# Patient Record
Sex: MALE | Race: BLACK OR AFRICAN AMERICAN | NOT HISPANIC OR LATINO | Employment: FULL TIME | URBAN - METROPOLITAN AREA
[De-identification: names, ages, dates, MRNs, and addresses within clinical notes are randomized per-mention and may not be internally consistent; named-entity substitution may affect disease eponyms.]

---

## 2018-10-16 ENCOUNTER — HOSPITAL ENCOUNTER (EMERGENCY)
Facility: MEDICAL CENTER | Age: 28
End: 2018-10-16
Attending: EMERGENCY MEDICINE
Payer: COMMERCIAL

## 2018-10-16 VITALS
BODY MASS INDEX: 23.32 KG/M2 | WEIGHT: 140 LBS | RESPIRATION RATE: 14 BRPM | OXYGEN SATURATION: 94 % | TEMPERATURE: 99.1 F | HEART RATE: 84 BPM | HEIGHT: 65 IN

## 2018-10-16 DIAGNOSIS — R10.84 GENERALIZED ABDOMINAL PAIN: ICD-10-CM

## 2018-10-16 LAB
ALBUMIN SERPL BCP-MCNC: 4.3 G/DL (ref 3.2–4.9)
ALBUMIN/GLOB SERPL: 1.1 G/DL
ALP SERPL-CCNC: 92 U/L (ref 30–99)
ALT SERPL-CCNC: 16 U/L (ref 2–50)
AMPHET UR QL SCN: NEGATIVE
ANION GAP SERPL CALC-SCNC: 9 MMOL/L (ref 0–11.9)
APPEARANCE UR: CLEAR
AST SERPL-CCNC: 22 U/L (ref 12–45)
BACTERIA #/AREA URNS HPF: NEGATIVE /HPF
BARBITURATES UR QL SCN: NEGATIVE
BASOPHILS # BLD AUTO: 0.3 % (ref 0–1.8)
BASOPHILS # BLD: 0.04 K/UL (ref 0–0.12)
BENZODIAZ UR QL SCN: NEGATIVE
BILIRUB SERPL-MCNC: 0.8 MG/DL (ref 0.1–1.5)
BILIRUB UR QL STRIP.AUTO: NEGATIVE
BUN SERPL-MCNC: 17 MG/DL (ref 8–22)
BZE UR QL SCN: POSITIVE
CALCIUM SERPL-MCNC: 9.3 MG/DL (ref 8.5–10.5)
CANNABINOIDS UR QL SCN: POSITIVE
CHLORIDE SERPL-SCNC: 104 MMOL/L (ref 96–112)
CO2 SERPL-SCNC: 23 MMOL/L (ref 20–33)
COLOR UR: YELLOW
CREAT SERPL-MCNC: 1.31 MG/DL (ref 0.5–1.4)
EKG IMPRESSION: NORMAL
EOSINOPHIL # BLD AUTO: 0.24 K/UL (ref 0–0.51)
EOSINOPHIL NFR BLD: 1.6 % (ref 0–6.9)
EPI CELLS #/AREA URNS HPF: NEGATIVE /HPF
ERYTHROCYTE [DISTWIDTH] IN BLOOD BY AUTOMATED COUNT: 45.1 FL (ref 35.9–50)
ETHANOL BLD-MCNC: 0 G/DL
GLOBULIN SER CALC-MCNC: 3.8 G/DL (ref 1.9–3.5)
GLUCOSE SERPL-MCNC: 101 MG/DL (ref 65–99)
GLUCOSE UR STRIP.AUTO-MCNC: NEGATIVE MG/DL
HCT VFR BLD AUTO: 43.2 % (ref 42–52)
HGB BLD-MCNC: 14.8 G/DL (ref 14–18)
HYALINE CASTS #/AREA URNS LPF: ABNORMAL /LPF
IMM GRANULOCYTES # BLD AUTO: 0.05 K/UL (ref 0–0.11)
IMM GRANULOCYTES NFR BLD AUTO: 0.3 % (ref 0–0.9)
KETONES UR STRIP.AUTO-MCNC: NEGATIVE MG/DL
LEUKOCYTE ESTERASE UR QL STRIP.AUTO: ABNORMAL
LIPASE SERPL-CCNC: 66 U/L (ref 11–82)
LYMPHOCYTES # BLD AUTO: 0.99 K/UL (ref 1–4.8)
LYMPHOCYTES NFR BLD: 6.7 % (ref 22–41)
MCH RBC QN AUTO: 32.5 PG (ref 27–33)
MCHC RBC AUTO-ENTMCNC: 34.3 G/DL (ref 33.7–35.3)
MCV RBC AUTO: 94.9 FL (ref 81.4–97.8)
METHADONE UR QL SCN: NEGATIVE
MICRO URNS: ABNORMAL
MONOCYTES # BLD AUTO: 1.02 K/UL (ref 0–0.85)
MONOCYTES NFR BLD AUTO: 6.9 % (ref 0–13.4)
NEUTROPHILS # BLD AUTO: 12.38 K/UL (ref 1.82–7.42)
NEUTROPHILS NFR BLD: 84.2 % (ref 44–72)
NITRITE UR QL STRIP.AUTO: NEGATIVE
NRBC # BLD AUTO: 0 K/UL
NRBC BLD-RTO: 0 /100 WBC
OPIATES UR QL SCN: NEGATIVE
OXYCODONE UR QL SCN: NEGATIVE
PCP UR QL SCN: NEGATIVE
PH UR STRIP.AUTO: 8.5 [PH]
PLATELET # BLD AUTO: 263 K/UL (ref 164–446)
PMV BLD AUTO: 9.3 FL (ref 9–12.9)
POTASSIUM SERPL-SCNC: 4.1 MMOL/L (ref 3.6–5.5)
PROPOXYPH UR QL SCN: NEGATIVE
PROT SERPL-MCNC: 8.1 G/DL (ref 6–8.2)
PROT UR QL STRIP: NEGATIVE MG/DL
RBC # BLD AUTO: 4.55 M/UL (ref 4.7–6.1)
RBC # URNS HPF: ABNORMAL /HPF
RBC UR QL AUTO: ABNORMAL
SODIUM SERPL-SCNC: 136 MMOL/L (ref 135–145)
SP GR UR STRIP.AUTO: 1.02
TROPONIN I SERPL-MCNC: <0.01 NG/ML (ref 0–0.04)
UROBILINOGEN UR STRIP.AUTO-MCNC: 0.2 MG/DL
WBC # BLD AUTO: 14.7 K/UL (ref 4.8–10.8)
WBC #/AREA URNS HPF: ABNORMAL /HPF

## 2018-10-16 PROCEDURE — 87491 CHLMYD TRACH DNA AMP PROBE: CPT

## 2018-10-16 PROCEDURE — 700105 HCHG RX REV CODE 258: Performed by: EMERGENCY MEDICINE

## 2018-10-16 PROCEDURE — 87591 N.GONORRHOEAE DNA AMP PROB: CPT

## 2018-10-16 PROCEDURE — 81001 URINALYSIS AUTO W/SCOPE: CPT

## 2018-10-16 PROCEDURE — 700111 HCHG RX REV CODE 636 W/ 250 OVERRIDE (IP): Performed by: EMERGENCY MEDICINE

## 2018-10-16 PROCEDURE — 83690 ASSAY OF LIPASE: CPT

## 2018-10-16 PROCEDURE — 84484 ASSAY OF TROPONIN QUANT: CPT

## 2018-10-16 PROCEDURE — 93005 ELECTROCARDIOGRAM TRACING: CPT | Performed by: EMERGENCY MEDICINE

## 2018-10-16 PROCEDURE — 85025 COMPLETE CBC W/AUTO DIFF WBC: CPT

## 2018-10-16 PROCEDURE — 80053 COMPREHEN METABOLIC PANEL: CPT

## 2018-10-16 PROCEDURE — 96365 THER/PROPH/DIAG IV INF INIT: CPT

## 2018-10-16 PROCEDURE — 96375 TX/PRO/DX INJ NEW DRUG ADDON: CPT

## 2018-10-16 PROCEDURE — 87086 URINE CULTURE/COLONY COUNT: CPT

## 2018-10-16 PROCEDURE — 80307 DRUG TEST PRSMV CHEM ANLYZR: CPT

## 2018-10-16 PROCEDURE — 99285 EMERGENCY DEPT VISIT HI MDM: CPT

## 2018-10-16 PROCEDURE — 36415 COLL VENOUS BLD VENIPUNCTURE: CPT

## 2018-10-16 RX ORDER — LORAZEPAM 2 MG/ML
1 INJECTION INTRAMUSCULAR ONCE
Status: COMPLETED | OUTPATIENT
Start: 2018-10-16 | End: 2018-10-16

## 2018-10-16 RX ORDER — DOXYCYCLINE 100 MG/1
100 CAPSULE ORAL 2 TIMES DAILY
Qty: 20 CAP | Refills: 0 | Status: SHIPPED | OUTPATIENT
Start: 2018-10-16 | End: 2018-10-26

## 2018-10-16 RX ORDER — ONDANSETRON 2 MG/ML
4 INJECTION INTRAMUSCULAR; INTRAVENOUS ONCE
Status: COMPLETED | OUTPATIENT
Start: 2018-10-16 | End: 2018-10-16

## 2018-10-16 RX ORDER — SODIUM CHLORIDE 9 MG/ML
1000 INJECTION, SOLUTION INTRAVENOUS ONCE
Status: COMPLETED | OUTPATIENT
Start: 2018-10-16 | End: 2018-10-16

## 2018-10-16 RX ADMIN — ONDANSETRON 4 MG: 2 INJECTION INTRAMUSCULAR; INTRAVENOUS at 18:51

## 2018-10-16 RX ADMIN — SODIUM CHLORIDE 1000 ML: 9 INJECTION, SOLUTION INTRAVENOUS at 18:51

## 2018-10-16 RX ADMIN — LORAZEPAM 1 MG: 2 INJECTION INTRAMUSCULAR; INTRAVENOUS at 18:52

## 2018-10-16 RX ADMIN — CEFTRIAXONE SODIUM 1 G: 1 INJECTION, POWDER, FOR SOLUTION INTRAMUSCULAR; INTRAVENOUS at 22:26

## 2018-10-16 ASSESSMENT — PAIN SCALES - GENERAL: PAINLEVEL_OUTOF10: 10

## 2018-10-16 ASSESSMENT — LIFESTYLE VARIABLES
AVERAGE NUMBER OF DAYS PER WEEK YOU HAVE A DRINK CONTAINING ALCOHOL: 1
EVER FELT BAD OR GUILTY ABOUT YOUR DRINKING: NO
TOTAL SCORE: 0
CONSUMPTION TOTAL: NEGATIVE
HOW MANY TIMES IN THE PAST YEAR HAVE YOU HAD 5 OR MORE DRINKS IN A DAY: 0
HAVE PEOPLE ANNOYED YOU BY CRITICIZING YOUR DRINKING: NO
EVER HAD A DRINK FIRST THING IN THE MORNING TO STEADY YOUR NERVES TO GET RID OF A HANGOVER: NO
DO YOU DRINK ALCOHOL: YES
HAVE YOU EVER FELT YOU SHOULD CUT DOWN ON YOUR DRINKING: NO
ON A TYPICAL DAY WHEN YOU DRINK ALCOHOL HOW MANY DRINKS DO YOU HAVE: 2
TOTAL SCORE: 0
TOTAL SCORE: 0

## 2018-10-17 LAB
C TRACH DNA SPEC QL NAA+PROBE: POSITIVE
N GONORRHOEA DNA SPEC QL NAA+PROBE: NEGATIVE
SPECIMEN SOURCE: ABNORMAL

## 2018-10-17 NOTE — ED NOTES
Verbalizes understanding of discharge and followup instructions. PIV removed, VSS. Given Rx. Ambulates with steady slow gait to discharge with fiance.
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
## 2018-10-17 NOTE — ED NOTES
Assumed care of patient, report from Ruma YBARRA. Girlfriend at bedside, VSS. Sleeping but wakes to verbal stimulus. Aware of need for urine sample.

## 2018-10-17 NOTE — ED NOTES
Attempted to encourage patient out of bed to provide urine sample. Refuses at this time and states he is unable.

## 2018-10-17 NOTE — ED PROVIDER NOTES
"ED Provider Note    CHIEF COMPLAINT  Chief Complaint   Patient presents with   • Seizure   • N/V   • Abdominal Pain   • Chest Pain       HPI  Marry Bunn is a 28 y.o. male who presents with vomiting.  The patient states he started getting sick this afternoon while at work.  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  Earlier in the day he also had a sore throat.  He presents via EMS after a possible seizure.  However the patient was able to communicate during the tremors.  On my exam the patient was also tremulous but is able to speak and answer questions appropriately therefore seizure to be very unlikely.  He does complain of some epigastric burning discomfort.  He states he does not have any history of anxiety.  He does admit to daily marijuana use but otherwise denies drug abuse.  The patient does not have a seizure history.  He has not had any fevers but has not felt well throughout the day as described above.    REVIEW OF SYSTEMS  See HPI for further details. All other systems are negative.     PAST MEDICAL HISTORY  History reviewed. No pertinent past medical history.    SOCIAL HISTORY  Social History     Social History   • Marital status: N/A     Spouse name: N/A   • Number of children: N/A   • Years of education: N/A     Social History Main Topics   • Smoking status: Current Some Day Smoker     Packs/day: 0.50     Types: Cigarettes   • Smokeless tobacco: Never Used   • Alcohol use Yes      Comment: Occasional   • Drug use: Yes     Types: Inhaled      Comment: Marijuana, every day   • Sexual activity: Not on file     Other Topics Concern   • Not on file     Social History Narrative   • No narrative on file           PHYSICAL EXAM  VITAL SIGNS: Temp (!) 38.1 °C (100.5 °F)   Resp 18   Ht 1.651 m (5' 5\")   Wt 63.5 kg (140 lb)   SpO2 96%   BMI 23.30 kg/m²   Constitutional: in acute distress, Non-toxic appearance.   HENT: Normocephalic, " Atraumatic, tympanic membranes are intact and nonerythematous bilaterally, Oropharynx dry t without exudates or erythema, Nose normal.   Eyes: PERRLA, EOMI, Conjunctiva normal.  Neck: Supple without meningismus  Lymphatic: No lymphadenopathy noted.   Cardiovascular: Tachycardic heart rate, Normal rhythm, No murmurs, No rubs, No gallops.   Thorax & Lungs: Normal breath sounds, No respiratory distress, No wheezing, No chest tenderness.   Abdomen: Epigastric discomfort to deep palpation, no rebound, no guarding, normal bowel sounds   skin: Warm, Dry, No erythema, No rash.   Back: No tenderness, No CVA tenderness.   Extremities: Atraumatic with symmetric distal pulses, No edema, No tenderness, No cyanosis, No clubbing.   Neurologic: Alert & oriented x 3, cranial nerves II through XII are intact, Normal motor function, Normal sensory function, No focal deficits noted.   Psychiatric: Anxious    EKG  Twelve-lead EKG interpreted by myself shows a normal sinus rhythm with a rate of 88, QRS shows right axis deviation, the patient does have large Q waves anteriorly but I suspect this is due to his body habitus and the patient does have corresponding J-point elevation.  No other ST segment elevation nor depression, normal T waves.      COURSE & MEDICAL DECISION MAKING  Pertinent Labs & Imaging studies reviewed. (See chart for details)  This a 28-year-old male who presents the emergency department with abdominal pain and seizure-like activity.  On my exam the patient is having the tremors and he is communicating therefore it be very unlikely that this is from a seizure.  The patient did have abdominal pain has had some associated vomiting diarrhea.  His abdomen did have diffuse tenderness but no peritoneal findings on my initial exam.  The patient does have a low-grade temperature on arrival that would support an infectious process and he also has a slight leukocytosis.  Based on the vomiting diarrhea I suspect this is all from  viral gastroenteritis.  The patient received Ativan which was effective in controlling his tremors.  The patient's drug screen did come back positive for cocaine as well as marijuana.  Therefore some of the patient's tremors could be from hyper excitation from the cocaine.  On repeat exam I did discuss the urinalysis the patient states he has had some dysuria over the last couple of days but denies purulent drainage from his penis.  Based on his age the most common cause of the urethritis would be from gonorrhea or chlamydia.  Therefore the patient received Rocephin intravenously to be discharged home on doxycycline.  The patient has been observed for a prolonged period of time and at the time of discharge she is now ambulatory much more appropriate.  The patient's abdominal examination is benign therefore surgical source of his presenting symptoms would be unlikely.    I did order a gonorrhea and chlamydia probe on the patient and his girlfriend is aware the need to follow-up on these results.  The patient will be discharged with clear instructions to return in 24 hours if he does not have continued improvement.    FINAL IMPRESSION  1.  Abdominal pain  2.  Vomiting diarrhea  3.  Suspect secondary to viral gastroenteritis  4.  Cocaine and marijuana abuse  5.  Cystitis versus urethritis     Disposition  The patient will be discharged in stable condition    Electronically signed by: Hai Recinos, 10/16/2018 6:43 PM

## 2018-10-17 NOTE — ED NOTES
Ambulatory to restroom after much convincing. Does not open eyes fully and ambulates with shuffled slow gait only moving feet several inches at a time. Steady on feet with good balance. Assisted in restroom and urine sample sent to lab.

## 2018-10-18 LAB
BACTERIA UR CULT: ABNORMAL
BACTERIA UR CULT: ABNORMAL
SIGNIFICANT IND 70042: ABNORMAL
SITE SITE: ABNORMAL
SOURCE SOURCE: ABNORMAL

## 2018-10-19 NOTE — ED NOTES
ED Positive Culture Follow-up/Notification Note:    Date: 10/19/18     Patient seen in the ED on 10/16/2018 for seizure, nausea, vomiting, abdominal pain, and dysuria.   1. Generalized abdominal pain       Discharge Medication List as of 10/16/2018 11:01 PM      START taking these medications    Details   doxycycline (MONODOX) 100 MG capsule Take 1 Cap by mouth 2 times a day for 10 days., Disp-20 Cap, R-0, Print Rx Paper             Allergies: Ancef [cefazolin]; Percocet [apap-fd&c red #40 [place]-oxycodone]; Shellfish allergy; and Tape     Vitals:    10/16/18 2130 10/16/18 2200 10/16/18 2301 10/16/18 2305   Pulse: 77 88  84   Resp:  18  14   Temp:   37.3 °C (99.1 °F)    SpO2: 93% 92%  94%   Weight:       Height:           Final cultures:   Results     Procedure Component Value Units Date/Time    URINE CULTURE(NEW) [[id number]]  (Abnormal) Collected:  10/16/18 2055    Order Status:  Completed Specimen:  Urine Updated:  10/18/18 0856     Significant Indicator POS (POS)     Source UR     Site --     Urine Culture Mixed skin cynthia <10,000 cfu/mL (A)      Yeast  <10,000 cfu/mL   (A)    CHLAMYDIA/GC PCR URINE OR SWAB [[id number]]  (Abnormal) Collected:  10/16/18 2055    Order Status:  Completed Specimen:  Urine from Genital Updated:  10/17/18 2229     Source Urine     C. trachomatis by PCR POSITIVE (A)     N. gonorrhoeae by PCR Negative    URINALYSIS CULTURE, IF INDICATED [[id number]]  (Abnormal) Collected:  10/16/18 2055    Order Status:  Completed Specimen:  Urine from Urine, Clean Catch Updated:  10/16/18 2119     Color Yellow     Character Clear     Specific Gravity 1.017     Ph 8.5 (A)     Glucose Negative mg/dL      Ketones Negative mg/dL      Protein Negative mg/dL      Bilirubin Negative     Urobilinogen, Urine 0.2     Nitrite Negative     Leukocyte Esterase Moderate (A)     Occult Blood Small (A)     Micro Urine Req Microscopic    CHLAMYDIA & GC BY PCR [[id number]] Collected:  10/16/18 0000    Order Status:   Canceled Specimen:  Genital from Genital           Plan:   Appropriate antibiotic therapy prescribed.   Called patient to inform him of results.  Discussed with patient that he should finish at least 7 days of doxycycline to complete treatment.   Education provided about further testing from the health department if he wanted, to inform partners within the last 60 days, and to remain abstinent until 7 days after completion of therapy.      Mihir Andrea, PharmD

## 2020-01-26 ENCOUNTER — HOSPITAL ENCOUNTER (EMERGENCY)
Facility: MEDICAL CENTER | Age: 30
End: 2020-01-26
Payer: COMMERCIAL

## 2020-01-26 VITALS
SYSTOLIC BLOOD PRESSURE: 122 MMHG | BODY MASS INDEX: 21.69 KG/M2 | RESPIRATION RATE: 18 BRPM | DIASTOLIC BLOOD PRESSURE: 91 MMHG | WEIGHT: 135 LBS | TEMPERATURE: 97.9 F | HEIGHT: 66 IN | HEART RATE: 84 BPM | OXYGEN SATURATION: 98 %

## 2020-01-26 PROCEDURE — 302449 STATCHG TRIAGE ONLY (STATISTIC)

## 2020-01-27 NOTE — ED TRIAGE NOTES
Chief Complaint   Patient presents with   • Flank Pain   • Abdominal Pain     History of kidney stones.  Recent lithotripsy.  Pt concerned he is passing kidney stone.  Triage process explained to patient.  Pt back to waiting room.  Pt instructed to inform RN if any changes or questions arise.

## 2020-01-27 NOTE — ED NOTES
"Pt stated he \"passed the stone.\" Pt told this tech he would come back if he has any more complications, but did not think he needed to be here at this time. Walked out with steady gait.  "

## 2020-03-13 ENCOUNTER — ANESTHESIA (OUTPATIENT)
Dept: SURGERY | Facility: MEDICAL CENTER | Age: 30
End: 2020-03-13
Payer: COMMERCIAL

## 2020-03-13 ENCOUNTER — APPOINTMENT (OUTPATIENT)
Dept: RADIOLOGY | Facility: MEDICAL CENTER | Age: 30
End: 2020-03-13
Attending: EMERGENCY MEDICINE
Payer: COMMERCIAL

## 2020-03-13 ENCOUNTER — HOSPITAL ENCOUNTER (OUTPATIENT)
Facility: MEDICAL CENTER | Age: 30
End: 2020-03-14
Attending: EMERGENCY MEDICINE | Admitting: HOSPITALIST
Payer: COMMERCIAL

## 2020-03-13 ENCOUNTER — APPOINTMENT (OUTPATIENT)
Dept: RADIOLOGY | Facility: MEDICAL CENTER | Age: 30
End: 2020-03-13
Attending: UROLOGY
Payer: COMMERCIAL

## 2020-03-13 ENCOUNTER — ANESTHESIA EVENT (OUTPATIENT)
Dept: SURGERY | Facility: MEDICAL CENTER | Age: 30
End: 2020-03-13
Payer: COMMERCIAL

## 2020-03-13 DIAGNOSIS — R10.9 FLANK PAIN: ICD-10-CM

## 2020-03-13 DIAGNOSIS — N20.1 URETERAL STONE: ICD-10-CM

## 2020-03-13 DIAGNOSIS — R11.2 NON-INTRACTABLE VOMITING WITH NAUSEA, UNSPECIFIED VOMITING TYPE: ICD-10-CM

## 2020-03-13 DIAGNOSIS — D72.825 BANDEMIA: ICD-10-CM

## 2020-03-13 DIAGNOSIS — N13.2 HYDRONEPHROSIS WITH URINARY OBSTRUCTION DUE TO URETERAL CALCULUS: ICD-10-CM

## 2020-03-13 PROBLEM — N13.9 OBSTRUCTIVE UROPATHY: Status: ACTIVE | Noted: 2020-03-13

## 2020-03-13 PROBLEM — R65.10 SIRS (SYSTEMIC INFLAMMATORY RESPONSE SYNDROME) (HCC): Status: ACTIVE | Noted: 2020-03-13

## 2020-03-13 PROBLEM — N17.9 AKI (ACUTE KIDNEY INJURY) (HCC): Status: ACTIVE | Noted: 2020-03-13

## 2020-03-13 PROBLEM — N13.30 HYDRONEPHROSIS: Status: ACTIVE | Noted: 2020-03-13

## 2020-03-13 LAB
ALBUMIN SERPL BCP-MCNC: 4.3 G/DL (ref 3.2–4.9)
ALBUMIN/GLOB SERPL: 1.2 G/DL
ALP SERPL-CCNC: 108 U/L (ref 30–99)
ALT SERPL-CCNC: 12 U/L (ref 2–50)
AMPHET UR QL SCN: NEGATIVE
ANION GAP SERPL CALC-SCNC: 11 MMOL/L (ref 7–16)
APPEARANCE UR: CLEAR
AST SERPL-CCNC: 22 U/L (ref 12–45)
BACTERIA #/AREA URNS HPF: NEGATIVE /HPF
BARBITURATES UR QL SCN: NEGATIVE
BASOPHILS # BLD AUTO: 0.5 % (ref 0–1.8)
BASOPHILS # BLD: 0.08 K/UL (ref 0–0.12)
BENZODIAZ UR QL SCN: NEGATIVE
BILIRUB SERPL-MCNC: 0.3 MG/DL (ref 0.1–1.5)
BILIRUB UR QL STRIP.AUTO: NEGATIVE
BUN SERPL-MCNC: 20 MG/DL (ref 8–22)
BZE UR QL SCN: POSITIVE
CALCIUM SERPL-MCNC: 10 MG/DL (ref 8.5–10.5)
CANNABINOIDS UR QL SCN: POSITIVE
CHLORIDE SERPL-SCNC: 105 MMOL/L (ref 96–112)
CO2 SERPL-SCNC: 25 MMOL/L (ref 20–33)
COLOR UR: YELLOW
CREAT SERPL-MCNC: 1.82 MG/DL (ref 0.5–1.4)
EOSINOPHIL # BLD AUTO: 0.99 K/UL (ref 0–0.51)
EOSINOPHIL NFR BLD: 5.8 % (ref 0–6.9)
EPI CELLS #/AREA URNS HPF: NEGATIVE /HPF
ERYTHROCYTE [DISTWIDTH] IN BLOOD BY AUTOMATED COUNT: 45.5 FL (ref 35.9–50)
GLOBULIN SER CALC-MCNC: 3.5 G/DL (ref 1.9–3.5)
GLUCOSE SERPL-MCNC: 112 MG/DL (ref 65–99)
GLUCOSE UR STRIP.AUTO-MCNC: NEGATIVE MG/DL
HCT VFR BLD AUTO: 44.7 % (ref 42–52)
HGB BLD-MCNC: 15 G/DL (ref 14–18)
HYALINE CASTS #/AREA URNS LPF: ABNORMAL /LPF
IMM GRANULOCYTES # BLD AUTO: 0.06 K/UL (ref 0–0.11)
IMM GRANULOCYTES NFR BLD AUTO: 0.4 % (ref 0–0.9)
KETONES UR STRIP.AUTO-MCNC: NEGATIVE MG/DL
LEUKOCYTE ESTERASE UR QL STRIP.AUTO: ABNORMAL
LIPASE SERPL-CCNC: 73 U/L (ref 11–82)
LYMPHOCYTES # BLD AUTO: 4.41 K/UL (ref 1–4.8)
LYMPHOCYTES NFR BLD: 25.7 % (ref 22–41)
MCH RBC QN AUTO: 33.1 PG (ref 27–33)
MCHC RBC AUTO-ENTMCNC: 33.6 G/DL (ref 33.7–35.3)
MCV RBC AUTO: 98.7 FL (ref 81.4–97.8)
METHADONE UR QL SCN: NEGATIVE
MICRO URNS: ABNORMAL
MONOCYTES # BLD AUTO: 1.25 K/UL (ref 0–0.85)
MONOCYTES NFR BLD AUTO: 7.3 % (ref 0–13.4)
NEUTROPHILS # BLD AUTO: 10.35 K/UL (ref 1.82–7.42)
NEUTROPHILS NFR BLD: 60.3 % (ref 44–72)
NITRITE UR QL STRIP.AUTO: NEGATIVE
NRBC # BLD AUTO: 0 K/UL
NRBC BLD-RTO: 0 /100 WBC
OPIATES UR QL SCN: NEGATIVE
OXYCODONE UR QL SCN: NEGATIVE
PCP UR QL SCN: NEGATIVE
PH UR STRIP.AUTO: >=9 [PH] (ref 5–8)
PLATELET # BLD AUTO: 226 K/UL (ref 164–446)
PMV BLD AUTO: 9.3 FL (ref 9–12.9)
POTASSIUM SERPL-SCNC: 4 MMOL/L (ref 3.6–5.5)
PROPOXYPH UR QL SCN: NEGATIVE
PROT SERPL-MCNC: 7.8 G/DL (ref 6–8.2)
PROT UR QL SSA: NEGATIVE MG/DL
PROT UR QL STRIP: NEGATIVE MG/DL
RBC # BLD AUTO: 4.53 M/UL (ref 4.7–6.1)
RBC # URNS HPF: ABNORMAL /HPF
RBC UR QL AUTO: ABNORMAL
SODIUM SERPL-SCNC: 141 MMOL/L (ref 135–145)
SP GR UR STRIP.AUTO: 1.01
UROBILINOGEN UR STRIP.AUTO-MCNC: 0.2 MG/DL
WBC # BLD AUTO: 17.1 K/UL (ref 4.8–10.8)
WBC #/AREA URNS HPF: ABNORMAL /HPF

## 2020-03-13 PROCEDURE — 160036 HCHG PACU - EA ADDL 30 MINS PHASE I: Performed by: UROLOGY

## 2020-03-13 PROCEDURE — 700111 HCHG RX REV CODE 636 W/ 250 OVERRIDE (IP): Performed by: FAMILY MEDICINE

## 2020-03-13 PROCEDURE — 160048 HCHG OR STATISTICAL LEVEL 1-5: Performed by: UROLOGY

## 2020-03-13 PROCEDURE — 700111 HCHG RX REV CODE 636 W/ 250 OVERRIDE (IP): Performed by: EMERGENCY MEDICINE

## 2020-03-13 PROCEDURE — 160009 HCHG ANES TIME/MIN: Performed by: UROLOGY

## 2020-03-13 PROCEDURE — 96365 THER/PROPH/DIAG IV INF INIT: CPT

## 2020-03-13 PROCEDURE — 99285 EMERGENCY DEPT VISIT HI MDM: CPT

## 2020-03-13 PROCEDURE — 160041 HCHG SURGERY MINUTES - EA ADDL 1 MIN LEVEL 4: Performed by: UROLOGY

## 2020-03-13 PROCEDURE — 501329 HCHG SET, CYSTO IRRIG Y TUR: Performed by: UROLOGY

## 2020-03-13 PROCEDURE — 81001 URINALYSIS AUTO W/SCOPE: CPT

## 2020-03-13 PROCEDURE — C1758 CATHETER, URETERAL: HCPCS | Performed by: UROLOGY

## 2020-03-13 PROCEDURE — 96376 TX/PRO/DX INJ SAME DRUG ADON: CPT

## 2020-03-13 PROCEDURE — 80053 COMPREHEN METABOLIC PANEL: CPT

## 2020-03-13 PROCEDURE — 700105 HCHG RX REV CODE 258: Performed by: EMERGENCY MEDICINE

## 2020-03-13 PROCEDURE — 160029 HCHG SURGERY MINUTES - 1ST 30 MINS LEVEL 4: Performed by: UROLOGY

## 2020-03-13 PROCEDURE — G0378 HOSPITAL OBSERVATION PER HR: HCPCS

## 2020-03-13 PROCEDURE — 160002 HCHG RECOVERY MINUTES (STAT): Performed by: UROLOGY

## 2020-03-13 PROCEDURE — 700105 HCHG RX REV CODE 258: Performed by: HOSPITALIST

## 2020-03-13 PROCEDURE — 85025 COMPLETE CBC W/AUTO DIFF WBC: CPT

## 2020-03-13 PROCEDURE — 87086 URINE CULTURE/COLONY COUNT: CPT

## 2020-03-13 PROCEDURE — 160035 HCHG PACU - 1ST 60 MINS PHASE I: Performed by: UROLOGY

## 2020-03-13 PROCEDURE — 500879 HCHG PACK, CYSTO: Performed by: UROLOGY

## 2020-03-13 PROCEDURE — 80307 DRUG TEST PRSMV CHEM ANLYZR: CPT

## 2020-03-13 PROCEDURE — 96375 TX/PRO/DX INJ NEW DRUG ADDON: CPT

## 2020-03-13 PROCEDURE — 96372 THER/PROPH/DIAG INJ SC/IM: CPT

## 2020-03-13 PROCEDURE — 83690 ASSAY OF LIPASE: CPT

## 2020-03-13 PROCEDURE — 74176 CT ABD & PELVIS W/O CONTRAST: CPT

## 2020-03-13 PROCEDURE — 99223 1ST HOSP IP/OBS HIGH 75: CPT | Performed by: HOSPITALIST

## 2020-03-13 PROCEDURE — 700111 HCHG RX REV CODE 636 W/ 250 OVERRIDE (IP): Performed by: HOSPITALIST

## 2020-03-13 PROCEDURE — 700101 HCHG RX REV CODE 250: Performed by: ANESTHESIOLOGY

## 2020-03-13 PROCEDURE — 700111 HCHG RX REV CODE 636 W/ 250 OVERRIDE (IP): Performed by: ANESTHESIOLOGY

## 2020-03-13 PROCEDURE — 700101 HCHG RX REV CODE 250: Performed by: UROLOGY

## 2020-03-13 RX ORDER — OXYCODONE HYDROCHLORIDE 5 MG/1
5 TABLET ORAL
Status: DISCONTINUED | OUTPATIENT
Start: 2020-03-13 | End: 2020-03-13

## 2020-03-13 RX ORDER — MAGNESIUM HYDROXIDE 1200 MG/15ML
LIQUID ORAL
Status: DISCONTINUED | OUTPATIENT
Start: 2020-03-13 | End: 2020-03-13 | Stop reason: HOSPADM

## 2020-03-13 RX ORDER — KETOROLAC TROMETHAMINE 30 MG/ML
30 INJECTION, SOLUTION INTRAMUSCULAR; INTRAVENOUS EVERY 6 HOURS PRN
Status: DISCONTINUED | OUTPATIENT
Start: 2020-03-13 | End: 2020-03-14 | Stop reason: HOSPADM

## 2020-03-13 RX ORDER — HALOPERIDOL 5 MG/ML
1 INJECTION INTRAMUSCULAR
Status: DISCONTINUED | OUTPATIENT
Start: 2020-03-13 | End: 2020-03-13 | Stop reason: HOSPADM

## 2020-03-13 RX ORDER — ONDANSETRON 2 MG/ML
4 INJECTION INTRAMUSCULAR; INTRAVENOUS ONCE
Status: COMPLETED | OUTPATIENT
Start: 2020-03-13 | End: 2020-03-13

## 2020-03-13 RX ORDER — ONDANSETRON 2 MG/ML
4 INJECTION INTRAMUSCULAR; INTRAVENOUS
Status: DISCONTINUED | OUTPATIENT
Start: 2020-03-13 | End: 2020-03-13 | Stop reason: HOSPADM

## 2020-03-13 RX ORDER — CIPROFLOXACIN 2 MG/ML
400 INJECTION, SOLUTION INTRAVENOUS ONCE
Status: DISCONTINUED | OUTPATIENT
Start: 2020-03-13 | End: 2020-03-13

## 2020-03-13 RX ORDER — KETOROLAC TROMETHAMINE 30 MG/ML
15 INJECTION, SOLUTION INTRAMUSCULAR; INTRAVENOUS ONCE
Status: COMPLETED | OUTPATIENT
Start: 2020-03-13 | End: 2020-03-13

## 2020-03-13 RX ORDER — ONDANSETRON 2 MG/ML
4 INJECTION INTRAMUSCULAR; INTRAVENOUS EVERY 4 HOURS PRN
Status: DISCONTINUED | OUTPATIENT
Start: 2020-03-13 | End: 2020-03-14 | Stop reason: HOSPADM

## 2020-03-13 RX ORDER — MAGNESIUM HYDROXIDE 1200 MG/15ML
LIQUID ORAL
Status: COMPLETED | OUTPATIENT
Start: 2020-03-13 | End: 2020-03-13

## 2020-03-13 RX ORDER — SODIUM CHLORIDE 9 MG/ML
INJECTION, SOLUTION INTRAVENOUS CONTINUOUS
Status: DISCONTINUED | OUTPATIENT
Start: 2020-03-13 | End: 2020-03-14 | Stop reason: HOSPADM

## 2020-03-13 RX ORDER — MORPHINE SULFATE 4 MG/ML
4 INJECTION, SOLUTION INTRAMUSCULAR; INTRAVENOUS ONCE
Status: COMPLETED | OUTPATIENT
Start: 2020-03-13 | End: 2020-03-13

## 2020-03-13 RX ORDER — POLYETHYLENE GLYCOL 3350 17 G/17G
1 POWDER, FOR SOLUTION ORAL
Status: DISCONTINUED | OUTPATIENT
Start: 2020-03-13 | End: 2020-03-14 | Stop reason: HOSPADM

## 2020-03-13 RX ORDER — SODIUM CHLORIDE 9 MG/ML
1000 INJECTION, SOLUTION INTRAVENOUS ONCE
Status: COMPLETED | OUTPATIENT
Start: 2020-03-13 | End: 2020-03-13

## 2020-03-13 RX ORDER — AMOXICILLIN 250 MG
2 CAPSULE ORAL 2 TIMES DAILY
Status: DISCONTINUED | OUTPATIENT
Start: 2020-03-13 | End: 2020-03-14 | Stop reason: HOSPADM

## 2020-03-13 RX ORDER — DIPHENHYDRAMINE HYDROCHLORIDE 50 MG/ML
12.5 INJECTION INTRAMUSCULAR; INTRAVENOUS
Status: DISCONTINUED | OUTPATIENT
Start: 2020-03-13 | End: 2020-03-13 | Stop reason: HOSPADM

## 2020-03-13 RX ORDER — BISACODYL 10 MG
10 SUPPOSITORY, RECTAL RECTAL
Status: DISCONTINUED | OUTPATIENT
Start: 2020-03-13 | End: 2020-03-14 | Stop reason: HOSPADM

## 2020-03-13 RX ORDER — SODIUM CHLORIDE, SODIUM LACTATE, POTASSIUM CHLORIDE, CALCIUM CHLORIDE 600; 310; 30; 20 MG/100ML; MG/100ML; MG/100ML; MG/100ML
1000 INJECTION, SOLUTION INTRAVENOUS ONCE
Status: COMPLETED | OUTPATIENT
Start: 2020-03-13 | End: 2020-03-14

## 2020-03-13 RX ORDER — OXYCODONE HYDROCHLORIDE 5 MG/1
2.5 TABLET ORAL
Status: DISCONTINUED | OUTPATIENT
Start: 2020-03-13 | End: 2020-03-13

## 2020-03-13 RX ORDER — KETOROLAC TROMETHAMINE 30 MG/ML
15 INJECTION, SOLUTION INTRAMUSCULAR; INTRAVENOUS ONCE
Status: DISCONTINUED | OUTPATIENT
Start: 2020-03-13 | End: 2020-03-13

## 2020-03-13 RX ORDER — MORPHINE SULFATE 4 MG/ML
2 INJECTION, SOLUTION INTRAMUSCULAR; INTRAVENOUS
Status: DISCONTINUED | OUTPATIENT
Start: 2020-03-13 | End: 2020-03-14 | Stop reason: HOSPADM

## 2020-03-13 RX ADMIN — MORPHINE SULFATE 4 MG: 4 INJECTION INTRAVENOUS at 03:43

## 2020-03-13 RX ADMIN — MORPHINE SULFATE 4 MG: 4 INJECTION INTRAVENOUS at 05:13

## 2020-03-13 RX ADMIN — ONDANSETRON 4 MG: 2 INJECTION INTRAMUSCULAR; INTRAVENOUS at 02:56

## 2020-03-13 RX ADMIN — LIDOCAINE HYDROCHLORIDE 100 MG: 20 INJECTION, SOLUTION INTRAVENOUS at 19:53

## 2020-03-13 RX ADMIN — SODIUM CHLORIDE: 9 INJECTION, SOLUTION INTRAVENOUS at 06:20

## 2020-03-13 RX ADMIN — MORPHINE SULFATE 2 MG: 4 INJECTION INTRAVENOUS at 14:43

## 2020-03-13 RX ADMIN — SODIUM CHLORIDE 1000 ML: 9 INJECTION, SOLUTION INTRAVENOUS at 02:59

## 2020-03-13 RX ADMIN — MORPHINE SULFATE 2 MG: 4 INJECTION INTRAVENOUS at 10:50

## 2020-03-13 RX ADMIN — SODIUM CHLORIDE, POTASSIUM CHLORIDE, SODIUM LACTATE AND CALCIUM CHLORIDE 1000 ML: 600; 310; 30; 20 INJECTION, SOLUTION INTRAVENOUS at 06:20

## 2020-03-13 RX ADMIN — SODIUM CHLORIDE: 9 INJECTION, SOLUTION INTRAVENOUS at 19:48

## 2020-03-13 RX ADMIN — KETOROLAC TROMETHAMINE 15 MG: 30 INJECTION, SOLUTION INTRAMUSCULAR at 05:14

## 2020-03-13 RX ADMIN — MORPHINE SULFATE 2 MG: 4 INJECTION INTRAVENOUS at 07:44

## 2020-03-13 RX ADMIN — SODIUM CHLORIDE: 9 INJECTION, SOLUTION INTRAVENOUS at 15:30

## 2020-03-13 RX ADMIN — KETOROLAC TROMETHAMINE 15 MG: 30 INJECTION, SOLUTION INTRAMUSCULAR; INTRAVENOUS at 02:29

## 2020-03-13 RX ADMIN — CEFTRIAXONE SODIUM 1 G: 1 INJECTION, POWDER, FOR SOLUTION INTRAMUSCULAR; INTRAVENOUS at 04:41

## 2020-03-13 RX ADMIN — ONDANSETRON 4 MG: 2 INJECTION INTRAMUSCULAR; INTRAVENOUS at 04:30

## 2020-03-13 RX ADMIN — KETOROLAC TROMETHAMINE 30 MG: 30 INJECTION, SOLUTION INTRAMUSCULAR at 10:51

## 2020-03-13 RX ADMIN — MORPHINE SULFATE 2 MG: 4 INJECTION INTRAVENOUS at 18:18

## 2020-03-13 RX ADMIN — PROPOFOL 200 MG: 10 INJECTION, EMULSION INTRAVENOUS at 19:53

## 2020-03-13 RX ADMIN — ONDANSETRON 4 MG: 2 INJECTION INTRAMUSCULAR; INTRAVENOUS at 15:28

## 2020-03-13 ASSESSMENT — LIFESTYLE VARIABLES
TOTAL SCORE: 0
EVER FELT BAD OR GUILTY ABOUT YOUR DRINKING: NO
ON A TYPICAL DAY WHEN YOU DRINK ALCOHOL HOW MANY DRINKS DO YOU HAVE: 2
CONSUMPTION TOTAL: NEGATIVE
TOTAL SCORE: 0
AVERAGE NUMBER OF DAYS PER WEEK YOU HAVE A DRINK CONTAINING ALCOHOL: 2
HOW MANY TIMES IN THE PAST YEAR HAVE YOU HAD 5 OR MORE DRINKS IN A DAY: 0
EVER HAD A DRINK FIRST THING IN THE MORNING TO STEADY YOUR NERVES TO GET RID OF A HANGOVER: NO
ALCOHOL_USE: YES
HAVE YOU EVER FELT YOU SHOULD CUT DOWN ON YOUR DRINKING: NO
HAVE PEOPLE ANNOYED YOU BY CRITICIZING YOUR DRINKING: NO
DOES PATIENT WANT TO STOP DRINKING: NO
DO YOU DRINK ALCOHOL: NO
EVER_SMOKED: YES
TOTAL SCORE: 0

## 2020-03-13 ASSESSMENT — ENCOUNTER SYMPTOMS
COUGH: 0
PALPITATIONS: 0
DIZZINESS: 0
SHORTNESS OF BREATH: 0
FOCAL WEAKNESS: 0
TINGLING: 0
FLANK PAIN: 1
NAUSEA: 0
VOMITING: 0
ABDOMINAL PAIN: 0
DIARRHEA: 0
PHOTOPHOBIA: 0
HEADACHES: 0
DEPRESSION: 0
SORE THROAT: 0
FEVER: 0
CHILLS: 0
MYALGIAS: 0
WHEEZING: 0

## 2020-03-13 ASSESSMENT — COGNITIVE AND FUNCTIONAL STATUS - GENERAL
DAILY ACTIVITIY SCORE: 24
MOBILITY SCORE: 24
SUGGESTED CMS G CODE MODIFIER DAILY ACTIVITY: CH
SUGGESTED CMS G CODE MODIFIER MOBILITY: CH

## 2020-03-13 ASSESSMENT — FIBROSIS 4 INDEX: FIB4 SCORE: 0.59

## 2020-03-13 ASSESSMENT — PATIENT HEALTH QUESTIONNAIRE - PHQ9
SUM OF ALL RESPONSES TO PHQ9 QUESTIONS 1 AND 2: 0
2. FEELING DOWN, DEPRESSED, IRRITABLE, OR HOPELESS: NOT AT ALL
1. LITTLE INTEREST OR PLEASURE IN DOING THINGS: NOT AT ALL

## 2020-03-13 NOTE — ASSESSMENT & PLAN NOTE
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  He has required lithotripsy in the past.  Current CT scan shows a 9 mm right ureteral stone showing obstructive changes including hydronephrosis.  At this size, it is unlikely to pass spontaneously.  He will be admitted to the hospital and will continue IV fluid hydration as well as symptomatic management.  Given his leukocytosis, I have started on empiric antibiotic therapies and I have sent his urine for culture.  The patient will be kept n.p.o. for anticipated surgical intervention in the morning with urology.  Dr. Stephenson of urology was consulted by the emergency room physician and I appreciate his recommendations.

## 2020-03-13 NOTE — CARE PLAN
Problem: Pain Management  Goal: Pain level will decrease to patient's comfort goal  Outcome: NOT MET  Patient educated on pain medications, availability, and alternatives.      Problem: Safety  Goal: Will remain free from injury  Outcome: PROGRESSING AS EXPECTED  Patient educated to call before mobilizing, call light and belongings within reach, bed locked and in lowest position.

## 2020-03-13 NOTE — CONSULTS
UROLOGY Consult Note:    ANUSHKA Ward  Date & Time note created:    3/13/2020   10:16 AM     Referring MD:  Dr. Perez  Patient ID:   Name:             Russell Bunn   YOB: 1990  Age:                 29 y.o.  male   MRN:               [id number]                                                             Reason for Consult:      R 9mm proximal ureteral stone.     History of Present Illness:    29 yom who presented early this am complaining of non radiating, sharp, constant right flank pain associated with nausea and vomiting  X 1 day  that has progressively gotten worse with a pain scale now of  7-10/10. Pt denies any alleviating or aggravating factors.  He has had no fevers, chills, or hematuria, frequency; + for dysuria. . CT done in ED found a 9mm R proximal ureteral;l stone.  He does state that his current pain is similar to his prior episodes of kidney stones. Pt states history of cystinuria reequring up to 7 treatments with lithrotripsy and stent placements.      Review of Systems:      Constitutional: Denies fevers, Denies weight changes  Eyes: Denies changes in vision, no eye pain  Ears/Nose/Throat/Mouth: Denies nasal congestion or sore throat   Cardiovascular: no chest pain, no palpitations   Respiratory: no shortness of breath , Denies cough  Gastrointestinal/Hepatic: Denies abdominal pain, nausea, vomiting, diarrhea, constipation or GI bleeding   Genitourinary: Denies hematuria and frequency:  Admits to right flank pain +  dysuria   Musculoskeletal/Rheum: Denies  joint pain and swelling, no edema  Skin: Denies rash  Neurological: Denies headache, confusion, memory loss or focal weakness/parasthesias  Psychiatric: denies mood disorder   Endocrine: Luana thyroid problems  Heme/Oncology/Lymph Nodes: Denies enlarged lymph nodes, denies brusing or known bleeding disorder  All other systems were reviewed and are negative (AMA/CMS criteria)                Past Medical  History:   Past Medical History:   Diagnosis Date   • Kidney stones      Active Hospital Problems    Diagnosis   • Obstructive uropathy [N13.9]     Priority: High   • Hydronephrosis [N13.30]     Priority: High   • ARAM (acute kidney injury) ([facility]) [N17.9]   • SIRS (systemic inflammatory response syndrome) ([facility]) [R65.10]       Past Surgical History:  Past Surgical History:   Procedure Laterality Date   • LITHOTRIPSY         Hospital Medications:    Current Facility-Administered Medications:   •  NS infusion, , Intravenous, Continuous, Chandrika Morel D.O., Last Rate: 150 mL/hr at 03/13/20 0620  •  senna-docusate (PERICOLACE or SENOKOT S) 8.6-50 MG per tablet 2 Tab, 2 Tab, Oral, BID **AND** polyethylene glycol/lytes (MIRALAX) PACKET 1 Packet, 1 Packet, Oral, QDAY PRN **AND** magnesium hydroxide (MILK OF MAGNESIA) suspension 30 mL, 30 mL, Oral, QDAY PRN **AND** bisacodyl (DULCOLAX) suppository 10 mg, 10 mg, Rectal, QDAY PRN, Mary Tolbert M.D.  •  Notify provider if pain remains uncontrolled, , , CONTINUOUS **AND** Use the numeric rating scale (NRS-11) on regular floors and Critical-Care Pain Observation Tool (CPOT) on ICUs/Trauma to assess pain, , , CONTINUOUS **AND** Pulse Ox (Oximetry), , , CONTINUOUS **AND** Pharmacy Consult Request ...Pain Management Review 1 Each, 1 Each, Other, PHARMACY TO DOSE **AND** If patient difficult to arouse and/or has respiratory depression, stop any opiates that are currently infusing and call a Rapid Response., , , CONTINUOUS **AND** [DISCONTINUED] oxyCODONE immediate-release (ROXICODONE) tablet 2.5 mg, 2.5 mg, Oral, Q3HRS PRN **AND** [DISCONTINUED] oxyCODONE immediate-release (ROXICODONE) tablet 5 mg, 5 mg, Oral, Q3HRS PRN **AND** morphine (pf) 4 MG/ML injection 2 mg, 2 mg, Intravenous, Q3HRS PRN, Mary Tolbert M.D., 2 mg at 03/13/20 0744  •  [START ON 3/14/2020] cefTRIAXone (ROCEPHIN) 2 g in  mL IVPB, 2 g, Intravenous, Q24HRS, Mary Tolbert M.D.  •  ketorolac (TORADOL)  injection 30 mg, 30 mg, Intravenous, Q6HRS PRN, Mary Tolbert M.D.  No current outpatient medications on file.    Current Outpatient Medications:  (Not in a hospital admission)      Medication Allergy:  Allergies   Allergen Reactions   • Ancef [Cefazolin] Rash     rash   • Percocet [Apap-Fd&C Red #40 [place]-Oxycodone]    • Shellfish Allergy    • Tape        Family History:  History reviewed. No pertinent family history.    Social History:  Social History     Socioeconomic History   • Marital status: Single     Spouse name: Not on file   • Number of children: Not on file   • Years of education: Not on file   • Highest education level: Not on file   Occupational History   • Not on file   Social Needs   • Financial resource strain: Not on file   • Food insecurity     Worry: Not on file     Inability: Not on file   • Transportation needs     Medical: Not on file     Non-medical: Not on file   Tobacco Use   • Smoking status: Current Some Day Smoker     Packs/day: 0.50     Types: Cigarettes   • Smokeless tobacco: Never Used   Substance and Sexual Activity   • Alcohol use: Yes     Comment: Occasional   • Drug use: Yes     Types: Inhaled     Comment: Marijuana, every day   • Sexual activity: Not on file   Lifestyle   • Physical activity     Days per week: Not on file     Minutes per session: Not on file   • Stress: Not on file   Relationships   • Social connections     Talks on phone: Not on file     Gets together: Not on file     Attends Yazidi service: Not on file     Active member of club or organization: Not on file     Attends meetings of clubs or organizations: Not on file     Relationship status: Not on file   • Intimate partner violence     Fear of current or ex partner: Not on file     Emotionally abused: Not on file     Physically abused: Not on file     Forced sexual activity: Not on file   Other Topics Concern   • Not on file   Social History Narrative   • Not on file         Physical Exam:  Vitals/  "General Appearance:   Weight/BMI: Body mass index is 22.13 kg/m².  /86   Pulse 97   Temp 36.7 °C (98 °F) (Oral)   Resp 18   Ht 1.676 m (5' 6\")   Wt 62.2 kg (137 lb 2 oz)   SpO2 99%   Vitals:    03/13/20 0748 03/13/20 0830 03/13/20 0900 03/13/20 1001   BP:  133/93 128/90 131/86   Pulse: (!) 101 95 97 97   Resp:       Temp:       TempSrc:       SpO2: 98% 98% 96% 99%   Weight:       Height:         Oxygen Therapy:  Pulse Oximetry: 99 %    Constitutional:   Well developed, Well nourished, No acute distress  HENMT:  Normocephalic, Atraumatic, Oropharynx moist mucous membranes, No oral exudates, Nose normal.  No thyromegaly.  Eyes:  EOMI, Conjunctiva normal, No discharge.  Neck:  Normal range of motion, No cervical tenderness,  no JVD.  Cardiovascular:  Normal heart rate, Normal rhythm, No murmurs, No rubs, No gallops.   Extremitites with intact distal pulses, no cyanosis, or edema.  Lungs:  Normal breath sounds, breath sounds clear to auscultation bilaterally,  no crackles, no wheezing.   Abdomen: Bowel sounds normal, Soft, No tenderness, No guarding, No rebound, No masses, No hepatosplenomegaly.  :+ R CVA pain   Skin: Warm, Dry, No erythema, No rash, no induration.  Neurologic: Alert & oriented x 3, No focal deficits noted, cranial nerves II through X are grossly intact.  Psychiatric: Affect normal, Judgment normal, Mood normal.      MDM (Data Review):     Records reviewed and summarized in current documentation    Lab Data Review:  Recent Results (from the past 24 hour(s))   CBC WITH DIFFERENTIAL    Collection Time: 03/13/20  2:50 AM   Result Value Ref Range    WBC 17.1 (H) 4.8 - 10.8 K/uL    RBC 4.53 (L) 4.70 - 6.10 M/uL    Hemoglobin 15.0 14.0 - 18.0 g/dL    Hematocrit 44.7 42.0 - 52.0 %    MCV 98.7 (H) 81.4 - 97.8 fL    MCH 33.1 (H) 27.0 - 33.0 pg    MCHC 33.6 (L) 33.7 - 35.3 g/dL    RDW 45.5 35.9 - 50.0 fL    Platelet Count 226 164 - 446 K/uL    MPV 9.3 9.0 - 12.9 fL    Neutrophils-Polys 60.30 44.00 - " 72.00 %    Lymphocytes 25.70 22.00 - 41.00 %    Monocytes 7.30 0.00 - 13.40 %    Eosinophils 5.80 0.00 - 6.90 %    Basophils 0.50 0.00 - 1.80 %    Immature Granulocytes 0.40 0.00 - 0.90 %    Nucleated RBC 0.00 /100 WBC    Neutrophils (Absolute) 10.35 (H) 1.82 - 7.42 K/uL    Lymphs (Absolute) 4.41 1.00 - 4.80 K/uL    Monos (Absolute) 1.25 (H) 0.00 - 0.85 K/uL    Eos (Absolute) 0.99 (H) 0.00 - 0.51 K/uL    Baso (Absolute) 0.08 0.00 - 0.12 K/uL    Immature Granulocytes (abs) 0.06 0.00 - 0.11 K/uL    NRBC (Absolute) 0.00 K/uL   COMP METABOLIC PANEL    Collection Time: 03/13/20  2:50 AM   Result Value Ref Range    Sodium 141 135 - 145 mmol/L    Potassium 4.0 3.6 - 5.5 mmol/L    Chloride 105 96 - 112 mmol/L    Co2 25 20 - 33 mmol/L    Anion Gap 11.0 7.0 - 16.0    Glucose 112 (H) 65 - 99 mg/dL    Bun 20 8 - 22 mg/dL    Creatinine 1.82 (H) 0.50 - 1.40 mg/dL    Calcium 10.0 8.5 - 10.5 mg/dL    AST(SGOT) 22 12 - 45 U/L    ALT(SGPT) 12 2 - 50 U/L    Alkaline Phosphatase 108 (H) 30 - 99 U/L    Total Bilirubin 0.3 0.1 - 1.5 mg/dL    Albumin 4.3 3.2 - 4.9 g/dL    Total Protein 7.8 6.0 - 8.2 g/dL    Globulin 3.5 1.9 - 3.5 g/dL    A-G Ratio 1.2 g/dL   LIPASE    Collection Time: 03/13/20  2:50 AM   Result Value Ref Range    Lipase 73 11 - 82 U/L   ESTIMATED GFR    Collection Time: 03/13/20  2:50 AM   Result Value Ref Range    GFR If  54 (A) >60 mL/min/1.73 m 2    GFR If Non  44 (A) >60 mL/min/1.73 m 2   URINALYSIS,CULTURE IF INDICATED    Collection Time: 03/13/20  3:17 AM   Result Value Ref Range    Color Yellow     Character Clear     Specific Gravity 1.014 <1.035    Ph >=9.0 (A) 5.0 - 8.0    Glucose Negative Negative mg/dL    Ketones Negative Negative mg/dL    Protein Negative Negative mg/dL    Bilirubin Negative Negative    Urobilinogen, Urine 0.2 Negative    Nitrite Negative Negative    Leukocyte Esterase Moderate (A) Negative    Occult Blood Moderate (A) Negative    Micro Urine Req Microscopic     URINE DRUG SCREEN    Collection Time: 03/13/20  3:17 AM   Result Value Ref Range    Amphetamines Urine Negative Negative    Barbiturates Negative Negative    Benzodiazepines Negative Negative    Cocaine Metabolite Positive (A) Negative    Methadone Negative Negative    Opiates Negative Negative    Oxycodone Negative Negative    Phencyclidine -Pcp Negative Negative    Propoxyphene Negative Negative    Cannabinoid Metab Positive (A) Negative   URINE MICROSCOPIC (W/UA)    Collection Time: 03/13/20  3:17 AM   Result Value Ref Range    -150 (A) /hpf    RBC 20-50 (A) /hpf    Bacteria Negative None /hpf    Epithelial Cells Negative /hpf    Hyaline Cast 0-2 /lpf   UR PROTEIN SSA    Collection Time: 03/13/20  3:17 AM   Result Value Ref Range    Protein Negative Negative mg/dL       Imaging/Procedures Review:    Reviewed    MDM (Assessment and Plan):     Active Hospital Problems    Diagnosis   • Obstructive uropathy [N13.9]     Priority: High   • Hydronephrosis [N13.30]     Priority: High   • ARAM (acute kidney injury) ([facility]) [N17.9]   • SIRS (systemic inflammatory response syndrome) ([facility]) [R65.10]     Pt to remain NPO  For possible R CULTS vs cysto with stent later this afternoon with MD Damon.     Plan of care discussed and directed by LONA Damon

## 2020-03-13 NOTE — ED TRIAGE NOTES
.  Chief Complaint   Patient presents with   • Flank Pain     right      Pt ambulate to triage with above complaint. Pt reports history of kidney stones, states pain began 30 minutes ago. Denies N/V. Reports sharp, pressure like pain in right flank. Denies dysuria at this time. Pt appears visibly uncomfortable in triage.   Charge RN notified of Pt condition. Pt roomed.

## 2020-03-13 NOTE — H&P
Hospital Medicine History & Physical Note    Date of Service  3/13/2020    Primary Care Physician  No primary care provider on file.    Consultants  Dr. Stephenson, urology    Code Status  Full    Chief Complaint  Chief Complaint   Patient presents with   • Flank Pain     right       History of Presenting Illness  29 y.o. male who presented on 3/13/2020 with right flank pain.  This is a pleasant young -American gentleman who comes in with complaints of right flank pain which began shortly prior to his arrival to the emergency room.  Patient reports that he has a history of cystinuria with recurrent kidney stones and has required lithotripsy and stent placement in the past.  His right flank pain has been constant, sharp, nonradiating, and associated with nausea and vomiting.  He has had no alleviating or aggravating factors.  He has had no fevers, chills, or hematuria.  Prior to this, he was in his usual state of health.  He does state that his current pain is similar to his prior episodes of kidney stones.    Review of Systems  Review of Systems   Constitutional: Negative for chills and fever.   HENT: Negative for congestion and sore throat.    Eyes: Negative for photophobia.   Respiratory: Negative for cough, shortness of breath and wheezing.    Cardiovascular: Negative for chest pain and palpitations.   Gastrointestinal: Negative for abdominal pain, diarrhea, nausea and vomiting.   Genitourinary: Positive for flank pain. Negative for dysuria.   Musculoskeletal: Negative for myalgias.   Skin: Negative.    Neurological: Negative for dizziness, tingling, focal weakness and headaches.   Psychiatric/Behavioral: Negative for depression and suicidal ideas.       Past Medical History  Past Medical History:   Diagnosis Date   • Kidney stones        Surgical History  Past Surgical History:   Procedure Laterality Date   • LITHOTRIPSY         Family History  History reviewed. No pertinent family history.    Social  History  Social History     Tobacco Use   • Smoking status: Current Some Day Smoker     Packs/day: 0.50     Types: Cigarettes   • Smokeless tobacco: Never Used   Substance Use Topics   • Alcohol use: Yes     Comment: Occasional   • Drug use: Yes     Types: Inhaled     Comment: Marijuana, every day       Allergies  Allergies   Allergen Reactions   • Ancef [Cefazolin] Rash     rash   • Percocet [Apap-Fd&C Red #40 Al Santillan-Oxycodone]    • Shellfish Allergy    • Tape        Medications  No current facility-administered medications on file prior to encounter.      No current outpatient medications on file prior to encounter.       Physical Exam  Hemodynamics  Temp (24hrs), Av.7 °C (98 °F), Min:36.7 °C (98 °F), Max:36.7 °C (98 °F)   Temperature: 36.7 °C (98 °F)  Pulse  Av.3  Min: 66  Max: 95    Blood Pressure: 123/75      Respiratory      Respiration: 18, Pulse Oximetry: 99 %             Physical Exam   Constitutional: He is oriented to person, place, and time. No distress.   HENT:   Head: Normocephalic and atraumatic.   Right Ear: External ear normal.   Left Ear: External ear normal.   Eyes: EOM are normal. Right eye exhibits no discharge. Left eye exhibits no discharge.   Neck: Neck supple. No JVD present.   Cardiovascular: Normal rate, regular rhythm and normal heart sounds.   Pulmonary/Chest: Effort normal and breath sounds normal. No respiratory distress. He exhibits no tenderness.   Abdominal: Soft. Bowel sounds are normal. He exhibits no distension. There is no abdominal tenderness.   Musculoskeletal:         General: No edema.   Neurological: He is alert and oriented to person, place, and time. No cranial nerve deficit.   Skin: Skin is dry. He is not diaphoretic. No erythema.   Psychiatric: He has a normal mood and affect. His behavior is normal.   Nursing note and vitals reviewed.    Capillary refill less than 3 seconds, distal pulses intact    Laboratory:  Recent Labs     20  0250   WBC 17.1*   RBC  4.53*   HEMOGLOBIN 15.0   HEMATOCRIT 44.7   MCV 98.7*   MCH 33.1*   MCHC 33.6*   RDW 45.5   PLATELETCT 226   MPV 9.3     Recent Labs     03/13/20  0250   SODIUM 141   POTASSIUM 4.0   CHLORIDE 105   CO2 25   GLUCOSE 112*   BUN 20   CREATININE 1.82*   CALCIUM 10.0     Recent Labs     03/13/20  0250   ALTSGPT 12   ASTSGOT 22   ALKPHOSPHAT 108*   TBILIRUBIN 0.3   LIPASE 73   GLUCOSE 112*                 Lab Results   Component Value Date    TROPONINI <0.01 10/16/2018       Imaging  Ct-renal Colic Evaluation(a/p W/o)    Result Date: 3/13/2020  3/13/2020 4:07 AM HISTORY/REASON FOR EXAM:  Right flank pain. TECHNIQUE/EXAM DESCRIPTION AND NUMBER OF VIEWS: CT scan renal/colic without contrast. 5 mm helical images of the abdomen and pelvis were obtained from the diaphragmatic domes through the pubic symphysis. Low dose optimization technique was utilized for this CT exam including automated exposure control and adjustment of the mA and/or kV according to patient size. COMPARISON: None. FINDINGS: Lower chest: Lung bases are clear. Liver: No mass. No intrahepatic biliary dilatation. Gallbladder: No mural thickening. No radiopaque gallstones. Common bile duct: Nondilated. Pancreas: Unremarkable. Spleen: No mass. Adrenals: No mass. Kidneys: No suspicious mass lesions. There are multiple bilateral renal stones and renal cortical calcifications The right kidney. A 9 x 6 mm stone in the right proximal ureter present in moderate moderate left hydronephrosis. No hydronephrosis. Stomach, small bowel, colon: No bowel wall thickening or obstruction. The appendix is not well seen. Peritoneal cavity: No ascites. Lymph nodes: No enlarged nodes by size criteria. Aorta: No aneurysm. Pelvic organs: Unremarkable. Musculoskeletal structures: No acute fracture or destructive lesion.     1.  A 9 mm stone in the right proximal ureter results in moderate right hydronephrosis. 2.  Multiple additional nonobstructing renal stones  bilaterally.        Assessment/Plan:  Anticipate that patient will need less than 2 midnights for management of the discussed medical issues.    * Obstructive uropathy  Assessment & Plan  Patient reports a history of cystinuria and has had recurrent nephrolithiasis.  He has required lithotripsy in the past.  Current CT scan shows a 9 mm right ureteral stone showing obstructive changes including hydronephrosis.  At this size, it is unlikely to pass spontaneously.  He will be admitted to the hospital and will continue IV fluid hydration as well as symptomatic management.  Given his leukocytosis, I have started on empiric antibiotic therapies and I have sent his urine for culture.  The patient will be kept n.p.o. for anticipated surgical intervention in the morning with urology.  Dr. Stephenson of urology was consulted by the emergency room physician and I appreciate his recommendations.    Hydronephrosis  Assessment & Plan  Treatment as noted above.    SIRS (systemic inflammatory response syndrome) ([facility])  Assessment & Plan  SIRS criteria identified on my evaluation include:  Leukocyosis, with WBC greater than 12,000    Patient with leukocytosis but is otherwise afebrile with stable vital signs.  Treatment as noted above for obstructive uropathy.    ARAM (acute kidney injury) ([facility])  Assessment & Plan  Likely prerenal, expect improvement with IV fluid hydration.  Monitor chemistries.      Prophylaxis: Sequential compression devices for DVT prophylaxis, no PPI indicated, bowel protocol as needed

## 2020-03-13 NOTE — ED PROVIDER NOTES
ED Provider Note    Scribed for Blair Palacios M.D. by Mildred Patiño. 3/13/2020  2:15 AM    CHIEF COMPLAINT  Chief Complaint   Patient presents with   • Flank Pain     right       HPI  Russell Bunn is a 29 y.o. male who presents to the ED for evaluation of right sided flank pain onset 30 minutes ago. Patient has history of kidney stones. 2 months ago, he had a kidney stone removed from his left kidney. He describes his pain as sharp in sensation.   Presents associated symptoms of nausea, emesis. Denies dysuria. Patient does not have a stent placed for his frequent kidney infections.       REVIEW OF SYSTEMS  Constitutional: No fevers, chills, or recent illness.  Skin: No rashes or diaphoresis.  Eyes: No change in vision, no discharge.  ENT: No hearing change. No rhinorrhea or nasal congestion, no ST or difficulty swallowing.  Respiratory: No SOB. No coughing or hemoptysis. No Wheezing.  Cardiac: No CP, palpitations, edema. No PND or orthopnea.  GI:  N/V  MSK: right sided flank pain,   Neuro: No HA or paresthesias. No focal weakness.  Psych: No SI, HI, AH, VH.  Endocrine: No polyuria or polydipsia. No heat or cold intolerance.  Heme: No easy bruising. No history of bleeding disorders or anemia.    PAST MEDICAL HISTORY   has a past medical history of Kidney stones.    SOCIAL HISTORY  Social History     Tobacco Use   • Smoking status: Current Some Day Smoker     Packs/day: 0.50     Types: Cigarettes   • Smokeless tobacco: Never Used   Substance and Sexual Activity   • Alcohol use: Yes     Comment: Occasional   • Drug use: Yes     Types: Inhaled     Comment: Marijuana, every day   • Sexual activity:        SURGICAL HISTORY   has a past surgical history that includes lithotripsy.    CURRENT MEDICATIONS  Home Medications     Reviewed by Lissy Zacarias R.N. (Registered Nurse) on 03/13/20 at 0202  Med List Status: Complete  "  Medication Last Dose Status        Patient [place] Taking any Medications                       ALLERGIES  Allergies   Allergen Reactions   • Ancef [Cefazolin]    • Percocet [Apap-Fd&C Red #40 Al Santillan-Oxycodone]    • Shellfish Allergy    • Tape        PHYSICAL EXAM  VITAL SIGNS: /71   Pulse 78   Temp 36.7 °C (98 °F) (Oral)   Resp 18   Ht 1.676 m (5' 6\")   Wt 62.2 kg (137 lb 2 oz)   SpO2 98%   BMI 22.13 kg/m²    Pulse ox interpretation: I interpret this pulse ox as normal.  Genl: Ill appearing, anxious, in moderate distress. Laying on side holding right flank.   Head: NC/AT   ENT: Mucous membranes slightly dry, posterior pharynx clear, uvula midline, nares patent bilaterally   Eyes: Normal sclera, pupils equal round reactive to light  Neck: Supple, FROM, no LAD appreciated   Pulmonary: Lungs are clear to auscultation bilaterally  Chest: No TTP  CV:  RRR, no murmur appreciated, pulses 2+ in both upper and lower extremities,  Abdomen: Inconsistent tenderness, thin and soft, no rebound/guarding, no HSP.  Repeatexam shows tenderness worse only on the left flank no CVA tenderness.   : no suprapubic tenderness   Musculoskeletal: Pain free ROM of the neck. Moving upper and lower extremities and spontaneous in coordinated fashion  Neuro: A&Ox4 (person, place, time, situation), speech fluent, gait steady, no focal deficits appreciated, No cerebellar signs.  Sensation is grossly intact in the distal upper and lower extremities  5/5 strength in  and dorsiflexion/plantar flexion of the ankles  Psych: Patient has an appropriate affect and behavior  Skin: No rash or lesions.  No pallor or jaundice.  No cyanosis.  Warm and dry.     DIAGNOSTIC STUDIES / PROCEDURES    LABS  Labs Reviewed   URINALYSIS,CULTURE IF INDICATED - Abnormal; Notable for the following components:       Result Value    Ph >=9.0 (*)     Leukocyte Esterase Moderate (*)     Occult Blood Moderate (*)     All other components within normal limits "   CBC WITH DIFFERENTIAL - Abnormal; Notable for the following components:    WBC 17.1 (*)     RBC 4.53 (*)     MCV 98.7 (*)     MCH 33.1 (*)     MCHC 33.6 (*)     Neutrophils (Absolute) 10.35 (*)     Monos (Absolute) 1.25 (*)     Eos (Absolute) 0.99 (*)     All other components within normal limits   COMP METABOLIC PANEL - Abnormal; Notable for the following components:    Glucose 112 (*)     Creatinine 1.82 (*)     Alkaline Phosphatase 108 (*)     All other components within normal limits   URINE DRUG SCREEN - Abnormal; Notable for the following components:    Cocaine Metabolite Positive (*)     Cannabinoid Metab Positive (*)     All other components within normal limits   ESTIMATED GFR - Abnormal; Notable for the following components:    GFR If  54 (*)     GFR If Non  44 (*)     All other components within normal limits   URINE MICROSCOPIC (W/UA) - Abnormal; Notable for the following components:    -150 (*)     RBC 20-50 (*)     All other components within normal limits   LIPASE   UR PROTEIN SSA   URINE CULTURE(NEW)       RADIOLOGY  CT-RENAL COLIC EVALUATION(A/P W/O)    (Results Pending)       COURSE & MEDICAL DECISION MAKING  Pertinent Labs & Imaging studies reviewed. (See chart for details)    Differential diagnoses include but not limited to: kidney stone, UT, Polysubstance abuse, withdrawal, Dehydration, Electrolyte abnormality. Kidney injury    2:15 AM - Patient seen and examined at bedside. Patient will be treated with NS Infusion 1,000 mL, Zofran 4 mg and Toradol 15 mg. Ordered CBC with diff., CMP, lipase, Urine drug screen, Urinlaysis to evaluate his symptoms.  Informed patient about lab work and radiology to rule out possible diagnoses. Patient understands and agrees with plan of care.      HYDRATION: Based on the patient's presentation of Acute Vomiting, Dehydration and Other to facilitiate passage of kidney stone the patient was given IV fluids. IV Hydration was  used because oral hydration was not adequate alone. Upon recheck following hydration, the patient was moderately improved.    Medical Decision Making:  Patient presented emergency room for symptoms as described above.  Patient reports having history of kidney stones, possible recurrent infections and stones that required surgical intervention.  The patient is intermittently vomiting, has nondescript flank discomfort concerning for stones versus infectious etiology.  The patient had no evidence of acute peritonitis, vital signs are reviewed and he is afebrile vital, normotensive with reassuring pulse.    IV access established and work-up for the broad differential as noted above.  Patient has a leukocytosis and leftward shift that could be due to repeat episodes of vomiting or possible infectious source.  Urinalysis was delayed as patient was acutely dry and IV fluids were given to help facilitate this.  Patient is made n.p.o. in the event of possible stone or infected stone, multiple doses of pain medications are administered along with antiemetics.  Urinalysis shows 100 250 WBCs, 20-50 RBCs, no bacteria but leukocyte esterase is present.  CT renal colic is ordered without contrast for evaluation of stone and obstructive pathology.  Patient has increased creatinine of 1.2, appears to be prerenal in nature, no concerning metabolic derangements.  Urine drug screen is positive for cocaine and cannabinoids, contribution of cannabinoid hyperemesis syndrome is also considered    At the time of signout, the patient is pending the results of CT abdomen pelvis without contrast.  Anticipate patient may need surgical intervention if he is found as there is indications of possible concurrent infectious etiology.  Patient will be followed up by the oncoming provider and ultimate disposition is at their discretion.    FINAL IMPRESSION  Visit Diagnoses     ICD-10-CM   1. Flank pain R10.9   2. Non-intractable vomiting with nausea,  unspecified vomiting type R11.2     IMildred (Scribe), am scribing for, and in the presence of, Blair Palacios M.D..    Electronically signed by: Mildred Patiño (Emy), 3/13/2020    Blair CONTRERAS M.D. personally performed the services described in this documentation, as scribed by Mildred Patiño in my presence, and it is both accurate and complete.    C    The note accurately reflects work and decisions made by me.  Blair Palacios M.D.  3/13/2020  4:03 AM

## 2020-03-13 NOTE — ED NOTES
Received report from Molina YBARRA.  Pt c/o pain rates as 10/10. Medicated per mar order. Will continue to monitor.  Call light within reach. Instructed to notify staff for any assistance.

## 2020-03-13 NOTE — ASSESSMENT & PLAN NOTE
SIRS criteria identified on my evaluation include:  Leukocyosis, with WBC greater than 12,000    Patient with leukocytosis but is otherwise afebrile with stable vital signs.  Treatment as noted above for obstructive uropathy.

## 2020-03-13 NOTE — ED PROVIDER NOTES
ED Provider Note    4:09 AM patient CARE signed out from nighttime ERP.  Patient presents with flank pain.  He is also had nausea and vomiting.  His labs do indicate a urinary tract infection with elevated white blood cell count.  CT scan, renal colic is pending.  Per signout, the patient has a stone he likely should be admitted to the hospital given the lab findings and clinical presentation.  If he turns around to clinically and CT scan is unrevealing and he is able to take oral fluids, could consider discharge to home.    4:54 AM patient CT scan reviewed.  Impression shows a 9 mm stone in the right proximal ureter resulting in moderate right hydronephrosis.  Additionally, patient has multiple nonobstructing renal stones.    5:30 AM patient's reevaluated the bedside.  He still experiencing nausea and vomiting.  I discussed with him, his CT findings consistent with a right sided ureteral stone.  Patient's frustrated, tearful at this news.  Reports being uncircumcised.  He will need admission to the hospital to which she is agreeable.  He still complaining of pain, despite readministration of medications.  Requesting Dilaudid.  Urology has been paged.  Per review of past records, patient has 1 prior encounter in October 2018 he was seen after a seizure with abdominal pain, nausea vomiting and chest pain.  At that time, he was diagnosed with viral gastroenteritis, cystitis/urethritis and cocaine and marijuana abuse.  Patient does not appear to be septic at this time.  He is not tachycardic or hypotensive.  He is afebrile.    5:39 AM discussed with Dr. Stephenson, urologist on call regarding the patient's presentation.  He agrees with admission to the hospitalist service.  He recommends keeping the patient n.p.o. and starting broad-spectrum antibiotics.  The broad-spectrum antibiotics were started prior to my involvement with this patient.  I will contact the hospitalist for admission.  He will be kept n.p.o.  He was  treated with IV fluid bolus prior to my involvement again, will continue IV fluids.  Nurses updated on plan of care.    5:45 AM discussed with Dr. Tolbert, hospitalist, who agrees to admit the patient to their service.  She is made aware, Dr. Stephenson, urologist will see the patient in consultation.

## 2020-03-13 NOTE — PROGRESS NOTES
Patient arrived via wheelchair and walked to bed.  Assessment complete.  A&O x 4. Patient calls appropriately.  Patient mobilizes independently.   Patient has 9/10 pain. Medicated per mar.  Denies N&V. NPO.  Surgery scheduled for this evening.  + void, + flatus, + BM.  Patient denies SOB.  SCD's off.  Review plan with of care with patient. Call light and personal belongings with in reach. Hourly rounding in place. All needs met at this time.

## 2020-03-14 ENCOUNTER — PATIENT OUTREACH (OUTPATIENT)
Dept: HEALTH INFORMATION MANAGEMENT | Facility: OTHER | Age: 30
End: 2020-03-14

## 2020-03-14 ENCOUNTER — APPOINTMENT (OUTPATIENT)
Dept: RADIOLOGY | Facility: MEDICAL CENTER | Age: 30
End: 2020-03-14
Attending: EMERGENCY MEDICINE
Payer: COMMERCIAL

## 2020-03-14 ENCOUNTER — HOSPITAL ENCOUNTER (OUTPATIENT)
Facility: MEDICAL CENTER | Age: 30
End: 2020-03-15
Attending: EMERGENCY MEDICINE | Admitting: INTERNAL MEDICINE
Payer: COMMERCIAL

## 2020-03-14 VITALS
HEART RATE: 64 BPM | WEIGHT: 137.13 LBS | OXYGEN SATURATION: 98 % | DIASTOLIC BLOOD PRESSURE: 82 MMHG | BODY MASS INDEX: 22.04 KG/M2 | HEIGHT: 66 IN | TEMPERATURE: 98.4 F | RESPIRATION RATE: 16 BRPM | SYSTOLIC BLOOD PRESSURE: 125 MMHG

## 2020-03-14 DIAGNOSIS — N17.9 ACUTE KIDNEY INJURY (HCC): ICD-10-CM

## 2020-03-14 DIAGNOSIS — G89.18 POSTOPERATIVE PAIN: ICD-10-CM

## 2020-03-14 DIAGNOSIS — N20.1 URETEROLITHIASIS: ICD-10-CM

## 2020-03-14 DIAGNOSIS — R10.9 FLANK PAIN: ICD-10-CM

## 2020-03-14 PROBLEM — D72.823 LEUKEMOID REACTION: Status: ACTIVE | Noted: 2020-03-14

## 2020-03-14 LAB
ALBUMIN SERPL BCP-MCNC: 4.2 G/DL (ref 3.2–4.9)
ALBUMIN/GLOB SERPL: 1.2 G/DL
ALP SERPL-CCNC: 87 U/L (ref 30–99)
ALT SERPL-CCNC: 15 U/L (ref 2–50)
ANION GAP SERPL CALC-SCNC: 10 MMOL/L (ref 7–16)
ANION GAP SERPL CALC-SCNC: 9 MMOL/L (ref 7–16)
APPEARANCE UR: CLEAR
AST SERPL-CCNC: 21 U/L (ref 12–45)
BACTERIA #/AREA URNS HPF: NEGATIVE /HPF
BASOPHILS # BLD AUTO: 0.3 % (ref 0–1.8)
BASOPHILS # BLD: 0.05 K/UL (ref 0–0.12)
BILIRUB SERPL-MCNC: 0.6 MG/DL (ref 0.1–1.5)
BILIRUB UR QL STRIP.AUTO: NEGATIVE
BUN SERPL-MCNC: 15 MG/DL (ref 8–22)
BUN SERPL-MCNC: 16 MG/DL (ref 8–22)
CALCIUM SERPL-MCNC: 8.9 MG/DL (ref 8.5–10.5)
CALCIUM SERPL-MCNC: 9.6 MG/DL (ref 8.5–10.5)
CHLORIDE SERPL-SCNC: 105 MMOL/L (ref 96–112)
CHLORIDE SERPL-SCNC: 108 MMOL/L (ref 96–112)
CO2 SERPL-SCNC: 22 MMOL/L (ref 20–33)
CO2 SERPL-SCNC: 22 MMOL/L (ref 20–33)
COLOR UR: YELLOW
CREAT SERPL-MCNC: 1.59 MG/DL (ref 0.5–1.4)
CREAT SERPL-MCNC: 1.73 MG/DL (ref 0.5–1.4)
EOSINOPHIL # BLD AUTO: 0.05 K/UL (ref 0–0.51)
EOSINOPHIL NFR BLD: 0.3 % (ref 0–6.9)
EPI CELLS #/AREA URNS HPF: NEGATIVE /HPF
ERYTHROCYTE [DISTWIDTH] IN BLOOD BY AUTOMATED COUNT: 45.8 FL (ref 35.9–50)
ERYTHROCYTE [DISTWIDTH] IN BLOOD BY AUTOMATED COUNT: 47.4 FL (ref 35.9–50)
GLOBULIN SER CALC-MCNC: 3.4 G/DL (ref 1.9–3.5)
GLUCOSE SERPL-MCNC: 111 MG/DL (ref 65–99)
GLUCOSE SERPL-MCNC: 86 MG/DL (ref 65–99)
GLUCOSE UR STRIP.AUTO-MCNC: NEGATIVE MG/DL
HCT VFR BLD AUTO: 41 % (ref 42–52)
HCT VFR BLD AUTO: 43.4 % (ref 42–52)
HGB BLD-MCNC: 13.6 G/DL (ref 14–18)
HGB BLD-MCNC: 14.6 G/DL (ref 14–18)
HYALINE CASTS #/AREA URNS LPF: ABNORMAL /LPF
IMM GRANULOCYTES # BLD AUTO: 0.1 K/UL (ref 0–0.11)
IMM GRANULOCYTES NFR BLD AUTO: 0.6 % (ref 0–0.9)
KETONES UR STRIP.AUTO-MCNC: ABNORMAL MG/DL
LEUKOCYTE ESTERASE UR QL STRIP.AUTO: ABNORMAL
LYMPHOCYTES # BLD AUTO: 1.21 K/UL (ref 1–4.8)
LYMPHOCYTES NFR BLD: 7.4 % (ref 22–41)
MCH RBC QN AUTO: 32.6 PG (ref 27–33)
MCH RBC QN AUTO: 33.3 PG (ref 27–33)
MCHC RBC AUTO-ENTMCNC: 33.2 G/DL (ref 33.7–35.3)
MCHC RBC AUTO-ENTMCNC: 33.6 G/DL (ref 33.7–35.3)
MCV RBC AUTO: 100.2 FL (ref 81.4–97.8)
MCV RBC AUTO: 96.9 FL (ref 81.4–97.8)
MICRO URNS: ABNORMAL
MONOCYTES # BLD AUTO: 1.85 K/UL (ref 0–0.85)
MONOCYTES NFR BLD AUTO: 11.3 % (ref 0–13.4)
NEUTROPHILS # BLD AUTO: 13.14 K/UL (ref 1.82–7.42)
NEUTROPHILS NFR BLD: 80.1 % (ref 44–72)
NITRITE UR QL STRIP.AUTO: NEGATIVE
NRBC # BLD AUTO: 0 K/UL
NRBC BLD-RTO: 0 /100 WBC
PH UR STRIP.AUTO: 6.5 [PH] (ref 5–8)
PLATELET # BLD AUTO: 208 K/UL (ref 164–446)
PLATELET # BLD AUTO: 216 K/UL (ref 164–446)
PMV BLD AUTO: 9.8 FL (ref 9–12.9)
PMV BLD AUTO: 9.8 FL (ref 9–12.9)
POTASSIUM SERPL-SCNC: 3.7 MMOL/L (ref 3.6–5.5)
POTASSIUM SERPL-SCNC: 4.1 MMOL/L (ref 3.6–5.5)
PROT SERPL-MCNC: 7.6 G/DL (ref 6–8.2)
PROT UR QL STRIP: NEGATIVE MG/DL
RBC # BLD AUTO: 4.09 M/UL (ref 4.7–6.1)
RBC # BLD AUTO: 4.48 M/UL (ref 4.7–6.1)
RBC # URNS HPF: ABNORMAL /HPF
RBC UR QL AUTO: ABNORMAL
SODIUM SERPL-SCNC: 137 MMOL/L (ref 135–145)
SODIUM SERPL-SCNC: 139 MMOL/L (ref 135–145)
SP GR UR STRIP.AUTO: 1.01
UROBILINOGEN UR STRIP.AUTO-MCNC: 0.2 MG/DL
WBC # BLD AUTO: 13.2 K/UL (ref 4.8–10.8)
WBC # BLD AUTO: 16.4 K/UL (ref 4.8–10.8)
WBC #/AREA URNS HPF: ABNORMAL /HPF

## 2020-03-14 PROCEDURE — 96375 TX/PRO/DX INJ NEW DRUG ADDON: CPT

## 2020-03-14 PROCEDURE — 700105 HCHG RX REV CODE 258: Performed by: EMERGENCY MEDICINE

## 2020-03-14 PROCEDURE — 36415 COLL VENOUS BLD VENIPUNCTURE: CPT

## 2020-03-14 PROCEDURE — 74176 CT ABD & PELVIS W/O CONTRAST: CPT

## 2020-03-14 PROCEDURE — 81001 URINALYSIS AUTO W/SCOPE: CPT

## 2020-03-14 PROCEDURE — 80048 BASIC METABOLIC PNL TOTAL CA: CPT

## 2020-03-14 PROCEDURE — 80053 COMPREHEN METABOLIC PANEL: CPT

## 2020-03-14 PROCEDURE — 700111 HCHG RX REV CODE 636 W/ 250 OVERRIDE (IP): Performed by: EMERGENCY MEDICINE

## 2020-03-14 PROCEDURE — 700105 HCHG RX REV CODE 258: Performed by: HOSPITALIST

## 2020-03-14 PROCEDURE — 99223 1ST HOSP IP/OBS HIGH 75: CPT | Performed by: INTERNAL MEDICINE

## 2020-03-14 PROCEDURE — 96376 TX/PRO/DX INJ SAME DRUG ADON: CPT

## 2020-03-14 PROCEDURE — 96374 THER/PROPH/DIAG INJ IV PUSH: CPT

## 2020-03-14 PROCEDURE — 700111 HCHG RX REV CODE 636 W/ 250 OVERRIDE (IP): Performed by: HOSPITALIST

## 2020-03-14 PROCEDURE — G0378 HOSPITAL OBSERVATION PER HR: HCPCS

## 2020-03-14 PROCEDURE — 85025 COMPLETE CBC W/AUTO DIFF WBC: CPT

## 2020-03-14 PROCEDURE — 700102 HCHG RX REV CODE 250 W/ 637 OVERRIDE(OP): Performed by: INTERNAL MEDICINE

## 2020-03-14 PROCEDURE — A9270 NON-COVERED ITEM OR SERVICE: HCPCS | Performed by: INTERNAL MEDICINE

## 2020-03-14 PROCEDURE — 99285 EMERGENCY DEPT VISIT HI MDM: CPT

## 2020-03-14 PROCEDURE — 85027 COMPLETE CBC AUTOMATED: CPT

## 2020-03-14 PROCEDURE — 700105 HCHG RX REV CODE 258: Performed by: INTERNAL MEDICINE

## 2020-03-14 RX ORDER — OXYCODONE HYDROCHLORIDE 10 MG/1
10 TABLET ORAL
Status: DISCONTINUED | OUTPATIENT
Start: 2020-03-14 | End: 2020-03-15

## 2020-03-14 RX ORDER — OXYCODONE HYDROCHLORIDE 5 MG/1
5 TABLET ORAL
Status: DISCONTINUED | OUTPATIENT
Start: 2020-03-14 | End: 2020-03-15

## 2020-03-14 RX ORDER — CEFDINIR 300 MG/1
300 CAPSULE ORAL 2 TIMES DAILY
Qty: 10 CAP | Refills: 0 | Status: ON HOLD | OUTPATIENT
Start: 2020-03-14 | End: 2020-03-15

## 2020-03-14 RX ORDER — ONDANSETRON 2 MG/ML
4 INJECTION INTRAMUSCULAR; INTRAVENOUS EVERY 4 HOURS PRN
Status: DISCONTINUED | OUTPATIENT
Start: 2020-03-14 | End: 2020-03-15 | Stop reason: HOSPADM

## 2020-03-14 RX ORDER — ACETAMINOPHEN 325 MG/1
650 TABLET ORAL EVERY 6 HOURS PRN
Status: DISCONTINUED | OUTPATIENT
Start: 2020-03-14 | End: 2020-03-15 | Stop reason: HOSPADM

## 2020-03-14 RX ORDER — HYDROMORPHONE HYDROCHLORIDE 1 MG/ML
1 INJECTION, SOLUTION INTRAMUSCULAR; INTRAVENOUS; SUBCUTANEOUS ONCE
Status: COMPLETED | OUTPATIENT
Start: 2020-03-14 | End: 2020-03-14

## 2020-03-14 RX ORDER — AMOXICILLIN 250 MG
2 CAPSULE ORAL 2 TIMES DAILY
Status: DISCONTINUED | OUTPATIENT
Start: 2020-03-14 | End: 2020-03-15 | Stop reason: HOSPADM

## 2020-03-14 RX ORDER — CEFDINIR 300 MG/1
300 CAPSULE ORAL 2 TIMES DAILY
Status: DISCONTINUED | OUTPATIENT
Start: 2020-03-15 | End: 2020-03-15 | Stop reason: HOSPADM

## 2020-03-14 RX ORDER — TAMSULOSIN HYDROCHLORIDE 0.4 MG/1
0.4 CAPSULE ORAL
Status: DISCONTINUED | OUTPATIENT
Start: 2020-03-14 | End: 2020-03-15 | Stop reason: HOSPADM

## 2020-03-14 RX ORDER — ONDANSETRON 4 MG/1
4 TABLET, ORALLY DISINTEGRATING ORAL EVERY 4 HOURS PRN
Status: DISCONTINUED | OUTPATIENT
Start: 2020-03-14 | End: 2020-03-15 | Stop reason: HOSPADM

## 2020-03-14 RX ORDER — MORPHINE SULFATE 4 MG/ML
4 INJECTION, SOLUTION INTRAMUSCULAR; INTRAVENOUS
Status: DISCONTINUED | OUTPATIENT
Start: 2020-03-14 | End: 2020-03-15 | Stop reason: HOSPADM

## 2020-03-14 RX ORDER — ONDANSETRON 2 MG/ML
4 INJECTION INTRAMUSCULAR; INTRAVENOUS ONCE
Status: COMPLETED | OUTPATIENT
Start: 2020-03-14 | End: 2020-03-14

## 2020-03-14 RX ORDER — CLONIDINE HYDROCHLORIDE 0.1 MG/1
0.1 TABLET ORAL EVERY 6 HOURS PRN
Status: DISCONTINUED | OUTPATIENT
Start: 2020-03-14 | End: 2020-03-15 | Stop reason: HOSPADM

## 2020-03-14 RX ORDER — SODIUM CHLORIDE, SODIUM LACTATE, POTASSIUM CHLORIDE, CALCIUM CHLORIDE 600; 310; 30; 20 MG/100ML; MG/100ML; MG/100ML; MG/100ML
1000 INJECTION, SOLUTION INTRAVENOUS ONCE
Status: COMPLETED | OUTPATIENT
Start: 2020-03-14 | End: 2020-03-14

## 2020-03-14 RX ORDER — LABETALOL HYDROCHLORIDE 5 MG/ML
10 INJECTION, SOLUTION INTRAVENOUS EVERY 4 HOURS PRN
Status: DISCONTINUED | OUTPATIENT
Start: 2020-03-14 | End: 2020-03-15 | Stop reason: HOSPADM

## 2020-03-14 RX ORDER — PROMETHAZINE HYDROCHLORIDE 25 MG/1
12.5-25 TABLET ORAL EVERY 4 HOURS PRN
Status: DISCONTINUED | OUTPATIENT
Start: 2020-03-14 | End: 2020-03-15 | Stop reason: HOSPADM

## 2020-03-14 RX ORDER — PROMETHAZINE HYDROCHLORIDE 12.5 MG/1
12.5-25 SUPPOSITORY RECTAL EVERY 4 HOURS PRN
Status: DISCONTINUED | OUTPATIENT
Start: 2020-03-14 | End: 2020-03-15 | Stop reason: HOSPADM

## 2020-03-14 RX ORDER — DIPHENHYDRAMINE HYDROCHLORIDE 50 MG/ML
25-50 INJECTION INTRAMUSCULAR; INTRAVENOUS EVERY 6 HOURS PRN
Status: DISCONTINUED | OUTPATIENT
Start: 2020-03-14 | End: 2020-03-15 | Stop reason: HOSPADM

## 2020-03-14 RX ORDER — BISACODYL 10 MG
10 SUPPOSITORY, RECTAL RECTAL
Status: DISCONTINUED | OUTPATIENT
Start: 2020-03-14 | End: 2020-03-15 | Stop reason: HOSPADM

## 2020-03-14 RX ORDER — PROCHLORPERAZINE EDISYLATE 5 MG/ML
5-10 INJECTION INTRAMUSCULAR; INTRAVENOUS EVERY 4 HOURS PRN
Status: DISCONTINUED | OUTPATIENT
Start: 2020-03-14 | End: 2020-03-15 | Stop reason: HOSPADM

## 2020-03-14 RX ORDER — POLYETHYLENE GLYCOL 3350 17 G/17G
1 POWDER, FOR SOLUTION ORAL
Status: DISCONTINUED | OUTPATIENT
Start: 2020-03-14 | End: 2020-03-15 | Stop reason: HOSPADM

## 2020-03-14 RX ORDER — ENALAPRILAT 1.25 MG/ML
1.25 INJECTION INTRAVENOUS EVERY 6 HOURS PRN
Status: DISCONTINUED | OUTPATIENT
Start: 2020-03-14 | End: 2020-03-15 | Stop reason: HOSPADM

## 2020-03-14 RX ORDER — SODIUM CHLORIDE, SODIUM LACTATE, POTASSIUM CHLORIDE, CALCIUM CHLORIDE 600; 310; 30; 20 MG/100ML; MG/100ML; MG/100ML; MG/100ML
INJECTION, SOLUTION INTRAVENOUS CONTINUOUS
Status: DISCONTINUED | OUTPATIENT
Start: 2020-03-14 | End: 2020-03-15 | Stop reason: HOSPADM

## 2020-03-14 RX ADMIN — TAMSULOSIN HYDROCHLORIDE 0.4 MG: 0.4 CAPSULE ORAL at 18:58

## 2020-03-14 RX ADMIN — SODIUM CHLORIDE, POTASSIUM CHLORIDE, SODIUM LACTATE AND CALCIUM CHLORIDE: 600; 310; 30; 20 INJECTION, SOLUTION INTRAVENOUS at 18:54

## 2020-03-14 RX ADMIN — HYDROMORPHONE HYDROCHLORIDE 1 MG: 1 INJECTION, SOLUTION INTRAMUSCULAR; INTRAVENOUS; SUBCUTANEOUS at 16:05

## 2020-03-14 RX ADMIN — FENTANYL CITRATE 50 MCG: 50 INJECTION, SOLUTION INTRAMUSCULAR; INTRAVENOUS at 15:21

## 2020-03-14 RX ADMIN — DIPHENHYDRAMINE HYDROCHLORIDE 50 MG: 50 INJECTION, SOLUTION INTRAMUSCULAR; INTRAVENOUS at 22:52

## 2020-03-14 RX ADMIN — SODIUM CHLORIDE: 9 INJECTION, SOLUTION INTRAVENOUS at 09:30

## 2020-03-14 RX ADMIN — SODIUM CHLORIDE, POTASSIUM CHLORIDE, SODIUM LACTATE AND CALCIUM CHLORIDE 1000 ML: 600; 310; 30; 20 INJECTION, SOLUTION INTRAVENOUS at 15:23

## 2020-03-14 RX ADMIN — CEFTRIAXONE SODIUM 2 G: 2 INJECTION, POWDER, FOR SOLUTION INTRAMUSCULAR; INTRAVENOUS at 05:21

## 2020-03-14 RX ADMIN — SODIUM CHLORIDE: 9 INJECTION, SOLUTION INTRAVENOUS at 02:26

## 2020-03-14 RX ADMIN — KETOROLAC TROMETHAMINE 30 MG: 30 INJECTION, SOLUTION INTRAMUSCULAR at 00:25

## 2020-03-14 RX ADMIN — OXYCODONE HYDROCHLORIDE 10 MG: 10 TABLET ORAL at 20:48

## 2020-03-14 RX ADMIN — ONDANSETRON 4 MG: 2 INJECTION INTRAMUSCULAR; INTRAVENOUS at 15:21

## 2020-03-14 RX ADMIN — OXYCODONE HYDROCHLORIDE 10 MG: 10 TABLET ORAL at 18:53

## 2020-03-14 ASSESSMENT — PATIENT HEALTH QUESTIONNAIRE - PHQ9
1. LITTLE INTEREST OR PLEASURE IN DOING THINGS: NOT AT ALL
1. LITTLE INTEREST OR PLEASURE IN DOING THINGS: NOT AT ALL
SUM OF ALL RESPONSES TO PHQ9 QUESTIONS 1 AND 2: 0
SUM OF ALL RESPONSES TO PHQ9 QUESTIONS 1 AND 2: 0
2. FEELING DOWN, DEPRESSED, IRRITABLE, OR HOPELESS: NOT AT ALL
2. FEELING DOWN, DEPRESSED, IRRITABLE, OR HOPELESS: NOT AT ALL

## 2020-03-14 ASSESSMENT — LIFESTYLE VARIABLES
TOTAL SCORE: 0
HOW MANY TIMES IN THE PAST YEAR HAVE YOU HAD 5 OR MORE DRINKS IN A DAY: 0
TOTAL SCORE: 0
DO YOU DRINK ALCOHOL: NO
EVER_SMOKED: YES
EVER HAD A DRINK FIRST THING IN THE MORNING TO STEADY YOUR NERVES TO GET RID OF A HANGOVER: NO
CONSUMPTION TOTAL: NEGATIVE
ON A TYPICAL DAY WHEN YOU DRINK ALCOHOL HOW MANY DRINKS DO YOU HAVE: 0
EVER FELT BAD OR GUILTY ABOUT YOUR DRINKING: NO
HAVE YOU EVER FELT YOU SHOULD CUT DOWN ON YOUR DRINKING: NO
HAVE PEOPLE ANNOYED YOU BY CRITICIZING YOUR DRINKING: NO
TOTAL SCORE: 0
ALCOHOL_USE: NO
AVERAGE NUMBER OF DAYS PER WEEK YOU HAVE A DRINK CONTAINING ALCOHOL: 0

## 2020-03-14 ASSESSMENT — FIBROSIS 4 INDEX
FIB4 SCORE: 0.89
FIB4 SCORE: 0.73

## 2020-03-14 ASSESSMENT — COGNITIVE AND FUNCTIONAL STATUS - GENERAL
MOBILITY SCORE: 24
SUGGESTED CMS G CODE MODIFIER MOBILITY: CH
DAILY ACTIVITIY SCORE: 24
SUGGESTED CMS G CODE MODIFIER DAILY ACTIVITY: CH

## 2020-03-14 ASSESSMENT — COPD QUESTIONNAIRES
COPD SCREENING SCORE: 0
HAVE YOU SMOKED AT LEAST 100 CIGARETTES IN YOUR ENTIRE LIFE: NO/DON'T KNOW
DO YOU EVER COUGH UP ANY MUCUS OR PHLEGM?: NO/ONLY WITH OCCASIONAL COLDS OR INFECTIONS
DURING THE PAST 4 WEEKS HOW MUCH DID YOU FEEL SHORT OF BREATH: NONE/LITTLE OF THE TIME

## 2020-03-14 NOTE — CARE PLAN
Problem: Pain Management  Goal: Pain level will decrease to patient's comfort goal  Outcome: PROGRESSING AS EXPECTED     Problem: Fluid Volume:  Goal: Will maintain balanced intake and output  Outcome: PROGRESSING AS EXPECTED     Problem: Communication  Goal: The ability to communicate needs accurately and effectively will improve  Outcome: PROGRESSING AS EXPECTED     Problem: Safety  Goal: Will remain free from injury  Outcome: PROGRESSING AS EXPECTED  Goal: Will remain free from falls  Outcome: PROGRESSING AS EXPECTED     Problem: Infection  Goal: Will remain free from infection  Outcome: PROGRESSING AS EXPECTED     Problem: Venous Thromboembolism (VTW)/Deep Vein Thrombosis (DVT) Prevention:  Goal: Patient will participate in Venous Thrombosis (VTE)/Deep Vein Thrombosis (DVT)Prevention Measures  Outcome: PROGRESSING AS EXPECTED  Pt ambulates the unit

## 2020-03-14 NOTE — ANESTHESIA PROCEDURE NOTES
Airway  Date/Time: 3/13/2020 7:53 PM  Performed by: Pedro Kimball M.D.  Authorized by: Pedro Kimball M.D.     Location:  OR  Urgency:  Elective  Indications for Airway Management:  Anesthesia  Spontaneous Ventilation: absent    Sedation Level:  Deep  Preoxygenated: Yes    Final Airway Type:  Supraglottic airway  Final Supraglottic Airway:  Standard LMA  SGA Size:  4  Number of Attempts at Approach:  1

## 2020-03-14 NOTE — OR SURGEON
Immediate Post OP Note    PreOp Diagnosis: Right flank pain                                 Right ureteral stone    PostOp Diagnosis: As above    Procedure(s):  CYSTOSCOPY - Wound Class: Clean Contaminated  RIGHT FLEXIBLE URETEROSCOPY WITH LASER LITHOTRIPSY    Surgeon(s):  Chase Damon M.D.    Anesthesiologist/Type of Anesthesia:  Anesthesiologist: Pedro Kimball M.D./General LMA    Surgical Staff:  Circulator: Martha Jones R.N.  Laser Staff: Karson Deng  Scrub Person: Aundrea Crowell    Specimens removed if any:  None    Estimated Blood Loss: N/A    Findings: Proximal right ureteral stone    Complications: None        3/13/2020 8:33 PM Chase Damon M.D.

## 2020-03-14 NOTE — OP REPORT
DATE OF SERVICE:  03/13/2020    PREOPERATIVE DIAGNOSES:  1.  History of recurrent cystine nephrolithiasis.  2.  Right flank pain.  3.  Right ureteral stone.    OPERATION AND PROCEDURE PERFORMED:  1.  Rigid cystourethroscopy.  2.  Right flexible ureteroscopy with holmium laser lithotripsy of right   ureteral stone.    SURGEON:  Chase Damon MD    ANESTHESIA:  General laryngeal mask.    ANESTHESIOLOGIST:  Pedro Kimball MD    POSTOPERATIVE DIAGNOSES:  1.  History of recurrent cystine nephrolithiasis.  2.  Right flank pain.  3.  Right ureteral stone.    COMPLICATIONS:  None.    DRAINS:  None.    INDICATIONS:  The patient is a pleasant 29-year-old -American male with   a history of recurrent cystine nephrolithiasis.  He is allergic to   D-PENICILLAMINE and continues to reform stones.  He presented with acute onset   of right flank pain and was found to have a 6-7 mm right proximal ureteral   stone with hydroureteronephrosis.  I discussed with the patient the treatment   options.  Given the density of cystine stones, I have recommended a right   ureteroscopy with laser lithotripsy and we discussed all risk of the procedure   including, but not limited to, risk of perioperative urinary tract infection,   risk of urosepsis, risk of ureteral perforation, risk of inability to treat   the stone requiring a stent and secondary procedure.  He is aware that if a   stent is positioned, there is a risk of stent pain, associated urgency,   frequency, hematuria, and the fact that the stent absolutely needs to be   removed or stones can form on the stent leading to renal insufficiency.  I   have also discussed perioperative risk of deep vein thrombosis, pulmonary   embolism, aspiration pneumonia, and death.  Informed consent was given to me   by the patient to proceed and he is marked on his right thigh with my initials   and letter Y for safe site surgery.    DESCRIPTION OF PROCEDURE IN DETAIL:  After informed consent was  obtained, the   patient was brought to the operating room and placed supine.  Bilateral   sequential compression devices were in place and operational.  General   laryngeal mask anesthetic administered in a balanced fashion.  He was then   positioned in modified lithotomy and the operative area was prepped and draped   in usual sterile fashion.  A surgical time-out was called.  All members of   the operative team agree as the patient's name, procedure to be performed, and   he has already received Rocephin.  I began the procedure by passing a   generously lubricated 25-Kyrgyz rigid cystoscope per urethra.  The anterior   urethra was normal.  Prostatic fossa measured 3.5 cm in length with   sub-occlusive hypertrophy.  Upon entering the bladder, serial evaluation shows   no evidence of stones, tumors, or diverticula.  Left ureteral orifice is   orthotopic, has clear efflux.  Right ureteral orifice is orthotopic, I did not   see any efflux.  I cannulated the right ureteral orifice with a 0.035   ZIPwire, pushed it up into the kidney as documented on fluoroscopy and then   over the wire, passed the inner obturator ureteral access sheath, 11-Kyrgyz   tapered to 13.  I passed this just into the distal ureter and then advanced a   flexible disposable Re.nooble digital ureteroscope up to the level of   the stone in the proximal ureter.  I treated the stone at a frequency of 5 Hz   and 400 millijoules increasing to 600 millijoules.  Some of the stone was   turned into powder.  A fragment did go up into the kidney.  This was treated   in the mid pole aruna into very small fragments.  At the end of the treatment,   all of the fragments were less than a millimeter.  He had tolerated the   procedure well.  Direct vision in the entire renal pelvis and calyceal anatomy   showed no other stones.  I subsequently serial withdrew the scope.  The   proximal ureter was normal.  There was minimal edema or inflammations, so I    elected not to leave a stent; however, I did leave the guidewire at this point   in time in the kidney, withdrew the ureteroscope and then backloaded the   cystoscope in and advanced this into the bladder.  Serial evaluation of the   bladder showed no other abnormalities.  The stent was removed and I saw high   pressure efflux from the right ureter with the stent having been removed, so   the stent was not positioned.  The bladder was drained.  He was awakened in   the operating room and transferred to recovery room where he arrived in stable   condition.       ____________________________________     MD SUNNY Carpio / MCKENZIE    DD:  03/13/2020 20:58:37  DT:  03/13/2020 22:23:05    D#:  [id number]  Job#:  [id number]

## 2020-03-14 NOTE — PROGRESS NOTES
Pt discharged to home, friend picked up, all belongings returned.  Patient understands that he needs to make an appointment with [facility] and to follow-up with urology within a week. Prescription antibiotic sent to [facility].   Discontinued PIV.

## 2020-03-14 NOTE — PROGRESS NOTES
Bedside report received.  Assessment complete.  A&O x 4. Patient calls appropriately.  Patient ambulates with no assist. Bed alarm off.   Patient declines pain at this time.  Denies N&V. Patient NPO.  + void, + flatus, - BM.  Patient denies SOB.  SCD's on.  Patient is calm and cooperative, resting in bed.  Review plan with of care with patient. Call light and personal belongings with in reach. Hourly rounding in place. All needs met at this time.

## 2020-03-14 NOTE — DISCHARGE SUMMARY
Discharge Summary    CHIEF COMPLAINT ON ADMISSION  Chief Complaint   Patient presents with   • Flank Pain     right       Reason for Admission  Flank Pain     Admission Date  3/13/2020    CODE STATUS  Full Code    HPI & HOSPITAL COURSE  This is a 30 y.o. male here with  right flank pain.  This is a pleasant young -American gentleman who comes in with complaints of right flank pain which began shortly prior to his arrival to the emergency room.  Patient reports that he has a history of cystinuria with recurrent kidney stones and has required lithotripsy and stent placement in the past.  His right flank pain has been constant, sharp, nonradiating, and associated with nausea and vomiting.  He has had no alleviating or aggravating factors.  He has had no fevers, chills, or hematuria.  Prior to this, he was in his usual state of health.  He does state that his current pain is similar to his prior episodes of kidney stones.  He was admitted and his renal ct showed:1.  A 9 mm stone in the right proximal ureter results in moderate right hydronephrosis.his kidney function also has improved with iv fluid.  2.  Multiple additional nonobstructing renal stones bilaterally.  He also was noted to have cystitis and was placed on iv antibiotic and iv fluid.he was seen by urology and was taken to the O.R and had cystoscopy with RIGHT FLEXIBLE URETEROSCOPY WITH LASER LITHOTRIPSY.he is seen at the bed side and feels better. He also was noted to have sepsis at the time admission 2nd to cystitis and was given iv fluid and iv antibiotic.His urine drug screen is also positive for cocaine and counselling against drug abuse is given to the pt.at this time pt feels much better and if he is cleared by urology then he will be discharged home.  He will be discharged home with 5days of cefdinir to complete 7days total of antibiotic.       Therefore, he is discharged in good and stable condition to home with close outpatient  follow-up.    The patient met 2-midnight criteria for an inpatient stay at the time of discharge.    Discharge Date  3/14/20    FOLLOW UP ITEMS POST DISCHARGE  urology    DISCHARGE DIAGNOSES  Principal Problem:    Obstructive uropathy POA: Unknown  Active Problems:    Hydronephrosis POA: Unknown    ARAM (acute kidney injury) (HCC) POA: Unknown    SIRS (systemic inflammatory response syndrome) (HCC) POA: Unknown  Resolved Problems:    * No resolved hospital problems. *      FOLLOW UP  No future appointments.  No follow-up provider specified.    MEDICATIONS ON DISCHARGE     Medication List      START taking these medications      Instructions   cefdinir 300 MG Caps  Commonly known as:  OMNICEF   Take 1 Cap by mouth 2 times a day.  Dose:  300 mg            Allergies  Allergies   Allergen Reactions   • Ancef [Cefazolin] Rash     rash   • Percocet [Apap-Fd&C Red #40 Al Santillan-Oxycodone]    • Shellfish Allergy    • Tape        DIET  Orders Placed This Encounter   Procedures   • Diet Order Regular     Standing Status:   Standing     Number of Occurrences:   1     Order Specific Question:   Diet:     Answer:   Regular [1]       ACTIVITY  As tolerated.  Weight bearing as tolerated    CONSULTATIONS  urology    PROCEDURES  S/p RIGHT FLEXIBLE URETEROSCOPY WITH LASER LITHOTRIPSY    LABORATORY  Lab Results   Component Value Date    SODIUM 139 03/14/2020    POTASSIUM 4.1 03/14/2020    CHLORIDE 108 03/14/2020    CO2 22 03/14/2020    GLUCOSE 86 03/14/2020    BUN 16 03/14/2020    CREATININE 1.59 (H) 03/14/2020        Lab Results   Component Value Date    WBC 13.2 (H) 03/14/2020    HEMOGLOBIN 13.6 (L) 03/14/2020    HEMATOCRIT 41.0 (L) 03/14/2020    PLATELETCT 208 03/14/2020        Total time of the discharge process exceeds 40 minutes.

## 2020-03-14 NOTE — PROGRESS NOTES
Report received. Assessment complete.   AAOx4  Patient reports pain 3/10, tolerable, declines intervention.  [place], self-ambulatory in unit. Patient denies SOB/ chest pain.   Pt denies N/V. Patient diet advanced to regular diet from NPO this morning, tolerating well.   +void, +flatus, -BM  SCDs on.   Patient is calm, cooperative, and resting in bed.   Plan of care discussed with patient. Fall precautions in place. Belongings and call light within reach. Educated patient to call for assistance as needed. All needs met at this time.

## 2020-03-14 NOTE — DISCHARGE INSTRUCTIONS
Follow-up With  Details  Why  Contact Info   [facility]  Schedule an appointment as soon as possible for a visit  Please call to establish with a Primary Care Physician and schedule your hospital follow up. Thank you!  [facility] [facility]  [place] [address] [phone]         Discharge Instructions    Discharged to home by car with friend. Discharged via walking, hospital escort: Yes.  Special equipment needed: Not Applicable    Be sure to schedule a follow-up appointment with your primary care doctor or any specialists as instructed.     Discharge Plan:   Diet Plan: Discussed  Activity Level: Discussed  Smoking Cessation Offered: Patient Refused  Confirmed Follow up Appointment: Patient to Call and Schedule Appointment  Confirmed Symptoms Management: Discussed  Medication Reconciliation Updated: Yes  Influenza Vaccine Indication: Patient Refuses    I understand that a diet low in cholesterol, fat, and sodium is recommended for good health. Unless I have been given specific instructions below for another diet, I accept this instruction as my diet prescription.   Other diet: Regular    Special Instructions: None    · Is patient discharged on Warfarin / Coumadin?   No     Depression / Suicide Risk    As you are discharged from this On license of [facility] facility, it is important to learn how to keep safe from harming yourself.    Recognize the warning signs:  · Abrupt changes in personality, positive or negative- including increase in energy   · Giving away possessions  · Change in eating patterns- significant weight changes-  positive or negative  · Change in sleeping patterns- unable to sleep or sleeping all the time   · Unwillingness or inability to communicate  · Depression  · Unusual sadness, discouragement and loneliness  · Talk of wanting to die  · Neglect of personal appearance   · Rebelliousness- reckless behavior  · Withdrawal from people/activities they love  · Confusion- inability to  concentrate     If you or a loved one observes any of these behaviors or has concerns about self-harm, here's what you can do:  · Talk about it- your feelings and reasons for harming yourself  · Remove any means that you might use to hurt yourself (examples: pills, rope, extension cords, firearm)  · Get professional help from the community (Mental Health, Substance Abuse, psychological counseling)  · Do not be alone:Call your Safe Contact- someone whom you trust who will be there for you.  · Call your local CRISIS HOTLINE 139-6416 or 448-247-1797  · Call your local Children's Mobile Crisis Response Team [place] (771) 411-9186 or www.Tapastreet  · Call the toll free National Suicide Prevention Hotlines   · National Suicide Prevention Lifeline 798-785-OWUB (1274)  · [place] Hope Line Network 800-SUICIDE (058-4596)    Lithotripsy, Care After  Refer to this sheet in the next few weeks. These instructions provide you with information on caring for yourself after your procedure. Your health care provider may also give you more specific instructions. Your treatment has been planned according to current medical practices, but problems sometimes occur. Call your health care provider if you have any problems or questions after your procedure.  WHAT TO EXPECT AFTER THE PROCEDURE   · Your urine may have a red tinge for a few days after treatment. Blood loss is usually minimal.  · You may have soreness in the back or flank area. This usually goes away after a few days. The procedure can cause blotches or bruises on the back where the pressure wave enters the skin. These marks usually cause only minimal discomfort and should disappear in a short time.  · Stone fragments should begin to pass within 24 hours of treatment. However, a delayed passage is not unusual.  · You may have pain, discomfort, and feel sick to your stomach (nauseated) when the crushed fragments of stone are passed down the tube from the kidney to  "the bladder. Stone fragments can pass soon after the procedure and may last for up to 4-8 weeks.  · A small number of patients may have severe pain when stone fragments are not able to pass, which leads to an obstruction.  · If your stone is greater than 1 inch (2.5 cm) in diameter or if you have multiple stones that have a combined diameter greater than 1 inch (2.5 cm), you may require more than one treatment.  · If you had a stent placed prior to your procedure, you may experience some discomfort, especially during urination. You may experience the pain or discomfort in your flank or back, or you may experience a sharp pain or discomfort at the base of your penis or in your lower abdomen. The discomfort usually lasts only a few minutes after urinating.  HOME CARE INSTRUCTIONS   · Rest at home until you feel your energy improving.  · Only take over-the-counter or prescription medicines for pain, discomfort, or fever as directed by your health care provider. Depending on the type of lithotripsy, you may need to take antibiotics and anti-inflammatory medicines for a few days.  · Drink enough water and fluids to keep your urine clear or pale yellow. This helps \"flush\" your kidneys. It helps pass any remaining pieces of stone and prevents stones from coming back.  · Most people can resume daily activities within 1-2 days after standard lithotripsy. It can take longer to recover from laser and percutaneous lithotripsy.  · If the stones are in your urinary system, you may be asked to strain your urine at home to look for stones. Any stones that are found can be sent to a medical lab for examination.  · Visit your health care provider for a follow-up appointment in a few weeks. Your doctor may remove your stent if you have one. Your health care provider will also check to see whether stone particles still remain.  SEEK MEDICAL CARE IF:   · Your pain is not relieved by medicine.  · You have a lasting nauseous " feeling.  · You feel there is too much blood in the urine.  · You develop persistent problems with frequent or painful urination that does not at least partially improve after 2 days following the procedure.  · You have a congested cough.  · You feel lightheaded.  · You develop a rash or any other signs that might suggest an allergic problem.  · You develop any reaction or side effects to your medicine(s).  SEEK IMMEDIATE MEDICAL CARE IF:   · You experience severe back or flank pain or both.  · You see nothing but blood when you urinate.  · You cannot pass any urine at all.  · You have a fever or shaking chills.  · You develop shortness of breath, difficulty breathing, or chest pain.  · You develop vomiting that will not stop after 6-8 hours.  · You have a fainting episode.     This information is not intended to replace advice given to you by your health care provider. Make sure you discuss any questions you have with your health care provider.     Document Released: 01/06/2009 Document Revised: 10/08/2014 Document Reviewed: 07/03/2014  [employer] Interactive Patient Education ©2016 Elsevier Inc.    Cefdinir capsules  What is this medicine?  CEFDINIR (SEF di ner) is a cephalosporin antibiotic. It is used to treat certain kinds of bacterial infections. It will not work for colds, flu, or other viral infections.  This medicine may be used for other purposes; ask your health care provider or pharmacist if you have questions.  COMMON BRAND NAME(S): Omnicef  What should I tell my health care provider before I take this medicine?  They need to know if you have any of these conditions:  -bleeding problems  -kidney disease  -stomach or intestine problems (especially colitis)  -an unusual or allergic reaction to cefdinir, other cephalosporin antibiotics, penicillin, penicillamine, other foods, dyes or preservatives  -pregnant or trying to get pregnant  -breast-feeding  How should I use this medicine?  Take this medicine by  mouth. Swallow it with a drink of water. Follow the directions on the prescription label. You can take it with or without food. If it upsets your stomach it may help to take it with food. Take your doses at regular intervals. Do not take it more often than directed. Finish all the medicine you are prescribed even if you think your infection is better.  Talk to your pediatrician regarding the use of this medicine in children. Special care may be needed.  Overdosage: If you think you have taken too much of this medicine contact a poison control center or emergency room at once.  NOTE: This medicine is only for you. Do not share this medicine with others.  What if I miss a dose?  If you miss a dose, take it as soon as you can. If it is almost time for your next dose, take only that dose. Do not take double or extra doses.  What may interact with this medicine?  -antacids that contain aluminum or magnesium  -iron supplements  -other antibiotics  -probenecid  This list may not describe all possible interactions. Give your health care provider a list of all the medicines, herbs, non-prescription drugs, or dietary supplements you use. Also tell them if you smoke, drink alcohol, or use illegal drugs. Some items may interact with your medicine.  What should I watch for while using this medicine?  Tell your doctor or health care professional if your symptoms do not get better in a few days.  If you are diabetic you may get a false-positive result for sugar in your urine. Check with your doctor or health care professional before you change your diet or the dose of your diabetes medicine.  What side effects may I notice from receiving this medicine?  Side effects that you should report to your doctor or health care professional as soon as possible:  -allergic reactions like skin rash, itching or hives, swelling of the face, lips, or tongue  -bloody or watery diarrhea  -breathing problems  -fever  -redness, blistering, peeling  or loosening of the skin, including inside the mouth  -seizures  -trouble passing urine or change in the amount of urine  -unusual bleeding or bruising  -unusually weak or tired  Side effects that usually do not require medical attention (report to your doctor or health care professional if they continue or are bothersome):  -constipation  -diarrhea  -dizziness  -dry mouth  -headache  -loss of appetite  -nausea, vomiting  -stomach pain  -stool discoloration  -tiredness  -vaginal discharge, itching, or odor in women  This list may not describe all possible side effects. Call your doctor for medical advice about side effects. You may report side effects to FDA at 4-982-JSD-6578.  Where should I keep my medicine?  Keep out of the reach of children.  Store at room temperature between 15 and 30 degrees C (59 and 86 degrees F). Throw the medicine away after the expiration date.  NOTE: This sheet is a summary. It may not cover all possible information. If you have questions about this medicine, talk to your doctor, pharmacist, or health care provider.  © 2018 Elsevier/[employer] (2017-04-24 15:52:44)

## 2020-03-14 NOTE — ED TRIAGE NOTES
Chief Complaint   Patient presents with   • Flank Pain     right side , post  LITHOTRIPSY     Pt ambulated to triage, he was d/c from hospital an hour ago post lithotripsy . Pt said that pain worst.

## 2020-03-14 NOTE — ED PROVIDER NOTES
ED Provider Note    ER PROVIDER NOTE    CHIEF COMPLAINT  Chief Complaint   Patient presents with   • Flank Pain     right side , post  LITHOTRIPSY       HPI  Russell Bunn is a 30 y.o. male who presents to the emergency department complaining of right-sided flank pain.  Patient reports that he was just discharged from the hospital this morning 1130 after lithotripsy.  Shortly after arriving home he had abrupt onset of right-sided flank pain as well as nausea.  This feels very similar to the stones he has had in the past.  He denies any fevers or chills.  No vomiting, no constipation or diarrhea.  Reports hematuria although no dysuria    REVIEW OF SYSTEMS  Pertinent positives include flank pain. Pertinent negatives include no fever. See HPI for details. All other systems reviewed and are negative.    PAST MEDICAL HISTORY   has a past medical history of Kidney stones.    SURGICAL HISTORY   has a past surgical history that includes lithotripsy.    FAMILY HISTORY  History reviewed. No pertinent family history.    SOCIAL HISTORY  Social History     Socioeconomic History   • Marital status: Single     Spouse name: Not on file   • Number of children: Not on file   • Years of education: Not on file   • Highest education level: Not on file   Occupational History   • Not on file   Social Needs   • Financial resource strain: Not on file   • Food insecurity     Worry: Not on file     Inability: Not on file   • Transportation needs     Medical: Not on file     Non-medical: Not on file   Tobacco Use   • Smoking status: Current Some Day Smoker     Packs/day: 0.50     Types: Cigarettes   • Smokeless tobacco: Never Used   Substance and Sexual Activity   • Alcohol use: Yes     Comment: Occasional   • Drug use: Yes     Types: Inhaled     Comment: Marijuana, every day   • Sexual activity: Not on file   Lifestyle   • Physical activity     Days per week: Not on file     Minutes per session: Not on file   • Stress: Not on  "file   Relationships   • Social connections     Talks on phone: Not on file     Gets together: Not on file     Attends Baptism service: Not on file     Active member of club or organization: Not on file     Attends meetings of clubs or organizations: Not on file     Relationship status: Not on file   • Intimate partner violence     Fear of current or ex partner: Not on file     Emotionally abused: Not on file     Physically abused: Not on file     Forced sexual activity: Not on file   Other Topics Concern   • Not on file   Social History Narrative   • Not on file      Social History     Substance and Sexual Activity   Drug Use Yes   • Types: Inhaled    Comment: Marijuana, every day       CURRENT MEDICATIONS  Home Medications     Reviewed by Suzanna Mayen, Pharmacy Intern (Pharmacy Intern) on 03/14/20 at 1632  Med List Status: Complete   Medication Last Dose Status   cefdinir (OMNICEF) 300 MG Cap  Active                ALLERGIES  Allergies   Allergen Reactions   • Ancef [Cefazolin] Rash     rash   • Percocet [Apap-Fd&C Red #40 Al Santillan-Oxycodone]    • Shellfish Allergy    • Tape        PHYSICAL EXAM  VITAL SIGNS: /86   Pulse 89   Temp 37.4 °C (99.3 °F) (Temporal)   Resp 16   Ht 1.676 m (5' 6\")   Wt 64.1 kg (141 lb 5 oz)   SpO2 95%   BMI 22.81 kg/m²   Pulse ox interpretation: I interpret this pulse ox as normal.    Constitutional: Alert, uncomfortable  HENT: No signs of trauma, Bilateral external ears normal, Nose normal.   Eyes: Pupils are equal and reactive, Conjunctiva normal, Non-icteric.   Neck: Normal range of motion, No tenderness, Supple, No stridor.   Lymphatic: No lymphadenopathy noted.   Cardiovascular: Regular rate and rhythm, no murmurs.   Thorax & Lungs: Normal breath sounds, No respiratory distress, No wheezing, No chest tenderness.   Abdomen: Bowel sounds normal, Soft, No tenderness, No masses, No pulsatile masses. No peritoneal signs.  Skin: Warm, Dry, No erythema, No rash.   Back: No " bony tenderness, R CVA tenderness.   Extremities: Intact distal pulses, No edema, No tenderness, No cyanosis,   Musculoskeletal: Good range of motion in all major joints. No tenderness to palpation or major deformities noted.   Neurologic: Alert , Normal motor function, Normal sensory function, No focal deficits noted.   Psychiatric: Affect normal, Judgment normal, Mood normal.     DIAGNOSTIC STUDIES / PROCEDURES      LABS  Labs Reviewed   CBC WITH DIFFERENTIAL - Abnormal; Notable for the following components:       Result Value    WBC 16.4 (*)     RBC 4.48 (*)     MCHC 33.6 (*)     Neutrophils-Polys 80.10 (*)     Lymphocytes 7.40 (*)     Neutrophils (Absolute) 13.14 (*)     Monos (Absolute) 1.85 (*)     All other components within normal limits   COMP METABOLIC PANEL - Abnormal; Notable for the following components:    Glucose 111 (*)     Creatinine 1.73 (*)     All other components within normal limits   URINALYSIS - Abnormal; Notable for the following components:    Ketones Trace (*)     Leukocyte Esterase Moderate (*)     Occult Blood Trace (*)     All other components within normal limits   URINE MICROSCOPIC (W/UA) - Abnormal; Notable for the following components:    WBC 20-50 (*)     RBC 2-5 (*)     All other components within normal limits   ESTIMATED GFR - Abnormal; Notable for the following components:    GFR If  57 (*)     GFR If Non  47 (*)     All other components within normal limits       All labs reviewed by me.    RADIOLOGY  CT-RENAL COLIC EVALUATION(A/P W/O)   Final Result         Persistent right-sided hydronephrosis and hydroureter with small stone fragments in the distal right ureter.      Bilateral renal collecting system stones.      Right perinephric stranding/fluid extending into the pelvis.        The radiologist's interpretation of all radiological studies have been reviewed by me.    COURSE & MEDICAL DECISION MAKING  Nursing notes, VS, PMSFHx reviewed in  chart.    3:07 PM Patient seen and examined at bedside. Patient will be treated with fentanyl, Zofran. Ordered for CBC, CMP, urinalysis, CT to evaluate his symptoms.     4:25 PM  Patient reevaluated, still in significant pain after hydromorphone, will plan for hospitalization    Discussed case with Dr. Valdez, CDU physician    HYDRATION: Based on the patient's presentation of Acute Kindney Injury the patient was given IV fluids. IV Hydration was used because oral hydration was not as rapid as required. Upon recheck following hydration, the patient was Improved.    Decision Making:  This is a 30 y.o. male presenting with continued flank pain.  Patient was just discharged a few hours ago after lithotripsy, he does appear to be passing some debris from this and has been unable to manage his pain at home.  Additionally shows findings consistent with acute kidney injury with a mildly elevated creatinine.  He has been started on IV fluids, IV pain control, and will be admitted for further care.  He is additionally being treated for urinary tract infection although no findings suggestive of more progressive infectious process at this point    Patient is hospitalized in stable condition    FINAL IMPRESSION  1. Flank pain    2. Ureterolithiasis    3. Acute kidney injury (HCC)          The note accurately reflects work and decisions made by me.  Raf Bansal M.D.  3/14/2020  4:26 PM

## 2020-03-14 NOTE — ANESTHESIA TIME REPORT
Anesthesia Start and Stop Event Times     Date Time Event    3/13/2020 1945 Ready for Procedure     1948 Anesthesia Start     2041 Anesthesia Stop        Responsible Staff  03/13/20    Name Role Begin End    Pedro Kimball M.D. Anesth 1948 2041        Preop Diagnosis (Free Text):  Pre-op Diagnosis     Right proximal Ureteral Stone        Preop Diagnosis (Codes):    Post op Diagnosis  Ureteral calculus      Premium Reason  A. 3PM - 7AM    Comments:

## 2020-03-14 NOTE — ANESTHESIA QCDR
2019 [facility] Clinical Data Registry (for Quality Improvement)     Postoperative nausea/vomiting risk protocol (Adult = 18 yrs and Pediatric 3-17 yrs)- (430 and 463)  General inhalation anesthetic (NOT TIVA) with PONV risk factors: No  Provision of anti-emetic therapy with at least 2 different classes of agents: N/A  Patient DID NOT receive anti-emetic therapy and reason is documented in Medical Record: N/A    Multimodal Pain Management- (477)  Non-emergent surgery AND patient age >= 18: No  Use of Multimodal Pain Management, two or more drugs and/or interventions, NOT including systemic opioids:   Exception: Documented allergy to multiple classes of analgesics:     Smoking Abstinence (404)  Patient is current smoker (cigarette, pipe, e-cig, marijuanna): No  Elective Surgery:   Abstinence instructions provided prior to day of surgery:   Patient abstained from smoking on day of surgery:     Pre-Op Beta-Blocker in Isolated CABG (44)  Isolated CABG AND patient age >= 18: No  Beta-blocker admin within 24 hours of surgical incision:   Exception:of medical reason(s) for not administering beta blocker within 24 hours prior to surgical incision (e.g., not  indicated,other medical reason):     PACU assessment of acute postoperative pain prior to Anesthesia Care End- Applies to Patients Age = 18- (ABG7)  Initial PACU pain score is which of the following: < 7/10  Patient unable to report pain score: N/A    Post-anesthetic transfer of care checklist/protocol to PACU/ICU- (426 and 427)  Upon conclusion of case, patient transferred to which of the following locations: PACU/Non-ICU  Use of transfer checklist/protocol: Yes  Exclusion: Service Performed in Patient Hospital Room (and thus did not require transfer): N/A  Unplanned admission to ICU related to anesthesia service up through end of PACU care- (MD51)  Unplanned admission to ICU (not initially anticipated at anesthesia start time): No

## 2020-03-14 NOTE — ANESTHESIA PREPROCEDURE EVALUATION
Relevant Problems      (+) ARAM (acute kidney injury) (HCC)   (+) Hydronephrosis       Physical Exam    Airway   Mallampati: III  TM distance: <3 FB  Neck ROM: full       Cardiovascular   Rhythm: regular  Rate: normal     Dental    Pulmonary   Breath sounds clear to auscultation     Abdominal    Neurological - normal exam                 Anesthesia Plan    ASA 2       Plan - general       Airway plan will be LMA        Induction: intravenous          Informed Consent:    Anesthetic plan and risks discussed with patient.

## 2020-03-15 VITALS
OXYGEN SATURATION: 94 % | TEMPERATURE: 100.5 F | HEART RATE: 86 BPM | DIASTOLIC BLOOD PRESSURE: 66 MMHG | WEIGHT: 141.54 LBS | BODY MASS INDEX: 22.75 KG/M2 | HEIGHT: 66 IN | SYSTOLIC BLOOD PRESSURE: 119 MMHG | RESPIRATION RATE: 16 BRPM

## 2020-03-15 PROBLEM — G89.18 POSTOPERATIVE PAIN: Status: RESOLVED | Noted: 2020-03-14 | Resolved: 2020-03-15

## 2020-03-15 LAB
ANION GAP SERPL CALC-SCNC: 8 MMOL/L (ref 7–16)
BACTERIA UR CULT: NORMAL
BASOPHILS # BLD AUTO: 0.3 % (ref 0–1.8)
BASOPHILS # BLD: 0.04 K/UL (ref 0–0.12)
BUN SERPL-MCNC: 10 MG/DL (ref 8–22)
CALCIUM SERPL-MCNC: 8.7 MG/DL (ref 8.5–10.5)
CHLORIDE SERPL-SCNC: 106 MMOL/L (ref 96–112)
CO2 SERPL-SCNC: 22 MMOL/L (ref 20–33)
CREAT SERPL-MCNC: 1.46 MG/DL (ref 0.5–1.4)
EOSINOPHIL # BLD AUTO: 0.24 K/UL (ref 0–0.51)
EOSINOPHIL NFR BLD: 1.6 % (ref 0–6.9)
ERYTHROCYTE [DISTWIDTH] IN BLOOD BY AUTOMATED COUNT: 45 FL (ref 35.9–50)
GLUCOSE SERPL-MCNC: 108 MG/DL (ref 65–99)
HCT VFR BLD AUTO: 36.1 % (ref 42–52)
HGB BLD-MCNC: 12.2 G/DL (ref 14–18)
IMM GRANULOCYTES # BLD AUTO: 0.04 K/UL (ref 0–0.11)
IMM GRANULOCYTES NFR BLD AUTO: 0.3 % (ref 0–0.9)
LYMPHOCYTES # BLD AUTO: 2.1 K/UL (ref 1–4.8)
LYMPHOCYTES NFR BLD: 14 % (ref 22–41)
MCH RBC QN AUTO: 33.1 PG (ref 27–33)
MCHC RBC AUTO-ENTMCNC: 33.8 G/DL (ref 33.7–35.3)
MCV RBC AUTO: 97.8 FL (ref 81.4–97.8)
MONOCYTES # BLD AUTO: 2.15 K/UL (ref 0–0.85)
MONOCYTES NFR BLD AUTO: 14.4 % (ref 0–13.4)
NEUTROPHILS # BLD AUTO: 10.41 K/UL (ref 1.82–7.42)
NEUTROPHILS NFR BLD: 69.4 % (ref 44–72)
NRBC # BLD AUTO: 0 K/UL
NRBC BLD-RTO: 0 /100 WBC
PLATELET # BLD AUTO: 171 K/UL (ref 164–446)
PMV BLD AUTO: 9.4 FL (ref 9–12.9)
POTASSIUM SERPL-SCNC: 3.5 MMOL/L (ref 3.6–5.5)
RBC # BLD AUTO: 3.69 M/UL (ref 4.7–6.1)
SIGNIFICANT IND 70042: NORMAL
SITE SITE: NORMAL
SODIUM SERPL-SCNC: 136 MMOL/L (ref 135–145)
SOURCE SOURCE: NORMAL
WBC # BLD AUTO: 15 K/UL (ref 4.8–10.8)

## 2020-03-15 PROCEDURE — A9270 NON-COVERED ITEM OR SERVICE: HCPCS | Performed by: INTERNAL MEDICINE

## 2020-03-15 PROCEDURE — A9270 NON-COVERED ITEM OR SERVICE: HCPCS | Performed by: NURSE PRACTITIONER

## 2020-03-15 PROCEDURE — 99238 HOSP IP/OBS DSCHRG MGMT 30/<: CPT | Performed by: INTERNAL MEDICINE

## 2020-03-15 PROCEDURE — 36415 COLL VENOUS BLD VENIPUNCTURE: CPT

## 2020-03-15 PROCEDURE — 700105 HCHG RX REV CODE 258: Performed by: INTERNAL MEDICINE

## 2020-03-15 PROCEDURE — 85025 COMPLETE CBC W/AUTO DIFF WBC: CPT

## 2020-03-15 PROCEDURE — 96375 TX/PRO/DX INJ NEW DRUG ADDON: CPT

## 2020-03-15 PROCEDURE — 700102 HCHG RX REV CODE 250 W/ 637 OVERRIDE(OP): Performed by: NURSE PRACTITIONER

## 2020-03-15 PROCEDURE — 700111 HCHG RX REV CODE 636 W/ 250 OVERRIDE (IP): Performed by: INTERNAL MEDICINE

## 2020-03-15 PROCEDURE — G0378 HOSPITAL OBSERVATION PER HR: HCPCS

## 2020-03-15 PROCEDURE — 700102 HCHG RX REV CODE 250 W/ 637 OVERRIDE(OP): Performed by: INTERNAL MEDICINE

## 2020-03-15 PROCEDURE — 80048 BASIC METABOLIC PNL TOTAL CA: CPT

## 2020-03-15 PROCEDURE — 700111 HCHG RX REV CODE 636 W/ 250 OVERRIDE (IP): Performed by: HOSPITALIST

## 2020-03-15 PROCEDURE — 96376 TX/PRO/DX INJ SAME DRUG ADON: CPT

## 2020-03-15 RX ORDER — DIPHENHYDRAMINE HCL 25 MG
25 TABLET ORAL EVERY 6 HOURS PRN
Status: DISCONTINUED | OUTPATIENT
Start: 2020-03-15 | End: 2020-03-15 | Stop reason: HOSPADM

## 2020-03-15 RX ORDER — TRAMADOL HYDROCHLORIDE 50 MG/1
50 TABLET ORAL EVERY 8 HOURS PRN
Qty: 15 TAB | Refills: 0 | Status: SHIPPED | OUTPATIENT
Start: 2020-03-15 | End: 2020-03-20

## 2020-03-15 RX ORDER — ONDANSETRON 4 MG/1
4 TABLET, ORALLY DISINTEGRATING ORAL EVERY 8 HOURS PRN
Qty: 15 TAB | Refills: 0 | Status: SHIPPED | OUTPATIENT
Start: 2020-03-15 | End: 2020-03-20

## 2020-03-15 RX ORDER — CEFDINIR 300 MG/1
300 CAPSULE ORAL 2 TIMES DAILY
Qty: 8 CAP | Refills: 0 | Status: SHIPPED | OUTPATIENT
Start: 2020-03-15 | End: 2020-03-19

## 2020-03-15 RX ORDER — POTASSIUM CHLORIDE 20 MEQ/1
20 TABLET, EXTENDED RELEASE ORAL ONCE
Status: COMPLETED | OUTPATIENT
Start: 2020-03-15 | End: 2020-03-15

## 2020-03-15 RX ORDER — TRAMADOL HYDROCHLORIDE 50 MG/1
50 TABLET ORAL EVERY 6 HOURS PRN
Status: DISCONTINUED | OUTPATIENT
Start: 2020-03-15 | End: 2020-03-15 | Stop reason: HOSPADM

## 2020-03-15 RX ADMIN — MORPHINE SULFATE 4 MG: 4 INJECTION INTRAVENOUS at 01:30

## 2020-03-15 RX ADMIN — POTASSIUM CHLORIDE 20 MEQ: 1500 TABLET, EXTENDED RELEASE ORAL at 07:50

## 2020-03-15 RX ADMIN — SODIUM CHLORIDE, POTASSIUM CHLORIDE, SODIUM LACTATE AND CALCIUM CHLORIDE: 600; 310; 30; 20 INJECTION, SOLUTION INTRAVENOUS at 01:45

## 2020-03-15 RX ADMIN — TAMSULOSIN HYDROCHLORIDE 0.4 MG: 0.4 CAPSULE ORAL at 10:14

## 2020-03-15 RX ADMIN — DIPHENHYDRAMINE HYDROCHLORIDE 50 MG: 50 INJECTION, SOLUTION INTRAMUSCULAR; INTRAVENOUS at 08:11

## 2020-03-15 RX ADMIN — CEFDINIR 300 MG: 300 CAPSULE ORAL at 16:45

## 2020-03-15 RX ADMIN — TRAMADOL HYDROCHLORIDE 50 MG: 50 TABLET, FILM COATED ORAL at 10:38

## 2020-03-15 RX ADMIN — OXYCODONE HYDROCHLORIDE 10 MG: 10 TABLET ORAL at 04:44

## 2020-03-15 RX ADMIN — CEFDINIR 300 MG: 300 CAPSULE ORAL at 07:50

## 2020-03-15 RX ADMIN — TRAMADOL HYDROCHLORIDE 50 MG: 50 TABLET, FILM COATED ORAL at 16:45

## 2020-03-15 RX ADMIN — SODIUM CHLORIDE, POTASSIUM CHLORIDE, SODIUM LACTATE AND CALCIUM CHLORIDE: 600; 310; 30; 20 INJECTION, SOLUTION INTRAVENOUS at 07:50

## 2020-03-15 ASSESSMENT — PATIENT HEALTH QUESTIONNAIRE - PHQ9
1. LITTLE INTEREST OR PLEASURE IN DOING THINGS: NOT AT ALL
2. FEELING DOWN, DEPRESSED, IRRITABLE, OR HOPELESS: NOT AT ALL
SUM OF ALL RESPONSES TO PHQ9 QUESTIONS 1 AND 2: 0

## 2020-03-15 NOTE — ASSESSMENT & PLAN NOTE
-Will hydrate him with LR at 150 cc/h.  Monitor renal function closely with hydration.  - avoid nephrotoxins, and continue to renally dose all medications.

## 2020-03-15 NOTE — ASSESSMENT & PLAN NOTE
-I will put him on as needed analgesics with as needed oxycodone, and IV morphine.  Hold off on IV Toradol NSAIDs given his ARAM.  I also put him on additional symptomatic therapy with as needed antiemetics.  -Pain might be related to passing stone fragments.  I will start him on aggressive IV fluid hydration to allow easier stone passage.  Start IV LR at 150 cc/h.  Strain all urine.  Start Flomax.

## 2020-03-15 NOTE — PROGRESS NOTES
Handoff report from Teresa RN. Assumed patient care. Received pt in bed awake, up for meal, AOx4, IVF infusing, Reports mild post op pain at the moment. Plan of care reviewed and understood by patient.. Encouraged to verbalize feelings. Patient questions answered.Reinforced use of call light. Will continue to monitor.

## 2020-03-15 NOTE — ASSESSMENT & PLAN NOTE
- s/p right flexible ureteroscopy with laser lithotripsy.  IV fluid hydration with LR as above.  Start Flomax, and strain urine.  Resume Omnicef.

## 2020-03-15 NOTE — PROGRESS NOTES
Tolerates his meals, oral pain medication was effective. IV line removed and clear for discharged.

## 2020-03-15 NOTE — CARE PLAN
Problem: Safety  Goal: Will remain free from injury  Outcome: MET     Problem: Pain Management  Goal: Pain level will decrease to patient's comfort goal  Outcome: MET     Problem: Medication  Goal: Compliance with prescribed medication will improve  Outcome: MET

## 2020-03-15 NOTE — DISCHARGE INSTRUCTIONS
Discharge Instructions    Discharged to home by car with self. Discharged via walking, hospital escort: Yes.  Special equipment needed: Not Applicable    Be sure to schedule a follow-up appointment with your primary care doctor or any specialists as instructed.     Discharge Plan:   Diet Plan: Discussed  Activity Level: Discussed  Smoking Cessation Offered: Patient Refused  Confirmed Follow up Appointment: Patient to Call and Schedule Appointment  Confirmed Symptoms Management: Discussed  Medication Reconciliation Updated: Yes  Influenza Vaccine Indication: Patient Refuses    I understand that a diet low in cholesterol, fat, and sodium is recommended for good health. Unless I have been given specific instructions below for another diet, I accept this instruction as my diet prescription.   Other diet: Regular Diet    Special Instructions: None    · Is patient discharged on Warfarin / Coumadin?   No     Depression / Suicide Risk    As you are discharged from this [facility] facility, it is important to learn how to keep safe from harming yourself.    Recognize the warning signs:  · Abrupt changes in personality, positive or negative- including increase in energy   · Giving away possessions  · Change in eating patterns- significant weight changes-  positive or negative  · Change in sleeping patterns- unable to sleep or sleeping all the time   · Unwillingness or inability to communicate  · Depression  · Unusual sadness, discouragement and loneliness  · Talk of wanting to die  · Neglect of personal appearance   · Rebelliousness- reckless behavior  · Withdrawal from people/activities they love  · Confusion- inability to concentrate     If you or a loved one observes any of these behaviors or has concerns about self-harm, here's what you can do:  · Talk about it- your feelings and reasons for harming yourself  · Remove any means that you might use to hurt yourself (examples: pills, rope, extension cords, firearm)  · Get  professional help from the community (Mental Health, Substance Abuse, psychological counseling)  · Do not be alone:Call your Safe Contact- someone whom you trust who will be there for you.  · Call your local CRISIS HOTLINE 845-5135 or 192-658-9177  · Call your local Children's Mobile Crisis Response Team [place] (460) 477-4852 or www.[employer]  · Call the toll free National Suicide Prevention Hotlines   · National Suicide Prevention Lifeline 553-219-JRKM (1005)  · [place] Hope Line Network 800-SUICIDE (546-2973)          Discharge Instructions per ANUSHKA Gray    You are being prescribed Omnicef for the treatment of your urinary tract infection. Please take this medication as prescribed and in completion.     DIET: Regular diet.    ACTIVITY: Continue as tolerated.    DIAGNOSIS: Postoperative pain.     Return to ER if symptoms worsen or if you develop inability to urinate, intractable nausea/vomiting, or severe abdominal pain.       Lithotripsy, Care After  Refer to this sheet in the next few weeks. These instructions provide you with information on caring for yourself after your procedure. Your health care provider may also give you more specific instructions. Your treatment has been planned according to current medical practices, but problems sometimes occur. Call your health care provider if you have any problems or questions after your procedure.  WHAT TO EXPECT AFTER THE PROCEDURE   · Your urine may have a red tinge for a few days after treatment. Blood loss is usually minimal.  · You may have soreness in the back or flank area. This usually goes away after a few days. The procedure can cause blotches or bruises on the back where the pressure wave enters the skin. These marks usually cause only minimal discomfort and should disappear in a short time.  · Stone fragments should begin to pass within 24 hours of treatment. However, a delayed passage is not unusual.  · You may have pain,  "discomfort, and feel sick to your stomach (nauseated) when the crushed fragments of stone are passed down the tube from the kidney to the bladder. Stone fragments can pass soon after the procedure and may last for up to 4-8 weeks.  · A small number of patients may have severe pain when stone fragments are not able to pass, which leads to an obstruction.  · If your stone is greater than 1 inch (2.5 cm) in diameter or if you have multiple stones that have a combined diameter greater than 1 inch (2.5 cm), you may require more than one treatment.  · If you had a stent placed prior to your procedure, you may experience some discomfort, especially during urination. You may experience the pain or discomfort in your flank or back, or you may experience a sharp pain or discomfort at the base of your penis or in your lower abdomen. The discomfort usually lasts only a few minutes after urinating.  HOME CARE INSTRUCTIONS   · Rest at home until you feel your energy improving.  · Only take over-the-counter or prescription medicines for pain, discomfort, or fever as directed by your health care provider. Depending on the type of lithotripsy, you may need to take antibiotics and anti-inflammatory medicines for a few days.  · Drink enough water and fluids to keep your urine clear or pale yellow. This helps \"flush\" your kidneys. It helps pass any remaining pieces of stone and prevents stones from coming back.  · Most people can resume daily activities within 1-2 days after standard lithotripsy. It can take longer to recover from laser and percutaneous lithotripsy.  · If the stones are in your urinary system, you may be asked to strain your urine at home to look for stones. Any stones that are found can be sent to a medical lab for examination.  · Visit your health care provider for a follow-up appointment in a few weeks. Your doctor may remove your stent if you have one. Your health care provider will also check to see whether stone " particles still remain.  SEEK MEDICAL CARE IF:   · Your pain is not relieved by medicine.  · You have a lasting nauseous feeling.  · You feel there is too much blood in the urine.  · You develop persistent problems with frequent or painful urination that does not at least partially improve after 2 days following the procedure.  · You have a congested cough.  · You feel lightheaded.  · You develop a rash or any other signs that might suggest an allergic problem.  · You develop any reaction or side effects to your medicine(s).  SEEK IMMEDIATE MEDICAL CARE IF:   · You experience severe back or flank pain or both.  · You see nothing but blood when you urinate.  · You cannot pass any urine at all.  · You have a fever or shaking chills.  · You develop shortness of breath, difficulty breathing, or chest pain.  · You develop vomiting that will not stop after 6-8 hours.  · You have a fainting episode.     This information is not intended to replace advice given to you by your health care provider. Make sure you discuss any questions you have with your health care provider.     Document Released: 01/06/2009 Document Revised: 10/08/2014 Document Reviewed: 07/03/2014  [employer] Patient Education ©2016 [employer]

## 2020-03-15 NOTE — ED NOTES
Transport at bedside for transfer to T214.   Report given to TATE Dickens.  All belongings sent with pt.

## 2020-03-15 NOTE — PROGRESS NOTES
Patient complaining of generalized itching, feeling very uncomfortable. Paged hospitalist on call with new orders made.

## 2020-03-15 NOTE — CARE PLAN
Problem: Knowledge Deficit  Goal: Knowledge of disease process/condition, treatment plan, diagnostic tests, and medications will improve  Outcome: MET     Problem: Discharge Barriers/Planning  Goal: Patient's continuum of care needs will be met  Outcome: MET

## 2020-03-15 NOTE — DISCHARGE SUMMARY
Discharge Summary    CHIEF COMPLAINT ON ADMISSION  Chief Complaint   Patient presents with   • Flank Pain     right side , post  LITHOTRIPSY       Reason for Admission  Post Op Pain     Admission Date  3/14/2020    CODE STATUS  Full Code    HPI & HOSPITAL COURSE  This is a 30 y.o. male here with postoperative pain s/p lithotripsy. He was recently admitted for right kidney stone and underwent a right flexible ureteroscopy with laser lithotripsy. Also, found to have UTI and prescribed Omnicef. He was admitted to the CDU for observation and pain management. He was started on IVF for ARAM, and creatinine improved from 1.46 to 1.73.  Electrolytes were monitored and repleted. His pain was controlled with as needed Tramadol, and nausea was controlled with antiemetics. He reported he was ready to go home and thus discharged with prescription for Tramadol and Zofran PRN, as well as the remaining course of Omnicef.        Therefore, he is discharged in fair and stable condition to home with close outpatient follow-up.    The patient recovered much more quickly than anticipated on admission.    Discharge Date  03/15/20    FOLLOW UP ITEMS POST DISCHARGE  Follow-up with urology outpatient     DISCHARGE DIAGNOSES  Principal Problem (Resolved):    Postoperative pain POA: Yes  Active Problems:    Ureterolithiasis with hydronephrosis POA: Yes    Leukemoid reaction POA: Yes    ARAM (acute kidney injury) (HCC) POA: Yes      FOLLOW UP  No future appointments.  No follow-up provider specified.    MEDICATIONS ON DISCHARGE     Medication List      START taking these medications      Instructions   ondansetron 4 MG Tbdp  Commonly known as:  ZOFRAN ODT   Take 1 Tab by mouth every 8 hours as needed for Nausea for up to 5 days.  Dose:  4 mg     tramadol 50 MG Tabs  Commonly known as:  ULTRAM   Take 1 Tab by mouth every 8 hours as needed for Severe Pain for up to 5 days.  Dose:  50 mg        CHANGE how you take these medications       Instructions   cefdinir 300 MG Caps  What changed:  when to take this  Commonly known as:  OMNICEF   Take 1 Cap by mouth 2 Times a Day for 4 days.  Dose:  300 mg            Allergies  Allergies   Allergen Reactions   • Ancef [Cefazolin] Rash     rash   • Percocet [Apap-Fd&C Red #40 Al Santillan-Oxycodone]    • Shellfish Allergy    • Tape        DIET  Orders Placed This Encounter   Procedures   • Diet Order Regular     Standing Status:   Standing     Number of Occurrences:   1     Order Specific Question:   Diet:     Answer:   Regular [1]       ACTIVITY  As tolerated.  Weight bearing as tolerated    CONSULTATIONS  None     PROCEDURES  None     LABORATORY  Lab Results   Component Value Date    SODIUM 136 03/15/2020    POTASSIUM 3.5 (L) 03/15/2020    CHLORIDE 106 03/15/2020    CO2 22 03/15/2020    GLUCOSE 108 (H) 03/15/2020    BUN 10 03/15/2020    CREATININE 1.46 (H) 03/15/2020        Lab Results   Component Value Date    WBC 15.0 (H) 03/15/2020    HEMOGLOBIN 12.2 (L) 03/15/2020    HEMATOCRIT 36.1 (L) 03/15/2020    PLATELETCT 171 03/15/2020

## 2020-03-19 NOTE — ANESTHESIA POSTPROCEDURE EVALUATION
Patient: Russell Bunn    Procedure Summary     Date:  03/13/20 Room / Location:  [place] [address] / SURGERY [facility]    Anesthesia Start:  1948 Anesthesia Stop:  2041    Procedures:       CYSTOSCOPY (Right Ureter)      URETEROSCOPY (Right Ureter)      LITHOTRIPSY, USING LASER (Right Ureter) Diagnosis:  (Right proximal Ureteral Stone)    Surgeon:  Chase Damon M.D. Responsible Provider:  Pedro Kimball M.D.    Anesthesia Type:  general ASA Status:  2        \  Anesthesia Post Evaluation    Patient location during evaluation: PACU  Patient participation: complete - patient participated  Level of consciousness: awake and alert    Airway patency: patent  Anesthetic complications: no  Cardiovascular status: hemodynamically stable  Respiratory status: acceptable  Hydration status: euvolemic    PONV: none           Nurse Pain Score: 5 (NPRS)

## 2021-01-16 ENCOUNTER — APPOINTMENT (OUTPATIENT)
Dept: RADIOLOGY | Facility: MEDICAL CENTER | Age: 31
End: 2021-01-16
Attending: EMERGENCY MEDICINE
Payer: COMMERCIAL

## 2021-01-16 ENCOUNTER — HOSPITAL ENCOUNTER (EMERGENCY)
Facility: MEDICAL CENTER | Age: 31
End: 2021-01-16
Attending: EMERGENCY MEDICINE | Admitting: EMERGENCY MEDICINE
Payer: COMMERCIAL

## 2021-01-16 VITALS
WEIGHT: 143.52 LBS | HEIGHT: 66 IN | SYSTOLIC BLOOD PRESSURE: 138 MMHG | RESPIRATION RATE: 18 BRPM | HEART RATE: 78 BPM | OXYGEN SATURATION: 97 % | TEMPERATURE: 97.3 F | BODY MASS INDEX: 23.07 KG/M2 | DIASTOLIC BLOOD PRESSURE: 84 MMHG

## 2021-01-16 DIAGNOSIS — R82.81 PYURIA: ICD-10-CM

## 2021-01-16 DIAGNOSIS — R10.9 FLANK PAIN: ICD-10-CM

## 2021-01-16 LAB
ANION GAP SERPL CALC-SCNC: 7 MMOL/L (ref 7–16)
APPEARANCE UR: CLEAR
APPEARANCE UR: CLEAR
BACTERIA #/AREA URNS HPF: NEGATIVE /HPF
BACTERIA #/AREA URNS HPF: NEGATIVE /HPF
BASOPHILS # BLD AUTO: 0.9 % (ref 0–1.8)
BASOPHILS # BLD: 0.09 K/UL (ref 0–0.12)
BILIRUB UR QL STRIP.AUTO: NEGATIVE
BILIRUB UR QL STRIP.AUTO: NEGATIVE
BUN SERPL-MCNC: 17 MG/DL (ref 8–22)
CALCIUM SERPL-MCNC: 9.3 MG/DL (ref 8.5–10.5)
CHLORIDE SERPL-SCNC: 106 MMOL/L (ref 96–112)
CO2 SERPL-SCNC: 26 MMOL/L (ref 20–33)
COLOR UR: YELLOW
COLOR UR: YELLOW
CREAT SERPL-MCNC: 1.22 MG/DL (ref 0.5–1.4)
EOSINOPHIL # BLD AUTO: 1.22 K/UL (ref 0–0.51)
EOSINOPHIL NFR BLD: 12.5 % (ref 0–6.9)
EPI CELLS #/AREA URNS HPF: ABNORMAL /HPF
EPI CELLS #/AREA URNS HPF: NEGATIVE /HPF
ERYTHROCYTE [DISTWIDTH] IN BLOOD BY AUTOMATED COUNT: 51 FL (ref 35.9–50)
GLUCOSE SERPL-MCNC: 77 MG/DL (ref 65–99)
GLUCOSE UR STRIP.AUTO-MCNC: NEGATIVE MG/DL
GLUCOSE UR STRIP.AUTO-MCNC: NEGATIVE MG/DL
HCT VFR BLD AUTO: 46.1 % (ref 42–52)
HGB BLD-MCNC: 14.7 G/DL (ref 14–18)
HYALINE CASTS #/AREA URNS LPF: ABNORMAL /LPF
HYALINE CASTS #/AREA URNS LPF: ABNORMAL /LPF
IMM GRANULOCYTES # BLD AUTO: 0.03 K/UL (ref 0–0.11)
IMM GRANULOCYTES NFR BLD AUTO: 0.3 % (ref 0–0.9)
KETONES UR STRIP.AUTO-MCNC: NEGATIVE MG/DL
KETONES UR STRIP.AUTO-MCNC: NEGATIVE MG/DL
LEUKOCYTE ESTERASE UR QL STRIP.AUTO: ABNORMAL
LEUKOCYTE ESTERASE UR QL STRIP.AUTO: ABNORMAL
LYMPHOCYTES # BLD AUTO: 3.14 K/UL (ref 1–4.8)
LYMPHOCYTES NFR BLD: 32.2 % (ref 22–41)
MCH RBC QN AUTO: 31.9 PG (ref 27–33)
MCHC RBC AUTO-ENTMCNC: 31.9 G/DL (ref 33.7–35.3)
MCV RBC AUTO: 100 FL (ref 81.4–97.8)
MICRO URNS: ABNORMAL
MICRO URNS: ABNORMAL
MONOCYTES # BLD AUTO: 0.83 K/UL (ref 0–0.85)
MONOCYTES NFR BLD AUTO: 8.5 % (ref 0–13.4)
NEUTROPHILS # BLD AUTO: 4.43 K/UL (ref 1.82–7.42)
NEUTROPHILS NFR BLD: 45.6 % (ref 44–72)
NITRITE UR QL STRIP.AUTO: NEGATIVE
NITRITE UR QL STRIP.AUTO: NEGATIVE
NRBC # BLD AUTO: 0 K/UL
NRBC BLD-RTO: 0 /100 WBC
PH UR STRIP.AUTO: 6.5 [PH] (ref 5–8)
PH UR STRIP.AUTO: 7 [PH] (ref 5–8)
PLATELET # BLD AUTO: 252 K/UL (ref 164–446)
PMV BLD AUTO: 9.3 FL (ref 9–12.9)
POTASSIUM SERPL-SCNC: 4.5 MMOL/L (ref 3.6–5.5)
PROT UR QL STRIP: NEGATIVE MG/DL
PROT UR QL STRIP: NEGATIVE MG/DL
RBC # BLD AUTO: 4.61 M/UL (ref 4.7–6.1)
RBC # URNS HPF: ABNORMAL /HPF
RBC # URNS HPF: ABNORMAL /HPF
RBC UR QL AUTO: NEGATIVE
RBC UR QL AUTO: NEGATIVE
SODIUM SERPL-SCNC: 139 MMOL/L (ref 135–145)
SP GR UR STRIP.AUTO: 1.02
SP GR UR STRIP.AUTO: 1.02
UROBILINOGEN UR STRIP.AUTO-MCNC: 0.2 MG/DL
UROBILINOGEN UR STRIP.AUTO-MCNC: 0.2 MG/DL
WBC # BLD AUTO: 9.7 K/UL (ref 4.8–10.8)
WBC #/AREA URNS HPF: ABNORMAL /HPF
WBC #/AREA URNS HPF: ABNORMAL /HPF

## 2021-01-16 PROCEDURE — 96375 TX/PRO/DX INJ NEW DRUG ADDON: CPT

## 2021-01-16 PROCEDURE — 700105 HCHG RX REV CODE 258: Performed by: EMERGENCY MEDICINE

## 2021-01-16 PROCEDURE — 76775 US EXAM ABDO BACK WALL LIM: CPT

## 2021-01-16 PROCEDURE — 700111 HCHG RX REV CODE 636 W/ 250 OVERRIDE (IP): Performed by: EMERGENCY MEDICINE

## 2021-01-16 PROCEDURE — 74176 CT ABD & PELVIS W/O CONTRAST: CPT

## 2021-01-16 PROCEDURE — 99284 EMERGENCY DEPT VISIT MOD MDM: CPT

## 2021-01-16 PROCEDURE — 96374 THER/PROPH/DIAG INJ IV PUSH: CPT

## 2021-01-16 PROCEDURE — 81001 URINALYSIS AUTO W/SCOPE: CPT

## 2021-01-16 PROCEDURE — 80048 BASIC METABOLIC PNL TOTAL CA: CPT

## 2021-01-16 PROCEDURE — 85025 COMPLETE CBC W/AUTO DIFF WBC: CPT

## 2021-01-16 RX ORDER — ONDANSETRON 4 MG/1
4 TABLET, ORALLY DISINTEGRATING ORAL EVERY 8 HOURS PRN
Qty: 12 TAB | Refills: 0 | Status: ON HOLD | OUTPATIENT
Start: 2021-01-16 | End: 2021-04-27

## 2021-01-16 RX ORDER — HYDROMORPHONE HYDROCHLORIDE 1 MG/ML
0.25 INJECTION, SOLUTION INTRAMUSCULAR; INTRAVENOUS; SUBCUTANEOUS ONCE
Status: COMPLETED | OUTPATIENT
Start: 2021-01-16 | End: 2021-01-16

## 2021-01-16 RX ORDER — DOXYCYCLINE 100 MG/1
100 CAPSULE ORAL 2 TIMES DAILY
Qty: 14 CAP | Refills: 0 | Status: SHIPPED | OUTPATIENT
Start: 2021-01-16 | End: 2021-01-16

## 2021-01-16 RX ORDER — IBUPROFEN 600 MG/1
600 TABLET ORAL
Qty: 20 TAB | Refills: 0 | Status: ON HOLD | OUTPATIENT
Start: 2021-01-16 | End: 2021-04-27

## 2021-01-16 RX ORDER — SODIUM CHLORIDE 9 MG/ML
1000 INJECTION, SOLUTION INTRAVENOUS ONCE
Status: COMPLETED | OUTPATIENT
Start: 2021-01-16 | End: 2021-01-16

## 2021-01-16 RX ORDER — KETOROLAC TROMETHAMINE 30 MG/ML
15 INJECTION, SOLUTION INTRAMUSCULAR; INTRAVENOUS ONCE
Status: COMPLETED | OUTPATIENT
Start: 2021-01-16 | End: 2021-01-16

## 2021-01-16 RX ORDER — ONDANSETRON 2 MG/ML
4 INJECTION INTRAMUSCULAR; INTRAVENOUS ONCE
Status: COMPLETED | OUTPATIENT
Start: 2021-01-16 | End: 2021-01-16

## 2021-01-16 RX ORDER — DOXYCYCLINE 100 MG/1
100 CAPSULE ORAL 2 TIMES DAILY
Qty: 20 CAP | Refills: 0 | Status: SHIPPED | OUTPATIENT
Start: 2021-01-16 | End: 2021-01-26

## 2021-01-16 RX ADMIN — SODIUM CHLORIDE 1000 ML: 9 INJECTION, SOLUTION INTRAVENOUS at 17:24

## 2021-01-16 RX ADMIN — KETOROLAC TROMETHAMINE 15 MG: 30 INJECTION, SOLUTION INTRAMUSCULAR at 16:29

## 2021-01-16 RX ADMIN — HYDROMORPHONE HYDROCHLORIDE 0.25 MG: 1 INJECTION, SOLUTION INTRAMUSCULAR; INTRAVENOUS; SUBCUTANEOUS at 16:32

## 2021-01-16 RX ADMIN — ONDANSETRON 4 MG: 2 INJECTION INTRAMUSCULAR; INTRAVENOUS at 16:29

## 2021-01-16 ASSESSMENT — FIBROSIS 4 INDEX: FIB4 SCORE: 0.92

## 2021-01-16 NOTE — ED TRIAGE NOTES
"..  Chief Complaint   Patient presents with   • Flank Pain     c/o right sided flank pain radiating to the RLQ x's 4 days. Pt reports chronic kidney stones.    • Medication Refill     pt reports running out of his sz medication which starts with a \"D\" and he doesn't remember the name of it.        ./65   Pulse 78   Temp 36.3 °C (97.3 °F) (Temporal)   Resp 18   Ht 1.676 m (5' 6\")   Wt 65.1 kg (143 lb 8.3 oz)   SpO2 99%        Explained triage process, to waiting room. Asked to inform RN if questions or concerns arise.   "

## 2021-01-16 NOTE — ED NOTES
Pt states right flank and abd discomfort. Onset 3 days ago. States hx of multiple kidney stone. States hx of multiple procedure to break stones up. States he is able to urinate still with increased discomfort at complete emptying bladder. Denies N/V. Speaking full and complete sentences without signs of distress. Alert and oriented x4.

## 2021-01-17 NOTE — ED PROVIDER NOTES
"ED Provider Note    CHIEF COMPLAINT  Chief Complaint   Patient presents with   • Flank Pain     c/o right sided flank pain radiating to the RLQ x's 4 days. Pt reports chronic kidney stones.    • Medication Refill     pt reports running out of his sz medication which starts with a \"D\" and he doesn't remember the name of it.        HPI  Russell Bunn is a 30 y.o. male who presents complaint of right-sided flank pain has had this for 3 or 4 days.  Started rather suddenly, it is a crampy, sharp pain in his right flank.  This feels like previous kidney stones.  Has had this several times in the past.  There is no vomiting but little nausea associate with the pain.  Does not radiate.  There is no testicle pain.  When he urinates it hurts in his right flank but he does no pain with urination itself.  There is no increased frequency or incomplete voiding.  No discharge from the penis.  No other belly pain.  He was seen in our system about 10 months ago, at that time he had cystoscopy.  4 months ago in [place] he also had a intervention on his left side.  He has been having no problems since then.    PAST MEDICAL HISTORY  Past Medical History:   Diagnosis Date   • Kidney stones    • Seizure disorder (HCC)        FAMILY HISTORY  History reviewed. No pertinent family history.    SOCIAL HISTORY  Social History     Tobacco Use   • Smoking status: Current Some Day Smoker     Packs/day: 0.50     Types: Cigarettes   • Smokeless tobacco: Never Used   Substance Use Topics   • Alcohol use: Yes     Comment: Occasional   • Drug use: Yes     Types: Inhaled     Comment: Marijuana, every day     He works at [employer].    SURGICAL HISTORY  Past Surgical History:   Procedure Laterality Date   • PB CYSTO/URETERO/PYELOSCOPY, DX Right 3/13/2020    Procedure: URETEROSCOPY;  Surgeon: Chase Damon M.D.;  Location: SURGERY [facility];  Service: Urology   • PB CYSTOSCOPY,INSERT URETERAL STENT Right 3/13/2020    Procedure: " "CYSTOSCOPY;  Surgeon: Chase Damon M.D.;  Location: SURGERY [facility];  Service: Urology   • LASERTRIPSY Right 3/13/2020    Procedure: LITHOTRIPSY, USING LASER;  Surgeon: Chase Damon M.D.;  Location: SURGERY [facility];  Service: Urology   • LITHOTRIPSY         CURRENT MEDICATIONS  Home Medications     Reviewed by Margaux Rodgers R.N. (Registered Nurse) on 01/16/21 at 1431  Med List Status: Not Addressed   Medication Last Dose Status        Patient [place] Taking any Medications                       I have reviewed the nurses notes and/or the list brought with the patient.    ALLERGIES  Allergies   Allergen Reactions   • Ancef [Cefazolin] Rash     rash   • Percocet [Apap-Fd&C Red #40 Al Santillan-Oxycodone]    • Shellfish Allergy    • Tape        REVIEW OF SYSTEMS  See HPI for further details. Review of systems as above, otherwise all other systems are negative.     PHYSICAL EXAM  VITAL SIGNS: /67   Pulse 71   Temp 36.3 °C (97.3 °F) (Temporal)   Resp 18   Ht 1.676 m (5' 6\")   Wt 65.1 kg (143 lb 8.3 oz)   SpO2 99%   BMI 23.16 kg/m²     Constitutional: Well appearing patient in no acute distress.  Not toxic, nor ill in appearance.  HENT: Mucus membranes moist.  Oropharynx is clear.  Eyes: Pupils equally round.  No scleral icterus.   Neck: Full nontender range of motion.  Lymphatic: No cervical lymphadenopathy noted.   Cardiovascular: Regular heart rate and rhythm.  No murmurs, rubs, nor gallop appreciated.   Thorax & Lungs: Chest is nontender.  Lungs are clear to auscultation with good air movement bilaterally.  No wheeze, rhonchi, nor rales.   Abdomen: Soft, with no tenderness, rebound nor guarding.  No mass, pulsatile mass, nor hepatosplenomegaly appreciated.  There is no CVA tenderness.    : Phallus unremarkable.  There is no inguinal mass or hernia.  The scrotum is benign.  Skin: No purpura nor petechia noted.  Extremities/Musculoskeletal: No sign of trauma.    Neurologic: Alert & oriented.  " Strength and sensation is intact all around.  Gait is normal.  Psychiatric: Normal affect appropriate for the clinical situation.    LABS  Labs Reviewed   CBC WITH DIFFERENTIAL - Abnormal; Notable for the following components:       Result Value    RBC 4.61 (*)     .0 (*)     MCHC 31.9 (*)     RDW 51.0 (*)     Eosinophils 12.50 (*)     Eos (Absolute) 1.22 (*)     All other components within normal limits   URINALYSIS - Abnormal; Notable for the following components:    Leukocyte Esterase Small (*)     All other components within normal limits   URINE MICROSCOPIC (W/UA) - Abnormal; Notable for the following components:    WBC  (*)     RBC 0-2 (*)     All other components within normal limits   URINALYSIS - Abnormal; Notable for the following components:    Leukocyte Esterase Small (*)     All other components within normal limits   URINE MICROSCOPIC (W/UA) - Abnormal; Notable for the following components:    WBC 20-50 (*)     RBC 2-5 (*)     All other components within normal limits   BASIC METABOLIC PANEL   ESTIMATED GFR   CHLAMYDIA/GC PCR URINE OR SWAB         RADIOLOGY/PROCEDURES  I have reviewed the patient's film interpretations myself, and they are read out by the radiologist as:   CT-RENAL COLIC EVALUATION(A/P W/O)   Final Result      Bilateral nonobstructing renal calculi.      Small hypodense right renal lesion likely represents a small cyst.      Mild scarring of the lower pole of the left kidney.      Nonvisualization of the appendix, limiting evaluation.      Small amount of nonspecific free fluid in the pelvis.      US-RENAL   Final Result      1.  Mild bilateral hydronephrosis, slightly worse on the RIGHT.   2.  Multiple bilateral kidney stones.   3.  Limited evaluation of the urinary bladder.        .    MEDICAL RECORD  I have reviewed patient's medical record and pertinent results are listed above.    COURSE & MEDICAL DECISION MAKING  I have reviewed any medical record information,  laboratory studies and radiographic results as noted above.  This patient presents with flank pain history of stones with suspicion is another stone.  Urinalysis shows pyuria but no bacteriuria.  A little bit of blood.  No nitrites.  There is no leukocytosis.  When I got a sonogram which does show that bilateral hydro as well as being worse on the right, and symptomatic side.    Case discussed with Dr. Bansal from urology.  He recommended repeating the urinalysis.  He also recommended going again a CT based upon the sonogram result.  Patient has just urinated, presently were keeping n.p.o., therefore we will go ahead and give him IV fluids help with that second urinalysis.    We proceeded with this, second urinalysis still shows pyuria.  CT scan is no shows no evidence of ureteral stones.  I given the patient Toradol and a small dose of Dilaudid and Zofran.  Upon recheck, he is completely asymptomatic.  Consideration for him having passed a stone, however also possible that this is unrelated to his stone.  He seems your instructions on flank pain.  We will go ahead and give him doxycycline to cover that pyuria.  He describes having hives from [facility], I discussed this with the pharmacist.  At this point, given his lack of other symptoms, I have a low suspicion for gonorrhea.  We will go ahead and send urine for this, he was advised he would need to come back for medications such as gentamicin should this be positive.  I would recommend he establish with Dr. Bansal in urology and he was given this contact information.  For now written for ibuprofen for pain should it recur, as well as Zofran for nausea.    FINAL IMPRESSION  1. Flank pain    2. Pyuria    3.  Renal stones       This dictation was created using voice recognition software.    Electronically signed by: Kaushik Leal M.D., 1/16/2021 4:16 PM

## 2021-01-17 NOTE — DISCHARGE INSTRUCTIONS
We will go ahead and treat you with doxycycline.  Depending on the results of your cultures which have ordered today, we may need to change this or add on medication.  I do recommend that you follow-up with urology and establishing with them.  Return to the ER for new symptoms or any turn for the worse.

## 2021-01-17 NOTE — ED NOTES
Pt lying in bed, awake and speaking without signs of distress. States unable to give second UA sample at this time. IV fluids running and patient tolerating without complaint.

## 2021-04-26 ENCOUNTER — ANESTHESIA EVENT (OUTPATIENT)
Dept: SURGERY | Facility: MEDICAL CENTER | Age: 31
DRG: 694 | End: 2021-04-26
Payer: COMMERCIAL

## 2021-04-26 ENCOUNTER — ANESTHESIA (OUTPATIENT)
Dept: SURGERY | Facility: MEDICAL CENTER | Age: 31
DRG: 694 | End: 2021-04-26
Payer: COMMERCIAL

## 2021-04-26 ENCOUNTER — HOSPITAL ENCOUNTER (INPATIENT)
Facility: MEDICAL CENTER | Age: 31
LOS: 1 days | DRG: 694 | End: 2021-04-27
Attending: EMERGENCY MEDICINE | Admitting: HOSPITALIST
Payer: COMMERCIAL

## 2021-04-26 ENCOUNTER — APPOINTMENT (OUTPATIENT)
Dept: RADIOLOGY | Facility: MEDICAL CENTER | Age: 31
DRG: 694 | End: 2021-04-26
Attending: EMERGENCY MEDICINE
Payer: COMMERCIAL

## 2021-04-26 ENCOUNTER — APPOINTMENT (OUTPATIENT)
Dept: RADIOLOGY | Facility: MEDICAL CENTER | Age: 31
DRG: 694 | End: 2021-04-26
Attending: UROLOGY
Payer: COMMERCIAL

## 2021-04-26 DIAGNOSIS — N20.0 KIDNEY STONE: ICD-10-CM

## 2021-04-26 DIAGNOSIS — N12 PYELONEPHRITIS: ICD-10-CM

## 2021-04-26 DIAGNOSIS — R56.9 SEIZURE-LIKE ACTIVITY (HCC): ICD-10-CM

## 2021-04-26 LAB
ALBUMIN SERPL BCP-MCNC: 4.5 G/DL (ref 3.2–4.9)
ALBUMIN/GLOB SERPL: 1.3 G/DL
ALP SERPL-CCNC: 115 U/L (ref 30–99)
ALT SERPL-CCNC: 10 U/L (ref 2–50)
ANION GAP SERPL CALC-SCNC: 12 MMOL/L (ref 7–16)
APPEARANCE UR: CLEAR
AST SERPL-CCNC: 18 U/L (ref 12–45)
BACTERIA #/AREA URNS HPF: ABNORMAL /HPF
BASOPHILS # BLD AUTO: 0.7 % (ref 0–1.8)
BASOPHILS # BLD: 0.11 K/UL (ref 0–0.12)
BILIRUB SERPL-MCNC: 0.5 MG/DL (ref 0.1–1.5)
BILIRUB UR QL STRIP.AUTO: NEGATIVE
BUN SERPL-MCNC: 16 MG/DL (ref 8–22)
CALCIUM SERPL-MCNC: 10 MG/DL (ref 8.5–10.5)
CHLORIDE SERPL-SCNC: 102 MMOL/L (ref 96–112)
CO2 SERPL-SCNC: 23 MMOL/L (ref 20–33)
COLOR UR: YELLOW
CREAT SERPL-MCNC: 1.43 MG/DL (ref 0.5–1.4)
EOSINOPHIL # BLD AUTO: 1.82 K/UL (ref 0–0.51)
EOSINOPHIL NFR BLD: 12.4 % (ref 0–6.9)
EPI CELLS #/AREA URNS HPF: ABNORMAL /HPF
ERYTHROCYTE [DISTWIDTH] IN BLOOD BY AUTOMATED COUNT: 50.1 FL (ref 35.9–50)
GLOBULIN SER CALC-MCNC: 3.6 G/DL (ref 1.9–3.5)
GLUCOSE SERPL-MCNC: 119 MG/DL (ref 65–99)
GLUCOSE UR STRIP.AUTO-MCNC: NEGATIVE MG/DL
HCT VFR BLD AUTO: 51 % (ref 42–52)
HGB BLD-MCNC: 16.7 G/DL (ref 14–18)
HYALINE CASTS #/AREA URNS LPF: ABNORMAL /LPF
IMM GRANULOCYTES # BLD AUTO: 0.04 K/UL (ref 0–0.11)
IMM GRANULOCYTES NFR BLD AUTO: 0.3 % (ref 0–0.9)
KETONES UR STRIP.AUTO-MCNC: NEGATIVE MG/DL
LEUKOCYTE ESTERASE UR QL STRIP.AUTO: ABNORMAL
LIPASE SERPL-CCNC: 90 U/L (ref 11–82)
LYMPHOCYTES # BLD AUTO: 4.9 K/UL (ref 1–4.8)
LYMPHOCYTES NFR BLD: 33.4 % (ref 22–41)
MCH RBC QN AUTO: 32.4 PG (ref 27–33)
MCHC RBC AUTO-ENTMCNC: 32.7 G/DL (ref 33.7–35.3)
MCV RBC AUTO: 99 FL (ref 81.4–97.8)
MICRO URNS: ABNORMAL
MONOCYTES # BLD AUTO: 1.16 K/UL (ref 0–0.85)
MONOCYTES NFR BLD AUTO: 7.9 % (ref 0–13.4)
NEUTROPHILS # BLD AUTO: 6.66 K/UL (ref 1.82–7.42)
NEUTROPHILS NFR BLD: 45.3 % (ref 44–72)
NITRITE UR QL STRIP.AUTO: NEGATIVE
NRBC # BLD AUTO: 0 K/UL
NRBC BLD-RTO: 0 /100 WBC
PATHOLOGY CONSULT NOTE: NORMAL
PH UR STRIP.AUTO: 6 [PH] (ref 5–8)
PLATELET # BLD AUTO: 287 K/UL (ref 164–446)
PMV BLD AUTO: 9.6 FL (ref 9–12.9)
POTASSIUM SERPL-SCNC: 4.4 MMOL/L (ref 3.6–5.5)
PROT SERPL-MCNC: 8.1 G/DL (ref 6–8.2)
PROT UR QL STRIP: 30 MG/DL
RBC # BLD AUTO: 5.15 M/UL (ref 4.7–6.1)
RBC # URNS HPF: ABNORMAL /HPF
RBC UR QL AUTO: ABNORMAL
SARS-COV+SARS-COV-2 AG RESP QL IA.RAPID: NOTDETECTED
SARS-COV-2 RNA RESP QL NAA+PROBE: NOTDETECTED
SODIUM SERPL-SCNC: 137 MMOL/L (ref 135–145)
SP GR UR STRIP.AUTO: 1.02
SPECIMEN SOURCE: NORMAL
SPECIMEN SOURCE: NORMAL
UROBILINOGEN UR STRIP.AUTO-MCNC: 0.2 MG/DL
WBC # BLD AUTO: 14.7 K/UL (ref 4.8–10.8)
WBC #/AREA URNS HPF: ABNORMAL /HPF

## 2021-04-26 PROCEDURE — 770006 HCHG ROOM/CARE - MED/SURG/GYN SEMI*

## 2021-04-26 PROCEDURE — 700111 HCHG RX REV CODE 636 W/ 250 OVERRIDE (IP): Performed by: ANESTHESIOLOGY

## 2021-04-26 PROCEDURE — 99291 CRITICAL CARE FIRST HOUR: CPT

## 2021-04-26 PROCEDURE — 99233 SBSQ HOSP IP/OBS HIGH 50: CPT | Mod: GC | Performed by: HOSPITALIST

## 2021-04-26 PROCEDURE — A9270 NON-COVERED ITEM OR SERVICE: HCPCS | Performed by: UROLOGY

## 2021-04-26 PROCEDURE — 83690 ASSAY OF LIPASE: CPT

## 2021-04-26 PROCEDURE — 700105 HCHG RX REV CODE 258: Performed by: EMERGENCY MEDICINE

## 2021-04-26 PROCEDURE — C9803 HOPD COVID-19 SPEC COLLECT: HCPCS | Performed by: EMERGENCY MEDICINE

## 2021-04-26 PROCEDURE — 85025 COMPLETE CBC W/AUTO DIFF WBC: CPT

## 2021-04-26 PROCEDURE — 700101 HCHG RX REV CODE 250: Performed by: ANESTHESIOLOGY

## 2021-04-26 PROCEDURE — 700111 HCHG RX REV CODE 636 W/ 250 OVERRIDE (IP): Performed by: STUDENT IN AN ORGANIZED HEALTH CARE EDUCATION/TRAINING PROGRAM

## 2021-04-26 PROCEDURE — 160048 HCHG OR STATISTICAL LEVEL 1-5: Performed by: UROLOGY

## 2021-04-26 PROCEDURE — 160002 HCHG RECOVERY MINUTES (STAT): Performed by: UROLOGY

## 2021-04-26 PROCEDURE — 96374 THER/PROPH/DIAG INJ IV PUSH: CPT

## 2021-04-26 PROCEDURE — U0005 INFEC AGEN DETEC AMPLI PROBE: HCPCS

## 2021-04-26 PROCEDURE — U0003 INFECTIOUS AGENT DETECTION BY NUCLEIC ACID (DNA OR RNA); SEVERE ACUTE RESPIRATORY SYNDROME CORONAVIRUS 2 (SARS-COV-2) (CORONAVIRUS DISEASE [COVID-19]), AMPLIFIED PROBE TECHNIQUE, MAKING USE OF HIGH THROUGHPUT TECHNOLOGIES AS DESCRIBED BY CMS-2020-01-R: HCPCS

## 2021-04-26 PROCEDURE — 0TCB8ZZ EXTIRPATION OF MATTER FROM BLADDER, VIA NATURAL OR ARTIFICIAL OPENING ENDOSCOPIC: ICD-10-PCS | Performed by: UROLOGY

## 2021-04-26 PROCEDURE — 88300 SURGICAL PATH GROSS: CPT

## 2021-04-26 PROCEDURE — 80053 COMPREHEN METABOLIC PANEL: CPT

## 2021-04-26 PROCEDURE — 81001 URINALYSIS AUTO W/SCOPE: CPT

## 2021-04-26 PROCEDURE — 96375 TX/PRO/DX INJ NEW DRUG ADDON: CPT

## 2021-04-26 PROCEDURE — 160028 HCHG SURGERY MINUTES - 1ST 30 MINS LEVEL 3: Performed by: UROLOGY

## 2021-04-26 PROCEDURE — 160035 HCHG PACU - 1ST 60 MINS PHASE I: Performed by: UROLOGY

## 2021-04-26 PROCEDURE — 500879 HCHG PACK, CYSTO: Performed by: UROLOGY

## 2021-04-26 PROCEDURE — 160039 HCHG SURGERY MINUTES - EA ADDL 1 MIN LEVEL 3: Performed by: UROLOGY

## 2021-04-26 PROCEDURE — 88305 TISSUE EXAM BY PATHOLOGIST: CPT

## 2021-04-26 PROCEDURE — 700111 HCHG RX REV CODE 636 W/ 250 OVERRIDE (IP): Performed by: EMERGENCY MEDICINE

## 2021-04-26 PROCEDURE — 160009 HCHG ANES TIME/MIN: Performed by: UROLOGY

## 2021-04-26 PROCEDURE — C1769 GUIDE WIRE: HCPCS | Performed by: UROLOGY

## 2021-04-26 PROCEDURE — 87426 SARSCOV CORONAVIRUS AG IA: CPT

## 2021-04-26 PROCEDURE — 74176 CT ABD & PELVIS W/O CONTRAST: CPT

## 2021-04-26 PROCEDURE — 700102 HCHG RX REV CODE 250 W/ 637 OVERRIDE(OP): Performed by: UROLOGY

## 2021-04-26 RX ORDER — POLYETHYLENE GLYCOL 3350 17 G/17G
1 POWDER, FOR SOLUTION ORAL
Status: DISCONTINUED | OUTPATIENT
Start: 2021-04-26 | End: 2021-04-27 | Stop reason: HOSPADM

## 2021-04-26 RX ORDER — GENTAMICIN SULFATE 40 MG/ML
INJECTION, SOLUTION INTRAMUSCULAR; INTRAVENOUS PRN
Status: DISCONTINUED | OUTPATIENT
Start: 2021-04-26 | End: 2021-04-26 | Stop reason: SURG

## 2021-04-26 RX ORDER — ONDANSETRON 4 MG/1
4 TABLET, ORALLY DISINTEGRATING ORAL EVERY 4 HOURS PRN
Status: DISCONTINUED | OUTPATIENT
Start: 2021-04-26 | End: 2021-04-26

## 2021-04-26 RX ORDER — LORAZEPAM 2 MG/ML
1 INJECTION INTRAMUSCULAR ONCE
Status: COMPLETED | OUTPATIENT
Start: 2021-04-26 | End: 2021-04-26

## 2021-04-26 RX ORDER — MIDAZOLAM HYDROCHLORIDE 1 MG/ML
2 INJECTION INTRAMUSCULAR; INTRAVENOUS
Status: DISCONTINUED | OUTPATIENT
Start: 2021-04-26 | End: 2021-04-26 | Stop reason: HOSPADM

## 2021-04-26 RX ORDER — PROCHLORPERAZINE EDISYLATE 5 MG/ML
10 INJECTION INTRAMUSCULAR; INTRAVENOUS EVERY 6 HOURS PRN
Status: DISCONTINUED | OUTPATIENT
Start: 2021-04-26 | End: 2021-04-27 | Stop reason: HOSPADM

## 2021-04-26 RX ORDER — KETOROLAC TROMETHAMINE 30 MG/ML
30 INJECTION, SOLUTION INTRAMUSCULAR; INTRAVENOUS ONCE
Status: COMPLETED | OUTPATIENT
Start: 2021-04-26 | End: 2021-04-26

## 2021-04-26 RX ORDER — AMOXICILLIN 250 MG
2 CAPSULE ORAL 2 TIMES DAILY
Status: DISCONTINUED | OUTPATIENT
Start: 2021-04-26 | End: 2021-04-27 | Stop reason: HOSPADM

## 2021-04-26 RX ORDER — MORPHINE SULFATE 4 MG/ML
2 INJECTION, SOLUTION INTRAMUSCULAR; INTRAVENOUS
Status: DISCONTINUED | OUTPATIENT
Start: 2021-04-26 | End: 2021-04-27 | Stop reason: HOSPADM

## 2021-04-26 RX ORDER — MEPERIDINE HYDROCHLORIDE 25 MG/ML
25 INJECTION INTRAMUSCULAR; INTRAVENOUS; SUBCUTANEOUS
Status: DISCONTINUED | OUTPATIENT
Start: 2021-04-26 | End: 2021-04-26 | Stop reason: HOSPADM

## 2021-04-26 RX ORDER — SODIUM CHLORIDE, SODIUM GLUCONATE, SODIUM ACETATE, POTASSIUM CHLORIDE AND MAGNESIUM CHLORIDE 526; 502; 368; 37; 30 MG/100ML; MG/100ML; MG/100ML; MG/100ML; MG/100ML
500 INJECTION, SOLUTION INTRAVENOUS CONTINUOUS
Status: DISCONTINUED | OUTPATIENT
Start: 2021-04-26 | End: 2021-04-26 | Stop reason: HOSPADM

## 2021-04-26 RX ORDER — ONDANSETRON 2 MG/ML
4 INJECTION INTRAMUSCULAR; INTRAVENOUS ONCE
Status: COMPLETED | OUTPATIENT
Start: 2021-04-26 | End: 2021-04-26

## 2021-04-26 RX ORDER — DIPHENHYDRAMINE HYDROCHLORIDE 50 MG/ML
12.5 INJECTION INTRAMUSCULAR; INTRAVENOUS
Status: DISCONTINUED | OUTPATIENT
Start: 2021-04-26 | End: 2021-04-26 | Stop reason: HOSPADM

## 2021-04-26 RX ORDER — ONDANSETRON 2 MG/ML
INJECTION INTRAMUSCULAR; INTRAVENOUS PRN
Status: DISCONTINUED | OUTPATIENT
Start: 2021-04-26 | End: 2021-04-26 | Stop reason: SURG

## 2021-04-26 RX ORDER — PHENAZOPYRIDINE HYDROCHLORIDE 100 MG/1
200 TABLET, FILM COATED ORAL
Status: DISCONTINUED | OUTPATIENT
Start: 2021-04-26 | End: 2021-04-27 | Stop reason: HOSPADM

## 2021-04-26 RX ORDER — BISACODYL 10 MG
10 SUPPOSITORY, RECTAL RECTAL
Status: DISCONTINUED | OUTPATIENT
Start: 2021-04-26 | End: 2021-04-27 | Stop reason: HOSPADM

## 2021-04-26 RX ORDER — ONDANSETRON 2 MG/ML
4 INJECTION INTRAMUSCULAR; INTRAVENOUS
Status: COMPLETED | OUTPATIENT
Start: 2021-04-26 | End: 2021-04-26

## 2021-04-26 RX ORDER — SUCCINYLCHOLINE/SOD CL,ISO/PF 200MG/10ML
SYRINGE (ML) INTRAVENOUS PRN
Status: DISCONTINUED | OUTPATIENT
Start: 2021-04-26 | End: 2021-04-26 | Stop reason: SURG

## 2021-04-26 RX ORDER — ONDANSETRON 2 MG/ML
4 INJECTION INTRAMUSCULAR; INTRAVENOUS EVERY 4 HOURS PRN
Status: DISCONTINUED | OUTPATIENT
Start: 2021-04-26 | End: 2021-04-27 | Stop reason: HOSPADM

## 2021-04-26 RX ORDER — HYDROMORPHONE HYDROCHLORIDE 1 MG/ML
0.5 INJECTION, SOLUTION INTRAMUSCULAR; INTRAVENOUS; SUBCUTANEOUS
Status: DISCONTINUED | OUTPATIENT
Start: 2021-04-26 | End: 2021-04-27 | Stop reason: HOSPADM

## 2021-04-26 RX ORDER — LIDOCAINE HYDROCHLORIDE 20 MG/ML
INJECTION, SOLUTION EPIDURAL; INFILTRATION; INTRACAUDAL; PERINEURAL PRN
Status: DISCONTINUED | OUTPATIENT
Start: 2021-04-26 | End: 2021-04-26 | Stop reason: SURG

## 2021-04-26 RX ORDER — PHENAZOPYRIDINE HYDROCHLORIDE 200 MG/1
200 TABLET, FILM COATED ORAL
Status: DISCONTINUED | OUTPATIENT
Start: 2021-04-26 | End: 2021-04-26

## 2021-04-26 RX ORDER — HALOPERIDOL 5 MG/ML
1 INJECTION INTRAMUSCULAR
Status: DISCONTINUED | OUTPATIENT
Start: 2021-04-26 | End: 2021-04-26 | Stop reason: HOSPADM

## 2021-04-26 RX ADMIN — GENTAMICIN SULFATE 80 MG: 40 INJECTION, SOLUTION INTRAMUSCULAR; INTRAVENOUS at 13:59

## 2021-04-26 RX ADMIN — PROPOFOL 20 MG: 10 INJECTION, EMULSION INTRAVENOUS at 13:46

## 2021-04-26 RX ADMIN — ONDANSETRON 4 MG: 2 INJECTION INTRAMUSCULAR; INTRAVENOUS at 14:50

## 2021-04-26 RX ADMIN — MEPERIDINE HYDROCHLORIDE 25 MG: 25 INJECTION INTRAMUSCULAR; INTRAVENOUS; SUBCUTANEOUS at 14:45

## 2021-04-26 RX ADMIN — PHENAZOPYRIDINE HYDROCHLORIDE 200 MG: 200 TABLET ORAL at 20:13

## 2021-04-26 RX ADMIN — LIDOCAINE HYDROCHLORIDE 20 MG: 20 INJECTION, SOLUTION EPIDURAL; INFILTRATION; INTRACAUDAL at 13:46

## 2021-04-26 RX ADMIN — ONDANSETRON 4 MG: 2 INJECTION INTRAMUSCULAR; INTRAVENOUS at 16:18

## 2021-04-26 RX ADMIN — KETOROLAC TROMETHAMINE 30 MG: 30 INJECTION, SOLUTION INTRAMUSCULAR; INTRAVENOUS at 09:51

## 2021-04-26 RX ADMIN — Medication 120 MG: at 13:48

## 2021-04-26 RX ADMIN — PHENAZOPYRIDINE HYDROCHLORIDE 200 MG: 200 TABLET ORAL at 14:45

## 2021-04-26 RX ADMIN — MORPHINE SULFATE 2 MG: 4 INJECTION INTRAVENOUS at 16:26

## 2021-04-26 RX ADMIN — PROPOFOL 60 MG: 10 INJECTION, EMULSION INTRAVENOUS at 13:48

## 2021-04-26 RX ADMIN — LORAZEPAM 1 MG: 2 INJECTION INTRAMUSCULAR; INTRAVENOUS at 09:48

## 2021-04-26 RX ADMIN — HYDROMORPHONE HYDROCHLORIDE 0.5 MG: 1 INJECTION, SOLUTION INTRAMUSCULAR; INTRAVENOUS; SUBCUTANEOUS at 12:05

## 2021-04-26 RX ADMIN — ONDANSETRON 4 MG: 2 INJECTION INTRAMUSCULAR; INTRAVENOUS at 14:13

## 2021-04-26 RX ADMIN — ONDANSETRON 4 MG: 2 INJECTION INTRAMUSCULAR; INTRAVENOUS at 09:48

## 2021-04-26 RX ADMIN — Medication 5 MG: at 13:46

## 2021-04-26 RX ADMIN — PROCHLORPERAZINE EDISYLATE 10 MG: 5 INJECTION INTRAMUSCULAR; INTRAVENOUS at 20:24

## 2021-04-26 ASSESSMENT — COGNITIVE AND FUNCTIONAL STATUS - GENERAL
SUGGESTED CMS G CODE MODIFIER DAILY ACTIVITY: CH
DAILY ACTIVITIY SCORE: 24
SUGGESTED CMS G CODE MODIFIER MOBILITY: CH
MOBILITY SCORE: 24

## 2021-04-26 ASSESSMENT — LIFESTYLE VARIABLES
TOTAL SCORE: 0
TOTAL SCORE: 0
HAVE PEOPLE ANNOYED YOU BY CRITICIZING YOUR DRINKING: NO
HOW MANY TIMES IN THE PAST YEAR HAVE YOU HAD 5 OR MORE DRINKS IN A DAY: 0
AVERAGE NUMBER OF DAYS PER WEEK YOU HAVE A DRINK CONTAINING ALCOHOL: 1
DOES PATIENT WANT TO STOP DRINKING: NO
TOTAL SCORE: 0
EVER HAD A DRINK FIRST THING IN THE MORNING TO STEADY YOUR NERVES TO GET RID OF A HANGOVER: NO
HAVE YOU EVER FELT YOU SHOULD CUT DOWN ON YOUR DRINKING: NO
ON A TYPICAL DAY WHEN YOU DRINK ALCOHOL HOW MANY DRINKS DO YOU HAVE: 1
EVER FELT BAD OR GUILTY ABOUT YOUR DRINKING: NO
ALCOHOL_USE: YES
CONSUMPTION TOTAL: NEGATIVE

## 2021-04-26 ASSESSMENT — PATIENT HEALTH QUESTIONNAIRE - PHQ9
SUM OF ALL RESPONSES TO PHQ9 QUESTIONS 1 AND 2: 0
1. LITTLE INTEREST OR PLEASURE IN DOING THINGS: NOT AT ALL
2. FEELING DOWN, DEPRESSED, IRRITABLE, OR HOPELESS: NOT AT ALL

## 2021-04-26 ASSESSMENT — FIBROSIS 4 INDEX
FIB4 SCORE: .6454972243679028142
FIB4 SCORE: 0.59

## 2021-04-26 ASSESSMENT — PAIN DESCRIPTION - PAIN TYPE
TYPE: ACUTE PAIN
TYPE: ACUTE PAIN
TYPE: OTHER (COMMENT)
TYPE: ACUTE PAIN

## 2021-04-26 ASSESSMENT — PAIN SCALES - GENERAL: PAIN_LEVEL: 0

## 2021-04-26 ASSESSMENT — PAIN SCALES - WONG BAKER: WONGBAKER_NUMERICALRESPONSE: HURTS AS MUCH AS POSSIBLE

## 2021-04-26 NOTE — ANESTHESIA PREPROCEDURE EVALUATION
Relevant Problems      (+) ARAM (acute kidney injury) (HCC)   (+) Hydronephrosis       Physical Exam    Airway   Mallampati: II  TM distance: >3 FB  Neck ROM: full       Cardiovascular - normal exam  Rhythm: regular  Rate: normal     Dental - normal exam           Pulmonary - normal exam  Breath sounds clear to auscultation     Abdominal    Neurological - normal exam                 Anesthesia Plan    ASA 2       Plan - general       Airway plan will be LMA          Induction: intravenous    Postoperative Plan: Postoperative administration of opioids is intended.    Pertinent diagnostic labs and testing reviewed    Informed Consent:    Anesthetic plan and risks discussed with patient.    Use of blood products discussed with: patient whom consented to blood products.

## 2021-04-26 NOTE — ED NOTES
Brought back from triage with flank pain, patient hyperventilating and had possible clonic/tonic seizure.  IV inserted to left upper arm, bloods drawn and sent.  Patient vomited  Medicated with zofran, lorazepam and toradol.  NSR on monitor

## 2021-04-26 NOTE — H&P
History & Physical Note    Date of Admission: 4/26/2021  Admission Status: Inpatient  Attending: Rubens Delvalle M.D.   Senior Resident: Anais Sykes M.D.  Contact Number: [phone]    Chief Complaint: Left flank pain    History of Present Illness (HPI):   Russell Chase is a 31 y.o. male with a past medical history of cystine renal stones status post lithotripsy and seizure on medication who presented 4/26/2021 with acute onset left-sided flank pain.  The history was limited as the patient was sleepy as he was given Ativan and Dilaudid in the ED for seizure-like episode.  He mentioned that he has seen a neurologist in the past and was on medication for seizure however he ran out of his medication.  Other history was taken from ED physician's note and talking to the ED physician directly.    He had an episode of seizure-like activity which lasted about 1 to 2 minutes in the ED.  He was hyperventilating at the time of seizure.    Review of Systems:   Review of Systems   Unable to perform ROS: Acuity of condition     Past Medical History:   Past Medical History was reviewed with patient.   has a past medical history of Kidney stones and Seizure disorder (HCC).    Past Surgical History: Past Surgical History was reviewed with patient.   has a past surgical history that includes lithotripsy; pr cysto/uretero/pyeloscopy, dx (Right, 3/13/2020); pr cystoscopy,insert ureteral stent (Right, 3/13/2020); and lasertripsy (Right, 3/13/2020).    Medications: Medications have been reviewed with patient.  Prior to Admission Medications   Prescriptions Last Dose Informant Patient Reported? Taking?   ibuprofen (MOTRIN) 600 MG Tab Not Taking at Unknown time  No No   Sig: Take 1 Tab by mouth 3 times a day, with meals.   Patient not taking: Reported on 4/26/2021   ondansetron (ZOFRAN ODT) 4 MG TABLET DISPERSIBLE Not Taking at Unknown time  No No   Sig: Take 1 Tab by mouth every 8 hours as needed for Nausea.   Patient not  taking: Reported on 4/26/2021      Facility-Administered Medications: None        Allergies: Allergies have been reviewed with patient.  Allergies   Allergen Reactions   • Ancef [Cefazolin] Rash     rash   • Percocet [Apap-Fd&C Red #40 Al Santillan-Oxycodone]    • Shellfish Allergy    • Tape        Family History: Patient is not able to answer  family history is not on file.     Social History:   Tobacco: Mentioned that he smokes a lot.  Probably more than 2 packs a day.  Alcohol: Occasionally  Recreational drugs (illegal and prescription): Does not use any drugs  Employment: Unable to answer  Activity Level: Unable to answer  Living situation: Unable to answer  Recent travel: Unable to answer  Primary Care Provider: reviewed Pcp Pt States None  Other (stressors, spirituality, exposures): Unable to answer  Physical Exam:   Vitals:  Temp:  [36.2 °C (97.2 °F)] 36.2 °C (97.2 °F)  Pulse:  [46-86] 46  Resp:  [16-48] 16  BP: (104-156)/() 139/79  SpO2:  [98 %-100 %] 98 %    Physical Exam    Labs:   Recent Results (from the past 24 hour(s))   CBC WITH DIFFERENTIAL    Collection Time: 04/26/21  9:40 AM   Result Value Ref Range    WBC 14.7 (H) 4.8 - 10.8 K/uL    RBC 5.15 4.70 - 6.10 M/uL    Hemoglobin 16.7 14.0 - 18.0 g/dL    Hematocrit 51.0 42.0 - 52.0 %    MCV 99.0 (H) 81.4 - 97.8 fL    MCH 32.4 27.0 - 33.0 pg    MCHC 32.7 (L) 33.7 - 35.3 g/dL    RDW 50.1 (H) 35.9 - 50.0 fL    Platelet Count 287 164 - 446 K/uL    MPV 9.6 9.0 - 12.9 fL    Neutrophils-Polys 45.30 44.00 - 72.00 %    Lymphocytes 33.40 22.00 - 41.00 %    Monocytes 7.90 0.00 - 13.40 %    Eosinophils 12.40 (H) 0.00 - 6.90 %    Basophils 0.70 0.00 - 1.80 %    Immature Granulocytes 0.30 0.00 - 0.90 %    Nucleated RBC 0.00 /100 WBC    Neutrophils (Absolute) 6.66 1.82 - 7.42 K/uL    Lymphs (Absolute) 4.90 (H) 1.00 - 4.80 K/uL    Monos (Absolute) 1.16 (H) 0.00 - 0.85 K/uL    Eos (Absolute) 1.82 (H) 0.00 - 0.51 K/uL    Baso (Absolute) 0.11 0.00 - 0.12 K/uL    Immature  Granulocytes (abs) 0.04 0.00 - 0.11 K/uL    NRBC (Absolute) 0.00 K/uL   COMP METABOLIC PANEL    Collection Time: 04/26/21  9:40 AM   Result Value Ref Range    Sodium 137 135 - 145 mmol/L    Potassium 4.4 3.6 - 5.5 mmol/L    Chloride 102 96 - 112 mmol/L    Co2 23 20 - 33 mmol/L    Anion Gap 12.0 7.0 - 16.0    Glucose 119 (H) 65 - 99 mg/dL    Bun 16 8 - 22 mg/dL    Creatinine 1.43 (H) 0.50 - 1.40 mg/dL    Calcium 10.0 8.5 - 10.5 mg/dL    AST(SGOT) 18 12 - 45 U/L    ALT(SGPT) 10 2 - 50 U/L    Alkaline Phosphatase 115 (H) 30 - 99 U/L    Total Bilirubin 0.5 0.1 - 1.5 mg/dL    Albumin 4.5 3.2 - 4.9 g/dL    Total Protein 8.1 6.0 - 8.2 g/dL    Globulin 3.6 (H) 1.9 - 3.5 g/dL    A-G Ratio 1.3 g/dL   LIPASE    Collection Time: 04/26/21  9:40 AM   Result Value Ref Range    Lipase 90 (H) 11 - 82 U/L   ESTIMATED GFR    Collection Time: 04/26/21  9:40 AM   Result Value Ref Range    GFR If African American >60 >60 mL/min/1.73 m 2    GFR If Non African American 58 (A) >60 mL/min/1.73 m 2   URINALYSIS (UA)    Collection Time: 04/26/21 10:29 AM    Specimen: Urine   Result Value Ref Range    Color Yellow     Character Clear     Specific Gravity 1.025 <1.035    Ph 6.0 5.0 - 8.0    Glucose Negative Negative mg/dL    Ketones Negative Negative mg/dL    Protein 30 (A) Negative mg/dL    Bilirubin Negative Negative    Urobilinogen, Urine 0.2 Negative    Nitrite Negative Negative    Leukocyte Esterase Trace (A) Negative    Occult Blood Small (A) Negative    Micro Urine Req Microscopic    URINE MICROSCOPIC (W/UA)    Collection Time: 04/26/21 10:29 AM   Result Value Ref Range    WBC  (A) /hpf    RBC 0-2 (A) /hpf    Bacteria Rare (A) None /hpf    Epithelial Cells Few /hpf    Hyaline Cast 0-2 /lpf         Imaging:   CT-RENAL COLIC EVALUATION(A/P W/O)   Final Result         1.  3 mm distal left ureteral stone producing mild left hydroureteronephrosis.   2.  Likely small stones dependently within the urinary bladder.   3.  Nonobstructing  renal stones.                   Previous Data Review: reviewed    Problem Representation:     * Obstructive uropathy- (present on admission)  Assessment & Plan  He has a history of cystine renal and ureteric stones in the past.  Status post lithotripsy about a year ago.  -Presented with left flank pain.  CT scan showing 3 mm distal left ureteral stone producing mild left hydroureteronephrosis and small stones in the urinary bladder.  -We will continue Rocephin for UTI as he has 30-50 WBCs in the urine.  Continue morphine as needed for pain management  -Urology was consulted who recommended removal of the ureteral stone today.  Patient is n.p.o. for this.  -He needs to be told to take higher fluid intake and alkalinization of the urine before discharge to avoid future stones.  He can also be started on Tylenol-containing drugs such as D-penicillamine or Tiopronin.  -He may also need a Noncon CT or ultrasound annually for monitoring.    Seizure-like activity (HCC)  Assessment & Plan  The patient told me that he has a history of seizure and was on medication for seizure.  However, he stopped taking this medication as he ran out of it.  He was seen by neurology in the past.  He had another seizure-like activity on presentation.  -Need to take more detailed history and the type of medication when is more awake.  He can be started on seizure medication before discharge.

## 2021-04-26 NOTE — ED NOTES
Patient awake and alert, speaking clearly, asking to go to bathroom. Given urinal, patient able to use on his own.

## 2021-04-26 NOTE — ANESTHESIA TIME REPORT
Anesthesia Start and Stop Event Times     Date Time Event    4/26/2021 1242 Ready for Procedure     1343 Anesthesia Start     1423 Anesthesia Stop        Responsible Staff  04/26/21    Name Role Begin End    Chase Thomas M.D. Anesth 1343 1423        Preop Diagnosis (Free Text):  Pre-op Diagnosis     Left ureteral stone with colic        Preop Diagnosis (Codes):    Post op Diagnosis  Left ureteral stone      Premium Reason  Non-Premium    Comments:

## 2021-04-26 NOTE — ED NOTES
"Witnessed by staff having active seizure activity while in the lobby checking-in, noted having\"tonic-clonic\" seizure w/eye eyes rolling backwards lasting approximately 1-2 minutes. layed in the ground by staff placed in right lateral position head supported by this RN while having active seizure activity. Placed on oxymask at highflow. Charge notified. Pt assisted in lifting from ground to the gurney.  "

## 2021-04-26 NOTE — ED PROVIDER NOTES
"ED Provider Note    Scribed for Ruslan Vizcaino M.D. by Gabe Armstrong. 4/26/2021  9:41 AM    Primary care provider: Pcp Pt States None  Means of arrival: Walk in  History obtained from: Patient  History limited by: Hyperventilating, will only nod yes or no, will not speak    CHIEF COMPLAINT  Left flank pain    HPI  Russell Bunn is a 31 y.o. male who presents to the Emergency Department for evaluation of left flank pain. Per nursing staff, the patient was waiting to be seen in the [facility], when he began to have \"tonic-clonic\" seizure activity with eyes rolling back, which lasted approximately 1-2 minutes. He admits to additional symptoms of left sided abdominal pain and vomiting.     History limited by: Hyperventilating, will only nod yes or no, will not speak    REVIEW OF SYSTEMS  Pertinent positives include left flank pain, \"tonic-clonic\" seizure activity with eyes rolling back, left sided abdominal pain and vomiting.     Review of systems limited by: Hyperventilating, will only nod yes or no, will not speak, history is limited for altered mental status    PAST MEDICAL HISTORY   has a past medical history of Kidney stones and Seizure disorder (HCC).    SURGICAL HISTORY   has a past surgical history that includes lithotripsy; cysto/uretero/pyeloscopy, dx (Right, 3/13/2020); cystoscopy,insert ureteral stent (Right, 3/13/2020); lasertripsy (Right, 3/13/2020); cystoscopy,insert ureteral stent (Left, 4/26/2021); and cysto/uretero/pyeloscopy, dx (Left, 4/26/2021).    SOCIAL HISTORY  Social History     Tobacco Use    Smoking status: Current Some Day Smoker     Packs/day: 0.50     Types: Cigarettes    Smokeless tobacco: Never Used   Substance Use Topics    Alcohol use: Yes     Comment: Occasional    Drug use: Yes     Types: Inhaled     Comment: Marijuana, every day      Social History     Substance and Sexual Activity   Drug Use Yes    Types: Inhaled    Comment: Marijuana, every day       FAMILY " HISTORY  None noted    CURRENT MEDICATIONS  Home Medications       Reviewed by Fazal Barry (Pharmacy Tech) on 04/26/21 at 1044  Med List Status: Complete     Medication Last Dose Status   ibuprofen (MOTRIN) 600 MG Tab Not Taking Active   ondansetron (ZOFRAN ODT) 4 MG TABLET DISPERSIBLE Not Taking Active                  ALLERGIES  Allergies   Allergen Reactions    Ancef [Cefazolin] Rash     rash    Percocet [Apap-Fd&C Red #40 Al Santillan-Oxycodone]     Shellfish Allergy     Tape        PHYSICAL EXAM  VITAL SIGNS: /68   Pulse 67   Temp 36.2 °C (97.2 °F) (Temporal)   Resp 16   Wt 65.8 kg (145 lb)   SpO2 100%   BMI 23.40 kg/m²     Constitutional: Vomit around the mouth. Well developed, Well nourished, moderate to severe distress, Non-toxic appearance.   HENT: Normocephalic, Atraumatic, Bilateral external ears normal, Oropharynx moist, No oral exudates.   Eyes: PERRLA, EOMI, Conjunctiva normal, No discharge.   Neck: No tenderness, Supple, No stridor.   Lymphatic: No lymphadenopathy noted.   Cardiovascular: Normal heart rate, Normal rhythm.   Thorax & Lungs: Hyperventilating. Clear to auscultation bilaterally, No wheezing, No crackles.   Abdomen: Flinches with light touch of the left abdomen. Soft, No masses, No pulsatile masses.   Skin: Warm, Dry, No erythema, No rash.   Extremities:, No edema No cyanosis.   Musculoskeletal: No tenderness to palpation or major deformities noted.  Intact distal pulses  Neurologic: Hyperventilating, will nod yes or no, will follow commands, will not talk, won't open eyes. Awake, alert. Moves all extremities spontaneously.  Psychiatric: Hyperventilating, anxious.     LABS  Results for orders placed or performed during the hospital encounter of 04/26/21   CBC WITH DIFFERENTIAL   Result Value Ref Range    WBC 14.7 (H) 4.8 - 10.8 K/uL    RBC 5.15 4.70 - 6.10 M/uL    Hemoglobin 16.7 14.0 - 18.0 g/dL    Hematocrit 51.0 42.0 - 52.0 %    MCV 99.0 (H) 81.4 - 97.8 fL    MCH 32.4  27.0 - 33.0 pg    MCHC 32.7 (L) 33.7 - 35.3 g/dL    RDW 50.1 (H) 35.9 - 50.0 fL    Platelet Count 287 164 - 446 K/uL    MPV 9.6 9.0 - 12.9 fL    Neutrophils-Polys 45.30 44.00 - 72.00 %    Lymphocytes 33.40 22.00 - 41.00 %    Monocytes 7.90 0.00 - 13.40 %    Eosinophils 12.40 (H) 0.00 - 6.90 %    Basophils 0.70 0.00 - 1.80 %    Immature Granulocytes 0.30 0.00 - 0.90 %    Nucleated RBC 0.00 /100 WBC    Neutrophils (Absolute) 6.66 1.82 - 7.42 K/uL    Lymphs (Absolute) 4.90 (H) 1.00 - 4.80 K/uL    Monos (Absolute) 1.16 (H) 0.00 - 0.85 K/uL    Eos (Absolute) 1.82 (H) 0.00 - 0.51 K/uL    Baso (Absolute) 0.11 0.00 - 0.12 K/uL    Immature Granulocytes (abs) 0.04 0.00 - 0.11 K/uL    NRBC (Absolute) 0.00 K/uL   COMP METABOLIC PANEL   Result Value Ref Range    Sodium 137 135 - 145 mmol/L    Potassium 4.4 3.6 - 5.5 mmol/L    Chloride 102 96 - 112 mmol/L    Co2 23 20 - 33 mmol/L    Anion Gap 12.0 7.0 - 16.0    Glucose 119 (H) 65 - 99 mg/dL    Bun 16 8 - 22 mg/dL    Creatinine 1.43 (H) 0.50 - 1.40 mg/dL    Calcium 10.0 8.5 - 10.5 mg/dL    AST(SGOT) 18 12 - 45 U/L    ALT(SGPT) 10 2 - 50 U/L    Alkaline Phosphatase 115 (H) 30 - 99 U/L    Total Bilirubin 0.5 0.1 - 1.5 mg/dL    Albumin 4.5 3.2 - 4.9 g/dL    Total Protein 8.1 6.0 - 8.2 g/dL    Globulin 3.6 (H) 1.9 - 3.5 g/dL    A-G Ratio 1.3 g/dL   LIPASE   Result Value Ref Range    Lipase 90 (H) 11 - 82 U/L   URINALYSIS (UA)    Specimen: Urine   Result Value Ref Range    Color Yellow     Character Clear     Specific Gravity 1.025 <1.035    Ph 6.0 5.0 - 8.0    Glucose Negative Negative mg/dL    Ketones Negative Negative mg/dL    Protein 30 (A) Negative mg/dL    Bilirubin Negative Negative    Urobilinogen, Urine 0.2 Negative    Nitrite Negative Negative    Leukocyte Esterase Trace (A) Negative    Occult Blood Small (A) Negative    Micro Urine Req Microscopic    URINE MICROSCOPIC (W/UA)   Result Value Ref Range    WBC  (A) /hpf    RBC 0-2 (A) /hpf    Bacteria Rare (A) None /hpf     Epithelial Cells Few /hpf    Hyaline Cast 0-2 /lpf   ESTIMATED GFR   Result Value Ref Range    GFR If African American >60 >60 mL/min/1.73 m 2    GFR If Non African American 58 (A) >60 mL/min/1.73 m 2   SARS-COV Antigen DARRELL: Collect dry nasal swab AND NP swab in VTM   Result Value Ref Range    SARS-CoV-2 Source Nasal Swab     SARS-COV ANTIGEN DARRELL NotDetected Not-Detected   SARS-CoV-2 PCR (24 hour In-House): Collect NP swab in VTM    Specimen: Respirate   Result Value Ref Range    SARS-CoV-2 Source NP Swab    Histology Request   Result Value Ref Range    Pathology Request Sent to Histo        All labs reviewed by me.     RADIOLOGY  DX-CYSTO FLUORO > 1 HOUR   Final Result      Cysto fluoroscopy utilized for left ureteral stent         INTERPRETING LOCATION: [address] [place] [address], [facility]      CT-RENAL COLIC EVALUATION(A/P W/O)   Final Result         1.  3 mm distal left ureteral stone producing mild left hydroureteronephrosis.   2.  Likely small stones dependently within the urinary bladder.   3.  Nonobstructing renal stones.                 The radiologist's interpretation of all radiological studies have been reviewed by me.      COURSE & MEDICAL DECISION MAKING  Pertinent Labs & Imaging studies reviewed. (See chart for details)    I reviewed the patient's medical records which showed the patient has history of seizure, kidney stones. He was seen here in January, 2021 with flank pain, at which time he was found to have stones in the kidneys, but no stone in the ureter.     9:41 AM - I was called urgently into the patient's room. Patient seen and examined at bedside. Patient will be treated with Ativan 1 mg injection, Toradol 30 mg injection Zofran 4 mg injection. Ordered CT-Renal Colic Evaluation w/o, CBC with diff, CMP, Lipase, UA to evaluate his symptoms. The differential diagnoses include but are not limited to: Seizure, Kidney Stone, Seizure-like Activity.     10:15 AM - Patient was reevaluated at bedside. He is  still complaining of pain. He notes history of seizures, but says he does not take medication. He states he has been evaluated by Neurotomy in the past, but is unsure of his diagnosis.     10:54 AM - The patient will be medicated with Dilaudid 0.5 mg injection every 30 minutes PRN.     11:38 AM - I reevaluated the patient at bedside. I discussed the patient's diagnostic study results, which show a 3 mm distal left ureteral stone producing mild left hydroureteronephrosis and pyelonephritis, as shown above. I informed the patient of my plan to admit today given the patient's current presentation and diagnostic study results. Patient verbalizes understanding and support with my plan for admission. Ordered SARS-CoV-2 PCR, SARS CoV Antigen. The patient will be treated with Rocephin 2 g in  mL IVPB.     11:45 AM - Paged Hospitalist and Urology.     11:50 AM I discussed the patient's case and the above findings with Dr. Sykes (Hospitalist) who will assess the patient for hospitalization.      11:52 AM I discussed the patient's case and the above findings with Dr. Moss (Urology) who will take the patient to the OR.     Decision Making:  Patient is coming in with seizure-like activity in the waiting room, the patient is complaining of left-sided flank pain, exquisitely tender to my touch. CT scan shows a left 3 UVJ stone, his urine does appear to be infected. The patient is still in pain. Discussed the case with Dr. Moss who will likely take the patient to the operating room, discussed the case with the hospitalist for hospitalization.      DISPOSITION:  Patient will be hospitalized by Dr. Sykes in guarded condition.     FINAL IMPRESSION  1. Kidney stone    2. Pyelonephritis    3. Seizure-like activity (HCC)        Gabe CONTRERAS (Scribe), am scribing for, and in the presence of, Ruslan Vizcaino M.D..    Electronically signed by: Gabe Armstrong (Emy), 4/26/2021    Ruslan CONTRERAS M.D. personally  performed the services described in this documentation, as scribed by Gabe Armstrong in my presence, and it is both accurate and complete.    The note accurately reflects work and decisions made by me.  Ruslan Vizcaino M.D.  4/26/2021  4:15 PM

## 2021-04-26 NOTE — OR NURSING
Report given to Maddie YBARRA via SBAR reviewed orders and patient history, no questions at this time.  Pt alert and oriented, resp even and nonlabored, in NAD, pt denies pain/nausea at this time. Pt moves all ext, follows commands and verbalized understanding  of poc and admission. Pt states no family to call.  Will con't to monitor until patient has transitioned.  Belongings on bed.pt sleeping at this time.

## 2021-04-26 NOTE — ASSESSMENT & PLAN NOTE
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  Status post lithotripsy about a year ago.  -Presented with left flank pain.  CT scan showing 3 mm distal left ureteral stone producing mild left hydroureteronephrosis and small stones in the urinary bladder.  -We will continue Rocephin for UTI as he has 30-50 WBCs in the urine.  Continue morphine as needed for pain management  -Urology was consulted who recommended removal of the ureteral stone today.  Patient is n.p.o. for this.  -He needs to be told to take higher fluid intake and alkalinization of the urine before discharge to avoid future stones.  He can also be started on Tylenol-containing drugs such as D-penicillamine or Tiopronin.  -He may also need a Noncon CT or ultrasound annually for monitoring.

## 2021-04-26 NOTE — ASSESSMENT & PLAN NOTE
The patient told me that he has a history of seizure and was on medication for seizure.  However, he stopped taking this medication as he ran out of it.  He was seen by neurology in the past.  He had another seizure-like activity on presentation.  -Need to take more detailed history and the type of medication when is more awake.  He can be started on seizure medication before discharge.

## 2021-04-26 NOTE — PROGRESS NOTES
31 yr old with history of cystine stones presents with an obstructing left distal ueteral stone and colic. Wishes to proceed with removal. Had seizure vs pseudo-seizure in ER; hx seizures, off meds.     No sign of UTI although + WBC in UA.     Prior lithotripsy for stones    Imp   Left ureteral stone with colic  Hx cystine stones  Sz disorder off meds    Plan ureteroscopic stone extraction of left distal ureteral stone

## 2021-04-26 NOTE — ANESTHESIA POSTPROCEDURE EVALUATION
Patient: Russell Bunn    Procedure Summary     Date: 04/26/21 Room / Location: [place] [address] / SURGERY [facility]    Anesthesia Start: 1343 Anesthesia Stop: 1423    Procedures:       CYSTOSCOPY, WITH URETERAL STENT INSERTION (Left )      URETEROSCOPY      LITHOTRIPSY Diagnosis: (Bladder stone, passed ureteral stone)    Surgeons: Raf Moss M.D. Responsible Provider: Chase Thomas M.D.    Anesthesia Type: general ASA Status: 2 - Emergent          Final Anesthesia Type: general  Last vitals  BP   Blood Pressure: 111/71    Temp   36.9 °C (98.4 °F)    Pulse   60   Resp   14    SpO2   97 %      Anesthesia Post Evaluation    Patient location during evaluation: PACU  Patient participation: complete - patient participated  Level of consciousness: awake and alert  Pain score: 0    Airway patency: patent  Anesthetic complications: no  Cardiovascular status: hemodynamically stable  Respiratory status: acceptable  Hydration status: euvolemic    PONV: none          No complications documented.     Nurse Pain Score: 10 (Orozco-Baker Scale)

## 2021-04-26 NOTE — ANESTHESIA PREPROCEDURE EVALUATION
Relevant Problems      (+) ARAM (acute kidney injury) (HCC)   (+) Hydronephrosis       Physical Exam    Anesthesia Plan    ASA 2- EMERGENT   ASA physical status emergent criteria: compromised vital organ, limb or tissue    Plan - general       Airway plan will be ETT          Induction: intravenous    Postoperative Plan: Postoperative administration of opioids is intended.    Pertinent diagnostic labs and testing reviewed    Informed Consent:    Anesthetic plan and risks discussed with patient.    Use of blood products discussed with: patient whom consented to blood products.

## 2021-04-26 NOTE — ANESTHESIA PROCEDURE NOTES
Airway    Date/Time: 4/26/2021 1:49 PM  Performed by: Chase Thomas M.D.  Authorized by: Chase Thomas M.D.     Location:  OR  Urgency:  Elective  Indications for Airway Management:  Anesthesia and airway protection      Spontaneous Ventilation: absent    Sedation Level:  Deep  Preoxygenated: Yes    Patient Position:  Sniffing  Final Airway Type:  Endotracheal airway  Final Endotracheal Airway:  ETT  Cuffed: Yes    Technique Used for Successful ETT Placement:  Direct laryngoscopy  Devices/Methods Used in Placement:  Intubating stylet    Insertion Site:  Oral  Blade Type:  Merritt  Laryngoscope Blade/Videolaryngoscope Blade Size:  2  ETT Size (mm):  8.0  Measured from:  Lips  ETT to Lips (cm):  25  Placement Verified by: auscultation and capnometry    Cormack-Lehane Classification:  Grade I - full view of glottis  Number of Attempts at Approach:  1

## 2021-04-26 NOTE — OP REPORT
DATE OF SERVICE:  04/26/2021     PREOPERATIVE DIAGNOSIS:  Left distal ureteral calculus.     POSTOPERATIVE DIAGNOSIS:  Passed ureteral calculus with bladder calculus.     PROCEDURES:  Cystolitholapaxy and left ureteroscopy.     SURGEON:  Raf Moss MD     ASSISTANT:  None.     ANESTHESIOLOGIST:  Chase Thomas MD     TYPE OF ANESTHESIA:  General.     INDICATIONS:  This 31-year-old male with a history of cystine stones,   presented with acute left renal colic.  CT scan showed a 3 mm distal left   ureteral calculus.  Due to intractable pain, he was taken to the operating   room.     FINDINGS:  There were no stones in the distal ureter.  There was mucoid debris   in the bladder and a small stone present in the bladder.  These were   evacuated.  Left ureteroscopy showed no stones.     PROCEDURE:  The patient was identified in the holding area.  He was taken to   the operative suite.  General anesthesia was administered by Dr. Thomas.  He had   received ceftriaxone in the emergency room.  He was sterilely prepped and   draped in dorsal lithotomy position.  Timeout was called.  Correct patient and   site of surgery was confirmed.     A 22-Israeli cystourethroscope was advanced through a normal-appearing urethra.    The prostate showed minimal elevation of the bladder neck.  Bladder had   mucoid debris and a small stone present.  Debris was irrigated free from the   bladder and stone was evacuated and collected for analysis.  Ureteral orifices   were orthotopic.  No foreign bodies or tumors were noted.     The left ureteral orifice was cannulated with a sensor wire and a navigator   was then advanced into the mid ureter, dilating the ureter.  No resistance was   met.  A semi-rigid ureteroscope was then advanced along the safety wire.  No   stone was seen in the ureter up to the UPJ.  I then advanced an Olympus   flexible ureteroscope over the guidewire into the collecting system and   removed the guidewire.  I then  inspected all of the calices.  There was   similar mucoid debris within the calices, but no stones were seen.  I   inspected the ureter upon withdrawing the ureteroscope and no stones were   seen.  As the ureter had been dilated and there was not much manipulation and   no stones, I elected not to leave a stent.  Bladder was again irrigated free   of mucoid debris and irrigant and the patient was then sent to recovery room   in stable condition.  He suffered no intraoperative complications.        ______________________________  MD LUPE Vazquez/REINIER    DD:  04/26/2021 14:22  DT:  04/26/2021 15:31    Job#:  [id number]

## 2021-04-26 NOTE — ED NOTES
Med Rec completed per pt  Allergies reviewed  No ABX in last 14 days    Pt denies taking any RX's or OTC's

## 2021-04-26 NOTE — OR SURGEON
Immediate Post OP Note    PreOp Diagnosis: left distal ureteral stone      PostOp Diagnosis: passed stone      Procedure(s):  CYSTOSCOPY, WITH LEFT URETEROSCOPY    Surgeon(s):  Raf Moss M.D.    Anesthesiologist/Type of Anesthesia:  Anesthesiologist: Chase Thomas M.D./* No anesthesia type entered *    Surgical Staff:  Circulator: Ania Machado R.N.  Laser Staff: Roberto Baltazar  Monitoring Nurse: Vamsi Zimmer R.N.  Scrub Person: Mihir Hartman    Specimens removed if any:  ID Type Source Tests Collected by Time Destination   A : Bladder stone Stone Bladder PATHOLOGY SPECIMEN Raf Moss M.D. 4/26/2021 1359    B : Bladder mucus Tissue Bladder PATHOLOGY SPECIMEN Raf Moss M.D. 4/26/2021 1411        Estimated Blood Loss: minimal    Findings:  No stone in ureter or kidney    Complications: none        4/26/2021 2:17 PM Raf Moss M.D.

## 2021-04-27 ENCOUNTER — PATIENT OUTREACH (OUTPATIENT)
Dept: HEALTH INFORMATION MANAGEMENT | Facility: OTHER | Age: 31
End: 2021-04-27

## 2021-04-27 VITALS
HEART RATE: 76 BPM | DIASTOLIC BLOOD PRESSURE: 61 MMHG | OXYGEN SATURATION: 96 % | HEIGHT: 66 IN | WEIGHT: 177.69 LBS | SYSTOLIC BLOOD PRESSURE: 106 MMHG | TEMPERATURE: 98.3 F | RESPIRATION RATE: 17 BRPM | BODY MASS INDEX: 28.56 KG/M2

## 2021-04-27 PROBLEM — F14.10 COCAINE ABUSE (HCC): Status: ACTIVE | Noted: 2021-04-27

## 2021-04-27 LAB
ALBUMIN SERPL BCP-MCNC: 4.1 G/DL (ref 3.2–4.9)
ALBUMIN/GLOB SERPL: 1.2 G/DL
ALP SERPL-CCNC: 102 U/L (ref 30–99)
ALT SERPL-CCNC: 12 U/L (ref 2–50)
AMPHET UR QL SCN: NEGATIVE
ANION GAP SERPL CALC-SCNC: 8 MMOL/L (ref 7–16)
AST SERPL-CCNC: 15 U/L (ref 12–45)
BARBITURATES UR QL SCN: NEGATIVE
BASOPHILS # BLD AUTO: 0.5 % (ref 0–1.8)
BASOPHILS # BLD: 0.06 K/UL (ref 0–0.12)
BENZODIAZ UR QL SCN: NEGATIVE
BILIRUB SERPL-MCNC: 0.5 MG/DL (ref 0.1–1.5)
BUN SERPL-MCNC: 16 MG/DL (ref 8–22)
BZE UR QL SCN: POSITIVE
CALCIUM SERPL-MCNC: 9.3 MG/DL (ref 8.5–10.5)
CANNABINOIDS UR QL SCN: POSITIVE
CHLORIDE SERPL-SCNC: 102 MMOL/L (ref 96–112)
CO2 SERPL-SCNC: 25 MMOL/L (ref 20–33)
CREAT SERPL-MCNC: 1.44 MG/DL (ref 0.5–1.4)
EOSINOPHIL # BLD AUTO: 0.53 K/UL (ref 0–0.51)
EOSINOPHIL NFR BLD: 4.1 % (ref 0–6.9)
ERYTHROCYTE [DISTWIDTH] IN BLOOD BY AUTOMATED COUNT: 49.3 FL (ref 35.9–50)
GLOBULIN SER CALC-MCNC: 3.3 G/DL (ref 1.9–3.5)
GLUCOSE SERPL-MCNC: 109 MG/DL (ref 65–99)
HCT VFR BLD AUTO: 47.2 % (ref 42–52)
HGB BLD-MCNC: 15.3 G/DL (ref 14–18)
IMM GRANULOCYTES # BLD AUTO: 0.07 K/UL (ref 0–0.11)
IMM GRANULOCYTES NFR BLD AUTO: 0.5 % (ref 0–0.9)
LYMPHOCYTES # BLD AUTO: 2.34 K/UL (ref 1–4.8)
LYMPHOCYTES NFR BLD: 18.3 % (ref 22–41)
MCH RBC QN AUTO: 31.7 PG (ref 27–33)
MCHC RBC AUTO-ENTMCNC: 32.4 G/DL (ref 33.7–35.3)
MCV RBC AUTO: 97.7 FL (ref 81.4–97.8)
METHADONE UR QL SCN: NEGATIVE
MONOCYTES # BLD AUTO: 1.01 K/UL (ref 0–0.85)
MONOCYTES NFR BLD AUTO: 7.9 % (ref 0–13.4)
NEUTROPHILS # BLD AUTO: 8.79 K/UL (ref 1.82–7.42)
NEUTROPHILS NFR BLD: 68.7 % (ref 44–72)
NRBC # BLD AUTO: 0 K/UL
NRBC BLD-RTO: 0 /100 WBC
OPIATES UR QL SCN: POSITIVE
OXYCODONE UR QL SCN: NEGATIVE
PCP UR QL SCN: NEGATIVE
PLATELET # BLD AUTO: 269 K/UL (ref 164–446)
PMV BLD AUTO: 10 FL (ref 9–12.9)
POTASSIUM SERPL-SCNC: 3.6 MMOL/L (ref 3.6–5.5)
PROPOXYPH UR QL SCN: NEGATIVE
PROT SERPL-MCNC: 7.4 G/DL (ref 6–8.2)
RBC # BLD AUTO: 4.83 M/UL (ref 4.7–6.1)
SODIUM SERPL-SCNC: 135 MMOL/L (ref 135–145)
TSH SERPL DL<=0.005 MIU/L-ACNC: 0.59 UIU/ML (ref 0.38–5.33)
WBC # BLD AUTO: 12.8 K/UL (ref 4.8–10.8)

## 2021-04-27 PROCEDURE — 700111 HCHG RX REV CODE 636 W/ 250 OVERRIDE (IP): Performed by: STUDENT IN AN ORGANIZED HEALTH CARE EDUCATION/TRAINING PROGRAM

## 2021-04-27 PROCEDURE — 700105 HCHG RX REV CODE 258: Performed by: STUDENT IN AN ORGANIZED HEALTH CARE EDUCATION/TRAINING PROGRAM

## 2021-04-27 PROCEDURE — A9270 NON-COVERED ITEM OR SERVICE: HCPCS | Performed by: STUDENT IN AN ORGANIZED HEALTH CARE EDUCATION/TRAINING PROGRAM

## 2021-04-27 PROCEDURE — 700102 HCHG RX REV CODE 250 W/ 637 OVERRIDE(OP): Performed by: UROLOGY

## 2021-04-27 PROCEDURE — 80053 COMPREHEN METABOLIC PANEL: CPT

## 2021-04-27 PROCEDURE — 80307 DRUG TEST PRSMV CHEM ANLYZR: CPT

## 2021-04-27 PROCEDURE — 85025 COMPLETE CBC W/AUTO DIFF WBC: CPT

## 2021-04-27 PROCEDURE — A9270 NON-COVERED ITEM OR SERVICE: HCPCS | Performed by: UROLOGY

## 2021-04-27 PROCEDURE — 99239 HOSP IP/OBS DSCHRG MGMT >30: CPT | Mod: GC | Performed by: INTERNAL MEDICINE

## 2021-04-27 PROCEDURE — 84443 ASSAY THYROID STIM HORMONE: CPT

## 2021-04-27 PROCEDURE — 700102 HCHG RX REV CODE 250 W/ 637 OVERRIDE(OP): Performed by: STUDENT IN AN ORGANIZED HEALTH CARE EDUCATION/TRAINING PROGRAM

## 2021-04-27 RX ORDER — TAMSULOSIN HYDROCHLORIDE 0.4 MG/1
0.4 CAPSULE ORAL
Status: DISCONTINUED | OUTPATIENT
Start: 2021-04-27 | End: 2021-04-27 | Stop reason: HOSPADM

## 2021-04-27 RX ORDER — DIVALPROEX SODIUM 500 MG/1
500 TABLET, DELAYED RELEASE ORAL
Qty: 60 TABLET | Refills: 0 | Status: SHIPPED | OUTPATIENT
Start: 2021-04-27 | End: 2022-02-19

## 2021-04-27 RX ADMIN — PHENAZOPYRIDINE HYDROCHLORIDE 200 MG: 200 TABLET ORAL at 08:42

## 2021-04-27 RX ADMIN — TAMSULOSIN HYDROCHLORIDE 0.4 MG: 0.4 CAPSULE ORAL at 08:46

## 2021-04-27 RX ADMIN — CEFTRIAXONE SODIUM 1 G: 1 INJECTION, POWDER, FOR SOLUTION INTRAMUSCULAR; INTRAVENOUS at 06:11
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
## 2021-04-27 NOTE — CONSULTS
DATE OF SERVICE:  04/26/2021     REASON FOR CONSULTATION:  Left ureteral calculus.     HISTORY OF PRESENT ILLNESS:  This 31-year-old male has a history of cystine   renal stones.  He has a history of prior lithotripsy with Dr. Damon.  He   presented with acute onset of left ureteral colic and was found to have a 3 mm   left distal ureteral calculus with obstructive changes and   hydroureteronephrosis above this.  A urinalysis suggested pyuria but no sign   of active infection.  He had intractable pain in the emergency room and   actually had a seizure witnessed in the emergency room versus pseudoseizure.    Because of the ongoing pain and pyuria, intervention with stent versus removal   of the stone was recommended.     PAST MEDICAL HISTORY:  Seizure disorder and stone disease.     MEDICATIONS:  Ceftriaxone was ordered in the emergency room, though this was   not given apparently.  Medications prior to admission include Motrin and   Zofran.  He is supposed to be on anti-seizure medications but has not been   taking those.     ALLERGIES:  CEFAZOLIN, PERCOCET, SHELLFISH AND TAPE.     FAMILY HISTORY:  No stone disease.     SOCIAL HISTORY:  He does use tobacco, uses alcohol.     PHYSICAL EXAMINATION:  GENERAL:  Reveals a pleasant, cooperative -American male in no acute   distress.  LUNGS:  Clear.  HEART:  Regular rate and rhythm.  ABDOMEN:  Soft.     DIAGNOSTIC DATA:  CT findings as above.     IMPRESSION:  1.  Left distal ureteral calculus.  2.  History of cystine urolithiasis.  3.  Seizure disorder.     PLAN:  Intervention with cystoscopy, left ureteroscopy with stent placement   versus extraction of stone has been recommended.  The indications, risks,   benefits and alternatives have been discussed with the patient.  He would like   to proceed and will do so at his request.        ______________________________  MD LUPE Vazquez/ROBINA    DD:  04/26/2021 21:36  DT:  04/26/2021 22:10    Job#:  [id number]

## 2021-04-27 NOTE — CARE PLAN
Problem: Bowel/Gastric:  Goal: Will not experience complications related to bowel motility  Outcome: PROGRESSING SLOWER THAN EXPECTED  Pt vomiting throughout this RN shift.  Pt medicated per MAR.     Problem: Pain Management  Goal: Pain level will decrease to patient's comfort goal  4/26/2021 2324 by Ania Rogers R.N.  Outcome: PROGRESSING AS EXPECTED  Pt declines being in pain at this time.  Pt resting in bed, calm, with unlabored breathing. Education provided about pain medications available to pt.

## 2021-04-27 NOTE — CARE PLAN
"  Problem: Safety  Goal: Will remain free from falls  Outcome: PROGRESSING AS EXPECTED  Intervention: Assess risk factors for falls  Flowsheets (Taken 4/26/2021 2438)  Pt Calls for Assistance: Yes  History of fall: 0  Mobility Status Assessment: 1-1 Healthcare Provider Required for Assistance with Ambulation & Transfer  Risk for Injury-Any positive answers results in the pt being at high risk for fall related injury: Not Applicable  Note: Pt is normally walky, talky up self but after surgery he's having problems w/ N&V and pain that is causing him to be weak and need mobility assistance, he is also rather sedated post surgery and is more of a fall risk than one might think. He refused to wear a yellow fall wrist band and have a bed alarm, so other precautions are in place (room signage)     Problem: Pain Management  Goal: Pain level will decrease to patient's comfort goal  Outcome: PROGRESSING AS EXPECTED  Intervention: Follow pain managment plan developed in collaboration with patient and Interdisciplinary Team  Note: Pt's pain controlled, its only hurting pt a lot when he voids \"it burns\" he says, offered an ice pack but pt refusing     Problem: Pain Management  Goal: Pain level will decrease to patient's comfort goal  Outcome: PROGRESSING AS EXPECTED  Intervention: Follow pain managment plan developed in collaboration with patient and Interdisciplinary Team  Note: Pt's pain controlled, its only hurting pt a lot when he voids \"it burns\" he says, offered an ice pack but pt refusing     "

## 2021-04-27 NOTE — NON-PROVIDER
"                               [facility] Internal Medicine Interval Note    Name Russell Bunn     1990   Age/Sex 31 y.o. male   MRN [id number]         Chief complaint/ reason for interval visit (Primary Diagnosis)   left flank pain    HPI:  Russell Chase is a 31 y.o. male with a past medical history of cystine renal stones status post lithotripsy and seizure on medication who presented 2021 with acute onset left-sided flank pain. He reports having had many previous episodes of cystine stones and has seizures when he is passing stones. For seizure, he takes Depakote, but does not take it because he does not like how it makes him feel. In the ER, he was noted to have \"tonic-clonic\" seizure w/eye eyes rolling backwards lasting approximately 1-2 minutes.  Since admission, Mr. Chase had Cystolitholapaxy and left ureteroscopy for Left distal ureteral calculus on  with Dr. Moss.     Today, the patient reports feeling much better, without flank pain. He reports some burning and hematuria with urination. Denies fever, chills, abdominal pain, chest pain, SOB, edema.     Past Medical History:   Past Medical History was reviewed with patient.   has a past medical history of Kidney stones and Seizure disorder (HCC).    Inpatient Meds:  ceftriazone (rocephin) IV  dilaudid prn  morphine prn   zofran 4mg q4hrs prn  pyridium 200mg TID  compazine prn - nausea    Allergies  Allergies   Allergen Reactions   • Ancef [Cefazolin] Rash     rash   • Percocet [Apap-Fd&C Red #40 Al Santillan-Oxycodone]    • Shellfish Allergy    • Tape    potassium citrate    Social History:   Tobacco: Mentioned that he smokes a lot.  Probably more than 2 packs a day.  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 Does not have a permanent place of residence.     Past Surgical History: Past Surgical History was reviewed with patient.   has a past surgical history that includes lithotripsy; pr cysto/uretero/pyeloscopy, dx (Right, 3/13/2020); pr cystoscopy,insert ureteral stent (Right, 3/13/2020); and lasertripsy (Right, 3/13/2020).      Physical Exam     Vitals:    04/26/21 1800 04/26/21 1930 04/27/21 0340 04/27/21 0800   BP: 125/79 121/77 117/76 106/61   Pulse: 60 68 71 76   Resp: 16 17 20 17   Temp: 36.2 °C (97.2 °F) 36.6 °C (97.9 °F) 36.9 °C (98.4 °F) 36.8 °C (98.3 °F)   TempSrc: Temporal Temporal Temporal Temporal   SpO2: 100% 97% 94% 96%   Weight:       Height:         Body mass index is 28.68 kg/m². Weight: 80.6 kg (177 lb 11.1 oz)  Oxygen Therapy:  Pulse Oximetry: 96 %, O2 (LPM): 0, O2 Delivery Device: None - Room Air    Physical Exam   Constitutional: NAD, appears comfortable  Cardiovascular: Regular Rate, Regular rhythm. No murmur.   Pulmonary: CTAB. No wheezes. No rales.   Abdominal: Non-tender, Non-distended. Soft. +BS. No flank pain.   Extremities: no peripheral edema  Neuro: Alter and oriented to person, place, time, situation    Lab Data Review:    4/27/2021  1:22 PM    Recent Labs     04/26/21  0940 04/27/21  0825   SODIUM 137 135   POTASSIUM 4.4 3.6   CHLORIDE 102 102   CO2 23 25   BUN 16 16   CREATININE 1.43* 1.44*   CALCIUM 10.0 9.3       Recent Labs     04/26/21  0940 04/27/21  0825   ALTSGPT 10 12   ASTSGOT 18 15   ALKPHOSPHAT 115* 102*   TBILIRUBIN 0.5 0.5   LIPASE 90*  --    GLUCOSE 119* 109*       Recent Labs     04/26/21  0940 04/27/21  0825   RBC 5.15 4.83   HEMOGLOBIN 16.7 15.3   HEMATOCRIT 51.0 47.2   PLATELETCT 287 269       Recent Labs     04/26/21  0940 04/27/21  0825   WBC 14.7* 12.8*   NEUTSPOLYS 45.30 68.70   LYMPHOCYTES 33.40 18.30*    MONOCYTES 7.90 7.90   EOSINOPHILS 12.40* 4.10   BASOPHILS 0.70 0.50   ASTSGOT 18 15   ALTSGPT 10 12   ALKPHOSPHAT 115* 102*   TBILIRUBIN 0.5 0.5     Imagin/26 CT-RENAL COLIC EVALUATION(A/P W/O)   HISTORY/REASON FOR EXAM:  flank pain.  Left flank pain  IMPRESSION:  1.  3 mm distal left ureteral stone producing mild left hydroureteronephrosis.  2.  Likely small stones dependently within the urinary bladder.  3.  Nonobstructing renal stones.      Assessment and Plan:     Russell Bunn is a 32YO male with history of cystine stones who presented  with an obstructing left distal ueteral stone and colic. Urinalysis on admission suggested pyuria but no signs of active infection.     * Obstructive uropathy- (present on admission)  Assessment & Plan  He has a history of cystine renal and ureteric stones in the past.  Status post lithotripsy about a year ago.   Presented with left flank pain.  CT scan showing 3 mm distal left ureteral stone producing mild left hydroureteronephrosis and small stones in the urinary bladder.  Cystolitholapaxy and left ureteroscopy for Left distal ureteral calculus on  with Dr. Moss.  - Hydration 2.5-3L water daily  - Establish care with PCP - contact provided for [facility] for starting and monitoring of Tx for Cystinuria. Consider Urinary alkalinization: acetazolamide or chelating agent: penicillamine. Patient reports allergy to potassium citrate.     # Seizure-like activity (HCC)  Assessment & Plan  The patient told me that he has a history of seizure and was on Depakote for seizure.  However, he stopped taking this medication as he does not like how it makes him feel. He was seen by neurology in the past.  He had another seizure-like activity on presentation.  - Depakote 500mg BID  - Neurology outpatient visit

## 2021-04-27 NOTE — DISCHARGE INSTRUCTIONS
Discharge Instructions    Discharged to home by car with relative. Discharged via wheelchair, hospital escort: Yes.  Special equipment needed: Not Applicable    Be sure to schedule a follow-up appointment with your primary care doctor or any specialists as instructed.     Discharge Plan:   Diet Plan: Discussed  Activity Level: Discussed  Confirmed Follow up Appointment: Patient to Call and Schedule Appointment  Confirmed Symptoms Management: Discussed  Medication Reconciliation Updated: Yes    I understand that a diet low in cholesterol, fat, and sodium is recommended for good health. Unless I have been given specific instructions below for another diet, I accept this instruction as my diet prescription.   Other diet: Regular diet    Special Instructions: None    · Is patient discharged on Warfarin / Coumadin?   No     Depression / Suicide Risk    As you are discharged from this [facility] facility, it is important to learn how to keep safe from harming yourself.    Recognize the warning signs:  · Abrupt changes in personality, positive or negative- including increase in energy   · Giving away possessions  · Change in eating patterns- significant weight changes-  positive or negative  · Change in sleeping patterns- unable to sleep or sleeping all the time   · Unwillingness or inability to communicate  · Depression  · Unusual sadness, discouragement and loneliness  · Talk of wanting to die  · Neglect of personal appearance   · Rebelliousness- reckless behavior  · Withdrawal from people/activities they love  · Confusion- inability to concentrate     If you or a loved one observes any of these behaviors or has concerns about self-harm, here's what you can do:  · Talk about it- your feelings and reasons for harming yourself  · Remove any means that you might use to hurt yourself (examples: pills, rope, extension cords, firearm)  · Get professional help from the community (Mental Health, Substance Abuse, psychological  counseling)  · Do not be alone:Call your Safe Contact- someone whom you trust who will be there for you.  · Call your local CRISIS HOTLINE 859-4422 or 323-422-1886  · Call your local Children's Mobile Crisis Response Team [place] (931) 831-4531 or www.[employer]  · Call the toll free National Suicide Prevention Hotlines   · National Suicide Prevention Lifeline 171-987-NCNK (6282)  · National Hope Line Network 800-SUICIDE (881-0675)  Valproic Acid, Divalproex Sodium sprinkle capsule  What is this medicine?  DIVALPROEX SODIUM (dye MAISHA pro ex SO vania um) is used to treat certain types of seizures in patients with epilepsy.  This medicine may be used for other purposes; ask your health care provider or pharmacist if you have questions.  COMMON BRAND NAME(S): Depakote  What should I tell my health care provider before I take this medicine?  They need to know if you have any of these conditions:  · if you often drink alcohol  · kidney disease  · liver disease  · low platelet counts  · mitochondrial disease  · suicidal thoughts, plans, or attempt; a previous suicide attempt by you or a family member  · urea cycle disorder (UCD)  · an unusual or allergic reaction to divalproex sodium, sodium valproate, valproic acid, other medicines, foods, dyes, or preservatives  · pregnant or trying to get pregnant  · breast-feeding  How should I use this medicine?  Take this medicine by mouth. It can be swallowed whole or the capsules may be opened carefully and the contents sprinkled on about a teaspoonful of applesauce or pudding. This mixture must be swallowed immediately. Do not cut, crush or chew this medicine. You can take it with or without food. If it upsets your stomach, take it with food. Follow the directions on the prescription label. Take your medicine at regular intervals. Do not take it more often than directed. Do not stop taking except on your doctor's advice.  A special MedGuide will be given to you by the  pharmacist with each prescription and refill. Be sure to read this information carefully each time.  Talk to your pediatrician regarding the use of this medicine in children. While this drug may be prescribed for children as young as 10 years for selected conditions, precautions do apply.  Overdosage: If you think you have taken too much of this medicine contact a poison control center or emergency room at once.  NOTE: This medicine is only for you. Do not share this medicine with others.  What if I miss a dose?  If you miss a dose, take it as soon as you can. If it is almost time for your next dose, take only that dose. Do not take double or extra doses.  What may interact with this medicine?  Do not take this medicine with any of the following medications:  · sodium phenylbutyrate  This medicine may also interact with the following medications:  · aspirin  · certain antibiotics like ertapenem, imipenem, meropenem  · certain medicines for depression, anxiety, or psychotic disturbances  · certain medicines for seizures like carbamazepine, clonazepam, diazepam, ethosuximide, felbamate, lamotrigine, phenobarbital, phenytoin, primidone, rufinamide, topiramate  · certain medicines that treat or prevent blood clots like warfarin  · cholestyramine  · female hormones, like estrogens and birth control pills, patches, or rings  · propofol  · rifampin  · ritonavir  · tolbutamide  · zidovudine  This list may not describe all possible interactions. Give your health care provider a list of all the medicines, herbs, non-prescription drugs, or dietary supplements you use. Also tell them if you smoke, drink alcohol, or use illegal drugs. Some items may interact with your medicine.  What should I watch for while using this medicine?  Tell your doctor or health care provider if your symptoms do not get better or they start to get worse.  This medicine may cause serious skin reactions. They can happen weeks to months after starting  the medicine. Contact your health care provider right away if you notice fevers or flu-like symptoms with a rash. The rash may be red or purple and then turn into blisters or peeling of the skin. Or, you might notice a red rash with swelling of the face, lips or lymph nodes in your neck or under your arms.  Wear a medical ID bracelet or chain, and carry a card that describes your disease and details of your medicine and dosage times.  You may get drowsy, dizzy, or have blurred vision. Do not drive, use machinery, or do anything that needs mental alertness until you know how this medicine affects you. To reduce dizzy or fainting spells, do not sit or stand up quickly, especially if you are an older patient. Alcohol can increase drowsiness and dizziness. Avoid alcoholic drinks.  This medicine can make you more sensitive to the sun. Keep out of the sun. If you cannot avoid being in the sun, wear protective clothing and use sunscreen. Do not use sun lamps or tanning beds/booths.  Patients and their families should watch out for new or worsening depression or thoughts of suicide. Also watch out for sudden changes in feelings such as feeling anxious, agitated, panicky, irritable, hostile, aggressive, impulsive, severely restless, overly excited and hyperactive, or not being able to sleep. If this happens, especially at the beginning of treatment or after a change in dose, call your health care provider.  Women should inform their doctor if they wish to become pregnant or think they might be pregnant. There is a potential for serious side effects to an unborn child. Talk to your health care provider or pharmacist for more information. Women who become pregnant while using this medicine may enroll in the [place] American Antiepileptic Drug Pregnancy Registry by calling 1-934.661.8539. This registry collects information about the safety of antiepileptic drug use during pregnancy.  This medicine may cause a decrease in folic  acid and vitamin D. You should make sure that you get enough vitamins while you are taking this medicine. Discuss the foods you eat and the vitamins you take with your health care provider.  What side effects may I notice from receiving this medicine?  Side effects that you should report to your doctor or health care professional as soon as possible:  · allergic reactions like skin rash, itching or hives, swelling of the face, lips, or tongue  · changes in vision  · rash, fever, and swollen lymph nodes  · redness, blistering, peeling or loosening of the skin, including inside the mouth  · signs and symptoms of liver injury like dark yellow or brown urine; general ill feeling or flu-like symptoms; light-colored stools; loss of appetite; nausea; right upper belly pain; unusually weak or tired; yellowing of the eyes or skin  · suicidal thoughts or other mood changes  · unusual bleeding or bruising  Side effects that usually do not require medical attention (report to your doctor or health care professional if they continue or are bothersome):  · constipation  · diarrhea  · dizziness  · hair loss  · headache  · loss of appetite  · weight gain  This list may not describe all possible side effects. Call your doctor for medical advice about side effects. You may report side effects to FDA at 0-174-FDA-0059.  Where should I keep my medicine?  Keep out of reach of children.  Store at room temperature below 25 degrees C (77 degrees F). Keep container tightly closed. Throw away any unused medicine after the expiration date.  NOTE: This sheet is a summary. It may not cover all possible information. If you have questions about this medicine, talk to your doctor, pharmacist, or health care provider.  © 2020 [employer] Standard (2020-03-27 16:10:00)

## 2021-04-27 NOTE — PROGRESS NOTES
Received report from TATE Perkins and assumed care of pt. Pt A&OX4. Pt on RA.  Denies any pain or discomfort at this time but states he has a burning sensation with urination. Pt vomiting,mediacted per MAR. POC discussed. Denies further needs at this time. Bed locked and in lowest position. Bed alarm on, call light within reach & hourly rounding in place

## 2021-04-27 NOTE — PROGRESS NOTES
Ears: Intact    Which preventative measures are in place for the ears? N/A    Elbows: Intact   Which preventative measures are in place for the elbows? N/A    Sacrum: Intact  Which preventative measures are in place for the sacrum? N/A    Heels: Intact   Which preventative measures are in place for the heels? N/A    Which devices are in place?  on left hand digit, PIV L upper arm    Description of skin under devices: Intact   Which preventative measures are in place under devices? None    Other: Pt turns self frequently.  Education provided on early early ambulation and pressure injury prevention.

## 2021-04-27 NOTE — PROGRESS NOTES
2 RN skin check complete, w/ Bradford RN   Devices in place NA.  Skin assessed under devices NA.  Confirmed pressure ulcers found on NA.  New potential pressure ulcers noted on NA.

## 2022-02-18 ENCOUNTER — HOSPITAL ENCOUNTER (EMERGENCY)
Facility: MEDICAL CENTER | Age: 32
End: 2022-02-18
Attending: EMERGENCY MEDICINE
Payer: COMMERCIAL

## 2022-02-18 ENCOUNTER — APPOINTMENT (OUTPATIENT)
Dept: RADIOLOGY | Facility: MEDICAL CENTER | Age: 32
End: 2022-02-18
Attending: EMERGENCY MEDICINE
Payer: COMMERCIAL

## 2022-02-18 VITALS
RESPIRATION RATE: 18 BRPM | OXYGEN SATURATION: 96 % | DIASTOLIC BLOOD PRESSURE: 77 MMHG | TEMPERATURE: 98.1 F | HEART RATE: 77 BPM | SYSTOLIC BLOOD PRESSURE: 119 MMHG

## 2022-02-18 DIAGNOSIS — N20.1 URETEROLITHIASIS: ICD-10-CM

## 2022-02-18 DIAGNOSIS — R10.9 FLANK PAIN: ICD-10-CM

## 2022-02-18 LAB
ANION GAP SERPL CALC-SCNC: 9 MMOL/L (ref 7–16)
APPEARANCE UR: CLEAR
BACTERIA #/AREA URNS HPF: NEGATIVE /HPF
BASOPHILS # BLD AUTO: 0.5 % (ref 0–1.8)
BASOPHILS # BLD: 0.05 K/UL (ref 0–0.12)
BILIRUB UR QL STRIP.AUTO: NEGATIVE
BUN SERPL-MCNC: 15 MG/DL (ref 8–22)
CALCIUM SERPL-MCNC: 8.8 MG/DL (ref 8.5–10.5)
CHLORIDE SERPL-SCNC: 105 MMOL/L (ref 96–112)
CO2 SERPL-SCNC: 24 MMOL/L (ref 20–33)
COLOR UR: YELLOW
CREAT SERPL-MCNC: 1.23 MG/DL (ref 0.5–1.4)
EOSINOPHIL # BLD AUTO: 0.52 K/UL (ref 0–0.51)
EOSINOPHIL NFR BLD: 5 % (ref 0–6.9)
EPI CELLS #/AREA URNS HPF: ABNORMAL /HPF
ERYTHROCYTE [DISTWIDTH] IN BLOOD BY AUTOMATED COUNT: 49.4 FL (ref 35.9–50)
GLUCOSE SERPL-MCNC: 95 MG/DL (ref 65–99)
GLUCOSE UR STRIP.AUTO-MCNC: NEGATIVE MG/DL
HCT VFR BLD AUTO: 40.3 % (ref 42–52)
HGB BLD-MCNC: 13.5 G/DL (ref 14–18)
HYALINE CASTS #/AREA URNS LPF: ABNORMAL /LPF
IMM GRANULOCYTES # BLD AUTO: 0.04 K/UL (ref 0–0.11)
IMM GRANULOCYTES NFR BLD AUTO: 0.4 % (ref 0–0.9)
KETONES UR STRIP.AUTO-MCNC: NEGATIVE MG/DL
LEUKOCYTE ESTERASE UR QL STRIP.AUTO: ABNORMAL
LYMPHOCYTES # BLD AUTO: 2.55 K/UL (ref 1–4.8)
LYMPHOCYTES NFR BLD: 24.7 % (ref 22–41)
MCH RBC QN AUTO: 33.5 PG (ref 27–33)
MCHC RBC AUTO-ENTMCNC: 33.5 G/DL (ref 33.7–35.3)
MCV RBC AUTO: 100 FL (ref 81.4–97.8)
MICRO URNS: ABNORMAL
MONOCYTES # BLD AUTO: 0.82 K/UL (ref 0–0.85)
MONOCYTES NFR BLD AUTO: 7.9 % (ref 0–13.4)
NEUTROPHILS # BLD AUTO: 6.34 K/UL (ref 1.82–7.42)
NEUTROPHILS NFR BLD: 61.5 % (ref 44–72)
NITRITE UR QL STRIP.AUTO: NEGATIVE
NRBC # BLD AUTO: 0 K/UL
NRBC BLD-RTO: 0 /100 WBC
PH UR STRIP.AUTO: 8.5 [PH] (ref 5–8)
PLATELET # BLD AUTO: 190 K/UL (ref 164–446)
PMV BLD AUTO: 10.1 FL (ref 9–12.9)
POTASSIUM SERPL-SCNC: 4.3 MMOL/L (ref 3.6–5.5)
PROT UR QL STRIP: 30 MG/DL
RBC # BLD AUTO: 4.03 M/UL (ref 4.7–6.1)
RBC # URNS HPF: ABNORMAL /HPF
RBC UR QL AUTO: NEGATIVE
SODIUM SERPL-SCNC: 138 MMOL/L (ref 135–145)
SP GR UR STRIP.AUTO: 1.02
UROBILINOGEN UR STRIP.AUTO-MCNC: 0.2 MG/DL
WBC # BLD AUTO: 10.3 K/UL (ref 4.8–10.8)
WBC #/AREA URNS HPF: ABNORMAL /HPF

## 2022-02-18 PROCEDURE — 700102 HCHG RX REV CODE 250 W/ 637 OVERRIDE(OP): Performed by: EMERGENCY MEDICINE

## 2022-02-18 PROCEDURE — 700111 HCHG RX REV CODE 636 W/ 250 OVERRIDE (IP): Performed by: EMERGENCY MEDICINE

## 2022-02-18 PROCEDURE — 87086 URINE CULTURE/COLONY COUNT: CPT

## 2022-02-18 PROCEDURE — A9270 NON-COVERED ITEM OR SERVICE: HCPCS | Performed by: EMERGENCY MEDICINE

## 2022-02-18 PROCEDURE — 74176 CT ABD & PELVIS W/O CONTRAST: CPT

## 2022-02-18 PROCEDURE — 81001 URINALYSIS AUTO W/SCOPE: CPT

## 2022-02-18 PROCEDURE — 96372 THER/PROPH/DIAG INJ SC/IM: CPT

## 2022-02-18 PROCEDURE — 99285 EMERGENCY DEPT VISIT HI MDM: CPT

## 2022-02-18 PROCEDURE — 80048 BASIC METABOLIC PNL TOTAL CA: CPT

## 2022-02-18 PROCEDURE — 36415 COLL VENOUS BLD VENIPUNCTURE: CPT

## 2022-02-18 PROCEDURE — 85025 COMPLETE CBC W/AUTO DIFF WBC: CPT

## 2022-02-18 RX ORDER — KETOROLAC TROMETHAMINE 30 MG/ML
15 INJECTION, SOLUTION INTRAMUSCULAR; INTRAVENOUS ONCE
Status: COMPLETED | OUTPATIENT
Start: 2022-02-18 | End: 2022-02-18

## 2022-02-18 RX ORDER — HYDROCODONE BITARTRATE AND ACETAMINOPHEN 5; 325 MG/1; MG/1
1 TABLET ORAL ONCE
Status: COMPLETED | OUTPATIENT
Start: 2022-02-18 | End: 2022-02-18

## 2022-02-18 RX ORDER — TAMSULOSIN HYDROCHLORIDE 0.4 MG/1
0.4 CAPSULE ORAL DAILY
Qty: 30 CAPSULE | Refills: 0 | Status: ON HOLD | OUTPATIENT
Start: 2022-02-18 | End: 2022-02-21

## 2022-02-18 RX ORDER — KETOROLAC TROMETHAMINE 10 MG/1
10 TABLET, FILM COATED ORAL EVERY 4 HOURS PRN
Qty: 20 TABLET | Refills: 0 | Status: ON HOLD | OUTPATIENT
Start: 2022-02-18 | End: 2022-02-21 | Stop reason: SDUPTHER

## 2022-02-18 RX ADMIN — KETOROLAC TROMETHAMINE 15 MG: 30 INJECTION, SOLUTION INTRAMUSCULAR at 17:03

## 2022-02-18 RX ADMIN — HYDROCODONE BITARTRATE AND ACETAMINOPHEN 1 TABLET: 5; 325 TABLET ORAL at 18:58

## 2022-02-18 ASSESSMENT — LIFESTYLE VARIABLES
EVER HAD A DRINK FIRST THING IN THE MORNING TO STEADY YOUR NERVES TO GET RID OF A HANGOVER: NO
CONSUMPTION TOTAL: INCOMPLETE
HAVE PEOPLE ANNOYED YOU BY CRITICIZING YOUR DRINKING: NO
TOTAL SCORE: 0
EVER FELT BAD OR GUILTY ABOUT YOUR DRINKING: NO
DOES PATIENT WANT TO STOP DRINKING: NO
HAVE YOU EVER FELT YOU SHOULD CUT DOWN ON YOUR DRINKING: NO
TOTAL SCORE: 0
DO YOU DRINK ALCOHOL: NO
TOTAL SCORE: 0

## 2022-02-18 ASSESSMENT — PAIN DESCRIPTION - PAIN TYPE: TYPE: ACUTE PAIN

## 2022-02-19 ENCOUNTER — HOSPITAL ENCOUNTER (INPATIENT)
Facility: MEDICAL CENTER | Age: 32
LOS: 2 days | DRG: 660 | End: 2022-02-21
Attending: STUDENT IN AN ORGANIZED HEALTH CARE EDUCATION/TRAINING PROGRAM | Admitting: STUDENT IN AN ORGANIZED HEALTH CARE EDUCATION/TRAINING PROGRAM
Payer: COMMERCIAL

## 2022-02-19 DIAGNOSIS — N17.9 AKI (ACUTE KIDNEY INJURY) (HCC): ICD-10-CM

## 2022-02-19 DIAGNOSIS — N20.0 NEPHROLITHIASIS: ICD-10-CM

## 2022-02-19 DIAGNOSIS — R52 INTRACTABLE PAIN: ICD-10-CM

## 2022-02-19 DIAGNOSIS — R56.9 SEIZURE-LIKE ACTIVITY (HCC): ICD-10-CM

## 2022-02-19 DIAGNOSIS — R10.9 FLANK PAIN: ICD-10-CM

## 2022-02-19 DIAGNOSIS — N20.1 URETEROLITHIASIS: ICD-10-CM

## 2022-02-19 PROBLEM — E72.01 CYSTINURIA (HCC): Status: ACTIVE | Noted: 2022-02-19

## 2022-02-19 LAB
ANION GAP SERPL CALC-SCNC: 12 MMOL/L (ref 7–16)
APPEARANCE UR: CLEAR
BACTERIA #/AREA URNS HPF: NEGATIVE /HPF
BASOPHILS # BLD AUTO: 0.5 % (ref 0–1.8)
BASOPHILS # BLD: 0.07 K/UL (ref 0–0.12)
BILIRUB UR QL STRIP.AUTO: NEGATIVE
BUN SERPL-MCNC: 19 MG/DL (ref 8–22)
CALCIUM SERPL-MCNC: 9.2 MG/DL (ref 8.5–10.5)
CHLORIDE SERPL-SCNC: 103 MMOL/L (ref 96–112)
CO2 SERPL-SCNC: 25 MMOL/L (ref 20–33)
COLOR UR: YELLOW
CREAT SERPL-MCNC: 1.32 MG/DL (ref 0.5–1.4)
EOSINOPHIL # BLD AUTO: 0.85 K/UL (ref 0–0.51)
EOSINOPHIL NFR BLD: 6 % (ref 0–6.9)
EPI CELLS #/AREA URNS HPF: NEGATIVE /HPF
ERYTHROCYTE [DISTWIDTH] IN BLOOD BY AUTOMATED COUNT: 50 FL (ref 35.9–50)
GLUCOSE SERPL-MCNC: 93 MG/DL (ref 65–99)
GLUCOSE UR STRIP.AUTO-MCNC: NEGATIVE MG/DL
HCT VFR BLD AUTO: 44.5 % (ref 42–52)
HGB BLD-MCNC: 14.5 G/DL (ref 14–18)
HYALINE CASTS #/AREA URNS LPF: ABNORMAL /LPF
IMM GRANULOCYTES # BLD AUTO: 0.04 K/UL (ref 0–0.11)
IMM GRANULOCYTES NFR BLD AUTO: 0.3 % (ref 0–0.9)
KETONES UR STRIP.AUTO-MCNC: NEGATIVE MG/DL
LEUKOCYTE ESTERASE UR QL STRIP.AUTO: NEGATIVE
LYMPHOCYTES # BLD AUTO: 4.66 K/UL (ref 1–4.8)
LYMPHOCYTES NFR BLD: 32.9 % (ref 22–41)
MCH RBC QN AUTO: 32.8 PG (ref 27–33)
MCHC RBC AUTO-ENTMCNC: 32.6 G/DL (ref 33.7–35.3)
MCV RBC AUTO: 100.7 FL (ref 81.4–97.8)
MICRO URNS: ABNORMAL
MONOCYTES # BLD AUTO: 0.96 K/UL (ref 0–0.85)
MONOCYTES NFR BLD AUTO: 6.8 % (ref 0–13.4)
NEUTROPHILS # BLD AUTO: 7.57 K/UL (ref 1.82–7.42)
NEUTROPHILS NFR BLD: 53.5 % (ref 44–72)
NITRITE UR QL STRIP.AUTO: NEGATIVE
NRBC # BLD AUTO: 0 K/UL
NRBC BLD-RTO: 0 /100 WBC
PH UR STRIP.AUTO: 8 [PH] (ref 5–8)
PLATELET # BLD AUTO: 261 K/UL (ref 164–446)
PMV BLD AUTO: 9.6 FL (ref 9–12.9)
POTASSIUM SERPL-SCNC: 3.9 MMOL/L (ref 3.6–5.5)
PROT UR QL STRIP: NEGATIVE MG/DL
RBC # BLD AUTO: 4.42 M/UL (ref 4.7–6.1)
RBC # URNS HPF: ABNORMAL /HPF
RBC UR QL AUTO: ABNORMAL
SODIUM SERPL-SCNC: 140 MMOL/L (ref 135–145)
SP GR UR STRIP.AUTO: 1.01
UROBILINOGEN UR STRIP.AUTO-MCNC: 0.2 MG/DL
WBC # BLD AUTO: 14.2 K/UL (ref 4.8–10.8)
WBC #/AREA URNS HPF: ABNORMAL /HPF

## 2022-02-19 PROCEDURE — 700111 HCHG RX REV CODE 636 W/ 250 OVERRIDE (IP): Performed by: STUDENT IN AN ORGANIZED HEALTH CARE EDUCATION/TRAINING PROGRAM

## 2022-02-19 PROCEDURE — 80048 BASIC METABOLIC PNL TOTAL CA: CPT

## 2022-02-19 PROCEDURE — A9270 NON-COVERED ITEM OR SERVICE: HCPCS | Performed by: STUDENT IN AN ORGANIZED HEALTH CARE EDUCATION/TRAINING PROGRAM

## 2022-02-19 PROCEDURE — 99223 1ST HOSP IP/OBS HIGH 75: CPT | Performed by: STUDENT IN AN ORGANIZED HEALTH CARE EDUCATION/TRAINING PROGRAM

## 2022-02-19 PROCEDURE — 770006 HCHG ROOM/CARE - MED/SURG/GYN SEMI*

## 2022-02-19 PROCEDURE — 96374 THER/PROPH/DIAG INJ IV PUSH: CPT

## 2022-02-19 PROCEDURE — 81001 URINALYSIS AUTO W/SCOPE: CPT

## 2022-02-19 PROCEDURE — 99285 EMERGENCY DEPT VISIT HI MDM: CPT

## 2022-02-19 PROCEDURE — 96375 TX/PRO/DX INJ NEW DRUG ADDON: CPT

## 2022-02-19 PROCEDURE — 85025 COMPLETE CBC W/AUTO DIFF WBC: CPT

## 2022-02-19 PROCEDURE — 700102 HCHG RX REV CODE 250 W/ 637 OVERRIDE(OP): Performed by: STUDENT IN AN ORGANIZED HEALTH CARE EDUCATION/TRAINING PROGRAM

## 2022-02-19 PROCEDURE — 96376 TX/PRO/DX INJ SAME DRUG ADON: CPT

## 2022-02-19 RX ORDER — ONDANSETRON 2 MG/ML
4 INJECTION INTRAMUSCULAR; INTRAVENOUS EVERY 4 HOURS PRN
Status: DISCONTINUED | OUTPATIENT
Start: 2022-02-19 | End: 2022-02-21 | Stop reason: HOSPADM

## 2022-02-19 RX ORDER — POLYETHYLENE GLYCOL 3350 17 G/17G
1 POWDER, FOR SOLUTION ORAL
Status: DISCONTINUED | OUTPATIENT
Start: 2022-02-19 | End: 2022-02-21 | Stop reason: HOSPADM

## 2022-02-19 RX ORDER — AMOXICILLIN 250 MG
2 CAPSULE ORAL 2 TIMES DAILY
Status: DISCONTINUED | OUTPATIENT
Start: 2022-02-20 | End: 2022-02-21 | Stop reason: HOSPADM

## 2022-02-19 RX ORDER — MORPHINE SULFATE 4 MG/ML
1 INJECTION INTRAVENOUS EVERY 4 HOURS PRN
Status: COMPLETED | OUTPATIENT
Start: 2022-02-19 | End: 2022-02-20

## 2022-02-19 RX ORDER — DIVALPROEX SODIUM 500 MG/1
500 TABLET, DELAYED RELEASE ORAL 2 TIMES DAILY
Status: DISCONTINUED | OUTPATIENT
Start: 2022-02-19 | End: 2022-02-21 | Stop reason: HOSPADM

## 2022-02-19 RX ORDER — KETOROLAC TROMETHAMINE 30 MG/ML
30 INJECTION, SOLUTION INTRAMUSCULAR; INTRAVENOUS ONCE
Status: COMPLETED | OUTPATIENT
Start: 2022-02-19 | End: 2022-02-19

## 2022-02-19 RX ORDER — ONDANSETRON 2 MG/ML
8 INJECTION INTRAMUSCULAR; INTRAVENOUS ONCE
Status: COMPLETED | OUTPATIENT
Start: 2022-02-19 | End: 2022-02-19

## 2022-02-19 RX ORDER — MORPHINE SULFATE 4 MG/ML
4 INJECTION INTRAVENOUS ONCE
Status: COMPLETED | OUTPATIENT
Start: 2022-02-19 | End: 2022-02-19

## 2022-02-19 RX ORDER — TAMSULOSIN HYDROCHLORIDE 0.4 MG/1
0.4 CAPSULE ORAL DAILY
Status: DISCONTINUED | OUTPATIENT
Start: 2022-02-20 | End: 2022-02-21 | Stop reason: HOSPADM

## 2022-02-19 RX ORDER — ACETAMINOPHEN 325 MG/1
650 TABLET ORAL EVERY 6 HOURS PRN
Status: DISCONTINUED | OUTPATIENT
Start: 2022-02-19 | End: 2022-02-21 | Stop reason: HOSPADM

## 2022-02-19 RX ORDER — PROMETHAZINE HYDROCHLORIDE 25 MG/1
12.5-25 TABLET ORAL EVERY 4 HOURS PRN
Status: DISCONTINUED | OUTPATIENT
Start: 2022-02-19 | End: 2022-02-21 | Stop reason: HOSPADM

## 2022-02-19 RX ORDER — PROCHLORPERAZINE EDISYLATE 5 MG/ML
5-10 INJECTION INTRAMUSCULAR; INTRAVENOUS EVERY 4 HOURS PRN
Status: DISCONTINUED | OUTPATIENT
Start: 2022-02-19 | End: 2022-02-21 | Stop reason: HOSPADM

## 2022-02-19 RX ORDER — ONDANSETRON 4 MG/1
4 TABLET, ORALLY DISINTEGRATING ORAL EVERY 4 HOURS PRN
Status: DISCONTINUED | OUTPATIENT
Start: 2022-02-19 | End: 2022-02-21 | Stop reason: HOSPADM

## 2022-02-19 RX ORDER — PROMETHAZINE HYDROCHLORIDE 25 MG/1
12.5-25 SUPPOSITORY RECTAL EVERY 4 HOURS PRN
Status: DISCONTINUED | OUTPATIENT
Start: 2022-02-19 | End: 2022-02-21 | Stop reason: HOSPADM

## 2022-02-19 RX ORDER — BISACODYL 10 MG
10 SUPPOSITORY, RECTAL RECTAL
Status: DISCONTINUED | OUTPATIENT
Start: 2022-02-19 | End: 2022-02-21 | Stop reason: HOSPADM

## 2022-02-19 RX ORDER — HEPARIN SODIUM 5000 [USP'U]/ML
5000 INJECTION, SOLUTION INTRAVENOUS; SUBCUTANEOUS EVERY 8 HOURS
Status: DISCONTINUED | OUTPATIENT
Start: 2022-02-20 | End: 2022-02-21 | Stop reason: HOSPADM

## 2022-02-19 RX ADMIN — KETOROLAC TROMETHAMINE 30 MG: 30 INJECTION, SOLUTION INTRAMUSCULAR at 20:11

## 2022-02-19 RX ADMIN — MORPHINE SULFATE 4 MG: 4 INJECTION INTRAVENOUS at 20:10

## 2022-02-19 RX ADMIN — DIVALPROEX SODIUM 500 MG: 500 TABLET, DELAYED RELEASE ORAL at 23:09

## 2022-02-19 RX ADMIN — ONDANSETRON 8 MG: 2 INJECTION INTRAMUSCULAR; INTRAVENOUS at 20:13

## 2022-02-19 RX ADMIN — MORPHINE SULFATE 1 MG: 4 INJECTION INTRAVENOUS at 22:46

## 2022-02-19 ASSESSMENT — ENCOUNTER SYMPTOMS
NECK PAIN: 0
HEADACHES: 0
VOMITING: 0
HEARTBURN: 0
BLURRED VISION: 0
PALPITATIONS: 0
COUGH: 0
DIZZINESS: 0
DOUBLE VISION: 0
FEVER: 0
BRUISES/BLEEDS EASILY: 0
DEPRESSION: 0
CHILLS: 0
FLANK PAIN: 1
TINGLING: 0
NAUSEA: 1
ORTHOPNEA: 0
PHOTOPHOBIA: 0
MYALGIAS: 0
SPUTUM PRODUCTION: 0
HEMOPTYSIS: 0

## 2022-02-19 ASSESSMENT — PAIN DESCRIPTION - PAIN TYPE: TYPE: ACUTE PAIN

## 2022-02-19 ASSESSMENT — FIBROSIS 4 INDEX: FIB4 SCORE: 0.68

## 2022-02-19 NOTE — ED NOTES
This nurse called to following up on BMP results. Per chemistry tech, she was going to call re: hemolysis of sample. Lab phlebotomist to be sent down to take sample from pt.

## 2022-02-19 NOTE — ED NOTES
Pt roaming the halls, reporting that he walked to the restroom, forgot to urinate in urinal previously given to him for urine specimen. Pt informed to stay in his room, to utilize the call light. Escorted back to bed without issue. Pt requesting more pain medications, ERP notified at this time.

## 2022-02-19 NOTE — ED NOTES
Pt given DC paperwork, demonstrated understanding of follow-up and prescriptions, verbally. VSS, To stable, able to ambulate.

## 2022-02-19 NOTE — DISCHARGE INSTRUCTIONS
Return immediately if you develop a fever, worsening pain or have any other concerns. If your symptoms are ongoing for more than 2 days return on Sunday for repeat urinalysis.

## 2022-02-19 NOTE — ED PROVIDER NOTES
ED Provider Note    CHIEF COMPLAINT  Chief Complaint   Patient presents with   • Flank Pain     Left flank pain, reports hx of kidney stones, cannot afford medication for pain.        HPI  Russell Bunn is a 31 y.o. male who presents with chief complaint of flank pain.  Patient reports a longstanding history of recurrent kidney stones.  Patient reports that pain is in his left flank.  He reports the pain is identical to prior episodes of kidney stones.  He expected that he would pass it, but pain persisted throughout the day.  Pain started while patient was working at around 10 AM this morning.  He denies any surgery nausea vomiting.  He reports his urine was dark but he is unsure if there is any blood.  He denies any fevers.  Patient reports that he has had stents placed in the past but has not had any recent stents placed and does not have any indwelling stents.  Patient reports that he does have a prescription for a medication which she is unsure but was unable to fill it because he does not have the Monday at this point.  He is currently homeless but is working.    REVIEW OF SYSTEMS  ROS  See HPI for further details. All other systems are negative.     PAST MEDICAL HISTORY   has a past medical history of Kidney stones and Seizure disorder (HCC).    SOCIAL HISTORY  Social History     Tobacco Use   • Smoking status: Current Some Day Smoker     Packs/day: 0.50     Types: Cigarettes   • Smokeless tobacco: Never Used   Substance and Sexual Activity   • Alcohol use: Yes     Comment: Occasional   • Drug use: Yes     Types: Inhaled     Comment: Marijuana, every day   • Sexual activity: Not on file       SURGICAL HISTORY   has a past surgical history that includes lithotripsy; cysto/uretero/pyeloscopy, dx (Right, 3/13/2020); cystoscopy,insert ureteral stent (Right, 3/13/2020); lasertripsy (Right, 3/13/2020); cystoscopy,insert ureteral stent (Left, 4/26/2021); and cysto/uretero/pyeloscopy, dx (Left,  4/26/2021).    CURRENT MEDICATIONS  Home Medications     Reviewed by Munir Horan R.N. (Registered Nurse) on 02/18/22 at 1610  Med List Status: <None>   Medication Last Dose Status   divalproex (DEPAKOTE) 500 MG Tablet Delayed Response  Active                ALLERGIES  Allergies   Allergen Reactions   • Ancef [Cefazolin] Rash     rash   • Percocet [Apap-Fd&C Red #40 Al Santillan-Oxycodone]    • Shellfish Allergy    • Tape        PHYSICAL EXAM  Vitals:    02/18/22 1616   BP: 129/75   Pulse: (!) 102   Resp:    Temp:    SpO2: 97%       Physical Exam  Constitutional:       Appearance: He is well-developed.   Eyes:      Conjunctiva/sclera: Conjunctivae normal.   Pulmonary:      Effort: Pulmonary effort is normal.   Abdominal:      General: Abdomen is flat. There is no distension.      Palpations: Abdomen is soft. There is no mass.      Tenderness: There is no abdominal tenderness.      Hernia: No hernia is present.      Comments: No CVA tenderness   Musculoskeletal:         General: Normal range of motion.      Cervical back: Normal range of motion and neck supple.   Skin:     General: Skin is warm.   Neurological:      General: No focal deficit present.      Mental Status: He is alert and oriented to person, place, and time.   Psychiatric:         Behavior: Behavior normal.       Results for orders placed or performed during the hospital encounter of 02/18/22   URINALYSIS,CULTURE IF INDICATED    Specimen: Urine   Result Value Ref Range    Color Yellow     Character Clear     Specific Gravity 1.023 <1.035    Ph 8.5 (A) 5.0 - 8.0    Glucose Negative Negative mg/dL    Ketones Negative Negative mg/dL    Protein 30 (A) Negative mg/dL    Bilirubin Negative Negative    Urobilinogen, Urine 0.2 Negative    Nitrite Negative Negative    Leukocyte Esterase Small (A) Negative    Occult Blood Negative Negative    Micro Urine Req Microscopic    Basic Metabolic Panel   Result Value Ref Range    Sodium 138 135 - 145 mmol/L    Potassium 4.3  3.6 - 5.5 mmol/L    Chloride 105 96 - 112 mmol/L    Co2 24 20 - 33 mmol/L    Glucose 95 65 - 99 mg/dL    Bun 15 8 - 22 mg/dL    Creatinine 1.23 0.50 - 1.40 mg/dL    Calcium 8.8 8.5 - 10.5 mg/dL    Anion Gap 9.0 7.0 - 16.0   URINE MICROSCOPIC (W/UA)   Result Value Ref Range    WBC 10-20 (A) /hpf    RBC 0-2 (A) /hpf    Bacteria Negative None /hpf    Epithelial Cells Few /hpf    Hyaline Cast 0-2 /lpf   ESTIMATED GFR   Result Value Ref Range    GFR If African American >60 >60 mL/min/1.73 m 2    GFR If Non African American >60 >60 mL/min/1.73 m 2   CBC WITH DIFFERENTIAL   Result Value Ref Range    WBC 10.3 4.8 - 10.8 K/uL    RBC 4.03 (L) 4.70 - 6.10 M/uL    Hemoglobin 13.5 (L) 14.0 - 18.0 g/dL    Hematocrit 40.3 (L) 42.0 - 52.0 %    .0 (H) 81.4 - 97.8 fL    MCH 33.5 (H) 27.0 - 33.0 pg    MCHC 33.5 (L) 33.7 - 35.3 g/dL    RDW 49.4 35.9 - 50.0 fL    Platelet Count 190 164 - 446 K/uL    MPV 10.1 9.0 - 12.9 fL    Neutrophils-Polys 61.50 44.00 - 72.00 %    Lymphocytes 24.70 22.00 - 41.00 %    Monocytes 7.90 0.00 - 13.40 %    Eosinophils 5.00 0.00 - 6.90 %    Basophils 0.50 0.00 - 1.80 %    Immature Granulocytes 0.40 0.00 - 0.90 %    Nucleated RBC 0.00 /100 WBC    Neutrophils (Absolute) 6.34 1.82 - 7.42 K/uL    Lymphs (Absolute) 2.55 1.00 - 4.80 K/uL    Monos (Absolute) 0.82 0.00 - 0.85 K/uL    Eos (Absolute) 0.52 (H) 0.00 - 0.51 K/uL    Baso (Absolute) 0.05 0.00 - 0.12 K/uL    Immature Granulocytes (abs) 0.04 0.00 - 0.11 K/uL    NRBC (Absolute) 0.00 K/uL     CT-RENAL COLIC EVALUATION(A/P W/O)   Final Result         The bilateral distal ureter are very difficult to visualize due to lack of pelvic fat.      1. Likely a new 5 mm calculus in the left distal ureter with mild left hydronephrosis.      2. Nonobstructive bilateral renal calculi.              COURSE & MEDICAL DECISION MAKING  Pertinent Labs & Imaging studies reviewed. (See chart for details)    Patient here with what appears to be consistent with recurrent  kidney stones.  He is having issues refilling medications that he was prescribed.  He has been given a good Rx coupon to help facilitate this.  He does appear to have an acute kidney stone, will check urinalysis and basic labs as well as CT to confirm.  CT does not fact confirm a kidney stone at the UVJ at 5 mm.  Patient's basic labs reveal a urinalysis with minimal whites but no evidence of bacteria, nitrite negative, patient without any associated kidney injury, furthermore patient without any associated white count.  Patient does not have any associated fever or evidence of infection on exam.  Urinalysis has been sent for culture.  I believe infected stone is very unlikely given patient's clinical picture at this point lack of any white count, lack of any evidence of pyelonephritis on CT and lack of any evidence of sepsis at this point.  I do not believe that the urine is entirely consistent with infection believe that emergent sent places is likely not necessary.  Furthermore while patient was waiting for his lab results he did receive Toradol and fentanyl.  This was relatively early in his course and there was a considerable delay in patient's laboratory results, while he was waiting hours in the emergency department his pain resolved after he urinated.  Patient does not recall passing a stone but I think it is very possible that he did pass while in the emergency department.  He has been prescribed a dose of Toradol and Flomax.  As patient is indigent I given follow-up instructions with urology however I discussed with him that if he develops a fever or any worsening symptoms to return immediately to the emergency department.  Also asked him to return in 48 hours if symptoms are ongoing so we can recheck urinalysis and ensure that his urine cultures are negative.    The patient will return for worsening symptoms and is stable at the time of discharge. The patient verbalizes understanding and will  comply.    FINAL IMPRESSION    1. Flank pain    2. Ureterolithiasis               Electronically signed by: Raf Bird M.D., 2/18/2022 4:38 PM

## 2022-02-19 NOTE — ED TRIAGE NOTES
Pt BIBEMS from home for 10/10 left flank pain. Pt reports that he has an extensive hx of kidney stones, is unable to afford medications for the pain. Pt also has a hx of seizures, is unable to afford his Depakote. Pt self medicating with marijuana. Pt given 200 Fentanyl en route.

## 2022-02-20 ENCOUNTER — ANESTHESIA EVENT (OUTPATIENT)
Dept: SURGERY | Facility: MEDICAL CENTER | Age: 32
DRG: 660 | End: 2022-02-20
Payer: COMMERCIAL

## 2022-02-20 ENCOUNTER — ANESTHESIA (OUTPATIENT)
Dept: SURGERY | Facility: MEDICAL CENTER | Age: 32
DRG: 660 | End: 2022-02-20
Payer: COMMERCIAL

## 2022-02-20 ENCOUNTER — APPOINTMENT (OUTPATIENT)
Dept: RADIOLOGY | Facility: MEDICAL CENTER | Age: 32
DRG: 660 | End: 2022-02-20
Attending: UROLOGY
Payer: COMMERCIAL

## 2022-02-20 LAB
EXTERNAL QUALITY CONTROL: NORMAL
SARS-COV+SARS-COV-2 AG RESP QL IA.RAPID: NEGATIVE

## 2022-02-20 PROCEDURE — 160028 HCHG SURGERY MINUTES - 1ST 30 MINS LEVEL 3: Performed by: UROLOGY

## 2022-02-20 PROCEDURE — A9270 NON-COVERED ITEM OR SERVICE: HCPCS | Performed by: INTERNAL MEDICINE

## 2022-02-20 PROCEDURE — 160039 HCHG SURGERY MINUTES - EA ADDL 1 MIN LEVEL 3: Performed by: UROLOGY

## 2022-02-20 PROCEDURE — 160002 HCHG RECOVERY MINUTES (STAT): Performed by: UROLOGY

## 2022-02-20 PROCEDURE — C2617 STENT, NON-COR, TEM W/O DEL: HCPCS | Performed by: UROLOGY

## 2022-02-20 PROCEDURE — C1769 GUIDE WIRE: HCPCS | Performed by: UROLOGY

## 2022-02-20 PROCEDURE — 700111 HCHG RX REV CODE 636 W/ 250 OVERRIDE (IP): Performed by: STUDENT IN AN ORGANIZED HEALTH CARE EDUCATION/TRAINING PROGRAM

## 2022-02-20 PROCEDURE — A9270 NON-COVERED ITEM OR SERVICE: HCPCS | Performed by: STUDENT IN AN ORGANIZED HEALTH CARE EDUCATION/TRAINING PROGRAM

## 2022-02-20 PROCEDURE — 0TC48ZZ EXTIRPATION OF MATTER FROM LEFT KIDNEY PELVIS, VIA NATURAL OR ARTIFICIAL OPENING ENDOSCOPIC: ICD-10-PCS | Performed by: UROLOGY

## 2022-02-20 PROCEDURE — 82365 CALCULUS SPECTROSCOPY: CPT

## 2022-02-20 PROCEDURE — 700111 HCHG RX REV CODE 636 W/ 250 OVERRIDE (IP): Performed by: ANESTHESIOLOGY

## 2022-02-20 PROCEDURE — 0T778DZ DILATION OF LEFT URETER WITH INTRALUMINAL DEVICE, VIA NATURAL OR ARTIFICIAL OPENING ENDOSCOPIC: ICD-10-PCS | Performed by: UROLOGY

## 2022-02-20 PROCEDURE — 87426 SARSCOV CORONAVIRUS AG IA: CPT | Performed by: UROLOGY

## 2022-02-20 PROCEDURE — 700102 HCHG RX REV CODE 250 W/ 637 OVERRIDE(OP): Performed by: ANESTHESIOLOGY

## 2022-02-20 PROCEDURE — 160048 HCHG OR STATISTICAL LEVEL 1-5: Performed by: UROLOGY

## 2022-02-20 PROCEDURE — C1758 CATHETER, URETERAL: HCPCS | Performed by: UROLOGY

## 2022-02-20 PROCEDURE — 88300 SURGICAL PATH GROSS: CPT

## 2022-02-20 PROCEDURE — 160035 HCHG PACU - 1ST 60 MINS PHASE I: Performed by: UROLOGY

## 2022-02-20 PROCEDURE — 770006 HCHG ROOM/CARE - MED/SURG/GYN SEMI*

## 2022-02-20 PROCEDURE — 700102 HCHG RX REV CODE 250 W/ 637 OVERRIDE(OP): Performed by: STUDENT IN AN ORGANIZED HEALTH CARE EDUCATION/TRAINING PROGRAM

## 2022-02-20 PROCEDURE — 0TC78ZZ EXTIRPATION OF MATTER FROM LEFT URETER, VIA NATURAL OR ARTIFICIAL OPENING ENDOSCOPIC: ICD-10-PCS | Performed by: UROLOGY

## 2022-02-20 PROCEDURE — 160009 HCHG ANES TIME/MIN: Performed by: UROLOGY

## 2022-02-20 PROCEDURE — 99232 SBSQ HOSP IP/OBS MODERATE 35: CPT | Performed by: INTERNAL MEDICINE

## 2022-02-20 PROCEDURE — 700105 HCHG RX REV CODE 258: Performed by: ANESTHESIOLOGY

## 2022-02-20 PROCEDURE — 160036 HCHG PACU - EA ADDL 30 MINS PHASE I: Performed by: UROLOGY

## 2022-02-20 PROCEDURE — 700101 HCHG RX REV CODE 250: Performed by: ANESTHESIOLOGY

## 2022-02-20 PROCEDURE — 700111 HCHG RX REV CODE 636 W/ 250 OVERRIDE (IP): Performed by: INTERNAL MEDICINE

## 2022-02-20 PROCEDURE — 700102 HCHG RX REV CODE 250 W/ 637 OVERRIDE(OP): Performed by: INTERNAL MEDICINE

## 2022-02-20 PROCEDURE — A9270 NON-COVERED ITEM OR SERVICE: HCPCS | Performed by: ANESTHESIOLOGY

## 2022-02-20 DEVICE — STENT UROLOGICAL POLARIS 6X26  ULTRA: Type: IMPLANTABLE DEVICE | Site: URETER | Status: FUNCTIONAL

## 2022-02-20 RX ORDER — DIPHENHYDRAMINE HYDROCHLORIDE 50 MG/ML
INJECTION INTRAMUSCULAR; INTRAVENOUS PRN
Status: DISCONTINUED | OUTPATIENT
Start: 2022-02-20 | End: 2022-02-20 | Stop reason: SURG

## 2022-02-20 RX ORDER — GABAPENTIN 300 MG/1
300 CAPSULE ORAL ONCE
Status: COMPLETED | OUTPATIENT
Start: 2022-02-20 | End: 2022-02-20

## 2022-02-20 RX ORDER — DEXAMETHASONE SODIUM PHOSPHATE 4 MG/ML
INJECTION, SOLUTION INTRA-ARTICULAR; INTRALESIONAL; INTRAMUSCULAR; INTRAVENOUS; SOFT TISSUE PRN
Status: DISCONTINUED | OUTPATIENT
Start: 2022-02-20 | End: 2022-02-20 | Stop reason: SURG

## 2022-02-20 RX ORDER — LIDOCAINE HYDROCHLORIDE 20 MG/ML
INJECTION, SOLUTION EPIDURAL; INFILTRATION; INTRACAUDAL; PERINEURAL PRN
Status: DISCONTINUED | OUTPATIENT
Start: 2022-02-20 | End: 2022-02-20 | Stop reason: SURG

## 2022-02-20 RX ORDER — KETOROLAC TROMETHAMINE 30 MG/ML
30 INJECTION, SOLUTION INTRAMUSCULAR; INTRAVENOUS EVERY 6 HOURS
Status: DISCONTINUED | OUTPATIENT
Start: 2022-02-20 | End: 2022-02-20

## 2022-02-20 RX ORDER — METOPROLOL TARTRATE 1 MG/ML
1 INJECTION, SOLUTION INTRAVENOUS
Status: DISCONTINUED | OUTPATIENT
Start: 2022-02-20 | End: 2022-02-20 | Stop reason: HOSPADM

## 2022-02-20 RX ORDER — SODIUM CHLORIDE, SODIUM LACTATE, POTASSIUM CHLORIDE, CALCIUM CHLORIDE 600; 310; 30; 20 MG/100ML; MG/100ML; MG/100ML; MG/100ML
INJECTION, SOLUTION INTRAVENOUS
Status: DISCONTINUED | OUTPATIENT
Start: 2022-02-20 | End: 2022-02-20 | Stop reason: SURG

## 2022-02-20 RX ORDER — OXYCODONE HYDROCHLORIDE 5 MG/1
5 TABLET ORAL EVERY 4 HOURS PRN
Status: CANCELLED | OUTPATIENT
Start: 2022-02-20

## 2022-02-20 RX ORDER — DIPHENHYDRAMINE HCL 25 MG
25 TABLET ORAL EVERY 6 HOURS PRN
Status: DISCONTINUED | OUTPATIENT
Start: 2022-02-20 | End: 2022-02-21 | Stop reason: HOSPADM

## 2022-02-20 RX ORDER — ROCURONIUM BROMIDE 10 MG/ML
INJECTION, SOLUTION INTRAVENOUS PRN
Status: DISCONTINUED | OUTPATIENT
Start: 2022-02-20 | End: 2022-02-20 | Stop reason: SURG

## 2022-02-20 RX ORDER — KETOROLAC TROMETHAMINE 30 MG/ML
INJECTION, SOLUTION INTRAMUSCULAR; INTRAVENOUS
Status: ACTIVE
Start: 2022-02-20 | End: 2022-02-20

## 2022-02-20 RX ORDER — DIPHENHYDRAMINE HYDROCHLORIDE 50 MG/ML
12.5 INJECTION INTRAMUSCULAR; INTRAVENOUS
Status: DISCONTINUED | OUTPATIENT
Start: 2022-02-20 | End: 2022-02-20 | Stop reason: HOSPADM

## 2022-02-20 RX ORDER — SODIUM CHLORIDE, SODIUM LACTATE, POTASSIUM CHLORIDE, CALCIUM CHLORIDE 600; 310; 30; 20 MG/100ML; MG/100ML; MG/100ML; MG/100ML
INJECTION, SOLUTION INTRAVENOUS CONTINUOUS
Status: DISCONTINUED | OUTPATIENT
Start: 2022-02-20 | End: 2022-02-20 | Stop reason: HOSPADM

## 2022-02-20 RX ORDER — ONDANSETRON 2 MG/ML
4 INJECTION INTRAMUSCULAR; INTRAVENOUS
Status: DISCONTINUED | OUTPATIENT
Start: 2022-02-20 | End: 2022-02-20 | Stop reason: HOSPADM

## 2022-02-20 RX ORDER — HYDROMORPHONE HYDROCHLORIDE 1 MG/ML
0.1 INJECTION, SOLUTION INTRAMUSCULAR; INTRAVENOUS; SUBCUTANEOUS
Status: DISCONTINUED | OUTPATIENT
Start: 2022-02-20 | End: 2022-02-20 | Stop reason: HOSPADM

## 2022-02-20 RX ORDER — OXYCODONE HYDROCHLORIDE 10 MG/1
10 TABLET ORAL EVERY 4 HOURS PRN
Status: CANCELLED | OUTPATIENT
Start: 2022-02-20

## 2022-02-20 RX ORDER — HYDROMORPHONE HYDROCHLORIDE 1 MG/ML
0.4 INJECTION, SOLUTION INTRAMUSCULAR; INTRAVENOUS; SUBCUTANEOUS
Status: DISCONTINUED | OUTPATIENT
Start: 2022-02-20 | End: 2022-02-20 | Stop reason: HOSPADM

## 2022-02-20 RX ORDER — KETOROLAC TROMETHAMINE 30 MG/ML
30 INJECTION, SOLUTION INTRAMUSCULAR; INTRAVENOUS ONCE
Status: COMPLETED | OUTPATIENT
Start: 2022-02-20 | End: 2022-02-20

## 2022-02-20 RX ORDER — OXYCODONE HCL 5 MG/5 ML
5 SOLUTION, ORAL ORAL
Status: COMPLETED | OUTPATIENT
Start: 2022-02-20 | End: 2022-02-20

## 2022-02-20 RX ORDER — ACETAMINOPHEN 500 MG
1000 TABLET ORAL ONCE
Status: COMPLETED | OUTPATIENT
Start: 2022-02-20 | End: 2022-02-20

## 2022-02-20 RX ORDER — HALOPERIDOL 5 MG/ML
1 INJECTION INTRAMUSCULAR
Status: DISCONTINUED | OUTPATIENT
Start: 2022-02-20 | End: 2022-02-20 | Stop reason: HOSPADM

## 2022-02-20 RX ORDER — MIDAZOLAM HYDROCHLORIDE 1 MG/ML
1 INJECTION INTRAMUSCULAR; INTRAVENOUS
Status: DISCONTINUED | OUTPATIENT
Start: 2022-02-20 | End: 2022-02-20 | Stop reason: HOSPADM

## 2022-02-20 RX ORDER — MIDAZOLAM HYDROCHLORIDE 1 MG/ML
INJECTION INTRAMUSCULAR; INTRAVENOUS PRN
Status: DISCONTINUED | OUTPATIENT
Start: 2022-02-20 | End: 2022-02-20 | Stop reason: SURG

## 2022-02-20 RX ORDER — CIPROFLOXACIN 2 MG/ML
INJECTION, SOLUTION INTRAVENOUS PRN
Status: DISCONTINUED | OUTPATIENT
Start: 2022-02-20 | End: 2022-02-20 | Stop reason: SURG

## 2022-02-20 RX ORDER — KETOROLAC TROMETHAMINE 30 MG/ML
30 INJECTION, SOLUTION INTRAMUSCULAR; INTRAVENOUS EVERY 6 HOURS
Status: DISCONTINUED | OUTPATIENT
Start: 2022-02-20 | End: 2022-02-21 | Stop reason: HOSPADM

## 2022-02-20 RX ORDER — OXYCODONE HCL 5 MG/5 ML
10 SOLUTION, ORAL ORAL
Status: COMPLETED | OUTPATIENT
Start: 2022-02-20 | End: 2022-02-20

## 2022-02-20 RX ORDER — HYDROMORPHONE HYDROCHLORIDE 1 MG/ML
0.2 INJECTION, SOLUTION INTRAMUSCULAR; INTRAVENOUS; SUBCUTANEOUS
Status: DISCONTINUED | OUTPATIENT
Start: 2022-02-20 | End: 2022-02-20 | Stop reason: HOSPADM

## 2022-02-20 RX ADMIN — KETOROLAC TROMETHAMINE 30 MG: 30 INJECTION, SOLUTION INTRAMUSCULAR at 17:05

## 2022-02-20 RX ADMIN — KETOROLAC TROMETHAMINE 30 MG: 30 INJECTION, SOLUTION INTRAMUSCULAR at 23:52

## 2022-02-20 RX ADMIN — DIVALPROEX SODIUM 500 MG: 500 TABLET, DELAYED RELEASE ORAL at 08:36

## 2022-02-20 RX ADMIN — SODIUM CHLORIDE, POTASSIUM CHLORIDE, SODIUM LACTATE AND CALCIUM CHLORIDE: 600; 310; 30; 20 INJECTION, SOLUTION INTRAVENOUS at 12:52

## 2022-02-20 RX ADMIN — OXYCODONE HYDROCHLORIDE 10 MG: 5 SOLUTION ORAL at 14:40

## 2022-02-20 RX ADMIN — FENTANYL CITRATE 25 MCG: 50 INJECTION, SOLUTION INTRAMUSCULAR; INTRAVENOUS at 13:35

## 2022-02-20 RX ADMIN — DIPHENHYDRAMINE HYDROCHLORIDE 25 MG: 25 TABLET ORAL at 17:04

## 2022-02-20 RX ADMIN — SODIUM CHLORIDE, POTASSIUM CHLORIDE, SODIUM LACTATE AND CALCIUM CHLORIDE 700 ML: 600; 310; 30; 20 INJECTION, SOLUTION INTRAVENOUS at 14:03

## 2022-02-20 RX ADMIN — TAMSULOSIN HYDROCHLORIDE 0.4 MG: 0.4 CAPSULE ORAL at 08:36

## 2022-02-20 RX ADMIN — HYDROMORPHONE HYDROCHLORIDE 0.4 MG: 1 INJECTION, SOLUTION INTRAMUSCULAR; INTRAVENOUS; SUBCUTANEOUS at 14:45

## 2022-02-20 RX ADMIN — ACETAMINOPHEN 650 MG: 325 TABLET, FILM COATED ORAL at 01:16

## 2022-02-20 RX ADMIN — PROCHLORPERAZINE EDISYLATE 10 MG: 5 INJECTION INTRAMUSCULAR; INTRAVENOUS at 17:05

## 2022-02-20 RX ADMIN — ACETAMINOPHEN 1000 MG: 500 TABLET ORAL at 12:43

## 2022-02-20 RX ADMIN — ROCURONIUM BROMIDE 30 MG: 10 INJECTION, SOLUTION INTRAVENOUS at 13:00

## 2022-02-20 RX ADMIN — DIVALPROEX SODIUM 500 MG: 500 TABLET, DELAYED RELEASE ORAL at 17:04

## 2022-02-20 RX ADMIN — HEPARIN SODIUM 5000 UNITS: 5000 INJECTION, SOLUTION INTRAVENOUS; SUBCUTANEOUS at 21:25

## 2022-02-20 RX ADMIN — MORPHINE SULFATE 1 MG: 4 INJECTION INTRAVENOUS at 03:33

## 2022-02-20 RX ADMIN — PROPOFOL 130 MG: 10 INJECTION, EMULSION INTRAVENOUS at 13:00

## 2022-02-20 RX ADMIN — LIDOCAINE HYDROCHLORIDE 50 MG: 20 INJECTION, SOLUTION EPIDURAL; INFILTRATION; INTRACAUDAL at 13:40

## 2022-02-20 RX ADMIN — MORPHINE SULFATE 1 MG: 4 INJECTION INTRAVENOUS at 08:36

## 2022-02-20 RX ADMIN — CIPROFLOXACIN 400 MG: 2 INJECTION, SOLUTION INTRAVENOUS at 13:05

## 2022-02-20 RX ADMIN — KETOROLAC TROMETHAMINE 30 MG: 30 INJECTION, SOLUTION INTRAMUSCULAR at 09:40

## 2022-02-20 RX ADMIN — DEXAMETHASONE SODIUM PHOSPHATE 8 MG: 4 INJECTION, SOLUTION INTRA-ARTICULAR; INTRALESIONAL; INTRAMUSCULAR; INTRAVENOUS; SOFT TISSUE at 13:16

## 2022-02-20 RX ADMIN — GABAPENTIN 300 MG: 300 CAPSULE ORAL at 12:43

## 2022-02-20 RX ADMIN — FENTANYL CITRATE 25 MCG: 50 INJECTION, SOLUTION INTRAMUSCULAR; INTRAVENOUS at 12:58

## 2022-02-20 RX ADMIN — FENTANYL CITRATE 25 MCG: 50 INJECTION, SOLUTION INTRAMUSCULAR; INTRAVENOUS at 13:10

## 2022-02-20 RX ADMIN — SUGAMMADEX 125 MG: 100 INJECTION, SOLUTION INTRAVENOUS at 13:40

## 2022-02-20 RX ADMIN — LIDOCAINE HYDROCHLORIDE 50 MG: 20 INJECTION, SOLUTION EPIDURAL; INFILTRATION; INTRACAUDAL at 13:00

## 2022-02-20 RX ADMIN — MIDAZOLAM HYDROCHLORIDE 2 MG: 1 INJECTION, SOLUTION INTRAMUSCULAR; INTRAVENOUS at 12:58

## 2022-02-20 RX ADMIN — DIPHENHYDRAMINE HYDROCHLORIDE 25 MG: 50 INJECTION, SOLUTION INTRAMUSCULAR; INTRAVENOUS at 13:20

## 2022-02-20 ASSESSMENT — PAIN DESCRIPTION - PAIN TYPE
TYPE: SURGICAL PAIN;ACUTE PAIN
TYPE: SURGICAL PAIN
TYPE: SURGICAL PAIN
TYPE: ACUTE PAIN
TYPE: ACUTE PAIN
TYPE: ACUTE PAIN;SURGICAL PAIN
TYPE: ACUTE PAIN

## 2022-02-20 ASSESSMENT — COGNITIVE AND FUNCTIONAL STATUS - GENERAL
SUGGESTED CMS G CODE MODIFIER DAILY ACTIVITY: CJ
TURNING FROM BACK TO SIDE WHILE IN FLAT BAD: A LITTLE
HELP NEEDED FOR BATHING: A LITTLE
DRESSING REGULAR LOWER BODY CLOTHING: A LITTLE
SUGGESTED CMS G CODE MODIFIER MOBILITY: CI
CLIMB 3 TO 5 STEPS WITH RAILING: A LITTLE
MOBILITY SCORE: 20
SUGGESTED CMS G CODE MODIFIER MOBILITY: CJ
MOBILITY SCORE: 23
MOVING FROM LYING ON BACK TO SITTING ON SIDE OF FLAT BED: A LITTLE
MOVING TO AND FROM BED TO CHAIR: A LITTLE
SUGGESTED CMS G CODE MODIFIER DAILY ACTIVITY: CH
DAILY ACTIVITIY SCORE: 22
CLIMB 3 TO 5 STEPS WITH RAILING: A LITTLE
DAILY ACTIVITIY SCORE: 24

## 2022-02-20 ASSESSMENT — ENCOUNTER SYMPTOMS
SHORTNESS OF BREATH: 0
HEADACHES: 0
ABDOMINAL PAIN: 1
VOMITING: 0
COUGH: 0
NAUSEA: 1
FLANK PAIN: 1
CHILLS: 0
NAUSEA: 0
FEVER: 0
VOMITING: 1

## 2022-02-20 ASSESSMENT — LIFESTYLE VARIABLES
HOW MANY TIMES IN THE PAST YEAR HAVE YOU HAD 5 OR MORE DRINKS IN A DAY: 0
CONSUMPTION TOTAL: NEGATIVE
ON A TYPICAL DAY WHEN YOU DRINK ALCOHOL HOW MANY DRINKS DO YOU HAVE: 0
TOTAL SCORE: 0
TOTAL SCORE: 0
EVER FELT BAD OR GUILTY ABOUT YOUR DRINKING: NO
CONSUMPTION TOTAL: INCOMPLETE
TOTAL SCORE: 0
HAVE YOU EVER FELT YOU SHOULD CUT DOWN ON YOUR DRINKING: NO
HAVE YOU EVER FELT YOU SHOULD CUT DOWN ON YOUR DRINKING: NO
DOES PATIENT WANT TO STOP DRINKING: NO
DOES PATIENT WANT TO STOP DRINKING: NO
TOTAL SCORE: 0
HAVE YOU EVER FELT YOU SHOULD CUT DOWN ON YOUR DRINKING: NO
EVER HAD A DRINK FIRST THING IN THE MORNING TO STEADY YOUR NERVES TO GET RID OF A HANGOVER: NO
CONSUMPTION TOTAL: INCOMPLETE
ALCOHOL_USE: YES
EVER FELT BAD OR GUILTY ABOUT YOUR DRINKING: NO
TOTAL SCORE: 0
EVER HAD A DRINK FIRST THING IN THE MORNING TO STEADY YOUR NERVES TO GET RID OF A HANGOVER: NO
AVERAGE NUMBER OF DAYS PER WEEK YOU HAVE A DRINK CONTAINING ALCOHOL: 0
TOTAL SCORE: 0
TOTAL SCORE: 0
EVER FELT BAD OR GUILTY ABOUT YOUR DRINKING: NO
EVER HAD A DRINK FIRST THING IN THE MORNING TO STEADY YOUR NERVES TO GET RID OF A HANGOVER: NO
TOTAL SCORE: 0
HAVE PEOPLE ANNOYED YOU BY CRITICIZING YOUR DRINKING: NO
TOTAL SCORE: 0
ALCOHOL_USE: NO
ALCOHOL_USE: NO

## 2022-02-20 ASSESSMENT — PAIN SCALES - WONG BAKER: WONGBAKER_NUMERICALRESPONSE: HURTS JUST A LITTLE BIT

## 2022-02-20 ASSESSMENT — PATIENT HEALTH QUESTIONNAIRE - PHQ9
2. FEELING DOWN, DEPRESSED, IRRITABLE, OR HOPELESS: NOT AT ALL
SUM OF ALL RESPONSES TO PHQ9 QUESTIONS 1 AND 2: 0
1. LITTLE INTEREST OR PLEASURE IN DOING THINGS: NOT AT ALL

## 2022-02-20 ASSESSMENT — FIBROSIS 4 INDEX
FIB4 SCORE: 0.5
FIB4 SCORE: 0.5

## 2022-02-20 NOTE — ED NOTES
"Pt now laying quietly in gurney. Pt still c/o left pelvic pain but stated \"I feel the medication going in\". Pt currently denies nausea with zofran IV given. Pt is in no apparent distress with non-labored breathing and stable VS. Pt given warm blanket for comfort. Will continue to monitor pt.  "

## 2022-02-20 NOTE — ANESTHESIA PREPROCEDURE EVALUATION
Case: [id number] Date/Time: 02/20/22 1300    Procedures:       CYSTOSCOPY, WITH URETERAL STENT INSERTION (Left )      LITHOTRIPSY, USING LASER (Left )      URETEROSCOPY (Left )    Location: [facility] SURGERY [facility]    Surgeons: Rei Silver M.D.        seizure history  Marijuana smoke  Relevant Problems      (positive) ARAM (acute kidney injury) (HCC)   (positive) Cystinuria (HCC)   (positive) Hydronephrosis       Physical Exam    Airway   Mallampati: III  TM distance: >3 FB  Neck ROM: full       Cardiovascular - normal exam  Rhythm: regular  Rate: normal  (-) murmur     Dental - normal exam        Facial Hair   Pulmonary - normal exam  Breath sounds clear to auscultation     Abdominal    Neurological - normal exam                 Anesthesia Plan    ASA 2       Plan - general       Airway plan will be ETT          Induction: intravenous    Postoperative Plan: Postoperative administration of opioids is intended.    Pertinent diagnostic labs and testing reviewed    Informed Consent:    Anesthetic plan and risks discussed with patient.    Use of blood products discussed with: patient whom consented to blood products.

## 2022-02-20 NOTE — ANESTHESIA PROCEDURE NOTES
Airway    Date/Time: 2/20/2022 1:02 PM  Performed by: Mraquis Kennedy M.D.  Authorized by: Marquis Kennedy M.D.     Location:  OR  Urgency:  Elective  Indications for Airway Management:  Anesthesia      Spontaneous Ventilation: absent    Sedation Level:  Deep  Preoxygenated: Yes    Patient Position:  Sniffing  Final Airway Type:  Endotracheal airway  Final Endotracheal Airway:  ETT  Cuffed: Yes    Technique Used for Successful ETT Placement:  Video laryngoscopy    Insertion Site:  Oral  Blade Type:  Glide  Laryngoscope Blade/Videolaryngoscope Blade Size:  3  ETT Size (mm):  7.5  Measured from:  Teeth  ETT to Teeth (cm):  22  Placement Verified by: auscultation and capnometry    Cormack-Lehane Classification:  Grade I - full view of glottis  Number of Attempts at Approach:  1

## 2022-02-20 NOTE — CARE PLAN
The patient is Stable - Low risk of patient condition declining or worsening     Clinical Goal: To adequately manage his pain.    Progress made toward(s) clinical / shift goals:  Pain decreased with PRN Morphine and Tylenol. Pt reports pain level 3/10 at this time.    Patient is not progressing towards the following goals:

## 2022-02-20 NOTE — PROGRESS NOTES
Bedside report received at change of shift. Pt is A&Ox4, pt reported sever flank pain, medicated per MAR. Thirty minutes later his pain was still severe, pt found on the floor. Pt was asked several times if he fell or if he intended to sit. Pt stated he intentionally lowered himself to try and find a comfortable spot and ease his pain. Toradol was added to his MAR and given per order. Pt is now sleeping soundly with steady breaths. Safety precautions in place. All pt needs met at this time.

## 2022-02-20 NOTE — ED PROVIDER NOTES
ED Provider Note    CHIEF COMPLAINT(1/4)  No chief complaint on file.      HPI  Russell Bunn is a 31 y.o. male who returns after being discharged from the ER last night for persistent left flank pain. History tonight is limited by patient's extreme distress and labored breathing. He claims he went back to the homeless shelter last night, and has been hydrating with water, but is not urinating much. He was unable to afford the pain medications that were prescribed. Per chart review, patient has a longstanding history of recurrent kidney stones. Patient on his belly during exam, writhing around in pain. He does endorse some nausea.     REVIEW OF SYSTEMS(1/10)  Pertinent positives include: Left flank pain, oliguria, nausea  Pertinent negatives include: Fever, chills, hematuria, dysuria.   All other systems are negative.     PAST MEDICAL HISTORY(PFS1,2)  Past Medical History:   Diagnosis Date   • Kidney stones    • Seizure disorder (HCC)        FAMILY HISTORY  No family history on file.    SOCIAL HISTORY  Social History     Tobacco Use   • Smoking status: Current Some Day Smoker     Packs/day: 0.50     Types: Cigarettes   • Smokeless tobacco: Never Used   Substance Use Topics   • Alcohol use: Yes     Comment: Occasional   • Drug use: Yes     Types: Inhaled     Comment: Marijuana, every day     Social History     Substance and Sexual Activity   Drug Use Yes   • Types: Inhaled    Comment: Marijuana, every day       SURGICAL HISTORY  Past Surgical History:   Procedure Laterality Date   • PB CYSTOSCOPY,INSERT URETERAL STENT Left 4/26/2021    Procedure: CYSTOSCOPY, WITH OUT URETERAL STENT INSERTION;  Surgeon: Raf Moss M.D.;  Location: SURGERY [facility];  Service: Urology   • PB CYSTO/URETERO/PYELOSCOPY, DX Left 4/26/2021    Procedure: URETEROSCOPY;  Surgeon: Raf Moss M.D.;  Location: [facility];  Service: Urology   • PB CYSTO/URETERO/PYELOSCOPY, DX Right 3/13/2020    Procedure:  "URETEROSCOPY;  Surgeon: Chase Damon M.D.;  Location: SURGERY [facility];  Service: Urology   • PB CYSTOSCOPY,INSERT URETERAL STENT Right 3/13/2020    Procedure: CYSTOSCOPY;  Surgeon: Chase Damon M.D.;  Location: SURGERY [facility];  Service: Urology   • LASERTRIPSY Right 3/13/2020    Procedure: LITHOTRIPSY, USING LASER;  Surgeon: Chase Damon M.D.;  Location: SURGERY [facility];  Service: Urology   • LITHOTRIPSY         CURRENT MEDICATIONS  Home Medications    **Home medications have not yet been reviewed for this encounter**         ALLERGIES  Allergies   Allergen Reactions   • Ancef [Cefazolin] Rash     rash   • Percocet [Apap-Fd&C Red #40 [place]-Oxycodone]      Tolerates acetaminophen   • Shellfish Allergy    • Tape        PHYSICAL EXAM  VITAL SIGNS: /60   Pulse 86   Temp 36.3 °C (97.3 °F) (Temporal)   Resp 12   Ht 1.676 m (5' 6\")   Wt 80.3 kg (177 lb)   SpO2 98%   BMI 28.57 kg/m²     Constitutional: Thin, lying prone, in acute distress, vomiting  HENT: Normocephalic, atraumatic, bilateral external ears normal, oropharynx moist, No exudates or erythema.   Eyes: PERRLA, conjunctiva pink, no scleral icterus.   Cardiovascular: Tachycardic, regular rhythm. No murmurs  Respiratory: Labored breathing, clear and equal bilaterally  Gastrointestinal: Limited due to position of patient.  Skin: No erythema, no rash.   Genitourinary:  CVA tenderness of left side  Neurologic: Alert & oriented x 3, cranial nerves 2-12 intact by passive exam.  No focal deficit noted.     DIFFERENTIAL DIAGNOSIS:  Ureterolithiasis, patient confirmed to have 5mm calculus in left distal ureter last night, with no evidence of obstruction, but mild left hydronephrosis.    PMHx of known recurrent cysteine stones admitted 4/2021 for 3mm distal L ureteral stone     RADIOLOGY/PROCEDURES  No orders to display       LABORATORY: Reviewed as below.  Results for orders placed or performed during the hospital encounter of " 02/19/22   CBC WITH DIFFERENTIAL   Result Value Ref Range    WBC 14.2 (H) 4.8 - 10.8 K/uL    RBC 4.42 (L) 4.70 - 6.10 M/uL    Hemoglobin 14.5 14.0 - 18.0 g/dL    Hematocrit 44.5 42.0 - 52.0 %    .7 (H) 81.4 - 97.8 fL    MCH 32.8 27.0 - 33.0 pg    MCHC 32.6 (L) 33.7 - 35.3 g/dL    RDW 50.0 35.9 - 50.0 fL    Platelet Count 261 164 - 446 K/uL    MPV 9.6 9.0 - 12.9 fL    Neutrophils-Polys 53.50 44.00 - 72.00 %    Lymphocytes 32.90 22.00 - 41.00 %    Monocytes 6.80 0.00 - 13.40 %    Eosinophils 6.00 0.00 - 6.90 %    Basophils 0.50 0.00 - 1.80 %    Immature Granulocytes 0.30 0.00 - 0.90 %    Nucleated RBC 0.00 /100 WBC    Neutrophils (Absolute) 7.57 (H) 1.82 - 7.42 K/uL    Lymphs (Absolute) 4.66 1.00 - 4.80 K/uL    Monos (Absolute) 0.96 (H) 0.00 - 0.85 K/uL    Eos (Absolute) 0.85 (H) 0.00 - 0.51 K/uL    Baso (Absolute) 0.07 0.00 - 0.12 K/uL    Immature Granulocytes (abs) 0.04 0.00 - 0.11 K/uL    NRBC (Absolute) 0.00 K/uL   Basic Metabolic Panel   Result Value Ref Range    Sodium 140 135 - 145 mmol/L    Potassium 3.9 3.6 - 5.5 mmol/L    Chloride 103 96 - 112 mmol/L    Co2 25 20 - 33 mmol/L    Glucose 93 65 - 99 mg/dL    Bun 19 8 - 22 mg/dL    Creatinine 1.32 0.50 - 1.40 mg/dL    Calcium 9.2 8.5 - 10.5 mg/dL    Anion Gap 12.0 7.0 - 16.0   URINALYSIS    Specimen: Urine   Result Value Ref Range    Color Yellow     Character Clear     Specific Gravity 1.013 <1.035    Ph 8.0 5.0 - 8.0    Glucose Negative Negative mg/dL    Ketones Negative Negative mg/dL    Protein Negative Negative mg/dL    Bilirubin Negative Negative    Urobilinogen, Urine 0.2 Negative    Nitrite Negative Negative    Leukocyte Esterase Negative Negative    Occult Blood Small (A) Negative    Micro Urine Req Microscopic    ESTIMATED GFR   Result Value Ref Range    GFR If African American >60 >60 mL/min/1.73 m 2    GFR If Non African American >60 >60 mL/min/1.73 m 2   URINE MICROSCOPIC (W/UA)   Result Value Ref Range    WBC 2-5 (A) /hpf    RBC 5-10 (A)  /hpf    Bacteria Negative None /hpf    Epithelial Cells Negative /hpf    Hyaline Cast 0-2 /lpf       INTERVENTIONS:  Medications   senna-docusate (PERICOLACE or SENOKOT S) 8.6-50 MG per tablet 2 Tablet (has no administration in time range)     And   polyethylene glycol/lytes (MIRALAX) PACKET 1 Packet (has no administration in time range)     And   magnesium hydroxide (MILK OF MAGNESIA) suspension 30 mL (has no administration in time range)     And   bisacodyl (DULCOLAX) suppository 10 mg (has no administration in time range)   heparin injection 5,000 Units (has no administration in time range)   acetaminophen (Tylenol) tablet 650 mg (has no administration in time range)   ondansetron (ZOFRAN) syringe/vial injection 4 mg (has no administration in time range)   ondansetron (ZOFRAN ODT) dispertab 4 mg (has no administration in time range)   promethazine (PHENERGAN) tablet 12.5-25 mg (has no administration in time range)   promethazine (PHENERGAN) suppository 12.5-25 mg (has no administration in time range)   prochlorperazine (COMPAZINE) injection 5-10 mg (has no administration in time range)   morphine 4 MG/ML injection 1 mg (1 mg Intravenous Given 2/19/22 2246)   ketorolac (TORADOL) injection 30 mg (30 mg Intravenous Given 2/19/22 2011)   morphine 4 MG/ML injection 4 mg (4 mg Intravenous Given 2/19/22 2010)   ondansetron (ZOFRAN) syringe/vial injection 8 mg (8 mg Intravenous Given 2/19/22 2013)     COURSE & MEDICAL DECISION MAKING  Discussed with Dr. Page.    7:45PM - Patient examined at bedside. In extreme distress from pain. Emesis x1. Ordered toradol 30mg, morphine, zofran, repeat UA, CBC, BMP.     9:01PM - Labs significant for worsening Cr from yesterday, still within normal range, leukocytosis to 14.     Review nursing notes and vital signs a final time 7:53 PM    FINAL IMPRESSION  1. Intractable pain    2. Nephrolithiasis    3. ARAM (acute kidney injury) (HCC)          Electronically signed by: Keara NAPIER  MILENA Solis., 2/19/2022 7:53 PM    I independently evaluated the patient and repeated the important components of the history and physical.  I discussed the management with the resident. I have reviewed and agree with the pertinent clinical information above including history, exam, study findings, and recommendations.     Encounter Summary: This is a 31 y.o. male with history of none for 24 hours after being discharged from this department with refractory pain secondary to nephrolithiasis.  Patient was diagnosed with a 5 mm UVJ stone with mildly elevated creatinine but today is returning due to refractory pain and inability to access pain medication as an outpatient.  Patient was actively vomiting in the emergency department and was given IV fluids, Zofran, morphine.  Patient has been consulted to Dr. Silver of urology who reports that he will see the patient once he is admitted, hospital medicine has admitted the patient for pain control, Dr. Boyd.  Disposition the hospital medicine floor.      1. Intractable pain    2. Nephrolithiasis    3. ARAM (acute kidney injury) ([facility])          Electronically signed by: Emile Page M.D., 2/19/2022 10:48 PM
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
## 2022-02-20 NOTE — ED NOTES
Med Rec complete per Pt at bedside.  Allergies reviewed.    Home Pharmacy:  [facility] [address]

## 2022-02-20 NOTE — ED NOTES
Pt c/o left pelvic pain at 8/10 at this time; pt medicated as per MAR. Will continue to monitor pt.

## 2022-02-20 NOTE — ASSESSMENT & PLAN NOTE
Patient reports history of cystinuria with recurrent kidney stones s/p multiple lithotripsies and surgeries.
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
## 2022-02-20 NOTE — CARE PLAN
The patient is Stable - Low risk of patient condition declining or worsening    Shift Goals  Clinical Goals: pt will have lithotripsy today    Progress made toward(s) clinical / shift goals:  pt had lithotripsy today and had stent placed       Problem: Pain - Standard  Goal: Alleviation of pain or a reduction in pain to the patient’s comfort goal  Outcome: Progressing     Problem: Knowledge Deficit - Standard  Goal: Patient and family/care givers will demonstrate understanding of plan of care, disease process/condition, diagnostic tests and medications  Outcome: Progressing     Problem: Seizure Precautions  Goal: Implementation of seizure precautions  Outcome: Progressing     Problem: Physical Regulation  Goal: Decrease in complications related to the disease process, condition or treatment  Outcome: Progressing     Problem: Medication  Goal: Risk for seizure medication side effects will decrease  Outcome: Progressing       Patient is not progressing towards the following goals:

## 2022-02-20 NOTE — CARE PLAN
The patient is Watcher - Medium risk of patient condition declining or worsening         Progress made toward(s) clinical / shift goals: Pain adequately managed with PRN Morphine IVP.    Patient is not progressing towards the following goals:

## 2022-02-20 NOTE — ED NOTES
Rounded on patient. Patient sleeping in [facility]. No signs of acute distress noted. Breathing is non-labored with equal chest rise and fall. VS stable. No further needs at this time. Call light within reach. Will continue to monitor pt.

## 2022-02-20 NOTE — ANESTHESIA POSTPROCEDURE EVALUATION
Patient: Russell Bunn    Procedure Summary     Date: 02/20/22 Room / Location: [place] [id number] / SURGERY [facility]    Anesthesia Start: 1252 Anesthesia Stop: 1351    Procedures:       CYSTOSCOPY, WITH URETERAL STENT INSERTION (Left Ureter)      LITHOTRIPSY, USING LASER (Left Ureter)      URETEROSCOPY (Left Ureter) Diagnosis: (Left Ureteral Calculus)    Surgeons: Rei Silver M.D. Responsible Provider: Marquis Kennedy M.D.    Anesthesia Type: general ASA Status: 2          Final Anesthesia Type: general  Last vitals  BP   Blood Pressure: 142/92    Temp   36.6 °C (97.9 °F)    Pulse   82   Resp   13    SpO2   93 %      Anesthesia Post Evaluation    Patient location during evaluation: PACU  Patient participation: complete - patient participated  Level of consciousness: awake and alert    Airway patency: patent  Anesthetic complications: no  Cardiovascular status: hemodynamically stable  Respiratory status: acceptable  Hydration status: euvolemic    PONV: none          No complications documented.     Nurse Pain Score: 10 (NPRS)

## 2022-02-20 NOTE — ASSESSMENT & PLAN NOTE
Patient presented to the ER last night with left flank pain.  Now he presents with intractable pain.  CT renal showing 5 mm calculus in the left distal ureter with mild left hydronephrosis.  Has history of cystinuria and multiple lithotripsy and nephrostomy tube in the past.    Pain control  Urology consulted and performed lithotripsy 2/20.  UA negative  Pain management with scheduled acetaminophen, toradol, prn oxycodone.

## 2022-02-20 NOTE — ED NOTES
Pt laying in gurney c/o severe left pelvic area pain. Pt stated he was seen the ER yesterday for the same complaint and was diagnosed with a 5 mm left kidney stone. Pt unable to lay still in gurney; appears very restless d/t pain. However, VS stable with non-labored breathing; noted equal rise and fall of chest wall. Will continue to monitor pt.

## 2022-02-20 NOTE — OP REPORT
Urologic Surgery Operative Report     Pre-op Diagnosis: Left ureterolithiasis   Post-op Diagnosis: Same as above   Procedure:  Left ureteroscopy, ureteral stone basketing, holmium laser lithotripsy of renal calculus, left ureteral stent placement.   Surgeon: Surgeon(s) and Role:     * Rei Silver M.D. - Primary   Assistant: Circulator: Genna Morel R.N.  Laser Staff: Jewels Aden Padsing  Relief Circulator: Kristen Bennett R.N.  Scrub Person: Jemima Victoria; Yvrose Bland   Anesthesia: General,   Anesthesiologist: Marquis Kennedy M.D.   Estimated Blood Loss: * No blood loss amount entered *   IV fluids <1L Crystalloid    Specimens: 1. Stone for chemical analysis    Complications: None   Condition: Stable, procedure well tolerated    Drains: 1. [id number], JJ stent((on strings) )  2. No powell   Disposition:  1. PACU, discharge after voiding  2. f/u 1wk for stent removal   Findings 1. 0.3 cm stone at Left UVJ  2. 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  After a full discussion of alternatives, risks, and benefits the patient consented to proceeding with Ureteroscopy, laser lithotripsy and possible JJ stent placement on the respective side.  He understands the risk of inability to access ureter, the need for second procedures, the possibility of negative ureteroscopy, that he may have stent discomfort until this is removed, bleeding, infection, ureteral injury or stricture, bladder injury, post op urinary retention requiring powell catheter, and the general pulmonary and cardiovascular risks associated with anesthesia.     Procedure Details:   The patient was taken to operating room and placed on table in supine position.  Cipro was administered prior to the start of the procedure based on previous urine cultures. Sequential compression devices were placed  for deep venous thrombosis prophylaxis. After induction of general anesthesia, both legs were placed in Sam stirrups in the standard lithotomy position.  A timeout was held confirming the correct patient, procedure and laterality.   The perineal area was prepped and draped in a sterile fashion. A rigid cystoscope was inserted into the urethra and the bladder was emptied and then distended with sterile saline. Urethral sounds were not needed to dilate the urethral meatus. Cystoscopy revealed normal bladder mucosa, orthotopic ureteral orifices, and no other abnormalities.    We passed a wire through the ureteral orifice of the respective side into the renal pelvis which was visualized under fluoroscopic vision. Dual-lumen was advanced and a second wire placed.    The wire was moved to the side and a semirigid ureteroscope was placed into the urethra and passed up the left ureter. We did not need a ureteral dilator to pass the scope into the ureter.  Stone was easily identified, grasped with a 0 tip nitinol basket and removed without difficulty following dilation with a dual-lumen catheter.  Proximal hydroureter was noted and the wire was replaced.    We then inserted the flexible ureteroscope over the wire up to level the renal pelvis and pan pyeloscopy revealed the previously noted stone in the left lower pole.  Stone was grasped with a 0 tip nitinol basket and withdrawn to the mid ureter easily, due to the hydroureter, and fractured into small fragments with a 200 µm holmium laser fiber.  All the fragments were evacuated and on final pass no residual stones were noted in ureter.    Returnign to rigid cystoscope, we then placed a left-sided JJ stent, [id number], JJ stent (on strings)  under fluoroscopy. We then saw that the JJ stent was in appropriate position, with a good curl visualized in the renal pelvis fluoroscopically and a good curl in the bladder endoscopically.  Stone sent for analysis.  The cystoscope was  removed after emptying the bladder.  The patient was awoken from anesthesia and brought to recovery in satisfactory condition.  Plan for discharge home when stable per primary, will schedule follow-up in 1 week for stent removal and in 6 to 8 weeks with renal ultrasound prior.       Rei Silver M.D.   [address] [facility] [place] [address]   [phone]

## 2022-02-20 NOTE — ED NOTES
Per Hospitalist MD, pt will be NPO after midnight. Pt given food box as per request. Pt tolerating PO without any difficulty; denies NV at this time. Will continue to monitor pt.

## 2022-02-20 NOTE — ED NOTES
Triage tech called this RN to . Pt was bent over in chair, not responding to voice.  Charge notified, Pt straight back to RED 8.  Report given to TATE Miller.   Pt reports hx of seizures, not taking meds. Family left before questions could be answered.  Pt able to answer questions in room, actively vomiting.
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
## 2022-02-20 NOTE — ANESTHESIA TIME REPORT
Anesthesia Start and Stop Event Times     Date Time Event    2/20/2022 1238 Ready for Procedure     1252 Anesthesia Start     1351 Anesthesia Stop        Responsible Staff  02/20/22    Name Role Begin End    Marquis Kennedy M.D. Anesth 1252 1351        Preop Diagnosis (Free Text):  Pre-op Diagnosis     Left Ureteral Calculus        Preop Diagnosis (Codes):    Premium Reason  E. Weekend    Comments:

## 2022-02-20 NOTE — OR NURSING
1348: receive to PACU via bed with oral airway. Respirations spontaneous and unlabored. Stent with string taped with steri strips to penis.    1357: patient awaken. Oral airway dc'd without problem or difficulty.    1415: sips of water given. Tolerated well. Denies nausea. C/o slight discomfort at the penis area.    1440: voiding. Pink colored urine output. C/o penile pain. Medicated. See MAR.    1445: c/o left flank and penile pain. Medicated. See MAR    1456: report called to Leslie YBARRA.    1530: sleeping. No signs of any distress noted.    1558: transported via bed to [place] [address] by transport.  Patient on O2 at 1L / nc. Stable. Patient wearing face mask.

## 2022-02-20 NOTE — CONSULTS
[facility] [place] Consult/H&P Note    Patient's Name/MRN: Russell Bunn, [id number]   Room #: S626/01    Admit Date:2/19/2022  Today's Date: 2/20/2022   Length of stay:  LOS: 1 day      Reason for consult/chief complaint: L flank pain  ID/HPI: Russell Bunn is a 31 y.o. male patient who p/w severe 10/10 flank pain on the left side. He has had +N, +V, -F, -C.  He reports a hx of cystinuria, for which he is seen by urologist at [facility], and has had multiple procedures in the past.  In ER, WBC was 14.2, UA was not c/w infection, and Cr was 1.32. He had previously presented to the ED on 2/18/22 at which time CT was done showing 5mm stone in the distal left ureter with mild left hydronephrosis.      Past Medical History:   Past Medical History:   Diagnosis Date   • Kidney stones    • Seizure disorder (HCC)         Past Surgical History:   Past Surgical History:   Procedure Laterality Date   • NH CYSTOSCOPY,INSERT URETERAL STENT Left 4/26/2021    Procedure: CYSTOSCOPY, WITH OUT URETERAL STENT INSERTION;  Surgeon: Raf Moss M.D.;  Location: [facility];  Service: Urology   • NH CYSTO/URETERO/PYELOSCOPY, DX Left 4/26/2021    Procedure: URETEROSCOPY;  Surgeon: Raf Moss M.D.;  Location: [facility];  Service: Urology   • NH CYSTO/URETERO/PYELOSCOPY, DX Right 3/13/2020    Procedure: URETEROSCOPY;  Surgeon: Chase Damon M.D.;  Location: [facility];  Service: Urology   • NH CYSTOSCOPY,INSERT URETERAL STENT Right 3/13/2020    Procedure: CYSTOSCOPY;  Surgeon: Chase Damon M.D.;  Location: [facility];  Service: Urology   • LASERTRIPSY Right 3/13/2020    Procedure: LITHOTRIPSY, USING LASER;  Surgeon: Chase Damon M.D.;  Location: [facility];  Service: Urology   • LITHOTRIPSY          Family History:   No family history on file.      Social History:   Social History     Tobacco Use   • Smoking status: Current Some Day Smoker     Packs/day: 0.50  "    Types: Cigarettes   • Smokeless tobacco: Never Used   Substance Use Topics   • Alcohol use: Yes     Comment: Occasional   • Drug use: Yes     Types: Inhaled     Comment: Marijuana, every day      Social History     Social History Narrative   • Not on file        Allergies: he Ancef [cefazolin], Percocet [apap-fd&c red #40 [place]-oxycodone], Shellfish allergy, and Tape    Medications:   Medications Prior to Admission   Medication Sig Dispense Refill Last Dose   • tamsulosin (FLOMAX) 0.4 MG capsule Take 1 Capsule by mouth every day. 30 Capsule 0 NOT PICKED UP at N/A   • ketorolac (TORADOL) 10 MG Tab Take 1 Tablet by mouth every four hours as needed for up to 5 days. 20 Tablet 0 NOT PICKED UP at N/A         Review of Systems  Review of Systems   Constitutional: Negative for chills and fever.   Respiratory: Negative for cough and shortness of breath.    Cardiovascular: Negative for chest pain and leg swelling.   Gastrointestinal: Positive for abdominal pain, nausea and vomiting.   Genitourinary: Positive for flank pain and hematuria. Negative for dysuria.   Neurological: Negative for headaches.   All other systems reviewed and are negative.       Physical Exam  VITAL SIGNS: /70   Pulse 66   Temp 36.9 °C (98.4 °F) (Temporal)   Resp 19   Ht 1.676 m (5' 6\")   Wt 61.6 kg (135 lb 12.9 oz)   SpO2 98%   BMI 21.92 kg/m²   GENERAL:  alert, in no acute distress  EYES: EOMI and normal accomodation  Neck: Supple  BACK: Left CVA tenderness  CHEST AND LUNGS: good air entry, no audible wheezes  CARDIOVASCULAR: Rate is regular, no peripheral edema.   ABDOMEN: Abdomen soft, nondistended  EXTREMITIES: Warm and well perfused without c/c/e  NEURO: No focal deficits  SKIN: Skin color, texture, turgor normal. No rashes, lesions, nor jaundice.      Labs:  Recent Labs     02/18/22  1703 02/19/22  1937   WBC 10.3 14.2*   RBC 4.03* 4.42*   HEMOGLOBIN 13.5* 14.5   HEMATOCRIT 40.3* 44.5   .0* 100.7*   MCH 33.5* 32.8   MCHC " 33.5* 32.6*   RDW 49.4 50.0   PLATELETCT 190 261   MPV 10.1 9.6     Recent Labs     02/18/22  1915 02/19/22  1937   SODIUM 138 140   POTASSIUM 4.3 3.9   CHLORIDE 105 103   CO2 24 25   GLUCOSE 95 93   BUN 15 19   CREATININE 1.23 1.32   CALCIUM 8.8 9.2         Glucose:  Lab Results   Component Value Date/Time    GLUCOSE 93 02/19/2022 07:37 PM    GLUCOSE 95 02/18/2022 07:15 PM    GLUCOSE 109 (H) 04/27/2021 08:25 AM    GLUCOSE 119 (H) 04/26/2021 09:40 AM     Coags:  No results found for: INR      Urinalysis:   Lab Results   Component Value Date/Time    COLORURINE Yellow 02/19/2022 07:43 PM    CLARITY Clear 02/19/2022 07:43 PM    SPECGRAVITY 1.013 02/19/2022 07:43 PM    PHURINE 8.0 02/19/2022 07:43 PM    GLUCOSEUR Negative 02/19/2022 07:43 PM    KETONES Negative 02/19/2022 07:43 PM    NITRITE Negative 02/19/2022 07:43 PM    OCCULTBLOOD Small (A) 02/19/2022 07:43 PM    RBCURINE 5-10 (A) 02/19/2022 07:43 PM    BACTERIA Negative 02/19/2022 07:43 PM    EPITHELCELL Negative 02/19/2022 07:43 PM       Imaging:  CT-RENAL COLIC EVALUATION(A/P W/O)  Narrative: 2/18/2022 6:35 PM    HISTORY/REASON FOR EXAM:  Flank pain, kidney stone suspected; FLANK PAIN  Left flank pain.    TECHNIQUE/EXAM DESCRIPTION AND NUMBER OF VIEWS:  CT scan renal/colic without contrast.    5 mm helical images of the abdomen and pelvis were obtained from the diaphragmatic domes through the pubic symphysis.    Low dose optimization technique was utilized for this CT exam including automated exposure control and adjustment of the mA and/or kV according to patient size.    COMPARISON: 4/26/2021, 1/16/2021, 3/13/2020.    FINDINGS:    Renal stone: Bilateral renal calculi are seen. Small left hydronephrosis.    Unchanged 3 mm calcification in the vicinity of the distal left ureter since 2020, likely phleboliths rather than calculus. New 5 mm calculus in the left distal ureter as well.    Unchanged right pelvic phleboliths.    Lung Bases:    No pulmonary nodules at  the lung bases. No pleural or pericardial fluid.    Abdomen:    Within the limits of noncontrast technique, the liver, spleen, pancreas, and adrenal glands are unremarkable in appearance.    The gallbladder is unremarkable    There is no biliary dilatation. There is no bowel wall thickening or abnormal dilatation.    The abdominal aorta is normal in caliber.    Pelvis:    Decompressed urinary bladder. Unremarkable prostate. The    No lymph node enlargement.    No free fluid, or free air in the abdomen or pelvis.    No aggressive bone lesions are seen.    _____________________________________  Impression: The bilateral distal ureter are very difficult to visualize due to lack of pelvic fat.    1. Likely a new 5 mm calculus in the left distal ureter with mild left hydronephrosis.    2. Nonobstructive bilateral renal calculi.                                    Assessment/Recommendation   31 y.o. M with distal 5mm left ureteral stone. Discussed treatment option of CULTS procedure. He understands the risk of inability to access ureter, the need for second procedures, the possibility of negative ureteroscopy, that he may have stent discomfort until this is removed, bleeding, infection, ureteral injury or stricture, bladder injury, post op urinary retention requiring powell catheter, and the general pulmonary and cardiovascular risks associated with anesthesia.  After a full discussion of the alternatives risks and benefits of the procedure, the patient consented to proceeding with the planned procedure.     · Consent obtained  · Add on for Cystoscopy, left ureteroscopy, Laser lithotripsy and JJ stents (CULTS)  · Patient to remain NPO until procedure      Dr. Silver is aware of consult and has directed this patient's plan of care.       Geeta Schneider P.A.-C.   [address] [place] [address]   [phone]

## 2022-02-20 NOTE — PROGRESS NOTES
Two RN skin check completed with Charge Nurse Kristan. No skin impairments found anywhere on body.    MAYLIN Cortes RN

## 2022-02-21 ENCOUNTER — APPOINTMENT (OUTPATIENT)
Dept: RADIOLOGY | Facility: MEDICAL CENTER | Age: 32
End: 2022-02-21
Attending: EMERGENCY MEDICINE
Payer: COMMERCIAL

## 2022-02-21 ENCOUNTER — HOSPITAL ENCOUNTER (EMERGENCY)
Facility: MEDICAL CENTER | Age: 32
End: 2022-02-21
Attending: EMERGENCY MEDICINE
Payer: COMMERCIAL

## 2022-02-21 VITALS
HEART RATE: 74 BPM | BODY MASS INDEX: 22.04 KG/M2 | HEIGHT: 66 IN | WEIGHT: 137.13 LBS | DIASTOLIC BLOOD PRESSURE: 81 MMHG | SYSTOLIC BLOOD PRESSURE: 137 MMHG | OXYGEN SATURATION: 95 % | RESPIRATION RATE: 14 BRPM | TEMPERATURE: 98.4 F

## 2022-02-21 VITALS
WEIGHT: 135.8 LBS | BODY MASS INDEX: 21.83 KG/M2 | OXYGEN SATURATION: 99 % | HEART RATE: 98 BPM | HEIGHT: 66 IN | DIASTOLIC BLOOD PRESSURE: 70 MMHG | TEMPERATURE: 98.5 F | SYSTOLIC BLOOD PRESSURE: 117 MMHG | RESPIRATION RATE: 16 BRPM

## 2022-02-21 DIAGNOSIS — R10.9 FLANK PAIN: ICD-10-CM

## 2022-02-21 LAB
ANION GAP SERPL CALC-SCNC: 11 MMOL/L (ref 7–16)
BACTERIA UR CULT: NORMAL
BASOPHILS # BLD AUTO: 0.2 % (ref 0–1.8)
BASOPHILS # BLD: 0.02 K/UL (ref 0–0.12)
BUN SERPL-MCNC: 16 MG/DL (ref 8–22)
CALCIUM SERPL-MCNC: 9 MG/DL (ref 8.5–10.5)
CHLORIDE SERPL-SCNC: 101 MMOL/L (ref 96–112)
CO2 SERPL-SCNC: 24 MMOL/L (ref 20–33)
CREAT SERPL-MCNC: 1.43 MG/DL (ref 0.5–1.4)
EOSINOPHIL # BLD AUTO: 0 K/UL (ref 0–0.51)
EOSINOPHIL NFR BLD: 0 % (ref 0–6.9)
ERYTHROCYTE [DISTWIDTH] IN BLOOD BY AUTOMATED COUNT: 49.1 FL (ref 35.9–50)
GLUCOSE SERPL-MCNC: 148 MG/DL (ref 65–99)
HCT VFR BLD AUTO: 43.7 % (ref 42–52)
HGB BLD-MCNC: 14.4 G/DL (ref 14–18)
IMM GRANULOCYTES # BLD AUTO: 0.03 K/UL (ref 0–0.11)
IMM GRANULOCYTES NFR BLD AUTO: 0.3 % (ref 0–0.9)
LYMPHOCYTES # BLD AUTO: 1.52 K/UL (ref 1–4.8)
LYMPHOCYTES NFR BLD: 14.1 % (ref 22–41)
MCH RBC QN AUTO: 32.9 PG (ref 27–33)
MCHC RBC AUTO-ENTMCNC: 33 G/DL (ref 33.7–35.3)
MCV RBC AUTO: 99.8 FL (ref 81.4–97.8)
MONOCYTES # BLD AUTO: 0.37 K/UL (ref 0–0.85)
MONOCYTES NFR BLD AUTO: 3.4 % (ref 0–13.4)
NEUTROPHILS # BLD AUTO: 8.84 K/UL (ref 1.82–7.42)
NEUTROPHILS NFR BLD: 82 % (ref 44–72)
NRBC # BLD AUTO: 0 K/UL
NRBC BLD-RTO: 0 /100 WBC
PATHOLOGY CONSULT NOTE: NORMAL
PLATELET # BLD AUTO: 254 K/UL (ref 164–446)
PMV BLD AUTO: 9.6 FL (ref 9–12.9)
POTASSIUM SERPL-SCNC: 5.3 MMOL/L (ref 3.6–5.5)
RBC # BLD AUTO: 4.38 M/UL (ref 4.7–6.1)
SIGNIFICANT IND 70042: NORMAL
SITE SITE: NORMAL
SODIUM SERPL-SCNC: 136 MMOL/L (ref 135–145)
SOURCE SOURCE: NORMAL
WBC # BLD AUTO: 10.8 K/UL (ref 4.8–10.8)

## 2022-02-21 PROCEDURE — 96374 THER/PROPH/DIAG INJ IV PUSH: CPT

## 2022-02-21 PROCEDURE — 99239 HOSP IP/OBS DSCHRG MGMT >30: CPT | Performed by: INTERNAL MEDICINE

## 2022-02-21 PROCEDURE — 99285 EMERGENCY DEPT VISIT HI MDM: CPT

## 2022-02-21 PROCEDURE — A9270 NON-COVERED ITEM OR SERVICE: HCPCS | Performed by: STUDENT IN AN ORGANIZED HEALTH CARE EDUCATION/TRAINING PROGRAM

## 2022-02-21 PROCEDURE — 700111 HCHG RX REV CODE 636 W/ 250 OVERRIDE (IP): Performed by: INTERNAL MEDICINE

## 2022-02-21 PROCEDURE — 700102 HCHG RX REV CODE 250 W/ 637 OVERRIDE(OP): Performed by: EMERGENCY MEDICINE

## 2022-02-21 PROCEDURE — A9270 NON-COVERED ITEM OR SERVICE: HCPCS | Performed by: EMERGENCY MEDICINE

## 2022-02-21 PROCEDURE — 700111 HCHG RX REV CODE 636 W/ 250 OVERRIDE (IP): Performed by: STUDENT IN AN ORGANIZED HEALTH CARE EDUCATION/TRAINING PROGRAM

## 2022-02-21 PROCEDURE — 85025 COMPLETE CBC W/AUTO DIFF WBC: CPT

## 2022-02-21 PROCEDURE — 700111 HCHG RX REV CODE 636 W/ 250 OVERRIDE (IP): Performed by: EMERGENCY MEDICINE

## 2022-02-21 PROCEDURE — 96375 TX/PRO/DX INJ NEW DRUG ADDON: CPT

## 2022-02-21 PROCEDURE — 74018 RADEX ABDOMEN 1 VIEW: CPT

## 2022-02-21 PROCEDURE — 80048 BASIC METABOLIC PNL TOTAL CA: CPT

## 2022-02-21 PROCEDURE — 700102 HCHG RX REV CODE 250 W/ 637 OVERRIDE(OP): Performed by: STUDENT IN AN ORGANIZED HEALTH CARE EDUCATION/TRAINING PROGRAM

## 2022-02-21 RX ORDER — ACETAMINOPHEN 325 MG/1
650 TABLET ORAL EVERY 6 HOURS PRN
Qty: 100 TABLET | Refills: 0 | Status: SHIPPED | OUTPATIENT
Start: 2022-02-21 | End: 2022-02-24

## 2022-02-21 RX ORDER — KETOROLAC TROMETHAMINE 30 MG/ML
15 INJECTION, SOLUTION INTRAMUSCULAR; INTRAVENOUS ONCE
Status: COMPLETED | OUTPATIENT
Start: 2022-02-21 | End: 2022-02-21

## 2022-02-21 RX ORDER — ONDANSETRON 2 MG/ML
4 INJECTION INTRAMUSCULAR; INTRAVENOUS ONCE
Status: COMPLETED | OUTPATIENT
Start: 2022-02-21 | End: 2022-02-21

## 2022-02-21 RX ORDER — DIVALPROEX SODIUM 500 MG/1
500 TABLET, DELAYED RELEASE ORAL 2 TIMES DAILY
Qty: 60 TABLET | Refills: 0 | Status: SHIPPED | OUTPATIENT
Start: 2022-02-21 | End: 2022-02-24

## 2022-02-21 RX ORDER — HYDROCODONE BITARTRATE AND ACETAMINOPHEN 5; 325 MG/1; MG/1
1 TABLET ORAL ONCE
Status: COMPLETED | OUTPATIENT
Start: 2022-02-21 | End: 2022-02-21

## 2022-02-21 RX ORDER — KETOROLAC TROMETHAMINE 10 MG/1
10 TABLET, FILM COATED ORAL EVERY 4 HOURS PRN
Qty: 20 TABLET | Refills: 0 | Status: SHIPPED | OUTPATIENT
Start: 2022-02-21 | End: 2022-02-26

## 2022-02-21 RX ORDER — TAMSULOSIN HYDROCHLORIDE 0.4 MG/1
0.4 CAPSULE ORAL DAILY
Qty: 30 CAPSULE | Refills: 0 | Status: SHIPPED | OUTPATIENT
Start: 2022-02-22 | End: 2022-02-24

## 2022-02-21 RX ORDER — MORPHINE SULFATE 4 MG/ML
4 INJECTION INTRAVENOUS ONCE
Status: COMPLETED | OUTPATIENT
Start: 2022-02-21 | End: 2022-02-21

## 2022-02-21 RX ADMIN — KETOROLAC TROMETHAMINE 15 MG: 30 INJECTION, SOLUTION INTRAMUSCULAR at 15:45

## 2022-02-21 RX ADMIN — KETOROLAC TROMETHAMINE 30 MG: 30 INJECTION, SOLUTION INTRAMUSCULAR at 11:48

## 2022-02-21 RX ADMIN — HYDROCODONE BITARTRATE AND ACETAMINOPHEN 1 TABLET: 5; 325 TABLET ORAL at 17:36

## 2022-02-21 RX ADMIN — HEPARIN SODIUM 5000 UNITS: 5000 INJECTION, SOLUTION INTRAVENOUS; SUBCUTANEOUS at 05:36

## 2022-02-21 RX ADMIN — TAMSULOSIN HYDROCHLORIDE 0.4 MG: 0.4 CAPSULE ORAL at 05:36

## 2022-02-21 RX ADMIN — DIVALPROEX SODIUM 500 MG: 500 TABLET, DELAYED RELEASE ORAL at 05:36

## 2022-02-21 RX ADMIN — ONDANSETRON 4 MG: 2 INJECTION INTRAMUSCULAR; INTRAVENOUS at 15:46

## 2022-02-21 RX ADMIN — MORPHINE SULFATE 4 MG: 4 INJECTION INTRAVENOUS at 16:39

## 2022-02-21 RX ADMIN — KETOROLAC TROMETHAMINE 30 MG: 30 INJECTION, SOLUTION INTRAMUSCULAR at 05:35

## 2022-02-21 RX ADMIN — ACETAMINOPHEN 650 MG: 325 TABLET, FILM COATED ORAL at 08:30

## 2022-02-21 ASSESSMENT — LIFESTYLE VARIABLES: DO YOU DRINK ALCOHOL: NO

## 2022-02-21 ASSESSMENT — PAIN DESCRIPTION - PAIN TYPE
TYPE: ACUTE PAIN

## 2022-02-21 ASSESSMENT — FIBROSIS 4 INDEX: FIB4 SCORE: 0.51

## 2022-02-21 NOTE — DISCHARGE PLANNING
8:30AM  Dr. Cho reports patient doing well after lithotripsy and plan to discharge today   Patient resides at shelter so discharge earlier in day recommended to assure bed at shelter    10:45am CM gave bedside nurse cab voucher to [facility]   No other CM needs identified at this time

## 2022-02-21 NOTE — PROGRESS NOTES
Uretal stent placed yesterday 2/20. Pt slept most of the shift. No c/o pain while in bed, but pain rated 7/10 when pt was up to the toilet urinating. Pink tinged urine. No PRNs given. ALBER toradol has been managing pain well. Aox4. Room air. Tolerating regular diet well. New 20g IV placed in R FA - SL. Unsure of DC plan at this time. Pt requested his manager be called and informed that he would not make AM shift today at [facility], per request manager was notified.

## 2022-02-21 NOTE — ED PROVIDER NOTES
ED Provider Note    Scribed for Aida Webb M.D. by [place]. 2/21/2022, 3:23 PM.    This patient was cared for during the COVID-19 pandemic. History and physical exam may be limited/truncated by the inherent challenges of PPE and the need to decrease staff exposure to novel coronavirus. Some aspects of disease management may be different to protect staff and help slow the spread of disease. I verified that, if possible, the patient was wearing a mask and I was wearing appropriate PPE every time I encountered the patient.      Primary care provider: Pcp Pt States None  Means of arrival: Ambulance  History obtained from: Patient  History limited by: None    CHIEF COMPLAINT  Chief Complaint   Patient presents with   • Abdominal Pain     7/10 LLQ abdominal and penis pain. Discharged this morning after lithotripsy yesterday with stent placement. Patient sent by EMS from [facility] due to possible seizure like activity. History of seizure disorder, prescribed depakote, taken this morning. Unable to  medication this morning.   • Flank Pain     Left flank       HPI  Russell Bunn is a 31 y.o. male with a history of seizure disorder who presents to the Emergency Department for moderate lower quadrant abdominal pain onset prior to arrival. Patient was recently discharged this morning from lithotripsy with stent placement yesterday. He was sent from [facility] due to possible seizure-like activity. He has a prescription for depakote, which he took this morning. He has associated dysuria. No alleviating factors were reported. Patient has not taken any medications for lithotripsy since he was unable to  pain medications this morning.    REVIEW OF SYSTEMS  Pertinent positives include abdominal pain and possible seizure-like activity. Pertinent negatives include no dysuria. All other systems reviewed and negative.    PAST MEDICAL HISTORY   has a past medical history of Kidney stones and  "Seizure disorder (HCC).    SURGICAL HISTORY   has a past surgical history that includes lithotripsy; cysto/uretero/pyeloscopy, dx (Right, 3/13/2020); cystoscopy,insert ureteral stent (Right, 3/13/2020); lasertripsy (Right, 3/13/2020); cystoscopy,insert ureteral stent (Left, 4/26/2021); cysto/uretero/pyeloscopy, dx (Left, 4/26/2021); cystoscopy (Left, 02/20/2022); cystoscopy,insert ureteral stent (Left, 2/20/2022); cysto/uretero/pyeloscopy, dx (Left, 2/20/2022); and lasertripsy (Left, 2/20/2022).    SOCIAL HISTORY  Social History     Tobacco Use   • Smoking status: Current Some Day Smoker     Packs/day: 0.50     Types: Cigarettes   • Smokeless tobacco: Never Used   Vaping Use   • Vaping Use: Never used   Substance Use Topics   • Alcohol use: Yes     Comment: Occasional   • Drug use: Yes     Types: Inhaled     Comment: Marijuana, every day      Social History     Substance and Sexual Activity   Drug Use Yes   • Types: Inhaled    Comment: Marijuana, every day       FAMILY HISTORY  History reviewed. No pertinent family history.    CURRENT MEDICATIONS  Home Medications     Reviewed by Sachin Crouch R.N. (Registered Nurse) on 02/21/22 at 1416  Med List Status: Partial   Medication Last Dose Status   acetaminophen (TYLENOL) 325 MG Tab  Active   divalproex (DEPAKOTE) 500 MG Tablet Delayed Response  Active   ketorolac (TORADOL) 10 MG Tab  Active   tamsulosin (FLOMAX) 0.4 MG capsule  Active                ALLERGIES  Allergies   Allergen Reactions   • Ancef [Cefazolin] Rash     rash   • Percocet [Apap-Fd&C Red #40 [place]-Oxycodone]      Tolerates acetaminophen   • Shellfish Allergy    • Tape        PHYSICAL EXAM  VITAL SIGNS: /75   Pulse 88   Temp 37.1 °C (98.8 °F) (Temporal)   Resp 16   Ht 1.676 m (5' 6\")   Wt 62.2 kg (137 lb 2 oz)   SpO2 100%   BMI 22.13 kg/m²   Constitutional: Alert in no respiratory distress. Looks mildly uncomfortable appearing.   HENT: No signs of trauma, Bilateral external ears normal, " "Nose normal.   Eyes: Pupils are equal and reactive, Conjunctiva normal, Non-icteric.   Neck: Normal range of motion, No tenderness, Supple, No stridor.   Cardiovascular: Regular rate and rhythm, no murmurs.   Thorax & Lungs: Normal breath sounds, No respiratory distress, No wheezing, No chest tenderness.   Abdomen: Bowel sounds normal, Soft, Left-sided abdominal tenderness, No masses, No peritoneal signs.  Skin: Warm, Dry, No erythema, No rash.   Musculoskeletal:  No major deformities noted.  Neurologic: Alert, moving all extremities without difficulty, no focal deficits.    RADIOLOGY  [id number] VIEW   Final Result      Status post left ureteral stent placement        The radiologist's interpretation of all radiological studies have been reviewed by me.    COURSE & MEDICAL DECISION MAKING  Pertinent Labs & Imaging studies reviewed. (See chart for details)    Review of past medical records show the patient has a history of kidney stones. He came in yesterday for flank pain. It was found he had a 5 mm left distal ureter stone with mild left hydro. Patient had a lithotripsy and stent placement done yesterday.  Patient was discharged from the discharge lounge approximately 15 to 20 minutes before his arrival here in the emergency department.  He did not want to wait for the delivery of his medications to bed.     3:23 PM - Patient seen and examined at bedside. Patient will be treated with ondansetron 4 mg and ketorolac 15 mg. Ordered DX-Abdomen to evaluate his symptoms.     4:34 PM - Patient will be treated with morphine 4 mg.     4:57 PM - Patient was reevaluated at bedside. Pain is controlled and is stable for discharge. /81   Pulse 74   Temp 36.9 °C (98.4 °F) (Oral)   Resp 14   Ht 1.676 m (5' 6\")   Wt 62.2 kg (137 lb 2 oz)   SpO2 95%   BMI 22.13 kg/m²      Decision Making:  This is a 31 y.o. year old male who presents with left flank pain.  Patient was discharged from the hospital 50 minutes before he " returned to the emergency department.  Apparently he was under the impression that he was going to get to cab vouchers 1 to the shelter and 1 to get his medications and when he did not have both of these he returned here for his medications.  We will attempt to get him his medications or ability to get them and he will be discharged.  He was given pain control here in the meantime.  Stent appears in appropriate location.    The patient will return for new or worsening symptoms and is stable at the time of discharge. Patient was given return precautions. Patient and/or family member verbalizes understanding and will comply.    DISPOSITION:  Patient will be discharged home in stable condition.    FOLLOW UP:  [facility], Emergency Dept  [address] [address] [phone]    Return for worsening pain, fevers or other concerns    Raf Moss M.D.  [address]  [facility] [address]  [phone]      Follow up with Urology as scheduled      OUTPATIENT MEDICATIONS:  New Prescriptions    No medications on file        FINAL IMPRESSION  1. Flank pain         This dictation has been created using voice recognition software and/or scribes. The accuracy of the dictation is limited by the abilities of the software and the expertise of the scribes. I expect there may be some errors of grammar and possibly content. I made every attempt to manually correct the errors within my dictation. However, errors related to voice recognition software and/or scribes may still exist and should be interpreted within the appropriate context.     IDaily (Emy), am scribing for, and in the presence of, Aida Webb M.D..    Electronically signed by: Daily Galvez), 2/21/2022    Aida CONTRERAS M.D. personally performed the services described in this documentation, as scribed by [place] in my presence, and it is both accurate and complete.    The note accurately reflects  work and decisions made by me.  Aida Webb M.D.  2/21/2022  5:54 PM

## 2022-02-21 NOTE — PROGRESS NOTES
0700- 1230    VSS on room air. Complains of pain to left flank, given tylenol and Toradol with relief. Tolerating diet without nausea. Adequately voiding. Up SBA to restroom. Safety maintained.

## 2022-02-21 NOTE — PROGRESS NOTES
0700- 1200    VSS on room air. Complains of pain to penis during urination and flank pain, given tylenol with some relief. Adequately voiding. Tolerating diet without nausea. Up SBA ambulating to restroom. Safety maintained.

## 2022-02-21 NOTE — DISCHARGE INSTRUCTIONS
Discharge Instructions    Discharged to other by car with escort. Discharged via wheelchair, hospital escort: Yes.  Special equipment needed: Not Applicable    Be sure to schedule a follow-up appointment with your primary care doctor or any specialists as instructed.     Discharge Plan:   Influenza Vaccine Indication: Patient Refuses    I understand that a diet low in cholesterol, fat, and sodium is recommended for good health. Unless I have been given specific instructions below for another diet, I accept this instruction as my diet prescription.   Other diet: Regular      Special Instructions: None    · Is patient discharged on Warfarin / Coumadin?   No     Depression / Suicide Risk    As you are discharged from this [facility] facility, it is important to learn how to keep safe from harming yourself.    Recognize the warning signs:  · Abrupt changes in personality, positive or negative- including increase in energy   · Giving away possessions  · Change in eating patterns- significant weight changes-  positive or negative  · Change in sleeping patterns- unable to sleep or sleeping all the time   · Unwillingness or inability to communicate  · Depression  · Unusual sadness, discouragement and loneliness  · Talk of wanting to die  · Neglect of personal appearance   · Rebelliousness- reckless behavior  · Withdrawal from people/activities they love  · Confusion- inability to concentrate     If you or a loved one observes any of these behaviors or has concerns about self-harm, here's what you can do:  · Talk about it- your feelings and reasons for harming yourself  · Remove any means that you might use to hurt yourself (examples: pills, rope, extension cords, firearm)  · Get professional help from the community (Mental Health, Substance Abuse, psychological counseling)  · Do not be alone:Call your Safe Contact- someone whom you trust who will be there for you.  · Call your local CRISIS HOTLINE 342-8720 or  951-383-3433  · Call your local Children's Mobile Crisis Response Team [place] (532) 166-1097 or www.[employer]  · Call the toll free National Suicide Prevention Hotlines   · National Suicide Prevention Lifeline 086-415-JBES (3291)  · National Use It Better Line Network 800-SUICIDE (354-3701)

## 2022-02-21 NOTE — ED TRIAGE NOTES
Russell Chase Antwon Bunn  31 y.o. male  Chief Complaint   Patient presents with   • Abdominal Pain     7/10 LLQ abdominal and penis pain. Discharged this morning after lithotripsy yesterday with stent placement. Patient sent by EMS from [facility] due to possible seizure like activity. History of seizure disorder, prescribed depakote, taken this morning. Unable to  medication this morning.   • Flank Pain     Left flank     Pt BIB EMS for above complaint. Unable to  discharge medications this morning. Charge RN aware of patient.      Pt is alert and oriented, speaking in full sentences, follows commands and responds appropriately to questions. Resp are even and unlabored.     Pt placed in lobby. Pt educated on triage process. Pt encouraged to alert staff for any changes. This RN masked and in appropriate PPE during encounter.     Vitals:    02/21/22 1407   BP: 123/75   Pulse: 88   Resp: 16   Temp: 37.1 °C (98.8 °F)   SpO2: 100%

## 2022-02-21 NOTE — CARE PLAN
The patient is Stable - Low risk of patient condition declining or worsening    Shift Goals  Clinical Goals: Pt will have pain managed with [facility] pain meds.    Progress made toward(s) clinical / shift goals:  No PRNs given yet this shift.    Patient is not progressing towards the following goals:

## 2022-02-21 NOTE — HOSPITAL COURSE
Russell Bunn is a 31 y.o. 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   Patient reports he has had multiple stones in the past and has had multiple surgeries in [place] at [facility].      In the ER, CT showed new 5 mm Left Distal Ureter with Mild Left Hydronephrosis. He was admitted for pain control and intervention. Urology was consulted and performed lithotripsy and ureteral stent placement on 2/20/22. The following morning, pain was well controlled on oral medications. He expressed a desire to discharge early to allow him to return to the shelter in time to procure a bed. He did not want to wait for delivery by meds to beds. He reported that his belongings had not yet been disposed of.

## 2022-02-22 NOTE — ED NOTES
Reviewed discharge instructions, pt verbalized understanding of instructions. States he will follow-up as scheduled with urology. Denies further questions at this time. Pt ambulatory out of ER with steady gait.

## 2022-02-22 NOTE — ED NOTES
Pt medicated per MAR. IV removed. Pt provided 2 cab vouchers. One to pharmacy, one back to shelter.   Will d/c.

## 2022-02-22 NOTE — DISCHARGE PLANNING
SW has given cab vouchers for Pt to get to the pharmacy to  his medications and a cab voucher to get from the pharmacy to [facility].     Cab Vouchers [id number]

## 2022-02-23 LAB
APPEARANCE STONE: NORMAL
COMPN STONE: NORMAL
SPECIMEN WT: 141 MG

## 2022-02-24 ENCOUNTER — APPOINTMENT (OUTPATIENT)
Dept: RADIOLOGY | Facility: MEDICAL CENTER | Age: 32
End: 2022-02-24
Attending: EMERGENCY MEDICINE
Payer: COMMERCIAL

## 2022-02-24 ENCOUNTER — HOSPITAL ENCOUNTER (EMERGENCY)
Facility: MEDICAL CENTER | Age: 32
End: 2022-02-24
Attending: EMERGENCY MEDICINE
Payer: COMMERCIAL

## 2022-02-24 VITALS
OXYGEN SATURATION: 96 % | HEIGHT: 66 IN | DIASTOLIC BLOOD PRESSURE: 79 MMHG | WEIGHT: 135 LBS | RESPIRATION RATE: 18 BRPM | SYSTOLIC BLOOD PRESSURE: 143 MMHG | TEMPERATURE: 97.7 F | HEART RATE: 66 BPM | BODY MASS INDEX: 21.69 KG/M2

## 2022-02-24 DIAGNOSIS — N20.0 KIDNEY STONE: ICD-10-CM

## 2022-02-24 LAB
ALBUMIN SERPL BCP-MCNC: 4 G/DL (ref 3.2–4.9)
ALBUMIN/GLOB SERPL: 1.7 G/DL
ALP SERPL-CCNC: 99 U/L (ref 30–99)
ALT SERPL-CCNC: 13 U/L (ref 2–50)
ANION GAP SERPL CALC-SCNC: 8 MMOL/L (ref 7–16)
APPEARANCE UR: CLEAR
AST SERPL-CCNC: 25 U/L (ref 12–45)
BACTERIA #/AREA URNS HPF: NEGATIVE /HPF
BASOPHILS # BLD AUTO: 0.5 % (ref 0–1.8)
BASOPHILS # BLD: 0.05 K/UL (ref 0–0.12)
BILIRUB SERPL-MCNC: 0.2 MG/DL (ref 0.1–1.5)
BILIRUB UR QL STRIP.AUTO: ABNORMAL
BUN SERPL-MCNC: 24 MG/DL (ref 8–22)
CALCIUM SERPL-MCNC: 9 MG/DL (ref 8.5–10.5)
CHLORIDE SERPL-SCNC: 107 MMOL/L (ref 96–112)
CO2 SERPL-SCNC: 23 MMOL/L (ref 20–33)
COLOR UR: ABNORMAL
CREAT SERPL-MCNC: 1.25 MG/DL (ref 0.5–1.4)
EOSINOPHIL # BLD AUTO: 0.89 K/UL (ref 0–0.51)
EOSINOPHIL NFR BLD: 8.6 % (ref 0–6.9)
EPI CELLS #/AREA URNS HPF: NEGATIVE /HPF
ERYTHROCYTE [DISTWIDTH] IN BLOOD BY AUTOMATED COUNT: 48.5 FL (ref 35.9–50)
GLOBULIN SER CALC-MCNC: 2.4 G/DL (ref 1.9–3.5)
GLUCOSE SERPL-MCNC: 76 MG/DL (ref 65–99)
GLUCOSE UR STRIP.AUTO-MCNC: NEGATIVE MG/DL
HCT VFR BLD AUTO: 40.4 % (ref 42–52)
HGB BLD-MCNC: 13.4 G/DL (ref 14–18)
IMM GRANULOCYTES # BLD AUTO: 0.02 K/UL (ref 0–0.11)
IMM GRANULOCYTES NFR BLD AUTO: 0.2 % (ref 0–0.9)
KETONES UR STRIP.AUTO-MCNC: NEGATIVE MG/DL
LEUKOCYTE ESTERASE UR QL STRIP.AUTO: ABNORMAL
LYMPHOCYTES # BLD AUTO: 2.46 K/UL (ref 1–4.8)
LYMPHOCYTES NFR BLD: 23.8 % (ref 22–41)
MCH RBC QN AUTO: 32.9 PG (ref 27–33)
MCHC RBC AUTO-ENTMCNC: 33.2 G/DL (ref 33.7–35.3)
MCV RBC AUTO: 99.3 FL (ref 81.4–97.8)
MICRO URNS: ABNORMAL
MONOCYTES # BLD AUTO: 1.02 K/UL (ref 0–0.85)
MONOCYTES NFR BLD AUTO: 9.9 % (ref 0–13.4)
NEUTROPHILS # BLD AUTO: 5.88 K/UL (ref 1.82–7.42)
NEUTROPHILS NFR BLD: 57 % (ref 44–72)
NITRITE UR QL STRIP.AUTO: NEGATIVE
NRBC # BLD AUTO: 0 K/UL
NRBC BLD-RTO: 0 /100 WBC
PH UR STRIP.AUTO: 7.5 [PH] (ref 5–8)
PLATELET # BLD AUTO: 232 K/UL (ref 164–446)
PMV BLD AUTO: 9.4 FL (ref 9–12.9)
POTASSIUM SERPL-SCNC: 4.9 MMOL/L (ref 3.6–5.5)
PROT SERPL-MCNC: 6.4 G/DL (ref 6–8.2)
PROT UR QL STRIP: >=300 MG/DL
RBC # BLD AUTO: 4.07 M/UL (ref 4.7–6.1)
RBC # URNS HPF: ABNORMAL /HPF
RBC UR QL AUTO: ABNORMAL
SODIUM SERPL-SCNC: 138 MMOL/L (ref 135–145)
SP GR UR STRIP.AUTO: 1.02
UROBILINOGEN UR STRIP.AUTO-MCNC: 2 MG/DL
WBC # BLD AUTO: 10.3 K/UL (ref 4.8–10.8)
WBC #/AREA URNS HPF: ABNORMAL /HPF

## 2022-02-24 PROCEDURE — 74176 CT ABD & PELVIS W/O CONTRAST: CPT

## 2022-02-24 PROCEDURE — 87086 URINE CULTURE/COLONY COUNT: CPT

## 2022-02-24 PROCEDURE — 81001 URINALYSIS AUTO W/SCOPE: CPT

## 2022-02-24 PROCEDURE — 700105 HCHG RX REV CODE 258: Performed by: EMERGENCY MEDICINE

## 2022-02-24 PROCEDURE — 80053 COMPREHEN METABOLIC PANEL: CPT

## 2022-02-24 PROCEDURE — 700111 HCHG RX REV CODE 636 W/ 250 OVERRIDE (IP): Performed by: EMERGENCY MEDICINE

## 2022-02-24 PROCEDURE — 99285 EMERGENCY DEPT VISIT HI MDM: CPT

## 2022-02-24 PROCEDURE — 36415 COLL VENOUS BLD VENIPUNCTURE: CPT

## 2022-02-24 PROCEDURE — 74018 RADEX ABDOMEN 1 VIEW: CPT

## 2022-02-24 PROCEDURE — 96375 TX/PRO/DX INJ NEW DRUG ADDON: CPT

## 2022-02-24 PROCEDURE — 96374 THER/PROPH/DIAG INJ IV PUSH: CPT

## 2022-02-24 PROCEDURE — 85025 COMPLETE CBC W/AUTO DIFF WBC: CPT

## 2022-02-24 PROCEDURE — 76775 US EXAM ABDO BACK WALL LIM: CPT

## 2022-02-24 RX ORDER — ONDANSETRON 2 MG/ML
4 INJECTION INTRAMUSCULAR; INTRAVENOUS ONCE
Status: COMPLETED | OUTPATIENT
Start: 2022-02-24 | End: 2022-02-24

## 2022-02-24 RX ORDER — SODIUM CHLORIDE 9 MG/ML
1000 INJECTION, SOLUTION INTRAVENOUS ONCE
Status: COMPLETED | OUTPATIENT
Start: 2022-02-24 | End: 2022-02-24

## 2022-02-24 RX ORDER — KETOROLAC TROMETHAMINE 30 MG/ML
15 INJECTION, SOLUTION INTRAMUSCULAR; INTRAVENOUS ONCE
Status: COMPLETED | OUTPATIENT
Start: 2022-02-24 | End: 2022-02-24

## 2022-02-24 RX ORDER — MORPHINE SULFATE 4 MG/ML
4 INJECTION INTRAVENOUS ONCE
Status: COMPLETED | OUTPATIENT
Start: 2022-02-24 | End: 2022-02-24

## 2022-02-24 RX ADMIN — KETOROLAC TROMETHAMINE 15 MG: 30 INJECTION, SOLUTION INTRAMUSCULAR at 06:45

## 2022-02-24 RX ADMIN — SODIUM CHLORIDE 1000 ML: 9 INJECTION, SOLUTION INTRAVENOUS at 06:45

## 2022-02-24 RX ADMIN — MORPHINE SULFATE 4 MG: 4 INJECTION INTRAVENOUS at 08:07

## 2022-02-24 RX ADMIN — ONDANSETRON 4 MG: 2 INJECTION INTRAMUSCULAR; INTRAVENOUS at 08:07

## 2022-02-24 ASSESSMENT — PAIN DESCRIPTION - DESCRIPTORS: DESCRIPTORS: THROBBING;SHARP

## 2022-02-24 ASSESSMENT — FIBROSIS 4 INDEX: FIB4 SCORE: 0.51

## 2022-02-24 NOTE — Clinical Note
Russell Bunn was seen and treated in our emergency department on 2/24/2022.  He may return to work on 02/25/2022.       If you have any questions or concerns, please don't hesitate to call.      Jimmy German M.D.

## 2022-02-24 NOTE — ED NOTES
Pt requesting pain meds for pain 10/10 on pain scale. Pt c/o left flank pain that has been increasing since he urinated. ERP notified @ this time.

## 2022-02-24 NOTE — ED NOTES
Pain meds given ( SEE MAR). Pt states has some pain relief prior to nurse leaving room. Will cont to monitor pt

## 2022-02-24 NOTE — ED PROVIDER NOTES
ED Provider Note    CHIEF COMPLAINT  Chief Complaint   Patient presents with   • Abdominal Pain     Patient complaining of left lower quadrant and left flank pain. Stated that he had a stent placed in his kidney 2 days ago. Tonight he started urinating blood and the pain started.          HPI  Russell Bunn is a 31 y.o. male who presents with left lower quadrant abdominal pain and left flank pain. Patient has a history of multiple kidney stones. Recently hospitalized with a 5 mm stone and underwent lithotripsy with stent placement by Dr. Almaraz. He had a return visit the day after discharge but states he has been doing okay since yesterday afternoon when he started to have more pain and then started urinating ferny blood. Pain located left lower quadrant radiates to left flank. Pain is sharp. No modifying factors. He has had increased urinary output although does not think he has been drinking too much liquid. He has not had a fever or chills.     REVIEW OF SYSTEMS  As per HPI  All other systems are negative.     PAST MEDICAL HISTORY  Past Medical History:   Diagnosis Date   • Kidney stones    • Seizure disorder (HCC)        FAMILY HISTORY  No family history on file.    SOCIAL HISTORY  Social History     Tobacco Use   • Smoking status: Current Some Day Smoker     Packs/day: 0.50     Types: Cigarettes   • Smokeless tobacco: Never Used   Vaping Use   • Vaping Use: Never used   Substance Use Topics   • Alcohol use: Yes     Comment: Occasional   • Drug use: Yes     Types: Inhaled     Comment: Marijuana, every day       SURGICAL HISTORY  Past Surgical History:   Procedure Laterality Date   • CYSTOSCOPY Left 02/20/2022    Cysto ureteroscopy ,lithotripsy, stone basket, ureteral stent placement, Dr Silver   • IN CYSTOSCOPY,INSERT URETERAL STENT Left 2/20/2022    Procedure: CYSTOSCOPY, WITH URETERAL STENT INSERTION;  Surgeon: Rei Silver M.D.;  Location: SURGERY [facility];  Service: Urology   • IN  "CYSTO/URETERO/PYELOSCOPY, DX Left 2/20/2022    Procedure: URETEROSCOPY;  Surgeon: Rei Silver M.D.;  Location: SURGERY [facility];  Service: Urology   • LASERTRIPSY Left 2/20/2022    Procedure: LITHOTRIPSY, USING LASER;  Surgeon: Rei Silver M.D.;  Location: SURGERY [facility];  Service: Urology   • WI CYSTOSCOPY,INSERT URETERAL STENT Left 4/26/2021    Procedure: CYSTOSCOPY, WITH OUT URETERAL STENT INSERTION;  Surgeon: Raf Moss M.D.;  Location: SURGERY [facility];  Service: Urology   • WI CYSTO/URETERO/PYELOSCOPY, DX Left 4/26/2021    Procedure: URETEROSCOPY;  Surgeon: Raf Moss M.D.;  Location: SURGERY [facility];  Service: Urology   • WI CYSTO/URETERO/PYELOSCOPY, DX Right 3/13/2020    Procedure: URETEROSCOPY;  Surgeon: Chase Damon M.D.;  Location: SURGERY [facility];  Service: Urology   • WI CYSTOSCOPY,INSERT URETERAL STENT Right 3/13/2020    Procedure: CYSTOSCOPY;  Surgeon: Chase Damon M.D.;  Location: SURGERY [facility];  Service: Urology   • LASERTRIPSY Right 3/13/2020    Procedure: LITHOTRIPSY, USING LASER;  Surgeon: Chase Damon M.D.;  Location: SURGERY [facility];  Service: Urology   • LITHOTRIPSY         CURRENT MEDICATIONS  Home Medications     Reviewed by Rosa Dennis R.N. (Registered Nurse) on 02/24/22 at 0530  Med List Status: <None>   Medication Last Dose Status   ketorolac (TORADOL) 10 MG Tab 2/24/2022 Active                ALLERGIES  Allergies   Allergen Reactions   • Ancef [Cefazolin] Rash     rash   • Percocet [Apap-Fd&C Red #40 [place]-Oxycodone]      Tolerates acetaminophen   • Shellfish Allergy    • Tape        PHYSICAL EXAM  VITAL SIGNS: Ht 1.676 m (5' 6\")   Wt 61.2 kg (135 lb)   BMI 21.79 kg/m²   Constitutional: Awake and alert  HENT: Dry mucous membranes  Eyes: Sclera white  Neck: Normal range of motion  Cardiovascular: Normal heart rate, Normal rhythm  Thorax & Lungs: Normal breath sounds, No respiratory distress, No wheezing, " No chest tenderness.   Abdomen: Soft, nondistended, nontender  Skin: No rash.   Back: No tenderness, No CVA tenderness.   Extremities: Intact, symmetric distal pulses, no edema.  Neurologic: Grossly normal    RADIOLOGY/PROCEDURES  CT-RENAL COLIC EVALUATION(A/P W/O)   Final Result      1.  Interval resolution of left hydronephrosis status post ureteral stent placement. Stone burden in the left kidney has decreased.      2.  Right nephrolithiasis      US-RENAL   Final Result         1.  Multiple echogenic bilateral renal foci, likely nephrolithiasis.   2.  Dilatation of the left kidney lower pole calyx could represent mild hydronephrosis or caliectasis.   3.  Echogenic mobile debris within the bladder, could represent hemorrhage given history.      [id number] VIEW   Final Result         1.  Moderate stool in the colon suggests changes of constipation, otherwise nonspecific bowel gas pattern         Imaging is interpreted by radiologist    Labs:   Results for orders placed or performed during the hospital encounter of 02/24/22   CBC WITH DIFFERENTIAL   Result Value Ref Range    WBC 10.3 4.8 - 10.8 K/uL    RBC 4.07 (L) 4.70 - 6.10 M/uL    Hemoglobin 13.4 (L) 14.0 - 18.0 g/dL    Hematocrit 40.4 (L) 42.0 - 52.0 %    MCV 99.3 (H) 81.4 - 97.8 fL    MCH 32.9 27.0 - 33.0 pg    MCHC 33.2 (L) 33.7 - 35.3 g/dL    RDW 48.5 35.9 - 50.0 fL    Platelet Count 232 164 - 446 K/uL    MPV 9.4 9.0 - 12.9 fL    Neutrophils-Polys 57.00 44.00 - 72.00 %    Lymphocytes 23.80 22.00 - 41.00 %    Monocytes 9.90 0.00 - 13.40 %    Eosinophils 8.60 (H) 0.00 - 6.90 %    Basophils 0.50 0.00 - 1.80 %    Immature Granulocytes 0.20 0.00 - 0.90 %    Nucleated RBC 0.00 /100 WBC    Neutrophils (Absolute) 5.88 1.82 - 7.42 K/uL    Lymphs (Absolute) 2.46 1.00 - 4.80 K/uL    Monos (Absolute) 1.02 (H) 0.00 - 0.85 K/uL    Eos (Absolute) 0.89 (H) 0.00 - 0.51 K/uL    Baso (Absolute) 0.05 0.00 - 0.12 K/uL    Immature Granulocytes (abs) 0.02 0.00 - 0.11 K/uL     NRBC (Absolute) 0.00 K/uL   COMP METABOLIC PANEL   Result Value Ref Range    Sodium 138 135 - 145 mmol/L    Potassium 4.9 3.6 - 5.5 mmol/L    Chloride 107 96 - 112 mmol/L    Co2 23 20 - 33 mmol/L    Anion Gap 8.0 7.0 - 16.0    Glucose 76 65 - 99 mg/dL    Bun 24 (H) 8 - 22 mg/dL    Creatinine 1.25 0.50 - 1.40 mg/dL    Calcium 9.0 8.5 - 10.5 mg/dL    AST(SGOT) 25 12 - 45 U/L    ALT(SGPT) 13 2 - 50 U/L    Alkaline Phosphatase 99 30 - 99 U/L    Total Bilirubin 0.2 0.1 - 1.5 mg/dL    Albumin 4.0 3.2 - 4.9 g/dL    Total Protein 6.4 6.0 - 8.2 g/dL    Globulin 2.4 1.9 - 3.5 g/dL    A-G Ratio 1.7 g/dL   URINALYSIS CULTURE, IF INDICATED    Specimen: Urine   Result Value Ref Range    Color Red (A)     Character Clear     Specific Gravity 1.020 <1.035    Ph 7.5 5.0 - 8.0    Glucose Negative Negative mg/dL    Ketones Negative Negative mg/dL    Protein >=300 (A) Negative mg/dL    Bilirubin Small (A) Negative    Urobilinogen, Urine 2.0 Negative    Nitrite Negative Negative    Leukocyte Esterase Moderate (A) Negative    Occult Blood Large (A) Negative    Micro Urine Req Microscopic    ESTIMATED GFR   Result Value Ref Range    GFR If African American >60 >60 mL/min/1.73 m 2    GFR If Non African American >60 >60 mL/min/1.73 m 2   URINE MICROSCOPIC (W/UA)   Result Value Ref Range    WBC 5-10 (A) /hpf    -150 (A) /hpf    Bacteria Negative None /hpf    Epithelial Cells Negative /hpf       Medications   ketorolac (TORADOL) injection 15 mg (15 mg Intravenous Given 2/24/22 0645)   NS infusion 1,000 mL (0 mL Intravenous Stopped 2/24/22 0927)   morphine 4 MG/ML injection 4 mg (4 mg Intravenous Given 2/24/22 0807)   ondansetron (ZOFRAN) syringe/vial injection 4 mg (4 mg Intravenous Given 2/24/22 0807)       Measures:  -Hydration: Patient was given IV fluids because of dehydration as well as kidney stone.  Oral fluids were not appropriate because of a possible surgical problem.  On recheck the patient was improved    COURSE & MEDICAL  DECISION MAKING  Patient presents with hematuria.  History of stones as well as recent stent placement.  This is consistent with renal colic.  Ordered IV fluid and Toradol.  Obtain work-up.  Initially pursued ultrasound and x-ray.  Obtain laboratory data.    Urine with hematuria.  No leukocytosis or left shift.  No remarkable anemia.  No suggestion of infection.  Renal function is normal.  X-rays returned as noted above.  Patient had persistent pain.  Because of this ordered CT scan and additional analgesic.    Admit to ED observation 7:30 AM 2/24/2022 pending CT scan and additional analgesics    Patient symptoms improved with morphine.  CT scan of the abdomen pelvis shows stent in place with resolution of left hydronephrosis.  There are still some stones in the kidney, but no obstructive pathology.    Patient reassessed.  Symptoms remain improved.  He is able to void without any retention.  Suspect pain related to either recently passed stone, stent in place or hematuria.  A urine culture was sent and is pending.  At this point appropriate for outpatient management.  Advised Tylenol and/or ibuprofen.  Patient needs to follow-up with his urologist, Dr. Silver.  Advised that he call today to make an appointment.  Advised push fluids to keep urine clear as possible.  Return to the ER for bladder outlet obstruction, fever, uncontrolled symptoms or concern.    FINAL IMPRESSION  1.  Renal colic  2.  Hematuria    Addendum:  Patient discharged from ED observation at 10 AM 3/2/2022      This dictation was created using voice recognition software. The accuracy of the dictation is limited to the abilities of the software.  The nursing notes were reviewed and certain aspects of this information were incorporated into this note.    Electronically signed by: Jimmy German M.D., 2/24/2022 6:37 AM

## 2022-02-24 NOTE — ED NOTES
Patient resting on gurney. Respirations even and unlabored. No s/s of distress noted. Lights dimmed for comfort. Call light within reach. Patient aware of need for urine sample. Will provide one when able.

## 2022-02-24 NOTE — ED NOTES
Patient BIB by EMS d/t left lower flank and quadrant pain. Patient had kidney stent placed 2 days ago. Tonight pain started and patient started urinating blood.

## 2022-02-26 ENCOUNTER — HOSPITAL ENCOUNTER (EMERGENCY)
Facility: MEDICAL CENTER | Age: 32
End: 2022-02-26
Attending: EMERGENCY MEDICINE
Payer: COMMERCIAL

## 2022-02-26 VITALS
WEIGHT: 141.76 LBS | DIASTOLIC BLOOD PRESSURE: 101 MMHG | HEIGHT: 67 IN | HEART RATE: 80 BPM | BODY MASS INDEX: 22.25 KG/M2 | OXYGEN SATURATION: 98 % | SYSTOLIC BLOOD PRESSURE: 132 MMHG | TEMPERATURE: 99 F | RESPIRATION RATE: 16 BRPM

## 2022-02-26 DIAGNOSIS — Z96.0 STATUS POST PLACEMENT OF URETERAL STENT: ICD-10-CM

## 2022-02-26 DIAGNOSIS — R31.0 GROSS HEMATURIA: ICD-10-CM

## 2022-02-26 LAB
ALBUMIN SERPL BCP-MCNC: 4 G/DL (ref 3.2–4.9)
ALBUMIN/GLOB SERPL: 1.6 G/DL
ALP SERPL-CCNC: 122 U/L (ref 30–99)
ALT SERPL-CCNC: 20 U/L (ref 2–50)
ANION GAP SERPL CALC-SCNC: 11 MMOL/L (ref 7–16)
APPEARANCE UR: ABNORMAL
AST SERPL-CCNC: 18 U/L (ref 12–45)
BASOPHILS # BLD AUTO: 0.5 % (ref 0–1.8)
BASOPHILS # BLD: 0.06 K/UL (ref 0–0.12)
BILIRUB SERPL-MCNC: 0.2 MG/DL (ref 0.1–1.5)
BILIRUB UR QL STRIP.AUTO: NEGATIVE
BUN SERPL-MCNC: 19 MG/DL (ref 8–22)
CALCIUM SERPL-MCNC: 9 MG/DL (ref 8.5–10.5)
CHLORIDE SERPL-SCNC: 105 MMOL/L (ref 96–112)
CO2 SERPL-SCNC: 25 MMOL/L (ref 20–33)
COLOR UR: ABNORMAL
CREAT SERPL-MCNC: 1.33 MG/DL (ref 0.5–1.4)
EOSINOPHIL # BLD AUTO: 0.75 K/UL (ref 0–0.51)
EOSINOPHIL NFR BLD: 6.8 % (ref 0–6.9)
EPI CELLS #/AREA URNS HPF: ABNORMAL /HPF
ERYTHROCYTE [DISTWIDTH] IN BLOOD BY AUTOMATED COUNT: 50.2 FL (ref 35.9–50)
GLOBULIN SER CALC-MCNC: 2.5 G/DL (ref 1.9–3.5)
GLUCOSE SERPL-MCNC: 90 MG/DL (ref 65–99)
GLUCOSE UR STRIP.AUTO-MCNC: NEGATIVE MG/DL
HCT VFR BLD AUTO: 38 % (ref 42–52)
HGB BLD-MCNC: 12.7 G/DL (ref 14–18)
IMM GRANULOCYTES # BLD AUTO: 0.02 K/UL (ref 0–0.11)
IMM GRANULOCYTES NFR BLD AUTO: 0.2 % (ref 0–0.9)
KETONES UR STRIP.AUTO-MCNC: NEGATIVE MG/DL
LEUKOCYTE ESTERASE UR QL STRIP.AUTO: ABNORMAL
LYMPHOCYTES # BLD AUTO: 2.6 K/UL (ref 1–4.8)
LYMPHOCYTES NFR BLD: 23.5 % (ref 22–41)
MCH RBC QN AUTO: 33.4 PG (ref 27–33)
MCHC RBC AUTO-ENTMCNC: 33.4 G/DL (ref 33.7–35.3)
MCV RBC AUTO: 100 FL (ref 81.4–97.8)
MICRO URNS: ABNORMAL
MONOCYTES # BLD AUTO: 1.01 K/UL (ref 0–0.85)
MONOCYTES NFR BLD AUTO: 9.1 % (ref 0–13.4)
NEUTROPHILS # BLD AUTO: 6.62 K/UL (ref 1.82–7.42)
NEUTROPHILS NFR BLD: 59.9 % (ref 44–72)
NITRITE UR QL STRIP.AUTO: NEGATIVE
NRBC # BLD AUTO: 0 K/UL
NRBC BLD-RTO: 0 /100 WBC
PH UR STRIP.AUTO: 8.5 [PH] (ref 5–8)
PLATELET # BLD AUTO: 224 K/UL (ref 164–446)
PMV BLD AUTO: 9.5 FL (ref 9–12.9)
POTASSIUM SERPL-SCNC: 4.6 MMOL/L (ref 3.6–5.5)
PROT SERPL-MCNC: 6.5 G/DL (ref 6–8.2)
PROT UR QL STRIP: 100 MG/DL
RBC # BLD AUTO: 3.8 M/UL (ref 4.7–6.1)
RBC # URNS HPF: >150 /HPF
RBC UR QL AUTO: ABNORMAL
SODIUM SERPL-SCNC: 141 MMOL/L (ref 135–145)
SP GR UR STRIP.AUTO: 1.02
UROBILINOGEN UR STRIP.AUTO-MCNC: 0.2 MG/DL
WBC # BLD AUTO: 11.1 K/UL (ref 4.8–10.8)
WBC #/AREA URNS HPF: ABNORMAL /HPF

## 2022-02-26 PROCEDURE — 80053 COMPREHEN METABOLIC PANEL: CPT

## 2022-02-26 PROCEDURE — 85025 COMPLETE CBC W/AUTO DIFF WBC: CPT

## 2022-02-26 PROCEDURE — 81001 URINALYSIS AUTO W/SCOPE: CPT

## 2022-02-26 PROCEDURE — 99283 EMERGENCY DEPT VISIT LOW MDM: CPT

## 2022-02-26 ASSESSMENT — FIBROSIS 4 INDEX: FIB4 SCORE: 0.9

## 2022-02-27 LAB
BACTERIA UR CULT: NORMAL
SIGNIFICANT IND 70042: NORMAL
SITE SITE: NORMAL
SOURCE SOURCE: NORMAL

## 2022-02-27 NOTE — DISCHARGE INSTRUCTIONS
Your ureteral stent does not appear to be acutely infected.  I discussed the case with urologist who would like to see you in clinic on March 1 at 1:30 PM.  Please go to this appointment and seeing him for removal of ureteral stent.

## 2022-02-27 NOTE — ED NOTES
Pt refusing to have IV or put on hospital gown at this time.  LAbs drawn, explained that if he is going to have a procedure an IV will need to be placed.

## 2022-02-27 NOTE — ED PROVIDER NOTES
ED Provider Note    CHIEF COMPLAINT  Chief Complaint   Patient presents with   • Follow-Up     Pt had a stent placed on 2/20 for kidney stones. Pt has [place] insurance, so the urologist that he followed up with won't take his insurance so he needs the stent to be taken out.      HPI  Patient is a 31-year-old male who presents emergency room for complaints of ongoing lower abdominal pain.  Patient had a stent placed on 2/20 for a 5 mm stone and concurrent lithotripsy with Dr. Silver.  Since that time, the patient reports that his pain is fairly stable, he had significant hematuria that has been improving.  He has not had any fevers or chills, denies any abdominal pain, testicular pain or other evolving complaints.  He was told to come back in approximately a week from when the stent was placed as he currently is [place] based insurance and only way in which he can get this approved would be to come to the emergency department.    PPE Note: I personally donned full PPE for all patient encounters during this visit, including being clean-shaven with an N95 respirator mask, gloves, and goggles.     Chart is reviewed from patient's previous encounter when he had a stent placement by Dr. Almaraz, repeat visit for ongoing pain and hematuria.  He was last seen on 2/24/2022 and it was recommended that he follow-up with outpatient urology.    Patient's last CT renal colic on 2/24 showed interval resolution of the left hydronephrosis status post ureteral stent placement.  The stone burden on the left kidney had decreased.  He also had a right-sided nephrolithiasis.  The ultrasound showed echogenic debris in the bladder.  Urinalysis did not show signs of acute infectious findings.    REVIEW OF SYSTEMS  See HPI for further details. All other systems are negative.     PAST MEDICAL HISTORY   has a past medical history of Kidney stones and Seizure disorder (HCC).    SOCIAL HISTORY  Social History     Tobacco Use   • Smoking  "status: Current Some Day Smoker     Packs/day: 0.50     Types: Cigarettes   • Smokeless tobacco: Never Used   Vaping Use   • Vaping Use: Never used   Substance and Sexual Activity   • Alcohol use: Yes     Comment: Occasional   • Drug use: Yes     Types: Inhaled     Comment: Marijuana, every day   • Sexual activity: Not on file     SURGICAL HISTORY   has a past surgical history that includes lithotripsy; cysto/uretero/pyeloscopy, dx (Right, 3/13/2020); cystoscopy,insert ureteral stent (Right, 3/13/2020); lasertripsy (Right, 3/13/2020); cystoscopy,insert ureteral stent (Left, 4/26/2021); cysto/uretero/pyeloscopy, dx (Left, 4/26/2021); cystoscopy (Left, 02/20/2022); cystoscopy,insert ureteral stent (Left, 2/20/2022); cysto/uretero/pyeloscopy, dx (Left, 2/20/2022); and lasertripsy (Left, 2/20/2022).    CURRENT MEDICATIONS  Home Medications     Reviewed by Elizabeht Espinoza R.N. (Registered Nurse) on 02/26/22 at 1714  Med List Status: <None>   Medication Last Dose Status   ketorolac (TORADOL) 10 MG Tab  Active              ALLERGIES  Allergies   Allergen Reactions   • Ancef [Cefazolin] Rash     rash   • Percocet [Apap-Fd&C Red #40 [place]-Oxycodone]      Tolerates acetaminophen   • Shellfish Allergy    • Tape      PHYSICAL EXAM  VITAL SIGNS: /101   Pulse 80   Temp 37.2 °C (99 °F) (Temporal)   Resp 16   Ht 1.689 m (5' 6.5\")   Wt 64.3 kg (141 lb 12.1 oz)   SpO2 98%   BMI 22.54 kg/m²   Pulse ox interpretation: I interpret this pulse ox as normal.  Genl: M sitting in chair comfortably, speaking clearly, appears in no acute distress   Head: NC/AT   ENT: Mucous membranes moist, posterior pharynx clear, uvula midline, nares patent bilaterally   Eyes: Normal sclera, pupils equal round reactive to light  Neck: Supple, FROM, no LAD appreciated  Pulmonary: Lungs are clear to auscultation bilaterally  Chest: No TTP  CV:  RRR, no murmur appreciated, pulses 2+ in both upper and lower extremities,  Abdomen: soft, NT/ND; no " rebound/guarding, no masses palpated, no HSM.   exam: Normal external male genitalia.  String from urethral meatus attached to an apparent 6 [place] by 26 cm JJ stent.  Musculoskeletal: Pain free ROM of the neck. Moving upper and lower extremities and spontaneous in coordinated fashion  Neuro: A&Ox4 (person, place, time, situation), speech fluent, gait steady, no focal deficits appreciated  Psych: Patient has an appropriate affect and behavior  Skin: No rash or lesions.  No pallor or jaundice.  No cyanosis.  Warm and dry.     DIAGNOSTIC STUDIES / PROCEDURES    LABS  Labs Reviewed   CBC WITH DIFFERENTIAL - Abnormal; Notable for the following components:       Result Value    WBC 11.1 (*)     RBC 3.80 (*)     Hemoglobin 12.7 (*)     Hematocrit 38.0 (*)     .0 (*)     MCH 33.4 (*)     MCHC 33.4 (*)     RDW 50.2 (*)     Monos (Absolute) 1.01 (*)     Eos (Absolute) 0.75 (*)     All other components within normal limits   COMP METABOLIC PANEL - Abnormal; Notable for the following components:    Alkaline Phosphatase 122 (*)     All other components within normal limits   URINALYSIS - Abnormal; Notable for the following components:    Color Red (*)     Character Cloudy (*)     Ph 8.5 (*)     Protein 100 (*)     Leukocyte Esterase Large (*)     Occult Blood Large (*)     All other components within normal limits   URINE MICROSCOPIC (W/UA) - Abnormal; Notable for the following components:    -150 (*)     RBC >150 (*)     All other components within normal limits   ESTIMATED GFR     RADIOLOGY  No orders to display     COURSE & MEDICAL DECISION MAKING  Pertinent Labs & Imaging studies reviewed. (See chart for details)    DDX: Hematuria, UTI, post stent placement pain.    [facility]    Initial evaluation at 1750:  Patient presents to the emergency room for symptoms as described above.  Patient was initially tachycardic though this had subsided by the time I evaluated the patient.  He was afebrile, had been seen  previously for gross hematuria and stent related pain however he says his pain is well controlled and he was more concerned about the possibility of having urinary tract infection and the need to get his stent removed.  He is currently in between insurances and primarily has [place] based insurance which she was told was difficult to get his procedures performed here and stated no data.  Lab work is obtained and he has a very scant leukocytosis with no leftward shift.  Is no gross electrolyte derangements and no evidence of kidney injury.  He has multiple changes in his UA with white cells and red cells and epithelial cells but no acute bacteria.  Leukocyte esterase is large but there is no nitrates.  After his lab work and returned I called the on-call urologist was part of the group that place a stent.  After review of the chart and previous documentation he would recommend that the patient come to a follow-up appointment on 3/1 for nurse encounter for removal of the stent.  He believes that this would alleviate the patient's concerns regarding need to pay as this is just a nursing visit and removal of the stent can be done via the strings that are present.  I discussed this with the patient who is amenable to following up as described.  No antibiotics are currently indicated and he will return to the emergency department should he have fevers, chills or other evolving complaints.    FINAL IMPRESSION  Visit Diagnoses     ICD-10-CM   1. Gross hematuria  R31.0   2. Status post placement of ureteral stent  Z96.0     Electronically signed by: Blair Palacios M.D., 2/26/2022 5:50 PM

## 2022-02-27 NOTE — ED TRIAGE NOTES
"Chief Complaint   Patient presents with   • Follow-Up     Pt had a stent placed on 2/20 for kidney stones. Pt has [place] insurance, so the urologist that he followed up with won't take his insurance so he needs the stent to be taken out.        /82   Pulse (!) 109   Temp 36.5 °C (97.7 °F) (Oral)   Resp 18   Ht 1.689 m (5' 6.5\")   Wt 64.3 kg (141 lb 12.1 oz)   SpO2 98% Comment: RA  BMI 22.54 kg/m²     "

## 2022-03-01 ENCOUNTER — HOSPITAL ENCOUNTER (INPATIENT)
Facility: MEDICAL CENTER | Age: 32
LOS: 1 days | DRG: 694 | End: 2022-03-02
Attending: EMERGENCY MEDICINE | Admitting: STUDENT IN AN ORGANIZED HEALTH CARE EDUCATION/TRAINING PROGRAM
Payer: COMMERCIAL

## 2022-03-01 ENCOUNTER — HOSPITAL ENCOUNTER (EMERGENCY)
Facility: MEDICAL CENTER | Age: 32
End: 2022-03-01
Payer: COMMERCIAL

## 2022-03-01 VITALS
BODY MASS INDEX: 22.66 KG/M2 | HEIGHT: 66 IN | WEIGHT: 141 LBS | OXYGEN SATURATION: 100 % | HEART RATE: 71 BPM | SYSTOLIC BLOOD PRESSURE: 125 MMHG | RESPIRATION RATE: 16 BRPM | TEMPERATURE: 98.6 F | DIASTOLIC BLOOD PRESSURE: 92 MMHG

## 2022-03-01 DIAGNOSIS — E72.01: ICD-10-CM

## 2022-03-01 DIAGNOSIS — N13.30 HYDRONEPHROSIS, UNSPECIFIED HYDRONEPHROSIS TYPE: ICD-10-CM

## 2022-03-01 DIAGNOSIS — R10.9 FLANK PAIN: ICD-10-CM

## 2022-03-01 LAB
ALBUMIN SERPL BCP-MCNC: 4.3 G/DL (ref 3.2–4.9)
ALBUMIN/GLOB SERPL: 1.4 G/DL
ALP SERPL-CCNC: 94 U/L (ref 30–99)
ALT SERPL-CCNC: 19 U/L (ref 2–50)
ANION GAP SERPL CALC-SCNC: 11 MMOL/L (ref 7–16)
AST SERPL-CCNC: 24 U/L (ref 12–45)
BASOPHILS # BLD AUTO: 0.6 % (ref 0–1.8)
BASOPHILS # BLD: 0.07 K/UL (ref 0–0.12)
BILIRUB SERPL-MCNC: 0.3 MG/DL (ref 0.1–1.5)
BUN SERPL-MCNC: 24 MG/DL (ref 8–22)
CALCIUM SERPL-MCNC: 9.4 MG/DL (ref 8.5–10.5)
CHLORIDE SERPL-SCNC: 103 MMOL/L (ref 96–112)
CO2 SERPL-SCNC: 25 MMOL/L (ref 20–33)
CREAT SERPL-MCNC: 1.5 MG/DL (ref 0.5–1.4)
EOSINOPHIL # BLD AUTO: 0.81 K/UL (ref 0–0.51)
EOSINOPHIL NFR BLD: 7.4 % (ref 0–6.9)
ERYTHROCYTE [DISTWIDTH] IN BLOOD BY AUTOMATED COUNT: 49.9 FL (ref 35.9–50)
GLOBULIN SER CALC-MCNC: 3.1 G/DL (ref 1.9–3.5)
GLUCOSE SERPL-MCNC: 92 MG/DL (ref 65–99)
HCT VFR BLD AUTO: 40.6 % (ref 42–52)
HGB BLD-MCNC: 13.5 G/DL (ref 14–18)
IMM GRANULOCYTES # BLD AUTO: 0.04 K/UL (ref 0–0.11)
IMM GRANULOCYTES NFR BLD AUTO: 0.4 % (ref 0–0.9)
LIPASE SERPL-CCNC: 65 U/L (ref 11–82)
LYMPHOCYTES # BLD AUTO: 2.22 K/UL (ref 1–4.8)
LYMPHOCYTES NFR BLD: 20.3 % (ref 22–41)
MCH RBC QN AUTO: 32.9 PG (ref 27–33)
MCHC RBC AUTO-ENTMCNC: 33.3 G/DL (ref 33.7–35.3)
MCV RBC AUTO: 99 FL (ref 81.4–97.8)
MONOCYTES # BLD AUTO: 1.35 K/UL (ref 0–0.85)
MONOCYTES NFR BLD AUTO: 12.4 % (ref 0–13.4)
NEUTROPHILS # BLD AUTO: 6.43 K/UL (ref 1.82–7.42)
NEUTROPHILS NFR BLD: 58.9 % (ref 44–72)
NRBC # BLD AUTO: 0 K/UL
NRBC BLD-RTO: 0 /100 WBC
PLATELET # BLD AUTO: 265 K/UL (ref 164–446)
PMV BLD AUTO: 9.4 FL (ref 9–12.9)
POTASSIUM SERPL-SCNC: 4.7 MMOL/L (ref 3.6–5.5)
PROT SERPL-MCNC: 7.4 G/DL (ref 6–8.2)
RBC # BLD AUTO: 4.1 M/UL (ref 4.7–6.1)
SODIUM SERPL-SCNC: 139 MMOL/L (ref 135–145)
WBC # BLD AUTO: 10.9 K/UL (ref 4.8–10.8)

## 2022-03-01 PROCEDURE — 96375 TX/PRO/DX INJ NEW DRUG ADDON: CPT

## 2022-03-01 PROCEDURE — 85025 COMPLETE CBC W/AUTO DIFF WBC: CPT

## 2022-03-01 PROCEDURE — 302449 STATCHG TRIAGE ONLY (STATISTIC)

## 2022-03-01 PROCEDURE — 80053 COMPREHEN METABOLIC PANEL: CPT

## 2022-03-01 PROCEDURE — 96376 TX/PRO/DX INJ SAME DRUG ADON: CPT

## 2022-03-01 PROCEDURE — 36415 COLL VENOUS BLD VENIPUNCTURE: CPT

## 2022-03-01 PROCEDURE — 99284 EMERGENCY DEPT VISIT MOD MDM: CPT

## 2022-03-01 PROCEDURE — 83690 ASSAY OF LIPASE: CPT

## 2022-03-01 PROCEDURE — 96374 THER/PROPH/DIAG INJ IV PUSH: CPT

## 2022-03-01 ASSESSMENT — FIBROSIS 4 INDEX
FIB4 SCORE: 0.54
FIB4 SCORE: 0.54

## 2022-03-02 ENCOUNTER — APPOINTMENT (OUTPATIENT)
Dept: RADIOLOGY | Facility: MEDICAL CENTER | Age: 32
DRG: 694 | End: 2022-03-02
Attending: EMERGENCY MEDICINE
Payer: COMMERCIAL

## 2022-03-02 ENCOUNTER — APPOINTMENT (OUTPATIENT)
Dept: RADIOLOGY | Facility: MEDICAL CENTER | Age: 32
DRG: 694 | End: 2022-03-02
Payer: COMMERCIAL

## 2022-03-02 VITALS
OXYGEN SATURATION: 97 % | RESPIRATION RATE: 20 BRPM | BODY MASS INDEX: 20.89 KG/M2 | DIASTOLIC BLOOD PRESSURE: 68 MMHG | SYSTOLIC BLOOD PRESSURE: 118 MMHG | HEART RATE: 70 BPM | WEIGHT: 130 LBS | TEMPERATURE: 97.7 F | HEIGHT: 66 IN

## 2022-03-02 LAB
ANION GAP SERPL CALC-SCNC: 11 MMOL/L (ref 7–16)
APPEARANCE UR: ABNORMAL
BACTERIA #/AREA URNS HPF: NEGATIVE /HPF
BILIRUB UR QL STRIP.AUTO: NEGATIVE
BUN SERPL-MCNC: 21 MG/DL (ref 8–22)
CALCIUM SERPL-MCNC: 9 MG/DL (ref 8.5–10.5)
CHLORIDE SERPL-SCNC: 103 MMOL/L (ref 96–112)
CO2 SERPL-SCNC: 24 MMOL/L (ref 20–33)
COLOR UR: YELLOW
CREAT SERPL-MCNC: 1.38 MG/DL (ref 0.5–1.4)
GLUCOSE SERPL-MCNC: 81 MG/DL (ref 65–99)
GLUCOSE UR STRIP.AUTO-MCNC: NEGATIVE MG/DL
KETONES UR STRIP.AUTO-MCNC: NEGATIVE MG/DL
LEUKOCYTE ESTERASE UR QL STRIP.AUTO: ABNORMAL
MICRO URNS: ABNORMAL
NITRITE UR QL STRIP.AUTO: NEGATIVE
PH UR STRIP.AUTO: 7 [PH] (ref 5–8)
POTASSIUM SERPL-SCNC: 4.8 MMOL/L (ref 3.6–5.5)
PROT UR QL STRIP: 30 MG/DL
RBC UR QL AUTO: ABNORMAL
SARS-COV+SARS-COV-2 AG RESP QL IA.RAPID: NOTDETECTED
SODIUM SERPL-SCNC: 138 MMOL/L (ref 135–145)
SP GR UR STRIP.AUTO: 1.01
SPECIMEN SOURCE: NORMAL
UROBILINOGEN UR STRIP.AUTO-MCNC: 0.2 MG/DL

## 2022-03-02 PROCEDURE — 700111 HCHG RX REV CODE 636 W/ 250 OVERRIDE (IP)

## 2022-03-02 PROCEDURE — 306637 HCHG MISC ORTHO ITEM RC 0274

## 2022-03-02 PROCEDURE — 700105 HCHG RX REV CODE 258: Performed by: EMERGENCY MEDICINE

## 2022-03-02 PROCEDURE — 700111 HCHG RX REV CODE 636 W/ 250 OVERRIDE (IP): Performed by: STUDENT IN AN ORGANIZED HEALTH CARE EDUCATION/TRAINING PROGRAM

## 2022-03-02 PROCEDURE — 87426 SARSCOV CORONAVIRUS AG IA: CPT

## 2022-03-02 PROCEDURE — 74176 CT ABD & PELVIS W/O CONTRAST: CPT

## 2022-03-02 PROCEDURE — 99239 HOSP IP/OBS DSCHRG MGMT >30: CPT | Performed by: HOSPITALIST

## 2022-03-02 PROCEDURE — 87086 URINE CULTURE/COLONY COUNT: CPT

## 2022-03-02 PROCEDURE — 700111 HCHG RX REV CODE 636 W/ 250 OVERRIDE (IP): Performed by: EMERGENCY MEDICINE

## 2022-03-02 PROCEDURE — 99236 HOSP IP/OBS SAME DATE HI 85: CPT | Performed by: STUDENT IN AN ORGANIZED HEALTH CARE EDUCATION/TRAINING PROGRAM

## 2022-03-02 PROCEDURE — 80048 BASIC METABOLIC PNL TOTAL CA: CPT

## 2022-03-02 PROCEDURE — 700102 HCHG RX REV CODE 250 W/ 637 OVERRIDE(OP): Performed by: STUDENT IN AN ORGANIZED HEALTH CARE EDUCATION/TRAINING PROGRAM

## 2022-03-02 PROCEDURE — G0378 HOSPITAL OBSERVATION PER HR: HCPCS

## 2022-03-02 PROCEDURE — 76775 US EXAM ABDO BACK WALL LIM: CPT

## 2022-03-02 PROCEDURE — A9270 NON-COVERED ITEM OR SERVICE: HCPCS | Performed by: STUDENT IN AN ORGANIZED HEALTH CARE EDUCATION/TRAINING PROGRAM

## 2022-03-02 PROCEDURE — 81001 URINALYSIS AUTO W/SCOPE: CPT

## 2022-03-02 RX ORDER — LORATADINE 10 MG/1
10 TABLET ORAL ONCE
Status: COMPLETED | OUTPATIENT
Start: 2022-03-02 | End: 2022-03-02

## 2022-03-02 RX ORDER — MORPHINE SULFATE 4 MG/ML
4 INJECTION INTRAVENOUS ONCE
Status: COMPLETED | OUTPATIENT
Start: 2022-03-02 | End: 2022-03-02

## 2022-03-02 RX ORDER — PROMETHAZINE HYDROCHLORIDE 25 MG/1
12.5-25 SUPPOSITORY RECTAL EVERY 4 HOURS PRN
Status: DISCONTINUED | OUTPATIENT
Start: 2022-03-02 | End: 2022-03-02 | Stop reason: HOSPADM

## 2022-03-02 RX ORDER — PROMETHAZINE HYDROCHLORIDE 25 MG/1
12.5-25 TABLET ORAL EVERY 4 HOURS PRN
Status: DISCONTINUED | OUTPATIENT
Start: 2022-03-02 | End: 2022-03-02 | Stop reason: HOSPADM

## 2022-03-02 RX ORDER — ONDANSETRON 2 MG/ML
4 INJECTION INTRAMUSCULAR; INTRAVENOUS EVERY 4 HOURS PRN
Status: DISCONTINUED | OUTPATIENT
Start: 2022-03-02 | End: 2022-03-02 | Stop reason: HOSPADM

## 2022-03-02 RX ORDER — DIPHENHYDRAMINE HYDROCHLORIDE 50 MG/ML
50 INJECTION INTRAMUSCULAR; INTRAVENOUS EVERY 6 HOURS PRN
Status: DISCONTINUED | OUTPATIENT
Start: 2022-03-02 | End: 2022-03-02

## 2022-03-02 RX ORDER — DIPHENHYDRAMINE HCL 25 MG
50 TABLET ORAL EVERY 6 HOURS PRN
Status: DISCONTINUED | OUTPATIENT
Start: 2022-03-02 | End: 2022-03-02 | Stop reason: HOSPADM

## 2022-03-02 RX ORDER — HYDROMORPHONE HYDROCHLORIDE 1 MG/ML
0.5 INJECTION, SOLUTION INTRAMUSCULAR; INTRAVENOUS; SUBCUTANEOUS EVERY 4 HOURS PRN
Status: DISCONTINUED | OUTPATIENT
Start: 2022-03-02 | End: 2022-03-02 | Stop reason: HOSPADM

## 2022-03-02 RX ORDER — SODIUM CHLORIDE, SODIUM LACTATE, POTASSIUM CHLORIDE, CALCIUM CHLORIDE 600; 310; 30; 20 MG/100ML; MG/100ML; MG/100ML; MG/100ML
1000 INJECTION, SOLUTION INTRAVENOUS ONCE
Status: COMPLETED | OUTPATIENT
Start: 2022-03-02 | End: 2022-03-02

## 2022-03-02 RX ORDER — DIPHENHYDRAMINE HYDROCHLORIDE 50 MG/ML
INJECTION INTRAMUSCULAR; INTRAVENOUS
Status: COMPLETED
Start: 2022-03-02 | End: 2022-03-02

## 2022-03-02 RX ORDER — PROCHLORPERAZINE EDISYLATE 5 MG/ML
5-10 INJECTION INTRAMUSCULAR; INTRAVENOUS EVERY 4 HOURS PRN
Status: DISCONTINUED | OUTPATIENT
Start: 2022-03-02 | End: 2022-03-02 | Stop reason: HOSPADM

## 2022-03-02 RX ORDER — KETOROLAC TROMETHAMINE 30 MG/ML
15 INJECTION, SOLUTION INTRAMUSCULAR; INTRAVENOUS ONCE
Status: COMPLETED | OUTPATIENT
Start: 2022-03-02 | End: 2022-03-02

## 2022-03-02 RX ORDER — DIPHENHYDRAMINE HYDROCHLORIDE 50 MG/ML
50 INJECTION INTRAMUSCULAR; INTRAVENOUS EVERY 6 HOURS PRN
Status: DISCONTINUED | OUTPATIENT
Start: 2022-03-02 | End: 2022-03-02 | Stop reason: HOSPADM

## 2022-03-02 RX ORDER — ONDANSETRON 4 MG/1
4 TABLET, ORALLY DISINTEGRATING ORAL EVERY 4 HOURS PRN
Status: DISCONTINUED | OUTPATIENT
Start: 2022-03-02 | End: 2022-03-02 | Stop reason: HOSPADM

## 2022-03-02 RX ORDER — HYDROMORPHONE HYDROCHLORIDE 1 MG/ML
0.5 INJECTION, SOLUTION INTRAMUSCULAR; INTRAVENOUS; SUBCUTANEOUS ONCE
Status: COMPLETED | OUTPATIENT
Start: 2022-03-02 | End: 2022-03-02

## 2022-03-02 RX ORDER — ONDANSETRON 2 MG/ML
4 INJECTION INTRAMUSCULAR; INTRAVENOUS ONCE
Status: COMPLETED | OUTPATIENT
Start: 2022-03-02 | End: 2022-03-02

## 2022-03-02 RX ADMIN — MORPHINE SULFATE 4 MG: 4 INJECTION INTRAVENOUS at 01:02

## 2022-03-02 RX ADMIN — ONDANSETRON 4 MG: 2 INJECTION INTRAMUSCULAR; INTRAVENOUS at 01:02

## 2022-03-02 RX ADMIN — DIPHENHYDRAMINE HYDROCHLORIDE 50 MG: 50 INJECTION INTRAMUSCULAR; INTRAVENOUS at 08:12

## 2022-03-02 RX ADMIN — LORATADINE 10 MG: 10 TABLET ORAL at 06:50

## 2022-03-02 RX ADMIN — MORPHINE SULFATE 4 MG: 4 INJECTION INTRAVENOUS at 01:34

## 2022-03-02 RX ADMIN — KETOROLAC TROMETHAMINE 15 MG: 30 INJECTION, SOLUTION INTRAMUSCULAR at 01:43

## 2022-03-02 RX ADMIN — HYDROMORPHONE HYDROCHLORIDE 0.5 MG: 1 INJECTION, SOLUTION INTRAMUSCULAR; INTRAVENOUS; SUBCUTANEOUS at 03:50

## 2022-03-02 RX ADMIN — SODIUM CHLORIDE, POTASSIUM CHLORIDE, SODIUM LACTATE AND CALCIUM CHLORIDE 1000 ML: 600; 310; 30; 20 INJECTION, SOLUTION INTRAVENOUS at 01:37

## 2022-03-02 RX ADMIN — HYDROMORPHONE HYDROCHLORIDE 0.5 MG: 1 INJECTION, SOLUTION INTRAMUSCULAR; INTRAVENOUS; SUBCUTANEOUS at 05:05

## 2022-03-02 ASSESSMENT — ENCOUNTER SYMPTOMS
SHORTNESS OF BREATH: 0
HEADACHES: 0
BLURRED VISION: 0
MYALGIAS: 0
VOMITING: 0
NAUSEA: 0
FLANK PAIN: 1
CHILLS: 0
HEARTBURN: 0
BRUISES/BLEEDS EASILY: 0
PALPITATIONS: 0
COUGH: 0
DEPRESSION: 0
FEVER: 0
ABDOMINAL PAIN: 1
NECK PAIN: 0
DOUBLE VISION: 0
HEMOPTYSIS: 0
DIZZINESS: 0

## 2022-03-02 NOTE — ED NOTES
in to see pt @ this time. States the CT scan had been reviewed urology has determined that there is no need for surgical intervention @ this time

## 2022-03-02 NOTE — ED NOTES
Pt back from CT @ this time. Pt resting in bed with no c/o @ this time. Pt remains NPO per orders

## 2022-03-02 NOTE — ED TRIAGE NOTES
"Chief Complaint   Patient presents with   • Unable to Urinate     Pt had a stent taken out two days ago. Unable to urinate. Last time he urinated was four hours ago.      Pt received morphine with EMS. IV in place    Hx of kidney stones.    /92   Pulse 71   Temp 37 °C (98.6 °F) (Temporal)   Resp 16   Ht 1.676 m (5' 6\")   Wt 64 kg (141 lb)   SpO2 100%   BMI 22.76 kg/m²     "

## 2022-03-02 NOTE — ED NOTES
Pt asking for medication for itching states dilaudid makes him itchy but is the only thing that helps for his pain. Educated pt if it makes him that itchy and he is that miserable then he probably shouldn't be getting the dilaudid. Pt states he is itching more than he is pain right now. Dr. Ibanez notified via voalte and new orders being placed m

## 2022-03-02 NOTE — ED NOTES
Report from Gretta YBARRA. Assumed pt care @ this time. Pt resting with eyes closed, even et unlabored resp noted @ this time. Will cont to monitor pt

## 2022-03-02 NOTE — DISCHARGE SUMMARY
"Discharge Summary    CHIEF COMPLAINT ON ADMISSION  Chief Complaint   Patient presents with   • Flank Pain     Patient says his kidneys are hurting him \"too much to talk\". Reports kidneys stones, kidney cysts, and recent kidney surgery on the 2/26. He followed up out patient and had the stents removed and has been having pain since.    • Unable to Urinate     Patient said he hasn't been able to urinate since he was here earlier when he LWBS. Patient reports blood clots in his urine, thinks he has another clot.        Reason for Admission  Kidney Pain; Unable to urinate     Admission Date  3/1/2022    CODE STATUS  Full Code    HPI & HOSPITAL COURSE  Per HPI  \"Russell Bunn is a 31 y.o. male with past medical history of cystinuria, multiple kidney stones status post multiple surgeries including percutaneous nephrostomy tubes recently moved from [place]  who presented 3/1/2022 with left flank pain and inability to urinate.  Describes the flank pain as, 1010 density, not relieved with anything exacerbated with attempting to urinate.  He had left ureteral stent placed in February, 20 2022 which was removed.     In the ER, ultrasound done showed new moderate left proximal hydroureteronephrosis and bilateral lateral nephrolithiasis.  Urology was consulted recommended patient to be hospitalized for medical management with consideration of possible procedure in a.m.\"    Patient was seen by Urology and no recommendations for surgery at this time. I sent for repeat BMP and ARAM has resolved, patient to dc to home and will follow up with Urology as outpatient.     Therefore, he is discharged in good and stable condition to home with close outpatient follow-up.    The patient recovered much more quickly than anticipated on admission.    Discharge Date  3/2/2022    FOLLOW UP ITEMS POST DISCHARGE  Follow up Urology    DISCHARGE DIAGNOSES  Principal Problem:    Ureterolithiasis with hydronephrosis POA: Yes      " Overview: IMO load March 2020  Active Problems:    Hydronephrosis POA: Yes    Cystinuria (HCC) POA: Yes  Resolved Problems:    * No resolved hospital problems. *      FOLLOW UP  No future appointments.  Rei Silver M.D.  [address]  [place] [address]  [phone]      [facility] will contact patient to arrange outpatient follow up.      MEDICATIONS ON DISCHARGE     Medication List      You have not been prescribed any medications.         Allergies  Allergies   Allergen Reactions   • Ancef [Cefazolin] Rash     rash   • Percocet [Apap-Fd&C Red #40 [place]-Oxycodone]      Tolerates acetaminophen   • Shellfish Allergy    • Tape      Paper tape okay        DIET  Orders Placed This Encounter   Procedures   • Diet NPO Restrict to: Sips with Medications     Standing Status:   Standing     Number of Occurrences:   1     Order Specific Question:   Diet NPO Restrict to:     Answer:   Sips with Medications [3]       ACTIVITY  As tolerated.  Weight bearing as tolerated    CONSULTATIONS  Urology    PROCEDURES  None    LABORATORY  Lab Results   Component Value Date    SODIUM 138 03/02/2022    POTASSIUM 4.8 03/02/2022    CHLORIDE 103 03/02/2022    CO2 24 03/02/2022    GLUCOSE 81 03/02/2022    BUN 21 03/02/2022    CREATININE 1.38 03/02/2022        Lab Results   Component Value Date    WBC 10.9 (H) 03/01/2022    HEMOGLOBIN 13.5 (L) 03/01/2022    HEMATOCRIT 40.6 (L) 03/01/2022    PLATELETCT 265 03/01/2022        Total time of the discharge process exceeds 41 minutes.

## 2022-03-02 NOTE — ED NOTES
"Charge RN receiving multiple calls regarding pts status. Pts mother states it is \"unacceptable that he is still in the lobby\". Charge RN explained triage process and apologized for the wait time.   "

## 2022-03-02 NOTE — H&P
"Hospital Medicine History & Physical Note    Date of Service  3/2/2022    Primary Care Physician  Pcp Pt States None    Consultants  urology    Specialist Names: Dr. Cramer    Code Status  Full Code    Chief Complaint  Chief Complaint   Patient presents with   • Flank Pain     Patient says his kidneys are hurting him \"too much to talk\". Reports kidneys stones, kidney cysts, and recent kidney surgery on the 2/26. He followed up out patient and had the stents removed and has been having pain since.    • Unable to Urinate     Patient said he hasn't been able to urinate since he was here earlier when he LWBS. Patient reports blood clots in his urine, thinks he has another clot.        History of Presenting Illness  Russell Bunn is a 31 y.o. male with past medical history of cystinuria, multiple kidney stones status post multiple surgeries including percutaneous nephrostomy tubes recently moved from [place]  who presented 3/1/2022 with left flank pain and inability to urinate.  Describes the flank pain as, 1010 density, not relieved with anything exacerbated with attempting to urinate.  He had left ureteral stent placed in February, 20 2022 which was removed.    In the ER, ultrasound done showed new moderate left proximal hydroureteronephrosis and bilateral lateral nephrolithiasis.  Urology was consulted recommended patient to be hospitalized for medical management with consideration of possible procedure in a.m.    I discussed the plan of care with patient.    Review of Systems  Review of Systems   Constitutional: Negative for chills and fever.   HENT: Negative for hearing loss and tinnitus.    Eyes: Negative for blurred vision and double vision.   Respiratory: Negative for cough and hemoptysis.    Cardiovascular: Negative for chest pain and palpitations.   Gastrointestinal: Negative for heartburn, nausea and vomiting.   Genitourinary: Positive for flank pain. Negative for dysuria and urgency. "   Musculoskeletal: Negative for myalgias and neck pain.   Skin: Negative for itching and rash.   Neurological: Negative for dizziness and headaches.   Endo/Heme/Allergies: Does not bruise/bleed easily.   Psychiatric/Behavioral: Negative for depression and suicidal ideas.       Past Medical History   has a past medical history of Kidney stones and Seizure disorder (HCC).    Surgical History   has a past surgical history that includes lithotripsy; pr cysto/uretero/pyeloscopy, dx (Right, 3/13/2020); pr cystoscopy,insert ureteral stent (Right, 3/13/2020); lasertripsy (Right, 3/13/2020); pr cystoscopy,insert ureteral stent (Left, 4/26/2021); pr cysto/uretero/pyeloscopy, dx (Left, 4/26/2021); cystoscopy (Left, 02/20/2022); pr cystoscopy,insert ureteral stent (Left, 2/20/2022); pr cysto/uretero/pyeloscopy, dx (Left, 2/20/2022); and lasertripsy (Left, 2/20/2022).     Family History  family history is not on file.   Family history reviewed with patient. There is no family history that is pertinent to the chief complaint.     Social History   reports that he has been smoking cigarettes. He has been smoking about 0.50 packs per day. He has never used smokeless tobacco. He reports current alcohol use. He reports current drug use. Drug: Inhaled.    Allergies  Allergies   Allergen Reactions   • Ancef [Cefazolin] Rash     rash   • Percocet [Apap-Fd&C Red #40 [place]-Oxycodone]      Tolerates acetaminophen   • Shellfish Allergy    • Tape      Paper tape okay        Medications  None       Physical Exam  Temp:  [36.7 °C (98 °F)-37 °C (98.6 °F)] 36.7 °C (98 °F)  Pulse:  [71-78] 72  Resp:  [16-20] 17  BP: (114-140)/(65-92) 115/65  SpO2:  [90 %-100 %] 90 %  Blood Pressure: 115/65   Temperature: 36.7 °C (98 °F)   Pulse: 72   Respiration: 17   Pulse Oximetry: 90 %       Physical Exam  Vitals and nursing note reviewed.   Constitutional:       General: He is not in acute distress.     Appearance: Normal appearance. He is not ill-appearing.    HENT:      Head: Normocephalic and atraumatic.      Right Ear: Tympanic membrane normal.      Left Ear: Tympanic membrane normal.      Nose: Nose normal.      Mouth/Throat:      Mouth: Mucous membranes are moist.      Pharynx: Oropharynx is clear.   Eyes:      Extraocular Movements: Extraocular movements intact.      Pupils: Pupils are equal, round, and reactive to light.   Cardiovascular:      Rate and Rhythm: Normal rate and regular rhythm.      Pulses: Normal pulses.      Heart sounds: Normal heart sounds.   Pulmonary:      Effort: Pulmonary effort is normal.      Breath sounds: Normal breath sounds.   Abdominal:      General: Bowel sounds are normal. There is no distension.      Palpations: Abdomen is soft. There is no mass.      Tenderness: There is no abdominal tenderness. There is left CVA tenderness.   Musculoskeletal:         General: No swelling, tenderness, deformity or signs of injury. Normal range of motion.      Cervical back: Neck supple. No tenderness.   Skin:     General: Skin is warm.      Capillary Refill: Capillary refill takes less than 2 seconds.      Coloration: Skin is not jaundiced or pale.      Findings: No bruising or erythema.   Neurological:      General: No focal deficit present.      Mental Status: He is alert and oriented to person, place, and time. Mental status is at baseline.      Cranial Nerves: No cranial nerve deficit.      Sensory: No sensory deficit.      Motor: No weakness.      Coordination: Coordination normal.   Psychiatric:         Mood and Affect: Mood normal.         Behavior: Behavior normal.         Laboratory:  Recent Labs     03/01/22  2316   WBC 10.9*   RBC 4.10*   HEMOGLOBIN 13.5*   HEMATOCRIT 40.6*   MCV 99.0*   MCH 32.9   MCHC 33.3*   RDW 49.9   PLATELETCT 265   MPV 9.4     Recent Labs     03/01/22  2316   SODIUM 139   POTASSIUM 4.7   CHLORIDE 103   CO2 25   GLUCOSE 92   BUN 24*   CREATININE 1.50*   CALCIUM 9.4     Recent Labs     03/01/22  2316   ALTSGPT 19    ASTSGOT 24   ALKPHOSPHAT 94   TBILIRUBIN 0.3   LIPASE 65   GLUCOSE 92         No results for input(s): NTPROBNP in the last 72 hours.      No results for input(s): TROPONINT in the last 72 hours.    Imaging:  US-RENAL   Final Result      New moderate left proximal hydroureteronephrosis. Ureteral stent is no longer seen      Bilateral nephrolithiasis      Debris within the urinary bladder could indicate hemorrhage, infectious or inflammatory exudate          no X-Ray or EKG requiring interpretation    Assessment/Plan:  I anticipate this patient will require at least two midnights for appropriate medical management, necessitating inpatient admission.    * Ureterolithiasis with hydronephrosis- (present on admission)  Assessment & Plan  New moderate left proximal hydroureteronephrosis  Urology consulted will evaluate patient in a.m.    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    Hydronephrosis- (present on admission)  Assessment & Plan  Urology consulted will evaluate patient in a.m.  Pain control      VTE prophylaxis: pharmacologic prophylaxis contraindicated due to high risk of bleed

## 2022-03-02 NOTE — ED TRIAGE NOTES
"Russell Salvatore Kapoor Oni  31 y.o. male  Chief Complaint   Patient presents with   • Flank Pain     Patient says his kidneys are hurting him \"too much to talk\". Reports kidneys stones, kidney cysts, and recent kidney surgery on the 2/26. He followed up out patient and had the stents removed and has been having pain since.    • Unable to Urinate     Patient said he hasn't been able to urinate since he was here earlier when he LWBS. Patient reports blood clots in his urine, thinks he has another clot.      Patient tearful in triage. Protocol ordered.     Vitals:    03/01/22 2225   BP: 114/79   Pulse: 78   Resp: 18   Temp: 36.7 °C (98 °F)   SpO2: 98%       Triage process explained to patient, apologized for wait time, and returned to [facility].  Pt informed to notify staff of any change in condition.     "

## 2022-03-02 NOTE — ED NOTES
Pt medicated per MAR with claritin for itching d/t dilaudid.  Pt states this is a usual rxn to dilaudid.

## 2022-03-02 NOTE — ED NOTES
Lab called to inform potassium was slightly hemolized edcuated to go ahead and release the results @ this time.

## 2022-03-02 NOTE — ASSESSMENT & PLAN NOTE
History of cystinuria with recurrent urinary stones status post multiple lithotripsies and percutaneous nephrostomy tube placement in the past.

## 2022-03-02 NOTE — CONSULTS
[facility] [place] Consult/H&P Note    Primary Service: Medicine  Attending: Pedro Ibanez M.D.  Patient's Name/MRN: Russell Bunn, [id number]    Admit Date:3/1/2022  Today's Date: 3/2/2022   Length of stay:  LOS: 0 days   Room #:  39/39 [facility]      Reason for consult/chief complaint: L flank pain    ADDENDUM:  Reviewed CT scan which demonstrates no stones in L kidney or ureter. Hydronephrosis likely 2/2 ureteral edema in setting of recent stent removal, which should resolve without intervention. No indication for OR at this time, urology will plan for outpatient follow up. Urology signing off, please call with questions.        ID/HPI: Russell Bunn is a 31 y.o. male patient with a hx of cystinuria and cystine stones who has been evaluated in the ED multiple times in recent weeks c/o flank pain. On 2/20 he was found to have an obstructing L ureteral stone and a non-obstructing stone in the L renal pelvis, both of which were treated by Dr. Silver with a CULTS on 2/20. He had a L ureteral stent placed on 2/20 which was removed 3 days ago. Pt now presents to the ED again c/o L flank pain, and an RBUS was done which demonstrated moderate L proximal hydronephrosis, thus urology was consulted.    Pt seen and examined, lying in bed somnolent, in NAD. Reports his pain started the day after his stent removal, and was initially 10/10 but is now 6/10 after dilaudid. Denies fever, chills, N/V, dysuria. UA not suspicious for infection, AFVSS, Cr elevated at 1.5. WBC 10.9.      Past Medical History:   Past Medical History:   Diagnosis Date   • Kidney stones    • Seizure disorder (HCC)         Past Surgical History:   Past Surgical History:   Procedure Laterality Date   • CYSTOSCOPY Left 02/20/2022    Cysto ureteroscopy ,lithotripsy, stone basket, ureteral stent placement, Dr Silver   • [place] CYSTOSCOPY,INSERT URETERAL STENT Left 2/20/2022    Procedure: CYSTOSCOPY, WITH URETERAL STENT INSERTION;  Surgeon:  Rei Silver M.D.;  Location: [facility];  Service: Urology   • [place] CYSTO/URETERO/PYELOSCOPY, DX Left 2/20/2022    Procedure: URETEROSCOPY;  Surgeon: Rei Silver M.D.;  Location: SURGERY [facility];  Service: Urology   • LASERTRIPSY Left 2/20/2022    Procedure: LITHOTRIPSY, USING LASER;  Surgeon: Rei Silver M.D.;  Location: SURGERY [facility];  Service: Urology   • [place] CYSTOSCOPY,INSERT URETERAL STENT Left 4/26/2021    Procedure: CYSTOSCOPY, WITH OUT URETERAL STENT INSERTION;  Surgeon: Raf Moss M.D.;  Location: [facility];  Service: Urology   • [place] CYSTO/URETERO/PYELOSCOPY, DX Left 4/26/2021    Procedure: URETEROSCOPY;  Surgeon: Raf Moss M.D.;  Location: [facility];  Service: Urology   • [place] CYSTO/URETERO/PYELOSCOPY, DX Right 3/13/2020    Procedure: URETEROSCOPY;  Surgeon: Chase Damon M.D.;  Location: [facility];  Service: Urology   • [place] CYSTOSCOPY,INSERT URETERAL STENT Right 3/13/2020    Procedure: CYSTOSCOPY;  Surgeon: Chase Damon M.D.;  Location: [facility];  Service: Urology   • LASERTRIPSY Right 3/13/2020    Procedure: LITHOTRIPSY, USING LASER;  Surgeon: Chase Damon M.D.;  Location: [facility];  Service: Urology   • LITHOTRIPSY          Family History:   No family history on file.      Social History:   Social History     Tobacco Use   • Smoking status: Current Some Day Smoker     Packs/day: 0.50     Types: Cigarettes   • Smokeless tobacco: Never Used   Vaping Use   • Vaping Use: Never used   Substance Use Topics   • Alcohol use: Yes     Comment: Occasional   • Drug use: Yes     Types: Inhaled     Comment: Marijuana, every day      Social History     Social History Narrative   • Not on file        Allergies: he Ancef [cefazolin], Percocet [apap-fd&c red #40 [place]-oxycodone], Shellfish allergy, and Tape    Medications:   (Not in a hospital admission)        Review of Systems  Review of Systems  "  Constitutional: Negative for chills and fever.   Respiratory: Negative for shortness of breath.    Cardiovascular: Negative for leg swelling.   Gastrointestinal: Positive for abdominal pain. Negative for nausea and vomiting.   Genitourinary: Positive for flank pain. Negative for dysuria and hematuria.   All other systems reviewed and are negative.       Physical Exam  VITAL SIGNS: /75   Pulse 60   Temp 36.7 °C (98 °F) (Temporal)   Resp 18   Ht 1.676 m (5' 6\")   Wt 59 kg (130 lb)   SpO2 95%   BMI 20.98 kg/m²   Physical Exam  Vitals and nursing note reviewed.   Constitutional:       General: He is not in acute distress.     Appearance: Normal appearance.   HENT:      Head: Normocephalic and atraumatic.      Nose: Nose normal.      Mouth/Throat:      Mouth: Mucous membranes are moist.      Pharynx: Oropharynx is clear.   Eyes:      Extraocular Movements: Extraocular movements intact.      Conjunctiva/sclera: Conjunctivae normal.   Pulmonary:      Effort: Pulmonary effort is normal.   Abdominal:      General: There is no distension.      Palpations: Abdomen is soft.      Tenderness: There is left CVA tenderness.   Musculoskeletal:         General: Normal range of motion.      Cervical back: Normal range of motion and neck supple.   Skin:     General: Skin is warm and dry.   Neurological:      General: No focal deficit present.           Labs:  Recent Labs     03/01/22  2316   WBC 10.9*   RBC 4.10*   HEMOGLOBIN 13.5*   HEMATOCRIT 40.6*   MCV 99.0*   MCH 32.9   MCHC 33.3*   RDW 49.9   PLATELETCT 265   MPV 9.4     Recent Labs     03/01/22  2316   SODIUM 139   POTASSIUM 4.7   CHLORIDE 103   CO2 25   GLUCOSE 92   BUN 24*   CREATININE 1.50*   CALCIUM 9.4         Glucose:  Recent Labs     03/01/22  2316   GLUCOSE 92     Coags:  No results for input(s): INR in the last 72 hours.      Urinalysis:   Recent Labs     03/02/22  0123   COLORURINE Yellow   CLARITY Hazy*   SPECGRAVITY 1.015   PHURINE 7.0   GLUCOSEUR " Negative   KETONES Negative   NITRITE Negative   OCCULTBLOOD Moderate*   BACTERIA Negative       Imaging:  US-RENAL   Final Result      New moderate left proximal hydroureteronephrosis. Ureteral stent is no longer seen      Bilateral nephrolithiasis      Debris within the urinary bladder could indicate hemorrhage, infectious or inflammatory exudate      CT-ABDOMEN-PELVIS W/O    (Results Pending)       @lastct@     Assessment/Recommendation   31 y.o. M with a hx of cystinuria presenting with L flank pain and new L hydro in the setting of recent L CULTS and ureteral stent removal.    · Will obtain CT A/P w/o to definitively evaluate for stones, although recurrence of a stone this soon after surgery is unlikely  · No plans for surgical intervention today, will await results of CT scan  · Continue supportive cares per primary team   · Urology will continue to follow      Dr. Stephenson is aware of consult and has directed this patient's plan of care.       Geeta Schneider P.A.-C.   [address] [address]   [phone]

## 2022-03-02 NOTE — ED PROVIDER NOTES
"ED Provider Note    Scribed for Dario Nicholson M.D. by Jay Talley. 3/2/2022  12:59 AM    Primary care provider: Pcp Pt States None  Means of arrival: EMS  History obtained from: Patient  History limited by: None    CHIEF COMPLAINT  Chief Complaint   Patient presents with   • Flank Pain     Patient says his kidneys are hurting him \"too much to talk\". Reports kidneys stones, kidney cysts, and recent kidney surgery on the 2/26. He followed up out patient and had the stents removed and has been having pain since.    • Unable to Urinate     Patient said he hasn't been able to urinate since he was here earlier when he LWBS. Patient reports blood clots in his urine, thinks he has another clot.        HPI  Russell Bunn is a 31 y.o. male who presents to the Emergency Department via EMS for severe left flank pain and urinary retention. The patient had left uretal stent placement on 2/20/22 with stent removal 3 days ago. He states the day after having the stent removed, he began experiencing kidney pain that has since been worsening. He has been unable to urinate as much as normal but when he does urinate, the urine is discolored and appears to have blood clots. The patient denies fever and adds he has a history of kidney stones, kidney cysts, and lithotripsy with percutaneous nephrostomy tube in addition to his recent surgery. No exacerbating or alleviating factors were reported.     REVIEW OF SYSTEMS  Pertinent positives include: flank pain, urinary retention, hematuria. Pertinent negatives include: fever. See history of present illness. All other systems are negative.     PAST MEDICAL HISTORY   has a past medical history of Kidney stones and Seizure disorder (HCC).    SURGICAL HISTORY   has a past surgical history that includes lithotripsy; cysto/uretero/pyeloscopy, dx (Right, 3/13/2020); cystoscopy,insert ureteral stent (Right, 3/13/2020); lasertripsy (Right, 3/13/2020); cystoscopy,insert ureteral stent " "(Left, 4/26/2021); cysto/uretero/pyeloscopy, dx (Left, 4/26/2021); cystoscopy (Left, 02/20/2022); cystoscopy,insert ureteral stent (Left, 2/20/2022); cysto/uretero/pyeloscopy, dx (Left, 2/20/2022); and lasertripsy (Left, 2/20/2022).    SOCIAL HISTORY  Social History     Tobacco Use   • Smoking status: Current Some Day Smoker     Packs/day: 0.50     Types: Cigarettes   • Smokeless tobacco: Never Used   Vaping Use   • Vaping Use: Never used   Substance Use Topics   • Alcohol use: Yes     Comment: Occasional   • Drug use: Yes     Types: Inhaled     Comment: Marijuana, every day      Social History     Substance and Sexual Activity   Drug Use Yes   • Types: Inhaled    Comment: Marijuana, every day       FAMILY HISTORY  Not pertinent.     CURRENT MEDICATIONS  No current outpatient medications    ALLERGIES  Allergies   Allergen Reactions   • Ancef [Cefazolin] Rash     rash   • Percocet [Apap-Fd&C Red #40 [place]-Oxycodone]      Tolerates acetaminophen   • Shellfish Allergy    • Tape      Paper tape okay        PHYSICAL EXAM  VITAL SIGNS: /79   Pulse 78   Temp 36.7 °C (98 °F) (Temporal)   Resp 18   Ht 1.676 m (5' 6\")   Wt 59 kg (130 lb)   SpO2 98%   BMI 20.98 kg/m²     Constitutional: Alert in no apparent distress.  HENT: No signs of trauma, Bilateral external ears normal, Nose normal. Uvula midline.   Eyes: Pupils are equal and reactive, Conjunctiva normal, Non-icteric.   Neck: Normal range of motion, No tenderness, Supple, No stridor.   Lymphatic: No lymphadenopathy noted.   Cardiovascular: Regular rate and rhythm, no murmurs.   Thorax & Lungs: Normal breath sounds, No respiratory distress, No wheezing, No chest tenderness.   Abdomen:  Soft, No tenderness, No peritoneal signs, No masses, No pulsatile masses.   Skin: Warm, Dry, No erythema, No rash.   Back: No bony tenderness, Left CVA tenderness.   Extremities: Intact distal pulses, No edema, No tenderness, No cyanosis.  Musculoskeletal: Good range of motion " in all major joints. No tenderness to palpation or major deformities noted.   Neurologic: Alert , Normal motor function, Normal sensory function, No focal deficits noted.   Psychiatric: Affect normal, Judgment normal, Mood normal.       DIAGNOSTIC STUDIES / PROCEDURES    LABS  Labs Reviewed   CBC WITH DIFFERENTIAL - Abnormal; Notable for the following components:       Result Value    WBC 10.9 (*)     RBC 4.10 (*)     Hemoglobin 13.5 (*)     Hematocrit 40.6 (*)     MCV 99.0 (*)     MCHC 33.3 (*)     Lymphocytes 20.30 (*)     Eosinophils 7.40 (*)     Monos (Absolute) 1.35 (*)     Eos (Absolute) 0.81 (*)     All other components within normal limits   COMP METABOLIC PANEL - Abnormal; Notable for the following components:    Bun 24 (*)     Creatinine 1.50 (*)     All other components within normal limits   URINALYSIS - Abnormal; Notable for the following components:    Character Hazy (*)     Protein 30 (*)     Leukocyte Esterase Small (*)     Occult Blood Moderate (*)     All other components within normal limits   ESTIMATED GFR - Abnormal; Notable for the following components:    GFR If Non  55 (*)     All other components within normal limits   LIPASE   URINE MICROSCOPIC (W/UA)   SARS-COV ANTIGEN DARRELL    Narrative:     Indication for culture:->Unexplained new onset of Flank pain  and/or Costovertebral angle tenderness  Is patient being admitted?->Yes  Does this patient meet criteria for Rush/Cepheid per [facility]  Inpatient Workflow? (See workflow link below)->Yes  Expected turn around time?->([place]/[facility]) Antigen dry swab  test asymptomatic surgical patient   URINE CULTURE(NEW)      All labs reviewed by me.    RADIOLOGY  US-RENAL   Final Result      New moderate left proximal hydroureteronephrosis. Ureteral stent is no longer seen      Bilateral nephrolithiasis      Debris within the urinary bladder could indicate hemorrhage, infectious or inflammatory exudate        The radiologist's interpretation of  all radiological studies have been reviewed by me.    COURSE & MEDICAL DECISION MAKING  Nursing notes, VS, PMSFHx reviewed in chart.    31 y.o. male p/w chief complaint of severe left flank pain.    12:59 AM Patient seen and examined at bedside. Ordered US-renal, CBC w/diff, CMP, lipase, and UA to evaluate. The patient will be treated with morphine 4 mg and Zofran 4 mg.     I verified that the patient was wearing a mask and I was wearing appropriate PPE every time I entered the room. The patient's mask was on the patient at all times during my encounter except for a brief view of the oropharynx.     The differential diagnoses include but are not limited to:     #flank pain    Review of records reveal the patient has a history of cystine stones and was seen in this ED on 2/26/22 for hematuria and f/u after surgery.   Patient presents 3 days s/p left uretal stent removal with flank pain consistent with previous kidney stone pain.   Patient otherwise well-appearing with low suspicion for sepsis, dissection or infected obstructed renal colic.   Ultrasound evaluation reveals the pt has a distended bladder with greater than 500 cc of urine present.   Repeat bladder scan after urination is 0.   US w/ new moderate left proximal hydroureteronephrosis and bilateral nephrolithiasis.  No e/o UTI  Baseline creatinine is 1.2; today is 1.5.     Low suspicion for atypical appendicitis, torsion, acute monika, or intraabdominal infection.   Urology consulted, advises admission for supportive care and consideration of percutaneous nephrostomy tube if pain does not improve.     Intravenous fluids administered for elevated creatinine.  Patient not appropriate for oral rehydration, as surgical process needs to be ruled out before trial of oral rehydration.   On repeat evaluation, pt w/ improvement in sx.  Pt w/ positive fluid response.    1:30 AM Repeat bladder scan performed.     2:57 AM Paged urology for consult.     3:09 AM I discussed  the patient's case and the above findings with Dr. Cramer (Urology) who recommends keeping NPO, hospitalization, and supportive care.     3:10 AM Paged the hospitalist.     3:13 AM I discussed the patient's case and the above findings with Dr. Boyd (Hospitalist) who agreed to hospitalize the patient    DISPOSITION:  Patient will be hospitalized by Dr. Boyd in guarded condition.    FINAL IMPRESSION  1. Flank pain    2. Hydronephrosis, unspecified hydronephrosis type    3. Cystine stone in urine (HCC)          Jay CONTRERAS (Scribe), am scribing for, and in the presence of, Dario Nicholson M.D..    Electronically signed by: Jay Talley (Scribe), 3/2/2022    IDario M.D. personally performed the services described in this documentation, as scribed by Jay Talley in my presence, and it is both accurate and complete.    C    The note accurately reflects work and decisions made by me.  Dario Nicholson M.D.  3/2/2022  6:44 AM

## 2022-03-02 NOTE — ED NOTES
Med rec completed per patient  Allergies reviewed  No PO Antibiotics in the last 30 days     Patient stated he took medications, but could not tell me what or what they were for. I followed up with home pharmacy ([facility]) who stated they have not filled for the patient since 2020.   No other medications found on file for the patient.

## 2022-03-04 LAB
BACTERIA UR CULT: NORMAL
SIGNIFICANT IND 70042: NORMAL
SITE SITE: NORMAL
SOURCE SOURCE: NORMAL

## 2022-03-10 ENCOUNTER — APPOINTMENT (OUTPATIENT)
Dept: RADIOLOGY | Facility: MEDICAL CENTER | Age: 32
DRG: 690 | End: 2022-03-10
Attending: EMERGENCY MEDICINE
Payer: COMMERCIAL

## 2022-03-10 ENCOUNTER — APPOINTMENT (OUTPATIENT)
Dept: RADIOLOGY | Facility: MEDICAL CENTER | Age: 32
DRG: 690 | End: 2022-03-10
Attending: INTERNAL MEDICINE
Payer: COMMERCIAL

## 2022-03-10 ENCOUNTER — HOSPITAL ENCOUNTER (INPATIENT)
Facility: MEDICAL CENTER | Age: 32
LOS: 1 days | DRG: 690 | End: 2022-03-11
Attending: EMERGENCY MEDICINE | Admitting: INTERNAL MEDICINE
Payer: COMMERCIAL

## 2022-03-10 DIAGNOSIS — N13.9 OBSTRUCTIVE UROPATHY: ICD-10-CM

## 2022-03-10 DIAGNOSIS — N12 PYELONEPHRITIS: ICD-10-CM

## 2022-03-10 PROBLEM — N13.30 HYDROURETERONEPHROSIS: Status: ACTIVE | Noted: 2022-03-10

## 2022-03-10 LAB
ALBUMIN SERPL BCP-MCNC: 4.3 G/DL (ref 3.2–4.9)
ALBUMIN/GLOB SERPL: 1.5 G/DL
ALP SERPL-CCNC: 100 U/L (ref 30–99)
ALT SERPL-CCNC: 12 U/L (ref 2–50)
ANION GAP SERPL CALC-SCNC: 13 MMOL/L (ref 7–16)
APPEARANCE UR: ABNORMAL
APPEARANCE UR: CLEAR
APTT PPP: 30.3 SEC (ref 24.7–36)
AST SERPL-CCNC: 19 U/L (ref 12–45)
BACTERIA #/AREA URNS HPF: NEGATIVE /HPF
BACTERIA #/AREA URNS HPF: NEGATIVE /HPF
BASOPHILS # BLD AUTO: 0.7 % (ref 0–1.8)
BASOPHILS # BLD: 0.07 K/UL (ref 0–0.12)
BILIRUB SERPL-MCNC: 0.3 MG/DL (ref 0.1–1.5)
BILIRUB UR QL STRIP.AUTO: NEGATIVE
BILIRUB UR QL STRIP.AUTO: NEGATIVE
BUN SERPL-MCNC: 14 MG/DL (ref 8–22)
CALCIUM SERPL-MCNC: 9 MG/DL (ref 8.5–10.5)
CHLORIDE SERPL-SCNC: 101 MMOL/L (ref 96–112)
CO2 SERPL-SCNC: 23 MMOL/L (ref 20–33)
COLOR UR: YELLOW
COLOR UR: YELLOW
CREAT SERPL-MCNC: 1.22 MG/DL (ref 0.5–1.4)
EOSINOPHIL # BLD AUTO: 1.24 K/UL (ref 0–0.51)
EOSINOPHIL NFR BLD: 11.8 % (ref 0–6.9)
EPI CELLS #/AREA URNS HPF: ABNORMAL /HPF
EPI CELLS #/AREA URNS HPF: NEGATIVE /HPF
ERYTHROCYTE [DISTWIDTH] IN BLOOD BY AUTOMATED COUNT: 49.3 FL (ref 35.9–50)
FLUAV RNA SPEC QL NAA+PROBE: NEGATIVE
FLUBV RNA SPEC QL NAA+PROBE: NEGATIVE
GLOBULIN SER CALC-MCNC: 2.9 G/DL (ref 1.9–3.5)
GLUCOSE SERPL-MCNC: 110 MG/DL (ref 65–99)
GLUCOSE UR STRIP.AUTO-MCNC: NEGATIVE MG/DL
GLUCOSE UR STRIP.AUTO-MCNC: NEGATIVE MG/DL
HCT VFR BLD AUTO: 42.6 % (ref 42–52)
HGB BLD-MCNC: 13.8 G/DL (ref 14–18)
HYALINE CASTS #/AREA URNS LPF: ABNORMAL /LPF
HYALINE CASTS #/AREA URNS LPF: ABNORMAL /LPF
IMM GRANULOCYTES # BLD AUTO: 0.04 K/UL (ref 0–0.11)
IMM GRANULOCYTES NFR BLD AUTO: 0.4 % (ref 0–0.9)
INR PPP: 1.06 (ref 0.87–1.13)
KETONES UR STRIP.AUTO-MCNC: NEGATIVE MG/DL
KETONES UR STRIP.AUTO-MCNC: NEGATIVE MG/DL
LEUKOCYTE ESTERASE UR QL STRIP.AUTO: ABNORMAL
LEUKOCYTE ESTERASE UR QL STRIP.AUTO: ABNORMAL
LIPASE SERPL-CCNC: 74 U/L (ref 11–82)
LYMPHOCYTES # BLD AUTO: 2.54 K/UL (ref 1–4.8)
LYMPHOCYTES NFR BLD: 24.2 % (ref 22–41)
MCH RBC QN AUTO: 32.5 PG (ref 27–33)
MCHC RBC AUTO-ENTMCNC: 32.4 G/DL (ref 33.7–35.3)
MCV RBC AUTO: 100.5 FL (ref 81.4–97.8)
MICRO URNS: ABNORMAL
MICRO URNS: ABNORMAL
MONOCYTES # BLD AUTO: 0.85 K/UL (ref 0–0.85)
MONOCYTES NFR BLD AUTO: 8.1 % (ref 0–13.4)
NEUTROPHILS # BLD AUTO: 5.77 K/UL (ref 1.82–7.42)
NEUTROPHILS NFR BLD: 54.8 % (ref 44–72)
NITRITE UR QL STRIP.AUTO: NEGATIVE
NITRITE UR QL STRIP.AUTO: NEGATIVE
NRBC # BLD AUTO: 0 K/UL
NRBC BLD-RTO: 0 /100 WBC
PH UR STRIP.AUTO: 7 [PH] (ref 5–8)
PH UR STRIP.AUTO: 7 [PH] (ref 5–8)
PLATELET # BLD AUTO: 285 K/UL (ref 164–446)
PMV BLD AUTO: 8.9 FL (ref 9–12.9)
POTASSIUM SERPL-SCNC: 4.1 MMOL/L (ref 3.6–5.5)
PROT SERPL-MCNC: 7.2 G/DL (ref 6–8.2)
PROT UR QL STRIP: 30 MG/DL
PROT UR QL STRIP: NEGATIVE MG/DL
PROTHROMBIN TIME: 13.5 SEC (ref 12–14.6)
RBC # BLD AUTO: 4.24 M/UL (ref 4.7–6.1)
RBC # URNS HPF: ABNORMAL /HPF
RBC # URNS HPF: ABNORMAL /HPF
RBC UR QL AUTO: ABNORMAL
RBC UR QL AUTO: ABNORMAL
RSV RNA SPEC QL NAA+PROBE: NEGATIVE
SARS-COV-2 RNA RESP QL NAA+PROBE: NOTDETECTED
SODIUM SERPL-SCNC: 137 MMOL/L (ref 135–145)
SP GR UR STRIP.AUTO: 1.01
SP GR UR STRIP.AUTO: 1.01
SPECIMEN SOURCE: NORMAL
UROBILINOGEN UR STRIP.AUTO-MCNC: 0.2 MG/DL
UROBILINOGEN UR STRIP.AUTO-MCNC: 0.2 MG/DL
WBC # BLD AUTO: 10.5 K/UL (ref 4.8–10.8)
WBC #/AREA URNS HPF: ABNORMAL /HPF
WBC #/AREA URNS HPF: ABNORMAL /HPF

## 2022-03-10 PROCEDURE — 770006 HCHG ROOM/CARE - MED/SURG/GYN SEMI*

## 2022-03-10 PROCEDURE — 700105 HCHG RX REV CODE 258: Performed by: EMERGENCY MEDICINE

## 2022-03-10 PROCEDURE — 160035 HCHG PACU - 1ST 60 MINS PHASE I

## 2022-03-10 PROCEDURE — 96376 TX/PRO/DX INJ SAME DRUG ADON: CPT

## 2022-03-10 PROCEDURE — 80053 COMPREHEN METABOLIC PANEL: CPT

## 2022-03-10 PROCEDURE — 87086 URINE CULTURE/COLONY COUNT: CPT

## 2022-03-10 PROCEDURE — 700105 HCHG RX REV CODE 258: Performed by: INTERNAL MEDICINE

## 2022-03-10 PROCEDURE — 0241U HCHG SARS-COV-2 COVID-19 NFCT DS RESP RNA 4 TRGT MIC: CPT

## 2022-03-10 PROCEDURE — 96375 TX/PRO/DX INJ NEW DRUG ADDON: CPT

## 2022-03-10 PROCEDURE — 700111 HCHG RX REV CODE 636 W/ 250 OVERRIDE (IP): Performed by: INTERNAL MEDICINE

## 2022-03-10 PROCEDURE — 81001 URINALYSIS AUTO W/SCOPE: CPT

## 2022-03-10 PROCEDURE — 700102 HCHG RX REV CODE 250 W/ 637 OVERRIDE(OP)

## 2022-03-10 PROCEDURE — 700102 HCHG RX REV CODE 250 W/ 637 OVERRIDE(OP): Performed by: INTERNAL MEDICINE

## 2022-03-10 PROCEDURE — 85730 THROMBOPLASTIN TIME PARTIAL: CPT

## 2022-03-10 PROCEDURE — A9270 NON-COVERED ITEM OR SERVICE: HCPCS

## 2022-03-10 PROCEDURE — 700111 HCHG RX REV CODE 636 W/ 250 OVERRIDE (IP): Performed by: EMERGENCY MEDICINE

## 2022-03-10 PROCEDURE — 700111 HCHG RX REV CODE 636 W/ 250 OVERRIDE (IP): Performed by: RADIOLOGY

## 2022-03-10 PROCEDURE — 85610 PROTHROMBIN TIME: CPT

## 2022-03-10 PROCEDURE — 700111 HCHG RX REV CODE 636 W/ 250 OVERRIDE (IP)

## 2022-03-10 PROCEDURE — A9270 NON-COVERED ITEM OR SERVICE: HCPCS | Performed by: INTERNAL MEDICINE

## 2022-03-10 PROCEDURE — 74176 CT ABD & PELVIS W/O CONTRAST: CPT

## 2022-03-10 PROCEDURE — 160002 HCHG RECOVERY MINUTES (STAT)

## 2022-03-10 PROCEDURE — 83690 ASSAY OF LIPASE: CPT

## 2022-03-10 PROCEDURE — 0T9430Z DRAINAGE OF LEFT KIDNEY PELVIS WITH DRAINAGE DEVICE, PERCUTANEOUS APPROACH: ICD-10-PCS | Performed by: RADIOLOGY

## 2022-03-10 PROCEDURE — 99223 1ST HOSP IP/OBS HIGH 75: CPT | Mod: AI | Performed by: INTERNAL MEDICINE

## 2022-03-10 PROCEDURE — C9803 HOPD COVID-19 SPEC COLLECT: HCPCS | Performed by: EMERGENCY MEDICINE

## 2022-03-10 PROCEDURE — 700117 HCHG RX CONTRAST REV CODE 255: Performed by: INTERNAL MEDICINE

## 2022-03-10 PROCEDURE — C1729 CATH, DRAINAGE: HCPCS

## 2022-03-10 PROCEDURE — 96374 THER/PROPH/DIAG INJ IV PUSH: CPT

## 2022-03-10 PROCEDURE — 85025 COMPLETE CBC W/AUTO DIFF WBC: CPT

## 2022-03-10 PROCEDURE — 36415 COLL VENOUS BLD VENIPUNCTURE: CPT

## 2022-03-10 PROCEDURE — 99291 CRITICAL CARE FIRST HOUR: CPT

## 2022-03-10 RX ORDER — ONDANSETRON 2 MG/ML
4 INJECTION INTRAMUSCULAR; INTRAVENOUS PRN
Status: ACTIVE | OUTPATIENT
Start: 2022-03-10 | End: 2022-03-10

## 2022-03-10 RX ORDER — PROMETHAZINE HYDROCHLORIDE 25 MG/1
12.5-25 TABLET ORAL EVERY 4 HOURS PRN
Status: DISCONTINUED | OUTPATIENT
Start: 2022-03-10 | End: 2022-03-11 | Stop reason: HOSPADM

## 2022-03-10 RX ORDER — MORPHINE SULFATE 4 MG/ML
4 INJECTION INTRAVENOUS ONCE
Status: COMPLETED | OUTPATIENT
Start: 2022-03-10 | End: 2022-03-10

## 2022-03-10 RX ORDER — POLYETHYLENE GLYCOL 3350 17 G/17G
1 POWDER, FOR SOLUTION ORAL
Status: DISCONTINUED | OUTPATIENT
Start: 2022-03-10 | End: 2022-03-11 | Stop reason: HOSPADM

## 2022-03-10 RX ORDER — HYDROXYZINE 50 MG/1
25 TABLET, FILM COATED ORAL 3 TIMES DAILY PRN
Status: DISCONTINUED | OUTPATIENT
Start: 2022-03-10 | End: 2022-03-11 | Stop reason: HOSPADM

## 2022-03-10 RX ORDER — SODIUM CHLORIDE 9 MG/ML
INJECTION, SOLUTION INTRAVENOUS CONTINUOUS
Status: ACTIVE | OUTPATIENT
Start: 2022-03-10 | End: 2022-03-10

## 2022-03-10 RX ORDER — MIDAZOLAM HYDROCHLORIDE 1 MG/ML
INJECTION INTRAMUSCULAR; INTRAVENOUS
Status: COMPLETED
Start: 2022-03-10 | End: 2022-03-10

## 2022-03-10 RX ORDER — SODIUM CHLORIDE 9 MG/ML
1000 INJECTION, SOLUTION INTRAVENOUS ONCE
Status: COMPLETED | OUTPATIENT
Start: 2022-03-10 | End: 2022-03-10

## 2022-03-10 RX ORDER — ONDANSETRON 2 MG/ML
4 INJECTION INTRAMUSCULAR; INTRAVENOUS ONCE
Status: COMPLETED | OUTPATIENT
Start: 2022-03-10 | End: 2022-03-10

## 2022-03-10 RX ORDER — SODIUM CHLORIDE 9 MG/ML
500 INJECTION, SOLUTION INTRAVENOUS
Status: ACTIVE | OUTPATIENT
Start: 2022-03-10 | End: 2022-03-10

## 2022-03-10 RX ORDER — DIPHENHYDRAMINE HCL 25 MG
25 TABLET ORAL EVERY 6 HOURS PRN
Status: DISCONTINUED | OUTPATIENT
Start: 2022-03-10 | End: 2022-03-11 | Stop reason: HOSPADM

## 2022-03-10 RX ORDER — DIPHENHYDRAMINE HYDROCHLORIDE 50 MG/ML
12.5 INJECTION INTRAMUSCULAR; INTRAVENOUS
Status: COMPLETED | OUTPATIENT
Start: 2022-03-10 | End: 2022-03-10

## 2022-03-10 RX ORDER — MIDAZOLAM HYDROCHLORIDE 1 MG/ML
.5-2 INJECTION INTRAMUSCULAR; INTRAVENOUS PRN
Status: ACTIVE | OUTPATIENT
Start: 2022-03-10 | End: 2022-03-10

## 2022-03-10 RX ORDER — DIPHENHYDRAMINE HCL 25 MG
25 TABLET ORAL EVERY 8 HOURS PRN
Status: DISCONTINUED | OUTPATIENT
Start: 2022-03-10 | End: 2022-03-10

## 2022-03-10 RX ORDER — PROCHLORPERAZINE EDISYLATE 5 MG/ML
5-10 INJECTION INTRAMUSCULAR; INTRAVENOUS EVERY 4 HOURS PRN
Status: DISCONTINUED | OUTPATIENT
Start: 2022-03-10 | End: 2022-03-11 | Stop reason: HOSPADM

## 2022-03-10 RX ORDER — ONDANSETRON 4 MG/1
4 TABLET, ORALLY DISINTEGRATING ORAL EVERY 4 HOURS PRN
Status: DISCONTINUED | OUTPATIENT
Start: 2022-03-10 | End: 2022-03-11 | Stop reason: HOSPADM

## 2022-03-10 RX ORDER — CEFTRIAXONE 2 G/1
2 INJECTION, POWDER, FOR SOLUTION INTRAMUSCULAR; INTRAVENOUS ONCE
Status: COMPLETED | OUTPATIENT
Start: 2022-03-10 | End: 2022-03-10

## 2022-03-10 RX ORDER — PROMETHAZINE HYDROCHLORIDE 25 MG/1
12.5-25 SUPPOSITORY RECTAL EVERY 4 HOURS PRN
Status: DISCONTINUED | OUTPATIENT
Start: 2022-03-10 | End: 2022-03-11 | Stop reason: HOSPADM

## 2022-03-10 RX ORDER — AMOXICILLIN 250 MG
2 CAPSULE ORAL 2 TIMES DAILY
Status: DISCONTINUED | OUTPATIENT
Start: 2022-03-10 | End: 2022-03-11 | Stop reason: HOSPADM

## 2022-03-10 RX ORDER — OXYCODONE HYDROCHLORIDE 5 MG/1
5 TABLET ORAL
Status: DISCONTINUED | OUTPATIENT
Start: 2022-03-10 | End: 2022-03-10

## 2022-03-10 RX ORDER — OXYCODONE HYDROCHLORIDE 5 MG/1
10 TABLET ORAL
Status: DISCONTINUED | OUTPATIENT
Start: 2022-03-10 | End: 2022-03-10

## 2022-03-10 RX ORDER — HEPARIN SODIUM 5000 [USP'U]/ML
5000 INJECTION, SOLUTION INTRAVENOUS; SUBCUTANEOUS EVERY 8 HOURS
Status: DISCONTINUED | OUTPATIENT
Start: 2022-03-10 | End: 2022-03-11 | Stop reason: HOSPADM

## 2022-03-10 RX ORDER — HYDROMORPHONE HYDROCHLORIDE 1 MG/ML
0.5 INJECTION, SOLUTION INTRAMUSCULAR; INTRAVENOUS; SUBCUTANEOUS
Status: DISCONTINUED | OUTPATIENT
Start: 2022-03-10 | End: 2022-03-11 | Stop reason: HOSPADM

## 2022-03-10 RX ORDER — HYDROCODONE BITARTRATE AND ACETAMINOPHEN 5; 325 MG/1; MG/1
1 TABLET ORAL EVERY 6 HOURS PRN
Status: DISCONTINUED | OUTPATIENT
Start: 2022-03-10 | End: 2022-03-11 | Stop reason: HOSPADM

## 2022-03-10 RX ORDER — ACETAMINOPHEN 325 MG/1
650 TABLET ORAL EVERY 6 HOURS PRN
Status: DISCONTINUED | OUTPATIENT
Start: 2022-03-10 | End: 2022-03-11 | Stop reason: HOSPADM

## 2022-03-10 RX ORDER — BISACODYL 10 MG
10 SUPPOSITORY, RECTAL RECTAL
Status: DISCONTINUED | OUTPATIENT
Start: 2022-03-10 | End: 2022-03-11 | Stop reason: HOSPADM

## 2022-03-10 RX ORDER — ONDANSETRON 2 MG/ML
4 INJECTION INTRAMUSCULAR; INTRAVENOUS EVERY 4 HOURS PRN
Status: DISCONTINUED | OUTPATIENT
Start: 2022-03-10 | End: 2022-03-11 | Stop reason: HOSPADM

## 2022-03-10 RX ORDER — LABETALOL HYDROCHLORIDE 5 MG/ML
10 INJECTION, SOLUTION INTRAVENOUS EVERY 4 HOURS PRN
Status: DISCONTINUED | OUTPATIENT
Start: 2022-03-10 | End: 2022-03-11 | Stop reason: HOSPADM

## 2022-03-10 RX ADMIN — MIDAZOLAM HYDROCHLORIDE 1 MG: 1 INJECTION, SOLUTION INTRAMUSCULAR; INTRAVENOUS at 19:58

## 2022-03-10 RX ADMIN — MORPHINE SULFATE 4 MG: 4 INJECTION INTRAVENOUS at 05:13

## 2022-03-10 RX ADMIN — MORPHINE SULFATE 4 MG: 4 INJECTION INTRAVENOUS at 07:00

## 2022-03-10 RX ADMIN — CEFTRIAXONE SODIUM 2 G: 2 INJECTION, POWDER, FOR SOLUTION INTRAMUSCULAR; INTRAVENOUS at 06:54

## 2022-03-10 RX ADMIN — HYDROXYZINE HYDROCHLORIDE 25 MG: 25 TABLET ORAL at 23:18

## 2022-03-10 RX ADMIN — HYDROMORPHONE HYDROCHLORIDE 0.5 MG: 1 INJECTION, SOLUTION INTRAMUSCULAR; INTRAVENOUS; SUBCUTANEOUS at 08:50

## 2022-03-10 RX ADMIN — FENTANYL CITRATE 50 MCG: 50 INJECTION INTRAMUSCULAR; INTRAVENOUS at 19:58

## 2022-03-10 RX ADMIN — FENTANYL CITRATE 50 MCG: 50 INJECTION, SOLUTION INTRAMUSCULAR; INTRAVENOUS at 19:58

## 2022-03-10 RX ADMIN — HYDROCODONE BITARTRATE AND ACETAMINOPHEN 1 TABLET: 5; 325 TABLET ORAL at 13:08

## 2022-03-10 RX ADMIN — DIPHENHYDRAMINE HYDROCHLORIDE 12.5 MG: 50 INJECTION INTRAMUSCULAR; INTRAVENOUS at 10:46

## 2022-03-10 RX ADMIN — MIDAZOLAM HYDROCHLORIDE 1 MG: 1 INJECTION, SOLUTION INTRAMUSCULAR; INTRAVENOUS at 20:04

## 2022-03-10 RX ADMIN — MORPHINE SULFATE 4 MG: 4 INJECTION INTRAVENOUS at 03:58

## 2022-03-10 RX ADMIN — DIPHENHYDRAMINE HYDROCHLORIDE 25 MG: 25 TABLET ORAL at 23:58

## 2022-03-10 RX ADMIN — SODIUM CHLORIDE 1000 ML: 9 INJECTION, SOLUTION INTRAVENOUS at 07:04

## 2022-03-10 RX ADMIN — FENTANYL CITRATE 50 MCG: 50 INJECTION, SOLUTION INTRAMUSCULAR; INTRAVENOUS at 20:04

## 2022-03-10 RX ADMIN — ONDANSETRON 4 MG: 2 INJECTION INTRAMUSCULAR; INTRAVENOUS at 03:57

## 2022-03-10 RX ADMIN — HYDROCODONE BITARTRATE AND ACETAMINOPHEN 1 TABLET: 5; 325 TABLET ORAL at 21:57

## 2022-03-10 RX ADMIN — SODIUM CHLORIDE: 9 INJECTION, SOLUTION INTRAVENOUS at 08:34

## 2022-03-10 RX ADMIN — HYDROXYZINE HYDROCHLORIDE 25 MG: 25 TABLET ORAL at 14:23

## 2022-03-10 RX ADMIN — HYDROXYZINE HYDROCHLORIDE 25 MG: 25 TABLET ORAL at 09:50

## 2022-03-10 RX ADMIN — IOHEXOL 40 ML: 300 INJECTION, SOLUTION INTRAVENOUS at 20:45

## 2022-03-10 ASSESSMENT — ENCOUNTER SYMPTOMS
DIZZINESS: 0
HEMOPTYSIS: 0
INSOMNIA: 0
PALPITATIONS: 0
NAUSEA: 0
BRUISES/BLEEDS EASILY: 0
FLANK PAIN: 1
NECK PAIN: 0
SORE THROAT: 0
DOUBLE VISION: 0
FEVER: 0
WEIGHT LOSS: 0
VOMITING: 0
MYALGIAS: 0
BLURRED VISION: 0
STRIDOR: 0
DEPRESSION: 0
HEADACHES: 0
COUGH: 0

## 2022-03-10 ASSESSMENT — PAIN DESCRIPTION - PAIN TYPE
TYPE: ACUTE PAIN
TYPE: SURGICAL PAIN
TYPE: ACUTE PAIN
TYPE: SURGICAL PAIN
TYPE: ACUTE PAIN

## 2022-03-10 ASSESSMENT — FIBROSIS 4 INDEX: FIB4 SCORE: 0.62

## 2022-03-10 NOTE — ED NOTES
"Pt was BIBA for  Chief Complaint   Patient presents with   • Flank Pain     Pt reports L flank pain that  has been going on since his surgery to remove his kidney stone on February 22nd. Pt reports he also had a stent placed on L side at that time. Pt reports stent was removed last week. Pt reports symptoms have worsened since stent removal.    • Other     Pt reports pain over his bladder that feels like pressure.      Pt reports he has been making little urine since stent removal.Pt reports he when he was last here they were suppose to do a powell catheter and he is unsure why they did not. Pt has hx of kidney stones.     /85   Pulse 87   Resp 16   Ht 1.676 m (5' 6\")   Wt 63.5 kg (140 lb)   SpO2 97%   BMI 22.60 kg/m²     "

## 2022-03-10 NOTE — ED PROVIDER NOTES
ED Provider Note    ER Provider Note         CHIEF COMPLAINT  Chief Complaint   Patient presents with   • Flank Pain     Pt reports L flank pain that  has been going on since his surgery to remove his kidney stone on February 22nd. Pt reports he also had a stent placed on L side at that time. Pt reports stent was removed last week. Pt reports symptoms have worsened since stent removal.    • Other     Pt reports pain over his bladder that feels like pressure.        HPI  Russell Bunn is a 31 y.o. male who presents to the Emergency Department with left-sided flank pain.  This has been going on and off since his surgery that happened on February 22.  The patient continues to have significant pain but did have some relief for about a week.  It got worse over the past few days.  The patient does feel some pressure in his bladder.  He has been drinking normally has not had to urinate in the last few hours.  The patient describes the pain as cramping and very uncomfortable.  Did have a stent placed in the left ureter that was removed.  Patient denies any chest pain or shortness of breath.    REVIEW OF SYSTEMS  See HPI for further details. All other systems are negative.     PAST MEDICAL HISTORY   has a past medical history of Kidney stones and Seizure disorder (HCC).    SURGICAL HISTORY   has a past surgical history that includes lithotripsy; cysto/uretero/pyeloscopy, dx (Right, 3/13/2020); cystoscopy,insert ureteral stent (Right, 3/13/2020); lasertripsy (Right, 3/13/2020); cystoscopy,insert ureteral stent (Left, 4/26/2021); cysto/uretero/pyeloscopy, dx (Left, 4/26/2021); cystoscopy (Left, 02/20/2022); cystoscopy,insert ureteral stent (Left, 2/20/2022); cysto/uretero/pyeloscopy, dx (Left, 2/20/2022); and lasertripsy (Left, 2/20/2022).    SOCIAL HISTORY  Social History     Tobacco Use   • Smoking status: Former Smoker     Packs/day: 0.50     Types: Cigarettes   • Smokeless tobacco: Never Used   Vaping Use   •  "Vaping Use: Never used   Substance Use Topics   • Alcohol use: Yes     Comment: Occasional   • Drug use: Yes     Types: Inhaled     Comment: Marijuana, every day      Social History     Substance and Sexual Activity   Drug Use Yes   • Types: Inhaled    Comment: Marijuana, every day       FAMILY HISTORY  History reviewed. No pertinent family history.    CURRENT MEDICATIONS  Home Medications     Reviewed by Angelia Madrigal R.N. (Registered Nurse) on 03/10/22 at 0336  Med List Status: Partial   Medication Last Dose Status        Patient [place] Taking any Medications                       ALLERGIES  Allergies   Allergen Reactions   • Ancef [Cefazolin] Rash     rash   • Percocet [Apap-Fd&C Red #40 [place]-Oxycodone]      Tolerates acetaminophen   • Shellfish Allergy    • Tape      Paper tape okay        PHYSICAL EXAM  VITAL SIGNS: /62   Pulse 80   Temp 36.3 °C (97.4 °F) (Temporal)   Resp 16   Ht 1.676 m (5' 6\")   Wt 63.5 kg (140 lb)   SpO2 93%   BMI 22.60 kg/m²      Constitutional: Alert in moderate distress.  HENT: No signs of trauma, Bilateral external ears normal, Nose normal.   Eyes: Pupils are equal, Conjunctiva normal, Non-icteric.   Neck: Normal range of motion, No tenderness, Supple, No stridor.   Lymphatic: No lymphadenopathy noted.   Cardiovascular: Regular rate and rhythm, nondisplaced PMI  Thorax & Lungs: No respiratory distress,  No chest tenderness.   Abdomen: Bowel sounds normal, Soft, No tenderness, No masses, No pulsatile masses. No peritoneal signs.  Skin: Warm, Dry, No erythema, No rash.   Back: No bony tenderness, + L CVA tenderness.  Extremities: Intact distal pulses, No edema, No tenderness, No cyanosis.  Musculoskeletal: Good range of motion in all major joints. No tenderness to palpation or major deformities noted.   Neurologic: Alert , Normal motor function, Normal sensory function, No focal deficits noted.   Psychiatric: Affect normal, Judgment normal, Mood normal.     DIAGNOSTIC " STUDIES / PROCEDURES        LABS  Labs Reviewed   CBC WITH DIFFERENTIAL - Abnormal; Notable for the following components:       Result Value    RBC 4.24 (*)     Hemoglobin 13.8 (*)     .5 (*)     MCHC 32.4 (*)     MPV 8.9 (*)     Eosinophils 11.80 (*)     Eos (Absolute) 1.24 (*)     All other components within normal limits   COMP METABOLIC PANEL - Abnormal; Notable for the following components:    Glucose 110 (*)     Alkaline Phosphatase 100 (*)     All other components within normal limits   URINALYSIS,CULTURE IF INDICATED - Abnormal; Notable for the following components:    Character Cloudy (*)     Protein 30 (*)     Leukocyte Esterase Large (*)     Occult Blood Large (*)     All other components within normal limits    Narrative:     Indication for culture:->Patient WITHOUT an indwelling [place]  catheter in place with new onset of Dysuria, Frequency,  Urgency, and/or Suprapubic pain   URINE MICROSCOPIC (W/UA) - Abnormal; Notable for the following components:    WBC Packed (*)     RBC  (*)     All other components within normal limits    Narrative:     Indication for culture:->Patient WITHOUT an indwelling [place]  catheter in place with new onset of Dysuria, Frequency,  Urgency, and/or Suprapubic pain   LIPASE   ESTIMATED GFR   URINE CULTURE(NEW)    Narrative:     Indication for culture:->Patient WITHOUT an indwelling [place]  catheter in place with new onset of Dysuria, Frequency,  Urgency, and/or Suprapubic pain       All labs reviewed by me.    RADIOLOGY  CT-RENAL COLIC EVALUATION(A/P W/O)   Final Result      1.  Moderate left hydroureteronephrosis has increased slightly from the prior exam. No obstructing calculus is appreciated      2.  Bilateral nephrolithiasis. Stone burden is greater on the right.           The radiologist's interpretation of all radiological studies have been reviewed by me.    COURSE & MEDICAL DECISION MAKING  Pertinent Labs & Imaging studies reviewed. (See chart for  details)    This is a 31 y.o. male that presents with left-sided flank pain.  I did an ultrasound and saw that I was concerned his hydro on the left.  We also have a urine in the bladder that does appear thickened I am concerned about infection.  We will get a CAT scan to evaluate for obstruction.  We will do lipase as well as urinalysis to exclude flexion and pancreatitis.  We will reassess the patient.     3:52 AM - Patient seen and examined at bedside.     The patient has left hydroureter nephrosis.  The patient has significant pyonephritis.  The patient is not septic at this time.  We get the patient IV antibiotics.  Urology will be consulted.  The patient has a normal creatinine.  Patient required multiple doses of pain medication.  Will admit the patient in guarded condition.        FINAL IMPRESSION  1. Pyelonephritis              Electronically signed by: Chase Post M.D., 3/10/2022

## 2022-03-10 NOTE — PROGRESS NOTES
I saw Russell Bunn 31 y.o. male who has a history of recurrent nephrolithiasis status post stent removal February 22, 2022 , presents with Flank Pain (Pt reports L flank pain that  has been going on since his surgery to remove his kidney stone on February 22nd. Pt reports he also had a stent placed on L side at that time. Pt reports stent was removed last week. Pt reports symptoms have worsened since stent removal. ) and Other (Pt reports pain over his bladder that feels like pressure. )   on 3/10/2022   He complains of flank pain and itching. He was not a good historian, irritable and avoidant.  At the ED afebrile, hemodynamicallystable.  CT renal:  1.  Moderate left hydroureteronephrosis has increased slightly from the prior exam. No obstructing calculus is appreciated       2.  Bilateral nephrolithiasis. Stone burden is greater on the right.   No leukocytosis  Urology consulted recommended nephrostomy tube  He is planned by IR nephrostomy tube placment today. He is placed on prophylactic antibiotics.

## 2022-03-10 NOTE — ASSESSMENT & PLAN NOTE
Pyuria with obstructing hydro-nephrolithiasis.  Symptomatic management, IV fluid, empiric Rocephin, follow-up urology consult

## 2022-03-10 NOTE — ASSESSMENT & PLAN NOTE
SIRS criteria identified on my evaluation include:  Tachycardia, with heart rate greater than 90 BPM  SIRS is non-infectious, the patient does not have sepsis

## 2022-03-10 NOTE — H&P
Hospital Medicine History & Physical Note    Date of Service  3/10/2022    Primary Care Physician  Pcp Pt States None    Consultants  urology    Specialist Names: Dr. Bansal    Code Status  Full Code    Chief Complaint  Chief Complaint   Patient presents with   • Flank Pain     Pt reports L flank pain that  has been going on since his surgery to remove his kidney stone on February 22nd. Pt reports he also had a stent placed on L side at that time. Pt reports stent was removed last week. Pt reports symptoms have worsened since stent removal.    • Other     Pt reports pain over his bladder that feels like pressure.        History of Presenting Illness  Russell Bunn is a 31 y.o. male with history of recurrent nephrolithiasis status post stent removal February 22, 2022 who presented 3/10/2022 with left-sided flank pain increasing since approximately March first 2022.  The patient does feel some pressure in his bladder.  He has been drinking normally has not had to urinate in the last few hours.  The patient describes the pain as cramping and very uncomfortable.  Did have a stent placed in the left ureter that was removed.  Patient denies any chest pain or shortness of breath.  Imaging reveals hydronephrosis, urinalysis reveals pyuria.  He received Rocephin, urology is consulted recommending left-sided percutaneous nephrostomy tube and the patient is referred to the hospitalist for admission.  At bedside patient is in moderate distress secondary to left-sided flank pain.  He endorses the above and recalls multiple previous episodes.    I discussed the plan of care with patient.    Review of Systems  Review of Systems   Constitutional: Negative for fever, malaise/fatigue and weight loss.   HENT: Negative for sore throat and tinnitus.    Eyes: Negative for blurred vision and double vision.   Respiratory: Negative for cough, hemoptysis and stridor.    Cardiovascular: Negative for chest pain and  palpitations.   Gastrointestinal: Negative for nausea and vomiting.   Genitourinary: Positive for flank pain, frequency and urgency. Negative for dysuria.   Musculoskeletal: Negative for myalgias and neck pain.   Skin: Negative for itching and rash.   Neurological: Negative for dizziness and headaches.   Endo/Heme/Allergies: Does not bruise/bleed easily.   Psychiatric/Behavioral: Negative for depression. The patient does not have insomnia.        Past Medical History   has a past medical history of Kidney stones and Seizure disorder (HCC).    Surgical History   has a past surgical history that includes lithotripsy; pr cysto/uretero/pyeloscopy, dx (Right, 3/13/2020); pr cystoscopy,insert ureteral stent (Right, 3/13/2020); lasertripsy (Right, 3/13/2020); pr cystoscopy,insert ureteral stent (Left, 4/26/2021); pr cysto/uretero/pyeloscopy, dx (Left, 4/26/2021); cystoscopy (Left, 02/20/2022); pr cystoscopy,insert ureteral stent (Left, 2/20/2022); pr cysto/uretero/pyeloscopy, dx (Left, 2/20/2022); and lasertripsy (Left, 2/20/2022).     Family History  family history is not on file.   Family history reviewed with patient. There is no family history that is pertinent to the chief complaint.     Social History   reports that he has quit smoking. His smoking use included cigarettes. He smoked 0.50 packs per day. He has never used smokeless tobacco. He reports current alcohol use. He reports current drug use. Drug: Inhaled.    Allergies  Allergies   Allergen Reactions   • Ancef [Cefazolin] Rash     Rash  Tolerated ceftriaxone (April 2021)   • Percocet [Apap-Fd&C Red #40 [place]-Oxycodone]      Tolerates acetaminophen   • Shellfish Allergy    • Tape      Paper tape okay        Medications  None       Physical Exam  Temp:  [36.3 °C (97.4 °F)] 36.3 °C (97.4 °F)  Pulse:  [80-87] 80  Resp:  [13-16] 16  BP: (101-135)/(62-90) 101/62  SpO2:  [93 %-97 %] 93 %  Blood Pressure: 101/62   Temperature: 36.3 °C (97.4 °F)   Pulse: 80    Respiration: 16   Pulse Oximetry: 93 %       Physical Exam  Vitals and nursing note reviewed.   Constitutional:       General: He is in acute distress.      Appearance: Normal appearance. He is normal weight. He is ill-appearing. He is not toxic-appearing.   HENT:      Head: Normocephalic and atraumatic.      Nose: Nose normal. No congestion or rhinorrhea.      Mouth/Throat:      Mouth: Mucous membranes are moist.      Pharynx: Oropharynx is clear.   Eyes:      Extraocular Movements: Extraocular movements intact.      Conjunctiva/sclera: Conjunctivae normal.      Pupils: Pupils are equal, round, and reactive to light.   Neck:      Vascular: No carotid bruit.   Cardiovascular:      Rate and Rhythm: Normal rate and regular rhythm.      Pulses: Normal pulses.      Heart sounds: Normal heart sounds. No murmur heard.    No gallop.   Pulmonary:      Effort: No respiratory distress.      Breath sounds: Normal breath sounds. No wheezing or rales.   Abdominal:      General: Abdomen is flat. Bowel sounds are normal. There is no distension.      Palpations: Abdomen is soft. There is no mass.      Tenderness: There is abdominal tenderness. There is left CVA tenderness.      Hernia: No hernia is present.   Musculoskeletal:         General: No tenderness or signs of injury.      Cervical back: Normal range of motion and neck supple. No muscular tenderness.   Lymphadenopathy:      Cervical: No cervical adenopathy.   Skin:     Capillary Refill: Capillary refill takes less than 2 seconds.      Coloration: Skin is not jaundiced or pale.      Findings: No bruising.   Neurological:      General: No focal deficit present.      Mental Status: He is alert and oriented to person, place, and time. Mental status is at baseline.      Cranial Nerves: No cranial nerve deficit.      Motor: No weakness.      Coordination: Coordination normal.   Psychiatric:         Mood and Affect: Mood normal.         Thought Content: Thought content normal.          Judgment: Judgment normal.         Laboratory:  Recent Labs     03/10/22  0351   WBC 10.5   RBC 4.24*   HEMOGLOBIN 13.8*   HEMATOCRIT 42.6   .5*   MCH 32.5   MCHC 32.4*   RDW 49.3   PLATELETCT 285   MPV 8.9*     Recent Labs     03/10/22  0351   SODIUM 137   POTASSIUM 4.1   CHLORIDE 101   CO2 23   GLUCOSE 110*   BUN 14   CREATININE 1.22   CALCIUM 9.0     Recent Labs     03/10/22  0351   ALTSGPT 12   ASTSGOT 19   ALKPHOSPHAT 100*   TBILIRUBIN 0.3   LIPASE 74   GLUCOSE 110*         No results for input(s): NTPROBNP in the last 72 hours.      No results for input(s): TROPONINT in the last 72 hours.    Imaging:  CT-RENAL COLIC EVALUATION(A/P W/O)   Final Result      1.  Moderate left hydroureteronephrosis has increased slightly from the prior exam. No obstructing calculus is appreciated      2.  Bilateral nephrolithiasis. Stone burden is greater on the right.      IR-CONSULT AND TREAT    (Results Pending)       X-Ray:  I have personally reviewed the images and compared with prior images.    Assessment/Plan:  I anticipate this patient will require at least two midnights for appropriate medical management, necessitating inpatient admission.    * Hydroureteronephrosis- (present on admission)  Assessment & Plan  Secondary to nephrolithiasis, complicated by recent stent removal and pyelonephritis, IR consult for percutaneous nephrostomy tube, IV hydration, symptomatic management, Rocephin, follow-up urology consult.    Pyelonephritis  Assessment & Plan  Pyuria with obstructing hydro-nephrolithiasis.  Symptomatic management, IV fluid, empiric Rocephin, follow-up urology consult    SIRS (systemic inflammatory response syndrome) (HCC)- (present on admission)  Assessment & Plan  SIRS criteria identified on my evaluation include:  Tachycardia, with heart rate greater than 90 BPM  SIRS is non-infectious, the patient does not have sepsis        VTE prophylaxis: SCDs/TEDs

## 2022-03-10 NOTE — PROGRESS NOTES
Assumed care of patient at 0820. Report taken from TATE Cherry. Pt alert and oriented. Vitals stable. Pt is 9/10 pain. Medicated per MAR. Fluids running. Call bell within reach.

## 2022-03-10 NOTE — ASSESSMENT & PLAN NOTE
Secondary to nephrolithiasis, complicated by recent stent removal and pyelonephritis, IR consult for percutaneous nephrostomy tube, IV hydration, symptomatic management, Rocephin, follow-up urology consult.

## 2022-03-10 NOTE — CONSULTS
.  [facility] [place] Consult/H&P Note    Primary Service:   Attending: Wilder Galvez M.D.  Patient's Name/MRN: Russell Bunn, [id number]    Admit Date:3/10/2022  Today's Date: 3/10/2022   Length of stay:  LOS: 0 days   Room #: YE 59/59 YEL      Reason for consult/chief complaint: Left flank pain with LEFT hydroureteronephrosis down to bladder, no obvious obstruction. He has history of cystine stones.    ID/HPI: Russell Bunn is a 31 y.o. male patient with multiples ER visits and procedures with Urology related to kidney stones. Patient has never followed up in clinic as he reports he is unable to afford it. He currently lives in a shelter and concerned that he won't have a bed when he discharges.     He was last treated 2/20/22 with lithotripsy of 5mm LEFT distal ureteral stone with stent on string placement.  He reports that his stent got caught on his zipper and fully removed by patient 3/1/22 with onset of worsening LEFT flank pain the next day. He presented back to the ED and CT demonstrated moderate left hydroureternephrosis that is inreased from prior exam without an obstruction seen.          His UA shows large LE, negative nitrite and packed WBCs. He is having burning urethral dysuria. His left flank pain is still present but improved since his admission, he is on Ceftriaxone at this time. He is pending a LEFT PCNT placement with IR.         Past Medical History:   Past Medical History:   Diagnosis Date   • Kidney stones    • Seizure disorder (HCC)          Past Surgical History:   Past Surgical History:   Procedure Laterality Date   • CYSTOSCOPY Left 02/20/2022    Cysto ureteroscopy ,lithotripsy, stone basket, ureteral stent placement, Dr Silver   • ND CYSTOSCOPY,INSERT URETERAL STENT Left 2/20/2022    Procedure: CYSTOSCOPY, WITH URETERAL STENT INSERTION;  Surgeon: Rei Silver M.D.;  Location: SURGERY [facility];  Service: Urology   • ND CYSTO/URETERO/PYELOSCOPY, DX Left  "2/20/2022    Procedure: URETEROSCOPY;  Surgeon: Rei Silver M.D.;  Location: SURGERY [facility];  Service: Urology   • LASERTRIPSY Left 2/20/2022    Procedure: LITHOTRIPSY, USING LASER;  Surgeon: Rei Silver M.D.;  Location: SURGERY [facility];  Service: Urology   • MD CYSTOSCOPY,INSERT URETERAL STENT Left 4/26/2021    Procedure: CYSTOSCOPY, WITH OUT URETERAL STENT INSERTION;  Surgeon: Raf Moss M.D.;  Location: SURGERY [facility];  Service: Urology   • MD CYSTO/URETERO/PYELOSCOPY, DX Left 4/26/2021    Procedure: URETEROSCOPY;  Surgeon: Raf Moss M.D.;  Location: SURGERY [facility];  Service: Urology   • MD CYSTO/URETERO/PYELOSCOPY, DX Right 3/13/2020    Procedure: URETEROSCOPY;  Surgeon: Chase Damon M.D.;  Location: SURGERY [facility];  Service: Urology   • MD CYSTOSCOPY,INSERT URETERAL STENT Right 3/13/2020    Procedure: CYSTOSCOPY;  Surgeon: Chase Damon M.D.;  Location: SURGERY [facility];  Service: Urology   • LASERTRIPSY Right 3/13/2020    Procedure: LITHOTRIPSY, USING LASER;  Surgeon: Chase Damon M.D.;  Location: SURGERY [facility];  Service: Urology   • LITHOTRIPSY          Family History:   History reviewed. No pertinent family history.      Social History:   Social History     Tobacco Use   • Smoking status: Former Smoker     Packs/day: 0.50     Types: Cigarettes   • Smokeless tobacco: Never Used   Vaping Use   • Vaping Use: Never used   Substance Use Topics   • Alcohol use: Yes     Comment: Occasional   • Drug use: Yes     Types: Inhaled     Comment: Marijuana, every day      Social History     Social History Narrative   • Not on file        Allergies: he Ancef [cefazolin], Percocet [apap-fd&c red #40 [place]-oxycodone], Shellfish allergy, and Tape    Medications:   (Not in a hospital admission)        Review of Systems  ROS     Physical Exam  VITAL SIGNS: /88   Pulse 78   Temp 36.3 °C (97.4 °F) (Temporal)   Resp 15   Ht 1.676 m (5' 6\")   Wt " 63.5 kg (140 lb)   SpO2 95%   BMI 22.60 kg/m²   Physical Exam      Labs:  Recent Labs     03/10/22  0351   WBC 10.5   RBC 4.24*   HEMOGLOBIN 13.8*   HEMATOCRIT 42.6   .5*   MCH 32.5   MCHC 32.4*   RDW 49.3   PLATELETCT 285   MPV 8.9*     Recent Labs     03/10/22  0351   SODIUM 137   POTASSIUM 4.1   CHLORIDE 101   CO2 23   GLUCOSE 110*   BUN 14   CREATININE 1.22   CALCIUM 9.0     Recent Labs     03/10/22  0351   APTT 30.3   INR 1.06     Glucose:  Recent Labs     03/10/22  0351   GLUCOSE 110*     Coags:  Recent Labs     03/10/22  0351   INR 1.06         Urinalysis:   Recent Labs     03/10/22  0501   COLORURINE Yellow   CLARITY Cloudy*   SPECGRAVITY 1.010   PHURINE 7.0   GLUCOSEUR Negative   KETONES Negative   NITRITE Negative   OCCULTBLOOD Large*   RBCURINE *   BACTERIA Negative   EPITHELCELL Negative       Imaging:                                                     Assessment/Recommendation   Left flank pain with LEFT hydroureteronephrosis down to bladder, no obvious obstruction. He has history of cystine stones.    · Patient to remain NPO in anticipation of LEFT PCNT placement with IR. We will wait on antegrade nephrostogram for 2-3 days as inpatient vs outpatient.   · Patient has not followed up following his previous urologic procedures, would avoid internalization of stent due to high risk of retained stent  · Continue ABX per medicine  · Will get bladder scan, if PVR >200 then will have foleey placed (distended bladder on CT)  · Urology to follow    Case discussed with Dr. Cramer.        Jeyson De Jesus, P.A.-C.   [address] [place] [address]   [phone]

## 2022-03-11 ENCOUNTER — PHARMACY VISIT (OUTPATIENT)
Dept: PHARMACY | Facility: MEDICAL CENTER | Age: 32
End: 2022-03-11
Payer: COMMERCIAL

## 2022-03-11 ENCOUNTER — PATIENT OUTREACH (OUTPATIENT)
Dept: HEALTH INFORMATION MANAGEMENT | Facility: OTHER | Age: 32
End: 2022-03-11
Payer: COMMERCIAL

## 2022-03-11 VITALS
DIASTOLIC BLOOD PRESSURE: 90 MMHG | TEMPERATURE: 98.2 F | OXYGEN SATURATION: 98 % | HEART RATE: 87 BPM | BODY MASS INDEX: 22.5 KG/M2 | HEIGHT: 66 IN | WEIGHT: 140 LBS | SYSTOLIC BLOOD PRESSURE: 120 MMHG | RESPIRATION RATE: 18 BRPM

## 2022-03-11 LAB
ANION GAP SERPL CALC-SCNC: 11 MMOL/L (ref 7–16)
BUN SERPL-MCNC: 12 MG/DL (ref 8–22)
CALCIUM SERPL-MCNC: 9.3 MG/DL (ref 8.5–10.5)
CHLORIDE SERPL-SCNC: 99 MMOL/L (ref 96–112)
CO2 SERPL-SCNC: 26 MMOL/L (ref 20–33)
CREAT SERPL-MCNC: 1.24 MG/DL (ref 0.5–1.4)
ERYTHROCYTE [DISTWIDTH] IN BLOOD BY AUTOMATED COUNT: 47.9 FL (ref 35.9–50)
GLUCOSE SERPL-MCNC: 79 MG/DL (ref 65–99)
HCT VFR BLD AUTO: 41.1 % (ref 42–52)
HGB BLD-MCNC: 13.6 G/DL (ref 14–18)
MCH RBC QN AUTO: 32.8 PG (ref 27–33)
MCHC RBC AUTO-ENTMCNC: 33.1 G/DL (ref 33.7–35.3)
MCV RBC AUTO: 99 FL (ref 81.4–97.8)
PLATELET # BLD AUTO: 297 K/UL (ref 164–446)
PMV BLD AUTO: 9 FL (ref 9–12.9)
POTASSIUM SERPL-SCNC: 4.4 MMOL/L (ref 3.6–5.5)
RBC # BLD AUTO: 4.15 M/UL (ref 4.7–6.1)
SODIUM SERPL-SCNC: 136 MMOL/L (ref 135–145)
WBC # BLD AUTO: 10.6 K/UL (ref 4.8–10.8)

## 2022-03-11 PROCEDURE — A9270 NON-COVERED ITEM OR SERVICE: HCPCS | Performed by: INTERNAL MEDICINE

## 2022-03-11 PROCEDURE — RXMED WILLOW AMBULATORY MEDICATION CHARGE: Performed by: INTERNAL MEDICINE

## 2022-03-11 PROCEDURE — 700102 HCHG RX REV CODE 250 W/ 637 OVERRIDE(OP): Performed by: INTERNAL MEDICINE

## 2022-03-11 PROCEDURE — 99239 HOSP IP/OBS DSCHRG MGMT >30: CPT | Performed by: INTERNAL MEDICINE

## 2022-03-11 PROCEDURE — 85027 COMPLETE CBC AUTOMATED: CPT

## 2022-03-11 PROCEDURE — 700111 HCHG RX REV CODE 636 W/ 250 OVERRIDE (IP): Performed by: INTERNAL MEDICINE

## 2022-03-11 PROCEDURE — A9270 NON-COVERED ITEM OR SERVICE: HCPCS

## 2022-03-11 PROCEDURE — 700102 HCHG RX REV CODE 250 W/ 637 OVERRIDE(OP)

## 2022-03-11 PROCEDURE — 80048 BASIC METABOLIC PNL TOTAL CA: CPT

## 2022-03-11 RX ORDER — POLYETHYLENE GLYCOL 3350 17 G/17G
POWDER, FOR SOLUTION ORAL
Refills: 3 | COMMUNITY
Start: 2022-03-11 | End: 2023-04-24

## 2022-03-11 RX ORDER — DIPHENHYDRAMINE HCL 25 MG
25 TABLET ORAL EVERY 8 HOURS PRN
COMMUNITY
Start: 2022-03-11 | End: 2023-04-24

## 2022-03-11 RX ORDER — NICOTINE 14MG/24HR
1 PATCH, TRANSDERMAL 24 HOURS TRANSDERMAL 2 TIMES DAILY WITH MEALS
Qty: 14 CAPSULE | Refills: 0 | Status: CANCELLED | OUTPATIENT
Start: 2022-03-11

## 2022-03-11 RX ORDER — AMOXICILLIN 250 MG
2 CAPSULE ORAL 2 TIMES DAILY
Qty: 30 TABLET | Refills: 0 | COMMUNITY
Start: 2022-03-11 | End: 2023-04-24

## 2022-03-11 RX ORDER — CEFUROXIME AXETIL 500 MG/1
500 TABLET ORAL 2 TIMES DAILY
Qty: 12 TABLET | Refills: 0 | Status: CANCELLED | OUTPATIENT
Start: 2022-03-11 | End: 2022-03-17

## 2022-03-11 RX ORDER — ACETAMINOPHEN 325 MG/1
650 TABLET ORAL EVERY 6 HOURS PRN
Qty: 30 TABLET | Refills: 0 | Status: SHIPPED | OUTPATIENT
Start: 2022-03-11 | End: 2023-04-24

## 2022-03-11 RX ORDER — HYDROCODONE BITARTRATE AND ACETAMINOPHEN 5; 325 MG/1; MG/1
1 TABLET ORAL EVERY 8 HOURS PRN
Qty: 9 TABLET | Refills: 0 | Status: SHIPPED | OUTPATIENT
Start: 2022-03-11 | End: 2022-03-14

## 2022-03-11 RX ADMIN — ONDANSETRON 4 MG: 2 INJECTION INTRAMUSCULAR; INTRAVENOUS at 00:32

## 2022-03-11 RX ADMIN — CEFTRIAXONE SODIUM 2 G: 10 INJECTION, POWDER, FOR SOLUTION INTRAVENOUS at 05:17

## 2022-03-11 RX ADMIN — HEPARIN SODIUM 5000 UNITS: 5000 INJECTION, SOLUTION INTRAVENOUS; SUBCUTANEOUS at 05:18

## 2022-03-11 RX ADMIN — HYDROXYZINE HYDROCHLORIDE 25 MG: 25 TABLET ORAL at 11:23

## 2022-03-11 RX ADMIN — DIPHENHYDRAMINE HYDROCHLORIDE 25 MG: 25 TABLET ORAL at 08:27

## 2022-03-11 RX ADMIN — HYDROCODONE BITARTRATE AND ACETAMINOPHEN 1 TABLET: 5; 325 TABLET ORAL at 11:02

## 2022-03-11 RX ADMIN — ACETAMINOPHEN 650 MG: 325 TABLET, FILM COATED ORAL at 08:27

## 2022-03-11 RX ADMIN — HYDROCODONE BITARTRATE AND ACETAMINOPHEN 1 TABLET: 5; 325 TABLET ORAL at 05:16

## 2022-03-11 RX ADMIN — SENNOSIDES AND DOCUSATE SODIUM 2 TABLET: 50; 8.6 TABLET ORAL at 05:16

## 2022-03-11 ASSESSMENT — ENCOUNTER SYMPTOMS
MYALGIAS: 0
VOMITING: 0
BRUISES/BLEEDS EASILY: 0
HEMOPTYSIS: 0
ABDOMINAL PAIN: 0
HEADACHES: 0
WEAKNESS: 0
SHORTNESS OF BREATH: 0
CHILLS: 0
COUGH: 0
DIZZINESS: 0
BLURRED VISION: 0
PALPITATIONS: 0
FEVER: 0

## 2022-03-11 ASSESSMENT — PAIN DESCRIPTION - PAIN TYPE
TYPE: SURGICAL PAIN
TYPE: SURGICAL PAIN
TYPE: CHRONIC PAIN;SURGICAL PAIN
TYPE: SURGICAL PAIN
TYPE: CHRONIC PAIN

## 2022-03-11 NOTE — DISCHARGE SUMMARY
Discharge Summary    CHIEF COMPLAINT ON ADMISSION  Chief Complaint   Patient presents with   • Flank Pain     Pt reports L flank pain that  has been going on since his surgery to remove his kidney stone on February 22nd. Pt reports he also had a stent placed on L side at that time. Pt reports stent was removed last week. Pt reports symptoms have worsened since stent removal.    • Other     Pt reports pain over his bladder that feels like pressure.        Reason for Admission  EMS     Admission Date  3/10/2022    CODE STATUS  Full Code    HPI & HOSPITAL COURSE  This is a 31 y.o. male here with Flank Pain (Pt reports L flank pain that  has been going on since his surgery to remove his kidney stone on February 22nd. Pt reports he also had a stent placed on L side at that time. Pt reports stent was removed last week. Pt reports symptoms have worsened since stent removal. ) and Other (Pt reports pain over his bladder that feels like pressure. )  Please review Dr. Raf Minor M.D. notes for further details of history of present illness, past medical/social/family histories, allergies and medications. Please review Dr. De Jesus/[facility] consultation notes.  He is homeless. He has a history of recurrent nephrolithiasis status post stent removal February 22, 2022 , presents with Flank Pain (Pt reports L flank pain that  has been going on since his surgery to remove his kidney stone on February 22nd. Pt reports he also had a stent placed on L side at that time. Pt reports stent was removed last week. Pt reports symptoms have worsened since stent removal. ) and Other (Pt reports pain over his bladder that feels like pressure. )   on 3/10/2022   He complains of flank pain and itching. He was not a good historian, irritable and avoidant.  At the ED afebrile, hemodynamicallystable.  CT renal:  1.  Moderate left hydroureteronephrosis hasincreased slightly from the prior exam. No obstructing calculus is appreciated       2.   Bilateral nephrolithiasis. Stone burden is greater on the right.   No leukocytosis  Urology consulted recommended nephrostomy tube  He underwent L PERC nephrostomy placement by Dr. Wang, IR  He tolerated procedure. AT the PACU he was wanting to go home as he was worried about losing bed in the shelter. I called and spoke with Dr. Silver, Urology. His kidney function is normal. Per Dr. Silver, NO need for antibiotics as he has no leukocytosis or other symptoms of infection. He can follow up with Urology for stent consideration.     UPDATE: Spoke with Social Work. He did NOT want to wait for his insurance to pay for his very necessary appointments and services to help him with his nephrsotomy tubes. He LEFT AGAINST MEDICAL ADVICE. Please see SW/CM notes. I have spoken with patient about this at the PACU and as noted he desired to leave against medical advice if necessary    Discharge Physical Exam  General/Constitutional: No acute distress. Thin   Head: Normocephalic, atraumatic  ENT: Oral mucosa is moist. No obvious pharyngeal exudates  Eyes: Pink conjunctiva, no scleral icterus  Neck: Supple, no lymphadenopathy  Cardiovascular: Normal rate and regular rhythm. S1,2 noted. No murmurs, gallops or rubs.  Pulmonary: Clear to auscultation bilaterally. No wheezes, rales or rhonchi.  Abdominal: Soft, nontender, not distended, bowel sounds normoactive. No guarding or peritoneal signs.  Musculoskeletal: No tenderness to palpation of chest wall.  Neurologic: Alert and oriented. Grossly nonfocal, moving all extremities.  Genitourinary: No gross hematuria. Nephrostomy tubes in place.   Skin: No obvious rash.  Psychiatric: Pleasant, cooperative.  Vitals Reviewed  Labs Reviewed  Imaging reviewed  Nursing notes reviewed      Imaging  IR-PERQ NEPH CATH NEW ACCESS (ALL RADIOLOGY) LEFT   Final Result      1. ULTRASOUND AND FLUOROSCOPIC GUIDED PLACEMENT OF A LEFT 8 [place]  PIGTAIL LOCKING LOOP PERCUTANEOUS NEPHROSTOMY CATHETER.       2. NEPHROSTOGRAM SHOWING SATISFACTORY CATHETER POSITION WITHOUT EXTRAVASATION. MODERATE HYDROURETERONEPHROSIS.      CT-RENAL COLIC EVALUATION(A/P W/O)   Final Result      1.  Moderate left hydroureteronephrosis has increased slightly from the prior exam. No obstructing calculus is appreciated      2.  Bilateral nephrolithiasis. Stone burden is greater on the right.                  Discharge Date  3/11/2022    FOLLOW UP ITEMS POST DISCHARGE      DISCHARGE DIAGNOSES  Principal Problem:    Hydroureteronephrosis, pyelonephritis POA: Yes  Active Problems:    SIRS (systemic inflammatory response syndrome) (HCC) POA: Yes    Pyelonephritis POA: Unknown        FOLLOW UP  No future appointments.  No follow-up provider specified.  1. I spoke to Dr. Silver, Urology. No need for antibiotics. From his standpoint he can be discharged.  2. SW/CM to work on [facility], if not motel + home health care; if not shelter + any service that can help take care of his nephrostomy tubes whether its [facility]. SW/CM to speak directly to patient   NOTE: High risk AMA. He wants to go because he is worried about losing a bed in the shelter.    Nursing, please call [phone] to schedule appointments; please provide phone numbers/addresses for clinic appointments if unable to arrange.  When all above criteria are met, ambulate patient (if applicable) and if patient is no longer symptomatic, stable vitals, if tolerating food and moving bowels patient can be discharged.  Any questions/clarifications with the discharge criteria above PLEASE call or Voalte me, Dr. Galvez.    NURSING: Copy/summarize below, provide appointment contact information to provide at your patient discharge instruction sheet. Provide phone numbers/resources for follow-ups.  Assign and follow up with primary provider or discharge clinic physician in 1 week. Labs recommended to check renal function.  Follow up with Dr. Silver or urologist in 1 week for nephrostomy tubes, obstructive  uropathy  Return to ER in the event of new or worsening symptoms. Please note importance of compliance and the patient has agreed to proceed with all medical recommendations and follow up plan indicated above. All medications come with benefits and risks. Risks may include permanent injury or death and these risks can be minimized with close reassessment and monitoring. Please make it to your scheduled follow ups with your primary provider, and/or specialists clinic.  Avoid swimming or driving if on narcotics  MEDICATIONS ON DISCHARGE     Medication List      START taking these medications      Instructions   acetaminophen 325 MG Tabs  Commonly known as: Tylenol   Take 2 Tablets by mouth every 6 hours as needed.  Dose: 650 mg     diphenhydrAMINE 25 MG Tabs  Commonly known as: BENADRYL   Take 1 Tablet by mouth every 8 hours as needed for Itching.  Dose: 25 mg     HYDROcodone-acetaminophen 5-325 MG Tabs per tablet  Commonly known as: NORCO   Take 1 Tablet by mouth every 8 hours as needed for up to 3 days.  Dose: 1 Tablet     polyethylene glycol/lytes 17 g Pack  Commonly known as: MIRALAX   Take  by mouth 1 time a day as needed (if sennosides and docusate ineffective after 24 hours).     senna-docusate 8.6-50 MG Tabs  Commonly known as: PERICOLACE or SENOKOT S   Take 2 Tablets by mouth 2 times a day.  Dose: 2 Tablet          LEFT AMA    Allergies  Allergies   Allergen Reactions   • Dilaudid [Hydromorphone] Itching   • Ancef [Cefazolin] Rash     Rash  Tolerated ceftriaxone (April 2021)   • Percocet [Apap-Fd&C Red #40 [place]-Oxycodone]      Tolerates acetaminophen   • Shellfish Allergy    • Tape      Paper tape okay        DIET  Orders Placed This Encounter   Procedures   • Diet Order Diet: Regular     Standing Status:   Standing     Number of Occurrences:   1     Order Specific Question:   Diet:     Answer:   Regular [1]       ACTIVITY        CONSULTATIONS  Urology    PROCEDURES  CT-RENAL COLIC EVALUATION(A/P  W/O)    Result Date: 3/10/2022  3/10/2022 4:23 AM HISTORY/REASON FOR EXAM:  Flank pain, kidney stone suspected; FLANK PAIN. Left flank pain TECHNIQUE/EXAM DESCRIPTION AND NUMBER OF VIEWS: CT scan renal/colic without contrast. 5 mm helical images of the abdomen and pelvis were obtained from the diaphragmatic domes through the pubic symphysis. Low dose optimization technique was utilized for this CT exam including automated exposure control and adjustment of the mA and/or kV according to patient size. COMPARISON: 3/2/2022 FINDINGS: Lung bases: Unremarkable. Kidneys: There are scattered nonobstructing renal calculi in the right kidney. There is a 2 mm nonobstructing calculus in the upper pole left kidney. There is moderate left hydroureteronephrosis, slightly more pronounced than on the prior exam. 3 mm calcification in the left pelvis is external to the distal left ureter and has been present since January 2021. Bladder: Unremarkable Liver: Unremarkable. Spleen: Unremarkable. Adrenal glands: Unremarkable. Pancreas: Unremarkable. Biliary system: Unremarkable. Bowel: The bowel is grossly unremarkable. The partially visualized appendix is unremarkable. Ascites: None. Lymph nodes: No definite adenopathy appreciated. Bones: Unremarkable. Miscellaneous:     1.  Moderate left hydroureteronephrosis has increased slightly from the prior exam. No obstructing calculus is appreciated 2.  Bilateral nephrolithiasis. Stone burden is greater on the right.    CT-RENAL COLIC EVALUATION(A/P W/O)    Result Date: 3/2/2022  3/2/2022 10:23 AM HISTORY/REASON FOR EXAM:  Flank pain, kidney stone suspected; hx of cysteine stones, L flank pain, RBUS with possible echogenic foci and L hydro, please eval for obstructive stones; FLANK PAIN. TECHNIQUE/EXAM DESCRIPTION AND NUMBER OF VIEWS: CT scan renal/colic without contrast. 5 mm helical images of the abdomen and pelvis were obtained from the diaphragmatic domes through the pubic symphysis. Low  dose optimization technique was utilized for this CT exam including automated exposure control and adjustment of the mA and/or kV according to patient size. COMPARISON: 2/24/2022 FINDINGS: Please note that noncontrast CT is significantly limited in sensitivity for detecting solid organ abnormalities. Lung bases: Unremarkable. Kidneys: Previously demonstrated left ureteral stent has been removed. No calculi are appreciated along the course of the left ureter. There is mild to moderate left hydronephrosis and hydroureter. There is scarring at the lower pole of the left kidney. Multiple nonobstructing calculi are again noted in the right kidney. Bladder: Unremarkable Liver: Unremarkable. Spleen: Unremarkable. Adrenal glands: Unremarkable. Pancreas: Unremarkable. Biliary system: Unremarkable. Bowel: The large and small bowel demonstrate normal course and caliber. Ascites: None. Lymph nodes: No adenopathy. Bones: Unremarkable. Miscellaneous:     1.  Mild to moderate left hydronephrosis and hydroureter. 2.  Interval removal of a left-sided ureteral stent. 3.  Nonobstructive right renal calculi.     CT-RENAL COLIC EVALUATION(A/P W/O)    Result Date: 2/24/2022 2/24/2022 8:44 AM HISTORY/REASON FOR EXAM:  Left flank pain. History of ureteral stent. TECHNIQUE/EXAM DESCRIPTION AND NUMBER OF VIEWS: CT scan renal/colic without contrast. 5 mm helical images of the abdomen and pelvis were obtained from the diaphragmatic domes through the pubic symphysis. Low dose optimization technique was utilized for this CT exam including automated exposure control and adjustment of the mA and/or kV according to patient size. COMPARISON: 2/18/2022 FINDINGS: Lung bases: Unremarkable. Kidneys: The left ureteral stent is in place with resolution of hydronephrosis. No calculi are appreciated along the course of the left ureteral stent. Previously described calculus in the left kidney is not visualized on the current CT. There is scarring at the  lower pole of the left kidney. Multiple nonobstructing calculi are again noted in the right kidney. Bladder: Unremarkable Liver: Unremarkable. Spleen: Unremarkable. Adrenal glands: Unremarkable. Pancreas: Unremarkable. Biliary system: Unremarkable. Bowel: The bowel is grossly unremarkable. There is scattered colonic stool. Ascites: None. Lymph nodes: No adenopathy. Bones: Unremarkable. Miscellaneous:     1.  Interval resolution of left hydronephrosis status post ureteral stent placement. Stone burden in the left kidney has decreased. 2.  Right nephrolithiasis    CT-RENAL COLIC EVALUATION(A/P W/O)    Result Date: 2/18/2022 2/18/2022 6:35 PM HISTORY/REASON FOR EXAM:  Flank pain, kidney stone suspected; FLANK PAIN Left flank pain. TECHNIQUE/EXAM DESCRIPTION AND NUMBER OF VIEWS: CT scan renal/colic without contrast. 5 mm helical images of the abdomen and pelvis were obtained from the diaphragmatic domes through the pubic symphysis. Low dose optimization technique was utilized for this CT exam including automated exposure control and adjustment of the mA and/or kV according to patient size. COMPARISON: 4/26/2021, 1/16/2021, 3/13/2020. FINDINGS: Renal stone: Bilateral renal calculi are seen. Small left hydronephrosis. Unchanged 3 mm calcification in the vicinity of the distal left ureter since 2020, likely phleboliths rather than calculus. New 5 mm calculus in the left distal ureter as well. Unchanged right pelvic phleboliths. Lung Bases: No pulmonary nodules at the lung bases. No pleural or pericardial fluid. Abdomen: Within the limits of noncontrast technique, the liver, spleen, pancreas, and adrenal glands are unremarkable in appearance. The gallbladder is unremarkable There is no biliary dilatation. There is no bowel wall thickening or abnormal dilatation. The abdominal aorta is normal in caliber. Pelvis: Decompressed urinary bladder. Unremarkable prostate. The No lymph node enlargement. No free fluid, or free air in  the abdomen or pelvis. No aggressive bone lesions are seen. _____________________________________     The bilateral distal ureter are very difficult to visualize due to lack of pelvic fat. 1. Likely a new 5 mm calculus in the left distal ureter with mild left hydronephrosis. 2. Nonobstructive bilateral renal calculi.    [id number] VIEW    Result Date: 2/24/2022 2/24/2022 6:23 AM HISTORY/REASON FOR EXAM: Abdominal Pain TECHNIQUE/EXAM DESCRIPTION:  Single AP view the abdomen. COMPARISON:  February 21, 2022 FINDINGS: Limited views of the lung bases are clear. Left ureteral stent is in place. Moderate stool is seen throughout the colon, otherwise bowel gas pattern is nonspecific. The bony structures appear age-appropriate.     1.  Moderate stool in the colon suggests changes of constipation, otherwise nonspecific bowel gas pattern    [id number] VIEW    Result Date: 2/21/2022 2/21/2022 3:34 PM HISTORY/REASON FOR EXAM:  Abdominal Pain; stent placement. TECHNIQUE/EXAM DESCRIPTION AND NUMBER OF VIEWS:  1 view(s) of the abdomen. COMPARISON: None FINDINGS: AP view the abdomen demonstrates a nonobstructive bowel gas pattern. No suspicious calcifications or masses. A left ureteral stent is in place. Evaluation for calculi is limited secondary to overlying bowel gas. No suspicious bony abnormalities.     Status post left ureteral stent placement    US-RENAL    Result Date: 3/2/2022  3/2/2022 2:05 AM HISTORY/REASON FOR EXAM: Flank Pain. Left ureteral stent. Nephrolithiasis TECHNIQUE/EXAM DESCRIPTION: Renal ultrasound. COMPARISON:  None FINDINGS: The right kidney measures 8.94 cm. There is a lower pole 16 mm cortical simple cyst. There are echogenic foci that shadow measuring up to 6 mm. No hydronephrosis The left kidney measures 11.68 cm. There is new moderate proximal hydroureteronephrosis. Ureteral stent is no longer seen. Echogenic foci measuring up to 5 mm are identified. The bladder demonstrates no focal wall  abnormality. There is moderate mobile echogenic debris within the bladder. The distal left ureter is not seen     New moderate left proximal hydroureteronephrosis. Ureteral stent is no longer seen Bilateral nephrolithiasis Debris within the urinary bladder could indicate hemorrhage, infectious or inflammatory exudate    US-RENAL    Result Date: 2/24/2022 2/24/2022 6:16 AM HISTORY/REASON FOR EXAM:  Pain TECHNIQUE/EXAM DESCRIPTION:  Renal ultrasound. COMPARISON:  None FINDINGS: The right kidney measures 9.43 cm.  The left kidney measures 10.14 cm. There is dilatation of the left lower pole renal calyces. Multiple bilateral echogenic renal foci are seen. 1.4 x 1.3 x 1.5 cm anechoic right lower pole renal cyst is seen. The bladder demonstrates no focal wall abnormality. Echogenic mobile debris within the bladder is seen.     1.  Multiple echogenic bilateral renal foci, likely nephrolithiasis. 2.  Dilatation of the left kidney lower pole calyx could represent mild hydronephrosis or caliectasis. 3.  Echogenic mobile debris within the bladder, could represent hemorrhage given history.    DX-CYSTO FLUORO > 1 HOUR    Result Date: 2/20/2022 2/20/2022 12:52 PM HISTORY/REASON FOR EXAM:  MAIN OR CYSTO LEFT STENT PLACEMENT TECHNIQUE/EXAM DESCRIPTION AND NUMBER OF VIEWS: Cysto fluoroscopy for greater than one hour. FINDINGS: Cysto fluoroscopy was utilized for greater than one hour. Actual fluoro time was 17 seconds     Cysto fluoroscopy utilized for 17 seconds INTERPRETING LOCATION: [address] [place] [address], [facility], [id number]    IR-PERQ NEPH CATH NEW ACCESS (ALL RADIOLOGY) LEFT    Result Date: 3/10/2022  3/10/2022 7:30 PM HISTORY/REASON FOR EXAM:  Hydronephrosis; Percutaneous nephrostomy tube for recurrent obstructing urolithiasis (cystine stones) with left-sided hydronephrosis. Ongoing left flank pain after removal of kidney stone February 22, 2022. Left-sided ureteral stent removed last week. Symptoms have worsened since removal of the  ureteral stent. TECHNIQUE/EXAM DESCRIPTION: LEFT Percutaneous Nephrostomy and Nephrostogram with ultrasound and fluoroscopic guidance. PROCEDURE: Informed consent was obtained. The skin was prepped and draped in a sterile fashion using chlorhexidine antiseptic. Moderate sedation was provided. Pulse oximetry and continuous cardiac monitoring by the nurse was performed throughout the exam. Intraservice time was 30 minutes. CURRENT ALARA PRINCIPLES FOR DOSE REDUCTION TECHNIQUES WERE UTILIZED FOR THIS EXAMINATION. FLUOROSCOPY TIME: 1.2 minutes NUMBER OF FILMS: 13 fluoroscopic frame capture images and/or digital series obtained. CONTRAST AMOUNT: 40 mL Omnipaque The LEFT kidney was localized with real time ultrasound. Following local anesthesia with 1% lidocaine, an 18 G needle with a janine tip stylet was advanced into the renal collecting system under real time ultrasound guidance. A 5 mL urine specimen from the initial needle puncture was collected and submitted for culture and sensitivity, Gram stain, and cell count. An angled glide wire was advanced into the renal pelvis and following serial tract dilatation, a 8 Yi locking loop pigtail nephrostomy catheter was placed and reformed in the renal pelvis. The locking loop was secured and a nephrostogram obtained documenting satisfactory catheter position with all side holes within the collecting system. The catheter was secured to the skin and connected to a gravity drainage bag. The patient tolerated the procedure well with no evidence of complication. COMPARISON:  CT of the abdomen and pelvis, renal colic CT 3/10/2022 FINDINGS:  The nephrostogram shows the catheter to be in satisfactory position. There is no extravasation. Mild-moderate variable caliectasis/hydronephrosis most prominent at the left lower pole calyx. Moderate dilatation of the left proximal and mid ureter.     1. ULTRASOUND AND FLUOROSCOPIC GUIDED PLACEMENT OF A LEFT 8 [place]  PIGTAIL LOCKING LOOP  PERCUTANEOUS NEPHROSTOMY CATHETER. 2. NEPHROSTOGRAM SHOWING SATISFACTORY CATHETER POSITION WITHOUT EXTRAVASATION. MODERATE HYDROURETERONEPHROSIS.    Please see Dr. Wang's PROC NOTE    LABORATORY  Lab Results   Component Value Date    SODIUM 136 03/11/2022    POTASSIUM 4.4 03/11/2022    CHLORIDE 99 03/11/2022    CO2 26 03/11/2022    GLUCOSE 79 03/11/2022    BUN 12 03/11/2022    CREATININE 1.24 03/11/2022        Lab Results   Component Value Date    WBC 10.6 03/11/2022    HEMOGLOBIN 13.6 (L) 03/11/2022    HEMATOCRIT 41.1 (L) 03/11/2022    PLATELETCT 297 03/11/2022        Total time of the discharge process exceeds 35 minutes.

## 2022-03-11 NOTE — DISCHARGE PLANNING
Anticipated Discharge Disposition:  AMA/Shelter    Action: Patient states he has a job interview tomorrow and can not wait for services to be arranged. Patient may need housing, [facility], nephrostomy care.     Barriers to Discharge: Patient has [employer], out patient services are not avaible as the patient does not have a payment source.     Plan: patient states he is leaving AMA

## 2022-03-11 NOTE — PROGRESS NOTES
Received report and pt arrived to unit via walking with all belongings. Discussed POC with pt and goals for remainder of shift. Assessment completed at this time. PT is A&O4, VSS on RA. Nephrostomy tube placed on left side intact with dressing in place, CDI.  Pt has no complaints of pain. Expressed concerns regarding losing bed at shelter as well as not being able to afford hospitalization over the weekend. Discussed with case management regarding concerns. Pt states no other needs, ambulatory around unit. Bed locked and in lowest position with belongings in reach.

## 2022-03-11 NOTE — PROGRESS NOTES
"Urology Progress Note      S: Seen and examined. S/p placement of left nephrostomy tube 3/10. States tube is draining well. Denies hematuria, blood clots. Denies fevers, chills. Denies flank pain.    O:   /86   Pulse 91   Temp 36.5 °C (97.7 °F) (Temporal)   Resp 14   Ht 1.676 m (5' 6\")   Wt 63.5 kg (140 lb)   SpO2 97%   Recent Labs     03/10/22  0351 03/11/22  0545   SODIUM 137 136   POTASSIUM 4.1 4.4   CHLORIDE 101 99   CO2 23 26   GLUCOSE 110* 79   BUN 14 12   CREATININE 1.22 1.24   CALCIUM 9.0 9.3     Recent Labs     03/10/22  0351 03/11/22  0545   WBC 10.5 10.6   RBC 4.24* 4.15*   HEMOGLOBIN 13.8* 13.6*   HEMATOCRIT 42.6 41.1*   .5* 99.0*   MCH 32.5 32.8   MCHC 32.4* 33.1*   RDW 49.3 47.9   PLATELETCT 285 297   MPV 8.9* 9.0         Intake/Output Summary (Last 24 hours) at 3/11/2022 1255  Last data filed at 3/11/2022 0515  Gross per 24 hour   Intake 600 ml   Output 750 ml   Net -150 ml       Exam:  Gen: NAD   Abd: Abdomen soft, no TTP.   Urine: left NT with clear yellow output    A/P:    Active Hospital Problems    Diagnosis    • Hydroureteronephrosis, pyelonephritis [N13.30]    • Pyelonephritis [N12]    • SIRS (systemic inflammatory response syndrome) ([facility]) [R65.10]      31 year old male with left flank pain with LEFT hydroureteronephrosis down to bladder, no obvious obstruction. He has history of cystine stones.  He is now s/p placement of left percutaneous nephrostomy tube 3/10.  Plan:  - continue abx per medicine  - patient has not followed up following his previous urologic procedures, would avoid internalization of stent due to high risk of retained stent  - ultimately, he will need antegrade nephrostogram in about 2-3 days with a follow up with urology.  We will help facilitate follow up  - no further acute urologic intervention. Urology signing off.    "

## 2022-03-11 NOTE — PROGRESS NOTES
Pt states he feels anxiety because his bed at the shelter is going to be given away by the end of today. Discussed with pt he may need to stay the weekend until sw can set up a safe dc plan with his neph tube and outpt fu/wound care. Pt states he can not stay past tonight and he needs to leave by tonight. Paged MD Galvez at [phone] in regards to pts concerns and possible AMA. Waiting for call back

## 2022-03-11 NOTE — PROGRESS NOTES
patient has chosen to leave the hospital against medical advice. The attending physician has not discharged the patient. Patient is not a risk to himself or others. I have discussed with the patient the following:  Physician has not determined patient is ready for discharge, Risks and consequences of leaving the hospital too soon and Benefit of continued hospitalization.         Pt has a fu dannielle with [facility] and given all information regarding needs for following up. Pt provided with medications via [facility]  Attending physician has been notified.

## 2022-03-11 NOTE — OR SURGEON
Immediate Post- Operative Note    PostOp Diagnosis: RENAL OBSTRUCTION, RECENT STONE, ILL AGAIN AFTER URETERAL STENT REMOVAL      Procedure(s): LEFT PERCUTANEOUS NEPHROSTOMY PLACEMENT AND NEPHROSTOGRAM WITH U/S AND FLUOROSCOPIC GUIDANCE    8F LOCKING LOOP PERC NEPH      Estimated Blood Loss: <3 CC      FINDINGS: MODERATE LEFT HYDRO. 8F PIGTAIL COILED IN RENAL PELVIS IN GOOD POSITION.         Complications: NONE          3/10/2022     8:18 PM     Rei Wang M.D.

## 2022-03-11 NOTE — PROGRESS NOTES
Pt presents to [facility]. Pt was consented by MD at bedside, confirmed by this RN and consent at bedside. Pt transferred to IR table in prone position. Pt placed on monitor, prepped and draped in a sterile fashion. Left nephrostomy tube placed by Dr Wang. Pt tolerated procedure well, vss transported to PACU     Confide 8F x 25cm  REF# [id number]  LOT# [id number]  Exp: 12/16/2024

## 2022-03-11 NOTE — PROGRESS NOTES
"MD Galvez states pt will be leaving AMA if he chooses to go today. Updated pt and again reinforced concerns regarding unsafe dc and follow up acre for nephrostomy tube. Pt states he has had many tubes prior to today and this is \"nothing new to him\". States he is comfortable with tube care and he will be compliant with follow up. Called scheduling to update them and confirm pt an appointment. Urology to follow up with pt on pts cell phone listed in chart   "

## 2022-03-11 NOTE — CARE PLAN
The patient is Stable - Low risk of patient condition declining or worsening    Shift Goals  Clinical Goals: safe discharge plan  Patient Goals: safe discharge plan    Progress made toward(s) clinical / shift goals:  discussed safe discharge goals with pt and addressed concerns regarding not being able to afford hosp stay. Rounded with cm who stated since he is inpatient his insurance will cover his stay and he may need to be here over the weekend to get a safe discharge     Patient is not progressing towards the following goals:

## 2022-03-11 NOTE — PROGRESS NOTES
"Radiology Progress Note   Author: ANUSHKA Reid Date & Time created: 3/11/2022  1:06 PM   Date of admission  3/10/2022  Note to reader: this note follows the APSO format rather than the historical SOAP format. Assessment and plan located at the top of the note for ease of use.    Chief Complaint  31 y.o. male admitted 3/10/2022 with   Chief Complaint   Patient presents with   • Flank Pain     Pt reports L flank pain that  has been going on since his surgery to remove his kidney stone on February 22nd. Pt reports he also had a stent placed on L side at that time. Pt reports stent was removed last week. Pt reports symptoms have worsened since stent removal.    • Other     Pt reports pain over his bladder that feels like pressure.        HPI  \" 31 year old male with left flank pain with LEFT hydroureteronephrosis down to bladder, no obvious obstruction. He has history of cystine stones.  He is now s/p placement of left percutaneous nephrostomy tube 3/10\"      Assessment/Plan  Interval History   Principal Problem:    Hydroureteronephrosis, pyelonephritis  Active Problems:    SIRS (systemic inflammatory response syndrome) (HCC)    Pyelonephritis      Plan IR  - Urology following and managing nephrostomy tube  - Wound education provided to patient  - Education provided on S/S of infection   - Ok to shower with catheter as long as site is covered with waterproof dressing   - No baths or submerging site under water until catheter removed   - OK from IR standpoint to DC once cleared by Hospitalist services      -Thank you for allowing Interventional Radiology team to participate in the patients care, if any additional care or requests are needed in the future please do not hesitate call or place IR order. IR signing off    [phone]      IR:   3/10- Left neph tube placed   3/11- Left neph tube drainage clear yellow, denies N/V, fever or chills. Urine cultures No growth at 48 hours, WBC WNL 10.6. Creat WNL, GFR WNL "          Review of Systems  Physical Exam   Review of Systems   Constitutional: Negative for chills and fever.   HENT: Negative for hearing loss.    Eyes: Negative for blurred vision.   Respiratory: Negative for cough, hemoptysis and shortness of breath.    Cardiovascular: Negative for chest pain and palpitations.   Gastrointestinal: Negative for abdominal pain and vomiting.   Genitourinary: Negative for dysuria.   Musculoskeletal: Negative for myalgias.   Skin: Negative for rash.   Neurological: Negative for dizziness, weakness and headaches.   Endo/Heme/Allergies: Does not bruise/bleed easily.      Vitals:    03/11/22 1300   BP: 124/80   Pulse: 85   Resp: 14   Temp: 36.6 °C (97.8 °F)   SpO2: 97%        Physical Exam  Constitutional:       Appearance: Normal appearance.   HENT:      Head: Normocephalic.      Nose: Nose normal.      Mouth/Throat:      Mouth: Mucous membranes are moist.   Eyes:      Pupils: Pupils are equal, round, and reactive to light.   Cardiovascular:      Rate and Rhythm: Normal rate.   Pulmonary:      Effort: Pulmonary effort is normal. No respiratory distress.   Abdominal:      General: Abdomen is flat.      Tenderness: There is no abdominal tenderness.   Musculoskeletal:         General: No tenderness or deformity.        Arms:       Cervical back: Normal range of motion.   Skin:     General: Skin is warm and dry.      Coloration: Skin is not jaundiced or pale.   Neurological:      General: No focal deficit present.      Mental Status: He is alert.      Motor: No weakness.   Psychiatric:         Mood and Affect: Mood normal.         Behavior: Behavior normal.             Labs    Recent Labs     03/10/22  0351 03/11/22  0545   WBC 10.5 10.6   RBC 4.24* 4.15*   HEMOGLOBIN 13.8* 13.6*   HEMATOCRIT 42.6 41.1*   .5* 99.0*   MCH 32.5 32.8   MCHC 32.4* 33.1*   RDW 49.3 47.9   PLATELETCT 285 297   MPV 8.9* 9.0     Recent Labs     03/10/22  0351 03/11/22  0545   SODIUM 137 136   POTASSIUM 4.1  4.4   CHLORIDE 101 99   CO2 23 26   GLUCOSE 110* 79   BUN 14 12   CREATININE 1.22 1.24   CALCIUM 9.0 9.3     Recent Labs     03/10/22  0351 03/11/22  0545   ALBUMIN 4.3  --    TBILIRUBIN 0.3  --    ALKPHOSPHAT 100*  --    TOTPROTEIN 7.2  --    ALTSGPT 12  --    ASTSGOT 19  --    CREATININE 1.22 1.24     IR-PERQ NEPH CATH NEW ACCESS (ALL RADIOLOGY) LEFT   Final Result      1. ULTRASOUND AND FLUOROSCOPIC GUIDED PLACEMENT OF A LEFT 8 Cymraes  PIGTAIL LOCKING LOOP PERCUTANEOUS NEPHROSTOMY CATHETER.      2. NEPHROSTOGRAM SHOWING SATISFACTORY CATHETER POSITION WITHOUT EXTRAVASATION. MODERATE HYDROURETERONEPHROSIS.      CT-RENAL COLIC EVALUATION(A/P W/O)   Final Result      1.  Moderate left hydroureteronephrosis has increased slightly from the prior exam. No obstructing calculus is appreciated      2.  Bilateral nephrolithiasis. Stone burden is greater on the right.          INR   Date Value Ref Range Status   03/10/2022 1.06 0.87 - 1.13 Final     Comment:     INR - Non-therapeutic Reference Range: 0.87-1.13  INR - Therapeutic Reference Range: 2.0-4.0       No results found for: POCINR     Intake/Output Summary (Last 24 hours) at 3/11/2022 1306  Last data filed at 3/11/2022 0515  Gross per 24 hour   Intake 600 ml   Output 750 ml   Net -150 ml      Labs not explicitly included in this progress note were reviewed by the author. Radiology/imaging not explicitly included in this progress note was reviewed by the author.     I have performed a physical exam and reviewed and updated ROS and Plan today (3/11/2022).     10 minutes in directly providing and coordinating care and extensive data review.  No time overlap and excludes procedures.

## 2022-03-12 LAB
BACTERIA UR CULT: NORMAL
SIGNIFICANT IND 70042: NORMAL
SITE SITE: NORMAL
SOURCE SOURCE: NORMAL

## 2022-03-13 LAB
BACTERIA UR CULT: NORMAL
SIGNIFICANT IND 70042: NORMAL
SITE SITE: NORMAL
SOURCE SOURCE: NORMAL

## 2022-03-30 NOTE — DOCUMENTATION QUERY
[facility]                                                                       Query Response Note      PATIENT:               ANTONIA HIGHTOWER  ACCT #:                  [id number]  MRN:                     [id number]  :                      1990  ADMIT DATE:       3/1/2022 11:31 PM  DISCH DATE:        3/2/2022 1:13 PM  RESPONDING  PROVIDER #:        [id number]           QUERY TEXT:    Please clarify in documentation the relationship, if any, between  Hydronephrosis and recent ureteral stent removal    The patient's Clinical Indicators include:  clinical indicators:  The patient had left  ureteral stent placement on 22 with stent removal 3 days ago.He states the day after having the stent removed, he began experiencing kidney pain that has since been worsening.In consults  states Hydronephrosis likely 2/2 ureteral edema in setting of recent stent removal.    Risk factors:Hydronephrosis  Options provided:   -- Hydronephrosis due to or associated with recent ureteral stent removal  [[Unrelated to each other   -- Unable to determine      Query created by: Madeline Kan on 3/18/2022 10:53 AM    RESPONSE TEXT:    Hydronephrosis due to or associated with recent ureteral stent removal [[Unrelated to each other          Electronically signed by:  REDD JACQUES MD 3/30/2022 1:58 PM

## 2022-06-24 NOTE — PROGRESS NOTES
Pt is seen at the bed side and chart is reviewed,he was admitted early this morning by the admitting physician.   Cimzia Counseling:  I discussed with the patient the risks of Cimzia including but not limited to immunosuppression, allergic reactions and infections.  The patient understands that monitoring is required including a PPD at baseline and must alert us or the primary physician if symptoms of infection or other concerning signs are noted.

## 2022-12-01 ENCOUNTER — APPOINTMENT (OUTPATIENT)
Dept: RADIOLOGY | Facility: MEDICAL CENTER | Age: 32
End: 2022-12-01
Attending: EMERGENCY MEDICINE
Payer: COMMERCIAL

## 2022-12-01 ENCOUNTER — HOSPITAL ENCOUNTER (EMERGENCY)
Facility: MEDICAL CENTER | Age: 32
End: 2022-12-02
Attending: EMERGENCY MEDICINE
Payer: COMMERCIAL

## 2022-12-01 DIAGNOSIS — N17.9 AKI (ACUTE KIDNEY INJURY) (HCC): ICD-10-CM

## 2022-12-01 DIAGNOSIS — N23 RENAL COLIC: ICD-10-CM

## 2022-12-01 PROCEDURE — 99284 EMERGENCY DEPT VISIT MOD MDM: CPT

## 2022-12-01 PROCEDURE — 81001 URINALYSIS AUTO W/SCOPE: CPT

## 2022-12-01 RX ORDER — KETOROLAC TROMETHAMINE 30 MG/ML
15 INJECTION, SOLUTION INTRAMUSCULAR; INTRAVENOUS ONCE
Status: COMPLETED | OUTPATIENT
Start: 2022-12-02 | End: 2022-12-02

## 2022-12-01 ASSESSMENT — FIBROSIS 4 INDEX: FIB4 SCORE: 0.57

## 2022-12-02 VITALS
TEMPERATURE: 98.2 F | DIASTOLIC BLOOD PRESSURE: 71 MMHG | RESPIRATION RATE: 18 BRPM | HEART RATE: 81 BPM | OXYGEN SATURATION: 99 % | BODY MASS INDEX: 22.32 KG/M2 | HEIGHT: 67 IN | SYSTOLIC BLOOD PRESSURE: 128 MMHG | WEIGHT: 142.2 LBS

## 2022-12-02 LAB
ALBUMIN SERPL BCP-MCNC: 4.4 G/DL (ref 3.2–4.9)
ALBUMIN/GLOB SERPL: 1.5 G/DL
ALP SERPL-CCNC: 97 U/L (ref 30–99)
ALT SERPL-CCNC: 15 U/L (ref 2–50)
ANION GAP SERPL CALC-SCNC: 11 MMOL/L (ref 7–16)
APPEARANCE UR: CLEAR
AST SERPL-CCNC: 22 U/L (ref 12–45)
BACTERIA #/AREA URNS HPF: ABNORMAL /HPF
BASOPHILS # BLD AUTO: 0.4 % (ref 0–1.8)
BASOPHILS # BLD: 0.04 K/UL (ref 0–0.12)
BILIRUB SERPL-MCNC: 0.3 MG/DL (ref 0.1–1.5)
BILIRUB UR QL STRIP.AUTO: NEGATIVE
BUN SERPL-MCNC: 27 MG/DL (ref 8–22)
CALCIUM SERPL-MCNC: 9.5 MG/DL (ref 8.5–10.5)
CHLORIDE SERPL-SCNC: 103 MMOL/L (ref 96–112)
CO2 SERPL-SCNC: 24 MMOL/L (ref 20–33)
COLOR UR: YELLOW
CREAT SERPL-MCNC: 1.48 MG/DL (ref 0.5–1.4)
EOSINOPHIL # BLD AUTO: 0.84 K/UL (ref 0–0.51)
EOSINOPHIL NFR BLD: 8.5 % (ref 0–6.9)
EPI CELLS #/AREA URNS HPF: ABNORMAL /HPF
ERYTHROCYTE [DISTWIDTH] IN BLOOD BY AUTOMATED COUNT: 46.8 FL (ref 35.9–50)
GFR SERPLBLD CREATININE-BSD FMLA CKD-EPI: 64 ML/MIN/1.73 M 2
GLOBULIN SER CALC-MCNC: 3 G/DL (ref 1.9–3.5)
GLUCOSE SERPL-MCNC: 84 MG/DL (ref 65–99)
GLUCOSE UR STRIP.AUTO-MCNC: NEGATIVE MG/DL
HCT VFR BLD AUTO: 45.5 % (ref 42–52)
HGB BLD-MCNC: 15.1 G/DL (ref 14–18)
HYALINE CASTS #/AREA URNS LPF: ABNORMAL /LPF
IMM GRANULOCYTES # BLD AUTO: 0.02 K/UL (ref 0–0.11)
IMM GRANULOCYTES NFR BLD AUTO: 0.2 % (ref 0–0.9)
KETONES UR STRIP.AUTO-MCNC: NEGATIVE MG/DL
LEUKOCYTE ESTERASE UR QL STRIP.AUTO: NEGATIVE
LIPASE SERPL-CCNC: 82 U/L (ref 11–82)
LYMPHOCYTES # BLD AUTO: 2.7 K/UL (ref 1–4.8)
LYMPHOCYTES NFR BLD: 27.4 % (ref 22–41)
MCH RBC QN AUTO: 32.8 PG (ref 27–33)
MCHC RBC AUTO-ENTMCNC: 33.2 G/DL (ref 33.7–35.3)
MCV RBC AUTO: 98.9 FL (ref 81.4–97.8)
MICRO URNS: ABNORMAL
MONOCYTES # BLD AUTO: 0.88 K/UL (ref 0–0.85)
MONOCYTES NFR BLD AUTO: 8.9 % (ref 0–13.4)
NEUTROPHILS # BLD AUTO: 5.39 K/UL (ref 1.82–7.42)
NEUTROPHILS NFR BLD: 54.6 % (ref 44–72)
NITRITE UR QL STRIP.AUTO: NEGATIVE
NRBC # BLD AUTO: 0 K/UL
NRBC BLD-RTO: 0 /100 WBC
PH UR STRIP.AUTO: 7 [PH] (ref 5–8)
PLATELET # BLD AUTO: 245 K/UL (ref 164–446)
PMV BLD AUTO: 9.4 FL (ref 9–12.9)
POTASSIUM SERPL-SCNC: 4.6 MMOL/L (ref 3.6–5.5)
PROT SERPL-MCNC: 7.4 G/DL (ref 6–8.2)
PROT UR QL STRIP: NEGATIVE MG/DL
RBC # BLD AUTO: 4.6 M/UL (ref 4.7–6.1)
RBC # URNS HPF: ABNORMAL /HPF
RBC UR QL AUTO: ABNORMAL
SODIUM SERPL-SCNC: 138 MMOL/L (ref 135–145)
SP GR UR STRIP.AUTO: 1.01
UROBILINOGEN UR STRIP.AUTO-MCNC: 0.2 MG/DL
WBC # BLD AUTO: 9.9 K/UL (ref 4.8–10.8)
WBC #/AREA URNS HPF: ABNORMAL /HPF

## 2022-12-02 PROCEDURE — 83690 ASSAY OF LIPASE: CPT

## 2022-12-02 PROCEDURE — 96374 THER/PROPH/DIAG INJ IV PUSH: CPT

## 2022-12-02 PROCEDURE — 80053 COMPREHEN METABOLIC PANEL: CPT

## 2022-12-02 PROCEDURE — 36415 COLL VENOUS BLD VENIPUNCTURE: CPT

## 2022-12-02 PROCEDURE — 700111 HCHG RX REV CODE 636 W/ 250 OVERRIDE (IP): Performed by: EMERGENCY MEDICINE

## 2022-12-02 PROCEDURE — 85025 COMPLETE CBC W/AUTO DIFF WBC: CPT

## 2022-12-02 PROCEDURE — 74176 CT ABD & PELVIS W/O CONTRAST: CPT

## 2022-12-02 RX ADMIN — KETOROLAC TROMETHAMINE 15 MG: 30 INJECTION, SOLUTION INTRAMUSCULAR; INTRAVENOUS at 00:02

## 2022-12-02 NOTE — ED PROVIDER NOTES
ER Provider Note     Scribed for Chase Post M.D. by Lay Dubois. 12/1/2022, 11:53 PM.    Primary Care Provider: Pcp Pt States None  Means of Arrival: Walk-In   History obtained from: Patient  History limited by: None     CHIEF COMPLAINT  Chief Complaint   Patient presents with    Flank Pain     R sided pain. States it feels like when he has had a kidney stone in the past. Visibly uncomfortable in triage. 7/10, pt states the pain is moving.        HPI  Russell Bunn is a 32 y.o. male, with a prior history of kidney stones, who presents to the Emergency Department for evaluation of right sided flank pain onset today while at work. The patient describes the pain as sharp, and notes that it feels similar to prior kidney stones. The pain is beginning to radiate into the groin. Patient denies any associated nausea or vomiting.     REVIEW OF SYSTEMS  See HPI for further details. All other systems are negative. C    PAST MEDICAL HISTORY   has a past medical history of Kidney stones and Seizure disorder (HCC).    SURGICAL HISTORY   has a past surgical history that includes lithotripsy; cysto/uretero/pyeloscopy, dx (Right, 3/13/2020); cystoscopy,insert ureteral stent (Right, 3/13/2020); lasertripsy (Right, 3/13/2020); cystoscopy,insert ureteral stent (Left, 4/26/2021); cysto/uretero/pyeloscopy, dx (Left, 4/26/2021); cystoscopy (Left, 02/20/2022); cystoscopy,insert ureteral stent (Left, 2/20/2022); cysto/uretero/pyeloscopy, dx (Left, 2/20/2022); and lasertripsy (Left, 2/20/2022).    SOCIAL HISTORY  Social History     Tobacco Use    Smoking status: Former     Packs/day: 0.50     Types: Cigarettes    Smokeless tobacco: Never   Vaping Use    Vaping Use: Never used   Substance Use Topics    Alcohol use: Yes     Comment: Occasional    Drug use: Yes     Types: Inhaled     Comment: Marijuana, every day      Social History     Substance and Sexual Activity   Drug Use Yes    Types: Inhaled    Comment: Marijuana,  "every day       FAMILY HISTORY  No family history on file.    CURRENT MEDICATIONS  Home Medications       Reviewed by Edna Castillo R.N. (Registered Nurse) on 12/01/22 at 2312  Med List Status: Partial     Medication Last Dose Status   acetaminophen (TYLENOL) 325 MG Tab  Active   diphenhydrAMINE (BENADRYL) 25 MG Tab  Active   polyethylene glycol/lytes (MIRALAX) 17 g Pack  Active   senna-docusate (PERICOLACE OR SENOKOT S) 8.6-50 MG Tab  Active             ALLERGIES  Allergies   Allergen Reactions    Dilaudid [Hydromorphone] Itching    Ancef [Cefazolin] Rash     Rash  Tolerated ceftriaxone (April 2021)    Percocet [Apap-Fd&C Red #40 [place]-Oxycodone]      Tolerates acetaminophen    Shellfish Allergy     Tape      Paper tape okay        PHYSICAL EXAM  VITAL SIGNS: /74   Pulse 88   Temp 36.6 °C (97.9 °F) (Temporal)   Resp 20   Ht 1.689 m (5' 6.5\")   Wt 64.5 kg (142 lb 3.2 oz)   SpO2 98%   BMI 22.61 kg/m²    Constitutional: Alert in mild distress secondary to pain  HENT: No signs of trauma, Bilateral external ears normal, Nose normal.   Eyes: Pupils are equal and reactive, Conjunctiva normal, Non-icteric.   Neck: Normal range of motion, No tenderness, Supple, No stridor.   Lymphatic: No lymphadenopathy noted.   Cardiovascular: Regular rate and rhythm, no palpable thrill  Thorax & Lungs: No respiratory distress,  No chest tenderness.  CTAB  Abdomen: Bowel sounds normal, Soft, No tenderness, No masses, No pulsatile masses. No peritoneal signs.  Skin: Warm, Dry, No erythema, No rash.   Back: Right sided CVA tenderness. No bony tenderness.   Extremities: Intact distal pulses, No edema, No tenderness, No cyanosis.  Musculoskeletal: Good range of motion in all major joints. No tenderness to palpation or major deformities noted.   Neurologic: Alert , Normal motor function, Normal sensory function, No focal deficits noted.   Psychiatric: Affect normal, Judgment normal, Mood normal.     DIAGNOSTIC STUDIES / " PROCEDURES    LABS  Labs Reviewed   URINALYSIS,CULTURE IF INDICATED - Abnormal; Notable for the following components:       Result Value    Occult Blood Moderate (*)     All other components within normal limits    Narrative:     Indication for culture:->Patient WITHOUT an indwelling [place]  catheter in place with new onset of Dysuria, Frequency,  Urgency, and/or Suprapubic pain   CBC WITH DIFFERENTIAL - Abnormal; Notable for the following components:    RBC 4.60 (*)     MCV 98.9 (*)     MCHC 33.2 (*)     Eosinophils 8.50 (*)     Monos (Absolute) 0.88 (*)     Eos (Absolute) 0.84 (*)     All other components within normal limits   COMP METABOLIC PANEL - Abnormal; Notable for the following components:    Bun 27 (*)     Creatinine 1.48 (*)     All other components within normal limits   URINE MICROSCOPIC (W/UA) - Abnormal; Notable for the following components:    WBC 2-5 (*)     RBC 10-20 (*)     Bacteria Few (*)     All other components within normal limits    Narrative:     Indication for culture:->Patient WITHOUT an indwelling [place]  catheter in place with new onset of Dysuria, Frequency,  Urgency, and/or Suprapubic pain   LIPASE   ESTIMATED GFR     All labs reviewed by me.    RADIOLOGY  CT-RENAL COLIC EVALUATION(A/P W/O)   Final Result         1.  Bilateral nephrolithiasis without visualized obstructive changes. The ureters are incompletely visualized due to negligible intra-abdominal fat limiting evaluation.         The radiologist's interpretation of all radiological studies have been reviewed by me.    COURSE & MEDICAL DECISION MAKING  Pertinent Labs & Imaging studies reviewed. (See chart for details)    This is a 32 y.o. male that presents with right-sided flank pain.  This could be kidney stone versus pyelonephritis versus passed kidney stone and renal colic.  Musculoskeletal back pain is also possible.  We will get the below labs and imaging and treat the patient's pain as well..     11:53 PM - Patient seen  and examined at bedside. I discussed the plan of care with the patient, including imaging for kidney stones and pain control with Toradol. Ordered CT-renal, CBC with differential, CMP, Lipase, Urine Microscopic, and UA culture.  Patient will be medicated with Toradol 15 mg for his symptoms.     Patient has bilateral nephrolithiasis.  Ureters do not show a large stone.  The patient does have some red blood cells in the urine.  The patient likely passed a stone.  Do not believe there is infection here in the urine will be sent for culture.  The patient is feeling much improved.  We will discharge him home with strict return precautions and follow-up.  The patient does have a mild ARAM we will push fluids.    FINAL IMPRESSION  1. Renal colic    2. ARAM (acute kidney injury) (HCC)          Lay CONTRERAS (Scriblee), am scribing for, and in the presence of, Chase Post M.D..    Electronically signed by: Lay Dubois (Emy), 12/1/2022    Chase CONTRERAS M.D. personally performed the services described in this documentation, as scribed by Lay Dubois in my presence, and it is both accurate and complete.     The note accurately reflects work and decisions made by me.  Chase Post M.D.  12/2/2022  3:26 AM

## 2022-12-02 NOTE — ED TRIAGE NOTES
Russell Kapoor [facility]  32 y.o. male  Chief Complaint   Patient presents with    Flank Pain     R sided pain. States it feels like when he has had a kidney stone in the past. Visibly uncomfortable in triage. 7/10, pt states the pain is moving.      Pt ambulatory with steady gait to triage for above complaint. Dysuria protocol ordered, urine sample cup provided.    Pt is alert, oriented, and follows commands. Pt speaking in full sentences and responds appropriately to questions. No acute distress noted in triage and respirations are even and unlabored.     Pt placed in lobby and educated on triage process. Pt encouraged to alert staff for any changes in condition.

## 2023-01-26 NOTE — DISCHARGE SUMMARY
Discharge Summary    CHIEF COMPLAINT ON ADMISSION  Flank pain    Reason for Admission  Low Back Pain     Admission Date  2/19/2022    CODE STATUS  Full Code    HPI & HOSPITAL COURSE  Russell Bunn is a 31 y.o. 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   Patient reports he has had multiple stones in the past and has had multiple surgeries in [place] at [place].      In the ER, CT showed new 5 mm Left Distal Ureter with Mild Left Hydronephrosis. He was admitted for pain control and intervention. Urology was consulted and performed lithotripsy and ureteral stent placement on 2/20/22. The following morning, pain was well controlled on oral medications. He expressed a desire to discharge early to allow him to return to the shelter in time to procure a bed. He did not want to wait for delivery by meds to beds. He reported that his belongings had not yet been disposed of.      Therefore, he is discharged in good and stable condition to home with close outpatient follow-up.    The patient met 2-midnight criteria for an inpatient stay at the time of discharge.    Discharge Date  2/21/22    FOLLOW UP ITEMS POST DISCHARGE  Follow up with Urology clinic    DISCHARGE DIAGNOSES  Principal Problem:    Flank pain POA: Yes  Active Problems:    Hydronephrosis POA: Yes    Ureterolithiasis with hydronephrosis POA: Yes      Overview: IMO load March 2020    Seizure-like activity (HCC) POA: Yes    Cystinuria (HCC) POA: Unknown  Resolved Problems:    * No resolved hospital problems. *      FOLLOW UP  [facility] [place]  [address] [place] [address]  [phone]    Schedule an appointment as soon as possible for a visit in 1 week  Stent removal      MEDICATIONS ON DISCHARGE     Medication List      START taking these medications       Instructions   acetaminophen 325 MG Tabs  Commonly known as: Tylenol   Take 2 Tablets by mouth every 6 hours as needed for Mild Pain or Moderate Pain.  Dose: 650 mg        CHANGE how you take these medications      Instructions   divalproex 500 MG Tbec  What changed: when to take this  Commonly known as: DEPAKOTE   Take 1 Tablet by mouth 2 times a day.  Dose: 500 mg     ketorolac 10 MG Tabs  What changed: reasons to take this  Commonly known as: TORADOL   Take 1 Tablet by mouth every four hours as needed (pain) for up to 5 days.  Dose: 10 mg        CONTINUE taking these medications      Instructions   tamsulosin 0.4 MG capsule  Start taking on: February 22, 2022  Commonly known as: FLOMAX   Take 1 Capsule by mouth every day.  Dose: 0.4 mg            Allergies  Allergies   Allergen Reactions   • Ancef [Cefazolin] Rash     rash   • Percocet [Apap-Fd&C Red #40 [place]-Oxycodone]      Tolerates acetaminophen   • Shellfish Allergy    • Tape        DIET  Orders Placed This Encounter   Procedures   • Diet Order Diet: Regular     Standing Status:   Standing     Number of Occurrences:   1     Order Specific Question:   Diet:     Answer:   Regular [1]       ACTIVITY  As tolerated.  Weight bearing as tolerated    CONSULTATIONS  Urology    PROCEDURES  2/20/22 Left ureteroscopy, ureteral stone basketing, holmium laser lithotripsy of renal calculus, left ureteral stent placement.    LABORATORY  Lab Results   Component Value Date    SODIUM 136 02/21/2022    POTASSIUM 5.3 02/21/2022    CHLORIDE 101 02/21/2022    CO2 24 02/21/2022    GLUCOSE 148 (H) 02/21/2022    BUN 16 02/21/2022    CREATININE 1.43 (H) 02/21/2022        Lab Results   Component Value Date    WBC 10.8 02/21/2022    HEMOGLOBIN 14.4 02/21/2022    HEMATOCRIT 43.7 02/21/2022    PLATELETCT 254 02/21/2022      DX-CYSTO FLUORO > 1 HOUR   Final Result      Cysto fluoroscopy utilized for 17 seconds         [facility] [address]         Total time  of the discharge process exceeds 32 minutes.   Detail Level: Zone

## 2023-02-18 ENCOUNTER — HOSPITAL ENCOUNTER (EMERGENCY)
Facility: MEDICAL CENTER | Age: 33
End: 2023-02-18
Attending: EMERGENCY MEDICINE
Payer: COMMERCIAL

## 2023-02-18 ENCOUNTER — APPOINTMENT (OUTPATIENT)
Dept: RADIOLOGY | Facility: MEDICAL CENTER | Age: 33
End: 2023-02-18
Attending: EMERGENCY MEDICINE

## 2023-02-18 VITALS
BODY MASS INDEX: 22.07 KG/M2 | HEART RATE: 64 BPM | DIASTOLIC BLOOD PRESSURE: 78 MMHG | OXYGEN SATURATION: 97 % | WEIGHT: 137.35 LBS | RESPIRATION RATE: 16 BRPM | TEMPERATURE: 97.5 F | HEIGHT: 66 IN | SYSTOLIC BLOOD PRESSURE: 126 MMHG

## 2023-02-18 DIAGNOSIS — Z76.5 DRUG-SEEKING BEHAVIOR: ICD-10-CM

## 2023-02-18 DIAGNOSIS — R46.89 AGGRESSIVE BEHAVIOR: ICD-10-CM

## 2023-02-18 DIAGNOSIS — R10.9 FLANK PAIN: Primary | ICD-10-CM

## 2023-02-18 LAB
ALBUMIN SERPL BCP-MCNC: 4.7 G/DL (ref 3.2–4.9)
ALBUMIN/GLOB SERPL: 1.6 G/DL
ALP SERPL-CCNC: 100 U/L (ref 30–99)
ALT SERPL-CCNC: 17 U/L (ref 2–50)
ANION GAP SERPL CALC-SCNC: 9 MMOL/L (ref 7–16)
APPEARANCE UR: CLEAR
AST SERPL-CCNC: 21 U/L (ref 12–45)
BACTERIA #/AREA URNS HPF: NEGATIVE /HPF
BASOPHILS # BLD AUTO: 0.7 % (ref 0–1.8)
BASOPHILS # BLD: 0.06 K/UL (ref 0–0.12)
BILIRUB SERPL-MCNC: 0.6 MG/DL (ref 0.1–1.5)
BILIRUB UR QL STRIP.AUTO: NEGATIVE
BUN SERPL-MCNC: 14 MG/DL (ref 8–22)
CALCIUM ALBUM COR SERPL-MCNC: 9 MG/DL (ref 8.5–10.5)
CALCIUM SERPL-MCNC: 9.6 MG/DL (ref 8.5–10.5)
CHLORIDE SERPL-SCNC: 103 MMOL/L (ref 96–112)
CO2 SERPL-SCNC: 27 MMOL/L (ref 20–33)
COLOR UR: YELLOW
CREAT SERPL-MCNC: 1.29 MG/DL (ref 0.5–1.4)
EOSINOPHIL # BLD AUTO: 0.83 K/UL (ref 0–0.51)
EOSINOPHIL NFR BLD: 9.3 % (ref 0–6.9)
EPI CELLS #/AREA URNS HPF: NEGATIVE /HPF
ERYTHROCYTE [DISTWIDTH] IN BLOOD BY AUTOMATED COUNT: 45.9 FL (ref 35.9–50)
GFR SERPLBLD CREATININE-BSD FMLA CKD-EPI: 76 ML/MIN/1.73 M 2
GLOBULIN SER CALC-MCNC: 2.9 G/DL (ref 1.9–3.5)
GLUCOSE SERPL-MCNC: 81 MG/DL (ref 65–99)
GLUCOSE UR STRIP.AUTO-MCNC: NEGATIVE MG/DL
HCT VFR BLD AUTO: 44.2 % (ref 42–52)
HGB BLD-MCNC: 14.6 G/DL (ref 14–18)
HYALINE CASTS #/AREA URNS LPF: ABNORMAL /LPF
IMM GRANULOCYTES # BLD AUTO: 0.02 K/UL (ref 0–0.11)
IMM GRANULOCYTES NFR BLD AUTO: 0.2 % (ref 0–0.9)
KETONES UR STRIP.AUTO-MCNC: NEGATIVE MG/DL
LEUKOCYTE ESTERASE UR QL STRIP.AUTO: ABNORMAL
LIPASE SERPL-CCNC: 86 U/L (ref 11–82)
LYMPHOCYTES # BLD AUTO: 2.85 K/UL (ref 1–4.8)
LYMPHOCYTES NFR BLD: 32.1 % (ref 22–41)
MCH RBC QN AUTO: 32 PG (ref 27–33)
MCHC RBC AUTO-ENTMCNC: 33 G/DL (ref 33.7–35.3)
MCV RBC AUTO: 96.9 FL (ref 81.4–97.8)
MICRO URNS: ABNORMAL
MONOCYTES # BLD AUTO: 0.8 K/UL (ref 0–0.85)
MONOCYTES NFR BLD AUTO: 9 % (ref 0–13.4)
NEUTROPHILS # BLD AUTO: 4.32 K/UL (ref 1.82–7.42)
NEUTROPHILS NFR BLD: 48.7 % (ref 44–72)
NITRITE UR QL STRIP.AUTO: NEGATIVE
NRBC # BLD AUTO: 0 K/UL
NRBC BLD-RTO: 0 /100 WBC
PH UR STRIP.AUTO: 7.5 [PH] (ref 5–8)
PLATELET # BLD AUTO: 235 K/UL (ref 164–446)
PMV BLD AUTO: 9.1 FL (ref 9–12.9)
POTASSIUM SERPL-SCNC: 4.3 MMOL/L (ref 3.6–5.5)
PROT SERPL-MCNC: 7.6 G/DL (ref 6–8.2)
PROT UR QL STRIP: NEGATIVE MG/DL
RBC # BLD AUTO: 4.56 M/UL (ref 4.7–6.1)
RBC # URNS HPF: ABNORMAL /HPF
RBC UR QL AUTO: NEGATIVE
SODIUM SERPL-SCNC: 139 MMOL/L (ref 135–145)
SP GR UR STRIP.AUTO: 1.02
UROBILINOGEN UR STRIP.AUTO-MCNC: 0.2 MG/DL
WBC # BLD AUTO: 8.9 K/UL (ref 4.8–10.8)
WBC #/AREA URNS HPF: ABNORMAL /HPF

## 2023-02-18 PROCEDURE — 96375 TX/PRO/DX INJ NEW DRUG ADDON: CPT

## 2023-02-18 PROCEDURE — 81001 URINALYSIS AUTO W/SCOPE: CPT

## 2023-02-18 PROCEDURE — 80053 COMPREHEN METABOLIC PANEL: CPT

## 2023-02-18 PROCEDURE — 85025 COMPLETE CBC W/AUTO DIFF WBC: CPT

## 2023-02-18 PROCEDURE — 83690 ASSAY OF LIPASE: CPT

## 2023-02-18 PROCEDURE — 74176 CT ABD & PELVIS W/O CONTRAST: CPT

## 2023-02-18 PROCEDURE — 99284 EMERGENCY DEPT VISIT MOD MDM: CPT

## 2023-02-18 PROCEDURE — 700111 HCHG RX REV CODE 636 W/ 250 OVERRIDE (IP): Performed by: EMERGENCY MEDICINE

## 2023-02-18 PROCEDURE — 36415 COLL VENOUS BLD VENIPUNCTURE: CPT

## 2023-02-18 PROCEDURE — 96374 THER/PROPH/DIAG INJ IV PUSH: CPT

## 2023-02-18 RX ORDER — IBUPROFEN 800 MG/1
800 TABLET ORAL EVERY 8 HOURS PRN
Qty: 20 TABLET | Refills: 0 | Status: SHIPPED | OUTPATIENT
Start: 2023-02-18 | End: 2023-02-21

## 2023-02-18 RX ORDER — KETOROLAC TROMETHAMINE 30 MG/ML
15 INJECTION, SOLUTION INTRAMUSCULAR; INTRAVENOUS ONCE
Status: COMPLETED | OUTPATIENT
Start: 2023-02-18 | End: 2023-02-18

## 2023-02-18 RX ORDER — ONDANSETRON 2 MG/ML
4 INJECTION INTRAMUSCULAR; INTRAVENOUS ONCE
Status: COMPLETED | OUTPATIENT
Start: 2023-02-18 | End: 2023-02-18

## 2023-02-18 RX ORDER — ACETAMINOPHEN 500 MG
1000 TABLET ORAL EVERY 6 HOURS PRN
Qty: 20 TABLET | Refills: 0 | Status: SHIPPED | OUTPATIENT
Start: 2023-02-18 | End: 2023-02-22

## 2023-02-18 RX ORDER — MORPHINE SULFATE 4 MG/ML
4 INJECTION INTRAVENOUS ONCE
Status: COMPLETED | OUTPATIENT
Start: 2023-02-18 | End: 2023-02-18

## 2023-02-18 RX ADMIN — ONDANSETRON 4 MG: 2 INJECTION INTRAMUSCULAR; INTRAVENOUS at 22:03

## 2023-02-18 RX ADMIN — KETOROLAC TROMETHAMINE 15 MG: 30 INJECTION, SOLUTION INTRAMUSCULAR; INTRAVENOUS at 22:03

## 2023-02-18 RX ADMIN — MORPHINE SULFATE 4 MG: 4 INJECTION INTRAVENOUS at 22:03

## 2023-02-18 ASSESSMENT — FIBROSIS 4 INDEX: FIB4 SCORE: 0.72

## 2023-02-19 NOTE — ED TRIAGE NOTES
Russell Kapoor [facility]  32 y.o.  male  Chief Complaint   Patient presents with    Flank Pain     R sided flank pain since this morning, pt states it feels like he is passing a kidney stone. Hx stones. C/o voiding in small amounts, decreased urination over past 2 days.        Labs & UA ordered. Patient educated on triage process, to alert staff if any changes in condition.

## 2023-02-19 NOTE — ED PROVIDER NOTES
ED Provider Note    Scribed for Joe Sequeira by Maurisio Artis. 2/18/2023  9:41 PM    Primary care provider: Pcp Pt States None  Means of arrival: walk in  History obtained from: Patient  History limited by: None    CHIEF COMPLAINT  Chief Complaint   Patient presents with    Flank Pain     R sided flank pain since this morning, pt states it feels like he is passing a kidney stone. Hx stones. C/o voiding in small amounts, decreased urination over past 2 days.      HPI/ROS    LIMITATION TO HISTORY   Select: : None    HPI  Russell Bunn is a 32 y.o. male who presents to the Emergency Department for right sided flank pain onset one week ago but worsening today. He states that he has felt some intermittent pains with urinary frequency and decreased output. He denies any fevers or vomiting. He states that he has an extensive history of kidney stones, his last one was a month ago. He usually receives treatment at [place], but recently moved up to [place]. He sees a specialist in [place] where he has had multiple cystoscopies.    REVIEW OF SYSTEMS  As above, all other systems reviewed and are negative.   See HPI for further details.     PAST MEDICAL HISTORY   has a past medical history of Kidney stones and Seizure disorder (HCC).    SURGICAL HISTORY   has a past surgical history that includes lithotripsy; cysto/uretero/pyeloscopy, dx (Right, 3/13/2020); cystoscopy,insert ureteral stent (Right, 3/13/2020); lasertripsy (Right, 3/13/2020); cystoscopy,insert ureteral stent (Left, 4/26/2021); cysto/uretero/pyeloscopy, dx (Left, 4/26/2021); cystoscopy (Left, 02/20/2022); cystoscopy,insert ureteral stent (Left, 2/20/2022); cysto/uretero/pyeloscopy, dx (Left, 2/20/2022); and lasertripsy (Left, 2/20/2022).    SOCIAL HISTORY  Social History     Tobacco Use    Smoking status: Former     Packs/day: 0.50     Types: Cigarettes    Smokeless tobacco: Never   Vaping Use    Vaping Use: Never used   Substance Use Topics  "   Alcohol use: Yes     Comment: Occasional    Drug use: Yes     Types: Inhaled     Comment: Marijuana, every day      Social History     Substance and Sexual Activity   Drug Use Yes    Types: Inhaled    Comment: Marijuana, every day     FAMILY HISTORY  No family history on file.  CURRENT MEDICATIONS  Home Medications       Reviewed by Cordelia Reyes R.N. (Registered Nurse) on 02/18/23 at 2132  Med List Status: Not Addressed     Medication Last Dose Status   acetaminophen (TYLENOL) 325 MG Tab  Active   diphenhydrAMINE (BENADRYL) 25 MG Tab  Active   polyethylene glycol/lytes (MIRALAX) 17 g Pack  Active   senna-docusate (PERICOLACE OR SENOKOT S) 8.6-50 MG Tab  Active                  ALLERGIES  Allergies   Allergen Reactions    Dilaudid [Hydromorphone] Itching    Ancef [Cefazolin] Rash     Rash  Tolerated ceftriaxone (April 2021)    Percocet [Apap-Fd&C Red #40 [place]-Oxycodone]      Tolerates acetaminophen    Shellfish Allergy     Tape      Paper tape okay        PHYSICAL EXAM    VITAL SIGNS:   Vitals:    02/18/23 2126 02/18/23 2129 02/18/23 2200 02/18/23 2300   BP: 127/87  118/78 126/78   Pulse: 68  65 64   Resp: 16   16   Temp: 36.4 °C (97.6 °F)   36.4 °C (97.5 °F)   TempSrc: Temporal      SpO2: 99%  98% 97%   Weight:  62.3 kg (137 lb 5.6 oz)     Height:  1.676 m (5' 6\")       Vitals: My interpretation: normotensive, not tachycardic, afebrile, not hypoxic    Reinterpretation of vitals: Unremarkable    PE:   Constitutional: Appears uncomfortable. Well developed, Well nourished, No acute distress, Non-toxic appearance.   HENT: Normocephalic, Atraumatic, Bilateral external ears normal, Oropharynx is clear mucous membranes are moist. No oral exudates or nasal discharge.   Eyes: Pupils are equal round and reactive, EOMI, Conjunctiva normal, No discharge.   Neck: Normal range of motion, No tenderness, Supple, No stridor. No meningismus.  Lymphatic: No lymphadenopathy noted.   Cardiovascular: Regular rate and rhythm " without murmur rub or gallop.  Thorax & Lungs: Clear breath sounds bilaterally without wheezes, rhonchi or rales. There is no chest wall tenderness.   Abdomen: No reproducible tenderness upon exam. Soft non-tender non-distended. There is no rebound or guarding. No organomegaly is appreciated. Bowel sounds are normal.  Skin: Normal without rash.   Back: No CVA or spinal tenderness.   Extremities: Intact distal pulses, No edema, No tenderness, No cyanosis, No clubbing. Capillary refill is less than 2 seconds.  Musculoskeletal: Good range of motion in all major joints. No tenderness to palpation or major deformities noted.   Neurologic: Alert & oriented x 3, Normal motor function, Normal sensory function, No focal deficits noted. Reflexes are normal.  Psychiatric: Affect normal, Judgment normal, Mood normal. There is no suicidal ideation or patient reported hallucinations.     DIAGNOSTIC STUDIES / PROCEDURES    LABS  Results for orders placed or performed during the hospital encounter of 02/18/23   CBC WITH DIFFERENTIAL   Result Value Ref Range    WBC 8.9 4.8 - 10.8 K/uL    RBC 4.56 (L) 4.70 - 6.10 M/uL    Hemoglobin 14.6 14.0 - 18.0 g/dL    Hematocrit 44.2 42.0 - 52.0 %    MCV 96.9 81.4 - 97.8 fL    MCH 32.0 27.0 - 33.0 pg    MCHC 33.0 (L) 33.7 - 35.3 g/dL    RDW 45.9 35.9 - 50.0 fL    Platelet Count 235 164 - 446 K/uL    MPV 9.1 9.0 - 12.9 fL    Neutrophils-Polys 48.70 44.00 - 72.00 %    Lymphocytes 32.10 22.00 - 41.00 %    Monocytes 9.00 0.00 - 13.40 %    Eosinophils 9.30 (H) 0.00 - 6.90 %    Basophils 0.70 0.00 - 1.80 %    Immature Granulocytes 0.20 0.00 - 0.90 %    Nucleated RBC 0.00 /100 WBC    Neutrophils (Absolute) 4.32 1.82 - 7.42 K/uL    Lymphs (Absolute) 2.85 1.00 - 4.80 K/uL    Monos (Absolute) 0.80 0.00 - 0.85 K/uL    Eos (Absolute) 0.83 (H) 0.00 - 0.51 K/uL    Baso (Absolute) 0.06 0.00 - 0.12 K/uL    Immature Granulocytes (abs) 0.02 0.00 - 0.11 K/uL    NRBC (Absolute) 0.00 K/uL   COMP METABOLIC PANEL    Result Value Ref Range    Sodium 139 135 - 145 mmol/L    Potassium 4.3 3.6 - 5.5 mmol/L    Chloride 103 96 - 112 mmol/L    Co2 27 20 - 33 mmol/L    Anion Gap 9.0 7.0 - 16.0    Glucose 81 65 - 99 mg/dL    Bun 14 8 - 22 mg/dL    Creatinine 1.29 0.50 - 1.40 mg/dL    Calcium 9.6 8.5 - 10.5 mg/dL    AST(SGOT) 21 12 - 45 U/L    ALT(SGPT) 17 2 - 50 U/L    Alkaline Phosphatase 100 (H) 30 - 99 U/L    Total Bilirubin 0.6 0.1 - 1.5 mg/dL    Albumin 4.7 3.2 - 4.9 g/dL    Total Protein 7.6 6.0 - 8.2 g/dL    Globulin 2.9 1.9 - 3.5 g/dL    A-G Ratio 1.6 g/dL   LIPASE   Result Value Ref Range    Lipase 86 (H) 11 - 82 U/L   URINALYSIS    Specimen: Urine   Result Value Ref Range    Color Yellow     Character Clear     Specific Gravity 1.018 <1.035    Ph 7.5 5.0 - 8.0    Glucose Negative Negative mg/dL    Ketones Negative Negative mg/dL    Protein Negative Negative mg/dL    Bilirubin Negative Negative    Urobilinogen, Urine 0.2 Negative    Nitrite Negative Negative    Leukocyte Esterase Small (A) Negative    Occult Blood Negative Negative    Micro Urine Req Microscopic    URINE MICROSCOPIC (W/UA)   Result Value Ref Range    WBC 20-50 (A) /hpf    RBC 2-5 (A) /hpf    Bacteria Negative None /hpf    Epithelial Cells Negative /hpf    Hyaline Cast 0-2 /lpf   CORRECTED CALCIUM   Result Value Ref Range    Correct Calcium 9.0 8.5 - 10.5 mg/dL   ESTIMATED GFR   Result Value Ref Range    GFR (CKD-EPI) 76 >60 mL/min/1.73 m 2      All labs reviewed by me. Labs were compared to prior labs if they were available. Significant for no leukocytosis, no anemia, normal electrolytes, normal renal function, normal liver enzymes, normal bilirubin, urinalysis negative for blood, infection    RADIOLOGY  I have independently interpreted the diagnostic imaging associated with this visit and am waiting the final reading from the radiologist.   Radiologist interpretation is as follows:  CT-RENAL COLIC EVALUATION(A/P W/O)   Final Result         1.  NO EVIDENCE OF  HYDRONEPHROSIS OR HYDROURETER.      2.  PUNCTATE CALCIFICATIONS ARE AGAIN IDENTIFIED IN EACH KIDNEY WHICH APPEARS SIMILAR TO THE PRIOR CT.           COURSE & MEDICAL DECISION MAKING  Nursing notes, VS, PMSFHx, labs, imaging, EKG reviewed in chart.    ED Observation Status? No; Patient does not meet criteria for ED Observation.     MDM: 9:41 PM Russell Bunn is a 32 y.o. male who presented with concerns of recurrent kidney stone.  Patient states he has had many before.  Comes from [facility].  Symptoms started throughout the week but worsened tonight.  Vital signs unremarkable, denies other symptoms.  No fever.  Physical exam is very benign and he is in no acute distress.  Underwent routine CT imaging and laboratory evaluation.  CT imaging was reviewed by myself and shows no obvious obstructive kidney stones, radiologist report agrees.   All labs reviewed by me. Labs were compared to prior labs if they were available. Significant for no leukocytosis, no anemia, normal electrolytes, normal renal function, normal liver enzymes, normal bilirubin, urinalysis negative for blood, infection.  Patient was treated initially with morphine, Zofran, Toradol.  When patient was informed with information that his CT scan was normal, he became extremely upset that he would not receive narcotic medications.  I am concerned for possible drug-seeking behavior.  I discussed that he should follow-up with urology and PCP.  He became aggressive and angry stating that this was not possible.  I discussed that we get case management involved and social work, but he again declined.  I am concerned for possible narcotic seeking behavior.  Recommend close outpatient follow-up and return precautions which were discussed with the patient, however he did walk out of the emergency department upset and angry.    ADDITIONAL PROBLEM LIST AND DISPOSITION    Escalation of care considered, and ultimately not  performed:the patient was evaluated by myself, after discussion I have recommended the patient to be discharged    Barriers to care at this time, including but not limited to: Patient does not have established PCP.     Decision tools and prescription drugs considered including, but not limited to: Pain Medications morphine 4 mg and Toradol 15 mg .    DISPOSITION:  Patient will be discharged home in stable condition.    FOLLOW UP:  [facility], Emergency Dept  [address]  [place] [address] [phone]    As needed, If symptoms worsen    Kulwinder Cramer M.D.  [address]   [place] [address]  [phone]    Schedule an appointment as soon as possible for a visit       OUTPATIENT MEDICATIONS:  Discharge Medication List as of 2/18/2023 11:44 PM        START taking these medications    Details   !! acetaminophen (TYLENOL) 500 MG Tab Take 2 Tablets by mouth every 6 hours as needed for Moderate Pain for up to 4 days., Disp-20 Tablet, R-0, Normal      ibuprofen (MOTRIN) 800 MG Tab Take 1 Tablet by mouth every 8 hours as needed for Mild Pain for up to 3 days., Disp-20 Tablet, R-0, Normal       !! - Potential duplicate medications found. Please discuss with provider.        FINAL IMPRESSION  1. Flank pain Acute   2. Aggressive behavior Acute   3. Drug-seeking behavior Acute      Maurisio CONTRERAS (Emy), am scribing for, and in the presence of, Joe Sequeira.    Electronically signed by: Maurisio Artis (Emy), 2/18/2023    IJoe personally performed the services described in this documentation, as scribed by Maurisio Artis in my presence, and it is both accurate and complete.    The note accurately reflects work and decisions made by me.  Joe Sequeira  2/18/2023  11:57 PM

## 2023-02-19 NOTE — DISCHARGE INSTRUCTIONS
CT scan shows no evidence of a kidney stone causing any obstruction today.  Your labs are normal.  It is unclear what is causing your flank pain but please follow-up with your outpatient team.  If some prescriptions to the pharmacy for you for Tylenol Motrin as needed.  Come back if you have fever or worsening symptoms.  Thank you for coming in today.

## 2023-02-20 ENCOUNTER — PATIENT OUTREACH (OUTPATIENT)
Dept: SCHEDULING | Facility: IMAGING CENTER | Age: 33
End: 2023-02-20

## 2023-02-21 ENCOUNTER — PATIENT OUTREACH (OUTPATIENT)
Dept: SCHEDULING | Facility: IMAGING CENTER | Age: 33
End: 2023-02-21

## 2023-02-22 ENCOUNTER — PATIENT OUTREACH (OUTPATIENT)
Dept: SCHEDULING | Facility: IMAGING CENTER | Age: 33
End: 2023-02-22

## 2023-04-24 ENCOUNTER — ANESTHESIA (OUTPATIENT)
Dept: SURGERY | Facility: MEDICAL CENTER | Age: 33
DRG: 854 | End: 2023-04-24
Payer: COMMERCIAL

## 2023-04-24 ENCOUNTER — HOSPITAL ENCOUNTER (INPATIENT)
Facility: MEDICAL CENTER | Age: 33
LOS: 1 days | DRG: 854 | End: 2023-04-24
Attending: EMERGENCY MEDICINE | Admitting: INTERNAL MEDICINE
Payer: COMMERCIAL

## 2023-04-24 ENCOUNTER — APPOINTMENT (OUTPATIENT)
Dept: RADIOLOGY | Facility: MEDICAL CENTER | Age: 33
DRG: 854 | End: 2023-04-24
Attending: EMERGENCY MEDICINE
Payer: COMMERCIAL

## 2023-04-24 ENCOUNTER — APPOINTMENT (OUTPATIENT)
Dept: RADIOLOGY | Facility: MEDICAL CENTER | Age: 33
DRG: 854 | End: 2023-04-24
Attending: UROLOGY
Payer: COMMERCIAL

## 2023-04-24 ENCOUNTER — ANESTHESIA EVENT (OUTPATIENT)
Dept: SURGERY | Facility: MEDICAL CENTER | Age: 33
DRG: 854 | End: 2023-04-24
Payer: COMMERCIAL

## 2023-04-24 ENCOUNTER — PHARMACY VISIT (OUTPATIENT)
Dept: PHARMACY | Facility: MEDICAL CENTER | Age: 33
End: 2023-04-24
Payer: COMMERCIAL

## 2023-04-24 VITALS
SYSTOLIC BLOOD PRESSURE: 133 MMHG | BODY MASS INDEX: 22.27 KG/M2 | WEIGHT: 138 LBS | TEMPERATURE: 97.9 F | HEART RATE: 64 BPM | DIASTOLIC BLOOD PRESSURE: 84 MMHG | RESPIRATION RATE: 16 BRPM | OXYGEN SATURATION: 97 %

## 2023-04-24 DIAGNOSIS — A41.9 SEPSIS WITHOUT ACUTE ORGAN DYSFUNCTION, DUE TO UNSPECIFIED ORGANISM (HCC): ICD-10-CM

## 2023-04-24 DIAGNOSIS — N13.30 HYDRONEPHROSIS, UNSPECIFIED HYDRONEPHROSIS TYPE: ICD-10-CM

## 2023-04-24 DIAGNOSIS — N20.1 URETEROLITHIASIS: ICD-10-CM

## 2023-04-24 DIAGNOSIS — N12 PYELONEPHRITIS: ICD-10-CM

## 2023-04-24 DIAGNOSIS — N13.30 HYDROURETERONEPHROSIS: ICD-10-CM

## 2023-04-24 PROBLEM — E87.20 LACTIC ACIDOSIS: Status: ACTIVE | Noted: 2023-04-24

## 2023-04-24 PROBLEM — R65.20 SEVERE SEPSIS (HCC): Status: ACTIVE | Noted: 2023-04-24

## 2023-04-24 LAB
ALBUMIN SERPL BCP-MCNC: 4.4 G/DL (ref 3.2–4.9)
ALBUMIN/GLOB SERPL: 1.3 G/DL
ALP SERPL-CCNC: 115 U/L (ref 30–99)
ALT SERPL-CCNC: 16 U/L (ref 2–50)
ANION GAP SERPL CALC-SCNC: 15 MMOL/L (ref 7–16)
APPEARANCE UR: ABNORMAL
AST SERPL-CCNC: 23 U/L (ref 12–45)
BACTERIA #/AREA URNS HPF: ABNORMAL /HPF
BASOPHILS # BLD AUTO: 0.6 % (ref 0–1.8)
BASOPHILS # BLD: 0.13 K/UL (ref 0–0.12)
BILIRUB SERPL-MCNC: 0.6 MG/DL (ref 0.1–1.5)
BILIRUB UR QL STRIP.AUTO: NEGATIVE
BUN SERPL-MCNC: 14 MG/DL (ref 8–22)
C TRACH DNA SPEC QL NAA+PROBE: NEGATIVE
CALCIUM ALBUM COR SERPL-MCNC: 9.2 MG/DL (ref 8.5–10.5)
CALCIUM SERPL-MCNC: 9.5 MG/DL (ref 8.5–10.5)
CHLORIDE SERPL-SCNC: 103 MMOL/L (ref 96–112)
CO2 SERPL-SCNC: 21 MMOL/L (ref 20–33)
COLOR UR: YELLOW
CREAT SERPL-MCNC: 1.27 MG/DL (ref 0.5–1.4)
EOSINOPHIL # BLD AUTO: 0.84 K/UL (ref 0–0.51)
EOSINOPHIL NFR BLD: 3.6 % (ref 0–6.9)
EPI CELLS #/AREA URNS HPF: NEGATIVE /HPF
ERYTHROCYTE [DISTWIDTH] IN BLOOD BY AUTOMATED COUNT: 47.7 FL (ref 35.9–50)
GFR SERPLBLD CREATININE-BSD FMLA CKD-EPI: 77 ML/MIN/1.73 M 2
GLOBULIN SER CALC-MCNC: 3.4 G/DL (ref 1.9–3.5)
GLUCOSE SERPL-MCNC: 164 MG/DL (ref 65–99)
GLUCOSE UR STRIP.AUTO-MCNC: 100 MG/DL
HCT VFR BLD AUTO: 48.6 % (ref 42–52)
HGB BLD-MCNC: 16.1 G/DL (ref 14–18)
HYALINE CASTS #/AREA URNS LPF: ABNORMAL /LPF
IMM GRANULOCYTES # BLD AUTO: 0.14 K/UL (ref 0–0.11)
IMM GRANULOCYTES NFR BLD AUTO: 0.6 % (ref 0–0.9)
INR PPP: 1.17 (ref 0.87–1.13)
KETONES UR STRIP.AUTO-MCNC: ABNORMAL MG/DL
LACTATE SERPL-SCNC: 2.3 MMOL/L (ref 0.5–2)
LACTATE SERPL-SCNC: 3.8 MMOL/L (ref 0.5–2)
LEUKOCYTE ESTERASE UR QL STRIP.AUTO: ABNORMAL
LIPASE SERPL-CCNC: 54 U/L (ref 11–82)
LYMPHOCYTES # BLD AUTO: 3.85 K/UL (ref 1–4.8)
LYMPHOCYTES NFR BLD: 16.5 % (ref 22–41)
MCH RBC QN AUTO: 32.5 PG (ref 27–33)
MCHC RBC AUTO-ENTMCNC: 33.1 G/DL (ref 33.7–35.3)
MCV RBC AUTO: 98.2 FL (ref 81.4–97.8)
MICRO URNS: ABNORMAL
MONOCYTES # BLD AUTO: 1.93 K/UL (ref 0–0.85)
MONOCYTES NFR BLD AUTO: 8.3 % (ref 0–13.4)
N GONORRHOEA DNA SPEC QL NAA+PROBE: NEGATIVE
NEUTROPHILS # BLD AUTO: 16.43 K/UL (ref 1.82–7.42)
NEUTROPHILS NFR BLD: 70.4 % (ref 44–72)
NITRITE UR QL STRIP.AUTO: NEGATIVE
NRBC # BLD AUTO: 0 K/UL
NRBC BLD-RTO: 0 /100 WBC
PATHOLOGY CONSULT NOTE: NORMAL
PH UR STRIP.AUTO: 7.5 [PH] (ref 5–8)
PLATELET # BLD AUTO: 269 K/UL (ref 164–446)
PMV BLD AUTO: 9.1 FL (ref 9–12.9)
POTASSIUM SERPL-SCNC: 3.9 MMOL/L (ref 3.6–5.5)
PROT SERPL-MCNC: 7.8 G/DL (ref 6–8.2)
PROT UR QL STRIP: 30 MG/DL
PROTHROMBIN TIME: 14.8 SEC (ref 12–14.6)
RBC # BLD AUTO: 4.95 M/UL (ref 4.7–6.1)
RBC # URNS HPF: ABNORMAL /HPF
RBC UR QL AUTO: ABNORMAL
SODIUM SERPL-SCNC: 139 MMOL/L (ref 135–145)
SP GR UR STRIP.AUTO: 1.02
SPECIMEN SOURCE: NORMAL
UROBILINOGEN UR STRIP.AUTO-MCNC: 0.2 MG/DL
WBC # BLD AUTO: 23.3 K/UL (ref 4.8–10.8)
WBC #/AREA URNS HPF: ABNORMAL /HPF
YEAST HYPHAE #/AREA URNS HPF: PRESENT /HPF

## 2023-04-24 PROCEDURE — 87077 CULTURE AEROBIC IDENTIFY: CPT

## 2023-04-24 PROCEDURE — 160009 HCHG ANES TIME/MIN: Performed by: UROLOGY

## 2023-04-24 PROCEDURE — 85025 COMPLETE CBC W/AUTO DIFF WBC: CPT

## 2023-04-24 PROCEDURE — C2617 STENT, NON-COR, TEM W/O DEL: HCPCS | Performed by: UROLOGY

## 2023-04-24 PROCEDURE — 700111 HCHG RX REV CODE 636 W/ 250 OVERRIDE (IP): Performed by: ANESTHESIOLOGY

## 2023-04-24 PROCEDURE — 0TC68ZZ EXTIRPATION OF MATTER FROM RIGHT URETER, VIA NATURAL OR ARTIFICIAL OPENING ENDOSCOPIC: ICD-10-PCS | Performed by: UROLOGY

## 2023-04-24 PROCEDURE — 87491 CHLMYD TRACH DNA AMP PROBE: CPT

## 2023-04-24 PROCEDURE — 88300 SURGICAL PATH GROSS: CPT

## 2023-04-24 PROCEDURE — 80053 COMPREHEN METABOLIC PANEL: CPT

## 2023-04-24 PROCEDURE — 700105 HCHG RX REV CODE 258: Performed by: EMERGENCY MEDICINE

## 2023-04-24 PROCEDURE — 99291 CRITICAL CARE FIRST HOUR: CPT

## 2023-04-24 PROCEDURE — 110371 HCHG SHELL REV 272: Performed by: UROLOGY

## 2023-04-24 PROCEDURE — 700102 HCHG RX REV CODE 250 W/ 637 OVERRIDE(OP): Performed by: INTERNAL MEDICINE

## 2023-04-24 PROCEDURE — 74176 CT ABD & PELVIS W/O CONTRAST: CPT

## 2023-04-24 PROCEDURE — 160035 HCHG PACU - 1ST 60 MINS PHASE I: Performed by: UROLOGY

## 2023-04-24 PROCEDURE — 700101 HCHG RX REV CODE 250: Performed by: ANESTHESIOLOGY

## 2023-04-24 PROCEDURE — 87591 N.GONORRHOEAE DNA AMP PROB: CPT

## 2023-04-24 PROCEDURE — 00910 ANES TRANSURETHRAL PX NOS: CPT | Performed by: ANESTHESIOLOGY

## 2023-04-24 PROCEDURE — 83690 ASSAY OF LIPASE: CPT

## 2023-04-24 PROCEDURE — 36415 COLL VENOUS BLD VENIPUNCTURE: CPT

## 2023-04-24 PROCEDURE — 160002 HCHG RECOVERY MINUTES (STAT): Performed by: UROLOGY

## 2023-04-24 PROCEDURE — 700117 HCHG RX CONTRAST REV CODE 255: Performed by: UROLOGY

## 2023-04-24 PROCEDURE — 160039 HCHG SURGERY MINUTES - EA ADDL 1 MIN LEVEL 3: Performed by: UROLOGY

## 2023-04-24 PROCEDURE — 99140 ANES COMP EMERGENCY COND: CPT | Performed by: ANESTHESIOLOGY

## 2023-04-24 PROCEDURE — A9270 NON-COVERED ITEM OR SERVICE: HCPCS | Performed by: ANESTHESIOLOGY

## 2023-04-24 PROCEDURE — 160028 HCHG SURGERY MINUTES - 1ST 30 MINS LEVEL 3: Performed by: UROLOGY

## 2023-04-24 PROCEDURE — 82365 CALCULUS SPECTROSCOPY: CPT

## 2023-04-24 PROCEDURE — 96375 TX/PRO/DX INJ NEW DRUG ADDON: CPT

## 2023-04-24 PROCEDURE — 0T768DZ DILATION OF RIGHT URETER WITH INTRALUMINAL DEVICE, VIA NATURAL OR ARTIFICIAL OPENING ENDOSCOPIC: ICD-10-PCS | Performed by: UROLOGY

## 2023-04-24 PROCEDURE — A9270 NON-COVERED ITEM OR SERVICE: HCPCS | Performed by: INTERNAL MEDICINE

## 2023-04-24 PROCEDURE — C1769 GUIDE WIRE: HCPCS | Performed by: UROLOGY

## 2023-04-24 PROCEDURE — 99223 1ST HOSP IP/OBS HIGH 75: CPT | Performed by: INTERNAL MEDICINE

## 2023-04-24 PROCEDURE — 96374 THER/PROPH/DIAG INJ IV PUSH: CPT

## 2023-04-24 PROCEDURE — 770001 HCHG ROOM/CARE - MED/SURG/GYN PRIV*

## 2023-04-24 PROCEDURE — 81001 URINALYSIS AUTO W/SCOPE: CPT

## 2023-04-24 PROCEDURE — 700101 HCHG RX REV CODE 250: Performed by: EMERGENCY MEDICINE

## 2023-04-24 PROCEDURE — 160048 HCHG OR STATISTICAL LEVEL 1-5: Performed by: UROLOGY

## 2023-04-24 PROCEDURE — 85610 PROTHROMBIN TIME: CPT

## 2023-04-24 PROCEDURE — 87186 SC STD MICRODIL/AGAR DIL: CPT

## 2023-04-24 PROCEDURE — 87040 BLOOD CULTURE FOR BACTERIA: CPT

## 2023-04-24 PROCEDURE — 71045 X-RAY EXAM CHEST 1 VIEW: CPT

## 2023-04-24 PROCEDURE — 700111 HCHG RX REV CODE 636 W/ 250 OVERRIDE (IP): Performed by: EMERGENCY MEDICINE

## 2023-04-24 PROCEDURE — 87086 URINE CULTURE/COLONY COUNT: CPT

## 2023-04-24 PROCEDURE — 700102 HCHG RX REV CODE 250 W/ 637 OVERRIDE(OP): Performed by: ANESTHESIOLOGY

## 2023-04-24 PROCEDURE — RXMED WILLOW AMBULATORY MEDICATION CHARGE: Performed by: INTERNAL MEDICINE

## 2023-04-24 PROCEDURE — 83605 ASSAY OF LACTIC ACID: CPT

## 2023-04-24 DEVICE — STENT UROLOGICAL POLARIS 6X26  ULTRA: Type: IMPLANTABLE DEVICE | Site: PENIS | Status: FUNCTIONAL

## 2023-04-24 RX ORDER — DIPHENHYDRAMINE HYDROCHLORIDE 50 MG/ML
12.5 INJECTION INTRAMUSCULAR; INTRAVENOUS
Status: DISCONTINUED | OUTPATIENT
Start: 2023-04-24 | End: 2023-04-24 | Stop reason: HOSPADM

## 2023-04-24 RX ORDER — HYDRALAZINE HYDROCHLORIDE 20 MG/ML
5 INJECTION INTRAMUSCULAR; INTRAVENOUS
Status: DISCONTINUED | OUTPATIENT
Start: 2023-04-24 | End: 2023-04-24 | Stop reason: HOSPADM

## 2023-04-24 RX ORDER — LORAZEPAM 2 MG/ML
0.5 INJECTION INTRAMUSCULAR ONCE
Status: COMPLETED | OUTPATIENT
Start: 2023-04-24 | End: 2023-04-24

## 2023-04-24 RX ORDER — HALOPERIDOL 5 MG/ML
1 INJECTION INTRAMUSCULAR
Status: DISCONTINUED | OUTPATIENT
Start: 2023-04-24 | End: 2023-04-24 | Stop reason: HOSPADM

## 2023-04-24 RX ORDER — PROMETHAZINE HYDROCHLORIDE 25 MG/1
12.5-25 SUPPOSITORY RECTAL EVERY 4 HOURS PRN
Status: DISCONTINUED | OUTPATIENT
Start: 2023-04-24 | End: 2023-04-24 | Stop reason: HOSPADM

## 2023-04-24 RX ORDER — KETOROLAC TROMETHAMINE 30 MG/ML
30 INJECTION, SOLUTION INTRAMUSCULAR; INTRAVENOUS ONCE
Status: COMPLETED | OUTPATIENT
Start: 2023-04-24 | End: 2023-04-24

## 2023-04-24 RX ORDER — ONDANSETRON 2 MG/ML
INJECTION INTRAMUSCULAR; INTRAVENOUS PRN
Status: DISCONTINUED | OUTPATIENT
Start: 2023-04-24 | End: 2023-04-24 | Stop reason: SURG

## 2023-04-24 RX ORDER — LIDOCAINE HYDROCHLORIDE 20 MG/ML
INJECTION, SOLUTION EPIDURAL; INFILTRATION; INTRACAUDAL; PERINEURAL PRN
Status: DISCONTINUED | OUTPATIENT
Start: 2023-04-24 | End: 2023-04-24 | Stop reason: SURG

## 2023-04-24 RX ORDER — MIDAZOLAM HYDROCHLORIDE 1 MG/ML
INJECTION INTRAMUSCULAR; INTRAVENOUS PRN
Status: DISCONTINUED | OUTPATIENT
Start: 2023-04-24 | End: 2023-04-24 | Stop reason: SURG

## 2023-04-24 RX ORDER — DEXAMETHASONE SODIUM PHOSPHATE 4 MG/ML
INJECTION, SOLUTION INTRA-ARTICULAR; INTRALESIONAL; INTRAMUSCULAR; INTRAVENOUS; SOFT TISSUE PRN
Status: DISCONTINUED | OUTPATIENT
Start: 2023-04-24 | End: 2023-04-24 | Stop reason: SURG

## 2023-04-24 RX ORDER — ONDANSETRON 4 MG/1
4 TABLET, ORALLY DISINTEGRATING ORAL EVERY 4 HOURS PRN
Status: DISCONTINUED | OUTPATIENT
Start: 2023-04-24 | End: 2023-04-24 | Stop reason: HOSPADM

## 2023-04-24 RX ORDER — LABETALOL HYDROCHLORIDE 5 MG/ML
5 INJECTION, SOLUTION INTRAVENOUS
Status: DISCONTINUED | OUTPATIENT
Start: 2023-04-24 | End: 2023-04-24 | Stop reason: HOSPADM

## 2023-04-24 RX ORDER — PROMETHAZINE HYDROCHLORIDE 25 MG/1
12.5-25 TABLET ORAL EVERY 4 HOURS PRN
Status: DISCONTINUED | OUTPATIENT
Start: 2023-04-24 | End: 2023-04-24 | Stop reason: HOSPADM

## 2023-04-24 RX ORDER — MIDAZOLAM HYDROCHLORIDE 1 MG/ML
1 INJECTION INTRAMUSCULAR; INTRAVENOUS
Status: DISCONTINUED | OUTPATIENT
Start: 2023-04-24 | End: 2023-04-24 | Stop reason: HOSPADM

## 2023-04-24 RX ORDER — BISACODYL 10 MG
10 SUPPOSITORY, RECTAL RECTAL
Status: DISCONTINUED | OUTPATIENT
Start: 2023-04-24 | End: 2023-04-24 | Stop reason: HOSPADM

## 2023-04-24 RX ORDER — SODIUM CHLORIDE, SODIUM LACTATE, POTASSIUM CHLORIDE, CALCIUM CHLORIDE 600; 310; 30; 20 MG/100ML; MG/100ML; MG/100ML; MG/100ML
INJECTION, SOLUTION INTRAVENOUS CONTINUOUS
Status: DISCONTINUED | OUTPATIENT
Start: 2023-04-24 | End: 2023-04-24 | Stop reason: HOSPADM

## 2023-04-24 RX ORDER — ESMOLOL HYDROCHLORIDE 10 MG/ML
INJECTION INTRAVENOUS PRN
Status: DISCONTINUED | OUTPATIENT
Start: 2023-04-24 | End: 2023-04-24 | Stop reason: SURG

## 2023-04-24 RX ORDER — MEPERIDINE HYDROCHLORIDE 25 MG/ML
12.5 INJECTION INTRAMUSCULAR; INTRAVENOUS; SUBCUTANEOUS
Status: DISCONTINUED | OUTPATIENT
Start: 2023-04-24 | End: 2023-04-24 | Stop reason: HOSPADM

## 2023-04-24 RX ORDER — PROCHLORPERAZINE EDISYLATE 5 MG/ML
5-10 INJECTION INTRAMUSCULAR; INTRAVENOUS EVERY 4 HOURS PRN
Status: DISCONTINUED | OUTPATIENT
Start: 2023-04-24 | End: 2023-04-24 | Stop reason: HOSPADM

## 2023-04-24 RX ORDER — ACETAMINOPHEN 325 MG/1
650 TABLET ORAL EVERY 6 HOURS PRN
Status: DISCONTINUED | OUTPATIENT
Start: 2023-04-24 | End: 2023-04-24 | Stop reason: HOSPADM

## 2023-04-24 RX ORDER — OXYCODONE HYDROCHLORIDE 5 MG/1
2.5 TABLET ORAL
Status: DISCONTINUED | OUTPATIENT
Start: 2023-04-24 | End: 2023-04-24 | Stop reason: HOSPADM

## 2023-04-24 RX ORDER — LABETALOL HYDROCHLORIDE 5 MG/ML
10 INJECTION, SOLUTION INTRAVENOUS EVERY 4 HOURS PRN
Status: DISCONTINUED | OUTPATIENT
Start: 2023-04-24 | End: 2023-04-24 | Stop reason: HOSPADM

## 2023-04-24 RX ORDER — ONDANSETRON 2 MG/ML
4 INJECTION INTRAMUSCULAR; INTRAVENOUS ONCE
Status: COMPLETED | OUTPATIENT
Start: 2023-04-24 | End: 2023-04-24

## 2023-04-24 RX ORDER — CIPROFLOXACIN 500 MG/1
500 TABLET, FILM COATED ORAL 2 TIMES DAILY
Qty: 20 TABLET | Refills: 0 | Status: ON HOLD | OUTPATIENT
Start: 2023-04-24 | End: 2023-04-28

## 2023-04-24 RX ORDER — POLYETHYLENE GLYCOL 3350 17 G/17G
1 POWDER, FOR SOLUTION ORAL
Status: DISCONTINUED | OUTPATIENT
Start: 2023-04-24 | End: 2023-04-24 | Stop reason: HOSPADM

## 2023-04-24 RX ORDER — EPHEDRINE SULFATE 50 MG/ML
5 INJECTION, SOLUTION INTRAVENOUS
Status: DISCONTINUED | OUTPATIENT
Start: 2023-04-24 | End: 2023-04-24 | Stop reason: HOSPADM

## 2023-04-24 RX ORDER — SODIUM CHLORIDE, SODIUM LACTATE, POTASSIUM CHLORIDE, CALCIUM CHLORIDE 600; 310; 30; 20 MG/100ML; MG/100ML; MG/100ML; MG/100ML
2000 INJECTION, SOLUTION INTRAVENOUS ONCE
Status: COMPLETED | OUTPATIENT
Start: 2023-04-24 | End: 2023-04-24

## 2023-04-24 RX ORDER — SODIUM CHLORIDE 9 MG/ML
1000 INJECTION, SOLUTION INTRAVENOUS ONCE
Status: COMPLETED | OUTPATIENT
Start: 2023-04-24 | End: 2023-04-24

## 2023-04-24 RX ORDER — OXYCODONE HYDROCHLORIDE 5 MG/1
5 TABLET ORAL
Status: DISCONTINUED | OUTPATIENT
Start: 2023-04-24 | End: 2023-04-24 | Stop reason: HOSPADM

## 2023-04-24 RX ORDER — AMOXICILLIN 250 MG
2 CAPSULE ORAL 2 TIMES DAILY
Status: DISCONTINUED | OUTPATIENT
Start: 2023-04-24 | End: 2023-04-24 | Stop reason: HOSPADM

## 2023-04-24 RX ORDER — ONDANSETRON 2 MG/ML
4 INJECTION INTRAMUSCULAR; INTRAVENOUS
Status: COMPLETED | OUTPATIENT
Start: 2023-04-24 | End: 2023-04-24

## 2023-04-24 RX ORDER — MORPHINE SULFATE 4 MG/ML
4 INJECTION INTRAVENOUS
Status: ACTIVE | OUTPATIENT
Start: 2023-04-24 | End: 2023-04-24

## 2023-04-24 RX ORDER — DOXYCYCLINE 100 MG/1
100 TABLET ORAL ONCE
Status: DISCONTINUED | OUTPATIENT
Start: 2023-04-24 | End: 2023-04-24 | Stop reason: HOSPADM

## 2023-04-24 RX ORDER — HYDROMORPHONE HYDROCHLORIDE 1 MG/ML
0.25 INJECTION, SOLUTION INTRAMUSCULAR; INTRAVENOUS; SUBCUTANEOUS
Status: DISCONTINUED | OUTPATIENT
Start: 2023-04-24 | End: 2023-04-24 | Stop reason: HOSPADM

## 2023-04-24 RX ORDER — ONDANSETRON 2 MG/ML
4 INJECTION INTRAMUSCULAR; INTRAVENOUS EVERY 4 HOURS PRN
Status: DISCONTINUED | OUTPATIENT
Start: 2023-04-24 | End: 2023-04-24 | Stop reason: HOSPADM

## 2023-04-24 RX ORDER — SODIUM CHLORIDE 9 MG/ML
INJECTION, SOLUTION INTRAVENOUS CONTINUOUS
Status: DISCONTINUED | OUTPATIENT
Start: 2023-04-24 | End: 2023-04-24 | Stop reason: HOSPADM

## 2023-04-24 RX ADMIN — HYDROCODONE BITARTRATE AND ACETAMINOPHEN 15 MG: 7.5; 325 SOLUTION ORAL at 15:13

## 2023-04-24 RX ADMIN — SUGAMMADEX 200 MG: 100 INJECTION, SOLUTION INTRAVENOUS at 14:13

## 2023-04-24 RX ADMIN — FENTANYL CITRATE 50 MCG: 50 INJECTION, SOLUTION INTRAMUSCULAR; INTRAVENOUS at 15:23

## 2023-04-24 RX ADMIN — ONDANSETRON 4 MG: 2 INJECTION INTRAMUSCULAR; INTRAVENOUS at 14:13

## 2023-04-24 RX ADMIN — DEXAMETHASONE SODIUM PHOSPHATE 8 MG: 4 INJECTION, SOLUTION INTRA-ARTICULAR; INTRALESIONAL; INTRAMUSCULAR; INTRAVENOUS; SOFT TISSUE at 13:49

## 2023-04-24 RX ADMIN — FENTANYL CITRATE 100 MCG: 50 INJECTION, SOLUTION INTRAMUSCULAR; INTRAVENOUS at 13:44

## 2023-04-24 RX ADMIN — ONDANSETRON HYDROCHLORIDE 4 MG: 2 SOLUTION INTRAMUSCULAR; INTRAVENOUS at 10:17

## 2023-04-24 RX ADMIN — ACETAMINOPHEN 650 MG: 325 TABLET, FILM COATED ORAL at 18:36

## 2023-04-24 RX ADMIN — KETOROLAC TROMETHAMINE 30 MG: 30 INJECTION, SOLUTION INTRAMUSCULAR; INTRAVENOUS at 10:17

## 2023-04-24 RX ADMIN — CEFTRIAXONE SODIUM 2000 MG: 10 INJECTION, POWDER, FOR SOLUTION INTRAVENOUS at 12:13

## 2023-04-24 RX ADMIN — MIDAZOLAM HYDROCHLORIDE 2 MG: 1 INJECTION, SOLUTION INTRAMUSCULAR; INTRAVENOUS at 13:41

## 2023-04-24 RX ADMIN — PROPOFOL 200 MG: 10 INJECTION, EMULSION INTRAVENOUS at 13:47

## 2023-04-24 RX ADMIN — ROCURONIUM BROMIDE 40 MG: 10 INJECTION, SOLUTION INTRAVENOUS at 13:50

## 2023-04-24 RX ADMIN — FENTANYL CITRATE 50 MCG: 50 INJECTION, SOLUTION INTRAMUSCULAR; INTRAVENOUS at 15:11

## 2023-04-24 RX ADMIN — ONDANSETRON 4 MG: 2 INJECTION INTRAMUSCULAR; INTRAVENOUS at 15:48

## 2023-04-24 RX ADMIN — FENTANYL CITRATE 50 MCG: 50 INJECTION, SOLUTION INTRAMUSCULAR; INTRAVENOUS at 13:56

## 2023-04-24 RX ADMIN — LIDOCAINE HYDROCHLORIDE 80 MG: 20 INJECTION, SOLUTION EPIDURAL; INFILTRATION; INTRACAUDAL at 13:44

## 2023-04-24 RX ADMIN — SODIUM CHLORIDE 1000 ML: 9 INJECTION, SOLUTION INTRAVENOUS at 10:18

## 2023-04-24 RX ADMIN — ESMOLOL HYDROCHLORIDE 20 MG: 100 INJECTION, SOLUTION INTRAVENOUS at 13:57

## 2023-04-24 RX ADMIN — LORAZEPAM 0.5 MG: 2 INJECTION INTRAMUSCULAR; INTRAVENOUS at 10:45

## 2023-04-24 RX ADMIN — Medication 100 MG: at 13:47

## 2023-04-24 RX ADMIN — ROCURONIUM BROMIDE 10 MG: 10 INJECTION, SOLUTION INTRAVENOUS at 13:47

## 2023-04-24 RX ADMIN — SODIUM CHLORIDE, POTASSIUM CHLORIDE, SODIUM LACTATE AND CALCIUM CHLORIDE: 600; 310; 30; 20 INJECTION, SOLUTION INTRAVENOUS at 13:41

## 2023-04-24 ASSESSMENT — PAIN DESCRIPTION - PAIN TYPE
TYPE: SURGICAL PAIN
TYPE: SURGICAL PAIN

## 2023-04-24 ASSESSMENT — FIBROSIS 4 INDEX: FIB4 SCORE: 0.69

## 2023-04-24 ASSESSMENT — LIFESTYLE VARIABLES: DO YOU DRINK ALCOHOL: NO

## 2023-04-24 ASSESSMENT — ENCOUNTER SYMPTOMS
NAUSEA: 1
FLANK PAIN: 1
VOMITING: 1

## 2023-04-24 NOTE — OR SURGEON
Immediate Post OP Note    PreOp Diagnosis: R ureteral stone with UTI and sepsis    PostOp Diagnosis: same    Procedure(s):  CYSTOSCOPY, WITH RIGHT URETEROSCOPY WITH LASER LITHOTRIPSY AND RIGHT URETERAL STENT INSERTION - Wound Class: Dirty or Infected    Surgeon(s):  Dante Stephenson M.D.    Anesthesiologist/Type of Anesthesia:  Anesthesiologist: Edison Sy M.D./General    Surgical Staff:  Circulator: Yamilka Gates R.N.  Scrub Person: Martha Price    Specimens removed if any:  ID Type Source Tests Collected by Time Destination   A : Right Ureteral Stone Stone Ureter PATHOLOGY SPECIMEN Dante Stephenson M.D. 4/24/2023  2:08 PM        Estimated Blood Loss: 5ml    Findings: 5mm R mid ureteral stone    Complications: none    Drain: [id number] R ureteral stent with string    4/24/2023 2:21 PM Dante Stephenson M.D.

## 2023-04-24 NOTE — ED NOTES
Report to TATE Rogers for pre-op.  Pt. Reports he had a poptart this AM at 0800 and drank water immediately prior to coming into ed.

## 2023-04-24 NOTE — ANESTHESIA POSTPROCEDURE EVALUATION
Patient: Russell Bunn    Procedure Summary     Date: 04/24/23 Room / Location: [facility] OR 09 / SURGERY [facility]    Anesthesia Start: 1341 Anesthesia Stop: 1432    Procedure: CYSTOSCOPY, WITH RIGHT URETERAL STENT INSERTION (Right: Penis) Diagnosis: (Right Ureteral Stone and Urinary Tract Infection with Sepsis)    Surgeons: Dante Stephenson M.D. Responsible Provider: Edison Sy M.D.    Anesthesia Type: general ASA Status: 2 - Emergent          Final Anesthesia Type: general  Last vitals  BP   Blood Pressure: (!) 81/45, NIBP: 128/85    Temp   36.7 °C (98 °F)    Pulse   82   Resp   20    SpO2   97 %      Anesthesia Post Evaluation    Patient location during evaluation: PACU  Patient participation: complete - patient participated  Level of consciousness: awake and alert    Airway patency: patent  Anesthetic complications: no  Cardiovascular status: hemodynamically stable  Respiratory status: acceptable  Hydration status: euvolemic    PONV: none          No notable events documented.     Nurse Pain Score: 6 (NPRS)

## 2023-04-24 NOTE — OR NURSING
Patient recovered well post op. A&Ox4. VSS, room air. Surgical sites CDI. Surgical pain managed. Patient able to drink fluids without Nausea and vomiting. PT belongings on the bed claimed by the patient. Spouse updated and discussed plan of care. Report called to Nellie YBARRA.

## 2023-04-24 NOTE — ASSESSMENT & PLAN NOTE
This is Severe Sepsis Present on admission  SIRS criteria identified on my evaluation include: Tachycardia, with heart rate greater than 90 BPM, Tachypnea, with respirations greater than 20 per minute and Leukocytosis, with WBC greater than 12,000  Clinical indicators of end organ dysfunction include Lactate >2 mmol/L (18.0 mg/dL)  Source is pyelonephritis   Sepsis protocol initiated  Crystalloid Fluid Administration: Fluid resuscitation ordered per standard protocol - 30 mL/kg per current or ideal body weight  IV antibiotics as appropriate for source of sepsis  Reassessment: I have reassessed the patient's hemodynamic status    Cultures pending   Lactic 3.8   IVF   Continue rocephin   Double Island Pedicle Flap Text: The defect edges were debeveled with a #15 scalpel blade.  Given the location of the defect, shape of the defect and the proximity to free margins a double island pedicle advancement flap was deemed most appropriate.  Using a sterile surgical marker, an appropriate advancement flap was drawn incorporating the defect, outlining the appropriate donor tissue and placing the expected incisions within the relaxed skin tension lines where possible.    The area thus outlined was incised deep to adipose tissue with a #15 scalpel blade.  The skin margins were undermined to an appropriate distance in all directions around the primary defect and laterally outward around the island pedicle utilizing iris scissors.  There was minimal undermining beneath the pedicle flap.

## 2023-04-24 NOTE — ED PROVIDER NOTES
"ER Provider Note    Scribed for Ruslan Vizcaino M.d. by Heber Thomas. 4/24/2023  10:01 AM    Primary Care Provider: Pcp Pt States None    CHIEF COMPLAINT  Chief Complaint   Patient presents with    Flank Pain     Pt c/o flank pain.  Seen here and in [place] many times for same.  Pt hyperventilating and lying on the floor in triage.  Assisted pt back to  and instructed him to please stay in the chair     EXTERNAL RECORDS REVIEWED  Outpatient Notes indicate the patient has a history of kidney stones. He was last seen here in February for flank pain. He had a CT scan at that time which did not show any active stones. There is some concern about narcotic seeking behavior. Reviewed records from [place] in October for flank pain. The patient had a left nephrostomy tube placed 1 year ago.    HPI/ROS  LIMITATION TO HISTORY   Select: due to pain and agitation.  OUTSIDE HISTORIAN(S):  None    Russell Bunn is a 33 y.o. male with a history of kidney stones who presents to the ED for evaluation of severe right sided flank pain onset a few hours prior to arrival. The patient states he also has nausea, vomiting, burning pain with urination x4 days, difficulty urinating, and mucosal drips/drainage from his penis, but denies any fevers. He repeatedly states \"the stone is coming\". He describes being evaluated for similar symptoms many times in the past. He has not followed up with Urology yet. No alleviating factors were noted.     PAST MEDICAL HISTORY  Past Medical History:   Diagnosis Date    Kidney stones     Seizure disorder (HCC)      SURGICAL HISTORY  Past Surgical History:   Procedure Laterality Date    CYSTOSCOPY Left 02/20/2022    Cysto ureteroscopy ,lithotripsy, stone basket, ureteral stent placement, Dr Silver    [place] CYSTOSCOPY,INSERT URETERAL STENT Left 2/20/2022    Procedure: CYSTOSCOPY, WITH URETERAL STENT INSERTION;  Surgeon: Rei Silver M.D.;  Location: SURGERY [facility];  Service: " Urology    MN CYSTO/URETERO/PYELOSCOPY, DX Left 2/20/2022    Procedure: URETEROSCOPY;  Surgeon: Rei Silver M.D.;  Location: SURGERY [facility];  Service: Urology    LASERTRIPSY Left 2/20/2022    Procedure: LITHOTRIPSY, USING LASER;  Surgeon: Rei Silver M.D.;  Location: SURGERY [facility];  Service: Urology    MN CYSTOSCOPY,INSERT URETERAL STENT Left 4/26/2021    Procedure: CYSTOSCOPY, WITH OUT URETERAL STENT INSERTION;  Surgeon: Raf Moss M.D.;  Location: SURGERY [facility];  Service: Urology    MN CYSTO/URETERO/PYELOSCOPY, DX Left 4/26/2021    Procedure: URETEROSCOPY;  Surgeon: Raf Moss M.D.;  Location: SURGERY [facility];  Service: Urology    MN CYSTO/URETERO/PYELOSCOPY, DX Right 3/13/2020    Procedure: URETEROSCOPY;  Surgeon: Chase Damon M.D.;  Location: SURGERY [facility];  Service: Urology    MN CYSTOSCOPY,INSERT URETERAL STENT Right 3/13/2020    Procedure: CYSTOSCOPY;  Surgeon: Chase Damon M.D.;  Location: SURGERY [facility];  Service: Urology    LASERTRIPSY Right 3/13/2020    Procedure: LITHOTRIPSY, USING LASER;  Surgeon: Chase Damon M.D.;  Location: SURGERY [facility];  Service: Urology    LITHOTRIPSY       FAMILY HISTORY  No family history noted.    SOCIAL HISTORY   reports that he has quit smoking. His smoking use included cigarettes. He smoked an average of .5 packs per day. He has never used smokeless tobacco. He reports current alcohol use. He reports current drug use. Drug: Inhaled.    CURRENT MEDICATIONS  Current Discharge Medication List        ALLERGIES  Dilaudid [hydromorphone], Ancef [cefazolin], Percocet [apap-fd&c red #40 [place]-oxycodone], Shellfish allergy, and Tape    PHYSICAL EXAM  Pulse 94   Temp 35.8 °C (96.5 °F) (Temporal)   Resp (!) 24   Wt 62.6 kg (138 lb)   SpO2 100%   BMI 22.27 kg/m²   Skin a 33-year-old male who is in moderate to severe distress curled up into a ball moaning in pain hyperventilating  Oropharynx with dry  mucous membranes  Tachycardic, good pulses  Hyperventilating, lungs clear to auscultation  Slight right lower quadrant abdominal tenderness palpation  No CVA tenderness, previous scars on the right CVA area  Neuro awake and alert hyperventilating moaning  Psych anxious    DIAGNOSTIC STUDIES    Labs:   Results for orders placed or performed during the hospital encounter of 04/24/23   CBC WITH DIFFERENTIAL   Result Value Ref Range    WBC 23.3 (H) 4.8 - 10.8 K/uL    RBC 4.95 4.70 - 6.10 M/uL    Hemoglobin 16.1 14.0 - 18.0 g/dL    Hematocrit 48.6 42.0 - 52.0 %    MCV 98.2 (H) 81.4 - 97.8 fL    MCH 32.5 27.0 - 33.0 pg    MCHC 33.1 (L) 33.7 - 35.3 g/dL    RDW 47.7 35.9 - 50.0 fL    Platelet Count 269 164 - 446 K/uL    MPV 9.1 9.0 - 12.9 fL    Neutrophils-Polys 70.40 44.00 - 72.00 %    Lymphocytes 16.50 (L) 22.00 - 41.00 %    Monocytes 8.30 0.00 - 13.40 %    Eosinophils 3.60 0.00 - 6.90 %    Basophils 0.60 0.00 - 1.80 %    Immature Granulocytes 0.60 0.00 - 0.90 %    Nucleated RBC 0.00 /100 WBC    Neutrophils (Absolute) 16.43 (H) 1.82 - 7.42 K/uL    Lymphs (Absolute) 3.85 1.00 - 4.80 K/uL    Monos (Absolute) 1.93 (H) 0.00 - 0.85 K/uL    Eos (Absolute) 0.84 (H) 0.00 - 0.51 K/uL    Baso (Absolute) 0.13 (H) 0.00 - 0.12 K/uL    Immature Granulocytes (abs) 0.14 (H) 0.00 - 0.11 K/uL    NRBC (Absolute) 0.00 K/uL   COMP METABOLIC PANEL   Result Value Ref Range    Sodium 139 135 - 145 mmol/L    Potassium 3.9 3.6 - 5.5 mmol/L    Chloride 103 96 - 112 mmol/L    Co2 21 20 - 33 mmol/L    Anion Gap 15.0 7.0 - 16.0    Glucose 164 (H) 65 - 99 mg/dL    Bun 14 8 - 22 mg/dL    Creatinine 1.27 0.50 - 1.40 mg/dL    Calcium 9.5 8.5 - 10.5 mg/dL    AST(SGOT) 23 12 - 45 U/L    ALT(SGPT) 16 2 - 50 U/L    Alkaline Phosphatase 115 (H) 30 - 99 U/L    Total Bilirubin 0.6 0.1 - 1.5 mg/dL    Albumin 4.4 3.2 - 4.9 g/dL    Total Protein 7.8 6.0 - 8.2 g/dL    Globulin 3.4 1.9 - 3.5 g/dL    A-G Ratio 1.3 g/dL   LIPASE   Result Value Ref Range    Lipase 54 11  - 82 U/L   URINALYSIS CULTURE, IF INDICATED    Specimen: Urine, Cath   Result Value Ref Range    Color Yellow     Character Turbid (A)     Specific Gravity 1.017 <1.035    Ph 7.5 5.0 - 8.0    Glucose 100 (A) Negative mg/dL    Ketones Trace (A) Negative mg/dL    Protein 30 (A) Negative mg/dL    Bilirubin Negative Negative    Urobilinogen, Urine 0.2 Negative    Nitrite Negative Negative    Leukocyte Esterase Large (A) Negative    Occult Blood Large (A) Negative    Micro Urine Req Microscopic    Chlamydia/GC, PCR (Urine)    Specimen: Genital; Urine   Result Value Ref Range    C. trachomatis by PCR Negative Negative    Gc By Dna Probe Negative Negative    Source Urine    URINE MICROSCOPIC (W/UA)   Result Value Ref Range    WBC Packed (A) /hpf    -150 (A) /hpf    Bacteria Few (A) None /hpf    Epithelial Cells Negative /hpf    Hyaline Cast 0-2 /lpf    Hyphae Yeast Present (A) Absent /hpf   LACTIC ACID   Result Value Ref Range    Lactic Acid 3.8 (H) 0.5 - 2.0 mmol/L   CORRECTED CALCIUM   Result Value Ref Range    Correct Calcium 9.2 8.5 - 10.5 mg/dL   ESTIMATED GFR   Result Value Ref Range    GFR (CKD-EPI) 77 >60 mL/min/1.73 m 2   URINE CULTURE(NEW)    Specimen: Urine   Result Value Ref Range    Significant Indicator NEG     Source UR     Site -     Culture Result -    Histology Request   Result Value Ref Range    [facility]       Radiology:   The attending emergency physician has independently interpreted the diagnostic imaging associated with this visit and am waiting the final reading from the radiologist.   Preliminary interpretation is a follows: CT-Renal Colic indicates approximately 5 mm right mid ureteral stone.  Radiologist interpretation:   DX-PORTABLE FLUOROSCOPY < 1 HOUR   Final Result      Portable fluoroscopy utilized for 0.6 seconds.      INTERPRETING LOCATION: [address] [place] [address], [id number]      DX-CHEST-PORTABLE (1 VIEW)   Final Result      1.  There is no acute cardiopulmonary  process.      CT-RENAL COLIC EVALUATION(A/P W/O)   Final Result      1.  There is a 4-5 mm right mid ureteral calcification and a 3 mm right UVJ calcification. There is mild right hydronephrosis and segmental hydroureter.   2.  Additional bilateral nephrolithiasis.   3.  There is a probable 3 mm dependent urinary bladder calcification.        COURSE & MEDICAL DECISION MAKING     ED Observation Status? Yes; I am placing the patient in to an observation status due to a diagnostic uncertainty as well as therapeutic intensity. Patient placed in observation status at 10:05 AM, 4/24/2023.     Observation plan is as follows: Evaluation for the patient's pain and treatment    Upon Reevaluation, the patient's condition has: not improved; and will be escalated to hospitalization.    Patient discharged from ED Observation status at 12:00 PM (Time) 4/24/2023 (Date).     INITIAL ASSESSMENT, COURSE AND PLAN  Care Narrative: Patient coming in with moderate severe pain, hyperventilating, curled up into a ball, extensive history of kidney stones.  I thought about not getting a CT scan however due to the patient's leukocytosis I added on a septic work-up and felt that it was appropriate to get a CT scan given the patient has had multiple CT scans.  CT scan does show a impacted kidney stone mid right ureter with hydronephrosis.  Urinalysis shows pyelonephritis, give the patient IV antibiotics also cover the patient's for STDs.  Discussed case with the hospitalist for hospitalization, discussed case with urology will take the patient to the operating room later this evening.    10:03 AM - Patient was evaluated at bedside. Ordered for CT-Renal Colic Evaluation A/P w/o, Chlamydia/GC PCR, CBC w/diff, CMP, Lipase, and UA w/ culture if indicated to evaluate. The patient will be medicated with Toradol 30 mg injection, NS infusion 1000 mL, Morphine 4 mg injection and Zofran 4 mg injection for his symptoms. Patient verbalizes understanding and  support with my plan of care.      10:24 AM - Ordered for Urine Microscopic to evaluate.    10:38 AM - The patient will be medicated with Ativan 0.5 mg injection for his symptoms.    10:49 AM - The patient has an elevated white blood cell count. Ordered for Septic protocol including DX-Chest, Blood Cultures, and Lactic acid.    11:27 AM - Review of laboratory studies indicates the patient has pyelonephritis. I discussed the patient's case and the above findings with Clinical Pharmacy who agrees to review the patient's allergies and determine the appropriate antibiotic to use.    11:29 AM - Patient was reevaluated at bedside. Discussed lab and radiology results with the patient and informed them that he has a kidney infection at this time. I informed the patient of my plan to admit today given the patient's current presentation and diagnostic study results. The patient was given an opportunity to ask questions. Patient verbalizes understanding and support with my plan for admission.       11:41 AM - Preliminary review of CT-Renal Colic performed by myself at this time as detailed above. Paged Urology.    11:48 AM - I discussed the patient's case and the above findings with Dr. Stephenson (Urology) who agrees to take the patient to the OR for stent placement this evening.    11:50 AM - Paged Hospitalist.    12:00 PM - I discussed the patient's case and the above findings with Dr. Abraham (Hospitalist) who agrees to evaluate the patient for hospitalization.      Differential diagnosis includes pyelonephritis STI urinary obstruction kidney stone chronic pain    HYDRATION: Based on the patient's presentation of Acute Kidney Injury, Dehydration, and Inability to take oral fluids the patient was given IV fluids. IV Hydration was used because oral hydration was not adequate alone. Upon recheck following hydration, the patient was improved.     CRITICAL CARE  The very real possibilty of a deterioration of this patient's condition  required the highest level of my preparedness for sudden, emergent intervention.  I provided critical care services, which included medication orders, frequent reevaluations of the patient's condition and response to treatment, ordering and reviewing test results, and discussing the case with various consultants.  The critical care time associated with the care of the patient was 32 minutes. Review chart for interventions. This time is exclusive of any other billable procedures.     DISPOSITION AND DISCUSSIONS  I have discussed management of the patient with the following physicians and RAJAT's:  Dr. Stephenson (Urology) and Dr. Abraham (Hospitalist).    Discussion of management with other [facility] or appropriate source(s): Pharmacy regarding appropriate choice of antibiotics for pyelonephritis in the context of ancef allergy.      DISPOSITION:  Patient will be hospitalized by Dr. Abraham (Hospitalist) in guarded condition.     FINAL DIANGOSIS  1. Sepsis without acute organ dysfunction, due to unspecified organism (HCC)    2. Pyelonephritis    3. Ureterolithiasis    4. Hydronephrosis, unspecified hydronephrosis type    5. Hydroureteronephrosis, pyelonephritis      Total critical care time was 32 minutes, as detailed above.       Heber CONTRERAS (Emy), am scribing for, and in the presence of, Ruslan Vizcaino M.D..    Electronically signed by: Heber Thomas (Emy), 4/24/2023    Ruslan CONTRERAS M.D. personally performed the services described in this documentation, as scribed by Heber Thomas in my presence, and it is both accurate and complete.      The note accurately reflects work and decisions made by me.  Ruslan Vizcaino M.D.  4/24/2023  5:11 PM

## 2023-04-24 NOTE — ASSESSMENT & PLAN NOTE
2/2 kidney stone   Given rocephin and doxycycline in the ER for concern for STD  Continue rocephin   Urine culture pending   Pain control   IVF

## 2023-04-24 NOTE — ANESTHESIA PROCEDURE NOTES
Airway    Date/Time: 4/24/2023 1:48 PM  Performed by: Edison Sy M.D.  Authorized by: Edison Sy M.D.     Location:  OR  Urgency:  Elective  Indications for Airway Management:  Anesthesia      Spontaneous Ventilation: absent    Sedation Level:  Deep  Preoxygenated: Yes    Patient Position:  Sniffing  Final Airway Type:  Endotracheal airway  Final Endotracheal Airway:  ETT  Cuffed: Yes    Technique Used for Successful ETT Placement:  Video laryngoscopy  Devices/Methods Used in Placement:  Anterior pressure/BURP    Insertion Site:  Oral  Blade Type:  Glide  Laryngoscope Blade/Videolaryngoscope Blade Size:  3  ETT Size (mm):  7.0  Measured from:  Lips  ETT to Lips (cm):  21  Placement Verified by: auscultation and capnometry    Cormack-Lehane Classification:  Grade I - full view of glottis  Number of Attempts at Approach:  1   Atraumatic x1 attempt

## 2023-04-24 NOTE — H&P
[facility] Medicine History & Physical Note    Date of Service  4/24/2023    Primary Care Physician  Pcp Pt States None    Consultants  urology    Specialist Names: Dr. Stephenson    Code Status  Full Code    Chief Complaint  Chief Complaint   Patient presents with    Flank Pain     Pt c/o flank pain.  Seen here and in [place] many times for same.  Pt hyperventilating and lying on the floor in triage.  Assisted pt back to  and instructed him to please stay in the chair       History of Presenting Illness  Russell Bunn is a 33 y.o. male with PMH of kidney stones who presented 4/24/2023 with right flank pain. Examined patient post op and patient still waking from anesthesia. Per ERP patient reported he had severe right-sided flank pain that started this morning a few hours prior to him coming to the ER.  He has associated nausea, vomiting, and dysuria for the last 4 days.  Also reported started having some difficulty urinating with some mucus appearing drainage from his penis.  He denied fevers, chills, chest pain or shortness of breath.  He does have a history of kidney stones and reports that this was similar to those episodes in the past.  He is not currently seeing outpatient urology.    In the ER vitals significant for heart rate 94, tachypnea otherwise stable.  Labs significant for WBC 23.3, glucose 164, lactic acid 3.8, positive UA.  CT renal showed right UVJ calcification.  4 to 5 mm right mid ureteral calcification and 3 mm right UVJ calcification. Urology consulted by ERP, patient taken to OR for stent placement     I discussed the plan of care with patient, bedside RN, and ERP .    Review of Systems  Review of Systems   Unable to perform ROS: Other (anesthesia)   Gastrointestinal:  Positive for nausea and vomiting.   Genitourinary:  Positive for dysuria and flank pain.     Past Medical History   has a past medical history of Kidney stones and Seizure disorder (HCC).    Surgical History   has a  past surgical history that includes lithotripsy; pr cysto/uretero/pyeloscopy, dx (Right, 3/13/2020); pr cystoscopy,insert ureteral stent (Right, 3/13/2020); lasertripsy (Right, 3/13/2020); pr cystoscopy,insert ureteral stent (Left, 4/26/2021); pr cysto/uretero/pyeloscopy, dx (Left, 4/26/2021); cystoscopy (Left, 02/20/2022); pr cystoscopy,insert ureteral stent (Left, 2/20/2022); pr cysto/uretero/pyeloscopy, dx (Left, 2/20/2022); and lasertripsy (Left, 2/20/2022).     Family History  Family history reviewed with patient. There is no family history that is pertinent to the chief complaint.     Social History   reports that he has quit smoking. His smoking use included cigarettes. He smoked an average of .5 packs per day. He has never used smokeless tobacco. He reports current alcohol use. He reports current drug use. Drug: Inhaled.    Allergies  Allergies   Allergen Reactions    Dilaudid [Hydromorphone] Itching    Ancef [Cefazolin] Rash     Rash  Tolerated ceftriaxone (April 2021)    Percocet [Apap-Fd&C Red #40 [place]-Oxycodone]      Tolerates acetaminophen    Shellfish Allergy     Tape      Paper tape okay        Medications  None       Physical Exam  Temp:  [35.8 °C (96.5 °F)-36.7 °C (98 °F)] 36.7 °C (98 °F)  Pulse:  [57-94] 86  Resp:  [14-26] 18  BP: ()/(45-83) 80/47  SpO2:  [93 %-100 %] 98 %  Blood Pressure: 139/83   Temperature: 35.8 °C (96.5 °F)   Pulse: 75   Respiration: 16   Pulse Oximetry: 93 %       Physical Exam  Vitals and nursing note reviewed.   Constitutional:       General: He is not in acute distress.     Appearance: Normal appearance.      Interventions: He is sedated.   HENT:      Head: Normocephalic and atraumatic.      Right Ear: External ear normal.      Left Ear: External ear normal.      Nose: Nose normal.      Mouth/Throat:      Pharynx: Oropharynx is clear.   Eyes:      Conjunctiva/sclera: Conjunctivae normal.   Cardiovascular:      Rate and Rhythm: Normal rate and regular rhythm.       Pulses: Normal pulses.   Pulmonary:      Effort: Pulmonary effort is normal. No respiratory distress.      Breath sounds: Normal breath sounds. No wheezing.   Abdominal:      General: Abdomen is flat. There is no distension.      Palpations: Abdomen is soft.      Tenderness: There is no abdominal tenderness.   Musculoskeletal:      Cervical back: Neck supple.      Right lower leg: No edema.      Left lower leg: No edema.   Skin:     General: Skin is warm and dry.      Findings: No rash.   Neurological:      General: No focal deficit present.      Cranial Nerves: No cranial nerve deficit.       Laboratory:  Recent Labs     04/24/23  1007   WBC 23.3*   RBC 4.95   HEMOGLOBIN 16.1   HEMATOCRIT 48.6   MCV 98.2*   MCH 32.5   MCHC 33.1*   RDW 47.7   PLATELETCT 269   MPV 9.1     Recent Labs     04/24/23  1007   SODIUM 139   POTASSIUM 3.9   CHLORIDE 103   CO2 21   GLUCOSE 164*   BUN 14   CREATININE 1.27   CALCIUM 9.5     Recent Labs     04/24/23  1007   ALTSGPT 16   ASTSGOT 23   ALKPHOSPHAT 115*   TBILIRUBIN 0.6   LIPASE 54   GLUCOSE 164*         No results for input(s): NTPROBNP in the last 72 hours.      No results for input(s): TROPONINT in the last 72 hours.    Imaging:  DX-CHEST-PORTABLE (1 VIEW)   Final Result      1.  There is no acute cardiopulmonary process.      CT-RENAL COLIC EVALUATION(A/P W/O)   Final Result      1.  There is a 4-5 mm right mid ureteral calcification and a 3 mm right UVJ calcification. There is mild right hydronephrosis and segmental hydroureter.   2.  Additional bilateral nephrolithiasis.   3.  There is a probable 3 mm dependent urinary bladder calcification.      DX-PORTABLE FLUOROSCOPY < 1 HOUR    (Results Pending)       X-Ray:  I have personally reviewed the images and compared with prior images.    Assessment/Plan:  Justification for Admission Status  I anticipate this patient will require at least two midnights for appropriate medical management, necessitating inpatient admission because  sepsis due to pyelonephritis from nephrolithiasis. Urology consulted plan for stent placement today.      * Severe sepsis (HCC)- (present on admission)  Assessment & Plan  This is Severe Sepsis Present on admission  SIRS criteria identified on my evaluation include: Tachycardia, with heart rate greater than 90 BPM, Tachypnea, with respirations greater than 20 per minute and Leukocytosis, with WBC greater than 12,000  Clinical indicators of end organ dysfunction include Lactate >2 mmol/L (18.0 mg/dL)  Source is pyelonephritis   Sepsis protocol initiated  Crystalloid Fluid Administration: Fluid resuscitation ordered per standard protocol - 30 mL/kg per current or ideal body weight  IV antibiotics as appropriate for source of sepsis  Reassessment: I have reassessed the patient's hemodynamic status    Cultures pending   Lactic 3.8   IVF   Continue rocephin    Pyelonephritis- (present on admission)  Assessment & Plan  2/2 kidney stone   Given rocephin and doxycycline in the ER for concern for STD  Continue rocephin   Urine culture pending   Pain control   IVF     Calculus of ureter- (present on admission)  Assessment & Plan  Urology consulted   -NPO  -s/p right stent placement   Pain control  IV abx   IVF    Lactic acidosis- (present on admission)  Assessment & Plan  2/2 sepsis   IVF   Repeat lactic         VTE prophylaxis: SCDs/TEDs

## 2023-04-24 NOTE — ED NOTES
Pt. No longer thrashing around on allarkarly.  Pt. Reports pain is better after meds.  Cultures completed by RN and .  Pt. Verbalized understanding of poc.

## 2023-04-24 NOTE — PROGRESS NOTES
Urology Pre-op Note:  34 yo M with sepsis from UTI and 5mm R mid ureteral stone with obstruction.    Plan:  Cont abx, supportive care  OR for urgent cystoscopy, possible right ureteroscopy with laser lithotripsy and stent placement

## 2023-04-24 NOTE — ANESTHESIA TIME REPORT
Anesthesia Start and Stop Event Times     Date Time Event    4/24/2023 1253 Ready for Procedure     1341 Anesthesia Start     1432 Anesthesia Stop        Responsible Staff  04/24/23    Name Role Begin End    Edison Sy M.D. Anesth 1341 1432        Overtime Reason:  no overtime (within assigned shift)    Comments:

## 2023-04-24 NOTE — ED TRIAGE NOTES
Chief Complaint   Patient presents with    Flank Pain     Pt c/o flank pain.  Seen here and in [place] many times for same.  Pt hyperventilating and lying on the floor in triage.  Assisted pt back to WC and instructed him to please stay in the chair

## 2023-04-24 NOTE — ANESTHESIA PREPROCEDURE EVALUATION
Case: [id number] Date/Time: 04/24/23 1303    Procedure: CYSTOSCOPY, WITH URETERAL STENT INSERTION OR REMOVAL    Location: [facility] OR 09 / SURGERY [facility]    Surgeons: TIFFANY Aparicio H&P:  PAST MEDICAL HISTORY:   33 y.o. male who presents for Procedure(s):  CYSTOSCOPY, WITH URETERAL STENT INSERTION OR REMOVAL.  He has current and past medical problems significant for:    Renal stones  Reports history of seizures after TBI, last occurred about 8 months ago, no current medical management   Current daily THC, smoker  States he had a prior difficult intubation however unclear regarding details  PONV  NPO 5-6 hours, ate a Pop-tart     Patient denies history of strokes  Patient denies history of diabetes or thyroid disease  Patient denies history of Asthma or COPD  Patient denies history of GERD or esophageal disease  Patient denies history of APURVA  Patient denies history of renal failure  Patient denies history of bleeding disorders      Patient denies history of previous MI, chest pain or shortness of breath  Able to climb 2 flights of stair without dyspnea or angina, > 4 METs     Past Medical History:   Diagnosis Date   • Kidney stones    • Seizure disorder (HCC)        SMOKING/ALCOHOL/RECREATIONAL DRUG USE:  Social History     Tobacco Use   • Smoking status: Former     Packs/day: 0.50     Types: Cigarettes   • Smokeless tobacco: Never   Vaping Use   • Vaping Use: Never used   Substance Use Topics   • Alcohol use: Yes     Comment: Occasional   • Drug use: Yes     Types: Inhaled     Comment: Marijuana, every day     Social History     Substance and Sexual Activity   Drug Use Yes   • Types: Inhaled    Comment: Marijuana, every day       PAST SURGICAL HISTORY:  Past Surgical History:   Procedure Laterality Date   • CYSTOSCOPY Left 02/20/2022    Cysto ureteroscopy ,lithotripsy, stone basket, ureteral stent placement, Dr Silver   • CA CYSTOSCOPY,INSERT URETERAL STENT Left 2/20/2022    Procedure: CYSTOSCOPY,  WITH URETERAL STENT INSERTION;  Surgeon: Rei Silver M.D.;  Location: SURGERY [facility];  Service: Urology   • TN CYSTO/URETERO/PYELOSCOPY, DX Left 2/20/2022    Procedure: URETEROSCOPY;  Surgeon: Rei Silver M.D.;  Location: SURGERY [facility];  Service: Urology   • LASERTRIPSY Left 2/20/2022    Procedure: LITHOTRIPSY, USING LASER;  Surgeon: Rei Silver M.D.;  Location: SURGERY [facility];  Service: Urology   • TN CYSTOSCOPY,INSERT URETERAL STENT Left 4/26/2021    Procedure: CYSTOSCOPY, WITH OUT URETERAL STENT INSERTION;  Surgeon: Raf Moss M.D.;  Location: [facility];  Service: Urology   • TN CYSTO/URETERO/PYELOSCOPY, DX Left 4/26/2021    Procedure: URETEROSCOPY;  Surgeon: Raf Moss M.D.;  Location: [facility];  Service: Urology   • TN CYSTO/URETERO/PYELOSCOPY, DX Right 3/13/2020    Procedure: URETEROSCOPY;  Surgeon: Chase Damon M.D.;  Location: [facility];  Service: Urology   • TN CYSTOSCOPY,INSERT URETERAL STENT Right 3/13/2020    Procedure: CYSTOSCOPY;  Surgeon: Chase Damon M.D.;  Location: [facility];  Service: Urology   • LASERTRIPSY Right 3/13/2020    Procedure: LITHOTRIPSY, USING LASER;  Surgeon: Chase Damon M.D.;  Location: SURGERY [facility];  Service: Urology   • LITHOTRIPSY         ALLERGIES:   Allergies   Allergen Reactions   • Dilaudid [Hydromorphone] Itching   • Ancef [Cefazolin] Rash     Rash  Tolerated ceftriaxone (April 2021)   • Percocet [Apap-Fd&C Red #40 [place]-Oxycodone]      Tolerates acetaminophen   • Shellfish Allergy    • Tape      Paper tape okay        MEDICATIONS:  No current facility-administered medications on file prior to encounter.     No current outpatient medications on file prior to encounter.       LABS:  Recent Labs     04/24/23  1007   WBC 23.3*   RBC 4.95   HEMOGLOBIN 16.1   HEMATOCRIT 48.6   MCV 98.2*   MCH 32.5   RDW 47.7   PLATELETCT 269   MPV 9.1   NEUTSPOLYS 70.40   LYMPHOCYTES  16.50*   MONOCYTES 8.30   EOSINOPHILS 3.60   BASOPHILS 0.60      Lab Results   Component Value Date/Time    SODIUM 139 04/24/2023 1007    POTASSIUM 3.9 04/24/2023 1007    CHLORIDE 103 04/24/2023 1007    CO2 21 04/24/2023 1007    GLUCOSE 164 (H) 04/24/2023 1007    BUN 14 04/24/2023 1007    CALCIUM 9.5 04/24/2023 1007         PREVIOUS ANESTHETICS: See EMR  __________________________________________    Relevant Problems      (positive) ARAM (acute kidney injury) (HCC)   (positive) Cystinuria (HCC)   (positive) Hydronephrosis   (positive) Hydroureteronephrosis, pyelonephritis       Physical Exam    Airway   Mallampati: II  TM distance: >3 FB  Neck ROM: full       Cardiovascular - normal exam  Rhythm: regular  Rate: normal  (-) murmur     Dental - normal exam        Facial Hair   Pulmonary - normal exam  Breath sounds clear to auscultation     Abdominal    Neurological - normal exam                 Anesthesia Plan    ASA 2- EMERGENT   ASA physical status emergent criteria: acutely contaminated wound or identified infection source    Plan - general       Airway plan will be ETT          Induction: intravenous    Postoperative Plan: Postoperative administration of opioids is intended.    Pertinent diagnostic labs and testing reviewed    Informed Consent:    Anesthetic plan and risks discussed with patient.    Use of blood products discussed with: patient whom consented to blood products.

## 2023-04-24 NOTE — CONSULTS
DATE OF SERVICE:  04/24/2023     UROLOGY CONSULTATION     REQUESTING PHYSICIAN:  Ruslan Vizcaino MD from the emergency room.     CONSULTING PHYSICIAN:  Dante Stephenson MD with Urology.     REASON FOR CONSULTATION:  Right ureteral stone and UTI with sepsis.     HISTORY OF PRESENT ILLNESS:  The patient's 33-year-old male with a history of   stones, who now presents with 24 hours of right flank pain.  Also, now with   fever and chills.     PAST MEDICAL HISTORY:  Kidney stones.     PAST SURGICAL HISTORY:  Ureteroscopy and laser lithotripsy and stent   placement.     ALLERGIES:  ANCEF, PERCOCET, SHELLFISH, AND TAPE AS WELL AS DILAUDID.     MEDICATIONS ON ADMISSION:  See list.     REVIEW OF SYSTEMS:  See HPI, otherwise complete review of systems negative.     PHYSICAL EXAMINATION:  GENERAL:  The patient is alert, in no acute distress.  LUNGS:  Breathing is nonlabored.  CARDIOVASCULAR:  Pulse is regular.  ABDOMEN:  Tender in the right flank, nontender on the left side.  GENITOURINARY:  Shows circumcised phallus with no lesions.  Bilaterally testes   are down, no masses.  EXTREMITIES:  The patient moves all extremities normally.  NEUROLOGIC:  Grossly intact.     LABORATORY DATA:  Show a white blood cell count of 23.3, hemoglobin 16.1,   hematocrit 48%, platelets 269.  Sodium 139, potassium 3.9, chloride 103,   bicarbonate 21, BUN 14 and creatinine 1.27 with a glucose of 164.  Urinalysis   shows a large leukocyte esterase, nitrite negative, few bacteria.     DIAGNOSTIC DATA:  A CT of the abdomen and pelvis without contrast shows a 5 mm   mid right ureteral stone with mild hydro and a 3 mm right UVJ stone and some   small bilateral renal stones.     ASSESSMENT AND PLAN:  A 33-year-old male with a 5 mm right mid ureteral stone   with mild hydro and UTI with sepsis.     PLAN:  To go to the operating room for cystoscopy and right ureteral stent   placement, possible right ureteroscopy and laser lithotripsy as well.  I will    continue broad-spectrum antibiotics and supportive care per the primary team.        ______________________________  MD SHANNAN Ulloa/NIKUNJ/CHELITA    DD:  04/24/2023 14:30  DT:  04/24/2023 15:25    Job#:  [id number]

## 2023-04-24 NOTE — ED NOTES
"ERP at bedside for eval.  Pt. Thrashing around on gurney shouting \"help me!\",  Reassurance offered, IV established.  Multiple attempts r/t pt. Thrashing.       "

## 2023-04-24 NOTE — OP REPORT
DATE OF SERVICE:  04/24/2023     PREOPERATIVE DIAGNOSES:  Right ureteral stone and urinary tract infection with   sepsis.     POSTOPERATIVE DIAGNOSES:  Right ureteral stone and urinary tract infection   with sepsis.     PROCEDURES PERFORMED:  Cystoscopy, right ureteroscopy with stone extraction,   and right ureteral stent placement.     SURGEON:  Dante Stephenson MD     ANESTHESIOLOGIST:  Dr. Edison Sy.     ANESTHESIA:  General.     INDICATIONS FOR PROCEDURE:  The patient is a 33-year-old male with a history   of stones, now with a 5 mm right ureteral stone and UTI with sepsis.  After   discussing treatment options, he elected for right ureteroscopy with laser   lithotripsy and stent placement.     DESCRIPTION OF PROCEDURE:  After obtaining informed consent, the patient was   brought to the operating room.  After the induction of general anesthesia, he   was positioned in dorsal lithotomy position and his genitalia were prepped and   draped in sterile fashion.  He received ceftriaxone as treatment for his UTI   prior to coming to the operating room.  I passed a 22-Vietnamese cystoscope into   the bladder under direct vision.  The prostate was short and nonobstructive.    Bladder was normal in appearance.  I passed a sensor wire up the right ureter.   Since there was no ferny pus draining out at this time, I elected to go ahead   and extract the stone with the ureteroscope. So, I passed a semi-rigid   ureteroscope up the ureter until the stone was encountered in the mid-ureter   and basketed the stone out and sent off for stone analysis.  I did not see any   other stones in the ureter all the way up to the UPJ.  I did a retrograde   pyelogram to outline the renal pelvis and calices for stent placement and then   passed up a 6-Vietnamese x 26 cm stent by backloading through the cystoscope   until it was seen to coil in the renal pelvis on fluoroscopy as well as in the   bladder under direct vision.  There was good  efflux through the stent.  The   string was left on the stent and was taped to his penis with benzoin and   Steri-Strips.  The patient was then awoken from anesthesia and taken to   recovery room in stable condition.     COMPLICATIONS:  None.     ESTIMATED BLOOD LOSS:  5 mL     SPECIMENS:  None.     DRAINS:  A 6-Polish x 26 cm right ureteral stent with string.        ______________________________  MD SHANNAN Ulloa/ALEX    DD:  04/24/2023 14:27  DT:  04/24/2023 15:20    Job#:  [id number]

## 2023-04-24 NOTE — ED NOTES
Pt. To be transported to per-op.  No IV abx not available in [facility] in ed so requested from Pharmacy.  Pt. Reports pain tolerable at this time.

## 2023-04-25 ENCOUNTER — APPOINTMENT (OUTPATIENT)
Dept: RADIOLOGY | Facility: MEDICAL CENTER | Age: 33
DRG: 872 | End: 2023-04-25
Attending: EMERGENCY MEDICINE
Payer: COMMERCIAL

## 2023-04-25 ENCOUNTER — HOSPITAL ENCOUNTER (INPATIENT)
Facility: MEDICAL CENTER | Age: 33
LOS: 3 days | DRG: 872 | End: 2023-04-28
Attending: EMERGENCY MEDICINE | Admitting: HOSPITALIST
Payer: COMMERCIAL

## 2023-04-25 DIAGNOSIS — N17.9 SEPSIS WITH ACUTE RENAL FAILURE, DUE TO UNSPECIFIED ORGANISM, UNSPECIFIED ACUTE RENAL FAILURE TYPE, UNSPECIFIED WHETHER SEPTIC SHOCK PRESENT (HCC): ICD-10-CM

## 2023-04-25 DIAGNOSIS — N13.9 OBSTRUCTIVE UROPATHY: ICD-10-CM

## 2023-04-25 DIAGNOSIS — N12 PYELONEPHRITIS: ICD-10-CM

## 2023-04-25 DIAGNOSIS — R65.20 SEPSIS WITH ACUTE RENAL FAILURE, DUE TO UNSPECIFIED ORGANISM, UNSPECIFIED ACUTE RENAL FAILURE TYPE, UNSPECIFIED WHETHER SEPTIC SHOCK PRESENT (HCC): ICD-10-CM

## 2023-04-25 DIAGNOSIS — A41.9 SEPSIS WITH ACUTE RENAL FAILURE, DUE TO UNSPECIFIED ORGANISM, UNSPECIFIED ACUTE RENAL FAILURE TYPE, UNSPECIFIED WHETHER SEPTIC SHOCK PRESENT (HCC): ICD-10-CM

## 2023-04-25 PROBLEM — N10 ACUTE PYELONEPHRITIS: Status: ACTIVE | Noted: 2023-04-25

## 2023-04-25 LAB
ALBUMIN SERPL BCP-MCNC: 4.1 G/DL (ref 3.2–4.9)
ALBUMIN/GLOB SERPL: 1.3 G/DL
ALP SERPL-CCNC: 105 U/L (ref 30–99)
ALT SERPL-CCNC: 16 U/L (ref 2–50)
ANION GAP SERPL CALC-SCNC: 13 MMOL/L (ref 7–16)
APPEARANCE UR: ABNORMAL
AST SERPL-CCNC: 25 U/L (ref 12–45)
BACTERIA #/AREA URNS HPF: ABNORMAL /HPF
BASOPHILS # BLD AUTO: 0.2 % (ref 0–1.8)
BASOPHILS # BLD: 0.04 K/UL (ref 0–0.12)
BILIRUB SERPL-MCNC: 0.5 MG/DL (ref 0.1–1.5)
BILIRUB UR QL STRIP.AUTO: NEGATIVE
BUN SERPL-MCNC: 14 MG/DL (ref 8–22)
CALCIUM ALBUM COR SERPL-MCNC: 8.7 MG/DL (ref 8.5–10.5)
CALCIUM SERPL-MCNC: 8.8 MG/DL (ref 8.5–10.5)
CHLORIDE SERPL-SCNC: 103 MMOL/L (ref 96–112)
CO2 SERPL-SCNC: 21 MMOL/L (ref 20–33)
COLOR UR: ABNORMAL
CREAT SERPL-MCNC: 1.47 MG/DL (ref 0.5–1.4)
EOSINOPHIL # BLD AUTO: 0.01 K/UL (ref 0–0.51)
EOSINOPHIL NFR BLD: 0.1 % (ref 0–6.9)
EPI CELLS #/AREA URNS HPF: ABNORMAL /HPF
ERYTHROCYTE [DISTWIDTH] IN BLOOD BY AUTOMATED COUNT: 47.9 FL (ref 35.9–50)
GFR SERPLBLD CREATININE-BSD FMLA CKD-EPI: 65 ML/MIN/1.73 M 2
GLOBULIN SER CALC-MCNC: 3.1 G/DL (ref 1.9–3.5)
GLUCOSE SERPL-MCNC: 93 MG/DL (ref 65–99)
GLUCOSE UR STRIP.AUTO-MCNC: NEGATIVE MG/DL
HCT VFR BLD AUTO: 43.8 % (ref 42–52)
HGB BLD-MCNC: 14.9 G/DL (ref 14–18)
HYALINE CASTS #/AREA URNS LPF: ABNORMAL /LPF
IMM GRANULOCYTES # BLD AUTO: 0.09 K/UL (ref 0–0.11)
IMM GRANULOCYTES NFR BLD AUTO: 0.5 % (ref 0–0.9)
KETONES UR STRIP.AUTO-MCNC: NEGATIVE MG/DL
LACTATE SERPL-SCNC: 1.3 MMOL/L (ref 0.5–2)
LEUKOCYTE ESTERASE UR QL STRIP.AUTO: ABNORMAL
LYMPHOCYTES # BLD AUTO: 1.22 K/UL (ref 1–4.8)
LYMPHOCYTES NFR BLD: 7.3 % (ref 22–41)
MCH RBC QN AUTO: 32.7 PG (ref 27–33)
MCHC RBC AUTO-ENTMCNC: 34 G/DL (ref 33.7–35.3)
MCV RBC AUTO: 96.1 FL (ref 81.4–97.8)
MICRO URNS: ABNORMAL
MONOCYTES # BLD AUTO: 1.22 K/UL (ref 0–0.85)
MONOCYTES NFR BLD AUTO: 7.3 % (ref 0–13.4)
NEUTROPHILS # BLD AUTO: 14.11 K/UL (ref 1.82–7.42)
NEUTROPHILS NFR BLD: 84.6 % (ref 44–72)
NITRITE UR QL STRIP.AUTO: NEGATIVE
NRBC # BLD AUTO: 0 K/UL
NRBC BLD-RTO: 0 /100 WBC
PH UR STRIP.AUTO: 6.5 [PH] (ref 5–8)
PLATELET # BLD AUTO: 243 K/UL (ref 164–446)
PMV BLD AUTO: 9.7 FL (ref 9–12.9)
POTASSIUM SERPL-SCNC: 3.7 MMOL/L (ref 3.6–5.5)
PROT SERPL-MCNC: 7.2 G/DL (ref 6–8.2)
PROT UR QL STRIP: 100 MG/DL
RBC # BLD AUTO: 4.56 M/UL (ref 4.7–6.1)
RBC # URNS HPF: >150 /HPF
RBC UR QL AUTO: ABNORMAL
SODIUM SERPL-SCNC: 137 MMOL/L (ref 135–145)
SP GR UR STRIP.AUTO: 1.02
UROBILINOGEN UR STRIP.AUTO-MCNC: 0.2 MG/DL
WBC # BLD AUTO: 16.7 K/UL (ref 4.8–10.8)
WBC #/AREA URNS HPF: ABNORMAL /HPF

## 2023-04-25 PROCEDURE — 700111 HCHG RX REV CODE 636 W/ 250 OVERRIDE (IP): Performed by: EMERGENCY MEDICINE

## 2023-04-25 PROCEDURE — 85025 COMPLETE CBC W/AUTO DIFF WBC: CPT

## 2023-04-25 PROCEDURE — 87186 SC STD MICRODIL/AGAR DIL: CPT | Mod: 91

## 2023-04-25 PROCEDURE — 74176 CT ABD & PELVIS W/O CONTRAST: CPT

## 2023-04-25 PROCEDURE — A9270 NON-COVERED ITEM OR SERVICE: HCPCS | Performed by: EMERGENCY MEDICINE

## 2023-04-25 PROCEDURE — 700105 HCHG RX REV CODE 258: Performed by: EMERGENCY MEDICINE

## 2023-04-25 PROCEDURE — 87086 URINE CULTURE/COLONY COUNT: CPT

## 2023-04-25 PROCEDURE — 36415 COLL VENOUS BLD VENIPUNCTURE: CPT

## 2023-04-25 PROCEDURE — 87040 BLOOD CULTURE FOR BACTERIA: CPT

## 2023-04-25 PROCEDURE — 700102 HCHG RX REV CODE 250 W/ 637 OVERRIDE(OP): Performed by: EMERGENCY MEDICINE

## 2023-04-25 PROCEDURE — 71045 X-RAY EXAM CHEST 1 VIEW: CPT

## 2023-04-25 PROCEDURE — 770006 HCHG ROOM/CARE - MED/SURG/GYN SEMI*

## 2023-04-25 PROCEDURE — 87077 CULTURE AEROBIC IDENTIFY: CPT | Mod: 91

## 2023-04-25 PROCEDURE — 80053 COMPREHEN METABOLIC PANEL: CPT

## 2023-04-25 PROCEDURE — 81001 URINALYSIS AUTO W/SCOPE: CPT

## 2023-04-25 PROCEDURE — 96375 TX/PRO/DX INJ NEW DRUG ADDON: CPT

## 2023-04-25 PROCEDURE — 99285 EMERGENCY DEPT VISIT HI MDM: CPT

## 2023-04-25 PROCEDURE — 83605 ASSAY OF LACTIC ACID: CPT

## 2023-04-25 PROCEDURE — 96365 THER/PROPH/DIAG IV INF INIT: CPT

## 2023-04-25 PROCEDURE — 99223 1ST HOSP IP/OBS HIGH 75: CPT | Performed by: HOSPITALIST

## 2023-04-25 RX ORDER — MORPHINE SULFATE 4 MG/ML
4 INJECTION INTRAVENOUS
Status: DISCONTINUED | OUTPATIENT
Start: 2023-04-25 | End: 2023-04-26

## 2023-04-25 RX ORDER — OXYCODONE HYDROCHLORIDE 5 MG/1
5 TABLET ORAL
Status: DISCONTINUED | OUTPATIENT
Start: 2023-04-25 | End: 2023-04-26

## 2023-04-25 RX ORDER — BISACODYL 10 MG
10 SUPPOSITORY, RECTAL RECTAL
Status: DISCONTINUED | OUTPATIENT
Start: 2023-04-25 | End: 2023-04-28 | Stop reason: HOSPADM

## 2023-04-25 RX ORDER — OXYCODONE HYDROCHLORIDE 10 MG/1
10 TABLET ORAL
Status: DISCONTINUED | OUTPATIENT
Start: 2023-04-25 | End: 2023-04-26

## 2023-04-25 RX ORDER — AMOXICILLIN 250 MG
2 CAPSULE ORAL 2 TIMES DAILY
Status: DISCONTINUED | OUTPATIENT
Start: 2023-04-26 | End: 2023-04-28 | Stop reason: HOSPADM

## 2023-04-25 RX ORDER — ACETAMINOPHEN 325 MG/1
650 TABLET ORAL EVERY 6 HOURS PRN
Status: DISCONTINUED | OUTPATIENT
Start: 2023-04-25 | End: 2023-04-26

## 2023-04-25 RX ORDER — ACETAMINOPHEN 325 MG/1
650 TABLET ORAL ONCE
Status: COMPLETED | OUTPATIENT
Start: 2023-04-25 | End: 2023-04-25

## 2023-04-25 RX ORDER — MORPHINE SULFATE 4 MG/ML
4 INJECTION INTRAVENOUS ONCE
Status: COMPLETED | OUTPATIENT
Start: 2023-04-25 | End: 2023-04-25

## 2023-04-25 RX ORDER — SODIUM CHLORIDE, SODIUM LACTATE, POTASSIUM CHLORIDE, AND CALCIUM CHLORIDE .6; .31; .03; .02 G/100ML; G/100ML; G/100ML; G/100ML
30 INJECTION, SOLUTION INTRAVENOUS ONCE
Status: COMPLETED | OUTPATIENT
Start: 2023-04-25 | End: 2023-04-25

## 2023-04-25 RX ORDER — POLYETHYLENE GLYCOL 3350 17 G/17G
1 POWDER, FOR SOLUTION ORAL
Status: DISCONTINUED | OUTPATIENT
Start: 2023-04-25 | End: 2023-04-28 | Stop reason: HOSPADM

## 2023-04-25 RX ORDER — SODIUM CHLORIDE, SODIUM LACTATE, POTASSIUM CHLORIDE, CALCIUM CHLORIDE 600; 310; 30; 20 MG/100ML; MG/100ML; MG/100ML; MG/100ML
INJECTION, SOLUTION INTRAVENOUS CONTINUOUS
Status: DISCONTINUED | OUTPATIENT
Start: 2023-04-26 | End: 2023-04-28 | Stop reason: HOSPADM

## 2023-04-25 RX ADMIN — AMPICILLIN AND SULBACTAM 3 G: 1; 2 INJECTION, POWDER, FOR SOLUTION INTRAMUSCULAR; INTRAVENOUS at 22:07

## 2023-04-25 RX ADMIN — ACETAMINOPHEN 650 MG: 325 TABLET ORAL at 21:31

## 2023-04-25 RX ADMIN — SODIUM CHLORIDE, POTASSIUM CHLORIDE, SODIUM LACTATE AND CALCIUM CHLORIDE 1836 ML: 600; 310; 30; 20 INJECTION, SOLUTION INTRAVENOUS at 21:31

## 2023-04-25 RX ADMIN — MORPHINE SULFATE 4 MG: 4 INJECTION INTRAVENOUS at 22:55

## 2023-04-25 ASSESSMENT — FIBROSIS 4 INDEX: FIB4 SCORE: 0.68

## 2023-04-25 ASSESSMENT — PAIN DESCRIPTION - PAIN TYPE: TYPE: ACUTE PAIN

## 2023-04-25 NOTE — DISCHARGE SUMMARY
Discharge Summary    CHIEF COMPLAINT ON ADMISSION  Chief Complaint   Patient presents with    Flank Pain     Pt c/o flank pain.  Seen here and in [place] many times for same.  Pt hyperventilating and lying on the floor in triage.  Assisted pt back to WC and instructed him to please stay in the chair       Reason for Admission  Abd Pain     Admission Date  4/24/2023    CODE STATUS  Prior    HPI & HOSPITAL COURSE  This is a 33 y.o. male here with flank pain.    33 y.o. male with PMH of kidney stones who presented 4/24/2023 with right flank pain.  Patient found to have ureteral stones, urology was consulted and he was taken to the OR for right stent placement.  Post stent placement patient decided to leave AGAINST MEDICAL ADVICE.  He is alert and oriented x4.  Despite being told the risks of leaving as he is septic with pyelonephritis patient still decided to leave AGAINST MEDICAL ADVICE.  I have sent 10 days of ciprofloxacin to the pharmacy for him.    Therefore, he is discharged in good and stable condition against medcial advice.      Discharge Date  4/24/2023    FOLLOW UP ITEMS POST DISCHARGE  Urology     DISCHARGE DIAGNOSES  Principal Problem:    Severe sepsis (HCC) POA: Yes  Active Problems:    Calculus of ureter POA: Yes      Overview: IMO load March 2020    Pyelonephritis POA: Yes    Lactic acidosis POA: Yes  Resolved Problems:    * No resolved hospital problems. *      FOLLOW UP  No future appointments.  Pcp Pt States None            MEDICATIONS ON DISCHARGE     Medication List        START taking these medications        Instructions   ciprofloxacin 500 MG Tabs  Commonly known as: CIPRO   Take 1 Tablet by mouth 2 times a day for 10 days.  Dose: 500 mg              Allergies  Allergies   Allergen Reactions    Dilaudid [Hydromorphone] Itching    Ancef [Cefazolin] Rash     Rash  Tolerated ceftriaxone (April 2021)    Percocet [Apap-Fd&C Red #40 [place]-Oxycodone]      Tolerates acetaminophen    Shellfish Allergy      Tape      Paper tape okay        DIET  No orders of the defined types were placed in this encounter.      ACTIVITY  As tolerated.  Weight bearing as tolerated    CONSULTATIONS  Urology    PROCEDURES  PROCEDURES PERFORMED:  Cystoscopy, right ureteroscopy with stone extraction,   and right ureteral stent placement.    LABORATORY  Lab Results   Component Value Date    SODIUM 139 04/24/2023    POTASSIUM 3.9 04/24/2023    CHLORIDE 103 04/24/2023    CO2 21 04/24/2023    GLUCOSE 164 (H) 04/24/2023    BUN 14 04/24/2023    CREATININE 1.27 04/24/2023        Lab Results   Component Value Date    WBC 23.3 (H) 04/24/2023    HEMOGLOBIN 16.1 04/24/2023    HEMATOCRIT 48.6 04/24/2023    PLATELETCT 269 04/24/2023        Total time of the discharge process exceeds 33 minutes.

## 2023-04-25 NOTE — PROGRESS NOTES
Pt arrived to unit appx 1630. Pt immediately request to leave as he does not have childcare for his 1 year old son and he is a single father with no help. MD notified. Abx ordered through [facility], bus pass obtained through ED CM. Admission profile and 2RN skin check not completed per pt AMA request. Pt remains drowsy from surgery so waiting for pt to be more alert before assisting downstairs, pt agreeable. Bed alarm on.

## 2023-04-25 NOTE — PROGRESS NOTES
1920: Pt stated he is leaving while this RN and day shift RN were giving bedside report. Pt has been made aware of risks of leaving AMA by day shift RN and this RN. Pt is A&ox4 and states he is aware. This RN informed pt that his doctor needs to be notified and that his IV needs to be removed. Pt agreeable to waiting for his IV to be removed and for his MD to be notified.     1947:Pt's MD, Dianelys Helm, notified that pt is leaving AMA. Patient is aware of risks of leaving AMA. Pt A&Ox4. Pt stated that he has all of his belongings.     1950: IV removed. Pt has meds to beds. Pt leaving AMA once ride is present.

## 2023-04-26 PROBLEM — R78.81 BACTEREMIA: Status: ACTIVE | Noted: 2023-04-26

## 2023-04-26 PROBLEM — R50.9 FEVER AND CHILLS: Status: ACTIVE | Noted: 2023-04-26

## 2023-04-26 LAB
ALBUMIN SERPL BCP-MCNC: 3.3 G/DL (ref 3.2–4.9)
ALBUMIN/GLOB SERPL: 1.2 G/DL
ALP SERPL-CCNC: 82 U/L (ref 30–99)
ALT SERPL-CCNC: 12 U/L (ref 2–50)
ANION GAP SERPL CALC-SCNC: 12 MMOL/L (ref 7–16)
AST SERPL-CCNC: 22 U/L (ref 12–45)
BACTERIA UR CULT: ABNORMAL
BACTERIA UR CULT: ABNORMAL
BASOPHILS # BLD AUTO: 0 % (ref 0–1.8)
BASOPHILS # BLD: 0 K/UL (ref 0–0.12)
BILIRUB SERPL-MCNC: 0.6 MG/DL (ref 0.1–1.5)
BUN SERPL-MCNC: 11 MG/DL (ref 8–22)
CALCIUM ALBUM COR SERPL-MCNC: 8.3 MG/DL (ref 8.5–10.5)
CALCIUM SERPL-MCNC: 7.7 MG/DL (ref 8.5–10.5)
CHLORIDE SERPL-SCNC: 103 MMOL/L (ref 96–112)
CO2 SERPL-SCNC: 21 MMOL/L (ref 20–33)
CREAT SERPL-MCNC: 1.31 MG/DL (ref 0.5–1.4)
EOSINOPHIL # BLD AUTO: 0 K/UL (ref 0–0.51)
EOSINOPHIL NFR BLD: 0 % (ref 0–6.9)
ERYTHROCYTE [DISTWIDTH] IN BLOOD BY AUTOMATED COUNT: 48.3 FL (ref 35.9–50)
FLUAV RNA SPEC QL NAA+PROBE: NEGATIVE
FLUBV RNA SPEC QL NAA+PROBE: NEGATIVE
GFR SERPLBLD CREATININE-BSD FMLA CKD-EPI: 74 ML/MIN/1.73 M 2
GLOBULIN SER CALC-MCNC: 2.7 G/DL (ref 1.9–3.5)
GLUCOSE SERPL-MCNC: 89 MG/DL (ref 65–99)
HCT VFR BLD AUTO: 44.1 % (ref 42–52)
HGB BLD-MCNC: 14.7 G/DL (ref 14–18)
LACTATE SERPL-SCNC: 1 MMOL/L (ref 0.5–2)
LACTATE SERPL-SCNC: 1.3 MMOL/L (ref 0.5–2)
LACTATE SERPL-SCNC: 1.4 MMOL/L (ref 0.5–2)
LACTATE SERPL-SCNC: 1.6 MMOL/L (ref 0.5–2)
LYMPHOCYTES # BLD AUTO: 1.33 K/UL (ref 1–4.8)
LYMPHOCYTES NFR BLD: 5.1 % (ref 22–41)
MAGNESIUM SERPL-MCNC: 1.1 MG/DL (ref 1.5–2.5)
MANUAL DIFF BLD: NORMAL
MCH RBC QN AUTO: 32.2 PG (ref 27–33)
MCHC RBC AUTO-ENTMCNC: 33.3 G/DL (ref 33.7–35.3)
MCV RBC AUTO: 96.5 FL (ref 81.4–97.8)
MONOCYTES # BLD AUTO: 2.66 K/UL (ref 0–0.85)
MONOCYTES NFR BLD AUTO: 10.2 % (ref 0–13.4)
MORPHOLOGY BLD-IMP: NORMAL
NEUTROPHILS # BLD AUTO: 22.11 K/UL (ref 1.82–7.42)
NEUTROPHILS NFR BLD: 84.7 % (ref 44–72)
NRBC # BLD AUTO: 0 K/UL
NRBC BLD-RTO: 0 /100 WBC
PLATELET # BLD AUTO: 190 K/UL (ref 164–446)
PLATELET BLD QL SMEAR: NORMAL
PMV BLD AUTO: 9.3 FL (ref 9–12.9)
POIKILOCYTOSIS BLD QL SMEAR: NORMAL
POTASSIUM SERPL-SCNC: 3.8 MMOL/L (ref 3.6–5.5)
PROT SERPL-MCNC: 6 G/DL (ref 6–8.2)
RBC # BLD AUTO: 4.57 M/UL (ref 4.7–6.1)
RBC BLD AUTO: PRESENT
RSV RNA SPEC QL NAA+PROBE: NEGATIVE
SARS-COV-2 RNA RESP QL NAA+PROBE: NOTDETECTED
SIGNIFICANT IND 70042: ABNORMAL
SITE SITE: ABNORMAL
SODIUM SERPL-SCNC: 136 MMOL/L (ref 135–145)
SOURCE SOURCE: ABNORMAL
SPECIMEN SOURCE: NORMAL
TARGETS BLD QL SMEAR: NORMAL
WBC # BLD AUTO: 26.1 K/UL (ref 4.8–10.8)

## 2023-04-26 PROCEDURE — 770006 HCHG ROOM/CARE - MED/SURG/GYN SEMI*

## 2023-04-26 PROCEDURE — 700102 HCHG RX REV CODE 250 W/ 637 OVERRIDE(OP): Performed by: HOSPITALIST

## 2023-04-26 PROCEDURE — 83735 ASSAY OF MAGNESIUM: CPT

## 2023-04-26 PROCEDURE — 83605 ASSAY OF LACTIC ACID: CPT | Mod: 91

## 2023-04-26 PROCEDURE — 700111 HCHG RX REV CODE 636 W/ 250 OVERRIDE (IP)

## 2023-04-26 PROCEDURE — 700111 HCHG RX REV CODE 636 W/ 250 OVERRIDE (IP): Performed by: NURSE PRACTITIONER

## 2023-04-26 PROCEDURE — 96366 THER/PROPH/DIAG IV INF ADDON: CPT

## 2023-04-26 PROCEDURE — 99233 SBSQ HOSP IP/OBS HIGH 50: CPT

## 2023-04-26 PROCEDURE — 96376 TX/PRO/DX INJ SAME DRUG ADON: CPT

## 2023-04-26 PROCEDURE — 96375 TX/PRO/DX INJ NEW DRUG ADDON: CPT

## 2023-04-26 PROCEDURE — A9270 NON-COVERED ITEM OR SERVICE: HCPCS

## 2023-04-26 PROCEDURE — 700111 HCHG RX REV CODE 636 W/ 250 OVERRIDE (IP): Performed by: HOSPITALIST

## 2023-04-26 PROCEDURE — 85025 COMPLETE CBC W/AUTO DIFF WBC: CPT

## 2023-04-26 PROCEDURE — 700105 HCHG RX REV CODE 258: Performed by: HOSPITALIST

## 2023-04-26 PROCEDURE — 0241U HCHG SARS-COV-2 COVID-19 NFCT DS RESP RNA 4 TRGT MIC: CPT

## 2023-04-26 PROCEDURE — 700102 HCHG RX REV CODE 250 W/ 637 OVERRIDE(OP)

## 2023-04-26 PROCEDURE — 80053 COMPREHEN METABOLIC PANEL: CPT

## 2023-04-26 PROCEDURE — 85007 BL SMEAR W/DIFF WBC COUNT: CPT

## 2023-04-26 PROCEDURE — C9803 HOPD COVID-19 SPEC COLLECT: HCPCS | Performed by: HOSPITALIST

## 2023-04-26 PROCEDURE — A9270 NON-COVERED ITEM OR SERVICE: HCPCS | Performed by: HOSPITALIST

## 2023-04-26 PROCEDURE — 36415 COLL VENOUS BLD VENIPUNCTURE: CPT

## 2023-04-26 RX ORDER — ONDANSETRON 2 MG/ML
4 INJECTION INTRAMUSCULAR; INTRAVENOUS EVERY 6 HOURS PRN
Status: DISCONTINUED | OUTPATIENT
Start: 2023-04-26 | End: 2023-04-28 | Stop reason: HOSPADM

## 2023-04-26 RX ORDER — KETOROLAC TROMETHAMINE 30 MG/ML
15 INJECTION, SOLUTION INTRAMUSCULAR; INTRAVENOUS EVERY 6 HOURS PRN
Status: DISCONTINUED | OUTPATIENT
Start: 2023-04-26 | End: 2023-04-28 | Stop reason: HOSPADM

## 2023-04-26 RX ORDER — MAGNESIUM SULFATE HEPTAHYDRATE 40 MG/ML
2 INJECTION, SOLUTION INTRAVENOUS ONCE
Status: COMPLETED | OUTPATIENT
Start: 2023-04-26 | End: 2023-04-26

## 2023-04-26 RX ORDER — ACETAMINOPHEN 325 MG/1
650 TABLET ORAL EVERY 6 HOURS
Status: DISCONTINUED | OUTPATIENT
Start: 2023-04-26 | End: 2023-04-26

## 2023-04-26 RX ORDER — MORPHINE SULFATE 4 MG/ML
4 INJECTION INTRAVENOUS
Status: DISCONTINUED | OUTPATIENT
Start: 2023-04-26 | End: 2023-04-27

## 2023-04-26 RX ORDER — ACETAMINOPHEN 325 MG/1
650 TABLET ORAL EVERY 6 HOURS
Status: DISCONTINUED | OUTPATIENT
Start: 2023-04-26 | End: 2023-04-28 | Stop reason: HOSPADM

## 2023-04-26 RX ORDER — OXYCODONE HYDROCHLORIDE 10 MG/1
10 TABLET ORAL
Status: DISCONTINUED | OUTPATIENT
Start: 2023-04-26 | End: 2023-04-26

## 2023-04-26 RX ORDER — OXYCODONE HYDROCHLORIDE 5 MG/1
5 TABLET ORAL
Status: DISCONTINUED | OUTPATIENT
Start: 2023-04-26 | End: 2023-04-26

## 2023-04-26 RX ADMIN — SODIUM CHLORIDE, POTASSIUM CHLORIDE, SODIUM LACTATE AND CALCIUM CHLORIDE: 600; 310; 30; 20 INJECTION, SOLUTION INTRAVENOUS at 17:28

## 2023-04-26 RX ADMIN — AMPICILLIN AND SULBACTAM 3 G: 1; 2 INJECTION, POWDER, FOR SOLUTION INTRAMUSCULAR; INTRAVENOUS at 23:24

## 2023-04-26 RX ADMIN — KETOROLAC TROMETHAMINE 15 MG: 30 INJECTION, SOLUTION INTRAMUSCULAR; INTRAVENOUS at 10:30

## 2023-04-26 RX ADMIN — MORPHINE SULFATE 4 MG: 4 INJECTION, SOLUTION INTRAMUSCULAR; INTRAVENOUS at 11:28

## 2023-04-26 RX ADMIN — MORPHINE SULFATE 4 MG: 4 INJECTION INTRAVENOUS at 03:56

## 2023-04-26 RX ADMIN — MORPHINE SULFATE 4 MG: 4 INJECTION, SOLUTION INTRAMUSCULAR; INTRAVENOUS at 15:09

## 2023-04-26 RX ADMIN — AMPICILLIN AND SULBACTAM 3 G: 1; 2 INJECTION, POWDER, FOR SOLUTION INTRAMUSCULAR; INTRAVENOUS at 10:33

## 2023-04-26 RX ADMIN — ACETAMINOPHEN 650 MG: 325 TABLET, FILM COATED ORAL at 14:04

## 2023-04-26 RX ADMIN — MORPHINE SULFATE 4 MG: 4 INJECTION INTRAVENOUS at 00:14

## 2023-04-26 RX ADMIN — ONDANSETRON HYDROCHLORIDE 4 MG: 2 SOLUTION INTRAMUSCULAR; INTRAVENOUS at 22:05

## 2023-04-26 RX ADMIN — MORPHINE SULFATE 4 MG: 4 INJECTION, SOLUTION INTRAMUSCULAR; INTRAVENOUS at 19:36

## 2023-04-26 RX ADMIN — AMPICILLIN AND SULBACTAM 3 G: 1; 2 INJECTION, POWDER, FOR SOLUTION INTRAMUSCULAR; INTRAVENOUS at 17:29

## 2023-04-26 RX ADMIN — MAGNESIUM SULFATE HEPTAHYDRATE 2 G: 40 INJECTION, SOLUTION INTRAVENOUS at 13:34

## 2023-04-26 RX ADMIN — SODIUM CHLORIDE, POTASSIUM CHLORIDE, SODIUM LACTATE AND CALCIUM CHLORIDE: 600; 310; 30; 20 INJECTION, SOLUTION INTRAVENOUS at 00:15

## 2023-04-26 RX ADMIN — KETOROLAC TROMETHAMINE 15 MG: 30 INJECTION, SOLUTION INTRAMUSCULAR; INTRAVENOUS at 17:24

## 2023-04-26 RX ADMIN — ACETAMINOPHEN 650 MG: 325 TABLET, FILM COATED ORAL at 07:33

## 2023-04-26 RX ADMIN — ACETAMINOPHEN 650 MG: 325 TABLET, FILM COATED ORAL at 19:35

## 2023-04-26 RX ADMIN — AMPICILLIN AND SULBACTAM 3 G: 1; 2 INJECTION, POWDER, FOR SOLUTION INTRAMUSCULAR; INTRAVENOUS at 03:56

## 2023-04-26 ASSESSMENT — LIFESTYLE VARIABLES
HOW MANY TIMES IN THE PAST YEAR HAVE YOU HAD 5 OR MORE DRINKS IN A DAY: 0
CONSUMPTION TOTAL: NEGATIVE
AVERAGE NUMBER OF DAYS PER WEEK YOU HAVE A DRINK CONTAINING ALCOHOL: 0
HAVE PEOPLE ANNOYED YOU BY CRITICIZING YOUR DRINKING: NO
ALCOHOL_USE: NO
EVER FELT BAD OR GUILTY ABOUT YOUR DRINKING: NO
EVER HAD A DRINK FIRST THING IN THE MORNING TO STEADY YOUR NERVES TO GET RID OF A HANGOVER: NO
SUBSTANCE_ABUSE: 0
DOES PATIENT WANT TO STOP DRINKING: NO
TOTAL SCORE: 0
ON A TYPICAL DAY WHEN YOU DRINK ALCOHOL HOW MANY DRINKS DO YOU HAVE: 0
TOTAL SCORE: 0
TOTAL SCORE: 0
HAVE YOU EVER FELT YOU SHOULD CUT DOWN ON YOUR DRINKING: NO

## 2023-04-26 ASSESSMENT — ENCOUNTER SYMPTOMS
DIZZINESS: 0
SPUTUM PRODUCTION: 0
CONSTIPATION: 0
CHILLS: 0
ABDOMINAL PAIN: 0
TINGLING: 0
WHEEZING: 0
MYALGIAS: 0
SINUS PAIN: 0
TREMORS: 0
EYE PAIN: 0
HALLUCINATIONS: 0
FALLS: 0
DIARRHEA: 0
NECK PAIN: 0
DOUBLE VISION: 0
ORTHOPNEA: 0
HEMOPTYSIS: 0
FEVER: 0
WEAKNESS: 0
BLURRED VISION: 0
DEPRESSION: 0
HEARTBURN: 0
NAUSEA: 0
BACK PAIN: 0
PND: 0
SORE THROAT: 0
VOMITING: 0
CLAUDICATION: 0
FLANK PAIN: 1
PHOTOPHOBIA: 0
HEADACHES: 0
COUGH: 0
STRIDOR: 0
POLYDIPSIA: 0
DIAPHORESIS: 0
BRUISES/BLEEDS EASILY: 0
BLOOD IN STOOL: 0
SHORTNESS OF BREATH: 0
PALPITATIONS: 0
NAUSEA: 1

## 2023-04-26 ASSESSMENT — COGNITIVE AND FUNCTIONAL STATUS - GENERAL
SUGGESTED CMS G CODE MODIFIER DAILY ACTIVITY: CH
SUGGESTED CMS G CODE MODIFIER MOBILITY: CH
MOBILITY SCORE: 24
DAILY ACTIVITIY SCORE: 24

## 2023-04-26 ASSESSMENT — PAIN DESCRIPTION - PAIN TYPE
TYPE: ACUTE PAIN

## 2023-04-26 NOTE — ED NOTES
Spoke with lab re: missing CBC. Per lab, a slide must be done and lab won't result until complete.

## 2023-04-26 NOTE — PROGRESS NOTES
Patient arrived to [place] [id number] via bed. Patient A&Ox4, pain improving slowly, has chills, 2L NC in place. -120s otherwise vitals WDL. Oriented to room & fall prevention policies.Personal belongings and call light within reach.    Addendum: Oral temperature 100.4; blankets removed, sheet to cover and ice pack given.

## 2023-04-26 NOTE — HOSPITAL COURSE
Russell Bunn is a 33 y.o. male who presented 4/25/2023 with past medical history of nephrolithiasis, PTSD, seizure, polycystic kidney disease disorder who comes into the hospital for right-sided flank pain.  It associated with fevers, chills, nausea, hematuria and dysuria.  Patient was admitted in the hospital but he left AMA yesterday after a lithotripsy and a right stent placement by Dr. Stephenson.  Patient was discharged with ciprofloxacin which he was taking at home.     CT scan of the abdomen found a right-sided ureteral stent that is in low position.  Mild residual right-sided hydronephrosis.  Right-sided ureteral stones are no longer visualized.  Chest x-ray interpreted by me found no acute pulmonary process

## 2023-04-26 NOTE — ASSESSMENT & PLAN NOTE
4/26 Tmax 101.4 today  -Scheduled Tylenol  -Continue IV fluids, LR at 100 mL/h  -Continue IV antibiotics, IV Unasyn due to cultures growing Enterococcus faecalis  -Continue to monitor  Low grade fever.

## 2023-04-26 NOTE — ED NOTES
BREAK RN: pt up to edge of bed with assistance to use urinal. Pt provided water. No further needs at this time.

## 2023-04-26 NOTE — ASSESSMENT & PLAN NOTE
Blood cultures from 4/25 growing gram-positive cocci in chains  Infectious disease has been consulted-Dr. Walker    I have ordered echo to assess for endocarditis  Discussed with ID Dr Walker  Repeat blood cx  Continue iv abx.   Wbc rising today 3000

## 2023-04-26 NOTE — ED NOTES
Patient has a temp of 101.2. Medicated per MAR. Patient able to stand at bedside to urinate. Compliant of 6/10 a pain in RLQ when urinating.

## 2023-04-26 NOTE — ASSESSMENT & PLAN NOTE
This is Sepsis Present on admission  SIRS criteria identified on my evaluation include: Fever, with temperature greater than 101 deg F, Tachypnea, with respirations greater than 20 per minute and Leukocytosis, with WBC greater than 12,000  Source is pyelonephritis  Sepsis protocol initiated  Fluid resuscitation ordered per protocol  Crystalloid Fluid Administration: Fluid resuscitation ordered per standard protocol - 30 mL/kg per current or ideal body weight  IV antibiotics as appropriate for source of sepsis- on unasyn  Reassessment: I have reassessed the patient's hemodynamic status

## 2023-04-26 NOTE — ASSESSMENT & PLAN NOTE
Stent is in well-placed  Pain control  Outpatient urology follow-up  Monitoring Cr level  Repeat US / CT if cr continues to increase

## 2023-04-26 NOTE — PROGRESS NOTES
4 Eyes Skin Assessment Completed by TATE Motta and TATE Billingsley.    Head WDL  Ears WDL  Nose WDL  Mouth WDL  Neck WDL  Breast/Chest WDL  Shoulder Blades WDL  Spine WDL  (R) Arm/Elbow/Hand WDL  (L) Arm/Elbow/Hand WDL  Abdomen WDL  Groin WDL  Scrotum/Coccyx/Buttocks WDL  (R) Leg WDL  (L) Leg Scar  (R) Heel/Foot/Toe WDL  (L) Heel/Foot/Toe Scar and Scab          Devices In Places Pulse Ox and Nasal Cannula      Interventions In Place NC W/Ear Foams and Pillows    Possible Skin Injury No    Pictures Uploaded Into Epic N/A  Wound Consult Placed N/A  RN Wound Prevention Protocol Ordered No

## 2023-04-26 NOTE — PROGRESS NOTES
[facility] Medicine Daily Progress Note    Date of Service  4/26/2023    Chief Complaint  Russell Bunn is a 33 y.o. male admitted 4/25/2023 with flank pain.    Hospital Course  Russell Bunn is a 33 y.o. male who presented 4/25/2023 with past medical history of nephrolithiasis, PTSD, seizure, polycystic kidney disease disorder who comes into the hospital for right-sided flank pain.  It associated with fevers, chills, nausea, hematuria and dysuria.  Patient was admitted in the hospital but he left AMA yesterday after a lithotripsy and a right stent placement by Dr. Stephenson.  Patient was discharged with ciprofloxacin which he was taking at home.     CT scan of the abdomen found a right-sided ureteral stent that is in low position.  Mild residual right-sided hydronephrosis.  Right-sided ureteral stones are no longer visualized.  Chest x-ray interpreted by me found no acute pulmonary process    Interval Problem Update  4/26 Tmax 101.4, tachycardic, BP WDL, on 0 to 2 L nasal cannula.  Patient still having fever and chills.  Scheduled Tylenol.  Continue IV Unasyn due to previous cultures growing Enterococcus faecalis.  Acute renal failure resolved, added Toradol due to patient complaining of pain that is not controlled.  Patient is allergic to oxy, discontinued.  Rotate Toradol and morphine for pain control.  Continue IV fluids.  Continue IV antibiotics.  ADDENDUM: Blood cultures growing gram-positive cocci in chains-consulted infectious disease    I have discussed this patient's plan of care and discharge plan at IDT rounds today with Case Management, Nursing, Nursing leadership, and other members of the IDT team.    Consultants/Specialty  None    Code Status  Full Code    Disposition  Patient is not medically cleared for discharge.   Anticipate discharge to to home with close outpatient follow-up.  I have placed the appropriate orders for post-discharge needs.    Review of Systems  Review of  Systems   Constitutional:  Negative for chills, fever and malaise/fatigue.   Respiratory:  Negative for cough and shortness of breath.    Cardiovascular:  Negative for chest pain, palpitations and leg swelling.   Gastrointestinal:  Negative for abdominal pain, diarrhea, heartburn, nausea and vomiting.   Genitourinary:  Positive for dysuria, flank pain, frequency and hematuria. Negative for urgency.   Neurological:  Negative for dizziness and headaches.   All other systems reviewed and are negative.     Physical Exam  Temp:  [38 °C (100.4 °F)-39.8 °C (103.7 °F)] 38.8 °C (101.9 °F)  Pulse:  [101-124] 115  Resp:  [16-25] 20  BP: (110-152)/() 122/77  SpO2:  [91 %-99 %] 96 %    Physical Exam  Vitals and nursing note reviewed.   Constitutional:       Appearance: Normal appearance. He is not ill-appearing.   HENT:      Head: Normocephalic and atraumatic.      Jaw: There is normal jaw occlusion.      Right Ear: Hearing normal.      Left Ear: Hearing normal.      Nose: Nose normal.      Mouth/Throat:      Lips: Pink.      Mouth: Mucous membranes are moist.   Eyes:      Extraocular Movements: Extraocular movements intact.      Conjunctiva/sclera: Conjunctivae normal.      Pupils: Pupils are equal, round, and reactive to light.   Neck:      Vascular: No carotid bruit.   Cardiovascular:      Rate and Rhythm: Normal rate and regular rhythm.      Pulses: Normal pulses.      Heart sounds: Normal heart sounds, S1 normal and S2 normal.   Pulmonary:      Effort: Pulmonary effort is normal.      Breath sounds: Normal breath sounds and air entry. No stridor.   Abdominal:      Tenderness: There is right CVA tenderness.   Musculoskeletal:      Cervical back: Normal range of motion and neck supple.      Right lower leg: No edema.      Left lower leg: No edema.   Skin:     General: Skin is warm and dry.      Capillary Refill: Capillary refill takes less than 2 seconds.   Neurological:      General: No focal deficit present.       Mental Status: He is alert and oriented to person, place, and time. Mental status is at baseline.      Sensory: Sensation is intact.      Motor: Motor function is intact.   Psychiatric:         Attention and Perception: Attention and perception normal.         Mood and Affect: Mood and affect normal.         Speech: Speech normal.         Behavior: Behavior normal. Behavior is cooperative.       Fluids    Intake/Output Summary (Last 24 hours) at 4/26/2023 1406  Last data filed at 4/26/2023 1130  Gross per 24 hour   Intake 2036 ml   Output 1200 ml   Net 836 ml       Laboratory  Recent Labs     04/24/23 1007 04/25/23 2029 04/26/23  0520   WBC 23.3* 16.7* 26.1*   RBC 4.95 4.56* 4.57*   HEMOGLOBIN 16.1 14.9 14.7   HEMATOCRIT 48.6 43.8 44.1   MCV 98.2* 96.1 96.5   MCH 32.5 32.7 32.2   MCHC 33.1* 34.0 33.3*   RDW 47.7 47.9 48.3   PLATELETCT 269 243 190   MPV 9.1 9.7 9.3     Recent Labs     04/24/23 1007 04/25/23 2029 04/26/23  0520   SODIUM 139 137 136   POTASSIUM 3.9 3.7 3.8   CHLORIDE 103 103 103   CO2 21 21 21   GLUCOSE 164* 93 89   BUN 14 14 11   CREATININE 1.27 1.47* 1.31   CALCIUM 9.5 8.8 7.7*     Recent Labs     04/24/23  1820   INR 1.17*               Imaging  CT-RENAL COLIC EVALUATION(A/P W/O)   Final Result      1.  Interval placement of right-sided double-J ureteral stent which is well positioned. Mild residual right-sided hydronephrosis.   2.  Previously demonstrated right-sided ureteral stones are no longer visualized.   3.  Faint nonobstructive punctate left-sided renal calculi. No evidence of left-sided hydronephrosis.      DX-CHEST-PORTABLE (1 VIEW)   Final Result      No acute cardiopulmonary disease evident.           Assessment/Plan  * Severe sepsis (HCC)- (present on admission)  Assessment & Plan  This is Sepsis Present on admission  SIRS criteria identified on my evaluation include: Fever, with temperature greater than 101 deg F, Tachypnea, with respirations greater than 20 per minute and  Leukocytosis, with WBC greater than 12,000  Source is pyelonephritis  Sepsis protocol initiated  Fluid resuscitation ordered per protocol  Crystalloid Fluid Administration: Fluid resuscitation ordered per standard protocol - 30 mL/kg per current or ideal body weight  IV antibiotics as appropriate for source of sepsis- on unasyn  Reassessment: I have reassessed the patient's hemodynamic status          Bacteremia  Assessment & Plan  Blood cultures from 4/25 growing gram-positive cocci in chains  Infectious disease has been consulted-Dr. Walker    Fever and chills  Assessment & Plan  4/26 Tmax 101.4 today  -Scheduled Tylenol  -Continue IV fluids, LR at 100 mL/h  -Continue IV antibiotics, IV Unasyn due to cultures growing Enterococcus faecalis  -Continue to monitor    Acute pyelonephritis- (present on admission)  Assessment & Plan  symptoms includes dysuria and frequency  UA + for bacteria, LE and nitrite  F/u urine cultures  Started pt on IV Unasyn  Previous cultures found Enterococcus faecalis       Obstructive uropathy- (present on admission)  Assessment & Plan  Stent is in well-placed  Pain control  Outpatient urology follow-up    Acute renal failure (ARF) (HCC)  Assessment & Plan  RESOLVED  Likely prerenal  IV fluid hydration   Monitor BMP and assess response  Avoid IV contrast/nephrotoxins/NSAIDs  Dose adjust meds for decreased GFR             VTE prophylaxis: SCDs/TEDs    I have performed a physical exam and reviewed and updated ROS and Plan today (4/26/2023). In review of yesterday's note (4/25/2023), there are no changes except as documented above.

## 2023-04-26 NOTE — ED NOTES
Pt resting comfortably, sleeping. VSS on RA. No s/sx of distress noted. Call bell within reach, side rails up.

## 2023-04-26 NOTE — ED NOTES
Received bedside report from NOC RN. Patient is resting comfortably in bed. Patient on monitor and updated on POC.

## 2023-04-26 NOTE — ASSESSMENT & PLAN NOTE
RESOLVED  Likely prerenal  IV fluid hydration   Monitor BMP and assess response  Avoid IV contrast/nephrotoxins/NSAIDs  Dose adjust meds for decreased GFR  Cr 1.57 close monitoring, if continues to increased will repeat US will need urology re-consult.

## 2023-04-26 NOTE — ED NOTES
Med rec updated and complete. Allergies reviewed. Confirmed name and date of birth. Pt   Is currently on Ciprofloxacin. Start date 04/24/23.  Last dose 04/25/23.      Home pharmacy [facility] [phone]    Cipro filled at [facility].

## 2023-04-26 NOTE — ED PROVIDER NOTES
ED Provider Note    CHIEF COMPLAINT  Chief Complaint   Patient presents with    Flank Pain     Right sided flank pain. Currently on ABX (Cipro) for kidney stones. Hx of Polycystic kidney disease. Reports 7/10 pain. Given 100mcg Fentanyl, 4 Zofran, and 400mL fluid IV en route by EMS.        EXTERNAL RECORDS REVIEWED  Other discharge summary from 4/24/23 he had lithotripsy with a stent placed yesterday and left AMA    HPI/ROS    Russell Bunn is a 33 y.o. male who presents with right flank pain.  The patient had lithotripsy yesterday with stent placement.  Subsequently he left AMA with a prescription for ciprofloxacin.  Today he developed increased right flank pain with fever and nausea and vomiting.  He has not had any diarrhea.  He has some slight SOB.  He also has dysuria and hematuria.    PAST MEDICAL HISTORY   has a past medical history of Kidney stones and Seizure disorder (HCC).    SURGICAL HISTORY   has a past surgical history that includes lithotripsy; cysto/uretero/pyeloscopy, dx (Right, 3/13/2020); cystoscopy,insert ureteral stent (Right, 3/13/2020); lasertripsy (Right, 3/13/2020); cystoscopy,insert ureteral stent (Left, 4/26/2021); cysto/uretero/pyeloscopy, dx (Left, 4/26/2021); cystoscopy (Left, 02/20/2022); cystoscopy,insert ureteral stent (Left, 2/20/2022); cysto/uretero/pyeloscopy, dx (Left, 2/20/2022); lasertripsy (Left, 2/20/2022); and cysto stent placemnt pre surg (Right, 4/24/2023).    FAMILY HISTORY  No family history on file.    SOCIAL HISTORY  Social History     Tobacco Use    Smoking status: Former     Packs/day: 0.50     Types: Cigarettes    Smokeless tobacco: Never   Vaping Use    Vaping Use: Never used   Substance and Sexual Activity    Alcohol use: Yes     Comment: Occasional    Drug use: Yes     Types: Inhaled     Comment: Marijuana, every day    Sexual activity: Not on file       CURRENT MEDICATIONS  Home Medications       Reviewed by Angy Sethi R.N. (Registered Nurse)  "on 04/25/23 at 2015  Med List Status: Partial     Medication Last Dose Status   ciprofloxacin (CIPRO) 500 MG Tab  Active                    ALLERGIES  Allergies   Allergen Reactions    Dilaudid [Hydromorphone] Itching    Ancef [Cefazolin] Rash     Rash  Tolerated ceftriaxone (April 2021)    Percocet [Apap-Fd&C Red #40 [place]-Oxycodone]      Tolerates acetaminophen    Shellfish Allergy     Tape      Paper tape okay        PHYSICAL EXAM  VITAL SIGNS: /84   Pulse (!) 109   Temp (!) 39.8 °C (103.7 °F)   Resp 16   Ht 1.676 m (5' 6\")   Wt 61.2 kg (135 lb)   SpO2 99%   BMI 21.79 kg/m²    In general the patient appears ill    HEENT unremarkable    Pulmonary chest clear to auscultation bilaterally.    Cardiovascular s1s2 with a tachycardic rate    GI abdomen is soft    Skin no pallor    Extremities no edema    Neurologic exam intact    DIAGNOSTIC STUDIES  Results for orders placed or performed during the hospital encounter of 04/25/23   Lactic acid (lactate)   Result Value Ref Range    Lactic Acid 1.3 0.5 - 2.0 mmol/L   CBC With Differential   Result Value Ref Range    WBC 16.7 (H) 4.8 - 10.8 K/uL    RBC 4.56 (L) 4.70 - 6.10 M/uL    Hemoglobin 14.9 14.0 - 18.0 g/dL    Hematocrit 43.8 42.0 - 52.0 %    MCV 96.1 81.4 - 97.8 fL    MCH 32.7 27.0 - 33.0 pg    MCHC 34.0 33.7 - 35.3 g/dL    RDW 47.9 35.9 - 50.0 fL    Platelet Count 243 164 - 446 K/uL    MPV 9.7 9.0 - 12.9 fL    Neutrophils-Polys 84.60 (H) 44.00 - 72.00 %    Lymphocytes 7.30 (L) 22.00 - 41.00 %    Monocytes 7.30 0.00 - 13.40 %    Eosinophils 0.10 0.00 - 6.90 %    Basophils 0.20 0.00 - 1.80 %    Immature Granulocytes 0.50 0.00 - 0.90 %    Nucleated RBC 0.00 /100 WBC    Neutrophils (Absolute) 14.11 (H) 1.82 - 7.42 K/uL    Lymphs (Absolute) 1.22 1.00 - 4.80 K/uL    Monos (Absolute) 1.22 (H) 0.00 - 0.85 K/uL    Eos (Absolute) 0.01 0.00 - 0.51 K/uL    Baso (Absolute) 0.04 0.00 - 0.12 K/uL    Immature Granulocytes (abs) 0.09 0.00 - 0.11 K/uL    NRBC " (Absolute) 0.00 K/uL   Comp Metabolic Panel   Result Value Ref Range    Sodium 137 135 - 145 mmol/L    Potassium 3.7 3.6 - 5.5 mmol/L    Chloride 103 96 - 112 mmol/L    Co2 21 20 - 33 mmol/L    Anion Gap 13.0 7.0 - 16.0    Glucose 93 65 - 99 mg/dL    Bun 14 8 - 22 mg/dL    Creatinine 1.47 (H) 0.50 - 1.40 mg/dL    Calcium 8.8 8.5 - 10.5 mg/dL    AST(SGOT) 25 12 - 45 U/L    ALT(SGPT) 16 2 - 50 U/L    Alkaline Phosphatase 105 (H) 30 - 99 U/L    Total Bilirubin 0.5 0.1 - 1.5 mg/dL    Albumin 4.1 3.2 - 4.9 g/dL    Total Protein 7.2 6.0 - 8.2 g/dL    Globulin 3.1 1.9 - 3.5 g/dL    A-G Ratio 1.3 g/dL   Urinalysis    Specimen: Urine   Result Value Ref Range    Color Red (A)     Character Cloudy (A)     Specific Gravity 1.020 <1.035    Ph 6.5 5.0 - 8.0    Glucose Negative Negative mg/dL    Ketones Negative Negative mg/dL    Protein 100 (A) Negative mg/dL    Bilirubin Negative Negative    Urobilinogen, Urine 0.2 Negative    Nitrite Negative Negative    Leukocyte Esterase Moderate (A) Negative    Occult Blood Large (A) Negative    Micro Urine Req Microscopic    CORRECTED CALCIUM   Result Value Ref Range    Correct Calcium 8.7 8.5 - 10.5 mg/dL   ESTIMATED GFR   Result Value Ref Range    GFR (CKD-EPI) 65 >60 mL/min/1.73 m 2   URINE MICROSCOPIC (W/UA)   Result Value Ref Range    WBC 20-50 (A) /hpf    RBC >150 (A) /hpf    Bacteria Few (A) None /hpf    Epithelial Cells Few /hpf    Hyaline Cast 0-2 /lpf         RADIOLOGY  CT-RENAL COLIC EVALUATION(A/P W/O)   Final Result      1.  Interval placement of right-sided double-J ureteral stent which is well positioned. Mild residual right-sided hydronephrosis.   2.  Previously demonstrated right-sided ureteral stones are no longer visualized.   3.  Faint nonobstructive punctate left-sided renal calculi. No evidence of left-sided hydronephrosis.      DX-CHEST-PORTABLE (1 VIEW)   Final Result      No acute cardiopulmonary disease evident.            COURSE & MEDICAL DECISION MAKING  This is  a 33-year-old male who presents the emergency department acutely ill in appearance.  In reviewing the patient's records from yesterday I was concerned for urosepsis as the patient was febrile and appeared ill.  He was treated aggressively with IV resuscitation per the sepsis protocol and initially ordered gentamicin.  Pharmacy change this to Unasyn.  Fortunately the patient does not have a lactic acidosis.  He does have a leukocytosis.  CT scan does show placement of the ureteral stent that appears well-positioned with only mild residual hydronephrosis.  The ureteral stones are no longer visualized which is comforting.  Urinalysis does show some white cells as well as red cells which would be expected.  He does have some bacteria in the urine with moderate leukocyte esterase all concerning from an infectious standpoint.  The patient's temperature has come down with oral Tylenol and he also received IV hydration as mentioned above.  His vital signs seem to be improving.  The patient be admitted to the hospitalist in guarded condition with urology in consultation.  Of note the patient's creatinine is slightly elevated and hopefully this is prerenal and the patient will respond to IV fluids.  FINAL DIAGNOSIS  1.  Urosepsis  2.  Status post lithotripsy and stent placement to the right ureter  3.  Mild renal insufficiency  4.  Critical care time 45 minutes    Disposition  The patient will be admitted in guarded condition       Electronically signed by: Hai Recinos M.D., 4/25/2023 9:26 PM

## 2023-04-26 NOTE — ED NOTES
Pt states he gets seizures from percocet, so oxycodone not given. Pt medicated with morphine per ERP.

## 2023-04-26 NOTE — H&P
Hospital Medicine History & Physical Note    Date of Service  4/25/2023    Primary Care Physician  Pcp Pt States None    Consultants      Specialist Names:     Code Status  Full Code    Chief Complaint  Chief Complaint   Patient presents with    Flank Pain     Right sided flank pain. Currently on ABX (Cipro) for kidney stones. Hx of Polycystic kidney disease. Reports 7/10 pain. Given 100mcg Fentanyl, 4 Zofran, and 400mL fluid IV en route by EMS.        History of Presenting Illness  Russell Bunn is a 33 y.o. male who presented 4/25/2023 with past medical history of nephrolithiasis, PTSD, seizure, polycystic kidney disease disorder who comes into the hospital for right-sided flank pain.  It associated with fevers, chills, nausea, hematuria and dysuria.  Patient was admitted in the hospital but he left AMA yesterday after a lithotripsy and a right stent placement by Dr. Stephenson.  Patient was discharged with ciprofloxacin which she was taking at home.    CT scan of the abdomen found a right-sided ureteral stent that is in low position.  Mild residual right-sided hydronephrosis.  Right-sided ureteral stones are no longer visualized.  Chest x-ray interpreted by me found no acute pulmonary process    I discussed the plan of care with patient.    Review of Systems  Review of Systems   Constitutional:  Negative for chills, diaphoresis, fever and malaise/fatigue.   HENT:  Negative for congestion, ear discharge, ear pain, hearing loss, nosebleeds, sinus pain, sore throat and tinnitus.    Eyes:  Negative for blurred vision, double vision, photophobia and pain.   Respiratory:  Negative for cough, hemoptysis, sputum production, shortness of breath, wheezing and stridor.    Cardiovascular:  Negative for chest pain, palpitations, orthopnea, claudication, leg swelling and PND.   Gastrointestinal:  Positive for nausea. Negative for abdominal pain, blood in stool, constipation, diarrhea, heartburn, melena and vomiting.    Genitourinary:  Positive for dysuria, flank pain and hematuria. Negative for frequency and urgency.   Musculoskeletal:  Negative for back pain, falls, joint pain, myalgias and neck pain.   Skin:  Negative for itching and rash.   Neurological:  Negative for dizziness, tingling, tremors, weakness and headaches.   Endo/Heme/Allergies:  Negative for environmental allergies and polydipsia. Does not bruise/bleed easily.   Psychiatric/Behavioral:  Negative for depression, hallucinations, substance abuse and suicidal ideas.      Past Medical History   has a past medical history of Kidney stones and Seizure disorder (HCC).    Surgical History   has a past surgical history that includes lithotripsy; pr cysto/uretero/pyeloscopy, dx (Right, 3/13/2020); pr cystoscopy,insert ureteral stent (Right, 3/13/2020); lasertripsy (Right, 3/13/2020); pr cystoscopy,insert ureteral stent (Left, 4/26/2021); pr cysto/uretero/pyeloscopy, dx (Left, 4/26/2021); cystoscopy (Left, 02/20/2022); pr cystoscopy,insert ureteral stent (Left, 2/20/2022); pr cysto/uretero/pyeloscopy, dx (Left, 2/20/2022); lasertripsy (Left, 2/20/2022); and cysto stent placemnt pre surg (Right, 4/24/2023).     Family History  Family history reviewed with patient. There is no family history that is pertinent to the chief complaint.     Social History   reports that he has quit smoking. His smoking use included cigarettes. He smoked an average of .5 packs per day. He has never used smokeless tobacco. He reports current alcohol use. He reports current drug use. Drug: Inhaled.    Allergies  Allergies   Allergen Reactions    Dilaudid [Hydromorphone] Itching    Ancef [Cefazolin] Rash     Rash  Tolerated ceftriaxone (April 2021)    Percocet [Apap-Fd&C Red #40 [place]-Oxycodone]      Tolerates acetaminophen    Shellfish Allergy     Tape      Paper tape okay        Medications  Prior to Admission Medications   Prescriptions Last Dose Informant Patient Reported? Taking?    ciprofloxacin (CIPRO) 500 MG Tab 4/25/2023 at 1000  No No   Sig: Take 1 Tablet by mouth 2 times a day for 10 days.      Facility-Administered Medications: None       Physical Exam  Temp:  [38.1 °C (100.6 °F)-39.8 °C (103.7 °F)] 38.1 °C (100.6 °F)  Pulse:  [101-111] 102  Resp:  [16-18] 16  BP: (119-152)/() 126/59  SpO2:  [97 %-99 %] 97 %  Blood Pressure: 126/59   Temperature: (!) 38.1 °C (100.6 °F)   Pulse: (!) 102   Respiration: 16   Pulse Oximetry: 97 %       Physical Exam  Vitals and nursing note reviewed.   Constitutional:       General: He is not in acute distress.     Appearance: Normal appearance. He is not ill-appearing, toxic-appearing or diaphoretic.   HENT:      Head: Normocephalic and atraumatic.      Nose: No congestion or rhinorrhea.      Mouth/Throat:      Pharynx: No posterior oropharyngeal erythema.   Eyes:      General: No scleral icterus.        Right eye: No discharge.   Cardiovascular:      Rate and Rhythm: Regular rhythm. Tachycardia present.      Pulses: Normal pulses.      Heart sounds: Normal heart sounds. No murmur heard.    No friction rub. No gallop.   Pulmonary:      Effort: Pulmonary effort is normal. No respiratory distress.      Breath sounds: Normal breath sounds. No stridor. No wheezing, rhonchi or rales.   Abdominal:      General: There is no distension.      Tenderness: There is abdominal tenderness.   Musculoskeletal:         General: No swelling, tenderness, deformity or signs of injury.      Cervical back: Normal range of motion.      Right lower leg: No edema.      Left lower leg: No edema.   Skin:     Capillary Refill: Capillary refill takes more than 3 seconds.      Coloration: Skin is not jaundiced or pale.      Findings: No bruising, erythema, lesion or rash.   Neurological:      General: No focal deficit present.      Mental Status: He is alert and oriented to person, place, and time.       Laboratory:  Recent Labs     04/24/23  1007 04/25/23 2029   WBC 23.3*  16.7*   RBC 4.95 4.56*   HEMOGLOBIN 16.1 14.9   HEMATOCRIT 48.6 43.8   MCV 98.2* 96.1   MCH 32.5 32.7   MCHC 33.1* 34.0   RDW 47.7 47.9   PLATELETCT 269 243   MPV 9.1 9.7     Recent Labs     04/24/23  1007 04/25/23 2029   SODIUM 139 137   POTASSIUM 3.9 3.7   CHLORIDE 103 103   CO2 21 21   GLUCOSE 164* 93   BUN 14 14   CREATININE 1.27 1.47*   CALCIUM 9.5 8.8     Recent Labs     04/24/23  1007 04/25/23 2029   ALTSGPT 16 16   ASTSGOT 23 25   ALKPHOSPHAT 115* 105*   TBILIRUBIN 0.6 0.5   LIPASE 54  --    GLUCOSE 164* 93     Recent Labs     04/24/23  1820   INR 1.17*     No results for input(s): NTPROBNP in the last 72 hours.      No results for input(s): TROPONINT in the last 72 hours.    Imaging:  CT-RENAL COLIC EVALUATION(A/P W/O)   Final Result      1.  Interval placement of right-sided double-J ureteral stent which is well positioned. Mild residual right-sided hydronephrosis.   2.  Previously demonstrated right-sided ureteral stones are no longer visualized.   3.  Faint nonobstructive punctate left-sided renal calculi. No evidence of left-sided hydronephrosis.      DX-CHEST-PORTABLE (1 VIEW)   Final Result      No acute cardiopulmonary disease evident.          X-Ray:  I have personally reviewed the images and compared with prior images.    Assessment/Plan:  Justification for Admission Status  I anticipate this patient will require at least two midnights for appropriate medical management, necessitating inpatient admission because acute pyelonephritis    Patient will need a Med/Surg bed on MEDICAL service .  The need is secondary to acute pyelonephritis.    * Acute pyelonephritis- (present on admission)  Assessment & Plan  symptoms includes dysuria and frequency  UA + for bacteria, LE and nitrite  F/u urine cultures  Started pt on IV Unasyn  Previous cultures found Enterococcus      Severe sepsis (HCC)- (present on admission)  Assessment & Plan  This is Sepsis Present on admission  SIRS criteria identified on my  evaluation include: Fever, with temperature greater than 101 deg F, Tachypnea, with respirations greater than 20 per minute and Leukocytosis, with WBC greater than 12,000  Source is pyelonephritis  Sepsis protocol initiated  Fluid resuscitation ordered per protocol  Crystalloid Fluid Administration: Fluid resuscitation ordered per standard protocol - 30 mL/kg per current or ideal body weight  IV antibiotics as appropriate for source of sepsis  Reassessment: I have reassessed the patient's hemodynamic status          Acute renal failure (ARF) (HCC)  Assessment & Plan  Likely prerenal  IV fluid hydration   Monitor BMP and assess response  Avoid IV contrast/nephrotoxins/NSAIDs  Dose adjust meds for decreased GFR        Obstructive uropathy- (present on admission)  Assessment & Plan  Stent is in well-placed  Pain control  Outpatient urology follow-up        VTE prophylaxis: SCDs/TEDs

## 2023-04-26 NOTE — ED TRIAGE NOTES
Chief Complaint   Patient presents with    Flank Pain     Right sided flank pain. Currently on ABX (Cipro) for kidney stones. Hx of Polycystic kidney disease. Reports 7/10 pain. Given 100mcg Fentanyl, 4 Zofran, and 400mL fluid IV en route by EMS.

## 2023-04-26 NOTE — ASSESSMENT & PLAN NOTE
symptoms includes dysuria and frequency  UA + for bacteria, LE and nitrite  F/u urine cultures  Started pt on IV Unasyn  ID following ,I have ordered echo, blood cx showed strep sp. Likely enterococcus.   Previous cultures found Enterococcus faecalis

## 2023-04-27 ENCOUNTER — APPOINTMENT (OUTPATIENT)
Dept: RADIOLOGY | Facility: MEDICAL CENTER | Age: 33
DRG: 872 | End: 2023-04-27
Attending: HOSPITALIST
Payer: COMMERCIAL

## 2023-04-27 LAB
ALBUMIN SERPL BCP-MCNC: 3.4 G/DL (ref 3.2–4.9)
ALBUMIN/GLOB SERPL: 1.1 G/DL
ALP SERPL-CCNC: 88 U/L (ref 30–99)
ALT SERPL-CCNC: 15 U/L (ref 2–50)
ANION GAP SERPL CALC-SCNC: 11 MMOL/L (ref 7–16)
AST SERPL-CCNC: 19 U/L (ref 12–45)
BILIRUB SERPL-MCNC: 0.5 MG/DL (ref 0.1–1.5)
BUN SERPL-MCNC: 15 MG/DL (ref 8–22)
CALCIUM ALBUM COR SERPL-MCNC: 8.7 MG/DL (ref 8.5–10.5)
CALCIUM SERPL-MCNC: 8.2 MG/DL (ref 8.5–10.5)
CHLORIDE SERPL-SCNC: 101 MMOL/L (ref 96–112)
CO2 SERPL-SCNC: 24 MMOL/L (ref 20–33)
CREAT SERPL-MCNC: 1.57 MG/DL (ref 0.5–1.4)
ERYTHROCYTE [DISTWIDTH] IN BLOOD BY AUTOMATED COUNT: 48.8 FL (ref 35.9–50)
GFR SERPLBLD CREATININE-BSD FMLA CKD-EPI: 60 ML/MIN/1.73 M 2
GLOBULIN SER CALC-MCNC: 3.1 G/DL (ref 1.9–3.5)
GLUCOSE SERPL-MCNC: 112 MG/DL (ref 65–99)
HCT VFR BLD AUTO: 42.6 % (ref 42–52)
HGB BLD-MCNC: 14.3 G/DL (ref 14–18)
MAGNESIUM SERPL-MCNC: 2.1 MG/DL (ref 1.5–2.5)
MCH RBC QN AUTO: 32.3 PG (ref 27–33)
MCHC RBC AUTO-ENTMCNC: 33.6 G/DL (ref 33.7–35.3)
MCV RBC AUTO: 96.2 FL (ref 81.4–97.8)
PLATELET # BLD AUTO: 168 K/UL (ref 164–446)
PMV BLD AUTO: 9 FL (ref 9–12.9)
POTASSIUM SERPL-SCNC: 4.6 MMOL/L (ref 3.6–5.5)
PROT SERPL-MCNC: 6.5 G/DL (ref 6–8.2)
RBC # BLD AUTO: 4.43 M/UL (ref 4.7–6.1)
SODIUM SERPL-SCNC: 136 MMOL/L (ref 135–145)
WBC # BLD AUTO: 30.7 K/UL (ref 4.8–10.8)

## 2023-04-27 PROCEDURE — 700111 HCHG RX REV CODE 636 W/ 250 OVERRIDE (IP)

## 2023-04-27 PROCEDURE — A9270 NON-COVERED ITEM OR SERVICE: HCPCS

## 2023-04-27 PROCEDURE — 83735 ASSAY OF MAGNESIUM: CPT

## 2023-04-27 PROCEDURE — 76775 US EXAM ABDO BACK WALL LIM: CPT

## 2023-04-27 PROCEDURE — 700105 HCHG RX REV CODE 258: Performed by: HOSPITALIST

## 2023-04-27 PROCEDURE — 700111 HCHG RX REV CODE 636 W/ 250 OVERRIDE (IP): Performed by: HOSPITALIST

## 2023-04-27 PROCEDURE — 700102 HCHG RX REV CODE 250 W/ 637 OVERRIDE(OP)

## 2023-04-27 PROCEDURE — 99255 IP/OBS CONSLTJ NEW/EST HI 80: CPT | Performed by: INTERNAL MEDICINE

## 2023-04-27 PROCEDURE — 700102 HCHG RX REV CODE 250 W/ 637 OVERRIDE(OP): Performed by: HOSPITALIST

## 2023-04-27 PROCEDURE — 80053 COMPREHEN METABOLIC PANEL: CPT

## 2023-04-27 PROCEDURE — 36415 COLL VENOUS BLD VENIPUNCTURE: CPT

## 2023-04-27 PROCEDURE — 770006 HCHG ROOM/CARE - MED/SURG/GYN SEMI*

## 2023-04-27 PROCEDURE — 99233 SBSQ HOSP IP/OBS HIGH 50: CPT | Performed by: HOSPITALIST

## 2023-04-27 PROCEDURE — 85027 COMPLETE CBC AUTOMATED: CPT

## 2023-04-27 PROCEDURE — 87040 BLOOD CULTURE FOR BACTERIA: CPT

## 2023-04-27 PROCEDURE — A9270 NON-COVERED ITEM OR SERVICE: HCPCS | Performed by: HOSPITALIST

## 2023-04-27 RX ORDER — MORPHINE SULFATE 4 MG/ML
4 INJECTION INTRAVENOUS
Status: DISCONTINUED | OUTPATIENT
Start: 2023-04-27 | End: 2023-04-28 | Stop reason: HOSPADM

## 2023-04-27 RX ORDER — OMEPRAZOLE 20 MG/1
20 CAPSULE, DELAYED RELEASE ORAL DAILY
Status: DISCONTINUED | OUTPATIENT
Start: 2023-04-27 | End: 2023-04-28 | Stop reason: HOSPADM

## 2023-04-27 RX ORDER — MORPHINE SULFATE 4 MG/ML
4 INJECTION INTRAVENOUS EVERY 4 HOURS PRN
Status: DISCONTINUED | OUTPATIENT
Start: 2023-04-27 | End: 2023-04-27

## 2023-04-27 RX ORDER — TAMSULOSIN HYDROCHLORIDE 0.4 MG/1
0.4 CAPSULE ORAL
Status: DISCONTINUED | OUTPATIENT
Start: 2023-04-27 | End: 2023-04-28 | Stop reason: HOSPADM

## 2023-04-27 RX ADMIN — KETOROLAC TROMETHAMINE 15 MG: 30 INJECTION, SOLUTION INTRAMUSCULAR; INTRAVENOUS at 16:53

## 2023-04-27 RX ADMIN — MORPHINE SULFATE 4 MG: 4 INJECTION INTRAVENOUS at 20:42

## 2023-04-27 RX ADMIN — KETOROLAC TROMETHAMINE 15 MG: 30 INJECTION, SOLUTION INTRAMUSCULAR; INTRAVENOUS at 23:08

## 2023-04-27 RX ADMIN — MORPHINE SULFATE 4 MG: 4 INJECTION INTRAVENOUS at 13:48

## 2023-04-27 RX ADMIN — KETOROLAC TROMETHAMINE 15 MG: 30 INJECTION, SOLUTION INTRAMUSCULAR; INTRAVENOUS at 02:09

## 2023-04-27 RX ADMIN — AMPICILLIN AND SULBACTAM 3 G: 1; 2 INJECTION, POWDER, FOR SOLUTION INTRAMUSCULAR; INTRAVENOUS at 04:25

## 2023-04-27 RX ADMIN — ACETAMINOPHEN 650 MG: 325 TABLET, FILM COATED ORAL at 01:40

## 2023-04-27 RX ADMIN — AMPICILLIN AND SULBACTAM 3 G: 1; 2 INJECTION, POWDER, FOR SOLUTION INTRAMUSCULAR; INTRAVENOUS at 16:58

## 2023-04-27 RX ADMIN — AMPICILLIN AND SULBACTAM 3 G: 1; 2 INJECTION, POWDER, FOR SOLUTION INTRAMUSCULAR; INTRAVENOUS at 21:29

## 2023-04-27 RX ADMIN — MORPHINE SULFATE 4 MG: 4 INJECTION INTRAVENOUS at 17:33

## 2023-04-27 RX ADMIN — MORPHINE SULFATE 4 MG: 4 INJECTION, SOLUTION INTRAMUSCULAR; INTRAVENOUS at 00:35

## 2023-04-27 RX ADMIN — OMEPRAZOLE 20 MG: 20 CAPSULE, DELAYED RELEASE ORAL at 12:17

## 2023-04-27 RX ADMIN — MORPHINE SULFATE 4 MG: 4 INJECTION, SOLUTION INTRAMUSCULAR; INTRAVENOUS at 10:38

## 2023-04-27 RX ADMIN — TAMSULOSIN HYDROCHLORIDE 0.4 MG: 0.4 CAPSULE ORAL at 16:55

## 2023-04-27 RX ADMIN — AMPICILLIN AND SULBACTAM 3 G: 1; 2 INJECTION, POWDER, FOR SOLUTION INTRAMUSCULAR; INTRAVENOUS at 09:51

## 2023-04-27 RX ADMIN — MORPHINE SULFATE 4 MG: 4 INJECTION INTRAVENOUS at 21:21

## 2023-04-27 RX ADMIN — MORPHINE SULFATE 4 MG: 4 INJECTION, SOLUTION INTRAMUSCULAR; INTRAVENOUS at 04:24

## 2023-04-27 RX ADMIN — KETOROLAC TROMETHAMINE 15 MG: 30 INJECTION, SOLUTION INTRAMUSCULAR; INTRAVENOUS at 09:42

## 2023-04-27 RX ADMIN — ACETAMINOPHEN 650 MG: 325 TABLET, FILM COATED ORAL at 19:53

## 2023-04-27 RX ADMIN — SODIUM CHLORIDE, POTASSIUM CHLORIDE, SODIUM LACTATE AND CALCIUM CHLORIDE 1000 ML: 600; 310; 30; 20 INJECTION, SOLUTION INTRAVENOUS at 10:34

## 2023-04-27 RX ADMIN — ACETAMINOPHEN 650 MG: 325 TABLET, FILM COATED ORAL at 13:48

## 2023-04-27 RX ADMIN — SODIUM CHLORIDE, POTASSIUM CHLORIDE, SODIUM LACTATE AND CALCIUM CHLORIDE: 600; 310; 30; 20 INJECTION, SOLUTION INTRAVENOUS at 19:51

## 2023-04-27 ASSESSMENT — ENCOUNTER SYMPTOMS
PALPITATIONS: 0
COUGH: 0
DEPRESSION: 0
FEVER: 0
BRUISES/BLEEDS EASILY: 0
ABDOMINAL PAIN: 0
HEADACHES: 0
SHORTNESS OF BREATH: 0
HEARTBURN: 0
FLANK PAIN: 1
CHILLS: 0
HEMOPTYSIS: 0
WHEEZING: 0
VOMITING: 0
DIZZINESS: 0
BACK PAIN: 0
MYALGIAS: 0
CLAUDICATION: 0
DIARRHEA: 0
BLURRED VISION: 0
DOUBLE VISION: 0
NAUSEA: 0
PND: 0

## 2023-04-27 ASSESSMENT — PAIN DESCRIPTION - PAIN TYPE
TYPE: ACUTE PAIN

## 2023-04-27 NOTE — CONSULTS
[facility] INFECTIOUS DISEASES INPATIENT CONSULT NOTE     Date of Service: 4/27/2023    Consult Requested By: Sandro Alexandre M.D.    Reason for Consultation: Bacteremia    Chief Complaint: Flank pain    History of Present Illness:     Russell Bunn is a 33 y.o. male admitted 4/25/2023.  Extensive review of documentation from multiple specialties performed in generating this HPI.  Patient has history of kidney stones, presented on 4/24 with acute severe right-sided flank pain, along with nausea and vomiting and dysuria for about 4 days prior, mucus appearing drainage from penis.  In the ER, her white count was 23,000, lactate 3.8.  CT scan showed right UVJ calcification, 4 to 5 mm right mid ureteral calcification and a 3 mm right UVJ calcification.  Patient was seen in the OR by urology the same day, underwent lithotripsy and right stent placement but unfortunately left AMA (single father with a 1-year-old son and did not have help).  He was given ciprofloxacin.  He returned the following day on 4/25 with fevers and chills, nausea and vomiting, worsening right flank pain.  Found to be febrile to 103, white count up to 30.7.  Repeat CT scan with right-sided ureteral stent in low position, mild residual right-sided hydronephrosis, no longer visualized right-sided ureteral stones.  Blood culture positive for GPC in chains, started on IV Unasyn and ID consulted.  Urine culture from 4/24 grew ampicillin susceptible E faecalis.  Feels a little better but still with right-sided flank pain.    Review of Systems:  All other systems reviewed and are negative expect as noted in HPI    Past Medical History:   Diagnosis Date    Kidney stones     Seizure disorder (HCC)        Past Surgical History:   Procedure Laterality Date    CYSTO STENT PLACEMNT PRE SURG Right 4/24/2023    Procedure: CYSTOSCOPY, WITH RIGHT URETERAL STENT INSERTION;  Surgeon: Dante Stephenson M.D.;  Location: SURGERY [facility];  Service:  Urology    CYSTOSCOPY Left 02/20/2022    Cysto ureteroscopy ,lithotripsy, stone basket, ureteral stent placement, Dr Silver    NE CYSTOSCOPY,INSERT URETERAL STENT Left 2/20/2022    Procedure: CYSTOSCOPY, WITH URETERAL STENT INSERTION;  Surgeon: Rei Silver M.D.;  Location: SURGERY [facility];  Service: Urology    NE CYSTO/URETERO/PYELOSCOPY, DX Left 2/20/2022    Procedure: URETEROSCOPY;  Surgeon: Rei Silver M.D.;  Location: SURGERY [facility];  Service: Urology    LASERTRIPSY Left 2/20/2022    Procedure: LITHOTRIPSY, USING LASER;  Surgeon: Rei Silver M.D.;  Location: [facility];  Service: Urology    NE CYSTOSCOPY,INSERT URETERAL STENT Left 4/26/2021    Procedure: CYSTOSCOPY, WITH OUT URETERAL STENT INSERTION;  Surgeon: Raf Moss M.D.;  Location: SURGERY [facility];  Service: Urology    NE CYSTO/URETERO/PYELOSCOPY, DX Left 4/26/2021    Procedure: URETEROSCOPY;  Surgeon: Raf Moss M.D.;  Location: SURGERY [facility];  Service: Urology    NE CYSTO/URETERO/PYELOSCOPY, DX Right 3/13/2020    Procedure: URETEROSCOPY;  Surgeon: Chase Damon M.D.;  Location: [facility];  Service: Urology    NE CYSTOSCOPY,INSERT URETERAL STENT Right 3/13/2020    Procedure: CYSTOSCOPY;  Surgeon: Chase Damon M.D.;  Location: [facility];  Service: Urology    LASERTRIPSY Right 3/13/2020    Procedure: LITHOTRIPSY, USING LASER;  Surgeon: Chase Damon M.D.;  Location: SURGERY [facility];  Service: Urology    LITHOTRIPSY         No family history on file.    Social History     Socioeconomic History    Marital status: Single     Spouse name: Not on file    Number of children: Not on file    Years of education: Not on file    Highest education level: Not on file   Occupational History    Not on file   Tobacco Use    Smoking status: Former     Packs/day: 0.50     Types: Cigarettes    Smokeless tobacco: Never   Vaping Use    Vaping Use: Never used   Substance and Sexual  Activity    Alcohol use: Yes     Comment: Occasional    Drug use: Yes     Types: Inhaled     Comment: Marijuana, every day    Sexual activity: Not on file   Other Topics Concern    Not on file   Social History Narrative    Not on file     Social Determinants of Health     Financial Resource Strain: Not on file   Food Insecurity: Not on file   Transportation Needs: Not on file   Physical Activity: Not on file   Stress: Not on file   Social Connections: Not on file   Intimate Partner Violence: Not on file   Housing Stability: Not on file       Allergies   Allergen Reactions    Dilaudid [Hydromorphone] Itching    Cefazolin Rash and Itching     Rash  Tolerated ceftriaxone (April 2021)    Oxycodone-Acetaminophen Unspecified     SHAKING  UNCONTROLLED  Seizure like symptom  Tolerates acetaminophen    Potassium Iodide      Severe itching    Shellfish Allergy     Shellfish-Derived Products Rash    Tape      Paper tape okay        Medications:    Current Facility-Administered Medications:     ketorolac (TORADOL) injection 15 mg, 15 mg, Intravenous, Q6HRS PRN, Keara Lawson, A.P.R.N., 15 mg at 04/27/23 0942    [DISCONTINUED] oxyCODONE immediate-release (ROXICODONE) tablet 5 mg, 5 mg, Oral, Q3HRS PRN **OR** [DISCONTINUED] oxyCODONE immediate release (ROXICODONE) tablet 10 mg, 10 mg, Oral, Q3HRS PRN **OR** morphine 4 MG/ML injection 4 mg, 4 mg, Intravenous, Q3HRS PRN, Keara Lawson, A.P.R.N., 4 mg at 04/27/23 0424    acetaminophen (Tylenol) tablet 650 mg, 650 mg, Oral, Q6HRS, Keara Lawson, A.P.R.N., 650 mg at 04/27/23 0140    ondansetron (ZOFRAN) syringe/vial injection 4 mg, 4 mg, Intravenous, Q6HRS PRN, Gretta Rasmussen, A.P.R.N., 4 mg at 04/26/23 2205    senna-docusate (PERICOLACE or SENOKOT S) 8.6-50 MG per tablet 2 Tablet, 2 Tablet, Oral, BID **AND** polyethylene glycol/lytes (MIRALAX) PACKET 1 Packet, 1 Packet, Oral, QDAY PRN **AND** magnesium hydroxide (MILK OF MAGNESIA) suspension 30 mL, 30 mL, Oral, QDAY  "PRN **AND** bisacodyl (DULCOLAX) suppository 10 mg, 10 mg, Rectal, QDAY PRN, Raul Perdomo M.D.    lactated ringers infusion, , Intravenous, Continuous, Raul Perdomo M.D., Last Rate: 100 mL/hr at 23, New Bag at 23    Notify provider if pain remains uncontrolled, , , CONTINUOUS **AND** Use the Numeric Rating Scale (NRS), Orozco-Baker Faces (WBF), or FLACC on regular floors and Critical-Care Pain Observation Tool (CPOT) on ICUs/Trauma to assess pain, , , CONTINUOUS **AND** Pulse Ox, , , CONTINUOUS **AND** Pharmacy Consult Request ...Pain Management Review 1 Each, 1 Each, Other, PHARMACY TO DOSE **AND** If patient difficult to arouse and/or has respiratory depression (respiratory rate of 10 or less), stop any opiates that are currently infusing and call a Rapid Response., , , CONTINUOUS, Raul Perdomo M.D.    ampicillin/sulbactam (UNASYN) 3 g in  mL IVPB, 3 g, Intravenous, Q6HRS, Raul Perdomo M.D., Last Rate: 200 mL/hr at 23, 3 g at 23    Physical Exam:   Vital Signs: /72   Pulse 94   Temp 36.8 °C (98.2 °F) (Temporal)   Resp 17   Ht 1.676 m (5' 6\")   Wt 61.2 kg (135 lb)   SpO2 99%   BMI 21.79 kg/m²   Temp  Av.7 °C (99.9 °F)  Min: 35.8 °C (96.5 °F)  Max: 39.8 °C (103.7 °F)  Vital signs reviewed  Constitutional: Patient is oriented to person, place, and time. Appears well-developed and well-nourished. No distress but uncomfortable due to pain  Head: Atraumatic, normocephalic  Eyes: Conjunctivae normal  Mouth/Throat: Lips without lesions  Neck: Neck supple. No masses/lymphadenopathy  Cardiovascular: Normal rate, regular rhythm  Pulmonary/Chest: No respiratory distress. Unlabored respiratory effort  Abdominal: Soft, non tender. BS + x 4. No masses or hepatosplenomegaly.  Right CVA tenderness  Musculoskeletal: No joint tenderness, swelling, erythema, or restriction of motion noted.  Neurological: Alert and oriented to person, place, and time. No gross " cranial nerve deficit. No focal neural deficit noted  Skin: Skin is warm and dry. No rashes or embolic phenomena noted on exposed skin  Psychiatric: Normal mood and affect. Behavior is normal.     LABS:  Recent Labs     04/25/23 2029 04/26/23 0520 04/27/23  0047   WBC 16.7* 26.1* 30.7*      Recent Labs     04/25/23 2029 04/26/23  0520 04/27/23  0047   HEMOGLOBIN 14.9 14.7 14.3   HEMATOCRIT 43.8 44.1 42.6   MCV 96.1 96.5 96.2   MCH 32.7 32.2 32.3   PLATELETCT 243 190 168       Recent Labs     04/25/23 2029 04/26/23 0520 04/27/23 0047   SODIUM 137 136 136   POTASSIUM 3.7 3.8 4.6   CHLORIDE 103 103 101   CO2 21 21 24   CREATININE 1.47* 1.31 1.57*        Recent Labs     04/25/23 2029 04/26/23 0520 04/27/23 0047   ALBUMIN 4.1 3.3 3.4        MICRO:  No results found for: BLOODCULTU, BLDCULT, BCHOLD     Latest pertinent labs were reviewed    IMAGING STUDIES:  As above    Hospital Course/Assessment:   Russell Bunn is a 33 y.o. male with history of kidney stones, admitted with sepsis and bacteremia secondary to right obstructive nephrolithiasis and pyelonephritis.  On 4/24, patient underwent lithotripsy and right ureteral stent placement.  Blood culture positive for GPC in chains, urine culture positive for ampicillin susceptible E faecalis.      Pertinent Diagnoses:  Sepsis  Gram-positive bacteremia  Pyelonephritis  Obstructive nephrolithiasis  ARAM on CKD stage II    Plan:   -Continue IV Unasyn for now, follow identification and susceptibilities of the organism in blood culture, anticipate will likely be the same organism from the urine  -Repeat blood cultures x2    Disposition: Unable to determine at this time  Need for PICC line: Unable to determine at this time.  Likely no, anticipate being able to complete course with oral linezolid     Plan was discussed with the primary team, Dr. Dixon.  This illness poses a threat to patient's life    ID will follow.  Please feel free to call with  questions.    Blake Knox M.D.    Please note that this dictation was created using voice recognition software. I have worked with technical experts from [facility] to optimize the interface.  I have made every reasonable attempt to correct obvious errors, but there may be errors of grammar and possibly content that I did not discover before finalizing the note.

## 2023-04-27 NOTE — CARE PLAN
The patient is Stable - Low risk of patient condition declining or worsening    Shift Goals  Clinical Goals: pain control  Patient Goals: pain control  Family Goals: none present    Progress made toward(s) clinical / shift goals:      Problem: Pain - Standard  Goal: Alleviation of pain or a reduction in pain to the patient’s comfort goal  Outcome: Progressing     Problem: Knowledge Deficit - Standard  Goal: Patient and family/care givers will demonstrate understanding of plan of care, disease process/condition, diagnostic tests and medications  Outcome: Progressing     Problem: Hemodynamics  Goal: Patient's hemodynamics, fluid balance and neurologic status will be stable or improve  Outcome: Progressing     Problem: Fluid Volume  Goal: Fluid volume balance will be maintained  Outcome: Progressing     Problem: Urinary - Renal Perfusion  Goal: Ability to achieve and maintain adequate renal perfusion and functioning will improve  Outcome: Progressing     Problem: Respiratory  Goal: Patient will achieve/maintain optimum respiratory ventilation and gas exchange  Outcome: Progressing     Problem: Physical Regulation  Goal: Diagnostic test results will improve  Outcome: Progressing

## 2023-04-27 NOTE — CARE PLAN
The patient is Watcher - Medium risk of patient condition declining or worsening    Shift Goals  Clinical Goals: pain control, temperature managment, iV fluids  Patient Goals: pain control  Family Goals: none present    Progress made toward(s) clinical / shift goals:    Problem: Pain - Standard  Goal: Alleviation of pain or a reduction in pain to the patient’s comfort goal  Outcome: Progressing     Problem: Hemodynamics  Goal: Patient's hemodynamics, fluid balance and neurologic status will be stable or improve  Outcome: Progressing     Problem: Fluid Volume  Goal: Fluid volume balance will be maintained  Outcome: Progressing       Patient is not progressing towards the following goals:

## 2023-04-27 NOTE — CARE PLAN
The patient is Stable - Low risk of patient condition declining or worsening    Shift Goals  Clinical Goals: pain contorl, IV antibioitcs, safety  Patient Goals: pain control, rest  Family Goals: none present    Progress made toward(s) clinical / shift goals: patient remains free from falls, maintains normal work of breathing      Problem: Pain - Standard  Goal: Alleviation of pain or a reduction in pain to the patient’s comfort goal  Outcome: Progressing     Problem: Knowledge Deficit - Standard  Goal: Patient and family/care givers will demonstrate understanding of plan of care, disease process/condition, diagnostic tests and medications  Outcome: Progressing     Problem: Respiratory  Goal: Patient will achieve/maintain optimum respiratory ventilation and gas exchange  Outcome: Progressing       Patient is not progressing towards the following goals:

## 2023-04-27 NOTE — PROGRESS NOTES
4 Eyes Skin Assessment Completed by TATE Motta and TATE Arcos.    Head WDL  Ears WDL  Nose WDL  Mouth WDL  Neck WDL  Breast/Chest WDL  Shoulder Blades WDL  Spine WDL  (R) Arm/Elbow/Hand WDL  (L) Arm/Elbow/Hand WDL  Abdomen WDL  Groin WDL  Scrotum/Coccyx/Buttocks WDL  (R) Leg Scar  (L) Leg Scar  (R) Heel/Foot/Toe Discoloration  (L) Heel/Foot/Toe Discoloration          Devices In Places Pulse Ox and Condom Cath      Interventions In Place Pillows    Possible Skin Injury No    Pictures Uploaded Into Epic N/A  Wound Consult Placed N/A  RN Wound Prevention Protocol Ordered No

## 2023-04-27 NOTE — PROGRESS NOTES
Hospital Medicine Daily Progress Note    Date of Service  4/27/2023    Chief Complaint  Russell Bunn is a 33 y.o. male admitted 4/25/2023 with flank pain.    Hospital Course  Russell Bunn is a 33 y.o. male who presented 4/25/2023 with past medical history of nephrolithiasis, PTSD, seizure, polycystic kidney disease disorder who comes into the hospital for right-sided flank pain.  It associated with fevers, chills, nausea, hematuria and dysuria.  Patient was admitted in the hospital but he left AMA yesterday after a lithotripsy and a right stent placement by Dr. Stephenson.  Patient was discharged with ciprofloxacin which he was taking at home.     CT scan of the abdomen found a right-sided ureteral stent that is in low position.  Mild residual right-sided hydronephrosis.  Right-sided ureteral stones are no longer visualized.  Chest x-ray interpreted by me found no acute pulmonary process    Interval Problem Update  4/26 Tmax 101.4, tachycardic, BP WDL, on 0 to 2 L nasal cannula.  Patient still having fever and chills.  Scheduled Tylenol.  Continue IV Unasyn due to previous cultures growing Enterococcus faecalis.  Acute renal failure resolved, added Toradol due to patient complaining of pain that is not controlled.  Patient is allergic to oxy, discontinued.  Rotate Toradol and morphine for pain control.  Continue IV fluids.  Continue IV antibiotics.  ADDENDUM: Blood cultures growing gram-positive cocci in chains-consulted infectious disease      4/27 patient continues to have low grade fever, Cr slightly increased today, c/o hiccups, I have added ppi for hiccups, I have ordered echo, discussed with ID, continue iv abx, f/u repeat cx, discussed with nurse staff, all questions answered.   Addendum: patient having uncontrolled pain, US kidney ordered, added extra dose of morphine, discussed with nurse staff.       I have discussed this patient's plan of care and discharge plan at IDT rounds  today with Case Management, Nursing, Nursing leadership, and other members of the IDT team.    Consultants/Specialty  ID    Code Status  Full Code    Disposition  Patient is not medically cleared for discharge.   Anticipate discharge to to home with close outpatient follow-up.  I have placed the appropriate orders for post-discharge needs.    Review of Systems  Review of Systems   Constitutional:  Negative for chills, fever and malaise/fatigue.   Eyes:  Negative for blurred vision and double vision.   Respiratory:  Negative for cough, hemoptysis, shortness of breath and wheezing.    Cardiovascular:  Negative for chest pain, palpitations, claudication, leg swelling and PND.   Gastrointestinal:  Negative for abdominal pain, diarrhea, heartburn, nausea and vomiting.   Genitourinary:  Positive for dysuria, flank pain, frequency and hematuria. Negative for urgency.   Musculoskeletal:  Negative for back pain and myalgias.   Skin:  Negative for rash.   Neurological:  Negative for dizziness and headaches.   Endo/Heme/Allergies:  Does not bruise/bleed easily.   Psychiatric/Behavioral:  Negative for depression.       Physical Exam  Temp:  [36.8 °C (98.2 °F)-37.7 °C (99.8 °F)] 36.8 °C (98.2 °F)  Pulse:  [] 94  Resp:  [17-20] 17  BP: (104-117)/(62-83) 109/72  SpO2:  [95 %-100 %] 99 %    Physical Exam  Vitals and nursing note reviewed.   Constitutional:       Appearance: Normal appearance. He is not ill-appearing.   HENT:      Head: Normocephalic and atraumatic.      Jaw: There is normal jaw occlusion.      Right Ear: Hearing normal.      Left Ear: Hearing normal.      Nose: Nose normal.      Mouth/Throat:      Lips: Pink.      Mouth: Mucous membranes are moist.   Eyes:      General: No scleral icterus.        Right eye: No discharge.         Left eye: No discharge.   Neck:      Vascular: No carotid bruit.   Cardiovascular:      Rate and Rhythm: Normal rate and regular rhythm.      Pulses: Normal pulses.      Heart sounds:  Normal heart sounds, S1 normal and S2 normal.   Pulmonary:      Effort: Pulmonary effort is normal.      Breath sounds: Normal breath sounds and air entry. No stridor.   Abdominal:      General: Abdomen is flat. There is no distension.      Tenderness: There is no abdominal tenderness. There is right CVA tenderness.   Musculoskeletal:      Cervical back: Normal range of motion and neck supple.      Right lower leg: No edema.      Left lower leg: No edema.   Skin:     General: Skin is warm and dry.      Capillary Refill: Capillary refill takes less than 2 seconds.   Neurological:      General: No focal deficit present.      Mental Status: He is alert and oriented to person, place, and time. Mental status is at baseline.      Sensory: Sensation is intact.      Motor: Motor function is intact.   Psychiatric:         Attention and Perception: Attention and perception normal.         Mood and Affect: Mood and affect normal.         Speech: Speech normal.         Behavior: Behavior normal. Behavior is cooperative.       Fluids    Intake/Output Summary (Last 24 hours) at 4/27/2023 1454  Last data filed at 4/27/2023 1400  Gross per 24 hour   Intake --   Output 1650 ml   Net -1650 ml         Laboratory  Recent Labs     04/25/23 2029 04/26/23  0520 04/27/23  0047   WBC 16.7* 26.1* 30.7*   RBC 4.56* 4.57* 4.43*   HEMOGLOBIN 14.9 14.7 14.3   HEMATOCRIT 43.8 44.1 42.6   MCV 96.1 96.5 96.2   MCH 32.7 32.2 32.3   MCHC 34.0 33.3* 33.6*   RDW 47.9 48.3 48.8   PLATELETCT 243 190 168   MPV 9.7 9.3 9.0       Recent Labs     04/25/23 2029 04/26/23  0520 04/27/23  0047   SODIUM 137 136 136   POTASSIUM 3.7 3.8 4.6   CHLORIDE 103 103 101   CO2 21 21 24   GLUCOSE 93 89 112*   BUN 14 11 15   CREATININE 1.47* 1.31 1.57*   CALCIUM 8.8 7.7* 8.2*       Recent Labs     04/24/23  1820   INR 1.17*                 Imaging  CT-RENAL COLIC EVALUATION(A/P W/O)   Final Result      1.  Interval placement of right-sided double-J ureteral stent which is  well positioned. Mild residual right-sided hydronephrosis.   2.  Previously demonstrated right-sided ureteral stones are no longer visualized.   3.  Faint nonobstructive punctate left-sided renal calculi. No evidence of left-sided hydronephrosis.      DX-CHEST-PORTABLE (1 VIEW)   Final Result      No acute cardiopulmonary disease evident.      EC-ECHOCARDIOGRAM COMPLETE W/O CONT    (Results Pending)          Assessment/Plan  * Severe sepsis (HCC)- (present on admission)  Assessment & Plan  This is Sepsis Present on admission  SIRS criteria identified on my evaluation include: Fever, with temperature greater than 101 deg F, Tachypnea, with respirations greater than 20 per minute and Leukocytosis, with WBC greater than 12,000  Source is pyelonephritis  Sepsis protocol initiated  Fluid resuscitation ordered per protocol  Crystalloid Fluid Administration: Fluid resuscitation ordered per standard protocol - 30 mL/kg per current or ideal body weight  IV antibiotics as appropriate for source of sepsis- on unasyn  Reassessment: I have reassessed the patient's hemodynamic status          Bacteremia  Assessment & Plan  Blood cultures from 4/25 growing gram-positive cocci in chains  Infectious disease has been consulted-Dr. Walker    I have ordered echo to assess for endocarditis  Discussed with ID Dr Walker  Repeat blood cx  Continue iv abx.   Wbc rising today 3000    Fever and chills  Assessment & Plan  4/26 Tmax 101.4 today  -Scheduled Tylenol  -Continue IV fluids, LR at 100 mL/h  -Continue IV antibiotics, IV Unasyn due to cultures growing Enterococcus faecalis  -Continue to monitor  Low grade fever.    Acute pyelonephritis- (present on admission)  Assessment & Plan  symptoms includes dysuria and frequency  UA + for bacteria, LE and nitrite  F/u urine cultures  Started pt on IV Unasyn  ID following ,I have ordered echo, blood cx showed strep sp. Likely enterococcus.   Previous cultures found Enterococcus faecalis        Acute renal failure (ARF) (HCC)  Assessment & Plan  RESOLVED  Likely prerenal  IV fluid hydration   Monitor BMP and assess response  Avoid IV contrast/nephrotoxins/NSAIDs  Dose adjust meds for decreased GFR  Cr 1.57 close monitoring, if continues to increased will repeat US will need urology re-consult.         Obstructive uropathy- (present on admission)  Assessment & Plan  Stent is in well-placed  Pain control  Outpatient urology follow-up  Monitoring Cr level  Repeat US / CT if cr continues to increase         VTE prophylaxis: SCDs/TEDs    I have performed a physical exam and reviewed and updated ROS and Plan today (4/27/2023). In review of yesterday's note (4/26/2023), there are no changes except as documented above.    Patient is has a high medical complexity, complex decision making and is at high risk for complication, morbidity, and mortality.

## 2023-04-27 NOTE — DISCHARGE PLANNING
Case Management Discharge Planning    Admission Date: 4/25/2023  GMLOS: 3.5  ALOS: 2    6-Clicks ADL Score: 24  6-Clicks Mobility Score: 24      Anticipated Discharge Dispo: Discharge Disposition: Discharged to home/self care (01)  Discharge Address: [address] [place] [address]    DME Needed: No    Action(s) Taken: Patient Conference and DC Assessment Complete (See below)    LSW met with patient at bedside to complete assessment. Patient presented as alert as evidenced by his ability to follow conversation. Patient reported being a single father of a one year old child following a custody foreman with [employer]. Patient reported to have no familial support as many of his family and friends live in [place], [place].  Patient reported that his son is currently with his friend Derek, who came to visit patient from [place]. Patient expressed concerns about his friend needing to return to [place]. Patient is concerned about  and ability to pay his rent due him missing work to care for his son a few weeks prior to his hospitalization.     Patient provided verbal consent to talk to friend/Derek to receive an update about his son. Patient reported to have received traffic violations and is unable to appear in court due to admission. Patient expressed concerns about a warrant being out for his arrest. Patient stated he would need to reschedule his home visit with CPS worker Khadra. LSW verbalized understanding.     LSW provided information on rental assistance located on the [facility] website. Paper application unavailable.     LSW called the number listed on file for Derek/friend [phone] and was informed that LSW had the wrong number.     Update @6887:     LSW met with patient to provide an update. LSW spoke with Margaux/ with [facility]. Per Margaux, patient needs to send an email to [email or web address] and the information will be passed along to the . Patient verbalized  understanding.  LSW to provide patient with letters stating admission dates & follow up with CPS worker Khadra regarding patient's concerns.     Patient reported that his friend has agreed to stay in [facility] and assist with caring for his son until patient is medically cleared. Patient provided Derek's number as [phone].    No other CM needs identified at this time.     Escalations Completed: None    Medically Clear: No    Next Steps: LSW to return to patient's room and obtain additional information.     Barriers to Discharge: Medical clearance and No Social Support    Is the patient up for discharge tomorrow: No

## 2023-04-28 ENCOUNTER — HOSPITAL ENCOUNTER (INPATIENT)
Facility: MEDICAL CENTER | Age: 33
LOS: 1 days | DRG: 872 | End: 2023-04-30
Attending: EMERGENCY MEDICINE | Admitting: HOSPITALIST
Payer: COMMERCIAL

## 2023-04-28 ENCOUNTER — PHARMACY VISIT (OUTPATIENT)
Dept: PHARMACY | Facility: MEDICAL CENTER | Age: 33
End: 2023-04-28
Payer: COMMERCIAL

## 2023-04-28 VITALS
BODY MASS INDEX: 21.69 KG/M2 | HEIGHT: 66 IN | DIASTOLIC BLOOD PRESSURE: 103 MMHG | RESPIRATION RATE: 18 BRPM | TEMPERATURE: 99.6 F | HEART RATE: 100 BPM | SYSTOLIC BLOOD PRESSURE: 157 MMHG | WEIGHT: 135 LBS | OXYGEN SATURATION: 94 %

## 2023-04-28 DIAGNOSIS — N13.9 OBSTRUCTIVE UROPATHY: ICD-10-CM

## 2023-04-28 DIAGNOSIS — R65.20 SEPSIS WITH ACUTE ORGAN DYSFUNCTION WITHOUT SEPTIC SHOCK, DUE TO UNSPECIFIED ORGANISM, UNSPECIFIED TYPE: ICD-10-CM

## 2023-04-28 DIAGNOSIS — Q61.3 POLYCYSTIC KIDNEY DISEASE: ICD-10-CM

## 2023-04-28 DIAGNOSIS — A41.9 SEPSIS WITH ACUTE ORGAN DYSFUNCTION WITHOUT SEPTIC SHOCK, DUE TO UNSPECIFIED ORGANISM, UNSPECIFIED TYPE: ICD-10-CM

## 2023-04-28 DIAGNOSIS — N12 PYELONEPHRITIS: ICD-10-CM

## 2023-04-28 DIAGNOSIS — N17.9 AKI (ACUTE KIDNEY INJURY) (HCC): ICD-10-CM

## 2023-04-28 LAB
ANION GAP SERPL CALC-SCNC: 10 MMOL/L (ref 7–16)
APPEARANCE STONE: NORMAL
BACTERIA BLD CULT: ABNORMAL
BACTERIA BLD CULT: ABNORMAL
BACTERIA UR CULT: ABNORMAL
BACTERIA UR CULT: ABNORMAL
BUN SERPL-MCNC: 13 MG/DL (ref 8–22)
CALCIUM SERPL-MCNC: 8.1 MG/DL (ref 8.5–10.5)
CHLORIDE SERPL-SCNC: 103 MMOL/L (ref 96–112)
CO2 SERPL-SCNC: 24 MMOL/L (ref 20–33)
COMPN STONE: NORMAL
CREAT SERPL-MCNC: 1.43 MG/DL (ref 0.5–1.4)
ERYTHROCYTE [DISTWIDTH] IN BLOOD BY AUTOMATED COUNT: 48.3 FL (ref 35.9–50)
GFR SERPLBLD CREATININE-BSD FMLA CKD-EPI: 67 ML/MIN/1.73 M 2
GLUCOSE SERPL-MCNC: 116 MG/DL (ref 65–99)
HCT VFR BLD AUTO: 38.1 % (ref 42–52)
HGB BLD-MCNC: 12.7 G/DL (ref 14–18)
MCH RBC QN AUTO: 32.6 PG (ref 27–33)
MCHC RBC AUTO-ENTMCNC: 33.3 G/DL (ref 33.7–35.3)
MCV RBC AUTO: 97.9 FL (ref 81.4–97.8)
PLATELET # BLD AUTO: 180 K/UL (ref 164–446)
PMV BLD AUTO: 10 FL (ref 9–12.9)
POTASSIUM SERPL-SCNC: 3.3 MMOL/L (ref 3.6–5.5)
RBC # BLD AUTO: 3.89 M/UL (ref 4.7–6.1)
SIGNIFICANT IND 70042: ABNORMAL
SIGNIFICANT IND 70042: ABNORMAL
SITE SITE: ABNORMAL
SITE SITE: ABNORMAL
SODIUM SERPL-SCNC: 137 MMOL/L (ref 135–145)
SOURCE SOURCE: ABNORMAL
SOURCE SOURCE: ABNORMAL
SPECIMEN WT: 5 MG
WBC # BLD AUTO: 23.9 K/UL (ref 4.8–10.8)

## 2023-04-28 PROCEDURE — 36415 COLL VENOUS BLD VENIPUNCTURE: CPT

## 2023-04-28 PROCEDURE — 700102 HCHG RX REV CODE 250 W/ 637 OVERRIDE(OP): Performed by: HOSPITALIST

## 2023-04-28 PROCEDURE — 700105 HCHG RX REV CODE 258: Performed by: HOSPITALIST

## 2023-04-28 PROCEDURE — 99239 HOSP IP/OBS DSCHRG MGMT >30: CPT | Performed by: STUDENT IN AN ORGANIZED HEALTH CARE EDUCATION/TRAINING PROGRAM

## 2023-04-28 PROCEDURE — 80048 BASIC METABOLIC PNL TOTAL CA: CPT

## 2023-04-28 PROCEDURE — 99285 EMERGENCY DEPT VISIT HI MDM: CPT

## 2023-04-28 PROCEDURE — 85027 COMPLETE CBC AUTOMATED: CPT

## 2023-04-28 PROCEDURE — 700102 HCHG RX REV CODE 250 W/ 637 OVERRIDE(OP)

## 2023-04-28 PROCEDURE — 700111 HCHG RX REV CODE 636 W/ 250 OVERRIDE (IP): Performed by: NURSE PRACTITIONER

## 2023-04-28 PROCEDURE — 700111 HCHG RX REV CODE 636 W/ 250 OVERRIDE (IP): Performed by: HOSPITALIST

## 2023-04-28 PROCEDURE — RXMED WILLOW AMBULATORY MEDICATION CHARGE: Performed by: STUDENT IN AN ORGANIZED HEALTH CARE EDUCATION/TRAINING PROGRAM

## 2023-04-28 PROCEDURE — A9270 NON-COVERED ITEM OR SERVICE: HCPCS | Performed by: HOSPITALIST

## 2023-04-28 PROCEDURE — 99233 SBSQ HOSP IP/OBS HIGH 50: CPT | Performed by: INTERNAL MEDICINE

## 2023-04-28 PROCEDURE — A9270 NON-COVERED ITEM OR SERVICE: HCPCS

## 2023-04-28 PROCEDURE — 700111 HCHG RX REV CODE 636 W/ 250 OVERRIDE (IP)

## 2023-04-28 RX ORDER — LORAZEPAM 2 MG/ML
0.5 INJECTION INTRAMUSCULAR EVERY 4 HOURS PRN
Status: DISCONTINUED | OUTPATIENT
Start: 2023-04-28 | End: 2023-04-28 | Stop reason: HOSPADM

## 2023-04-28 RX ORDER — HALOPERIDOL 5 MG/ML
5 INJECTION INTRAMUSCULAR EVERY 6 HOURS PRN
Status: DISCONTINUED | OUTPATIENT
Start: 2023-04-28 | End: 2023-04-28 | Stop reason: HOSPADM

## 2023-04-28 RX ORDER — LINEZOLID 600 MG/1
600 TABLET, FILM COATED ORAL 2 TIMES DAILY
Qty: 24 TABLET | Refills: 0 | Status: ON HOLD | OUTPATIENT
Start: 2023-04-28 | End: 2023-04-30 | Stop reason: SDUPTHER

## 2023-04-28 RX ORDER — HYDROCODONE BITARTRATE AND ACETAMINOPHEN 5; 325 MG/1; MG/1
1 TABLET ORAL EVERY 6 HOURS PRN
Qty: 12 TABLET | Refills: 0 | Status: SHIPPED | OUTPATIENT
Start: 2023-04-28 | End: 2023-05-01

## 2023-04-28 RX ORDER — TAMSULOSIN HYDROCHLORIDE 0.4 MG/1
0.4 CAPSULE ORAL
Qty: 30 CAPSULE | Refills: 0 | Status: SHIPPED | OUTPATIENT
Start: 2023-04-29 | End: 2023-05-29

## 2023-04-28 RX ADMIN — KETOROLAC TROMETHAMINE 15 MG: 30 INJECTION, SOLUTION INTRAMUSCULAR; INTRAVENOUS at 06:13

## 2023-04-28 RX ADMIN — ACETAMINOPHEN 650 MG: 325 TABLET, FILM COATED ORAL at 00:52

## 2023-04-28 RX ADMIN — TAMSULOSIN HYDROCHLORIDE 0.4 MG: 0.4 CAPSULE ORAL at 10:12

## 2023-04-28 RX ADMIN — ONDANSETRON HYDROCHLORIDE 4 MG: 2 SOLUTION INTRAMUSCULAR; INTRAVENOUS at 07:05

## 2023-04-28 RX ADMIN — ACETAMINOPHEN 650 MG: 325 TABLET, FILM COATED ORAL at 13:47

## 2023-04-28 RX ADMIN — SODIUM CHLORIDE, POTASSIUM CHLORIDE, SODIUM LACTATE AND CALCIUM CHLORIDE: 600; 310; 30; 20 INJECTION, SOLUTION INTRAVENOUS at 06:13

## 2023-04-28 RX ADMIN — AMPICILLIN AND SULBACTAM 3 G: 1; 2 INJECTION, POWDER, FOR SOLUTION INTRAMUSCULAR; INTRAVENOUS at 10:15

## 2023-04-28 RX ADMIN — MORPHINE SULFATE 4 MG: 4 INJECTION INTRAVENOUS at 14:24

## 2023-04-28 RX ADMIN — MORPHINE SULFATE 4 MG: 4 INJECTION INTRAVENOUS at 04:29

## 2023-04-28 RX ADMIN — MORPHINE SULFATE 4 MG: 4 INJECTION INTRAVENOUS at 00:53

## 2023-04-28 RX ADMIN — AMPICILLIN AND SULBACTAM 3 G: 1; 2 INJECTION, POWDER, FOR SOLUTION INTRAMUSCULAR; INTRAVENOUS at 04:29

## 2023-04-28 ASSESSMENT — FIBROSIS 4 INDEX: FIB4 SCORE: 0.87

## 2023-04-28 ASSESSMENT — ENCOUNTER SYMPTOMS: FLANK PAIN: 1

## 2023-04-28 ASSESSMENT — PAIN DESCRIPTION - PAIN TYPE
TYPE: ACUTE PAIN

## 2023-04-28 NOTE — CARE PLAN
The patient is Stable - Low risk of patient condition declining or worsening    Shift Goals  Clinical Goals: pain control  Patient Goals: pain control      Progress made toward(s) clinical / shift goals:  Medicated per MAR for pain 9/10, relieved to 7/10 pain with intervention.      Problem: Pain - Standard  Goal: Alleviation of pain or a reduction in pain to the patient’s comfort goal  Outcome: Progressing     Problem: Knowledge Deficit - Standard  Goal: Patient and family/care givers will demonstrate understanding of plan of care, disease process/condition, diagnostic tests and medications  Outcome: Progressing       Patient is not progressing towards the following goals:

## 2023-04-28 NOTE — PROGRESS NOTES
Rec'd report from day shift RN. Assumed pt care. Assessment completed. AA&OX4. Reports flank pain, will medicate per MAR. No s/s of discomfort or distress. Pt's urine is shirin/red. Pt is up self, ambulates to the bathroom and maintains steady gait. Bed in lowest position, bed locked, treaded socks in place, RN and CNA numbers provided, call light within reach.

## 2023-04-28 NOTE — CARE PLAN
The patient is Stable - Low risk of patient condition declining or worsening    Shift Goals  Clinical Goals: pain control, IV antibiotics, safety  Patient Goals: pain control, comfort, sleep  Family Goals: none present    Progress made toward(s) clinical / shift goals: patient's pain is improving, safe environment was provided for patient      Problem: Pain - Standard  Goal: Alleviation of pain or a reduction in pain to the patient’s comfort goal  Outcome: Progressing     Problem: Knowledge Deficit - Standard  Goal: Patient and family/care givers will demonstrate understanding of plan of care, disease process/condition, diagnostic tests and medications  Outcome: Progressing     Problem: Provide Safe Environment  Goal: Suicide environmental safety, protocols, policies, and practices will be implemented  Outcome: Progressing       Patient is not progressing towards the following goals:

## 2023-04-28 NOTE — PROGRESS NOTES
Russell Bunn has chosen to leave the hospital against medical advice. The attending physician has not discharged the patient. The provider is aware that the patient is leaving against medical advice. Patient expresses understanding of the risks of leaving the hospital and benefits of admission including but not limited to, the availability and proximity of nurses, physicians, monitoring, diagnostic testing, treatment, and a safe discharge plan. The patient had the opportunity to ask questions about their medical condition and recommended treatment.  Patient is aware that they may return for care at any time.

## 2023-04-28 NOTE — PROGRESS NOTES
"Patient endorsing feeling overwhelmed. As he is in the hospital and has been sick he is unable to pay rent for this month. He is a single father and concerned about being able to provide for his 1 yr old son.The patient does not have any support from friends or family. He has exhausted his options for rental assistance. Social work has been consulted and is working with the patient to assist with resources.   In conversation patient stated, \" I just want to die. There are many things I could use in this room to kill myself and I have a gun at home\". Charge RN and MD He alerted. In further conversation with charge RN patient denies SI, says he was just feeling very overwhelmed when he made that statement but does not actually want to die. Charge RN discussed further with patient and patient will let staff know if his feelings change.   "

## 2023-04-28 NOTE — DISCHARGE SUMMARY
Discharge Summary    CHIEF COMPLAINT ON ADMISSION  Chief Complaint   Patient presents with    Flank Pain     Right sided flank pain. Currently on ABX (Cipro) for kidney stones. Hx of Polycystic kidney disease. Reports 7/10 pain. Given 100mcg Fentanyl, 4 Zofran, and 400mL fluid IV en route by EMS.        Reason for Admission  Flank pain     Admission Date  4/25/2023    CODE STATUS  Prior    HPI & HOSPITAL COURSE  Russell Bunn is a 33 y.o. male who presented 4/25/2023 with past medical history of nephrolithiasis, PTSD, seizure, polycystic kidney disease disorder who comes into the hospital for right-sided flank pain.  It associated with fevers, chills, nausea, hematuria and dysuria.  Patient was admitted in the hospital but he left AMA yesterday after a lithotripsy and a right stent placement by Dr. Stephenson on 4/24.  Patient was sent home with ciprofloxacin.    CT scan of the abdomen found a right-sided ureteral stent that is in low position.  Mild residual right-sided hydronephrosis.  Right-sided ureteral stones are no longer visualized.  Patient was found sepsis, obstructive UTI, Enterococcus bacteremia and ARAM. ID consulted. Patient has been treated with iv unasyn. The sensitivity and repeated culture 4/27 still pending.  Abx regimen is not finalized yet. However, patient insisted to leave AMA despite the extensive education regarding the life threatening infection without appropriate abx. Patient stated that we only cared about the treatment and didn't care about how he pay the bills. He was aox 4 with the capacity to make medical decisions. He left AMA again.   I talked to ID, recommended 12 days zyvox, although it's not optimal treatment since the culture has not been finalized yet.     Therefore, he is discharged in fair and stable condition against medcial advice.    The patient met 2-midnight criteria for an inpatient stay at the time of discharge.    Discharge Date  4/28/2023    FOLLOW UP ITEMS  POST DISCHARGE  - Follow up with primary care physician in 1 week.   - Follow up with urology as instructed  - Please take the medications as instructed    - Go to the local Emergency Department if you have any worsening condition.       DISCHARGE DIAGNOSES  Principal Problem:    Severe sepsis (HCC) POA: Yes  Active Problems:    Obstructive uropathy POA: Yes    Acute renal failure (ARF) (HCC) POA: Unknown    Acute pyelonephritis POA: Yes    Fever and chills POA: Unknown    Bacteremia POA: Unknown  Resolved Problems:    * No resolved hospital problems. *      FOLLOW UP  No future appointments.  Dante Stephenson M.D.  [address]  [facility] [address]  [phone]    Call in 1 week(s)  follow up recommended by specialist      MEDICATIONS ON DISCHARGE     Medication List        START taking these medications        Instructions   HYDROcodone-acetaminophen 5-325 MG Tabs per tablet  Commonly known as: Norco   Take 1 Tablet by mouth every 6 hours as needed (severe pain) for up to 3 days.  Dose: 1 Tablet     linezolid 600 MG Tabs  Commonly known as: Zyvox   Take 1 Tablet by mouth 2 times a day for 12 days.  Dose: 600 mg     tamsulosin 0.4 MG capsule  Start taking on: April 29, 2023  Commonly known as: FLOMAX   Take 1 Capsule by mouth 1/2 hour after breakfast for 30 days.  Dose: 0.4 mg            STOP taking these medications      ciprofloxacin 500 MG Tabs  Commonly known as: CIPRO              Allergies  Allergies   Allergen Reactions    Dilaudid [Hydromorphone] Itching    Cefazolin Rash and Itching     Rash  Tolerated ceftriaxone (April 2021)    Kiwi Extract Anaphylaxis    Oxycodone-Acetaminophen Unspecified     SHAKING  UNCONTROLLED  Seizure like symptom  Tolerates acetaminophen    Potassium Iodide      Severe itching    Shellfish Allergy Anaphylaxis    Shellfish-Derived Products Rash    Tape      Paper tape okay        DIET  No orders of the defined types were placed in this encounter.      ACTIVITY  As tolerated.  Weight  bearing as tolerated    CONSULTATIONS  Urology, ID    PROCEDURES  Cystoscopy, right ureteroscopy with stone extraction,   and right ureteral stent placement 4/24    LABORATORY  Lab Results   Component Value Date    SODIUM 137 04/28/2023    POTASSIUM 3.3 (L) 04/28/2023    CHLORIDE 103 04/28/2023    CO2 24 04/28/2023    GLUCOSE 116 (H) 04/28/2023    BUN 13 04/28/2023    CREATININE 1.43 (H) 04/28/2023        Lab Results   Component Value Date    WBC 23.9 (H) 04/28/2023    HEMOGLOBIN 12.7 (L) 04/28/2023    HEMATOCRIT 38.1 (L) 04/28/2023    PLATELETCT 180 04/28/2023        Total time of the discharge process exceeds 32 minutes.

## 2023-04-28 NOTE — PROGRESS NOTES
This charge RN was alerted that the patient was possibly feeling suicidal. Charge RN and bedside RN went in to assess and speak with patient. Patient endorsed that he was not actively feeling suicidal and does not have a plan to commit suicide. Patient stated he was just feeling very overwhelmed due to being unable to work while in the hospital, needing to pay rent, and care for his young son. Patient stated that he was venting his frustrations and wants to be able to take care of his children. This charge RN again verified that he was not feeling suicidal and did not have a plan. Patient again denied suicidal thoughts and plan. MD notified,  notified and will see patient to provide services.

## 2023-04-28 NOTE — PROGRESS NOTES
Infectious Disease Progress Note    Author: Blake Knox M.D. Date & Time of service: 2023  8:26 AM    Chief Complaint:  Follow-up for sepsis, bacteremia    Interval History:   fever curve improved, Tmax 99.3, white count down to 23.9 today, tolerating Unasyn, organism blood culture identified as E faecalis, sensitivities pending, creatinine 1.43.  Flank pain has nearly resolved    Labs Reviewed and Medications Reviewed.    Review of Systems:  Review of Systems   Genitourinary:  Positive for flank pain.     Hemodynamics:  Temp (24hrs), Av.2 °C (98.9 °F), Min:36.9 °C (98.4 °F), Max:37.4 °C (99.3 °F)  Temperature: 36.9 °C (98.4 °F)  Pulse  Av.9  Min: 57  Max: 124   Blood Pressure: 109/71       Physical Exam:  Physical Exam  Vitals and nursing note reviewed.   Constitutional:       Appearance: Normal appearance. He is not ill-appearing.   Eyes:      Conjunctiva/sclera: Conjunctivae normal.   Cardiovascular:      Rate and Rhythm: Normal rate.      Heart sounds: No murmur heard.  Pulmonary:      Effort: Pulmonary effort is normal. No respiratory distress.      Breath sounds: No stridor.   Abdominal:      General: Abdomen is flat. There is no distension.      Tenderness: There is right CVA tenderness.   Musculoskeletal:         General: No swelling or tenderness.   Skin:     Findings: No erythema or rash.   Neurological:      General: No focal deficit present.      Mental Status: He is alert and oriented to person, place, and time.       Meds:    Current Facility-Administered Medications:     omeprazole    [DISCONTINUED] oxyCODONE immediate-release **OR** [DISCONTINUED] oxyCODONE immediate-release **OR** morphine injection    tamsulosin    ketorolac    acetaminophen    ondansetron    senna-docusate **AND** polyethylene glycol/lytes **AND** magnesium hydroxide **AND** bisacodyl    LR    Notify provider if pain remains uncontrolled **AND** Use the Numeric Rating Scale (NRS), Orozco-Baker Faces (WBF), or  FLACC on regular floors and Critical-Care Pain Observation Tool (CPOT) on ICUs/Trauma to assess pain **AND** Pulse Ox **AND** Pharmacy Consult Request **AND** If patient difficult to arouse and/or has respiratory depression (respiratory rate of 10 or less), stop any opiates that are currently infusing and call a Rapid Response.    ampicillin-sulbactam (UNASYN) IV    Labs:  Recent Labs     04/25/23 2029 04/26/23 0520 04/27/23 0047 04/28/23 0042   WBC 16.7* 26.1* 30.7* 23.9*   RBC 4.56* 4.57* 4.43* 3.89*   HEMOGLOBIN 14.9 14.7 14.3 12.7*   HEMATOCRIT 43.8 44.1 42.6 38.1*   MCV 96.1 96.5 96.2 97.9*   MCH 32.7 32.2 32.3 32.6   RDW 47.9 48.3 48.8 48.3   PLATELETCT 243 190 168 180   MPV 9.7 9.3 9.0 10.0   NEUTSPOLYS 84.60* 84.70*  --   --    LYMPHOCYTES 7.30* 5.10*  --   --    MONOCYTES 7.30 10.20  --   --    EOSINOPHILS 0.10 0.00  --   --    BASOPHILS 0.20 0.00  --   --    RBCMORPHOLO  --  Present  --   --      Recent Labs     04/26/23 0520 04/27/23 0047 04/28/23 0042   SODIUM 136 136 137   POTASSIUM 3.8 4.6 3.3*   CHLORIDE 103 101 103   CO2 21 24 24   GLUCOSE 89 112* 116*   BUN 11 15 13     Recent Labs     04/25/23 2029 04/26/23 0520 04/27/23 0047 04/28/23 0042   ALBUMIN 4.1 3.3 3.4  --    TBILIRUBIN 0.5 0.6 0.5  --    ALKPHOSPHAT 105* 82 88  --    TOTPROTEIN 7.2 6.0 6.5  --    ALTSGPT 16 12 15  --    ASTSGOT 25 22 19  --    CREATININE 1.47* 1.31 1.57* 1.43*       Imaging:  CT-RENAL COLIC EVALUATION(A/P W/O)    Result Date: 4/25/2023 4/25/2023 9:38 PM HISTORY/REASON FOR EXAM:  FLANK PAIN. TECHNIQUE/EXAM DESCRIPTION AND NUMBER OF VIEWS: CT scan renal/colic without contrast. 5 mm helical images of the abdomen and pelvis were obtained from the diaphragmatic domes through the pubic symphysis. Low dose optimization technique was utilized for this CT exam including automated exposure control and adjustment of the mA and/or kV according to patient size. COMPARISON: 4/24/2023 FINDINGS: Please note that noncontrast  CT is significantly limited in sensitivity for detecting solid organ abnormalities. Visualized portions of the lung bases and cardiomediastinal structures are unremarkable. The unenhanced liver, gallbladder, pancreas, spleen and adrenals are unremarkable. There has been interval placement of a right-sided double-J ureteral stent which is well positioned with the proximal loop in the renal collecting system and the distal loop in the urinary bladder which is decompressed and poorly evaluated. There is mild residual right-sided hydronephrosis. Previously demonstrated right-sided ureteral stone and right ureterovesicular junction calcification are not visualized on this exam. The left kidney redemonstrates faint punctate nonobstructive calculi. There is no evidence of left-sided hydronephrosis. There is no evidence of bowel obstruction. There is no free pelvic fluid. There are no suspicious osseous lesions.     1.  Interval placement of right-sided double-J ureteral stent which is well positioned. Mild residual right-sided hydronephrosis. 2.  Previously demonstrated right-sided ureteral stones are no longer visualized. 3.  Faint nonobstructive punctate left-sided renal calculi. No evidence of left-sided hydronephrosis.    CT-RENAL COLIC EVALUATION(A/P W/O)    Result Date: 4/24/2023 4/24/2023 11:07 AM HISTORY/REASON FOR EXAM:  Right flank pain. TECHNIQUE/EXAM DESCRIPTION AND NUMBER OF VIEWS: CT scan renal/colic without contrast. 5 mm helical images of the abdomen and pelvis were obtained from the diaphragmatic domes through the pubic symphysis. Low dose optimization technique was utilized for this CT exam including automated exposure control and adjustment of the mA and/or kV according to patient size. COMPARISON: 2/18/2023 FINDINGS: The visualized lung bases are clear. There is no acute bony process. CT Abdomen: The liver is unremarkable. The spleen is normal in appearance. The pancreas is unremarkable without  inflammation. The gallbladder is without calcified stones. The adrenal glands are not enlarged. There is mild right hydronephrosis and proximal hydroureter. There is a 4-5 mm calcification versus 2 adjacent calcifications or ureter just above the iliac crest at the L4 level. The ureter is also mildly dilated beyond this level. There is a 3 mm calcification at the right UVJ. There are a few tiny additional intrarenal calcifications in both kidneys. There are peripheral cortical calcifications again seen in the right kidney. There is no lymphadenopathy. The visualized bowel is unremarkable. Appendix is not identified. CT Pelvis: There is probably a small 3 mm dependent calcification in the urinary bladder. There is a trace of fluid in the pelvis.     1.  There is a 4-5 mm right mid ureteral calcification and a 3 mm right UVJ calcification. There is mild right hydronephrosis and segmental hydroureter. 2.  Additional bilateral nephrolithiasis. 3.  There is a probable 3 mm dependent urinary bladder calcification.    DX-CHEST-PORTABLE (1 VIEW)    Result Date: 4/25/2023 4/25/2023 9:10 PM HISTORY/REASON FOR EXAM:  possible sepsis.. TECHNIQUE/EXAM DESCRIPTION AND NUMBER OF VIEWS: Single portable view of the chest. COMPARISON: 4/24/2023 FINDINGS: The soft tissues and bony structures are unremarkable. The heart and mediastinal structures are within normal limits. Pulmonary vascularity is normal. The lung fields are clear. There is no effusion or pneumothorax.     No acute cardiopulmonary disease evident.    DX-CHEST-PORTABLE (1 VIEW)    Result Date: 4/24/2023 4/24/2023 11:30 AM HISTORY/REASON FOR EXAM:  Sepsis. Hyperventilation. TECHNIQUE/EXAM DESCRIPTION AND NUMBER OF VIEWS: Single portable view of the chest. COMPARISON: None available. FINDINGS: The mediastinal and cardiac silhouette is unremarkable. The pulmonary vascularity is within normal limits. The lung parenchyma is clear. There is no significant pleural effusion.  There is no visible pneumothorax. There are no acute bony abnormalities.     1.  There is no acute cardiopulmonary process.    US-RENAL    Result Date: 4/27/2023 4/27/2023 5:06 PM HISTORY/REASON FOR EXAM:  History of hydronephrosis with a right double-J ureteral stent. TECHNIQUE/EXAM DESCRIPTION: Renal ultrasound. COMPARISON:  Renal ultrasound 3/2/2022, CT 4/25/2023 FINDINGS: The right kidney measures 11.03 cm.  The right kidney appears normal in contour and parenchymal echotexture. The corticomedullary differentiation is preserved. There is a right ureteral stent with no hydronephrosis evident. There are no renal calculi. The left kidney measures 11.40 cm. The left kidney appears normal in contour and parenchymal echotexture. The corticomedullary differentiation is preserved. Minimally dilated right renal pelvis and central calyces. There are no renal calculi. The bladder demonstrates no focal wall abnormality.     1.  There is a right ureteral stent with no right hydronephrosis. 2.  Minimal pyelectasis left kidney.    DX-PORTABLE FLUOROSCOPY < 1 HOUR    Result Date: 4/24/2023 4/24/2023 1:41 PM HISTORY/REASON FOR EXAM:  Main OR, right cysto stent. TECHNIQUE/EXAM DESCRIPTION AND NUMBER OF VIEWS: Portable fluoroscopy for less than one hour in OR. FINDINGS: The portable fluoroscopy unit was obligated to the procedure for less than one hour. Actual fluoro time was 0.6 seconds. Fluoroscopy dose (DAP):  0.36 Gy*cm^2     Portable fluoroscopy utilized for 0.6 seconds. INTERPRETING LOCATION: [address] [place] [address], [facility]      Micro:  Results       Procedure Component Value Units Date/Time    Urine Culture (New) [[id number]]  (Abnormal) Collected: 04/25/23 2144    Order Status: Completed Specimen: Urine Updated: 04/27/23 1713     Significant Indicator POS     Source UR     Site -     Culture Result -      Enterococcus faecalis  10-50,000 cfu/mL      Narrative:      Indication for culture:->Evaluation for sepsis without  "a  clear source of infection  Indication for culture:->Evaluation for sepsis without a    Blood Culture [[id number]]  (Abnormal) Collected: 04/25/23 2029    Order Status: Completed Specimen: Blood from Peripheral Updated: 04/27/23 1539     Significant Indicator POS     Source BLD     Site PERIPHERAL     Culture Result Growth detected by Bactec instrument. 04/26/2023  13:52      Enterococcus faecalis    Narrative:      CALL  Pastor  MS5 tel. [phone],  CALLED  MS5 tel. [phone] 04/26/2023, 13:59, RB PERF. RESULTS CALLED TO:  [id number] RN  Per Hospital Policy: Only change Specimen Src: to \"Line\" if  specified by physician order.  Left AC    BLOOD CULTURE [[id number]] Collected: 04/27/23 1102    Order Status: Sent Specimen: Blood from Peripheral Updated: 04/27/23 1154    Narrative:      Per Hospital Policy: Only change Specimen Src: to \"Line\" if  specified by physician order.    BLOOD CULTURE [[id number]] Collected: 04/27/23 1115    Order Status: Sent Specimen: Blood from Peripheral Updated: 04/27/23 1153    Narrative:      Per Hospital Policy: Only change Specimen Src: to \"Line\" if  specified by physician order.    Blood Culture [[id number]] Collected: 04/25/23 2029    Order Status: Completed Specimen: Blood from Peripheral Updated: 04/26/23 0912     Significant Indicator NEG     Source BLD     Site PERIPHERAL     Culture Result No Growth  Note: Blood cultures are incubated for 5 days and  are monitored continuously.Positive blood cultures  are called to the RN and reported as soon as  they are identified.      Narrative:      Per Hospital Policy: Only change Specimen Src: to \"Line\" if  specified by physician order.  Right Hand    CoV-2, Flu A/B, And RSV by PCR (CepWellcentiveid) [[id number]] Collected: 04/26/23 0050    Order Status: Completed Specimen: Respirate Updated: 04/26/23 0140     Influenza virus A RNA Negative     Influenza virus B, PCR Negative     RSV, PCR Negative     SARS-CoV-2 by PCR NotDetected     Comment: [facility] " providers: PLEASE REFER TO DE-ESCALATION AND RETESTING PROTOCOL  on [email or web address]    **The [facility] GeneXpert Xpress SARS-CoV-2 RT-PCR Test has been made  available for use under the Emergency Use Authorization (EUA) only.          SARS-CoV-2 Source NP Swab    Urinalysis [[id number]]  (Abnormal) Collected: 04/25/23 2144    Order Status: Completed Specimen: Urine Updated: 04/25/23 2201     Color Red     Character Cloudy     Specific Gravity 1.020     Ph 6.5     Glucose Negative mg/dL      Ketones Negative mg/dL      Protein 100 mg/dL      Bilirubin Negative     Urobilinogen, Urine 0.2     Nitrite Negative     Leukocyte Esterase Moderate     Occult Blood Large     Micro Urine Req Microscopic    Narrative:      Indication for culture:->Evaluation for sepsis without a  clear source of infection            Assessment:  Russell Bunn is a 33 y.o. male with history of kidney stones, admitted with sepsis and bacteremia secondary to right obstructive nephrolithiasis and pyelonephritis.  On 4/24, patient underwent lithotripsy and right ureteral stent placement.  Blood culture positive for GPC in chains, urine culture positive for ampicillin susceptible E faecalis.       Pertinent Diagnoses:  Sepsis  Enterococcus bacteremia  Pyelonephritis  Obstructive nephrolithiasis  ARAM on CKD stage II, improved    Plan:  -Continue IV Unasyn for now, follow susceptibilities of the Enterococcus in blood culture  -Follow repeat blood cultures x2 from 4/27, pending    Disposition: Unable to determine at this time.  Anticipate no ID specific disposition needs  Need for PICC line: Unable to determine at this time.  Likely no, anticipate being able to complete course with oral linezolid      Plan was discussed with the primary team, Dr. Vargas.  This illness poses a threat to patient's life     ID will follow.  Please feel free to call with questions.

## 2023-04-29 LAB
ALBUMIN SERPL BCP-MCNC: 3.3 G/DL (ref 3.2–4.9)
ALBUMIN/GLOB SERPL: 1 G/DL
ALP SERPL-CCNC: 88 U/L (ref 30–99)
ALT SERPL-CCNC: 13 U/L (ref 2–50)
ANION GAP SERPL CALC-SCNC: 10 MMOL/L (ref 7–16)
APPEARANCE UR: CLEAR
AST SERPL-CCNC: 15 U/L (ref 12–45)
BACTERIA #/AREA URNS HPF: NEGATIVE /HPF
BACTERIA BLD CULT: NORMAL
BACTERIA BLD CULT: NORMAL
BASOPHILS # BLD AUTO: 0.2 % (ref 0–1.8)
BASOPHILS # BLD: 0.04 K/UL (ref 0–0.12)
BILIRUB SERPL-MCNC: 0.5 MG/DL (ref 0.1–1.5)
BILIRUB UR QL STRIP.AUTO: NEGATIVE
BUN SERPL-MCNC: 13 MG/DL (ref 8–22)
CALCIUM ALBUM COR SERPL-MCNC: 9 MG/DL (ref 8.5–10.5)
CALCIUM SERPL-MCNC: 8.4 MG/DL (ref 8.5–10.5)
CHLORIDE SERPL-SCNC: 105 MMOL/L (ref 96–112)
CO2 SERPL-SCNC: 26 MMOL/L (ref 20–33)
COLOR UR: YELLOW
CREAT SERPL-MCNC: 1.54 MG/DL (ref 0.5–1.4)
EOSINOPHIL # BLD AUTO: 0.24 K/UL (ref 0–0.51)
EOSINOPHIL NFR BLD: 1.3 % (ref 0–6.9)
EPI CELLS #/AREA URNS HPF: ABNORMAL /HPF
ERYTHROCYTE [DISTWIDTH] IN BLOOD BY AUTOMATED COUNT: 47.1 FL (ref 35.9–50)
GFR SERPLBLD CREATININE-BSD FMLA CKD-EPI: 61 ML/MIN/1.73 M 2
GLOBULIN SER CALC-MCNC: 3.3 G/DL (ref 1.9–3.5)
GLUCOSE SERPL-MCNC: 97 MG/DL (ref 65–99)
GLUCOSE UR STRIP.AUTO-MCNC: NEGATIVE MG/DL
HCT VFR BLD AUTO: 33.8 % (ref 42–52)
HGB BLD-MCNC: 11.2 G/DL (ref 14–18)
IMM GRANULOCYTES # BLD AUTO: 0.12 K/UL (ref 0–0.11)
IMM GRANULOCYTES NFR BLD AUTO: 0.6 % (ref 0–0.9)
KETONES UR STRIP.AUTO-MCNC: NEGATIVE MG/DL
LACTATE SERPL-SCNC: 0.9 MMOL/L (ref 0.5–2)
LACTATE SERPL-SCNC: 1.3 MMOL/L (ref 0.5–2)
LEUKOCYTE ESTERASE UR QL STRIP.AUTO: ABNORMAL
LYMPHOCYTES # BLD AUTO: 1.88 K/UL (ref 1–4.8)
LYMPHOCYTES NFR BLD: 10.1 % (ref 22–41)
MCH RBC QN AUTO: 32.1 PG (ref 27–33)
MCHC RBC AUTO-ENTMCNC: 33.1 G/DL (ref 33.7–35.3)
MCV RBC AUTO: 96.8 FL (ref 81.4–97.8)
MICRO URNS: ABNORMAL
MONOCYTES # BLD AUTO: 2.14 K/UL (ref 0–0.85)
MONOCYTES NFR BLD AUTO: 11.5 % (ref 0–13.4)
NEUTROPHILS # BLD AUTO: 14.11 K/UL (ref 1.82–7.42)
NEUTROPHILS NFR BLD: 76.3 % (ref 44–72)
NITRITE UR QL STRIP.AUTO: NEGATIVE
NRBC # BLD AUTO: 0 K/UL
NRBC BLD-RTO: 0 /100 WBC
PH UR STRIP.AUTO: 7 [PH] (ref 5–8)
PLATELET # BLD AUTO: 220 K/UL (ref 164–446)
PMV BLD AUTO: 9.4 FL (ref 9–12.9)
POTASSIUM SERPL-SCNC: 3.4 MMOL/L (ref 3.6–5.5)
PROT SERPL-MCNC: 6.6 G/DL (ref 6–8.2)
PROT UR QL STRIP: 100 MG/DL
RBC # BLD AUTO: 3.49 M/UL (ref 4.7–6.1)
RBC # URNS HPF: ABNORMAL /HPF
RBC UR QL AUTO: ABNORMAL
SIGNIFICANT IND 70042: NORMAL
SIGNIFICANT IND 70042: NORMAL
SITE SITE: NORMAL
SITE SITE: NORMAL
SODIUM SERPL-SCNC: 141 MMOL/L (ref 135–145)
SOURCE SOURCE: NORMAL
SOURCE SOURCE: NORMAL
SP GR UR STRIP.AUTO: 1.01
UROBILINOGEN UR STRIP.AUTO-MCNC: 0.2 MG/DL
WBC # BLD AUTO: 18.5 K/UL (ref 4.8–10.8)
WBC #/AREA URNS HPF: ABNORMAL /HPF

## 2023-04-29 PROCEDURE — 83605 ASSAY OF LACTIC ACID: CPT

## 2023-04-29 PROCEDURE — 87040 BLOOD CULTURE FOR BACTERIA: CPT

## 2023-04-29 PROCEDURE — 700102 HCHG RX REV CODE 250 W/ 637 OVERRIDE(OP): Performed by: HOSPITALIST

## 2023-04-29 PROCEDURE — 700105 HCHG RX REV CODE 258: Performed by: HOSPITALIST

## 2023-04-29 PROCEDURE — 87086 URINE CULTURE/COLONY COUNT: CPT

## 2023-04-29 PROCEDURE — 700105 HCHG RX REV CODE 258: Performed by: EMERGENCY MEDICINE

## 2023-04-29 PROCEDURE — 700101 HCHG RX REV CODE 250: Performed by: HOSPITALIST

## 2023-04-29 PROCEDURE — 85025 COMPLETE CBC W/AUTO DIFF WBC: CPT

## 2023-04-29 PROCEDURE — 81001 URINALYSIS AUTO W/SCOPE: CPT

## 2023-04-29 PROCEDURE — 770006 HCHG ROOM/CARE - MED/SURG/GYN SEMI*

## 2023-04-29 PROCEDURE — 99223 1ST HOSP IP/OBS HIGH 75: CPT | Performed by: HOSPITALIST

## 2023-04-29 PROCEDURE — A9270 NON-COVERED ITEM OR SERVICE: HCPCS | Performed by: HOSPITALIST

## 2023-04-29 PROCEDURE — 96375 TX/PRO/DX INJ NEW DRUG ADDON: CPT

## 2023-04-29 PROCEDURE — 770001 HCHG ROOM/CARE - MED/SURG/GYN PRIV*

## 2023-04-29 PROCEDURE — 700111 HCHG RX REV CODE 636 W/ 250 OVERRIDE (IP): Performed by: EMERGENCY MEDICINE

## 2023-04-29 PROCEDURE — 36415 COLL VENOUS BLD VENIPUNCTURE: CPT

## 2023-04-29 PROCEDURE — 80053 COMPREHEN METABOLIC PANEL: CPT

## 2023-04-29 PROCEDURE — 96365 THER/PROPH/DIAG IV INF INIT: CPT

## 2023-04-29 PROCEDURE — 700111 HCHG RX REV CODE 636 W/ 250 OVERRIDE (IP): Performed by: HOSPITALIST

## 2023-04-29 RX ORDER — POTASSIUM CHLORIDE 20 MEQ/1
40 TABLET, EXTENDED RELEASE ORAL ONCE
Status: COMPLETED | OUTPATIENT
Start: 2023-04-29 | End: 2023-04-29

## 2023-04-29 RX ORDER — KETOROLAC TROMETHAMINE 30 MG/ML
30 INJECTION, SOLUTION INTRAMUSCULAR; INTRAVENOUS ONCE
Status: COMPLETED | OUTPATIENT
Start: 2023-04-29 | End: 2023-04-29

## 2023-04-29 RX ORDER — ONDANSETRON 2 MG/ML
4 INJECTION INTRAMUSCULAR; INTRAVENOUS EVERY 4 HOURS PRN
Status: DISCONTINUED | OUTPATIENT
Start: 2023-04-29 | End: 2023-04-30 | Stop reason: HOSPADM

## 2023-04-29 RX ORDER — MORPHINE SULFATE 4 MG/ML
2-4 INJECTION INTRAVENOUS EVERY 4 HOURS PRN
Status: DISCONTINUED | OUTPATIENT
Start: 2023-04-29 | End: 2023-04-30 | Stop reason: HOSPADM

## 2023-04-29 RX ORDER — ONDANSETRON 2 MG/ML
4 INJECTION INTRAMUSCULAR; INTRAVENOUS ONCE
Status: COMPLETED | OUTPATIENT
Start: 2023-04-29 | End: 2023-04-29

## 2023-04-29 RX ORDER — PROMETHAZINE HYDROCHLORIDE 25 MG/1
12.5-25 SUPPOSITORY RECTAL EVERY 4 HOURS PRN
Status: DISCONTINUED | OUTPATIENT
Start: 2023-04-29 | End: 2023-04-30 | Stop reason: HOSPADM

## 2023-04-29 RX ORDER — ONDANSETRON 4 MG/1
4 TABLET, ORALLY DISINTEGRATING ORAL EVERY 4 HOURS PRN
Status: DISCONTINUED | OUTPATIENT
Start: 2023-04-29 | End: 2023-04-30 | Stop reason: HOSPADM

## 2023-04-29 RX ORDER — TRAMADOL HYDROCHLORIDE 50 MG/1
50 TABLET ORAL EVERY 6 HOURS PRN
Status: DISCONTINUED | OUTPATIENT
Start: 2023-04-29 | End: 2023-04-29

## 2023-04-29 RX ORDER — PROMETHAZINE HYDROCHLORIDE 25 MG/1
12.5-25 TABLET ORAL EVERY 4 HOURS PRN
Status: DISCONTINUED | OUTPATIENT
Start: 2023-04-29 | End: 2023-04-30 | Stop reason: HOSPADM

## 2023-04-29 RX ORDER — ACETAMINOPHEN 325 MG/1
650 TABLET ORAL EVERY 6 HOURS PRN
Status: DISCONTINUED | OUTPATIENT
Start: 2023-04-29 | End: 2023-04-30 | Stop reason: HOSPADM

## 2023-04-29 RX ORDER — GABAPENTIN 100 MG/1
200 CAPSULE ORAL 3 TIMES DAILY
Status: DISCONTINUED | OUTPATIENT
Start: 2023-04-29 | End: 2023-04-30 | Stop reason: HOSPADM

## 2023-04-29 RX ORDER — TRAMADOL HYDROCHLORIDE 50 MG/1
50 TABLET ORAL EVERY 6 HOURS PRN
Status: DISCONTINUED | OUTPATIENT
Start: 2023-04-29 | End: 2023-04-30 | Stop reason: HOSPADM

## 2023-04-29 RX ORDER — SODIUM CHLORIDE, SODIUM LACTATE, POTASSIUM CHLORIDE, CALCIUM CHLORIDE 600; 310; 30; 20 MG/100ML; MG/100ML; MG/100ML; MG/100ML
INJECTION, SOLUTION INTRAVENOUS CONTINUOUS
Status: DISCONTINUED | OUTPATIENT
Start: 2023-04-29 | End: 2023-04-30 | Stop reason: HOSPADM

## 2023-04-29 RX ORDER — TRAMADOL HYDROCHLORIDE 50 MG/1
100 TABLET ORAL EVERY 6 HOURS PRN
Status: DISCONTINUED | OUTPATIENT
Start: 2023-04-29 | End: 2023-04-30 | Stop reason: HOSPADM

## 2023-04-29 RX ORDER — MORPHINE SULFATE 4 MG/ML
4 INJECTION INTRAVENOUS ONCE
Status: COMPLETED | OUTPATIENT
Start: 2023-04-29 | End: 2023-04-29

## 2023-04-29 RX ORDER — OXYCODONE HYDROCHLORIDE 5 MG/1
5 TABLET ORAL EVERY 4 HOURS PRN
Status: DISCONTINUED | OUTPATIENT
Start: 2023-04-29 | End: 2023-04-29

## 2023-04-29 RX ORDER — LABETALOL HYDROCHLORIDE 5 MG/ML
10 INJECTION, SOLUTION INTRAVENOUS EVERY 4 HOURS PRN
Status: DISCONTINUED | OUTPATIENT
Start: 2023-04-29 | End: 2023-04-30 | Stop reason: HOSPADM

## 2023-04-29 RX ORDER — HYDROMORPHONE HYDROCHLORIDE 1 MG/ML
0.5 INJECTION, SOLUTION INTRAMUSCULAR; INTRAVENOUS; SUBCUTANEOUS EVERY 4 HOURS PRN
Status: DISCONTINUED | OUTPATIENT
Start: 2023-04-29 | End: 2023-04-29

## 2023-04-29 RX ORDER — PROCHLORPERAZINE EDISYLATE 5 MG/ML
5-10 INJECTION INTRAMUSCULAR; INTRAVENOUS EVERY 4 HOURS PRN
Status: DISCONTINUED | OUTPATIENT
Start: 2023-04-29 | End: 2023-04-30 | Stop reason: HOSPADM

## 2023-04-29 RX ORDER — LIDOCAINE 50 MG/G
2 PATCH TOPICAL EVERY 24 HOURS
Status: DISCONTINUED | OUTPATIENT
Start: 2023-04-29 | End: 2023-04-30 | Stop reason: HOSPADM

## 2023-04-29 RX ORDER — KETOROLAC TROMETHAMINE 30 MG/ML
15 INJECTION, SOLUTION INTRAMUSCULAR; INTRAVENOUS ONCE
Status: COMPLETED | OUTPATIENT
Start: 2023-04-29 | End: 2023-04-29

## 2023-04-29 RX ORDER — SODIUM CHLORIDE, SODIUM LACTATE, POTASSIUM CHLORIDE, CALCIUM CHLORIDE 600; 310; 30; 20 MG/100ML; MG/100ML; MG/100ML; MG/100ML
1000 INJECTION, SOLUTION INTRAVENOUS ONCE
Status: COMPLETED | OUTPATIENT
Start: 2023-04-29 | End: 2023-04-29

## 2023-04-29 RX ADMIN — SODIUM CHLORIDE, POTASSIUM CHLORIDE, SODIUM LACTATE AND CALCIUM CHLORIDE 1000 ML: 600; 310; 30; 20 INJECTION, SOLUTION INTRAVENOUS at 15:47

## 2023-04-29 RX ADMIN — MORPHINE SULFATE 4 MG: 4 INJECTION INTRAVENOUS at 21:37

## 2023-04-29 RX ADMIN — KETOROLAC TROMETHAMINE 15 MG: 30 INJECTION, SOLUTION INTRAMUSCULAR; INTRAVENOUS at 12:49

## 2023-04-29 RX ADMIN — SODIUM CHLORIDE, POTASSIUM CHLORIDE, SODIUM LACTATE AND CALCIUM CHLORIDE 1000 ML: 600; 310; 30; 20 INJECTION, SOLUTION INTRAVENOUS at 00:19

## 2023-04-29 RX ADMIN — TRAMADOL HYDROCHLORIDE 50 MG: 50 TABLET, COATED ORAL at 08:44

## 2023-04-29 RX ADMIN — ONDANSETRON HYDROCHLORIDE 4 MG: 2 SOLUTION INTRAMUSCULAR; INTRAVENOUS at 00:26

## 2023-04-29 RX ADMIN — AMPICILLIN AND SULBACTAM 3 G: 1; 2 INJECTION, POWDER, FOR SOLUTION INTRAMUSCULAR; INTRAVENOUS at 21:37

## 2023-04-29 RX ADMIN — TRAMADOL HYDROCHLORIDE 100 MG: 50 TABLET, COATED ORAL at 14:34

## 2023-04-29 RX ADMIN — MORPHINE SULFATE 4 MG: 4 INJECTION INTRAVENOUS at 10:14

## 2023-04-29 RX ADMIN — SODIUM CHLORIDE, POTASSIUM CHLORIDE, SODIUM LACTATE AND CALCIUM CHLORIDE: 600; 310; 30; 20 INJECTION, SOLUTION INTRAVENOUS at 01:53

## 2023-04-29 RX ADMIN — LIDOCAINE 2 PATCH: 50 PATCH TOPICAL at 12:50

## 2023-04-29 RX ADMIN — KETOROLAC TROMETHAMINE 30 MG: 30 INJECTION, SOLUTION INTRAMUSCULAR; INTRAVENOUS at 00:29

## 2023-04-29 RX ADMIN — FENTANYL CITRATE 50 MCG: 50 INJECTION, SOLUTION INTRAMUSCULAR; INTRAVENOUS at 00:29

## 2023-04-29 RX ADMIN — POTASSIUM CHLORIDE 40 MEQ: 1500 TABLET, EXTENDED RELEASE ORAL at 03:44

## 2023-04-29 RX ADMIN — AMPICILLIN AND SULBACTAM 3 G: 1; 2 INJECTION, POWDER, FOR SOLUTION INTRAMUSCULAR; INTRAVENOUS at 01:07

## 2023-04-29 RX ADMIN — GABAPENTIN 200 MG: 100 CAPSULE ORAL at 17:42

## 2023-04-29 RX ADMIN — MORPHINE SULFATE 4 MG: 4 INJECTION INTRAVENOUS at 17:42

## 2023-04-29 RX ADMIN — GABAPENTIN 200 MG: 100 CAPSULE ORAL at 12:50

## 2023-04-29 ASSESSMENT — ENCOUNTER SYMPTOMS
STRIDOR: 0
EYE PAIN: 0
TREMORS: 0
HEMOPTYSIS: 0
SORE THROAT: 0
DIAPHORESIS: 0
VOMITING: 0
WHEEZING: 0
PND: 0
BLURRED VISION: 0
POLYDIPSIA: 0
CONSTIPATION: 0
SINUS PAIN: 0
COUGH: 0
HALLUCINATIONS: 0
DIARRHEA: 0
PHOTOPHOBIA: 0
FALLS: 0
HEARTBURN: 0
TINGLING: 0
PALPITATIONS: 0
NECK PAIN: 0
BLOOD IN STOOL: 0
WEAKNESS: 0
FEVER: 1
CLAUDICATION: 0
NAUSEA: 0
SHORTNESS OF BREATH: 0
MYALGIAS: 0
CHILLS: 1
DOUBLE VISION: 0
BRUISES/BLEEDS EASILY: 0
FLANK PAIN: 0
ABDOMINAL PAIN: 0
DIZZINESS: 0
HEADACHES: 0
DEPRESSION: 0
SPUTUM PRODUCTION: 0
ORTHOPNEA: 0
BACK PAIN: 0

## 2023-04-29 ASSESSMENT — LIFESTYLE VARIABLES
AVERAGE NUMBER OF DAYS PER WEEK YOU HAVE A DRINK CONTAINING ALCOHOL: 0
CONSUMPTION TOTAL: INCOMPLETE
HAVE YOU EVER FELT YOU SHOULD CUT DOWN ON YOUR DRINKING: NO
DOES PATIENT WANT TO STOP DRINKING: NO
AVERAGE NUMBER OF DAYS PER WEEK YOU HAVE A DRINK CONTAINING ALCOHOL: 0
HOW MANY TIMES IN THE PAST YEAR HAVE YOU HAD 5 OR MORE DRINKS IN A DAY: 0
ON A TYPICAL DAY WHEN YOU DRINK ALCOHOL HOW MANY DRINKS DO YOU HAVE: 0
EVER HAD A DRINK FIRST THING IN THE MORNING TO STEADY YOUR NERVES TO GET RID OF A HANGOVER: NO
HAVE PEOPLE ANNOYED YOU BY CRITICIZING YOUR DRINKING: NO
HAVE YOU EVER FELT YOU SHOULD CUT DOWN ON YOUR DRINKING: NO
TOTAL SCORE: 0
ALCOHOL_USE: NO
ALCOHOL_USE: NO
EVER FELT BAD OR GUILTY ABOUT YOUR DRINKING: NO
TOTAL SCORE: 0
TOTAL SCORE: 0
HAVE PEOPLE ANNOYED YOU BY CRITICIZING YOUR DRINKING: NO
TOTAL SCORE: 0
TOTAL SCORE: 0
SUBSTANCE_ABUSE: 0
EVER HAD A DRINK FIRST THING IN THE MORNING TO STEADY YOUR NERVES TO GET RID OF A HANGOVER: NO
HOW MANY TIMES IN THE PAST YEAR HAVE YOU HAD 5 OR MORE DRINKS IN A DAY: 0
EVER FELT BAD OR GUILTY ABOUT YOUR DRINKING: NO
TOTAL SCORE: 0
CONSUMPTION TOTAL: NEGATIVE

## 2023-04-29 ASSESSMENT — PAIN DESCRIPTION - PAIN TYPE
TYPE: ACUTE PAIN

## 2023-04-29 ASSESSMENT — PATIENT HEALTH QUESTIONNAIRE - PHQ9
2. FEELING DOWN, DEPRESSED, IRRITABLE, OR HOPELESS: NOT AT ALL
1. LITTLE INTEREST OR PLEASURE IN DOING THINGS: NOT AT ALL
2. FEELING DOWN, DEPRESSED, IRRITABLE, OR HOPELESS: NOT AT ALL
SUM OF ALL RESPONSES TO PHQ9 QUESTIONS 1 AND 2: 0
1. LITTLE INTEREST OR PLEASURE IN DOING THINGS: NOT AT ALL
SUM OF ALL RESPONSES TO PHQ9 QUESTIONS 1 AND 2: 0

## 2023-04-29 ASSESSMENT — FIBROSIS 4 INDEX: FIB4 SCORE: .6051274867911590422

## 2023-04-29 NOTE — ED NOTES
PIV obtained, rainbow and 1st set of cultures and urine sample collected and sent to lab. Pt urinated 200ml in urinal.   Medicated per MAR. IVF infusing. Phlebotomist contacted for 2nd set of cultures.

## 2023-04-29 NOTE — CARE PLAN
The patient is Stable - Low risk of patient condition declining or worsening    Shift Goals  Clinical Goals: Safety, IVF  Patient Goals: Rest    Progress made toward(s) clinical / shift goals:    Problem: Pain - Standard  Goal: Alleviation of pain or a reduction in pain to the patient’s comfort goal  Outcome: Progressing     Problem: Knowledge Deficit - Standard  Goal: Patient and family/care givers will demonstrate understanding of plan of care, disease process/condition, diagnostic tests and medications  4/29/2023 0331 by Sarah Vasquez R.N.  Outcome: Progressing  4/29/2023 0331 by Sarah Vasquez R.N.  Outcome: Progressing       Patient denies pain or discomfort at this patient.

## 2023-04-29 NOTE — PROGRESS NOTES
Patient received from ED. Report received from Aida. Patient was oriented to room. Patient denies pain or comfort. A&Ox4. Able to make needs known. All needs met at this time. Safety precautions in place.

## 2023-04-29 NOTE — ED NOTES
Pt ambulates to room with a steady gait. Placed on monitoring, call light within reach, urinal provided.   Pt had lithotripsy on Monday, states had to leave AMA twice due to personal reasons and was dx with sepsis. States not in a lot of pain right now but is scared because he had looked up sepsis. Denies body aches or chills or known fevers. Reports some discomfort while urinating and decreased urine output and blood in urine.

## 2023-04-29 NOTE — ED PROVIDER NOTES
ED Provider Note    CHIEF COMPLAINT  Chief Complaint   Patient presents with    Abdominal Pain     Patient ambulates to triage left AMA earlier today, was admitted for sepsis due to kidney stone. Patient had to leave AMA for social forms.        EXTERNAL RECORDS REVIEWED  Inpatient Notes discharge summary earlier today, 4:16 PM.  Patient initially presented to the hospital for right-sided flank pain.  Currently on Cipro for kidney stones.  History of polycystic kidney disease.  Patient initially presented on 5/25 with past medical history of kidney stones, PTSD, seizure, polycystic kidney disorder who comes in the hospital for right-sided flank pain.  Fever chills nausea hematuria and dysuria.  Admitted but left the hospital AMA after lithotripsy and a right stent placement by  on 4/24.  Sent home with ciprofloxacin.  Scan of the abdomen found a right-sided ureteral stent that is low in position.  Mild residual hydronephrosis.  Right-sided ureteral stones are no longer visualized.  He was found to have sepsis, obstructive UTI, Enterococcus bacteremia and ARAM.  ID was consulted, patient has been treated with IV Unasyn.  Sensitivity and repeat cultures 4/27 still pending.  Antibiotics regimen not finalized yet.  However patient insisted on leaving AMA again.  ID recommended 12 days of Zyvox although this was not optimal treatment.    HPI/ROS  LIMITATION TO HISTORY   Select: : None  OUTSIDE HISTORIAN(S):  None    Russell Bunn is a 33 y.o. male who presents through triage for abdominal pain, right flank pain, fever and chills.  Patient states he does not feel any better since discharge.  He was given antibiotics on discharge and has taken 1 tablet this evening.  No vomiting.    Patient realizes he needs to be hospitalized until his infection is gone and he feels better.  He states he will not leave AMA again this time.     PAST MEDICAL HISTORY   has a past medical history of Kidney stones and  "Seizure disorder (HCC).    SURGICAL HISTORY   has a past surgical history that includes lithotripsy; cysto/uretero/pyeloscopy, dx (Right, 3/13/2020); cystoscopy,insert ureteral stent (Right, 3/13/2020); lasertripsy (Right, 3/13/2020); cystoscopy,insert ureteral stent (Left, 4/26/2021); cysto/uretero/pyeloscopy, dx (Left, 4/26/2021); cystoscopy (Left, 02/20/2022); cystoscopy,insert ureteral stent (Left, 2/20/2022); cysto/uretero/pyeloscopy, dx (Left, 2/20/2022); lasertripsy (Left, 2/20/2022); and cysto stent placemnt pre surg (Right, 4/24/2023).    FAMILY HISTORY  No family history on file.    SOCIAL HISTORY  Social History     Tobacco Use    Smoking status: Former     Packs/day: 0.50     Types: Cigarettes    Smokeless tobacco: Never   Vaping Use    Vaping Use: Never used   Substance and Sexual Activity    Alcohol use: Yes     Comment: Occasional    Drug use: Yes     Types: Inhaled     Comment: Marijuana, every day    Sexual activity: Not on file       CURRENT MEDICATIONS  Home Medications       Reviewed by Pauline Maki R.N. (Registered Nurse) on 04/28/23 at 2302  Med List Status: <None>     Medication Last Dose Status   HYDROcodone-acetaminophen (NORCO) 5-325 MG Tab per tablet  Active   linezolid (ZYVOX) 600 MG Tab  Active   tamsulosin (FLOMAX) 0.4 MG capsule  Active                  ALLERGIES  Allergies   Allergen Reactions    Dilaudid [Hydromorphone] Itching    Cefazolin Rash and Itching     Rash  Tolerated ceftriaxone (April 2021)    Kiwi Extract Anaphylaxis    Oxycodone-Acetaminophen Unspecified     SHAKING  UNCONTROLLED  Seizure like symptom  Tolerates acetaminophen    Potassium Iodide      Severe itching    Shellfish Allergy Anaphylaxis    Shellfish-Derived Products Rash    Tape      Paper tape okay      PHYSICAL EXAM  VITAL SIGNS: /78   Pulse (!) 117   Temp 36.3 °C (97.3 °F) (Temporal)   Resp 16   Ht 1.676 m (5' 6\")   Wt 62 kg (136 lb 11 oz)   SpO2 99%   BMI 22.06 kg/m²    Pulse ox " interpretation: I interpret this pulse ox as normal.  Constitutional: Alert in no apparent distress.  HENT: Normocephalic, atraumatic. Bilateral external ears normal, Nose normal. Moist mucous membranes.    Eyes: Pupils are equal and reactive, Conjunctiva normal.   Neck: Normal range of motion  Lymphatic: No lymphadenopathy noted.   Cardiovascular: Regular rate and rhythm, no murmurs. Distal pulses intact.  Thorax & Lungs: Normal breath sounds.  No wheezing/rales/ronchi. No increased work of breathing, clipped speech.  Abdomen: Soft, non-distended, non-tender to palpation.  CVA tenderness percussion on the right.  Skin: Warm, Dry, No erythema, No rash.   Musculoskeletal: Good range of motion in all major joints.    Neurologic: Alert and orient x4.  Moves 4 extremities spontaneously.  Psychiatric: Affect normal, Judgment normal, Mood normal.       DIAGNOSTIC STUDIES / PROCEDURES    LABS  Results for orders placed or performed during the hospital encounter of 04/28/23   CBC With Differential   Result Value Ref Range    WBC 18.5 (H) 4.8 - 10.8 K/uL    RBC 3.49 (L) 4.70 - 6.10 M/uL    Hemoglobin 11.2 (L) 14.0 - 18.0 g/dL    Hematocrit 33.8 (L) 42.0 - 52.0 %    MCV 96.8 81.4 - 97.8 fL    MCH 32.1 27.0 - 33.0 pg    MCHC 33.1 (L) 33.7 - 35.3 g/dL    RDW 47.1 35.9 - 50.0 fL    Platelet Count 220 164 - 446 K/uL    MPV 9.4 9.0 - 12.9 fL    Neutrophils-Polys 76.30 (H) 44.00 - 72.00 %    Lymphocytes 10.10 (L) 22.00 - 41.00 %    Monocytes 11.50 0.00 - 13.40 %    Eosinophils 1.30 0.00 - 6.90 %    Basophils 0.20 0.00 - 1.80 %    Immature Granulocytes 0.60 0.00 - 0.90 %    Nucleated RBC 0.00 /100 WBC    Neutrophils (Absolute) 14.11 (H) 1.82 - 7.42 K/uL    Lymphs (Absolute) 1.88 1.00 - 4.80 K/uL    Monos (Absolute) 2.14 (H) 0.00 - 0.85 K/uL    Eos (Absolute) 0.24 0.00 - 0.51 K/uL    Baso (Absolute) 0.04 0.00 - 0.12 K/uL    Immature Granulocytes (abs) 0.12 (H) 0.00 - 0.11 K/uL    NRBC (Absolute) 0.00 K/uL   Comp Metabolic Panel    Result Value Ref Range    Sodium 141 135 - 145 mmol/L    Potassium 3.4 (L) 3.6 - 5.5 mmol/L    Chloride 105 96 - 112 mmol/L    Co2 26 20 - 33 mmol/L    Anion Gap 10.0 7.0 - 16.0    Glucose 97 65 - 99 mg/dL    Bun 13 8 - 22 mg/dL    Creatinine 1.54 (H) 0.50 - 1.40 mg/dL    Calcium 8.4 (L) 8.5 - 10.5 mg/dL    AST(SGOT) 15 12 - 45 U/L    ALT(SGPT) 13 2 - 50 U/L    Alkaline Phosphatase 88 30 - 99 U/L    Total Bilirubin 0.5 0.1 - 1.5 mg/dL    Albumin 3.3 3.2 - 4.9 g/dL    Total Protein 6.6 6.0 - 8.2 g/dL    Globulin 3.3 1.9 - 3.5 g/dL    A-G Ratio 1.0 g/dL   Lactic Acid   Result Value Ref Range    Lactic Acid 0.9 0.5 - 2.0 mmol/L   Urinalysis    Specimen: Urine   Result Value Ref Range    Color Yellow     Character Clear     Specific Gravity 1.009 <1.035    Ph 7.0 5.0 - 8.0    Glucose Negative Negative mg/dL    Ketones Negative Negative mg/dL    Protein 100 (A) Negative mg/dL    Bilirubin Negative Negative    Urobilinogen, Urine 0.2 Negative    Nitrite Negative Negative    Leukocyte Esterase Large (A) Negative    Occult Blood Large (A) Negative    Micro Urine Req Microscopic    URINE MICROSCOPIC (W/UA)   Result Value Ref Range    WBC 20-50 (A) /hpf    RBC 20-50 (A) /hpf    Bacteria Negative None /hpf    Epithelial Cells Few /hpf   ESTIMATED GFR   Result Value Ref Range    GFR (CKD-EPI) 61 >60 mL/min/1.73 m 2   CORRECTED CALCIUM   Result Value Ref Range    Correct Calcium 9.0 8.5 - 10.5 mg/dL       COURSE & MEDICAL DECISION MAKING    ED Observation Status? No; Patient does not meet criteria for ED Observation.     INITIAL ASSESSMENT, COURSE AND PLAN  Care Narrative:   Patient seen and evaluated at bedside.  Record review, history and exam as above.  Pyelonephritis, Enterococcus bacteremia although blood cultures from 4/27 is since resulted negative.  Did not complete course of Unasyn as prescribed by ID, awaiting culture and sensitivities for definitive antibiotic course.  Did take a single Zyvox after leaving AMA  earlier today.  Still feeling ill, right lower quadrant, right flank pain.  Although hemodynamically stable at this time.  Requesting readmission, agrees to stay without leaving AMA again.  Labs with repeat cultures have been added.  We will plan admission.    ADDITIONAL PROBLEM LIST    DISPOSITION AND DISCUSSIONS  I have discussed management of the patient with the following physicians and RAJAT's:    0056 -Dr. Perdomo is aware of the patient agreeable to consultation.    FINAL DIAGNOSIS  1. Sepsis with acute organ dysfunction without septic shock, due to unspecified organism, unspecified type ([facility])    2. Obstructive uropathy    3. Pyelonephritis    4. ARAM (acute kidney injury) ([facility])    5. Polycystic kidney disease           Electronically signed by: Yasmeen Santiago D.O., 4/29/2023 12:07 AM

## 2023-04-29 NOTE — ASSESSMENT & PLAN NOTE
Likely prerenal  IV fluid hydration   Monitor BMP and assess response  Avoid IV contrast/nephrotoxins/NSAIDs  Dose adjust meds for decreased GFR

## 2023-04-29 NOTE — PROGRESS NOTES
4 Eyes Skin Assessment Completed by TATE Rodriguez and TATE Montanez.    Head WDL  Ears WDL  Nose WDL  Mouth WDL  Neck WDL  Breast/Chest WDL  Shoulder Blades WDL  Spine WDL  (R) Arm/Elbow/Hand WDL  (L) Arm/Elbow/Hand WDL  Abdomen WDL  Groin WDL  Scrotum/Coccyx/Buttocks WDL  (R) Leg WDL  (L) Leg WDL  (R) Heel/Foot/Toe WDL  (L) Heel/Foot/Toe WDL    Surgical scars to the right and left back andl eft lower leg          Interventions In Place N/A    Possible Skin Injury No    Pictures Uploaded Into Epic N/A  Wound Consult Placed N/A  RN Wound Prevention Protocol Ordered No

## 2023-04-29 NOTE — H&P
Hospital Medicine History & Physical Note    Date of Service  4/29/2023    Primary Care Physician  Pcp Pt States None    Consultants      Specialist Names:     Code Status  Full Code    Chief Complaint  Chief Complaint   Patient presents with    Abdominal Pain     Patient ambulates to triage left AMA earlier today, was admitted for sepsis due to kidney stone. Patient had to leave AMA for social forms.        History of Presenting Illness  Russell Bunn is a 33 y.o. male who presented 4/28/2023 with past medical history of nephrolithiasis, polycystic kidney disease, seizure disorder who presents to the hospital for fevers and right-sided flank pain.  It is associated with chills, nausea, dysuria, mucus in urine.  He is status post lithotripsy, right stent placement by Dr. Stephenson on 4/24.  Patient was found to have Enterococcus bacteremia and ID recommended IV Unasyn.  Patient had left AMA once again on 4/28.  Patient states that he was going to be homeless if he did not leave AMA.  Patient did have a renal ultrasound on 4/27.    I discussed the plan of care with patient.    Review of Systems  Review of Systems   Constitutional:  Positive for chills, fever and malaise/fatigue. Negative for diaphoresis.   HENT:  Negative for congestion, ear discharge, ear pain, hearing loss, nosebleeds, sinus pain, sore throat and tinnitus.    Eyes:  Negative for blurred vision, double vision, photophobia and pain.   Respiratory:  Negative for cough, hemoptysis, sputum production, shortness of breath, wheezing and stridor.    Cardiovascular:  Negative for chest pain, palpitations, orthopnea, claudication, leg swelling and PND.   Gastrointestinal:  Negative for abdominal pain, blood in stool, constipation, diarrhea, heartburn, melena, nausea and vomiting.   Genitourinary:  Positive for frequency, hematuria and urgency. Negative for dysuria and flank pain.   Musculoskeletal:  Negative for back pain, falls, joint pain,  myalgias and neck pain.   Skin:  Negative for itching and rash.   Neurological:  Negative for dizziness, tingling, tremors, weakness and headaches.   Endo/Heme/Allergies:  Negative for environmental allergies and polydipsia. Does not bruise/bleed easily.   Psychiatric/Behavioral:  Negative for depression, hallucinations, substance abuse and suicidal ideas.      Past Medical History   has a past medical history of Kidney stones and Seizure disorder (HCC).    Surgical History   has a past surgical history that includes lithotripsy; pr cysto/uretero/pyeloscopy, dx (Right, 3/13/2020); pr cystoscopy,insert ureteral stent (Right, 3/13/2020); lasertripsy (Right, 3/13/2020); pr cystoscopy,insert ureteral stent (Left, 4/26/2021); pr cysto/uretero/pyeloscopy, dx (Left, 4/26/2021); cystoscopy (Left, 02/20/2022); pr cystoscopy,insert ureteral stent (Left, 2/20/2022); pr cysto/uretero/pyeloscopy, dx (Left, 2/20/2022); lasertripsy (Left, 2/20/2022); and cysto stent placemnt pre surg (Right, 4/24/2023).     Family History  family history is not on file.   Family history reviewed with patient. There is no family history that is pertinent to the chief complaint.     Social History   reports that he has quit smoking. His smoking use included cigarettes. He smoked an average of .5 packs per day. He has never used smokeless tobacco. He reports current alcohol use. He reports current drug use. Drug: Inhaled.    Allergies  Allergies   Allergen Reactions    Dilaudid [Hydromorphone] Itching    Cefazolin Rash and Itching     Rash  Tolerated ceftriaxone (April 2021)    Kiwi Extract Anaphylaxis    Oxycodone-Acetaminophen Unspecified     SHAKING  UNCONTROLLED  Seizure like symptom  Tolerates acetaminophen    Potassium Iodide      Severe itching    Shellfish Allergy Anaphylaxis    Shellfish-Derived Products Rash    Tape      Paper tape okay        Medications  Prior to Admission Medications   Prescriptions Last Dose Informant Patient Reported?  Taking?   HYDROcodone-acetaminophen (NORCO) 5-325 MG Tab per tablet   No No   Sig: Take 1 Tablet by mouth every 6 hours as needed (severe pain) for up to 3 days.   linezolid (ZYVOX) 600 MG Tab   No No   Sig: Take 1 Tablet by mouth 2 times a day for 12 days.   tamsulosin (FLOMAX) 0.4 MG capsule   No No   Sig: Take 1 Capsule by mouth 1/2 hour after breakfast for 30 days.      Facility-Administered Medications: None       Physical Exam  Temp:  [36.3 °C (97.3 °F)-37.6 °C (99.6 °F)] 36.3 °C (97.3 °F)  Pulse:  [] 91  Resp:  [16-18] 16  BP: (108-157)/() 118/76  SpO2:  [94 %-99 %] 98 %  Blood Pressure: 118/76   Temperature: 36.3 °C (97.3 °F)   Pulse: 91   Respiration: 16   Pulse Oximetry: 98 %       Physical Exam  Vitals and nursing note reviewed.   Constitutional:       General: He is not in acute distress.     Appearance: Normal appearance. He is not ill-appearing, toxic-appearing or diaphoretic.   HENT:      Head: Normocephalic and atraumatic.      Nose: No congestion or rhinorrhea.      Mouth/Throat:      Pharynx: No posterior oropharyngeal erythema.   Eyes:      General: No scleral icterus.        Right eye: No discharge.   Cardiovascular:      Rate and Rhythm: Normal rate and regular rhythm.      Pulses: Normal pulses.      Heart sounds: Normal heart sounds. No murmur heard.    No friction rub. No gallop.   Pulmonary:      Effort: Pulmonary effort is normal. No respiratory distress.      Breath sounds: Normal breath sounds. No stridor. No wheezing, rhonchi or rales.   Abdominal:      General: There is distension.      Tenderness: There is abdominal tenderness. There is right CVA tenderness.   Musculoskeletal:         General: No swelling, tenderness, deformity or signs of injury.      Cervical back: Normal range of motion.      Right lower leg: No edema.      Left lower leg: No edema.   Skin:     Capillary Refill: Capillary refill takes more than 3 seconds.      Coloration: Skin is not jaundiced or pale.       Findings: No bruising, erythema, lesion or rash.   Neurological:      General: No focal deficit present.      Mental Status: He is alert and oriented to person, place, and time.       Laboratory:  Recent Labs     04/27/23 0047 04/28/23 0042 04/29/23 0012   WBC 30.7* 23.9* 18.5*   RBC 4.43* 3.89* 3.49*   HEMOGLOBIN 14.3 12.7* 11.2*   HEMATOCRIT 42.6 38.1* 33.8*   MCV 96.2 97.9* 96.8   MCH 32.3 32.6 32.1   MCHC 33.6* 33.3* 33.1*   RDW 48.8 48.3 47.1   PLATELETCT 168 180 220   MPV 9.0 10.0 9.4     Recent Labs     04/26/23 0520 04/27/23 0047 04/28/23 0042 04/29/23 0012   SODIUM 136 136 137  --    POTASSIUM 3.8 4.6 3.3*  --    CHLORIDE 103 101 103  --    CO2 21 24 24  --    GLUCOSE 89 112* 116*  --    BUN 11 15 13  --    CREATININE 1.31 1.57* 1.43* 1.54*   CALCIUM 7.7* 8.2* 8.1*  --      Recent Labs     04/26/23 0520 04/27/23 0047 04/28/23 0042   ALTSGPT 12 15  --    ASTSGOT 22 19  --    ALKPHOSPHAT 82 88  --    TBILIRUBIN 0.6 0.5  --    GLUCOSE 89 112* 116*         No results for input(s): NTPROBNP in the last 72 hours.      No results for input(s): TROPONINT in the last 72 hours.    Imaging:  No orders to display       no X-Ray or EKG requiring interpretation    Assessment/Plan:  Justification for Admission Status  I anticipate this patient will require at least two midnights for appropriate medical management, necessitating inpatient admission because acute pyelonephritis    Patient will need a Med/Surg bed on MEDICAL service .  The need is secondary to acute pyelonephritis.    * Acute pyelonephritis- (present on admission)  Assessment & Plan  symptoms includes dysuria and frequency  UA + for bacteria, LE and nitrite  F/u urine cultures  Started pt on Unasyn for 5 days  Previous cultures found Enterococcus      Severe sepsis (HCC)- (present on admission)  Assessment & Plan  This is Sepsis Present on admission  SIRS criteria identified on my evaluation include: Fever, with temperature greater than 101  deg F, Tachycardia, with heart rate greater than 90 BPM and Leukocytosis, with WBC greater than 12,000  Source is acute pyelonephritis  Sepsis protocol initiated  Fluid resuscitation ordered per protocol  Crystalloid Fluid Administration: Fluid resuscitation ordered per standard protocol - 30 mL/kg per current or ideal body weight  IV antibiotics as appropriate for source of sepsis  Reassessment: I have reassessed the patient's hemodynamic status          Acute renal failure (ARF) (HCC)- (present on admission)  Assessment & Plan  Likely prerenal  IV fluid hydration   Monitor BMP and assess response  Avoid IV contrast/nephrotoxins/NSAIDs  Dose adjust meds for decreased GFR        Hydronephrosis- (present on admission)  Assessment & Plan  Stent placement on the right  Urology follow-up as an outpatient        VTE prophylaxis: SCDs/TEDs

## 2023-04-29 NOTE — ASSESSMENT & PLAN NOTE
This is Sepsis Present on admission  SIRS criteria identified on my evaluation include: Fever, with temperature greater than 101 deg F, Tachycardia, with heart rate greater than 90 BPM and Leukocytosis, with WBC greater than 12,000  Source is acute pyelonephritis  Sepsis protocol initiated  Fluid resuscitation ordered per protocol  Crystalloid Fluid Administration: Fluid resuscitation ordered per standard protocol - 30 mL/kg per current or ideal body weight  IV antibiotics as appropriate for source of sepsis  Reassessment: I have reassessed the patient's hemodynamic status

## 2023-04-29 NOTE — PROGRESS NOTES
Brief ID note:    -Patient briefly left AMA for social reasons and returned to continue therapy, has been resumed on IV Unasyn  -Blood culture results finalized as pansensitive E faecalis, same as the urine organism, repeat blood cultures x2 no growth till date  -On discharge, recommend p.o. linezolid 600 mg twice daily through 5/6/2023  -Follow repeat blood cultures x2 from 4/27 till finalized    Discussed with Dr. White.  ID will sign off

## 2023-04-29 NOTE — ED TRIAGE NOTES
"Chief Complaint   Patient presents with    Abdominal Pain     Patient ambulates to triage left AMA earlier today, was admitted for sepsis due to kidney stone. Patient had to leave AMA for social forms.        /78   Pulse (!) 117   Temp 36.3 °C (97.3 °F) (Temporal)   Resp 16   Ht 1.676 m (5' 6\")   Wt 62 kg (136 lb 11 oz)   SpO2 99%     Patient educated on ed triage process, instructed to notify staff of any new or worsening symptoms, verbalizes understanding. Patient returned to ed lobby, apologized for wait times.     "

## 2023-04-29 NOTE — ASSESSMENT & PLAN NOTE
symptoms includes dysuria and frequency  UA + for bacteria, LE and nitrite  F/u urine cultures  Started pt on Unasyn for 5 days  Previous cultures found Enterococcus

## 2023-04-29 NOTE — PROGRESS NOTES
Received report from previous shift RN, assumed care of patient. Patient is A&Ox3 (disoriented to time), vital signs are stable, on room air. Patient reports extreme right sided flank pain at this time, medicated per MAR. Sitting up in bed for breakfast. Call light and belongings within reach. Bed in lowest/locked position. Hourly rounding in place.

## 2023-04-29 NOTE — PROGRESS NOTES
Patient left AMA. Dr. Vargas spoke with patient & discussed risks of leaving AMA with the patient who verbalized understanding. IV removed. Meds 2 beds delivered to the patient and medication education provided. Patient educated that he can come back to the ED if symptoms persist and are not improving.

## 2023-04-30 ENCOUNTER — PHARMACY VISIT (OUTPATIENT)
Dept: PHARMACY | Facility: MEDICAL CENTER | Age: 33
End: 2023-04-30
Payer: COMMERCIAL

## 2023-04-30 VITALS
RESPIRATION RATE: 18 BRPM | WEIGHT: 135.58 LBS | DIASTOLIC BLOOD PRESSURE: 91 MMHG | OXYGEN SATURATION: 96 % | BODY MASS INDEX: 21.79 KG/M2 | HEIGHT: 66 IN | HEART RATE: 73 BPM | TEMPERATURE: 98.8 F | SYSTOLIC BLOOD PRESSURE: 139 MMHG

## 2023-04-30 LAB
ALBUMIN SERPL BCP-MCNC: 3.1 G/DL (ref 3.2–4.9)
ALBUMIN/GLOB SERPL: 0.8 G/DL
ALP SERPL-CCNC: 95 U/L (ref 30–99)
ALT SERPL-CCNC: 13 U/L (ref 2–50)
ANION GAP SERPL CALC-SCNC: 11 MMOL/L (ref 7–16)
AST SERPL-CCNC: 18 U/L (ref 12–45)
BACTERIA BLD CULT: NORMAL
BASOPHILS # BLD AUTO: 0 % (ref 0–1.8)
BASOPHILS # BLD: 0 K/UL (ref 0–0.12)
BILIRUB SERPL-MCNC: 0.5 MG/DL (ref 0.1–1.5)
BUN SERPL-MCNC: 9 MG/DL (ref 8–22)
BURR CELLS BLD QL SMEAR: NORMAL
CALCIUM ALBUM COR SERPL-MCNC: 9.2 MG/DL (ref 8.5–10.5)
CALCIUM SERPL-MCNC: 8.5 MG/DL (ref 8.5–10.5)
CHLORIDE SERPL-SCNC: 104 MMOL/L (ref 96–112)
CO2 SERPL-SCNC: 23 MMOL/L (ref 20–33)
CREAT SERPL-MCNC: 1.26 MG/DL (ref 0.5–1.4)
EOSINOPHIL # BLD AUTO: 0.45 K/UL (ref 0–0.51)
EOSINOPHIL NFR BLD: 3.5 % (ref 0–6.9)
ERYTHROCYTE [DISTWIDTH] IN BLOOD BY AUTOMATED COUNT: 47.1 FL (ref 35.9–50)
GFR SERPLBLD CREATININE-BSD FMLA CKD-EPI: 78 ML/MIN/1.73 M 2
GLOBULIN SER CALC-MCNC: 3.8 G/DL (ref 1.9–3.5)
GLUCOSE SERPL-MCNC: 78 MG/DL (ref 65–99)
HCT VFR BLD AUTO: 37.2 % (ref 42–52)
HGB BLD-MCNC: 12.1 G/DL (ref 14–18)
LYMPHOCYTES # BLD AUTO: 2.8 K/UL (ref 1–4.8)
LYMPHOCYTES NFR BLD: 21.7 % (ref 22–41)
MAGNESIUM SERPL-MCNC: 1.7 MG/DL (ref 1.5–2.5)
MANUAL DIFF BLD: NORMAL
MCH RBC QN AUTO: 31.8 PG (ref 27–33)
MCHC RBC AUTO-ENTMCNC: 32.5 G/DL (ref 33.7–35.3)
MCV RBC AUTO: 97.6 FL (ref 81.4–97.8)
MONOCYTES # BLD AUTO: 1.57 K/UL (ref 0–0.85)
MONOCYTES NFR BLD AUTO: 12.2 % (ref 0–13.4)
MORPHOLOGY BLD-IMP: NORMAL
NEUTROPHILS # BLD AUTO: 8.08 K/UL (ref 1.82–7.42)
NEUTROPHILS NFR BLD: 62.6 % (ref 44–72)
NRBC # BLD AUTO: 0 K/UL
NRBC BLD-RTO: 0 /100 WBC
PLATELET # BLD AUTO: 227 K/UL (ref 164–446)
PLATELET BLD QL SMEAR: NORMAL
PMV BLD AUTO: 9.5 FL (ref 9–12.9)
POIKILOCYTOSIS BLD QL SMEAR: NORMAL
POLYCHROMASIA BLD QL SMEAR: NORMAL
POTASSIUM SERPL-SCNC: 4.5 MMOL/L (ref 3.6–5.5)
PROT SERPL-MCNC: 6.9 G/DL (ref 6–8.2)
RBC # BLD AUTO: 3.81 M/UL (ref 4.7–6.1)
RBC BLD AUTO: PRESENT
SIGNIFICANT IND 70042: NORMAL
SITE SITE: NORMAL
SODIUM SERPL-SCNC: 138 MMOL/L (ref 135–145)
SOURCE SOURCE: NORMAL
WBC # BLD AUTO: 12.9 K/UL (ref 4.8–10.8)

## 2023-04-30 PROCEDURE — 83735 ASSAY OF MAGNESIUM: CPT

## 2023-04-30 PROCEDURE — 700102 HCHG RX REV CODE 250 W/ 637 OVERRIDE(OP)

## 2023-04-30 PROCEDURE — 85025 COMPLETE CBC W/AUTO DIFF WBC: CPT

## 2023-04-30 PROCEDURE — A9270 NON-COVERED ITEM OR SERVICE: HCPCS

## 2023-04-30 PROCEDURE — 700111 HCHG RX REV CODE 636 W/ 250 OVERRIDE (IP): Performed by: HOSPITALIST

## 2023-04-30 PROCEDURE — 36415 COLL VENOUS BLD VENIPUNCTURE: CPT

## 2023-04-30 PROCEDURE — 80053 COMPREHEN METABOLIC PANEL: CPT

## 2023-04-30 PROCEDURE — 700105 HCHG RX REV CODE 258: Performed by: HOSPITALIST

## 2023-04-30 PROCEDURE — A9270 NON-COVERED ITEM OR SERVICE: HCPCS | Performed by: HOSPITALIST

## 2023-04-30 PROCEDURE — 99239 HOSP IP/OBS DSCHRG MGMT >30: CPT | Performed by: HOSPITALIST

## 2023-04-30 PROCEDURE — 85007 BL SMEAR W/DIFF WBC COUNT: CPT

## 2023-04-30 PROCEDURE — 700102 HCHG RX REV CODE 250 W/ 637 OVERRIDE(OP): Performed by: HOSPITALIST

## 2023-04-30 PROCEDURE — RXMED WILLOW AMBULATORY MEDICATION CHARGE: Performed by: HOSPITALIST

## 2023-04-30 RX ORDER — LINEZOLID 600 MG/1
600 TABLET, FILM COATED ORAL 2 TIMES DAILY
Qty: 12 TABLET | Refills: 0 | Status: ACTIVE | OUTPATIENT
Start: 2023-04-30 | End: 2023-05-06

## 2023-04-30 RX ORDER — ONDANSETRON 4 MG/1
4 TABLET, ORALLY DISINTEGRATING ORAL EVERY 6 HOURS PRN
Qty: 10 TABLET | Refills: 0 | Status: ON HOLD | OUTPATIENT
Start: 2023-04-30 | End: 2023-07-29

## 2023-04-30 RX ORDER — GABAPENTIN 100 MG/1
200 CAPSULE ORAL 3 TIMES DAILY
Qty: 90 CAPSULE | Refills: 0 | Status: SHIPPED | OUTPATIENT
Start: 2023-04-30 | End: 2023-05-30

## 2023-04-30 RX ORDER — DIPHENHYDRAMINE HCL 25 MG
25 TABLET ORAL ONCE
Status: COMPLETED | OUTPATIENT
Start: 2023-04-30 | End: 2023-04-30

## 2023-04-30 RX ADMIN — MORPHINE SULFATE 4 MG: 4 INJECTION INTRAVENOUS at 01:36

## 2023-04-30 RX ADMIN — SODIUM CHLORIDE, POTASSIUM CHLORIDE, SODIUM LACTATE AND CALCIUM CHLORIDE: 600; 310; 30; 20 INJECTION, SOLUTION INTRAVENOUS at 03:49

## 2023-04-30 RX ADMIN — ONDANSETRON HYDROCHLORIDE 4 MG: 2 SOLUTION INTRAMUSCULAR; INTRAVENOUS at 01:15

## 2023-04-30 RX ADMIN — TRAMADOL HYDROCHLORIDE 100 MG: 50 TABLET, COATED ORAL at 08:14

## 2023-04-30 RX ADMIN — GABAPENTIN 200 MG: 100 CAPSULE ORAL at 05:08

## 2023-04-30 RX ADMIN — AMPICILLIN AND SULBACTAM 3 G: 1; 2 INJECTION, POWDER, FOR SOLUTION INTRAMUSCULAR; INTRAVENOUS at 04:06

## 2023-04-30 RX ADMIN — ACETAMINOPHEN 650 MG: 325 TABLET, FILM COATED ORAL at 03:11

## 2023-04-30 RX ADMIN — DIPHENHYDRAMINE HYDROCHLORIDE 25 MG: 25 TABLET ORAL at 04:00

## 2023-04-30 ASSESSMENT — PAIN DESCRIPTION - PAIN TYPE
TYPE: ACUTE PAIN

## 2023-04-30 NOTE — HOSPITAL COURSE
This is a 33-year-old male with a past medical history significant for polycystic kidney disease, seizure disorder, history of nephrolithiasis was admitted with pyelonephritis and obstructive stone, patient underwent right stent placement by Dr. Stephenson on 4/24 and was discharged on cipro.  Patient decided to leave AGAINST MEDICAL ADVICE on 4/24.    He then presented again on 4/25 no fever, chills, nausea, vomiting, worsening right flank pain.  Fever as high as 103, WBC elevated at 13.77; CT scan showed right-sided ureteral stent mild residual hydronephrosis.  Blood culture  on 4/25 positive for Enterococcus faecalis sensitive to Unasyn; repeat blood culture on 4/27 no growth to date. ID was  consulted, recommended continuing Unasyn while him being in the hospital and discharging the patient on Zyvox    Patient will be discharged on Zyvox 600 mg p.o. twice daily again 5/6/2023.  Mr. Blevins has been ordered.    Patient WBC count continues to trend down to 12.9, he did function has been improved.  He will be recommended to follow-up with urology as an outpatient.      Patient does have some social situation and stated that he cant remain in the hospital and will be going home.

## 2023-04-30 NOTE — CARE PLAN
The patient is Stable - Low risk of patient condition declining or worsening    Shift Goals  Clinical Goals: Pain management  Patient Goals: Rest    Progress made toward(s) clinical / shift goals:   Problem: Pain - Standard  Goal: Alleviation of pain or a reduction in pain to the patient’s comfort goal  Outcome: Progressing  Note: Pt reported high to moderate pain levels throughout shift. Reported relief with prn pain medications.       Patient is not progressing towards the following goals:

## 2023-04-30 NOTE — DISCHARGE SUMMARY
Discharge Summary    CHIEF COMPLAINT ON ADMISSION  Chief Complaint   Patient presents with    Abdominal Pain     Patient ambulates to triage left AMA earlier today, was admitted for sepsis due to kidney stone. Patient had to leave AMA for social forms.        Reason for Admission  Other     Admission Date  4/28/2023    CODE STATUS  Prior    HPI & HOSPITAL COURSE against medical advice  This is a 33-year-old male with a past medical history significant for polycystic kidney disease, seizure disorder, history of nephrolithiasis was admitted with pyelonephritis and obstructive stone, patient underwent right stent placement by Dr. Stephenson on 4/24 and was discharged on cipro.  Patient decided to leave AGAINST MEDICAL ADVICE on 4/24.    He then presented again on 4/25 no fever, chills, nausea, vomiting, worsening right flank pain.  Fever as high as 103, WBC elevated at 13.77; CT scan showed right-sided ureteral stent mild residual hydronephrosis.  Blood culture  on 4/25 positive for Enterococcus faecalis sensitive to Unasyn; repeat blood culture on 4/27 no growth to date. ID was  consulted, recommended continuing Unasyn while him being in the hospital and discharging the patient on Zyvox    Patient will be discharged on Zyvox 600 mg p.o. twice daily again 5/6/2023.  Mr. Blevins has been ordered.    Patient WBC count continues to trend down to 12.9, he did function has been improved.  He will be recommended to follow-up with urology as an outpatient.      Patient does have some social situation and stated that he cant remain in the hospital and will be going home.    Discharge Date  4/30/2023    FOLLOW UP ITEMS POST DISCHARGE  Please follow up with urology as an op     DISCHARGE DIAGNOSES  Principal Problem:    Acute pyelonephritis POA: Yes  Active Problems:    Hydronephrosis POA: Yes    Acute renal failure (ARF) (HCC) POA: Yes    Severe sepsis (HCC) POA: Yes  Resolved Problems:    * No resolved hospital problems.  *      FOLLOW UP  No future appointments.  No follow-up provider specified.    MEDICATIONS ON DISCHARGE     Medication List        START taking these medications        Instructions   gabapentin 100 MG Caps  Commonly known as: NEURONTIN   Take 2 Capsules by mouth 3 times a day for 30 days.  Dose: 200 mg     ondansetron 4 MG Tbdp  Commonly known as: ZOFRAN ODT   Take 1 Tablet by mouth every 6 hours as needed for Nausea/Vomiting.  Dose: 4 mg            CONTINUE taking these medications        Instructions   HYDROcodone-acetaminophen 5-325 MG Tabs per tablet  Commonly known as: Norco   Take 1 Tablet by mouth every 6 hours as needed (severe pain) for up to 3 days.  Dose: 1 Tablet     linezolid 600 MG Tabs  Commonly known as: Zyvox   Take 1 Tablet by mouth 2 times a day for 6 days.  Dose: 600 mg     tamsulosin 0.4 MG capsule  Commonly known as: FLOMAX   Take 1 Capsule by mouth 1/2 hour after breakfast for 30 days.  Dose: 0.4 mg              Allergies  Allergies   Allergen Reactions    Dilaudid [Hydromorphone] Itching    Kiwi Extract Anaphylaxis    Oxycodone-Acetaminophen Unspecified     SHAKING  UNCONTROLLED  Seizure like symptom  Tolerates acetaminophen    Shellfish Allergy Anaphylaxis    Cefazolin Rash and Itching     Rash  Tolerated ceftriaxone (April 2021)    Potassium Iodide Itching     Severe itching    Tape Unspecified     Paper tape okay        DIET  No orders of the defined types were placed in this encounter.      ACTIVITY  As tolerated.  Weight bearing as tolerated    CONSULTATIONS  ID    PROCEDURES  none    LABORATORY  Lab Results   Component Value Date    SODIUM 138 04/30/2023    POTASSIUM 4.5 04/30/2023    CHLORIDE 104 04/30/2023    CO2 23 04/30/2023    GLUCOSE 78 04/30/2023    BUN 9 04/30/2023    CREATININE 1.26 04/30/2023        Lab Results   Component Value Date    WBC 12.9 (H) 04/30/2023    HEMOGLOBIN 12.1 (L) 04/30/2023    HEMATOCRIT 37.2 (L) 04/30/2023    PLATELETCT 227 04/30/2023        Total time of  the discharge process exceeds 38 minutes.

## 2023-04-30 NOTE — CARE PLAN
The patient is Stable - Low risk of patient condition declining or worsening    Shift Goals  Clinical Goals: Pain management  Patient Goals: Rest  Family Goals: LACY    Progress made toward(s) clinical / shift goals: on going    Patient is not  Problem: Hemodynamics  Goal: Patient's hemodynamics, fluid balance and neurologic status will be stable or improve  Outcome: Progressing     Problem: Fluid Volume  Goal: Fluid volume balance will be maintained  Outcome: Progressing     Problem: Urinary Elimination  Goal: Establish and maintain regular urinary output  Outcome: Progressing     Problem: Infection - Standard  Goal: Patient will remain free from infection  Outcome: Progressing    progressing towards the following goals:

## 2023-04-30 NOTE — PROGRESS NOTES
This is a 33-year-old male with a past medical history significant for obesity kidney disease, seizure disorder, history of nephrolithiasis was admitted on 4/25 and decided to leave AMA on 4/28. He had lithotripsy and a right stent placement by Dr. Stephenson on 4/24.    Blood Cx t was obtained consistent with Enterococcus bacteremia ID was consulted during the stay in the hospital, and recommended IV Unasyn.    Patient presented to ER 4/29/2023 with a complaint of intractable abdominal pain, WBC 18, acute renal failure with a creatinine of 1.5.  ID recommended continuing Zyvox.  We will continue IV fluid, repeat BMP at a.m.    Currently patient was started on Unasyn, otherwise agree with assessment and plan provided by admitting physician.

## 2023-05-01 LAB
BACTERIA UR CULT: NORMAL
SIGNIFICANT IND 70042: NORMAL
SITE SITE: NORMAL
SOURCE SOURCE: NORMAL

## 2023-05-02 LAB
BACTERIA BLD CULT: NORMAL
BACTERIA BLD CULT: NORMAL
SIGNIFICANT IND 70042: NORMAL
SIGNIFICANT IND 70042: NORMAL
SITE SITE: NORMAL
SITE SITE: NORMAL
SOURCE SOURCE: NORMAL
SOURCE SOURCE: NORMAL

## 2023-05-04 LAB
BACTERIA BLD CULT: NORMAL
SIGNIFICANT IND 70042: NORMAL
SITE SITE: NORMAL
SOURCE SOURCE: NORMAL

## 2023-07-29 ENCOUNTER — APPOINTMENT (OUTPATIENT)
Dept: RADIOLOGY | Facility: MEDICAL CENTER | Age: 33
DRG: 690 | End: 2023-07-29
Attending: EMERGENCY MEDICINE
Payer: MEDICAID

## 2023-07-29 ENCOUNTER — HOSPITAL ENCOUNTER (INPATIENT)
Facility: MEDICAL CENTER | Age: 33
LOS: 1 days | DRG: 690 | End: 2023-07-30
Attending: EMERGENCY MEDICINE | Admitting: HOSPITALIST
Payer: MEDICAID

## 2023-07-29 DIAGNOSIS — N20.1 URETEROLITHIASIS: ICD-10-CM

## 2023-07-29 DIAGNOSIS — N39.0 ACUTE UTI: ICD-10-CM

## 2023-07-29 LAB
ALBUMIN SERPL BCP-MCNC: 4.3 G/DL (ref 3.2–4.9)
ALBUMIN/GLOB SERPL: 1.2 G/DL
ALP SERPL-CCNC: 106 U/L (ref 30–99)
ALT SERPL-CCNC: 10 U/L (ref 2–50)
ANION GAP SERPL CALC-SCNC: 13 MMOL/L (ref 7–16)
APPEARANCE UR: CLEAR
AST SERPL-CCNC: 19 U/L (ref 12–45)
BACTERIA #/AREA URNS HPF: ABNORMAL /HPF
BASOPHILS # BLD AUTO: 0.6 % (ref 0–1.8)
BASOPHILS # BLD: 0.06 K/UL (ref 0–0.12)
BILIRUB SERPL-MCNC: 0.3 MG/DL (ref 0.1–1.5)
BILIRUB UR QL STRIP.AUTO: NEGATIVE
BUN SERPL-MCNC: 15 MG/DL (ref 8–22)
CALCIUM ALBUM COR SERPL-MCNC: 9.4 MG/DL (ref 8.5–10.5)
CALCIUM SERPL-MCNC: 9.6 MG/DL (ref 8.5–10.5)
CHLORIDE SERPL-SCNC: 104 MMOL/L (ref 96–112)
CO2 SERPL-SCNC: 22 MMOL/L (ref 20–33)
COLOR UR: YELLOW
CREAT SERPL-MCNC: 1.43 MG/DL (ref 0.5–1.4)
EOSINOPHIL # BLD AUTO: 0.75 K/UL (ref 0–0.51)
EOSINOPHIL NFR BLD: 8 % (ref 0–6.9)
EPI CELLS #/AREA URNS HPF: NEGATIVE /HPF
ERYTHROCYTE [DISTWIDTH] IN BLOOD BY AUTOMATED COUNT: 49.3 FL (ref 35.9–50)
GFR SERPLBLD CREATININE-BSD FMLA CKD-EPI: 67 ML/MIN/1.73 M 2
GLOBULIN SER CALC-MCNC: 3.5 G/DL (ref 1.9–3.5)
GLUCOSE SERPL-MCNC: 114 MG/DL (ref 65–99)
GLUCOSE UR STRIP.AUTO-MCNC: NEGATIVE MG/DL
HCT VFR BLD AUTO: 45.3 % (ref 42–52)
HGB BLD-MCNC: 14.8 G/DL (ref 14–18)
HYALINE CASTS #/AREA URNS LPF: ABNORMAL /LPF
IMM GRANULOCYTES # BLD AUTO: 0.02 K/UL (ref 0–0.11)
IMM GRANULOCYTES NFR BLD AUTO: 0.2 % (ref 0–0.9)
KETONES UR STRIP.AUTO-MCNC: NEGATIVE MG/DL
LEUKOCYTE ESTERASE UR QL STRIP.AUTO: ABNORMAL
LIPASE SERPL-CCNC: 75 U/L (ref 11–82)
LYMPHOCYTES # BLD AUTO: 3.05 K/UL (ref 1–4.8)
LYMPHOCYTES NFR BLD: 32.6 % (ref 22–41)
MCH RBC QN AUTO: 31.8 PG (ref 27–33)
MCHC RBC AUTO-ENTMCNC: 32.7 G/DL (ref 32.3–36.5)
MCV RBC AUTO: 97.4 FL (ref 81.4–97.8)
MICRO URNS: ABNORMAL
MONOCYTES # BLD AUTO: 0.65 K/UL (ref 0–0.85)
MONOCYTES NFR BLD AUTO: 6.9 % (ref 0–13.4)
NEUTROPHILS # BLD AUTO: 4.84 K/UL (ref 1.82–7.42)
NEUTROPHILS NFR BLD: 51.7 % (ref 44–72)
NITRITE UR QL STRIP.AUTO: NEGATIVE
NRBC # BLD AUTO: 0 K/UL
NRBC BLD-RTO: 0 /100 WBC (ref 0–0.2)
PH UR STRIP.AUTO: 7 [PH] (ref 5–8)
PLATELET # BLD AUTO: 265 K/UL (ref 164–446)
PMV BLD AUTO: 9.1 FL (ref 9–12.9)
POTASSIUM SERPL-SCNC: 4.4 MMOL/L (ref 3.6–5.5)
PROT SERPL-MCNC: 7.8 G/DL (ref 6–8.2)
PROT UR QL STRIP: NEGATIVE MG/DL
RBC # BLD AUTO: 4.65 M/UL (ref 4.7–6.1)
RBC # URNS HPF: ABNORMAL /HPF
RBC UR QL AUTO: NEGATIVE
SODIUM SERPL-SCNC: 139 MMOL/L (ref 135–145)
SP GR UR STRIP.AUTO: 1.02
UROBILINOGEN UR STRIP.AUTO-MCNC: 0.2 MG/DL
WBC # BLD AUTO: 9.4 K/UL (ref 4.8–10.8)
WBC #/AREA URNS HPF: ABNORMAL /HPF

## 2023-07-29 PROCEDURE — 85025 COMPLETE CBC W/AUTO DIFF WBC: CPT

## 2023-07-29 PROCEDURE — 87591 N.GONORRHOEAE DNA AMP PROB: CPT

## 2023-07-29 PROCEDURE — 83690 ASSAY OF LIPASE: CPT

## 2023-07-29 PROCEDURE — 74176 CT ABD & PELVIS W/O CONTRAST: CPT

## 2023-07-29 PROCEDURE — 76870 US EXAM SCROTUM: CPT

## 2023-07-29 PROCEDURE — 80053 COMPREHEN METABOLIC PANEL: CPT

## 2023-07-29 PROCEDURE — A9270 NON-COVERED ITEM OR SERVICE: HCPCS | Performed by: EMERGENCY MEDICINE

## 2023-07-29 PROCEDURE — 81001 URINALYSIS AUTO W/SCOPE: CPT

## 2023-07-29 PROCEDURE — 96372 THER/PROPH/DIAG INJ SC/IM: CPT

## 2023-07-29 PROCEDURE — 87491 CHLMYD TRACH DNA AMP PROBE: CPT

## 2023-07-29 PROCEDURE — 700102 HCHG RX REV CODE 250 W/ 637 OVERRIDE(OP): Performed by: EMERGENCY MEDICINE

## 2023-07-29 PROCEDURE — 99285 EMERGENCY DEPT VISIT HI MDM: CPT

## 2023-07-29 PROCEDURE — 770006 HCHG ROOM/CARE - MED/SURG/GYN SEMI*

## 2023-07-29 PROCEDURE — 36415 COLL VENOUS BLD VENIPUNCTURE: CPT

## 2023-07-29 PROCEDURE — 700111 HCHG RX REV CODE 636 W/ 250 OVERRIDE (IP): Mod: JZ | Performed by: EMERGENCY MEDICINE

## 2023-07-29 PROCEDURE — 700111 HCHG RX REV CODE 636 W/ 250 OVERRIDE (IP): Mod: JZ | Performed by: HOSPITALIST

## 2023-07-29 PROCEDURE — 99223 1ST HOSP IP/OBS HIGH 75: CPT | Mod: AI | Performed by: HOSPITALIST

## 2023-07-29 RX ORDER — ONDANSETRON 2 MG/ML
4 INJECTION INTRAMUSCULAR; INTRAVENOUS EVERY 4 HOURS PRN
Status: DISCONTINUED | OUTPATIENT
Start: 2023-07-29 | End: 2023-07-30 | Stop reason: HOSPADM

## 2023-07-29 RX ORDER — PROCHLORPERAZINE EDISYLATE 5 MG/ML
5-10 INJECTION INTRAMUSCULAR; INTRAVENOUS EVERY 4 HOURS PRN
Status: DISCONTINUED | OUTPATIENT
Start: 2023-07-29 | End: 2023-07-30 | Stop reason: HOSPADM

## 2023-07-29 RX ORDER — ACETAMINOPHEN 325 MG/1
650 TABLET ORAL ONCE
Status: COMPLETED | OUTPATIENT
Start: 2023-07-29 | End: 2023-07-29

## 2023-07-29 RX ORDER — ONDANSETRON 4 MG/1
4 TABLET, ORALLY DISINTEGRATING ORAL EVERY 4 HOURS PRN
Status: DISCONTINUED | OUTPATIENT
Start: 2023-07-29 | End: 2023-07-30 | Stop reason: HOSPADM

## 2023-07-29 RX ORDER — KETOROLAC TROMETHAMINE 30 MG/ML
15 INJECTION, SOLUTION INTRAMUSCULAR; INTRAVENOUS ONCE
Status: COMPLETED | OUTPATIENT
Start: 2023-07-29 | End: 2023-07-29

## 2023-07-29 RX ORDER — CEFTRIAXONE 1 G/1
1000 INJECTION, POWDER, FOR SOLUTION INTRAMUSCULAR; INTRAVENOUS ONCE
Status: COMPLETED | OUTPATIENT
Start: 2023-07-29 | End: 2023-07-29

## 2023-07-29 RX ORDER — SODIUM CHLORIDE, SODIUM LACTATE, POTASSIUM CHLORIDE, CALCIUM CHLORIDE 600; 310; 30; 20 MG/100ML; MG/100ML; MG/100ML; MG/100ML
INJECTION, SOLUTION INTRAVENOUS CONTINUOUS
Status: DISCONTINUED | OUTPATIENT
Start: 2023-07-29 | End: 2023-07-30 | Stop reason: HOSPADM

## 2023-07-29 RX ORDER — TAMSULOSIN HYDROCHLORIDE 0.4 MG/1
0.4 CAPSULE ORAL
Status: DISCONTINUED | OUTPATIENT
Start: 2023-07-30 | End: 2023-07-30 | Stop reason: HOSPADM

## 2023-07-29 RX ORDER — DOXYCYCLINE 100 MG/1
100 TABLET ORAL ONCE
Status: COMPLETED | OUTPATIENT
Start: 2023-07-29 | End: 2023-07-29

## 2023-07-29 RX ORDER — MORPHINE SULFATE 4 MG/ML
4 INJECTION INTRAVENOUS ONCE
Status: COMPLETED | OUTPATIENT
Start: 2023-07-29 | End: 2023-07-29

## 2023-07-29 RX ORDER — PROMETHAZINE HYDROCHLORIDE 25 MG/1
12.5-25 SUPPOSITORY RECTAL EVERY 4 HOURS PRN
Status: DISCONTINUED | OUTPATIENT
Start: 2023-07-29 | End: 2023-07-30 | Stop reason: HOSPADM

## 2023-07-29 RX ORDER — OXYCODONE HYDROCHLORIDE 10 MG/1
10 TABLET ORAL
Status: DISCONTINUED | OUTPATIENT
Start: 2023-07-29 | End: 2023-07-30 | Stop reason: HOSPADM

## 2023-07-29 RX ORDER — MORPHINE SULFATE 4 MG/ML
4 INJECTION INTRAVENOUS
Status: DISCONTINUED | OUTPATIENT
Start: 2023-07-29 | End: 2023-07-30 | Stop reason: HOSPADM

## 2023-07-29 RX ORDER — PROMETHAZINE HYDROCHLORIDE 25 MG/1
12.5-25 TABLET ORAL EVERY 4 HOURS PRN
Status: DISCONTINUED | OUTPATIENT
Start: 2023-07-29 | End: 2023-07-30 | Stop reason: HOSPADM

## 2023-07-29 RX ORDER — OXYCODONE HYDROCHLORIDE 5 MG/1
5 TABLET ORAL
Status: DISCONTINUED | OUTPATIENT
Start: 2023-07-29 | End: 2023-07-30 | Stop reason: HOSPADM

## 2023-07-29 RX ADMIN — KETOROLAC TROMETHAMINE 15 MG: 30 INJECTION, SOLUTION INTRAMUSCULAR; INTRAVENOUS at 20:16

## 2023-07-29 RX ADMIN — DOXYCYCLINE 100 MG: 100 TABLET, FILM COATED ORAL at 22:56

## 2023-07-29 RX ADMIN — ACETAMINOPHEN 650 MG: 325 TABLET, FILM COATED ORAL at 21:19

## 2023-07-29 RX ADMIN — CEFTRIAXONE SODIUM 1000 MG: 1 INJECTION, POWDER, FOR SOLUTION INTRAMUSCULAR; INTRAVENOUS at 22:45

## 2023-07-29 RX ADMIN — MORPHINE SULFATE 4 MG: 4 INJECTION, SOLUTION INTRAMUSCULAR; INTRAVENOUS at 23:54

## 2023-07-29 RX ADMIN — MORPHINE SULFATE 4 MG: 4 INJECTION, SOLUTION INTRAMUSCULAR; INTRAVENOUS at 22:05

## 2023-07-29 ASSESSMENT — ENCOUNTER SYMPTOMS
NAUSEA: 0
DOUBLE VISION: 0
PND: 0
SPUTUM PRODUCTION: 0
BLOOD IN STOOL: 0
DEPRESSION: 0
CONSTIPATION: 0
EYE PAIN: 0
HEMOPTYSIS: 0
SORE THROAT: 0
BACK PAIN: 0
COUGH: 0
TINGLING: 0
DIZZINESS: 0
FEVER: 1
DIARRHEA: 0
MYALGIAS: 0
PHOTOPHOBIA: 0
WEAKNESS: 0
WHEEZING: 0
HEADACHES: 0
CHILLS: 1
BLURRED VISION: 0
PALPITATIONS: 0
HALLUCINATIONS: 0
HEARTBURN: 0
POLYDIPSIA: 0
ORTHOPNEA: 0
FLANK PAIN: 0
VOMITING: 0
SHORTNESS OF BREATH: 0
ABDOMINAL PAIN: 0
CLAUDICATION: 0
DIAPHORESIS: 0
SINUS PAIN: 0
TREMORS: 0
NECK PAIN: 0
BRUISES/BLEEDS EASILY: 0
FALLS: 0
STRIDOR: 0

## 2023-07-29 ASSESSMENT — PAIN DESCRIPTION - PAIN TYPE
TYPE: ACUTE PAIN

## 2023-07-29 ASSESSMENT — PATIENT HEALTH QUESTIONNAIRE - PHQ9
1. LITTLE INTEREST OR PLEASURE IN DOING THINGS: NOT AT ALL
SUM OF ALL RESPONSES TO PHQ9 QUESTIONS 1 AND 2: 0
2. FEELING DOWN, DEPRESSED, IRRITABLE, OR HOPELESS: NOT AT ALL

## 2023-07-29 ASSESSMENT — FIBROSIS 4 INDEX: FIB4 SCORE: 0.7

## 2023-07-29 ASSESSMENT — LIFESTYLE VARIABLES: SUBSTANCE_ABUSE: 0

## 2023-07-30 ENCOUNTER — APPOINTMENT (OUTPATIENT)
Dept: RADIOLOGY | Facility: MEDICAL CENTER | Age: 33
DRG: 690 | End: 2023-07-30
Attending: UROLOGY
Payer: MEDICAID

## 2023-07-30 VITALS
TEMPERATURE: 98.2 F | SYSTOLIC BLOOD PRESSURE: 117 MMHG | DIASTOLIC BLOOD PRESSURE: 74 MMHG | BODY MASS INDEX: 21.61 KG/M2 | HEIGHT: 66 IN | RESPIRATION RATE: 17 BRPM | HEART RATE: 63 BPM | OXYGEN SATURATION: 98 % | WEIGHT: 134.48 LBS

## 2023-07-30 LAB
ALBUMIN SERPL BCP-MCNC: 3.8 G/DL (ref 3.2–4.9)
ALBUMIN/GLOB SERPL: 1.4 G/DL
ALP SERPL-CCNC: 90 U/L (ref 30–99)
ALT SERPL-CCNC: 8 U/L (ref 2–50)
ANION GAP SERPL CALC-SCNC: 9 MMOL/L (ref 7–16)
AST SERPL-CCNC: 24 U/L (ref 12–45)
BASOPHILS # BLD AUTO: 0.7 % (ref 0–1.8)
BASOPHILS # BLD: 0.07 K/UL (ref 0–0.12)
BILIRUB SERPL-MCNC: 0.3 MG/DL (ref 0.1–1.5)
BUN SERPL-MCNC: 16 MG/DL (ref 8–22)
C TRACH DNA SPEC QL NAA+PROBE: NEGATIVE
CALCIUM ALBUM COR SERPL-MCNC: 8.8 MG/DL (ref 8.5–10.5)
CALCIUM SERPL-MCNC: 8.6 MG/DL (ref 8.5–10.5)
CHLORIDE SERPL-SCNC: 104 MMOL/L (ref 96–112)
CO2 SERPL-SCNC: 24 MMOL/L (ref 20–33)
CREAT SERPL-MCNC: 1.34 MG/DL (ref 0.5–1.4)
EOSINOPHIL # BLD AUTO: 1.02 K/UL (ref 0–0.51)
EOSINOPHIL NFR BLD: 10.3 % (ref 0–6.9)
ERYTHROCYTE [DISTWIDTH] IN BLOOD BY AUTOMATED COUNT: 48.2 FL (ref 35.9–50)
GFR SERPLBLD CREATININE-BSD FMLA CKD-EPI: 72 ML/MIN/1.73 M 2
GLOBULIN SER CALC-MCNC: 2.8 G/DL (ref 1.9–3.5)
GLUCOSE SERPL-MCNC: 87 MG/DL (ref 65–99)
HCT VFR BLD AUTO: 40.2 % (ref 42–52)
HGB BLD-MCNC: 13.2 G/DL (ref 14–18)
IMM GRANULOCYTES # BLD AUTO: 0.02 K/UL (ref 0–0.11)
IMM GRANULOCYTES NFR BLD AUTO: 0.2 % (ref 0–0.9)
LYMPHOCYTES # BLD AUTO: 3.11 K/UL (ref 1–4.8)
LYMPHOCYTES NFR BLD: 31.3 % (ref 22–41)
MCH RBC QN AUTO: 32 PG (ref 27–33)
MCHC RBC AUTO-ENTMCNC: 32.8 G/DL (ref 32.3–36.5)
MCV RBC AUTO: 97.3 FL (ref 81.4–97.8)
MONOCYTES # BLD AUTO: 0.97 K/UL (ref 0–0.85)
MONOCYTES NFR BLD AUTO: 9.8 % (ref 0–13.4)
N GONORRHOEA DNA SPEC QL NAA+PROBE: NEGATIVE
NEUTROPHILS # BLD AUTO: 4.75 K/UL (ref 1.82–7.42)
NEUTROPHILS NFR BLD: 47.7 % (ref 44–72)
NRBC # BLD AUTO: 0 K/UL
NRBC BLD-RTO: 0 /100 WBC (ref 0–0.2)
PLATELET # BLD AUTO: 215 K/UL (ref 164–446)
PMV BLD AUTO: 9.1 FL (ref 9–12.9)
POTASSIUM SERPL-SCNC: 4.4 MMOL/L (ref 3.6–5.5)
PROT SERPL-MCNC: 6.6 G/DL (ref 6–8.2)
RBC # BLD AUTO: 4.13 M/UL (ref 4.7–6.1)
SODIUM SERPL-SCNC: 137 MMOL/L (ref 135–145)
SPECIMEN SOURCE: NORMAL
WBC # BLD AUTO: 9.9 K/UL (ref 4.8–10.8)

## 2023-07-30 PROCEDURE — 80053 COMPREHEN METABOLIC PANEL: CPT

## 2023-07-30 PROCEDURE — 99239 HOSP IP/OBS DSCHRG MGMT >30: CPT | Performed by: INTERNAL MEDICINE

## 2023-07-30 PROCEDURE — 700102 HCHG RX REV CODE 250 W/ 637 OVERRIDE(OP)

## 2023-07-30 PROCEDURE — A9270 NON-COVERED ITEM OR SERVICE: HCPCS

## 2023-07-30 PROCEDURE — A9270 NON-COVERED ITEM OR SERVICE: HCPCS | Performed by: HOSPITALIST

## 2023-07-30 PROCEDURE — 85025 COMPLETE CBC W/AUTO DIFF WBC: CPT

## 2023-07-30 PROCEDURE — 700111 HCHG RX REV CODE 636 W/ 250 OVERRIDE (IP): Mod: JZ | Performed by: HOSPITALIST

## 2023-07-30 PROCEDURE — 700105 HCHG RX REV CODE 258: Mod: JZ | Performed by: HOSPITALIST

## 2023-07-30 PROCEDURE — 36415 COLL VENOUS BLD VENIPUNCTURE: CPT

## 2023-07-30 PROCEDURE — 700102 HCHG RX REV CODE 250 W/ 637 OVERRIDE(OP): Performed by: HOSPITALIST

## 2023-07-30 RX ORDER — HYDROXYZINE 50 MG/1
25 TABLET, FILM COATED ORAL ONCE
Status: COMPLETED | OUTPATIENT
Start: 2023-07-30 | End: 2023-07-30

## 2023-07-30 RX ORDER — DIPHENHYDRAMINE HCL 25 MG
25 TABLET ORAL ONCE
Status: COMPLETED | OUTPATIENT
Start: 2023-07-30 | End: 2023-07-30

## 2023-07-30 RX ORDER — TAMSULOSIN HYDROCHLORIDE 0.4 MG/1
0.4 CAPSULE ORAL ONCE
Status: COMPLETED | OUTPATIENT
Start: 2023-07-30 | End: 2023-07-30

## 2023-07-30 RX ORDER — DIPHENHYDRAMINE HYDROCHLORIDE, ZINC ACETATE 2; .1 G/100G; G/100G
CREAM TOPICAL 3 TIMES DAILY PRN
Status: DISCONTINUED | OUTPATIENT
Start: 2023-07-30 | End: 2023-07-30 | Stop reason: HOSPADM

## 2023-07-30 RX ADMIN — ONDANSETRON 4 MG: 2 INJECTION INTRAMUSCULAR; INTRAVENOUS at 03:15

## 2023-07-30 RX ADMIN — TAMSULOSIN HYDROCHLORIDE 0.4 MG: 0.4 CAPSULE ORAL at 03:10

## 2023-07-30 RX ADMIN — SODIUM CHLORIDE, POTASSIUM CHLORIDE, SODIUM LACTATE AND CALCIUM CHLORIDE: 600; 310; 30; 20 INJECTION, SOLUTION INTRAVENOUS at 00:00

## 2023-07-30 RX ADMIN — Medication: at 06:06

## 2023-07-30 RX ADMIN — OXYCODONE HYDROCHLORIDE 10 MG: 10 TABLET ORAL at 01:06

## 2023-07-30 RX ADMIN — HYDROXYZINE HYDROCHLORIDE 25 MG: 50 TABLET, FILM COATED ORAL at 05:57

## 2023-07-30 RX ADMIN — DIPHENHYDRAMINE HYDROCHLORIDE 25 MG: 25 TABLET ORAL at 03:10

## 2023-07-30 RX ADMIN — TAMSULOSIN HYDROCHLORIDE 0.4 MG: 0.4 CAPSULE ORAL at 08:20

## 2023-07-30 ASSESSMENT — COGNITIVE AND FUNCTIONAL STATUS - GENERAL
SUGGESTED CMS G CODE MODIFIER DAILY ACTIVITY: CH
MOBILITY SCORE: 24
MOBILITY SCORE: 24
SUGGESTED CMS G CODE MODIFIER MOBILITY: CH
DAILY ACTIVITIY SCORE: 24
SUGGESTED CMS G CODE MODIFIER MOBILITY: CH
SUGGESTED CMS G CODE MODIFIER MOBILITY: CH
MOBILITY SCORE: 24

## 2023-07-30 ASSESSMENT — LIFESTYLE VARIABLES
EVER HAD A DRINK FIRST THING IN THE MORNING TO STEADY YOUR NERVES TO GET RID OF A HANGOVER: NO
HAVE YOU EVER FELT YOU SHOULD CUT DOWN ON YOUR DRINKING: NO
TOTAL SCORE: 0
ALCOHOL_USE: YES
HOW MANY TIMES IN THE PAST YEAR HAVE YOU HAD 5 OR MORE DRINKS IN A DAY: 4
EVER FELT BAD OR GUILTY ABOUT YOUR DRINKING: NO
DOES PATIENT WANT TO STOP DRINKING: NO
CONSUMPTION TOTAL: INCOMPLETE
TOTAL SCORE: 0
TOTAL SCORE: 0
HAVE PEOPLE ANNOYED YOU BY CRITICIZING YOUR DRINKING: NO

## 2023-07-30 ASSESSMENT — FIBROSIS 4 INDEX: FIB4 SCORE: 0.73

## 2023-07-30 ASSESSMENT — PAIN DESCRIPTION - PAIN TYPE
TYPE: ACUTE PAIN

## 2023-07-30 NOTE — DISCHARGE SUMMARY
AMA Discharge Summary    CHIEF COMPLAINT ON ADMISSION  Chief Complaint   Patient presents with    Flank Pain     Pt reports he thinks he has a kidney stone on L side. Pt has hx of stones and recent surgery for one. Flank pain x couple hours. Pt also reports a throbbing pain in bladder area x 3 days       Reason for Admission  Flank pain     Admission Date  7/29/2023    CODE STATUS  Prior    HPI & HOSPITAL COURSE  Russell Bunn is a 33 y.o. male who presented 7/29/2023 with past medical history of cystinuria, repeated nephrolithiasis who comes to the hospital for left-sided flank pain for the past 3 days.  The pain is not in radiating and nonpositional.  It is associated with fever, chills, dysuria.  He denies any hematuria.     CT scan of the abdomen found a moderate left-sided hydronephrosis with left lower ureteral stone measuring 3 mm.    Patient was seen by urology, Dr. Moss with recommendation for conservative management.  There is associated hydronephrosis, moderate on the left side on CT.  He was started on Flomax.  Patient was monitored in the hospital.  Patient stated that he had passed the stones.  And decided to leave AGAINST MEDICAL ADVICE prior to clearance.    Appears comfortable, IV removed.  Patient is aware that leaving AGAINST MEDICAL ADVICE may lead to worsening infection, hydronephrosis and pain and death.  Patient states that he has done this multiple times before.  He has left AGAINST MEDICAL ADVICE.         Therefore, he is discharged in fair and stable condition to home with close outpatient follow-up.        Discharge Date  7/30/2023      DISCHARGE DIAGNOSES  Principal Problem:    Ureterolithiasis (POA: Yes)  Active Problems:    Hydronephrosis (POA: Yes)    Acute renal failure (ARF) (HCC) (POA: Yes)    Acute pyelonephritis (POA: Yes)  Resolved Problems:    * No resolved hospital problems. *      FOLLOW UP  No future appointments.  No follow-up provider  specified.    MEDICATIONS ON DISCHARGE     Medication List        ASK your doctor about these medications        Instructions   asa/apap/caffeine 250-250-65 MG Tabs  Commonly known as: Excedrin   Take 1 Tablet by mouth every 6 hours as needed for Headache.  Dose: 1 Tablet              Allergies  Allergies   Allergen Reactions    Kiwi Extract Anaphylaxis    Shellfish Allergy Anaphylaxis    Cefazolin Rash and Itching     Rash  Tolerated ceftriaxone (April 2021)    Dilaudid [Hydromorphone] Itching    Potassium Iodide Itching     Severe itching    Tape Unspecified     Paper tape okay        DIET  No orders of the defined types were placed in this encounter.          CONSULTATIONS  Urology    PROCEDURES  None    LABORATORY  Lab Results   Component Value Date    SODIUM 137 07/30/2023    POTASSIUM 4.4 07/30/2023    CHLORIDE 104 07/30/2023    CO2 24 07/30/2023    GLUCOSE 87 07/30/2023    BUN 16 07/30/2023    CREATININE 1.34 07/30/2023        Lab Results   Component Value Date    WBC 9.9 07/30/2023    HEMOGLOBIN 13.2 (L) 07/30/2023    HEMATOCRIT 40.2 (L) 07/30/2023    PLATELETCT 215 07/30/2023        Total time of the discharge process exceeds 45 minutes.

## 2023-07-30 NOTE — ED NOTES
Pt medicated per MAR for pain. Pt informed of medication and provided verbal consent. US at bedside. Lab called and instructed this tech to collect another urine sample.

## 2023-07-30 NOTE — PROGRESS NOTES
Pt left AMA at 0845. IV removed by this RN, educated pt on risks of leaving against medical advice. Pt verbalizes understanding. Charge RN and provider notified.

## 2023-07-30 NOTE — ED NOTES
Pt stated his pain dropped to 8/10 after toradol was give. Pt requesting more medication for pain at this time. ERP notified.

## 2023-07-30 NOTE — ED TRIAGE NOTES
Chief Complaint   Patient presents with    Flank Pain     Pt reports he thinks he has a kidney stone on L side. Pt has hx of stones and recent surgery for one. Flank pain x couple hours. Pt also reports a throbbing pain in bladder area x 3 days       Pt to triage with steady gait for above complaint. Presents hunched over d/t pain in L flank starting a few hours ago, pt has hx of kidney stones, believes this is the same. Denies trouble urinating currently    Pt back to lobby, educated on triage process and encourage to alert staff of any changes.     Vitals:    07/29/23 1904   BP: 108/84   Pulse: 86   Resp: 18   Temp: 37.2 °C (98.9 °F)   SpO2: 96%

## 2023-07-30 NOTE — ASSESSMENT & PLAN NOTE
symptoms includes dysuria and frequency  UA + for bacteria, LE and nitrite  F/u urine cultures  Started pt on ceftriaxone  Previous cultures found Enterococcus    7/30 f/u cx  Pain control

## 2023-07-30 NOTE — PROGRESS NOTES
Report receive from Migel RN, ED  Patient arrived from ED, with out Incident     Report received. Assumed care. Pt sitting at the edge of bed. A/O x4. VSS. Responds appropriately. Complain of 9 out of 10 pain, will medicate Per MAR. SOB. Assessment complete. Discussed POC, , pt verbalizes understanding. Explained importance of calling before getting OOB. Call light and belongings within reach. Bed alarm on. Bed in the lowest position. Treaded socks in place. Hourly rounding in progress. Will continue to monitor .   Goal   Pain control

## 2023-07-30 NOTE — ED NOTES
Med Rec complete per patient  No oral antibiotics in the last 30 days  Allergies reviewed  Preferred Pharmacy: [facility]

## 2023-07-30 NOTE — CARE PLAN
The patient is Stable - Low risk of patient condition declining or worsening    Shift Goals  Clinical Goals: Pain control, rest  Patient Goals: Pain control and rest  Family Goals: N/A    Progress made toward(s) clinical / shift goals:      Problem: Pain - Standard  Goal: Alleviation of pain or a reduction in pain to the patient’s comfort goal  Outcome: Met  Note: Patient medicated per MAR   Goal 2 out of 10 pain   Patient possible allergic to OXY, gave benadryl,benadryl cream, and atarax Itching was still slightly present, getting better.    Patient passed Stone NO more pain ready to go home.         Patient is not progressing towards the following goals:

## 2023-07-30 NOTE — ED PROVIDER NOTES
ED Provider Note    CHIEF COMPLAINT  Chief Complaint   Patient presents with    Flank Pain     Pt reports he thinks he has a kidney stone on L side. Pt has hx of stones and recent surgery for one. Flank pain x couple hours. Pt also reports a throbbing pain in bladder area x 3 days       EXTERNAL RECORDS REVIEWED  Inpatient Notes inpatient note 4/28/2023 for pyelonephritis    HPI/ROS  LIMITATION TO HISTORY   Select: : None  OUTSIDE HISTORIAN(S):  None  Russell Bunn is a 33 y.o. male who presents to the emergency department complaining of left flank pain and left testicular pain.  Past medical history significant for prior kidney stones, seizure disorder and remote history of STI.  He explains that over the last couple days he been having increasing pain initiating in his left testicle now radiating more towards the left flank.  As he progressed today the pain became more moderate to severe and more similar to his prior kidney stone.  States that he is sexually active with multiple partners but no new partners per se.  Denies any other upper abdominal pain.  No nausea or vomiting.  Again pain currently moderate.    Past medical history and medications were reviewed.    PAST MEDICAL HISTORY   has a past medical history of Kidney stones and Seizure disorder (HCC).    SURGICAL HISTORY   has a past surgical history that includes lithotripsy; cysto/uretero/pyeloscopy, dx (Right, 3/13/2020); cystoscopy,insert ureteral stent (Right, 3/13/2020); lasertripsy (Right, 3/13/2020); cystoscopy,insert ureteral stent (Left, 4/26/2021); cysto/uretero/pyeloscopy, dx (Left, 4/26/2021); cystoscopy (Left, 02/20/2022); cystoscopy,insert ureteral stent (Left, 2/20/2022); cysto/uretero/pyeloscopy, dx (Left, 2/20/2022); lasertripsy (Left, 2/20/2022); and cysto stent placemnt pre surg (Right, 4/24/2023).    FAMILY HISTORY  No family history on file.    SOCIAL HISTORY  Social History     Tobacco Use    Smoking status: Former      "Packs/day: 0.50     Types: Cigarettes    Smokeless tobacco: Never   Vaping Use    Vaping Use: Never used   Substance and Sexual Activity    Alcohol use: Yes     Comment: Occasional    Drug use: Yes     Types: Inhaled     Comment: Marijuana, every day    Sexual activity: Not on file       CURRENT MEDICATIONS  Home Medications       Reviewed by Tiffany Salomon R.N. (Registered Nurse) on 07/29/23 at 1909  Med List Status: Not Addressed     Medication Last Dose Status   ondansetron (ZOFRAN ODT) 4 MG TABLET DISPERSIBLE  Active                    ALLERGIES  Allergies   Allergen Reactions    Dilaudid [Hydromorphone] Itching    Kiwi Extract Anaphylaxis    Shellfish Allergy Anaphylaxis    Cefazolin Rash and Itching     Rash  Tolerated ceftriaxone (April 2021)    Potassium Iodide Itching     Severe itching    Tape Unspecified     Paper tape okay        PHYSICAL EXAM  VITAL SIGNS: BP (!) 119/91   Pulse 70   Temp 37.2 °C (98.9 °F) (Temporal)   Resp 18   Ht 1.676 m (5' 6\")   Wt 60.6 kg (133 lb 9.6 oz)   SpO2 99%   BMI 21.56 kg/m²          Pulse ox interpretation: I interpret this pulse ox as normal.  Constitutional: Alert in no apparent distress.  HENT: No signs of trauma, Bilateral external ears normal, Nose normal.   Eyes: Pupils are equal and reactive,  Neck: Normal range of motion, No tenderness, Supple  Cardiovascular: Regular rate and rhythm, no murmurs.   Thorax & Lungs: Normal breath sounds, No respiratory distress, No wheezing, No chest tenderness.   Abdomen: Bowel sounds normal, Soft, No tenderness, No masses, No pulsatile masses. No peritoneal signs.  Skin: Warm, Dry, No erythema, No rash.   Back: No bony tenderness, No CVA tenderness.   Extremities: Intact distal pulses, No edema, No tenderness, No cyanosis,  Negative Domonique's sign.   Musculoskeletal: Good range of motion in all major joints. No tenderness to palpation or major deformities noted.   Neurologic: Alert , Normal motor function, Normal sensory " function, No focal deficits noted.   Psychiatric: Affect normal, Judgment normal, Mood normal.         DIAGNOSTIC STUDIES / PROCEDURES      LABS  Results for orders placed or performed during the hospital encounter of 07/29/23   CBC WITH DIFFERENTIAL   Result Value Ref Range    WBC 9.4 4.8 - 10.8 K/uL    RBC 4.65 (L) 4.70 - 6.10 M/uL    Hemoglobin 14.8 14.0 - 18.0 g/dL    Hematocrit 45.3 42.0 - 52.0 %    MCV 97.4 81.4 - 97.8 fL    MCH 31.8 27.0 - 33.0 pg    MCHC 32.7 32.3 - 36.5 g/dL    RDW 49.3 35.9 - 50.0 fL    Platelet Count 265 164 - 446 K/uL    MPV 9.1 9.0 - 12.9 fL    Neutrophils-Polys 51.70 44.00 - 72.00 %    Lymphocytes 32.60 22.00 - 41.00 %    Monocytes 6.90 0.00 - 13.40 %    Eosinophils 8.00 (H) 0.00 - 6.90 %    Basophils 0.60 0.00 - 1.80 %    Immature Granulocytes 0.20 0.00 - 0.90 %    Nucleated RBC 0.00 0.00 - 0.20 /100 WBC    Neutrophils (Absolute) 4.84 1.82 - 7.42 K/uL    Lymphs (Absolute) 3.05 1.00 - 4.80 K/uL    Monos (Absolute) 0.65 0.00 - 0.85 K/uL    Eos (Absolute) 0.75 (H) 0.00 - 0.51 K/uL    Baso (Absolute) 0.06 0.00 - 0.12 K/uL    Immature Granulocytes (abs) 0.02 0.00 - 0.11 K/uL    NRBC (Absolute) 0.00 K/uL   COMP METABOLIC PANEL   Result Value Ref Range    Sodium 139 135 - 145 mmol/L    Potassium 4.4 3.6 - 5.5 mmol/L    Chloride 104 96 - 112 mmol/L    Co2 22 20 - 33 mmol/L    Anion Gap 13.0 7.0 - 16.0    Glucose 114 (H) 65 - 99 mg/dL    Bun 15 8 - 22 mg/dL    Creatinine 1.43 (H) 0.50 - 1.40 mg/dL    Calcium 9.6 8.5 - 10.5 mg/dL    Correct Calcium 9.4 8.5 - 10.5 mg/dL    AST(SGOT) 19 12 - 45 U/L    ALT(SGPT) 10 2 - 50 U/L    Alkaline Phosphatase 106 (H) 30 - 99 U/L    Total Bilirubin 0.3 0.1 - 1.5 mg/dL    Albumin 4.3 3.2 - 4.9 g/dL    Total Protein 7.8 6.0 - 8.2 g/dL    Globulin 3.5 1.9 - 3.5 g/dL    A-G Ratio 1.2 g/dL   LIPASE   Result Value Ref Range    Lipase 75 11 - 82 U/L   URINALYSIS    Specimen: Urine, Clean Catch   Result Value Ref Range    Color Yellow     Character Clear      Specific Gravity 1.018 <1.035    Ph 7.0 5.0 - 8.0    Glucose Negative Negative mg/dL    Ketones Negative Negative mg/dL    Protein Negative Negative mg/dL    Bilirubin Negative Negative    Urobilinogen, Urine 0.2 Negative    Nitrite Negative Negative    Leukocyte Esterase Moderate (A) Negative    Occult Blood Negative Negative    Micro Urine Req Microscopic    ESTIMATED GFR   Result Value Ref Range    GFR (CKD-EPI) 67 >60 mL/min/1.73 m 2   URINE MICROSCOPIC (W/UA)   Result Value Ref Range    WBC Packed (A) /hpf    RBC 5-10 (A) /hpf    Bacteria Moderate (A) None /hpf    Epithelial Cells Negative /hpf    Hyaline Cast 0-2 /lpf   Chlamydia/GC, PCR (Urine)    Specimen: Urine   Result Value Ref Range    Source Urine          RADIOLOGY  I have independently interpreted the diagnostic imaging associated with this visit and am waiting the final reading from the radiologist.   My preliminary interpretation is as follows: Left ureteral lithiasis  Radiologist interpretation:   CT-RENAL COLIC EVALUATION(A/P W/O)   Final Result      1.  Moderate left-sided hydronephrosis with a left lower ureteral stone present measuring 3 mm in size at or just above the left ureterovesical junction.      2.  Multiple 1 to 2 mm right-sided renal calyceal stones.      [id number]   Final Result      1.  No evidence of testicular mass or torsion.      2.  Small bilateral hydroceles.      3.  Mild increased vascularity of the epididymis bilaterally which can be seen with epididymitis.            COURSE & MEDICAL DECISION MAKING    ED Observation Status? Yes; I am placing the patient in to an observation status due to a diagnostic uncertainty as well as therapeutic intensity. Patient placed in observation status at 8:00PM, 7/29/2023.     Observation plan is as follows: Patient presented with flank and testicular pain.  High consideration for kidney stone.  Chart review revealing recent pyelonephritis.  Patient also with a prior history of STI  and he leaves consideration to possibility of recurrent STI infection at this point.    Upon Reevaluation, the patient's condition has: not improved; and will be escalated to hospitalization.    Patient discharged from ED Observation status at 2300 (Time) 7/29/2023 (Date).     INITIAL ASSESSMENT, COURSE AND PLAN  Care Narrative: Patient presenting with flank pain.  We will follow observational plan as above  DISPOSITION AND DISCUSSIONS  I have discussed management of the patient with the following physicians and RAJAT's: Dr. Moss and hospitalist    Discussion of management with other [facility] or appropriate source(s): Pharmacy for medication verification      33-year-old male presenting to the emergency department with the above presentation.  Urinalysis was positive.  Ultrasound imaging showing bilateral epididymal inflammatory changes.  Ultimately CT was able to be completed and also shows additional 3 mm ureteral lithiasis.  As stated above on chart review the patient has had relatively recent pyelonephritis.  I have started the patient empirically on antibiotics with respect to his cephalosporin allergy but he has pretty tolerated prior ceftriaxone and therefore this was provided along with doxycycline to also include for STI coverage.  He has required ongoing pain management.  At this point will be brought into the hospital service and urology will continue consultation.    FINAL DIAGNOSIS  1. Ureterolithiasis    2. Acute UTI           Electronically signed by: Sean Carlos M.D., 7/29/2023 8:13 PM

## 2023-07-30 NOTE — ASSESSMENT & PLAN NOTE
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

## 2023-07-30 NOTE — PROGRESS NOTES
4 Eyes Skin Assessment Completed by TATE Candelario and TATE Hannon.    Head WDL  Ears WDL  Nose WDL  Mouth WDL  Neck WDL  Breast/Chest WDL  Shoulder Blades WDL  Spine Incision sites,Healed, Scaring  (R) Arm/Elbow/Hand WDL  (L) Arm/Elbow/Hand WDL  Abdomen WDL  Groin WDL  Scrotum/Coccyx/Buttocks WDL  (R) Leg WDL  (L) Leg WDL  (R) Heel/Foot/Toe WDL  (L) Heel/Foot/Toe WDL      Devices In Places       Interventions In Place N/A    Possible Skin Injury No    Pictures Uploaded Into Epic N/A  Wound Consult Placed N/A  RN Wound Prevention Protocol Ordered No

## 2023-07-30 NOTE — ASSESSMENT & PLAN NOTE
Secondary to obstruction  IV fluid hydration   Monitor BMP and assess response  Avoid IV contrast/nephrotoxins/NSAIDs  Dose adjust meds for decreased GFR

## 2023-07-30 NOTE — CONSULTS
[facility] [place] Consult/H&P Note    Primary Service: Hospitalist  Attending: Raul Perdomo M.D.  Patient's Name/MRN: Russell Bunn, [id number]    Admit Date:7/29/2023  Today's Date: 7/29/2023   Length of stay:  LOS: 0 days   Room #: ORTHO/ORT 01      Reason for consult/chief complaint: Left ureteral calculus and possible urinary infection  ID/HPI: Russell Bunn is a 33 y.o. male patient with a history of cystinuria and multiple bilateral procedures for stones.  He presented complaining of left-sided flank and testicular pain concerning for recurrence of stone disease.  Due to worsening pain he presented for evaluation    He has had several days of left testicular discomfort radiating for the left flank he did not have any nausea or vomiting and no documented fevers at home    In the emergency room he was afebrile with stable vital signs.  He was evaluated with a CT scan that revealed a 3 mm ureterovesical junction calculus on the left and multiple 1 to 2 mm right-sided calyceal stones.  White blood cell count was normal at 9.4.  Creatinine was acutely elevated at 1.43.  Urine analysis was moderate for leukocyte Estrace but negative for urinary nitrite.  Moderate bacteria were noted.       Past Medical History:   Past Medical History:   Diagnosis Date    Kidney stones     Seizure disorder (HCC)         Past Surgical History:   Past Surgical History:   Procedure Laterality Date    CYSTO STENT PLACEMNT PRE SURG Right 4/24/2023    Procedure: CYSTOSCOPY, WITH RIGHT URETERAL STENT INSERTION;  Surgeon: Dante Stephenson M.D.;  Location: [facility];  Service: Urology    CYSTOSCOPY Left 02/20/2022    Cysto ureteroscopy ,lithotripsy, stone basket, ureteral stent placement, Dr Silver    NE CYSTOSCOPY,INSERT URETERAL STENT Left 2/20/2022    Procedure: CYSTOSCOPY, WITH URETERAL STENT INSERTION;  Surgeon: Rei Silver M.D.;  Location: [facility];  Service: Urology    NE  "CYSTO/URETERO/PYELOSCOPY, DX Left 2/20/2022    Procedure: URETEROSCOPY;  Surgeon: Rei Silver M.D.;  Location: [facility];  Service: Urology    LASERTRIPSY Left 2/20/2022    Procedure: LITHOTRIPSY, USING LASER;  Surgeon: Rei Silver M.D.;  Location: SURGERY [facility];  Service: Urology    RI CYSTOSCOPY,INSERT URETERAL STENT Left 4/26/2021    Procedure: CYSTOSCOPY, WITH OUT URETERAL STENT INSERTION;  Surgeon: Raf Moss M.D.;  Location: SUR[facility]ERY [facility];  Service: Urology    RI CYSTO/URETERO/PYELOSCOPY, DX Left 4/26/2021    Procedure: URETEROSCOPY;  Surgeon: Raf Moss M.D.;  Location: SURGERY [facility];  Service: Urology    RI CYSTO/URETERO/PYELOSCOPY, DX Right 3/13/2020    Procedure: URETEROSCOPY;  Surgeon: Chase Damon M.D.;  Location: [facility]ERY [facility];  Service: Urology    RI CYSTOSCOPY,INSERT URETERAL STENT Right 3/13/2020    Procedure: CYSTOSCOPY;  Surgeon: Chase Damon M.D.;  Location: SURGERY [facility];  Service: Urology    LASERTRIPSY Right 3/13/2020    Procedure: LITHOTRIPSY, USING LASER;  Surgeon: Chase Damon M.D.;  Location: SURGERY [facility];  Service: Urology    LITHOTRIPSY          Family History:   No family history on file.      Social History:   Social History     Tobacco Use    Smoking status: Former     Packs/day: 0.50     Types: Cigarettes    Smokeless tobacco: Never   Vaping Use    Vaping Use: Never used   Substance Use Topics    Alcohol use: Yes     Comment: Occasional    Drug use: Yes     Types: Inhaled     Comment: Marijuana, every day      Social History     Social History Narrative    Not on file        Allergies: he Dilaudid [hydromorphone], Kiwi extract, Shellfish allergy, Cefazolin, Potassium iodide, and Tape    Medications:   (Not in a hospital admission)        Review of Systems  ROS     Physical Exam  VITAL SIGNS: BP (!) 119/91   Pulse 70   Temp 37.2 °C (98.9 °F) (Temporal)   Resp 18   Ht 1.676 m (5' 6\")   Wt 60.6 kg " (133 lb 9.6 oz)   SpO2 99%   BMI 21.56 kg/m²   Physical Exam    Pleasant cooperative male no acute distress    Lungs clear    Cardiac exam regular rate rhythm    Abdomen is soft nontender    Minimal left flank tenderness     Labs:  Recent Labs     07/29/23 1913   WBC 9.4   RBC 4.65*   HEMOGLOBIN 14.8   HEMATOCRIT 45.3   MCV 97.4   MCH 31.8   MCHC 32.7   RDW 49.3   PLATELETCT 265   MPV 9.1     Recent Labs     07/29/23 1913   SODIUM 139   POTASSIUM 4.4   CHLORIDE 104   CO2 22   GLUCOSE 114*   BUN 15   CREATININE 1.43*   CALCIUM 9.6         Glucose:  Recent Labs     07/29/23 1913   GLUCOSE 114*     Coags:  No results for input(s): INR in the last 72 hours.      Urinalysis:   Recent Labs     07/29/23 1927   COLORURINE Yellow   CLARITY Clear   SPECGRAVITY 1.018   PHURINE 7.0   GLUCOSEUR Negative   KETONES Negative   NITRITE Negative   OCCULTBLOOD Negative   RBCURINE 5-10*   BACTERIA Moderate*   EPITHELCELL Negative       Imaging:  CT-RENAL COLIC EVALUATION(A/P W/O)   Final Result      1.  Moderate left-sided hydronephrosis with a left lower ureteral stone present measuring 3 mm in size at or just above the left ureterovesical junction.      2.  Multiple 1 to 2 mm right-sided renal calyceal stones.      [id number]   Final Result      1.  No evidence of testicular mass or torsion.      2.  Small bilateral hydroceles.      3.  Mild increased vascularity of the epididymis bilaterally which can be seen with epididymitis.          @lastct@     Assessment/Recommendation   33 y.o. male with a history of cystinuria who has a small left distal ureteral calculus at the ureterovesical junction.  There is associated hydronephrosis.  Urine is equivocal for infection with nitrite being negative but leukocyte Estrace positive and bacteria noted..  He does not have any signs of sepsis.  White blood cell count is normal.  He is afebrile.    He may be able to pass the stone as it is very distal at this time.  He has been  started on tamsulosin.  He will be left n.p.o. in case we need to emergently place a stent if he develops signs of sepsis.    He has received intramuscular ceftriaxone.  He will be admitted to the hospitalist service and observed and hydrated and we will reassess in the morning for any possible need for intervention.  He will be left n.p.o. at this time.    Raf Moss M.D.

## 2023-07-30 NOTE — H&P
Hospital Medicine History & Physical Note    Date of Service  7/29/2023    Primary Care Physician  Pcp Pt States None    Consultants      Specialist Names:     Code Status  Full Code    Chief Complaint  Chief Complaint   Patient presents with    Flank Pain     Pt reports he thinks he has a kidney stone on L side. Pt has hx of stones and recent surgery for one. Flank pain x couple hours. Pt also reports a throbbing pain in bladder area x 3 days       History of Presenting Illness  Russell Bunn is a 33 y.o. male who presented 7/29/2023 with past medical history of cystinuria, repeated nephrolithiasis who comes to the hospital for left-sided flank pain for the past 3 days.  The pain is not in radiating and nonpositional.  It is associated with fever, chills, dysuria.  He denies any hematuria.    CT scan of the abdomen found a moderate left-sided hydronephrosis with left lower ureteral stone measuring 3 mm.    I discussed the plan of care with patient.    Review of Systems  Review of Systems   Constitutional:  Positive for chills and fever. Negative for diaphoresis and malaise/fatigue.   HENT:  Negative for congestion, ear discharge, ear pain, hearing loss, nosebleeds, sinus pain, sore throat and tinnitus.    Eyes:  Negative for blurred vision, double vision, photophobia and pain.   Respiratory:  Negative for cough, hemoptysis, sputum production, shortness of breath, wheezing and stridor.    Cardiovascular:  Negative for chest pain, palpitations, orthopnea, claudication, leg swelling and PND.   Gastrointestinal:  Negative for abdominal pain, blood in stool, constipation, diarrhea, heartburn, melena, nausea and vomiting.   Genitourinary:  Positive for dysuria. Negative for flank pain, frequency, hematuria and urgency.   Musculoskeletal:  Negative for back pain, falls, joint pain, myalgias and neck pain.   Skin:  Negative for itching and rash.   Neurological:  Negative for dizziness, tingling, tremors,  weakness and headaches.   Endo/Heme/Allergies:  Negative for environmental allergies and polydipsia. Does not bruise/bleed easily.   Psychiatric/Behavioral:  Negative for depression, hallucinations, substance abuse and suicidal ideas.        Past Medical History   has a past medical history of Kidney stones and Seizure disorder (HCC).    Surgical History   has a past surgical history that includes lithotripsy; pr cysto/uretero/pyeloscopy, dx (Right, 3/13/2020); pr cystoscopy,insert ureteral stent (Right, 3/13/2020); lasertripsy (Right, 3/13/2020); pr cystoscopy,insert ureteral stent (Left, 4/26/2021); pr cysto/uretero/pyeloscopy, dx (Left, 4/26/2021); cystoscopy (Left, 02/20/2022); pr cystoscopy,insert ureteral stent (Left, 2/20/2022); pr cysto/uretero/pyeloscopy, dx (Left, 2/20/2022); lasertripsy (Left, 2/20/2022); and cysto stent placemnt pre surg (Right, 4/24/2023).     Family History  family history is not on file.   Family history reviewed with patient. There is no family history that is pertinent to the chief complaint.     Social History   reports that he has quit smoking. His smoking use included cigarettes. He smoked an average of .5 packs per day. He has never used smokeless tobacco. He reports current alcohol use. He reports current drug use. Drug: Inhaled.    Allergies  Allergies   Allergen Reactions    Dilaudid [Hydromorphone] Itching    Kiwi Extract Anaphylaxis    Shellfish Allergy Anaphylaxis    Cefazolin Rash and Itching     Rash  Tolerated ceftriaxone (April 2021)    Potassium Iodide Itching     Severe itching    Tape Unspecified     Paper tape okay        Medications  Prior to Admission Medications   Prescriptions Last Dose Informant Patient Reported? Taking?   ondansetron (ZOFRAN ODT) 4 MG TABLET DISPERSIBLE   No No   Sig: Take 1 Tablet by mouth every 6 hours as needed for Nausea/Vomiting.      Facility-Administered Medications: None       Physical Exam  Temp:  [37.2 °C (98.9 °F)] 37.2 °C (98.9  °F)  Pulse:  [70-86] 70  Resp:  [18] 18  BP: (108-119)/(84-91) 119/91  SpO2:  [96 %-99 %] 99 %  Blood Pressure: (!) 119/91   Temperature: 37.2 °C (98.9 °F)   Pulse: 70   Respiration: 18   Pulse Oximetry: 99 %       Physical Exam  Vitals and nursing note reviewed.   Constitutional:       General: He is not in acute distress.     Appearance: Normal appearance. He is not ill-appearing, toxic-appearing or diaphoretic.   HENT:      Head: Normocephalic and atraumatic.      Nose: No congestion or rhinorrhea.      Mouth/Throat:      Pharynx: No posterior oropharyngeal erythema.   Eyes:      General: No scleral icterus.        Right eye: No discharge.   Cardiovascular:      Rate and Rhythm: Normal rate and regular rhythm.      Pulses: Normal pulses.      Heart sounds: Normal heart sounds. No murmur heard.     No friction rub. No gallop.   Pulmonary:      Effort: Pulmonary effort is normal. No respiratory distress.      Breath sounds: Normal breath sounds. No stridor. No wheezing, rhonchi or rales.   Abdominal:      General: There is no distension.      Tenderness: There is no abdominal tenderness.   Musculoskeletal:         General: No swelling, tenderness, deformity or signs of injury.      Cervical back: Normal range of motion.      Right lower leg: No edema.      Left lower leg: No edema.   Skin:     Capillary Refill: Capillary refill takes more than 3 seconds.      Coloration: Skin is not jaundiced or pale.      Findings: No bruising, erythema, lesion or rash.   Neurological:      General: No focal deficit present.      Mental Status: He is alert and oriented to person, place, and time.         Laboratory:  Recent Labs     07/29/23 1913   WBC 9.4   RBC 4.65*   HEMOGLOBIN 14.8   HEMATOCRIT 45.3   MCV 97.4   MCH 31.8   MCHC 32.7   RDW 49.3   PLATELETCT 265   MPV 9.1     Recent Labs     07/29/23 1913   SODIUM 139   POTASSIUM 4.4   CHLORIDE 104   CO2 22   GLUCOSE 114*   BUN 15   CREATININE 1.43*   CALCIUM 9.6     Recent  Labs     07/29/23 1913   ALTSGPT 10   ASTSGOT 19   ALKPHOSPHAT 106*   TBILIRUBIN 0.3   LIPASE 75   GLUCOSE 114*         No results for input(s): NTPROBNP in the last 72 hours.      No results for input(s): TROPONINT in the last 72 hours.    Imaging:  CT-RENAL COLIC EVALUATION(A/P W/O)   Final Result      1.  Moderate left-sided hydronephrosis with a left lower ureteral stone present measuring 3 mm in size at or just above the left ureterovesical junction.      2.  Multiple 1 to 2 mm right-sided renal calyceal stones.      [id number]   Final Result      1.  No evidence of testicular mass or torsion.      2.  Small bilateral hydroceles.      3.  Mild increased vascularity of the epididymis bilaterally which can be seen with epididymitis.          no X-Ray or EKG requiring interpretation    Assessment/Plan:  Justification for Admission Status  I anticipate this patient will require at least two midnights for appropriate medical management, necessitating inpatient admission because ureterolithiasis    Patient will need a Med/Surg bed on MEDICAL service .  The need is secondary to ureterolithiasis.    * Ureterolithiasis- (present on admission)  Assessment & Plan  Patient has a history of cystinuria and has repeated stones in the past  IV hydration  Flomax  Pain control with oral and IV narcotics  3 mm stone and will wait to see if it passes      Acute pyelonephritis- (present on admission)  Assessment & Plan  symptoms includes dysuria and frequency  UA + for bacteria, LE and nitrite  F/u urine cultures  Started pt on ceftriaxone  Previous cultures found Enterococcus      Acute renal failure (ARF) (HCC)- (present on admission)  Assessment & Plan  Secondary to obstruction  IV fluid hydration   Monitor BMP and assess response  Avoid IV contrast/nephrotoxins/NSAIDs  Dose adjust meds for decreased GFR        Hydronephrosis- (present on admission)  Assessment & Plan  Continue IV hydration        VTE prophylaxis:  SCDs/TEDs

## 2023-07-31 ENCOUNTER — TELEPHONE (OUTPATIENT)
Dept: HEALTH INFORMATION MANAGEMENT | Facility: OTHER | Age: 33
End: 2023-07-31

## 2023-08-10 ENCOUNTER — APPOINTMENT (OUTPATIENT)
Dept: RADIOLOGY | Facility: MEDICAL CENTER | Age: 33
DRG: 854 | End: 2023-08-10
Attending: EMERGENCY MEDICINE
Payer: MEDICAID

## 2023-08-10 ENCOUNTER — HOSPITAL ENCOUNTER (INPATIENT)
Facility: MEDICAL CENTER | Age: 33
LOS: 1 days | DRG: 854 | End: 2023-08-11
Attending: EMERGENCY MEDICINE | Admitting: STUDENT IN AN ORGANIZED HEALTH CARE EDUCATION/TRAINING PROGRAM
Payer: MEDICAID

## 2023-08-10 ENCOUNTER — ANESTHESIA EVENT (OUTPATIENT)
Dept: SURGERY | Facility: MEDICAL CENTER | Age: 33
DRG: 854 | End: 2023-08-10
Payer: MEDICAID

## 2023-08-10 ENCOUNTER — APPOINTMENT (OUTPATIENT)
Dept: RADIOLOGY | Facility: MEDICAL CENTER | Age: 33
DRG: 854 | End: 2023-08-10
Attending: UROLOGY
Payer: MEDICAID

## 2023-08-10 ENCOUNTER — ANESTHESIA (OUTPATIENT)
Dept: SURGERY | Facility: MEDICAL CENTER | Age: 33
DRG: 854 | End: 2023-08-10
Payer: MEDICAID

## 2023-08-10 DIAGNOSIS — K59.00 CONSTIPATION, UNSPECIFIED CONSTIPATION TYPE: ICD-10-CM

## 2023-08-10 DIAGNOSIS — R11.2 NAUSEA AND VOMITING, UNSPECIFIED VOMITING TYPE: ICD-10-CM

## 2023-08-10 DIAGNOSIS — N10 ACUTE PYELONEPHRITIS: ICD-10-CM

## 2023-08-10 DIAGNOSIS — R50.9 FEVER, UNSPECIFIED FEVER CAUSE: ICD-10-CM

## 2023-08-10 DIAGNOSIS — E87.20 LACTIC ACID ACIDOSIS: ICD-10-CM

## 2023-08-10 DIAGNOSIS — N20.1 URETERAL CALCULI: ICD-10-CM

## 2023-08-10 PROBLEM — N39.0 ACUTE UTI: Status: ACTIVE | Noted: 2023-08-10

## 2023-08-10 PROBLEM — A41.9 SEPSIS (HCC): Status: ACTIVE | Noted: 2023-08-10

## 2023-08-10 LAB
ALBUMIN SERPL BCP-MCNC: 4.1 G/DL (ref 3.2–4.9)
ALBUMIN/GLOB SERPL: 1.1 G/DL
ALP SERPL-CCNC: 96 U/L (ref 30–99)
ALT SERPL-CCNC: 9 U/L (ref 2–50)
ANION GAP SERPL CALC-SCNC: 15 MMOL/L (ref 7–16)
APPEARANCE UR: CLEAR
AST SERPL-CCNC: 24 U/L (ref 12–45)
BACTERIA #/AREA URNS HPF: NEGATIVE /HPF
BASOPHILS # BLD AUTO: 0.7 % (ref 0–1.8)
BASOPHILS # BLD: 0.06 K/UL (ref 0–0.12)
BILIRUB SERPL-MCNC: 0.4 MG/DL (ref 0.1–1.5)
BILIRUB UR QL STRIP.AUTO: NEGATIVE
BUN SERPL-MCNC: 12 MG/DL (ref 8–22)
CALCIUM ALBUM COR SERPL-MCNC: 9.5 MG/DL (ref 8.5–10.5)
CALCIUM SERPL-MCNC: 9.6 MG/DL (ref 8.5–10.5)
CHLORIDE SERPL-SCNC: 103 MMOL/L (ref 96–112)
CO2 SERPL-SCNC: 19 MMOL/L (ref 20–33)
COLOR UR: YELLOW
CREAT SERPL-MCNC: 1.29 MG/DL (ref 0.5–1.4)
EKG IMPRESSION: NORMAL
EOSINOPHIL # BLD AUTO: 0.1 K/UL (ref 0–0.51)
EOSINOPHIL NFR BLD: 1.1 % (ref 0–6.9)
EPI CELLS #/AREA URNS HPF: ABNORMAL /HPF
ERYTHROCYTE [DISTWIDTH] IN BLOOD BY AUTOMATED COUNT: 48.9 FL (ref 35.9–50)
GFR SERPLBLD CREATININE-BSD FMLA CKD-EPI: 75 ML/MIN/1.73 M 2
GLOBULIN SER CALC-MCNC: 3.7 G/DL (ref 1.9–3.5)
GLUCOSE SERPL-MCNC: 75 MG/DL (ref 65–99)
GLUCOSE UR STRIP.AUTO-MCNC: NEGATIVE MG/DL
HCT VFR BLD AUTO: 46.6 % (ref 42–52)
HGB BLD-MCNC: 15.3 G/DL (ref 14–18)
HYALINE CASTS #/AREA URNS LPF: ABNORMAL /LPF
IMM GRANULOCYTES # BLD AUTO: 0.02 K/UL (ref 0–0.11)
IMM GRANULOCYTES NFR BLD AUTO: 0.2 % (ref 0–0.9)
INR PPP: 1.16 (ref 0.87–1.13)
KETONES UR STRIP.AUTO-MCNC: NEGATIVE MG/DL
LACTATE SERPL-SCNC: 0.8 MMOL/L (ref 0.5–2)
LACTATE SERPL-SCNC: 0.8 MMOL/L (ref 0.5–2)
LACTATE SERPL-SCNC: 1.1 MMOL/L (ref 0.5–2)
LACTATE SERPL-SCNC: 2.2 MMOL/L (ref 0.5–2)
LEUKOCYTE ESTERASE UR QL STRIP.AUTO: ABNORMAL
LIPASE SERPL-CCNC: 57 U/L (ref 11–82)
LYMPHOCYTES # BLD AUTO: 0.88 K/UL (ref 1–4.8)
LYMPHOCYTES NFR BLD: 9.8 % (ref 22–41)
MCH RBC QN AUTO: 32.2 PG (ref 27–33)
MCHC RBC AUTO-ENTMCNC: 32.8 G/DL (ref 32.3–36.5)
MCV RBC AUTO: 98.1 FL (ref 81.4–97.8)
MICRO URNS: ABNORMAL
MONOCYTES # BLD AUTO: 0.92 K/UL (ref 0–0.85)
MONOCYTES NFR BLD AUTO: 10.2 % (ref 0–13.4)
NEUTROPHILS # BLD AUTO: 7.04 K/UL (ref 1.82–7.42)
NEUTROPHILS NFR BLD: 78 % (ref 44–72)
NITRITE UR QL STRIP.AUTO: NEGATIVE
NRBC # BLD AUTO: 0 K/UL
NRBC BLD-RTO: 0 /100 WBC (ref 0–0.2)
PH UR STRIP.AUTO: 8 [PH] (ref 5–8)
PLATELET # BLD AUTO: 271 K/UL (ref 164–446)
PMV BLD AUTO: 9 FL (ref 9–12.9)
POTASSIUM SERPL-SCNC: 4.5 MMOL/L (ref 3.6–5.5)
PROT SERPL-MCNC: 7.8 G/DL (ref 6–8.2)
PROT UR QL STRIP: 100 MG/DL
PROTHROMBIN TIME: 14.6 SEC (ref 12–14.6)
RBC # BLD AUTO: 4.75 M/UL (ref 4.7–6.1)
RBC # URNS HPF: ABNORMAL /HPF
RBC UR QL AUTO: NEGATIVE
SODIUM SERPL-SCNC: 137 MMOL/L (ref 135–145)
SP GR UR STRIP.AUTO: 1.02
UROBILINOGEN UR STRIP.AUTO-MCNC: 0.2 MG/DL
WBC # BLD AUTO: 9 K/UL (ref 4.8–10.8)
WBC #/AREA URNS HPF: ABNORMAL /HPF

## 2023-08-10 PROCEDURE — 87040 BLOOD CULTURE FOR BACTERIA: CPT | Mod: 91

## 2023-08-10 PROCEDURE — 700111 HCHG RX REV CODE 636 W/ 250 OVERRIDE (IP): Performed by: ANESTHESIOLOGY

## 2023-08-10 PROCEDURE — 160039 HCHG SURGERY MINUTES - EA ADDL 1 MIN LEVEL 3: Performed by: UROLOGY

## 2023-08-10 PROCEDURE — 160036 HCHG PACU - EA ADDL 30 MINS PHASE I: Performed by: UROLOGY

## 2023-08-10 PROCEDURE — 93005 ELECTROCARDIOGRAM TRACING: CPT | Performed by: UROLOGY

## 2023-08-10 PROCEDURE — 99223 1ST HOSP IP/OBS HIGH 75: CPT | Mod: AI | Performed by: STUDENT IN AN ORGANIZED HEALTH CARE EDUCATION/TRAINING PROGRAM

## 2023-08-10 PROCEDURE — 83690 ASSAY OF LIPASE: CPT

## 2023-08-10 PROCEDURE — 80053 COMPREHEN METABOLIC PANEL: CPT

## 2023-08-10 PROCEDURE — 96376 TX/PRO/DX INJ SAME DRUG ADON: CPT

## 2023-08-10 PROCEDURE — 110371 HCHG SHELL REV 272: Performed by: UROLOGY

## 2023-08-10 PROCEDURE — 87077 CULTURE AEROBIC IDENTIFY: CPT

## 2023-08-10 PROCEDURE — 160035 HCHG PACU - 1ST 60 MINS PHASE I: Performed by: UROLOGY

## 2023-08-10 PROCEDURE — 770006 HCHG ROOM/CARE - MED/SURG/GYN SEMI*

## 2023-08-10 PROCEDURE — 700105 HCHG RX REV CODE 258: Mod: JZ,UD | Performed by: EMERGENCY MEDICINE

## 2023-08-10 PROCEDURE — 83605 ASSAY OF LACTIC ACID: CPT | Mod: 91

## 2023-08-10 PROCEDURE — 85610 PROTHROMBIN TIME: CPT

## 2023-08-10 PROCEDURE — 160028 HCHG SURGERY MINUTES - 1ST 30 MINS LEVEL 3: Performed by: UROLOGY

## 2023-08-10 PROCEDURE — 87186 SC STD MICRODIL/AGAR DIL: CPT

## 2023-08-10 PROCEDURE — 700111 HCHG RX REV CODE 636 W/ 250 OVERRIDE (IP): Mod: JZ,UD | Performed by: EMERGENCY MEDICINE

## 2023-08-10 PROCEDURE — 36415 COLL VENOUS BLD VENIPUNCTURE: CPT

## 2023-08-10 PROCEDURE — 96375 TX/PRO/DX INJ NEW DRUG ADDON: CPT

## 2023-08-10 PROCEDURE — 700102 HCHG RX REV CODE 250 W/ 637 OVERRIDE(OP): Performed by: STUDENT IN AN ORGANIZED HEALTH CARE EDUCATION/TRAINING PROGRAM

## 2023-08-10 PROCEDURE — 160002 HCHG RECOVERY MINUTES (STAT): Performed by: UROLOGY

## 2023-08-10 PROCEDURE — 96374 THER/PROPH/DIAG INJ IV PUSH: CPT

## 2023-08-10 PROCEDURE — 700101 HCHG RX REV CODE 250: Performed by: ANESTHESIOLOGY

## 2023-08-10 PROCEDURE — 160048 HCHG OR STATISTICAL LEVEL 1-5: Performed by: UROLOGY

## 2023-08-10 PROCEDURE — C2617 STENT, NON-COR, TEM W/O DEL: HCPCS | Performed by: UROLOGY

## 2023-08-10 PROCEDURE — 99285 EMERGENCY DEPT VISIT HI MDM: CPT

## 2023-08-10 PROCEDURE — 74176 CT ABD & PELVIS W/O CONTRAST: CPT

## 2023-08-10 PROCEDURE — 700111 HCHG RX REV CODE 636 W/ 250 OVERRIDE (IP): Mod: JZ,UD

## 2023-08-10 PROCEDURE — 700111 HCHG RX REV CODE 636 W/ 250 OVERRIDE (IP)

## 2023-08-10 PROCEDURE — 87086 URINE CULTURE/COLONY COUNT: CPT

## 2023-08-10 PROCEDURE — 700105 HCHG RX REV CODE 258: Performed by: STUDENT IN AN ORGANIZED HEALTH CARE EDUCATION/TRAINING PROGRAM

## 2023-08-10 PROCEDURE — 85025 COMPLETE CBC W/AUTO DIFF WBC: CPT

## 2023-08-10 PROCEDURE — 71045 X-RAY EXAM CHEST 1 VIEW: CPT

## 2023-08-10 PROCEDURE — A9270 NON-COVERED ITEM OR SERVICE: HCPCS | Performed by: STUDENT IN AN ORGANIZED HEALTH CARE EDUCATION/TRAINING PROGRAM

## 2023-08-10 PROCEDURE — 0T778DZ DILATION OF LEFT URETER WITH INTRALUMINAL DEVICE, VIA NATURAL OR ARTIFICIAL OPENING ENDOSCOPIC: ICD-10-PCS | Performed by: UROLOGY

## 2023-08-10 PROCEDURE — 160009 HCHG ANES TIME/MIN: Performed by: UROLOGY

## 2023-08-10 PROCEDURE — 81001 URINALYSIS AUTO W/SCOPE: CPT

## 2023-08-10 DEVICE — STENT UROLOGICAL POLARIS 6X26  ULTRA: Type: IMPLANTABLE DEVICE | Site: URETER | Status: FUNCTIONAL

## 2023-08-10 RX ORDER — HALOPERIDOL 5 MG/ML
1 INJECTION INTRAMUSCULAR
Status: DISCONTINUED | OUTPATIENT
Start: 2023-08-10 | End: 2023-08-10 | Stop reason: HOSPADM

## 2023-08-10 RX ORDER — HYDROMORPHONE HYDROCHLORIDE 1 MG/ML
0.4 INJECTION, SOLUTION INTRAMUSCULAR; INTRAVENOUS; SUBCUTANEOUS
Status: DISCONTINUED | OUTPATIENT
Start: 2023-08-10 | End: 2023-08-10 | Stop reason: HOSPADM

## 2023-08-10 RX ORDER — KETOROLAC TROMETHAMINE 30 MG/ML
15 INJECTION, SOLUTION INTRAMUSCULAR; INTRAVENOUS EVERY 6 HOURS PRN
Status: DISCONTINUED | OUTPATIENT
Start: 2023-08-10 | End: 2023-08-11 | Stop reason: HOSPADM

## 2023-08-10 RX ORDER — HYDROMORPHONE HYDROCHLORIDE 1 MG/ML
0.1 INJECTION, SOLUTION INTRAMUSCULAR; INTRAVENOUS; SUBCUTANEOUS
Status: DISCONTINUED | OUTPATIENT
Start: 2023-08-10 | End: 2023-08-10 | Stop reason: HOSPADM

## 2023-08-10 RX ORDER — ACETAMINOPHEN 325 MG/1
650 TABLET ORAL EVERY 6 HOURS PRN
Status: DISCONTINUED | OUTPATIENT
Start: 2023-08-10 | End: 2023-08-11 | Stop reason: HOSPADM

## 2023-08-10 RX ORDER — DIPHENHYDRAMINE HYDROCHLORIDE 50 MG/ML
12.5 INJECTION INTRAMUSCULAR; INTRAVENOUS
Status: DISCONTINUED | OUTPATIENT
Start: 2023-08-10 | End: 2023-08-10 | Stop reason: HOSPADM

## 2023-08-10 RX ORDER — MIDAZOLAM HYDROCHLORIDE 1 MG/ML
1 INJECTION INTRAMUSCULAR; INTRAVENOUS
Status: DISCONTINUED | OUTPATIENT
Start: 2023-08-10 | End: 2023-08-10 | Stop reason: HOSPADM

## 2023-08-10 RX ORDER — AMOXICILLIN 250 MG
2 CAPSULE ORAL 2 TIMES DAILY
Status: DISCONTINUED | OUTPATIENT
Start: 2023-08-10 | End: 2023-08-11 | Stop reason: HOSPADM

## 2023-08-10 RX ORDER — DEXAMETHASONE SODIUM PHOSPHATE 4 MG/ML
INJECTION, SOLUTION INTRA-ARTICULAR; INTRALESIONAL; INTRAMUSCULAR; INTRAVENOUS; SOFT TISSUE PRN
Status: DISCONTINUED | OUTPATIENT
Start: 2023-08-10 | End: 2023-08-10 | Stop reason: SURG

## 2023-08-10 RX ORDER — OXYCODONE HYDROCHLORIDE 5 MG/1
2.5 TABLET ORAL
Status: DISCONTINUED | OUTPATIENT
Start: 2023-08-10 | End: 2023-08-11 | Stop reason: HOSPADM

## 2023-08-10 RX ORDER — HYDROMORPHONE HYDROCHLORIDE 1 MG/ML
0.5 INJECTION, SOLUTION INTRAMUSCULAR; INTRAVENOUS; SUBCUTANEOUS ONCE
Status: COMPLETED | OUTPATIENT
Start: 2023-08-10 | End: 2023-08-10

## 2023-08-10 RX ORDER — OXYCODONE HYDROCHLORIDE 5 MG/1
5 TABLET ORAL
Status: DISCONTINUED | OUTPATIENT
Start: 2023-08-10 | End: 2023-08-11 | Stop reason: HOSPADM

## 2023-08-10 RX ORDER — MORPHINE SULFATE 4 MG/ML
4 INJECTION INTRAVENOUS ONCE
Status: COMPLETED | OUTPATIENT
Start: 2023-08-10 | End: 2023-08-10

## 2023-08-10 RX ORDER — BISACODYL 10 MG
10 SUPPOSITORY, RECTAL RECTAL
Status: DISCONTINUED | OUTPATIENT
Start: 2023-08-10 | End: 2023-08-11 | Stop reason: HOSPADM

## 2023-08-10 RX ORDER — LIDOCAINE HYDROCHLORIDE 20 MG/ML
INJECTION, SOLUTION EPIDURAL; INFILTRATION; INTRACAUDAL; PERINEURAL PRN
Status: DISCONTINUED | OUTPATIENT
Start: 2023-08-10 | End: 2023-08-10 | Stop reason: SURG

## 2023-08-10 RX ORDER — EPHEDRINE SULFATE 50 MG/ML
5 INJECTION, SOLUTION INTRAVENOUS
Status: DISCONTINUED | OUTPATIENT
Start: 2023-08-10 | End: 2023-08-10 | Stop reason: HOSPADM

## 2023-08-10 RX ORDER — ONDANSETRON 2 MG/ML
4 INJECTION INTRAMUSCULAR; INTRAVENOUS ONCE
Status: COMPLETED | OUTPATIENT
Start: 2023-08-10 | End: 2023-08-10

## 2023-08-10 RX ORDER — ONDANSETRON 2 MG/ML
INJECTION INTRAMUSCULAR; INTRAVENOUS PRN
Status: DISCONTINUED | OUTPATIENT
Start: 2023-08-10 | End: 2023-08-10 | Stop reason: SURG

## 2023-08-10 RX ORDER — SODIUM CHLORIDE, SODIUM LACTATE, POTASSIUM CHLORIDE, CALCIUM CHLORIDE 600; 310; 30; 20 MG/100ML; MG/100ML; MG/100ML; MG/100ML
1000 INJECTION, SOLUTION INTRAVENOUS ONCE
Status: COMPLETED | OUTPATIENT
Start: 2023-08-10 | End: 2023-08-10

## 2023-08-10 RX ORDER — ONDANSETRON 2 MG/ML
4 INJECTION INTRAMUSCULAR; INTRAVENOUS
Status: DISCONTINUED | OUTPATIENT
Start: 2023-08-10 | End: 2023-08-10 | Stop reason: HOSPADM

## 2023-08-10 RX ORDER — HYDRALAZINE HYDROCHLORIDE 20 MG/ML
5 INJECTION INTRAMUSCULAR; INTRAVENOUS
Status: DISCONTINUED | OUTPATIENT
Start: 2023-08-10 | End: 2023-08-10 | Stop reason: HOSPADM

## 2023-08-10 RX ORDER — SODIUM CHLORIDE, SODIUM LACTATE, POTASSIUM CHLORIDE, CALCIUM CHLORIDE 600; 310; 30; 20 MG/100ML; MG/100ML; MG/100ML; MG/100ML
INJECTION, SOLUTION INTRAVENOUS CONTINUOUS
Status: DISCONTINUED | OUTPATIENT
Start: 2023-08-10 | End: 2023-08-11 | Stop reason: HOSPADM

## 2023-08-10 RX ORDER — SODIUM CHLORIDE, SODIUM LACTATE, POTASSIUM CHLORIDE, AND CALCIUM CHLORIDE .6; .31; .03; .02 G/100ML; G/100ML; G/100ML; G/100ML
1000 INJECTION, SOLUTION INTRAVENOUS ONCE
Status: COMPLETED | OUTPATIENT
Start: 2023-08-10 | End: 2023-08-10

## 2023-08-10 RX ORDER — LABETALOL HYDROCHLORIDE 5 MG/ML
5 INJECTION, SOLUTION INTRAVENOUS
Status: DISCONTINUED | OUTPATIENT
Start: 2023-08-10 | End: 2023-08-10 | Stop reason: HOSPADM

## 2023-08-10 RX ORDER — MIDAZOLAM HYDROCHLORIDE 1 MG/ML
INJECTION INTRAMUSCULAR; INTRAVENOUS PRN
Status: DISCONTINUED | OUTPATIENT
Start: 2023-08-10 | End: 2023-08-10 | Stop reason: SURG

## 2023-08-10 RX ORDER — SODIUM CHLORIDE, SODIUM LACTATE, POTASSIUM CHLORIDE, AND CALCIUM CHLORIDE .6; .31; .03; .02 G/100ML; G/100ML; G/100ML; G/100ML
30 INJECTION, SOLUTION INTRAVENOUS ONCE
Status: DISCONTINUED | OUTPATIENT
Start: 2023-08-10 | End: 2023-08-10

## 2023-08-10 RX ORDER — POLYETHYLENE GLYCOL 3350 17 G/17G
1 POWDER, FOR SOLUTION ORAL
Status: DISCONTINUED | OUTPATIENT
Start: 2023-08-10 | End: 2023-08-11 | Stop reason: HOSPADM

## 2023-08-10 RX ORDER — HYDROMORPHONE HYDROCHLORIDE 1 MG/ML
0.2 INJECTION, SOLUTION INTRAMUSCULAR; INTRAVENOUS; SUBCUTANEOUS
Status: DISCONTINUED | OUTPATIENT
Start: 2023-08-10 | End: 2023-08-10 | Stop reason: HOSPADM

## 2023-08-10 RX ORDER — SODIUM CHLORIDE, SODIUM LACTATE, POTASSIUM CHLORIDE, CALCIUM CHLORIDE 600; 310; 30; 20 MG/100ML; MG/100ML; MG/100ML; MG/100ML
INJECTION, SOLUTION INTRAVENOUS CONTINUOUS
Status: DISCONTINUED | OUTPATIENT
Start: 2023-08-10 | End: 2023-08-10 | Stop reason: HOSPADM

## 2023-08-10 RX ORDER — PHENYLEPHRINE HCL IN 0.9% NACL 0.5 MG/5ML
SYRINGE (ML) INTRAVENOUS PRN
Status: DISCONTINUED | OUTPATIENT
Start: 2023-08-10 | End: 2023-08-10 | Stop reason: SURG

## 2023-08-10 RX ORDER — DIPHENHYDRAMINE HYDROCHLORIDE 50 MG/ML
25 INJECTION INTRAMUSCULAR; INTRAVENOUS ONCE
Status: DISCONTINUED | OUTPATIENT
Start: 2023-08-10 | End: 2023-08-11

## 2023-08-10 RX ADMIN — SODIUM CHLORIDE, POTASSIUM CHLORIDE, SODIUM LACTATE AND CALCIUM CHLORIDE 1000 ML: 600; 310; 30; 20 INJECTION, SOLUTION INTRAVENOUS at 14:45

## 2023-08-10 RX ADMIN — SODIUM CHLORIDE, POTASSIUM CHLORIDE, SODIUM LACTATE AND CALCIUM CHLORIDE: 600; 310; 30; 20 INJECTION, SOLUTION INTRAVENOUS at 14:45

## 2023-08-10 RX ADMIN — FENTANYL CITRATE 50 MCG: 50 INJECTION, SOLUTION INTRAMUSCULAR; INTRAVENOUS at 17:23

## 2023-08-10 RX ADMIN — HYDROMORPHONE HYDROCHLORIDE 0.5 MG: 1 INJECTION, SOLUTION INTRAMUSCULAR; INTRAVENOUS; SUBCUTANEOUS at 21:52

## 2023-08-10 RX ADMIN — OXYCODONE HYDROCHLORIDE 5 MG: 5 TABLET ORAL at 20:34

## 2023-08-10 RX ADMIN — SODIUM CHLORIDE, POTASSIUM CHLORIDE, SODIUM LACTATE AND CALCIUM CHLORIDE 1000 ML: 600; 310; 30; 20 INJECTION, SOLUTION INTRAVENOUS at 13:51

## 2023-08-10 RX ADMIN — MORPHINE SULFATE 4 MG: 4 INJECTION, SOLUTION INTRAMUSCULAR; INTRAVENOUS at 13:53

## 2023-08-10 RX ADMIN — PROPOFOL 50 MG: 10 INJECTION, EMULSION INTRAVENOUS at 18:06

## 2023-08-10 RX ADMIN — LIDOCAINE HYDROCHLORIDE 100 MG: 20 INJECTION, SOLUTION EPIDURAL; INFILTRATION; INTRACAUDAL at 17:23

## 2023-08-10 RX ADMIN — PROPOFOL 200 MG: 10 INJECTION, EMULSION INTRAVENOUS at 17:24

## 2023-08-10 RX ADMIN — MORPHINE SULFATE 4 MG: 4 INJECTION, SOLUTION INTRAMUSCULAR; INTRAVENOUS at 12:26

## 2023-08-10 RX ADMIN — MIDAZOLAM 2 MG: 1 INJECTION, SOLUTION INTRAMUSCULAR; INTRAVENOUS at 17:23

## 2023-08-10 RX ADMIN — SODIUM CHLORIDE, POTASSIUM CHLORIDE, SODIUM LACTATE AND CALCIUM CHLORIDE: 600; 310; 30; 20 INJECTION, SOLUTION INTRAVENOUS at 19:44

## 2023-08-10 RX ADMIN — FENTANYL CITRATE 50 MCG: 50 INJECTION, SOLUTION INTRAMUSCULAR; INTRAVENOUS at 17:30

## 2023-08-10 RX ADMIN — ONDANSETRON 4 MG: 2 INJECTION INTRAMUSCULAR; INTRAVENOUS at 17:30

## 2023-08-10 RX ADMIN — MIDAZOLAM 2 MG: 1 INJECTION, SOLUTION INTRAMUSCULAR; INTRAVENOUS at 17:20

## 2023-08-10 RX ADMIN — CEFTRIAXONE SODIUM 1000 MG: 10 INJECTION, POWDER, FOR SOLUTION INTRAVENOUS at 13:38

## 2023-08-10 RX ADMIN — DEXAMETHASONE SODIUM PHOSPHATE 4 MG: 4 INJECTION INTRA-ARTICULAR; INTRALESIONAL; INTRAMUSCULAR; INTRAVENOUS; SOFT TISSUE at 17:30

## 2023-08-10 RX ADMIN — ACETAMINOPHEN 650 MG: 325 TABLET, FILM COATED ORAL at 21:53

## 2023-08-10 RX ADMIN — ONDANSETRON 4 MG: 2 INJECTION INTRAMUSCULAR; INTRAVENOUS at 12:25

## 2023-08-10 RX ADMIN — Medication 100 MCG: at 18:00

## 2023-08-10 ASSESSMENT — PATIENT HEALTH QUESTIONNAIRE - PHQ9
9. THOUGHTS THAT YOU WOULD BE BETTER OFF DEAD, OR OF HURTING YOURSELF: NOT AT ALL
7. TROUBLE CONCENTRATING ON THINGS, SUCH AS READING THE NEWSPAPER OR WATCHING TELEVISION: NOT AT ALL
3. TROUBLE FALLING OR STAYING ASLEEP OR SLEEPING TOO MUCH: MORE THAN HALF THE DAYS
6. FEELING BAD ABOUT YOURSELF - OR THAT YOU ARE A FAILURE OR HAVE LET YOURSELF OR YOUR FAMILY DOWN: NOT AL ALL
2. FEELING DOWN, DEPRESSED, IRRITABLE, OR HOPELESS: SEVERAL DAYS
1. LITTLE INTEREST OR PLEASURE IN DOING THINGS: SEVERAL DAYS
SUM OF ALL RESPONSES TO PHQ QUESTIONS 1-9: 6
4. FEELING TIRED OR HAVING LITTLE ENERGY: SEVERAL DAYS
SUM OF ALL RESPONSES TO PHQ9 QUESTIONS 1 AND 2: 2
8. MOVING OR SPEAKING SO SLOWLY THAT OTHER PEOPLE COULD HAVE NOTICED. OR THE OPPOSITE, BEING SO FIGETY OR RESTLESS THAT YOU HAVE BEEN MOVING AROUND A LOT MORE THAN USUAL: NOT AT ALL
5. POOR APPETITE OR OVEREATING: SEVERAL DAYS

## 2023-08-10 ASSESSMENT — LIFESTYLE VARIABLES
EVER FELT BAD OR GUILTY ABOUT YOUR DRINKING: NO
HAVE PEOPLE ANNOYED YOU BY CRITICIZING YOUR DRINKING: NO
DOES PATIENT WANT TO STOP DRINKING: CANNOT ASSESS
HAVE YOU EVER FELT YOU SHOULD CUT DOWN ON YOUR DRINKING: NO
CONSUMPTION TOTAL: NEGATIVE
TOTAL SCORE: 0
AVERAGE NUMBER OF DAYS PER WEEK YOU HAVE A DRINK CONTAINING ALCOHOL: 0
TOTAL SCORE: 0
EVER HAD A DRINK FIRST THING IN THE MORNING TO STEADY YOUR NERVES TO GET RID OF A HANGOVER: NO
HOW MANY TIMES IN THE PAST YEAR HAVE YOU HAD 5 OR MORE DRINKS IN A DAY: 0
ALCOHOL_USE: NO
ON A TYPICAL DAY WHEN YOU DRINK ALCOHOL HOW MANY DRINKS DO YOU HAVE: 0
TOTAL SCORE: 0

## 2023-08-10 ASSESSMENT — COGNITIVE AND FUNCTIONAL STATUS - GENERAL
WALKING IN HOSPITAL ROOM: A LITTLE
DAILY ACTIVITIY SCORE: 24
SUGGESTED CMS G CODE MODIFIER DAILY ACTIVITY: CH
MOVING TO AND FROM BED TO CHAIR: A LITTLE
SUGGESTED CMS G CODE MODIFIER MOBILITY: CK
MOVING FROM LYING ON BACK TO SITTING ON SIDE OF FLAT BED: A LITTLE
MOBILITY SCORE: 19
CLIMB 3 TO 5 STEPS WITH RAILING: A LITTLE
STANDING UP FROM CHAIR USING ARMS: A LITTLE

## 2023-08-10 ASSESSMENT — ENCOUNTER SYMPTOMS
FEVER: 1
ABDOMINAL PAIN: 1

## 2023-08-10 ASSESSMENT — FIBROSIS 4 INDEX
FIB4 SCORE: 0.94
FIB4 SCORE: 0.94
FIB4 SCORE: 1.26

## 2023-08-10 ASSESSMENT — PAIN DESCRIPTION - PAIN TYPE
TYPE: SURGICAL PAIN
TYPE: ACUTE PAIN;SURGICAL PAIN

## 2023-08-10 NOTE — ASSESSMENT & PLAN NOTE
Lactic acidosis likely secondary to urosepsis  Continue IV fluid - watch for fluid overload   Trend lactic acid

## 2023-08-10 NOTE — ED PROVIDER NOTES
ED Provider Note    CHIEF COMPLAINT  Chief Complaint   Patient presents with    Flank Pain     Bilateral, R>L, h/o kidney stones, last 3 wks ago and was admitted.  Fever noted on arrival of 100.6f.         EXTERNAL RECORDS REVIEWED  Inpatient Notes I reviewed the discharge summary from July 30, 2023 that stated the patient was admitted for ureteral stone with colic on the left side.    HPI/ROS  LIMITATION TO HISTORY   Select: : None  OUTSIDE HISTORIAN(S):  None    Russell Bunn is a 33 y.o. male who presents with a history of kidney stones, he reports in the past has had both cystoscopy for kidney stones and stents as well as nephrostomy tubes.  He presents today with fever vomiting and left flank pain and right flank pain, left greater than right.    PAST MEDICAL HISTORY   has a past medical history of Kidney stones and Seizure disorder (HCC).    SURGICAL HISTORY   has a past surgical history that includes lithotripsy; cysto/uretero/pyeloscopy, dx (Right, 3/13/2020); cystoscopy,insert ureteral stent (Right, 3/13/2020); lasertripsy (Right, 3/13/2020); cystoscopy,insert ureteral stent (Left, 4/26/2021); cysto/uretero/pyeloscopy, dx (Left, 4/26/2021); cystoscopy (Left, 02/20/2022); cystoscopy,insert ureteral stent (Left, 2/20/2022); cysto/uretero/pyeloscopy, dx (Left, 2/20/2022); lasertripsy (Left, 2/20/2022); and cysto stent placemnt pre surg (Right, 4/24/2023).    FAMILY HISTORY  No family history on file.    SOCIAL HISTORY  Social History     Tobacco Use    Smoking status: Former     Packs/day: 0.50     Types: Cigarettes    Smokeless tobacco: Never   Vaping Use    Vaping Use: Never used   Substance and Sexual Activity    Alcohol use: Yes     Comment: Occasional    Drug use: Yes     Types: Inhaled     Comment: Marijuana, every day    Sexual activity: Not on file       CURRENT MEDICATIONS  Home Medications    **Home medications have not yet been reviewed for this encounter**         ALLERGIES  Allergies  "  Allergen Reactions    Kiwi Extract Anaphylaxis    Shellfish Allergy Anaphylaxis    Cefazolin Rash and Itching     Rash  Tolerated ceftriaxone (April 2021)    Dilaudid [Hydromorphone] Itching    Potassium Iodide Itching     Severe itching    Tape Unspecified     Paper tape okay        PHYSICAL EXAM  VITAL SIGNS: /72   Pulse 86   Temp (!) 38.1 °C (100.6 °F) (Temporal)   Resp 20   Ht 1.676 m (5' 6\")   Wt 60.8 kg (134 lb)   SpO2 94%   BMI 21.63 kg/m²    Constitutional: Alert.  HENT: No signs of trauma, Bilateral external ears normal, Nose normal.   Eyes: Pupils are equal and reactive, Conjunctiva normal, Non-icteric.   Neck: Normal range of motion, No tenderness, Supple, No stridor.   Lymphatic: No lymphadenopathy noted.   Cardiovascular: Regular rate and rhythm, no murmurs.   Thorax & Lungs: Normal breath sounds, No respiratory distress, No wheezing, No chest tenderness.   Abdomen: Bowel sounds normal, Soft, diffuse tenderness.  Skin: Warm, Dry, No erythema, No rash.   Back: No bony tenderness, No CVA tenderness.   Extremities: Intact distal pulses, No edema, No tenderness, No cyanosis.  Musculoskeletal: Good range of motion in all major joints. No tenderness to palpation or major deformities noted.   Neurologic: Alert , Normal motor function, Normal sensory function, No focal deficits noted.   Psychiatric: Affect normal, Judgment normal, Mood normal.       DIAGNOSTIC STUDIES / PROCEDURES      LABS  Labs Reviewed   CBC WITH DIFFERENTIAL - Abnormal; Notable for the following components:       Result Value    MCV 98.1 (*)     Neutrophils-Polys 78.00 (*)     Lymphocytes 9.80 (*)     Lymphs (Absolute) 0.88 (*)     Monos (Absolute) 0.92 (*)     All other components within normal limits   COMP METABOLIC PANEL - Abnormal; Notable for the following components:    Co2 19 (*)     Globulin 3.7 (*)     All other components within normal limits   URINALYSIS - Abnormal; Notable for the following components:    Protein " "100 (*)     Leukocyte Esterase Trace (*)     All other components within normal limits    Narrative:     Indication for culture:->Evaluation for sepsis without a  clear source of infection   LACTIC ACID - Abnormal; Notable for the following components:    Lactic Acid 2.2 (*)     All other components within normal limits   URINE MICROSCOPIC (W/UA) - Abnormal; Notable for the following components:    WBC 5-10 (*)     RBC 2-5 (*)     All other components within normal limits    Narrative:     Indication for culture:->Evaluation for sepsis without a  clear source of infection   LIPASE   ESTIMATED GFR   LACTIC ACID   LACTIC ACID   URINE CULTURE(NEW)    Narrative:     Indication for culture:->Evaluation for sepsis without a  clear source of infection   BLOOD CULTURE    Narrative:     Per Hospital Policy: Only change Specimen Src: to \"Line\" if  specified by physician order.   BLOOD CULTURE    Narrative:     Per Hospital Policy: Only change Specimen Src: to \"Line\" if  specified by physician order.   LACTIC ACID   PROTHROMBIN TIME   BLOOD CULTURE   BLOOD CULTURE   URINALYSIS         RADIOLOGY  I have independently interpreted the diagnostic imaging associated with this visit and am waiting the final reading from the radiologist.   My preliminary interpretation is as follows: Left hydronephrosis  Radiologist interpretation:     CT-RENAL COLIC EVALUATION(A/P W/O)   Final Result      1.  Mild left hydronephrosis and hydroureter secondary to a 3 mm left distal ureteral stone. This is unchanged in location compared with the previous exam.   2.  Punctate nonobstructive bilateral renal stones.      DX-CHEST-PORTABLE (1 VIEW)   Final Result      No acute cardiopulmonary disease evident.            COURSE & MEDICAL DECISION MAKING    ED Observation Status? Yes; I am placing the patient in to an observation status due to a diagnostic uncertainty as well as therapeutic intensity. Patient placed in observation status at 12:21 PM, " 8/10/2023.     Observation plan is as follows: The patient presents with a history of obstructing kidney stone now with fever, differential diagnose includes sepsis, infected stone, UTI.  Labs were ordered, urine was ordered, Rocephin IV, morphine 4 mg IV and Zofran 4 mg IV were ordered.    Upon Reevaluation, the patient's condition has: not improved; and will be escalated to hospitalization.    Patient discharged from ED Observation status at 2:20 PM (Time) August 10, 2023 (Date).     INITIAL ASSESSMENT, COURSE AND PLAN  Care Narrative:     1:20 PM  The patient's urine has 5-10 white cells and positive leukocyte Estrace, 2-5 red cells no bacteria and a few epithelial cells.  His white blood count is 9 with 78% neutrophils.  Lactic acid is slightly elevated 2.2.  I have paged the urologist.      1:30 PM I spoke with  urologist who will take the patient to the operating room to remove the obstructing stone.  I spoke with the hospitalist to assess the patient for hospitalization.            ADDITIONAL PROBLEM LIST  The stones, seizure disorder, fever  DISPOSITION AND DISCUSSIONS  I have discussed management of the patient with the following physicians and RAJAT's: Dr. Silver urologist and Dr. Cabrera hospitalist    Discussion of management with other [facility] or appropriate source(s): None         Barriers to care at this time, including but not limited to:  None .     Decision tools and prescription drugs considered including, but not limited to: Antibiotics I ordered the patient IV Rocephin and IV fluids .    CRITICAL CARE  The very real possibilty of a deterioration of this patient's condition required the highest level of my preparedness for sudden, emergent intervention.  I provided critical care services, which included medication orders, frequent reevaluations of the patient's condition and response to treatment, ordering and reviewing test results, and discussing the case with various consultants.  The critical  care time associated with the care of the patient was 40 minutes. Review chart for interventions. This time is exclusive of any other billable procedures.      FINAL DIAGNOSIS  1. Fever, unspecified fever cause    2. Ureteral calculi    3. Nausea and vomiting, unspecified vomiting type      Total critical care time 40 minutes as outlined above    Electronically signed by: Mihir Mathis M.D., 8/10/2023 11:49 AM

## 2023-08-10 NOTE — PROGRESS NOTES
Medication refill:    Medication Name:    Name from pharmacy: TRIAMCINOLONE ACETONIDE 0.1 % Cream         Will file in chart as: triamcinolone (ARISTOCORT) 0.1 % cream    Sig: Apply to feet twice daily as needed for dryness/cracking    Disp:  454 g    Refills:  Not specified    Start: 6/22/2022    Class: Eprescribe    Non-formulary For: Dry skin dermatitis    Last ordered: 1 month ago by Chon Simon MD Last refill: 6/1/2022    Rx #: [id number]       To be filled at: [facility] (Now [facility]) - [place] [address]         Medication last refilled: 5/4/2021    Provider: Chon Simon M.D.     Pharmacy: [facility]    Last office visit: 5/4/2022    Follow up: Return in about 3 months (around 8/4/2022) for MWV and 6 month follow up.    Last lab related to medication: 0    Upcoming appointments: 8/10/2022    Refill outcome: Filled per protocol     Pt off the floor to surgery   Render Post-Care Instructions In Note?: no Post-Care Instructions: I reviewed with the patient in detail post-care instructions. Patient is to wear sunprotection, and avoid picking at any of the treated lesions. Pt may apply Vaseline to crusted or scabbing areas. Duration Of Freeze Thaw-Cycle (Seconds): 4 Total Number Of Aks Treated: 14 Detail Level: Zone Number Of Freeze-Thaw Cycles: 1 freeze-thaw cycle Consent: The patient's consent was obtained including but not limited to risks of crusting, scabbing, blistering, scarring, darker or lighter pigmentary change, recurrence, incomplete removal and infection.

## 2023-08-10 NOTE — ED NOTES
PIV infiltrated so it was removed. Catheter tip intact, bleeding controlled, and pressure dressing applied. New PIV placed and Rocephin administered per MAR, pt tolerated well. Will continue to monitor.

## 2023-08-10 NOTE — ED NOTES
"Pt back from CT scan and requesting more pain medication. Pt states he has 8/10 pain in \"both my kidneys.\" Will notify ERP.  "

## 2023-08-10 NOTE — PROGRESS NOTES
Report given to Briseyda in pre-op. Benadryl an oxycodone held as pt is going to be transported soon. Educated pt on medications and agreeable. Mouth swabs given. Awaiting transport.

## 2023-08-10 NOTE — ED NOTES
Report called to TATE Packer. Pt being taken upstairs at this time by Transport Tech via gurney on 2L O2. Pt is awake and alert, talking to staff, in no apparent distress at time of transfer. Pt's paperwork and belongings sent upstairs with pt and Transport Tech. Pre-Op notified that pt has room upstairs and is being transported there now as well.

## 2023-08-10 NOTE — ASSESSMENT & PLAN NOTE
CT renal noted with mild left hydronephrosis and hydroureter secondary to 3 mm left distal ureteral stone, punctate nonobstructive bilateral renal stone.    Urology on board planning for surgery  On IV antibiotic

## 2023-08-10 NOTE — ED NOTES
IV established. 2nd set of blood cx collected and sent.    Pt able to void. Urine collected and sent.

## 2023-08-10 NOTE — ASSESSMENT & PLAN NOTE
This is Sepsis Present on admission  SIRS criteria identified on my evaluation include: Fever, with temperature greater than 100.9 deg F and Tachypnea, with respirations greater than 20 per minute  Clinical indicators of end organ dysfunction include Lactic Acid greater than 2  Source is urosepsis  Sepsis protocol initiated  Crystalloid Fluid Administration: Fluid resuscitation ordered per standard protocol - 30 mL/kg per current or ideal body weight  IV antibiotics as appropriate for source of sepsis  Reassessment: I have reassessed the patient's hemodynamic status    Insetting of urosepsis  Follow blood culture, urine culture  Scheduled for surgery later today

## 2023-08-10 NOTE — H&P
Hospital Medicine History & Physical Note    Date of Service  8/10/2023    Primary Care Physician  Pcp Pt States None    Consultants  urology and vascular surgery    Specialist Names: Dr Silver    Code Status  Prior    Chief Complaint  Chief Complaint   Patient presents with    Flank Pain     Bilateral, R>L, h/o kidney stones, last 3 wks ago and was admitted.  Fever noted on arrival of 100.6f.         History of Presenting Illness  Russell Bunn is a 33 y.o. male with past medical history of renal stone, seizure disorder who presented 8/10/2023 with flank pain associated with fever.  Report he has bilateral pain on and off for last 2 weeks but this time it got worse so he came to the ED for evaluation.  Does report of dysuria.    On arrival to ED patient blood pressure remained stable, febrile Tmax 100.6, respiratory rate 22.    Labs reveal normal white count, bicarbonate 19, lactic acid 2.2, UA positive for UTI.  CT renal noted with mild left hydronephrosis and hydroureter secondary to 3 mm left distal ureteral stone, punctate nonobstructive bilateral renal stone.    Urology Dr. Silver was consulted and planning for surgery today    Case discussed with ED physician Dr. Adan    Patient admitted for urosepsis.        I discussed the plan of care with patient.    Review of Systems  Review of Systems   Constitutional:  Positive for fever and malaise/fatigue.   Gastrointestinal:  Positive for abdominal pain.   Genitourinary:  Positive for dysuria, frequency, hematuria and urgency.       Past Medical History   has a past medical history of Kidney stones and Seizure disorder (HCC).    Surgical History   has a past surgical history that includes lithotripsy; pr cysto/uretero/pyeloscopy, dx (Right, 3/13/2020); pr cystoscopy,insert ureteral stent (Right, 3/13/2020); lasertripsy (Right, 3/13/2020); pr cystoscopy,insert ureteral stent (Left, 4/26/2021); pr cysto/uretero/pyeloscopy, dx (Left, 4/26/2021);  cystoscopy (Left, 02/20/2022); pr cystoscopy,insert ureteral stent (Left, 2/20/2022); pr cysto/uretero/pyeloscopy, dx (Left, 2/20/2022); lasertripsy (Left, 2/20/2022); and cysto stent placemnt pre surg (Right, 4/24/2023).     Family History  family history is not on file.   Family history reviewed with patient. There is no family history that is pertinent to the chief complaint.     Social History   reports that he has quit smoking. His smoking use included cigarettes. He smoked an average of .5 packs per day. He has never used smokeless tobacco. He reports current alcohol use. He reports current drug use. Drug: Inhaled.    Allergies  Allergies   Allergen Reactions    Kiwi Extract Anaphylaxis    Shellfish Allergy Anaphylaxis    Cefazolin Rash and Itching     Rash  Tolerated ceftriaxone (April 2021)    Dilaudid [Hydromorphone] Itching    Potassium Iodide Itching     Severe itching    Tape Unspecified     Paper tape okay        Medications  Prior to Admission Medications   Prescriptions Last Dose Informant Patient Reported? Taking?   asa/apap/caffeine (EXCEDRIN) 250-250-65 MG Tab  Patient Yes No   Sig: Take 1 Tablet by mouth every 6 hours as needed for Headache.      Facility-Administered Medications: None       Physical Exam  Temp:  [38.1 °C (100.6 °F)] 38.1 °C (100.6 °F)  Pulse:  [] 86  Resp:  [20-22] 20  BP: (105-136)/(70-72) 130/72  SpO2:  [81 %-97 %] 94 %  Blood Pressure: 130/72   Temperature: (!) 38.1 °C (100.6 °F)   Pulse: 86   Respiration: 20   Pulse Oximetry: 94 %       Physical Exam  Constitutional:       Appearance: Normal appearance.   HENT:      Head: Normocephalic and atraumatic.      Mouth/Throat:      Mouth: Mucous membranes are dry.   Cardiovascular:      Rate and Rhythm: Tachycardia present. Rhythm irregular.   Pulmonary:      Effort: Pulmonary effort is normal. No respiratory distress.      Breath sounds: Normal breath sounds.   Abdominal:      General: Abdomen is flat.      Tenderness: There  is right CVA tenderness and left CVA tenderness.   Musculoskeletal:      Right lower leg: No edema.      Left lower leg: No edema.   Skin:     General: Skin is warm.   Neurological:      General: No focal deficit present.      Mental Status: He is alert and oriented to person, place, and time.   Psychiatric:         Mood and Affect: Mood normal.         Laboratory:  Recent Labs     08/10/23  1130   WBC 9.0   RBC 4.75   HEMOGLOBIN 15.3   HEMATOCRIT 46.6   MCV 98.1*   MCH 32.2   MCHC 32.8   RDW 48.9   PLATELETCT 271   MPV 9.0     Recent Labs     08/10/23  1130   SODIUM 137   POTASSIUM 4.5   CHLORIDE 103   CO2 19*   GLUCOSE 75   BUN 12   CREATININE 1.29   CALCIUM 9.6     Recent Labs     08/10/23  1130   ALTSGPT 9   ASTSGOT 24   ALKPHOSPHAT 96   TBILIRUBIN 0.4   LIPASE 57   GLUCOSE 75         No results for input(s): NTPROBNP in the last 72 hours.      No results for input(s): TROPONINT in the last 72 hours.    Imaging:  CT-RENAL COLIC EVALUATION(A/P W/O)   Final Result      1.  Mild left hydronephrosis and hydroureter secondary to a 3 mm left distal ureteral stone. This is unchanged in location compared with the previous exam.   2.  Punctate nonobstructive bilateral renal stones.      DX-CHEST-PORTABLE (1 VIEW)   Final Result      No acute cardiopulmonary disease evident.          X-Ray:  I have personally reviewed the images and compared with prior images.    Assessment/Plan:  Justification for Admission Status  I anticipate this patient will require at least two midnights for appropriate medical management, necessitating inpatient admission because urosepsis requiring surgical intervention including lithotripsy, ureteral stent, IV antibiotics and blood culture        * Sepsis (HCC)- (present on admission)  Assessment & Plan  This is Sepsis Present on admission  SIRS criteria identified on my evaluation include: Fever, with temperature greater than 100.9 deg F and Tachypnea, with respirations greater than 20 per  minute  Clinical indicators of end organ dysfunction include Lactic Acid greater than 2  Source is urosepsis  Sepsis protocol initiated  Crystalloid Fluid Administration: Fluid resuscitation ordered per standard protocol - 30 mL/kg per current or ideal body weight  IV antibiotics as appropriate for source of sepsis  Reassessment: I have reassessed the patient's hemodynamic status    Insetting of urosepsis  Follow blood culture, urine culture  Scheduled for surgery later today    Lactic acid acidosis  Assessment & Plan  Lactic acidosis likely secondary to urosepsis  Continue IV fluid - watch for fluid overload   Trend lactic acid       Acute UTI  Assessment & Plan  On IV antibiotics  Follow culture    Ureteral stone  Assessment & Plan  CT renal noted with mild left hydronephrosis and hydroureter secondary to 3 mm left distal ureteral stone, punctate nonobstructive bilateral renal stone.    Urology on board planning for surgery  On IV antibiotic    Ureterolithiasis- (present on admission)  Assessment & Plan  Plan for surgery later today        VTE prophylaxis: SCDs/TEDs

## 2023-08-10 NOTE — ED NOTES
Lactate drawn from PIV and sent to lab.     Report called to Briseyda HURT RN in Pre-Op. Per Briseyda pt will be put on transport list when they are ready. Pt updated regarding plan of care, including pending surgery. Pt tearful at this news and frustrated as he has had this problem and surgery multiple times in the past. Pt informed that he needs to get undressed for surgery and demonstrated understanding. Will continue to monitor pt while awaiting Transport Tech arrival to take pt upstairs.

## 2023-08-10 NOTE — H&P
UROLOGY Consult Note:    Rei Silver M.D.  Date & Time note created:    8/10/2023   4:50 PM     Referring MD:  Dr. Cabrera    Patient ID:   Name:             Russell Bunn   YOB: 1990  Age:                 33 y.o.  male   MRN:               [id number]                                                             Reason for Consult:      Left UPJ stone    History of Present Illness:    This is a 33-year-old male with multiple visits for acute left flank pain.  CT abdomen pelvis identified 3 mm distal left ureteral stone, repeat CT today showed no progression.  Discussed options including continued medical expulsive therapy versus ureteroscopy and laser lithotripsy and after all questions were answered he opted for intervention.    Review of Systems:      As above all others negative           Past Medical History:   Past Medical History:   Diagnosis Date    Kidney stones     multiple times requiring multiple surgeries    Seizure disorder (HCC)      Active Hospital Problems    Diagnosis     Sepsis (HCC) [A41.9]     Ureteral stone [N20.1]     Acute UTI [N39.0]     Lactic acid acidosis [E87.20]     Ureterolithiasis [N20.1]        Past Surgical History:  Past Surgical History:   Procedure Laterality Date    CYSTO STENT PLACEMNT PRE SURG Right 4/24/2023    Procedure: CYSTOSCOPY, WITH RIGHT URETERAL STENT INSERTION;  Surgeon: Dante Stephenson M.D.;  Location: SURGERY [facility];  Service: Urology    CYSTOSCOPY Left 02/20/2022    Cysto ureteroscopy ,lithotripsy, stone basket, ureteral stent placement, Dr Silver    CA CYSTOSCOPY,INSERT URETERAL STENT Left 2/20/2022    Procedure: CYSTOSCOPY, WITH URETERAL STENT INSERTION;  Surgeon: Rei Silver M.D.;  Location: SURGERY [facility];  Service: Urology    CA CYSTO/URETERO/PYELOSCOPY, DX Left 2/20/2022    Procedure: URETEROSCOPY;  Surgeon: Rei Silver M.D.;  Location: SURGERY [facility];  Service: Urology    LASERTRIPSY Left 2/20/2022     Procedure: LITHOTRIPSY, USING LASER;  Surgeon: Rei Silver M.D.;  Location: [facility]ERY [facility];  Service: Urology    MN CYSTOSCOPY,INSERT URETERAL STENT Left 4/26/2021    Procedure: CYSTOSCOPY, WITH OUT URETERAL STENT INSERTION;  Surgeon: Raf Moss M.D.;  Location: [facility]ERY [facility];  Service: Urology    MN CYSTO/URETERO/PYELOSCOPY, DX Left 4/26/2021    Procedure: URETEROSCOPY;  Surgeon: Raf Moss M.D.;  Location: [facility]ERY [facility];  Service: Urology    MN CYSTO/URETERO/PYELOSCOPY, DX Right 3/13/2020    Procedure: URETEROSCOPY;  Surgeon: Chase Damon M.D.;  Location: SURGERY [facility];  Service: Urology    MN CYSTOSCOPY,INSERT URETERAL STENT Right 3/13/2020    Procedure: CYSTOSCOPY;  Surgeon: Chase Damon M.D.;  Location: SURGERY [facility];  Service: Urology    LASERTRIPSY Right 3/13/2020    Procedure: LITHOTRIPSY, USING LASER;  Surgeon: Chase Damon M.D.;  Location: SURGERY [facility];  Service: Urology    LITHOTRIPSY         Hospital Medications:    Current Facility-Administered Medications:     [MAR Hold] senna-docusate (Pericolace Or Senokot S) 8.6-50 MG per tablet 2 Tablet, 2 Tablet, Oral, BID **AND** [MAR Hold] polyethylene glycol/lytes (Miralax) PACKET 1 Packet, 1 Packet, Oral, QDAY PRN **AND** [MAR Hold] magnesium hydroxide (Milk Of Magnesia) suspension 30 mL, 30 mL, Oral, QDAY PRN **AND** [MAR Hold] bisacodyl (Dulcolax) suppository 10 mg, 10 mg, Rectal, QDAY PRN, Ferdinand Cabrera M.D.    [MAR Hold] acetaminophen (Tylenol) tablet 650 mg, 650 mg, Oral, Q6HRS PRN, Ferdinand Cabrera M.D.    Notify provider if pain remains uncontrolled, , , CONTINUOUS **AND** Use the Numeric Rating Scale (NRS), Orozco-Baker Faces (WBF), or FLACC on regular floors and Critical-Care Pain Observation Tool (CPOT) on ICUs/Trauma to assess pain, , , CONTINUOUS **AND** Pulse Ox, , , CONTINUOUS **AND** [MAR Hold] Pharmacy Consult Request ...Pain Management Review 1 Each, 1 Each, Other, PHARMACY TO  DOSE **AND** If patient difficult to arouse and/or has respiratory depression (respiratory rate of 10 or less), stop any opiates that are currently infusing and call a Rapid Response., , , CONTINUOUS, Ferdinand Cabrera M.D.    [MAR Hold] oxyCODONE immediate-release (Roxicodone) tablet 2.5 mg, 2.5 mg, Oral, Q3HRS PRN **OR** [MAR Hold] oxyCODONE immediate-release (Roxicodone) tablet 5 mg, 5 mg, Oral, Q3HRS PRN, Ferdinand Cabrera M.D.    [MAR Hold] cefTRIAXone (Rocephin) syringe 1,000 mg, 1,000 mg, Intravenous, Once, Ferdinand Cabrera M.D.    lactated ringers infusion, , Intravenous, Continuous, Ferdinand Cabrera M.D., Last Rate: 83 mL/hr at 08/10/23 1445, New Bag at 08/10/23 1445    [MAR Hold] diphenhydrAMINE (Benadryl) injection 25 mg, 25 mg, Intravenous, Once, Ferdinand Cabrera M.D.    Current Outpatient Medications:  No medications prior to admission.       Medication Allergy:  Allergies   Allergen Reactions    Kiwi Extract Anaphylaxis    Shellfish Allergy Anaphylaxis    Cefazolin Rash and Itching     Rash  Tolerated ceftriaxone (April 2021)    Dilaudid [Hydromorphone] Itching    Hydrocodone Itching    Oxycodone Itching    Potassium Iodide Itching     Severe itching    Tape Unspecified     Paper tape okay        Family History:  History reviewed. No pertinent family history.    Social History:  Social History     Socioeconomic History    Marital status: Single     Spouse name: Not on file    Number of children: Not on file    Years of education: Not on file    Highest education level: Not on file   Occupational History    Not on file   Tobacco Use    Smoking status: Former     Packs/day: 0.50     Types: Cigarettes    Smokeless tobacco: Never   Vaping Use    Vaping Use: Never used   Substance and Sexual Activity    Alcohol use: Yes     Comment: Occasional    Drug use: Yes     Types: Inhaled     Comment: Marijuana, every day    Sexual activity: Not on file   Other Topics Concern    Not on file   Social History Narrative    Not on file  "    Social Determinants of Health     Financial Resource Strain: Not on file   Food Insecurity: Not on file   Transportation Needs: Not on file   Physical Activity: Not on file   Stress: Not on file   Social Connections: Not on file   Intimate Partner Violence: Not on file   Housing Stability: Not on file         Physical Exam:  Vitals/ General Appearance:   Weight/BMI: Body mass index is 21.17 kg/m² (pended).  BP (!) 189/137   Pulse 92   Temp (!) 38.2 °C (100.8 °F) (Temporal)   Resp 19   Ht 1.676 m (5' 6\")   Wt 58.6 kg (129 lb 3 oz)   SpO2 94%   Vitals:    08/10/23 1400 08/10/23 1430 08/10/23 1457 08/10/23 1614   BP: 127/62 118/72 (!) 189/137    Pulse: 85 86 92    Resp:   19    Temp:   (!) 38.2 °C (100.8 °F)    TempSrc:   Temporal    SpO2: 98% 97% 94%    Weight:   58.6 kg (129 lb 3 oz) (P) 59.5 kg (131 lb 2.8 oz)   Height:         Oxygen Therapy:  Pulse Oximetry: 94 %, O2 (LPM): 0, O2 Delivery Device: Room air w/o2 available    Constitutional:   Well developed, Well nourished, No acute distress  HENMT:  Normocephalic, Atraumatic  Eyes:  No discharge.  Neck:  Normal range of motion  Cardiovascular:  Normal heart rate  Lungs:  equal chest rise   Abdomen: BSoft, No tenderness, No guarding, No rebound  : As above, left costovertebral angle tenderness  Skin: Warm, Dry  Neurologic: Alert & oriented x 3  Psychiatric: Affect normal      MDM (Data Review):     Records reviewed and summarized in current documentation    Lab Data Review:  Recent Results (from the past 24 hour(s))   CBC WITH DIFFERENTIAL    Collection Time: 08/10/23 11:30 AM   Result Value Ref Range    WBC 9.0 4.8 - 10.8 K/uL    RBC 4.75 4.70 - 6.10 M/uL    Hemoglobin 15.3 14.0 - 18.0 g/dL    Hematocrit 46.6 42.0 - 52.0 %    MCV 98.1 (H) 81.4 - 97.8 fL    MCH 32.2 27.0 - 33.0 pg    MCHC 32.8 32.3 - 36.5 g/dL    RDW 48.9 35.9 - 50.0 fL    Platelet Count 271 164 - 446 K/uL    MPV 9.0 9.0 - 12.9 fL    Neutrophils-Polys 78.00 (H) 44.00 - 72.00 %    " Lymphocytes 9.80 (L) 22.00 - 41.00 %    Monocytes 10.20 0.00 - 13.40 %    Eosinophils 1.10 0.00 - 6.90 %    Basophils 0.70 0.00 - 1.80 %    Immature Granulocytes 0.20 0.00 - 0.90 %    Nucleated RBC 0.00 0.00 - 0.20 /100 WBC    Neutrophils (Absolute) 7.04 1.82 - 7.42 K/uL    Lymphs (Absolute) 0.88 (L) 1.00 - 4.80 K/uL    Monos (Absolute) 0.92 (H) 0.00 - 0.85 K/uL    Eos (Absolute) 0.10 0.00 - 0.51 K/uL    Baso (Absolute) 0.06 0.00 - 0.12 K/uL    Immature Granulocytes (abs) 0.02 0.00 - 0.11 K/uL    NRBC (Absolute) 0.00 K/uL   COMP METABOLIC PANEL    Collection Time: 08/10/23 11:30 AM   Result Value Ref Range    Sodium 137 135 - 145 mmol/L    Potassium 4.5 3.6 - 5.5 mmol/L    Chloride 103 96 - 112 mmol/L    Co2 19 (L) 20 - 33 mmol/L    Anion Gap 15.0 7.0 - 16.0    Glucose 75 65 - 99 mg/dL    Bun 12 8 - 22 mg/dL    Creatinine 1.29 0.50 - 1.40 mg/dL    Calcium 9.6 8.5 - 10.5 mg/dL    Correct Calcium 9.5 8.5 - 10.5 mg/dL    AST(SGOT) 24 12 - 45 U/L    ALT(SGPT) 9 2 - 50 U/L    Alkaline Phosphatase 96 30 - 99 U/L    Total Bilirubin 0.4 0.1 - 1.5 mg/dL    Albumin 4.1 3.2 - 4.9 g/dL    Total Protein 7.8 6.0 - 8.2 g/dL    Globulin 3.7 (H) 1.9 - 3.5 g/dL    A-G Ratio 1.1 g/dL   LIPASE    Collection Time: 08/10/23 11:30 AM   Result Value Ref Range    Lipase 57 11 - 82 U/L   Lactic acid (lactate)    Collection Time: 08/10/23 11:30 AM   Result Value Ref Range    Lactic Acid 2.2 (H) 0.5 - 2.0 mmol/L   ESTIMATED GFR    Collection Time: 08/10/23 11:30 AM   Result Value Ref Range    GFR (CKD-EPI) 75 >60 mL/min/1.73 m 2   Prothrombin time (INR)    Collection Time: 08/10/23 11:31 AM   Result Value Ref Range    PT 14.6 12.0 - 14.6 sec    INR 1.16 (H) 0.87 - 1.13   URINALYSIS    Collection Time: 08/10/23 12:26 PM    Specimen: Urine   Result Value Ref Range    Color Yellow     Character Clear     Specific Gravity 1.019 <1.035    Ph 8.0 5.0 - 8.0    Glucose Negative Negative mg/dL    Ketones Negative Negative mg/dL    Protein 100 (A)  Negative mg/dL    Bilirubin Negative Negative    Urobilinogen, Urine 0.2 Negative    Nitrite Negative Negative    Leukocyte Esterase Trace (A) Negative    Occult Blood Negative Negative    Micro Urine Req Microscopic    URINE MICROSCOPIC (W/UA)    Collection Time: 08/10/23 12:26 PM   Result Value Ref Range    WBC 5-10 (A) /hpf    RBC 2-5 (A) /hpf    Bacteria Negative None /hpf    Epithelial Cells Few /hpf    Hyaline Cast 0-2 /lpf   Lactic acid (lactate): Repeat if initial lactic acid result is greater than 2    Collection Time: 08/10/23  2:00 PM   Result Value Ref Range    Lactic Acid 1.1 0.5 - 2.0 mmol/L   EKG    Collection Time: 08/10/23  2:14 PM   Result Value Ref Range    Report       [facility] Emergency Dept.    Test Date:  2023-08-10  Pt Name:    ANTONIA HIGHTOWER           Department: ER  MRN:        [id number]                      Room:       [facility]  Gender:     Male                         Technician: [id number]  :        1990                   Requested By:SARA DURAND  Order #:    [id number]                    Reading MD:    Measurements  Intervals                                Axis  Rate:       78                           P:          79  DE:         152                          QRS:        113  QRSD:       88                           T:          40  QT:         352  QTc:        401    Interpretive Statements  Sinus rhythm  Probable left atrial enlargement  Left posterior fascicular block  Low voltage, extremity leads  ST elev, probable normal early repol pattern  Compared to ECG 10/16/2018 18:37:10  Left posterior fascicular block now present  Low QRS voltage now present  ST (T wave) deviation still present         Imaging/Procedures Review:    Reviewed    MDM (Assessment and Plan):     Active Hospital Problems    Diagnosis     Sepsis (HCC) [A41.9]     Ureteral stone [N20.1]     Acute UTI [N39.0]     Lactic acid acidosis [E87.20]     Ureterolithiasis [N20.1]

## 2023-08-10 NOTE — ANESTHESIA PREPROCEDURE EVALUATION
Case: [id number] Date/Time: 08/10/23 1745    Procedures:       CYSTOSCOPY, WITH URETERAL STENT INSERTION (Left)      URETEROSCOPY      LITHOTRIPSY, USING LASER    Location: [place] [address] / SURGERY [facility]    Surgeons: Rei Silver M.D.          Relevant Problems      (positive) Acute pyelonephritis   (positive) Acute renal failure (ARF) (HCC)   (positive) Cystinuria (HCC)   (positive) Hydronephrosis   (positive) Hydroureteronephrosis, pyelonephritis      Other   (positive) Acute UTI   (positive) Lactic acid acidosis   (positive) Sepsis (HCC)   (positive) Ureteral stone   (positive) Ureterolithiasis   history of seizures currently off medication, possible seizure this morning per patient.      Physical Exam    Airway   Mallampati: II  TM distance: >3 FB  Neck ROM: full       Cardiovascular - normal exam  Rhythm: regular  Rate: normal  (-) murmur     Dental - normal exam           Pulmonary - normal exam  Breath sounds clear to auscultation     Abdominal    Neurological - normal exam                 Anesthesia Plan    ASA 3- EMERGENT   ASA physical status emergent criteria: sepsis    Plan - general       Airway plan will be LMA          Induction: intravenous    Postoperative Plan: Postoperative administration of opioids is intended.    Pertinent diagnostic labs and testing reviewed    Informed Consent:    Anesthetic plan and risks discussed with patient.    Use of blood products discussed with: patient whom consented to blood products.

## 2023-08-11 ENCOUNTER — PHARMACY VISIT (OUTPATIENT)
Dept: PHARMACY | Facility: MEDICAL CENTER | Age: 33
End: 2023-08-11
Payer: COMMERCIAL

## 2023-08-11 VITALS
OXYGEN SATURATION: 96 % | TEMPERATURE: 98.4 F | SYSTOLIC BLOOD PRESSURE: 116 MMHG | BODY MASS INDEX: 20.76 KG/M2 | WEIGHT: 129.19 LBS | HEART RATE: 62 BPM | RESPIRATION RATE: 18 BRPM | DIASTOLIC BLOOD PRESSURE: 72 MMHG | HEIGHT: 66 IN

## 2023-08-11 LAB
ANION GAP SERPL CALC-SCNC: 9 MMOL/L (ref 7–16)
APPEARANCE UR: CLEAR
BACTERIA #/AREA URNS HPF: NEGATIVE /HPF
BILIRUB UR QL STRIP.AUTO: NEGATIVE
BUN SERPL-MCNC: 12 MG/DL (ref 8–22)
CALCIUM SERPL-MCNC: 8.8 MG/DL (ref 8.5–10.5)
CHLORIDE SERPL-SCNC: 103 MMOL/L (ref 96–112)
CO2 SERPL-SCNC: 24 MMOL/L (ref 20–33)
COLOR UR: YELLOW
CREAT SERPL-MCNC: 1.34 MG/DL (ref 0.5–1.4)
EPI CELLS #/AREA URNS HPF: NEGATIVE /HPF
ERYTHROCYTE [DISTWIDTH] IN BLOOD BY AUTOMATED COUNT: 48 FL (ref 35.9–50)
GFR SERPLBLD CREATININE-BSD FMLA CKD-EPI: 72 ML/MIN/1.73 M 2
GLUCOSE SERPL-MCNC: 108 MG/DL (ref 65–99)
GLUCOSE UR STRIP.AUTO-MCNC: NEGATIVE MG/DL
HCT VFR BLD AUTO: 40.4 % (ref 42–52)
HGB BLD-MCNC: 13.4 G/DL (ref 14–18)
HYALINE CASTS #/AREA URNS LPF: ABNORMAL /LPF
KETONES UR STRIP.AUTO-MCNC: NEGATIVE MG/DL
LACTATE SERPL-SCNC: 1.1 MMOL/L (ref 0.5–2)
LEUKOCYTE ESTERASE UR QL STRIP.AUTO: ABNORMAL
MCH RBC QN AUTO: 31.7 PG (ref 27–33)
MCHC RBC AUTO-ENTMCNC: 33.2 G/DL (ref 32.3–36.5)
MCV RBC AUTO: 95.5 FL (ref 81.4–97.8)
MICRO URNS: ABNORMAL
NITRITE UR QL STRIP.AUTO: NEGATIVE
PH UR STRIP.AUTO: 7.5 [PH] (ref 5–8)
PLATELET # BLD AUTO: 212 K/UL (ref 164–446)
PMV BLD AUTO: 9.1 FL (ref 9–12.9)
POTASSIUM SERPL-SCNC: 5.2 MMOL/L (ref 3.6–5.5)
PROT UR QL STRIP: 30 MG/DL
RBC # BLD AUTO: 4.23 M/UL (ref 4.7–6.1)
RBC # URNS HPF: ABNORMAL /HPF
RBC UR QL AUTO: ABNORMAL
SODIUM SERPL-SCNC: 136 MMOL/L (ref 135–145)
SP GR UR STRIP.AUTO: 1.01
UROBILINOGEN UR STRIP.AUTO-MCNC: 0.2 MG/DL
WBC # BLD AUTO: 7.7 K/UL (ref 4.8–10.8)
WBC #/AREA URNS HPF: ABNORMAL /HPF

## 2023-08-11 PROCEDURE — 81001 URINALYSIS AUTO W/SCOPE: CPT

## 2023-08-11 PROCEDURE — 700111 HCHG RX REV CODE 636 W/ 250 OVERRIDE (IP): Performed by: STUDENT IN AN ORGANIZED HEALTH CARE EDUCATION/TRAINING PROGRAM

## 2023-08-11 PROCEDURE — 83605 ASSAY OF LACTIC ACID: CPT

## 2023-08-11 PROCEDURE — 99239 HOSP IP/OBS DSCHRG MGMT >30: CPT | Performed by: INTERNAL MEDICINE

## 2023-08-11 PROCEDURE — 85027 COMPLETE CBC AUTOMATED: CPT

## 2023-08-11 PROCEDURE — 700111 HCHG RX REV CODE 636 W/ 250 OVERRIDE (IP)

## 2023-08-11 PROCEDURE — 700102 HCHG RX REV CODE 250 W/ 637 OVERRIDE(OP): Performed by: STUDENT IN AN ORGANIZED HEALTH CARE EDUCATION/TRAINING PROGRAM

## 2023-08-11 PROCEDURE — RXMED WILLOW AMBULATORY MEDICATION CHARGE: Performed by: INTERNAL MEDICINE

## 2023-08-11 PROCEDURE — A9270 NON-COVERED ITEM OR SERVICE: HCPCS | Performed by: STUDENT IN AN ORGANIZED HEALTH CARE EDUCATION/TRAINING PROGRAM

## 2023-08-11 PROCEDURE — 80048 BASIC METABOLIC PNL TOTAL CA: CPT

## 2023-08-11 RX ORDER — DIPHENHYDRAMINE HYDROCHLORIDE 50 MG/ML
25 INJECTION INTRAMUSCULAR; INTRAVENOUS ONCE
Status: DISCONTINUED | OUTPATIENT
Start: 2023-08-11 | End: 2023-08-11 | Stop reason: HOSPADM

## 2023-08-11 RX ORDER — AMOXICILLIN 250 MG
2 CAPSULE ORAL 2 TIMES DAILY
Qty: 30 TABLET | Refills: 0 | COMMUNITY
Start: 2023-08-11 | End: 2023-08-16

## 2023-08-11 RX ORDER — ACETAMINOPHEN 325 MG/1
650 TABLET ORAL EVERY 6 HOURS PRN
Qty: 30 TABLET | Refills: 0 | COMMUNITY
Start: 2023-08-11 | End: 2023-08-16

## 2023-08-11 RX ORDER — ONDANSETRON 2 MG/ML
4 INJECTION INTRAMUSCULAR; INTRAVENOUS EVERY 4 HOURS PRN
Status: DISCONTINUED | OUTPATIENT
Start: 2023-08-11 | End: 2023-08-11 | Stop reason: HOSPADM

## 2023-08-11 RX ORDER — POLYETHYLENE GLYCOL 3350 17 G/17G
17 POWDER, FOR SOLUTION ORAL
Refills: 3 | COMMUNITY
Start: 2023-08-11 | End: 2023-08-16

## 2023-08-11 RX ORDER — CEFDINIR 300 MG/1
300 CAPSULE ORAL 2 TIMES DAILY
Qty: 18 CAPSULE | Refills: 0 | Status: ON HOLD | OUTPATIENT
Start: 2023-08-11 | End: 2023-08-18

## 2023-08-11 RX ORDER — HYDROCODONE BITARTRATE AND ACETAMINOPHEN 7.5; 325 MG/1; MG/1
1 TABLET ORAL EVERY 6 HOURS PRN
Qty: 12 TABLET | Refills: 0 | Status: SHIPPED | OUTPATIENT
Start: 2023-08-11 | End: 2023-08-14

## 2023-08-11 RX ADMIN — OXYCODONE HYDROCHLORIDE 5 MG: 5 TABLET ORAL at 00:00

## 2023-08-11 RX ADMIN — CEFTRIAXONE SODIUM 1000 MG: 10 INJECTION, POWDER, FOR SOLUTION INTRAVENOUS at 12:43

## 2023-08-11 RX ADMIN — KETOROLAC TROMETHAMINE 15 MG: 30 INJECTION, SOLUTION INTRAMUSCULAR; INTRAVENOUS at 08:28

## 2023-08-11 RX ADMIN — KETOROLAC TROMETHAMINE 15 MG: 30 INJECTION, SOLUTION INTRAMUSCULAR; INTRAVENOUS at 02:11

## 2023-08-11 ASSESSMENT — PAIN DESCRIPTION - PAIN TYPE
TYPE: ACUTE PAIN;SURGICAL PAIN
TYPE: ACUTE PAIN;SURGICAL PAIN
TYPE: SURGICAL PAIN
TYPE: ACUTE PAIN;SURGICAL PAIN

## 2023-08-11 NOTE — DISCHARGE INSTRUCTIONS
Discharge Instructions    Discharged to home by car with relative. Discharged via walking, hospital escort: Refused.  Special equipment needed: Not Applicable    Be sure to schedule a follow-up appointment with your primary care doctor or any specialists as instructed.     Discharge Plan:   Diet Plan: Discussed  Activity Level: Discussed  Confirmed Follow up Appointment: Patient to Call and Schedule Appointment  Confirmed Symptoms Management: Discussed  Medication Reconciliation Updated: Yes    I understand that a diet low in cholesterol, fat, and sodium is recommended for good health. Unless I have been given specific instructions below for another diet, I accept this instruction as my diet prescription.   Other diet: regular    Special Instructions: None    -Is this patient being discharged with medication to prevent blood clots?  No    Is patient discharged on Warfarin / Coumadin?   No

## 2023-08-11 NOTE — PROGRESS NOTES
4 Eyes Skin Assessment Completed by Clementina YBARRA and Lynn RN.    Head WDL  Ears WDL  Nose WDL  Mouth WDL  Neck WDL  Breast/Chest WDL  Shoulder Blades WDL  Spine WDL  (R) Arm/Elbow/Hand WDL  (L) Arm/Elbow/Hand WDL  Abdomen WDL  Groin WDL  Scrotum/Coccyx/Buttocks WDL  Penis Steristrips  (R) Leg WDL  (L) Leg WDL  (R) Heel/Foot/Toe WDL  (L) Heel/Foot/Toe WDL          Devices In PlacesN/A      Interventions In Place N/A    Possible Skin Injury No    Pictures Uploaded Into Epic N/A  Wound Consult Placed N/A  RN Wound Prevention Protocol Ordered No

## 2023-08-11 NOTE — PROGRESS NOTES
To whom it may concern,    This is to inform Mr. Bunn to be excused from work as he is under our care. He is to return to work 8/16/2023 provided he is no longer needing narcotic pain medications or urologic procedures and that he is to avoid operating heavy machinery or driving at that point in time.  He can see his primary provider and urologist for further clearance.    Thanks,  Wilder Galvez MD  [facility] Physician  [facility]

## 2023-08-11 NOTE — ANESTHESIA TIME REPORT
Anesthesia Start and Stop Event Times     Date Time Event    8/10/2023 1655 Ready for Procedure     1718 Anesthesia Start     1811 Anesthesia Stop        Responsible Staff  08/10/23    Name Role Begin End    Raf Martínez M.D. Anesth 1718 1811        Overtime Reason:  no overtime (within assigned shift)    Comments:

## 2023-08-11 NOTE — PROGRESS NOTES
Patient discharged home with family member.  Discharge instructions discussed with patient, patient voiced understanding.  PIV x1 removed.  Patient anxious to leave facility, will return for meds to beds.  Informed patient that [facility] closes at 1630, will return meds to pharmacy if patient has not returned by then.  Patient voiced understanding.

## 2023-08-11 NOTE — OP REPORT
Urologic Surgery Operative Report     Pre-op Diagnosis: Left ureterolithiasis   Post-op Diagnosis: Same as above   Procedure: Left ureteroscopy, left ureteral stent placement   Surgeon: Surgeon(s) and Role:     * Rei Silver M.D. - Primary   Assistant: Erenulator: Vito Lipscomb R.N.  Laser Staff: Roberto Baltazar  Scrub Person: Cliff Nogueira  Radiology Technologist: Devin Schmidt   Anesthesia: General,   Anesthesiologist: Raf Martínez M.D.   Estimated Blood Loss: * No blood loss amount entered *   IV fluids <1L Crystalloid    Specimens: 1. Stone for chemical analysis    Complications: None   Condition: Stable, procedure well tolerated    Drains: 1. 7Mv27mi, JJ stent(on )  2. No powell   Disposition:  1. PACU, discharge after voiding  2. f/u 5 days for stent removal   Findings 1.  No identifiable stone, recently passed versus submucosal  2.  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  CT identified 3 mm UVJ stone here for definitive treatment..  After a full discussion of alternatives, risks, and benefits the patient consented to proceeding with Ureteroscopy, laser lithotripsy and possible JJ stent placement on the respective side.  He understands the risk of inability to access ureter, the need for second procedures, the possibility of negative ureteroscopy, that he may have stent discomfort until this is removed, bleeding, infection, ureteral injury or stricture, bladder injury, post op urinary retention requiring powell catheter, and the general pulmonary and cardiovascular risks associated with anesthesia.     Procedure Details:   The patient was taken to operating room and placed on table in supine position.  Ceftriaxone was administered prior to the start of the procedure based on  previous urine cultures. Sequential compression devices were placed for deep venous thrombosis prophylaxis. After induction of general anesthesia, both legs were placed in Sam stirrups in the standard lithotomy position.  A timeout was held confirming the correct patient, procedure and laterality.   The perineal area was prepped and draped in a sterile fashion. A rigid cystoscope was inserted into the urethra and the bladder was emptied and then distended with sterile saline. Urethral sounds were not needed to dilate the urethral meatus. Cystoscopy revealed normal bladder mucosa, orthotopic ureteral orifices, and no other abnormalities..    Left ureteral orifice was cannulated with an 035 Glidewire over which a dual-lumen ureteral catheter was advanced and a second wire placed.  Minimal resistance at the ureteral orifice.  Rigid ureteroscope advanced over the second wire up to the level of the UO, upon entering ureteral orifice significant edema was noted but no stone identified.  Scope was advanced up to the level of the UPJ revealing proximal hydronephrosis but no calculus.  No stone noted on fluoroscopy.  Upon withdrawal a 0 tip nitinol basket was utilized to expand the lumen of the ureter again revealing no impacted stones or ureteral injury.  Considering the significant distal ureteral edema we did opt to place a stent.      Returnign to rigid cystoscope, we then placed a left-sided JJ stent, [id number], JJ stent on  under fluoroscopy. We then saw that the JJ stent was in appropriate position, with a good curl visualized in the renal pelvis fluoroscopically and a good curl in the bladder endoscopically. The cystoscope was removed after emptying the bladder.  The patient was awoken from anesthesia and brought to recovery in satisfactory condition.  He will return to his previous floor and team, plan for follow-up in 5 days for stent removal and in 6 to 8 weeks with renal ultrasound prior.       Rei Silver,  M.D.   [address]  [place] [address]   [phone]

## 2023-08-11 NOTE — ANESTHESIA POSTPROCEDURE EVALUATION
Patient: Russell Bunn    Procedure Summary     Date: 08/10/23 Room / Location: [place] [address] / SURGERY [facility]    Anesthesia Start: 1718 Anesthesia Stop: 1811    Procedures:       CYSTOSCOPY, WITH URETERAL STENT INSERTION (Left: Ureter)      URETEROSCOPY (Left: Ureter) Diagnosis: (URETERAL STONE)    Surgeons: Rei Silver M.D. Responsible Provider: Raf Martínez M.D.    Anesthesia Type: general ASA Status: 3 - Emergent          Final Anesthesia Type: general  Last vitals  BP   Blood Pressure: 120/75    Temp   37.1 °C (98.7 °F)    Pulse   65   Resp   18    SpO2   96 %      Anesthesia Post Evaluation    Patient location during evaluation: PACU  Patient participation: complete - patient participated  Level of consciousness: awake and alert    Airway patency: patent  Anesthetic complications: no  Cardiovascular status: hemodynamically stable  Respiratory status: acceptable  Hydration status: euvolemic    PONV: none          No notable events documented.     Nurse Pain Score: 6 (NPRS)

## 2023-08-11 NOTE — OR NURSING
1808 Pt arrived to PACU with Anesthesiologist and OR RN. OPA in place. Even, unlabored respirations. VSS. String taped in place at urethral opening, CDI.    1900 OPA d/c'd. AAOx4.  C/o mild discomfort at site, cold pack placed. Declines oxycodone. Denies nausea. Sleeping comfortably.    1905 POC update given to girlfriend over the phone. All questions answered.     1920 Pt meets floor criteria. Report called to TATE Mcrae on S6.     1925 Pt transported to S625 with transport. Chart and full oxygen tank with patient.

## 2023-08-11 NOTE — DISCHARGE SUMMARY
Discharge Summary    CHIEF COMPLAINT ON ADMISSION  Chief Complaint   Patient presents with    Flank Pain     Bilateral, R>L, h/o kidney stones, last 3 wks ago and was admitted.  Fever noted on arrival of 100.6f.         Reason for Admission  EMS     Admission Date  8/10/2023    CODE STATUS  Full Code    HPI & HOSPITAL COURSE  This is a 33 y.o. male here with Flank Pain (Bilateral, R>L, h/o kidney stones, last 3 wks ago and was admitted.  Fever noted on arrival of 100.6f.  )  Please review Dr. Ferdinand Cabrera M.D. notes for further details of history of present illness, past medical/social/family histories, allergies and medications. Please review Dr. Silver, Urology consultation notes.  He has a history of cystinuria and cystine stones, alcohol use, former smoker, illicit inhaled drug use. He presented with L flank pain along with fever and chills for 3 days.    At the ED, he has a low grade temp. Normotensive.  CT renal showed L ureteral stone and mild L hydronephrosis  No leukocytosis but U/A positive for infection.   Antibiotics started.   He underwent Left ureteroscopy, left ureteral stent placement by Dr. Silver on 8/10. He tolerated procedure and was cleared by urology for discharge with follow up for stent removal and repeat renal US. He will be discharged on a course of antibiotics.    At discharge date, Russell Bunn afebrile and hemodynamically stable.  Russell Bunn wanted to be discharged today.    Discharge Physical Exam  General/Constitutional: No acute distress.   Head: Normocephalic, atraumatic  ENT: Oral mucosa is moist. No obvious pharyngeal exudates  Eyes: Pink conjunctiva, no scleral icterus  Neck: Supple, no lymphadenopathy  Cardiovascular: Normal rate and regular rhythm. S1,2 noted. No murmurs, gallops or rubs.  Pulmonary: Clear to auscultation bilaterally. No wheezes, rales or rhonchi.  Abdominal: Soft, nontender, not distended, bowel sounds normoactive. No guarding or  peritoneal signs.  Musculoskeletal: No tenderness to palpation of chest wall.  Neurologic: Alert and oriented. Grossly nonfocal, moving all extremities.  Genitourinary: No gross hematuria  Skin: No obvious rash.  Psychiatric: Pleasant, cooperative.  Vitals Reviewed  Labs Reviewed  Imaging reviewed  Nursing notes reviewed      Imaging  DX-PORTABLE FLUOROSCOPY < 1 HOUR   Preliminary Result      Portable fluoroscopy utilized for 6 seconds.         INTERPRETING LOCATION: [address] [place] [address], [place], [address]      CT-RENAL COLIC EVALUATION(A/P W/O)   Final Result      1.  Mild left hydronephrosis and hydroureter secondary to a 3 mm left distal ureteral stone. This is unchanged in location compared with the previous exam.   2.  Punctate nonobstructive bilateral renal stones.      DX-CHEST-PORTABLE (1 VIEW)   Final Result      No acute cardiopulmonary disease evident.                Therefore, he is discharged in fair and stable condition to home with close outpatient follow-up.    The patient recovered much more quickly than anticipated on admission.    Discharge Date  8/11/2023    FOLLOW UP ITEMS POST DISCHARGE      DISCHARGE DIAGNOSES  Principal Problem:    L ureterolithiasis, UTI (POA: Yes)  Active Problems:    Sepsis (HCC) (POA: Yes)    Ureteral stone (POA: Unknown)    Acute UTI (POA: Unknown)    Lactic acid acidosis (POA: Unknown)        FOLLOW UP  No future appointments.  Rei Silver M.D.  [address]  [facility] [id number] [phone]    Follow up  Our office will call patient to schedule outpatient follow up/post op.    Assign and follow up with primary provider or discharge clinic physician in 1 week Follow up with Dr. Silver in 1 week for postprocedure visit, stent removal and repeat imaging. NURSING provide resources/pamphlets for Antibiotic use, narcotic use (Avoid swimming, driving or operating machinery. Treat constipation with prescribed bowel regimen), UTI/pyelonephritis, alcohol use (minimize or avoid  alcohol), illicit drug use (no illicit drugs pls)       MEDICATIONS ON DISCHARGE     Medication List        START taking these medications        Instructions   acetaminophen 325 MG Tabs  Commonly known as: Tylenol   Take 2 Tablets by mouth every 6 hours as needed for Fever, Moderate Pain or Mild Pain.  Dose: 650 mg     cefdinir 300 MG Caps  Commonly known as: Omnicef   Take 1 Capsule by mouth 2 times a day for 9 days.  Dose: 300 mg     HYDROcodone-acetaminophen 7.5-325 MG tab  Commonly known as: Norco   Take 1 Tablet by mouth every 6 hours as needed for Severe Pain for up to 3 days.  Dose: 1 Tablet     polyethylene glycol/lytes 17 g Pack  Commonly known as: Miralax   Take 1 Packet by mouth 1 time a day as needed (if sennosides and docusate ineffective after 24 hours).  Dose: 17 g     senna-docusate 8.6-50 MG Tabs  Commonly known as: Pericolace Or Senokot S   Take 2 Tablets by mouth 2 times a day.  Dose: 2 Tablet              Allergies  Allergies   Allergen Reactions    Kiwi Extract Anaphylaxis    Shellfish Allergy Anaphylaxis    Cefazolin Rash and Itching     Rash  Tolerated ceftriaxone (April 2021)    Dilaudid [Hydromorphone] Itching    Hydrocodone Itching    Oxycodone Itching    Potassium Iodide Itching     Severe itching    Tape Unspecified     Paper tape okay        DIET  Orders Placed This Encounter   Procedures    Diet Order Diet: Regular     Standing Status:   Standing     Number of Occurrences:   1     Order Specific Question:   Diet:     Answer:   Regular [1]    Discontinue Diet Tray     Standing Status:   Standing     Number of Occurrences:   1       ACTIVITY  Avoid heavy lifting or strenuous activity      CONSULTATIONS  Urology    PROCEDURES  See Dr. Silver's OP NOTE    LABORATORY  Lab Results   Component Value Date    SODIUM 136 08/11/2023    POTASSIUM 5.2 08/11/2023    CHLORIDE 103 08/11/2023    CO2 24 08/11/2023    GLUCOSE 108 (H) 08/11/2023    BUN 12 08/11/2023    CREATININE 1.34 08/11/2023         Lab Results   Component Value Date    WBC 7.7 08/11/2023    HEMOGLOBIN 13.4 (L) 08/11/2023    HEMATOCRIT 40.4 (L) 08/11/2023    PLATELETCT 212 08/11/2023        Total time of the discharge process exceeds 40 minutes.

## 2023-08-11 NOTE — PROGRESS NOTES
Pt educated on discharge lounge and [id number], understands and agreeable. Work excuse printed and given to patient.

## 2023-08-11 NOTE — CARE PLAN
The patient is Stable - Low risk of patient condition declining or worsening    Shift Goals  Clinical Goals: monitor post op complications  Patient Goals: pain control    Progress made toward(s) clinical / shift goals:  Patient's vital signs were stable after the surgery. Patient constantly complaint of pain on right and left side flank pain, sometimes right and left anterior of abdomen, mildly burring and pain with urination, lower back pain. RN administered one time dilaudid. Patient requested PRN oxycodone every 4 hours. Patient understood that he had mild allergy to oxycodone and dilaudid but insisted on getting them. Patient reported getting relieve from IV toradol and oxycodone alternating every 2-03 hours. Patient got one time dose of benadryl for mild itchiness from the allergy. RN educated patient on pain management and non-pharmacologic management. Pain reported <5/10 with PRN pain meds    Patient is not progressing towards the following goals:

## 2023-08-15 ENCOUNTER — HOSPITAL ENCOUNTER (EMERGENCY)
Facility: MEDICAL CENTER | Age: 33
End: 2023-08-15
Attending: EMERGENCY MEDICINE
Payer: MEDICAID

## 2023-08-15 ENCOUNTER — APPOINTMENT (OUTPATIENT)
Dept: RADIOLOGY | Facility: MEDICAL CENTER | Age: 33
End: 2023-08-15
Attending: EMERGENCY MEDICINE
Payer: MEDICAID

## 2023-08-15 VITALS
WEIGHT: 138 LBS | HEART RATE: 66 BPM | TEMPERATURE: 98.2 F | SYSTOLIC BLOOD PRESSURE: 118 MMHG | DIASTOLIC BLOOD PRESSURE: 74 MMHG | RESPIRATION RATE: 18 BRPM | BODY MASS INDEX: 22.27 KG/M2 | OXYGEN SATURATION: 93 %

## 2023-08-15 DIAGNOSIS — N20.0 KIDNEY STONE ON LEFT SIDE: ICD-10-CM

## 2023-08-15 DIAGNOSIS — R82.71 BACTERIA IN URINE: ICD-10-CM

## 2023-08-15 LAB
ALBUMIN SERPL BCP-MCNC: 4.6 G/DL (ref 3.2–4.9)
ALBUMIN/GLOB SERPL: 1.4 G/DL
ALP SERPL-CCNC: 89 U/L (ref 30–99)
ALT SERPL-CCNC: 14 U/L (ref 2–50)
ANION GAP SERPL CALC-SCNC: 9 MMOL/L (ref 7–16)
ANISOCYTOSIS BLD QL SMEAR: ABNORMAL
APPEARANCE UR: ABNORMAL
AST SERPL-CCNC: 22 U/L (ref 12–45)
BACTERIA #/AREA URNS HPF: ABNORMAL /HPF
BACTERIA BLD CULT: NORMAL
BASOPHILS # BLD AUTO: 0 % (ref 0–1.8)
BASOPHILS # BLD: 0 K/UL (ref 0–0.12)
BILIRUB SERPL-MCNC: 0.3 MG/DL (ref 0.1–1.5)
BILIRUB UR QL STRIP.AUTO: NEGATIVE
BUN SERPL-MCNC: 16 MG/DL (ref 8–22)
BURR CELLS BLD QL SMEAR: NORMAL
CALCIUM ALBUM COR SERPL-MCNC: 9.3 MG/DL (ref 8.5–10.5)
CALCIUM SERPL-MCNC: 9.8 MG/DL (ref 8.5–10.5)
CHLORIDE SERPL-SCNC: 103 MMOL/L (ref 96–112)
CO2 SERPL-SCNC: 27 MMOL/L (ref 20–33)
COLOR UR: YELLOW
CREAT SERPL-MCNC: 1.28 MG/DL (ref 0.5–1.4)
EKG IMPRESSION: NORMAL
EOSINOPHIL # BLD AUTO: 0.11 K/UL (ref 0–0.51)
EOSINOPHIL NFR BLD: 0.9 % (ref 0–6.9)
EPI CELLS #/AREA URNS HPF: NEGATIVE /HPF
ERYTHROCYTE [DISTWIDTH] IN BLOOD BY AUTOMATED COUNT: 48.1 FL (ref 35.9–50)
GFR SERPLBLD CREATININE-BSD FMLA CKD-EPI: 76 ML/MIN/1.73 M 2
GLOBULIN SER CALC-MCNC: 3.4 G/DL (ref 1.9–3.5)
GLUCOSE SERPL-MCNC: 87 MG/DL (ref 65–99)
GLUCOSE UR STRIP.AUTO-MCNC: NEGATIVE MG/DL
HCT VFR BLD AUTO: 48.4 % (ref 42–52)
HGB BLD-MCNC: 15.6 G/DL (ref 14–18)
HIV 1+2 AB+HIV1 P24 AG SERPL QL IA: NORMAL
HYALINE CASTS #/AREA URNS LPF: ABNORMAL /LPF
KETONES UR STRIP.AUTO-MCNC: NEGATIVE MG/DL
LEUKOCYTE ESTERASE UR QL STRIP.AUTO: ABNORMAL
LIPASE SERPL-CCNC: 40 U/L (ref 11–82)
LYMPHOCYTES # BLD AUTO: 2.59 K/UL (ref 1–4.8)
LYMPHOCYTES NFR BLD: 20.9 % (ref 22–41)
MACROCYTES BLD QL SMEAR: ABNORMAL
MANUAL DIFF BLD: NORMAL
MCH RBC QN AUTO: 31.7 PG (ref 27–33)
MCHC RBC AUTO-ENTMCNC: 32.2 G/DL (ref 32.3–36.5)
MCV RBC AUTO: 98.4 FL (ref 81.4–97.8)
MICRO URNS: ABNORMAL
MONOCYTES # BLD AUTO: 1.18 K/UL (ref 0–0.85)
MONOCYTES NFR BLD AUTO: 9.5 % (ref 0–13.4)
MORPHOLOGY BLD-IMP: NORMAL
NEUTROPHILS # BLD AUTO: 8.52 K/UL (ref 1.82–7.42)
NEUTROPHILS NFR BLD: 68.7 % (ref 44–72)
NITRITE UR QL STRIP.AUTO: NEGATIVE
NRBC # BLD AUTO: 0 K/UL
NRBC BLD-RTO: 0 /100 WBC (ref 0–0.2)
PH UR STRIP.AUTO: 7 [PH] (ref 5–8)
PLATELET # BLD AUTO: 249 K/UL (ref 164–446)
PLATELET BLD QL SMEAR: NORMAL
PMV BLD AUTO: 9.3 FL (ref 9–12.9)
POIKILOCYTOSIS BLD QL SMEAR: NORMAL
POTASSIUM SERPL-SCNC: 4.9 MMOL/L (ref 3.6–5.5)
PROT SERPL-MCNC: 8 G/DL (ref 6–8.2)
PROT UR QL STRIP: 300 MG/DL
RBC # BLD AUTO: 4.92 M/UL (ref 4.7–6.1)
RBC # URNS HPF: >150 /HPF
RBC BLD AUTO: PRESENT
RBC UR QL AUTO: ABNORMAL
SIGNIFICANT IND 70042: NORMAL
SITE SITE: NORMAL
SODIUM SERPL-SCNC: 139 MMOL/L (ref 135–145)
SOURCE SOURCE: NORMAL
SP GR UR STRIP.AUTO: 1.02
T PALLIDUM AB SER QL IA: NORMAL
UROBILINOGEN UR STRIP.AUTO-MCNC: 0.2 MG/DL
WBC # BLD AUTO: 12.4 K/UL (ref 4.8–10.8)
WBC #/AREA URNS HPF: ABNORMAL /HPF

## 2023-08-15 PROCEDURE — 99285 EMERGENCY DEPT VISIT HI MDM: CPT

## 2023-08-15 PROCEDURE — 85007 BL SMEAR W/DIFF WBC COUNT: CPT

## 2023-08-15 PROCEDURE — 700111 HCHG RX REV CODE 636 W/ 250 OVERRIDE (IP): Mod: UD

## 2023-08-15 PROCEDURE — 87389 HIV-1 AG W/HIV-1&-2 AB AG IA: CPT

## 2023-08-15 PROCEDURE — 80053 COMPREHEN METABOLIC PANEL: CPT

## 2023-08-15 PROCEDURE — 83690 ASSAY OF LIPASE: CPT

## 2023-08-15 PROCEDURE — 85025 COMPLETE CBC W/AUTO DIFF WBC: CPT

## 2023-08-15 PROCEDURE — 86780 TREPONEMA PALLIDUM: CPT

## 2023-08-15 PROCEDURE — 93005 ELECTROCARDIOGRAM TRACING: CPT | Performed by: EMERGENCY MEDICINE

## 2023-08-15 PROCEDURE — 36415 COLL VENOUS BLD VENIPUNCTURE: CPT

## 2023-08-15 PROCEDURE — 700105 HCHG RX REV CODE 258: Mod: JZ,UD | Performed by: EMERGENCY MEDICINE

## 2023-08-15 PROCEDURE — 96375 TX/PRO/DX INJ NEW DRUG ADDON: CPT

## 2023-08-15 PROCEDURE — 96374 THER/PROPH/DIAG INJ IV PUSH: CPT

## 2023-08-15 PROCEDURE — 96376 TX/PRO/DX INJ SAME DRUG ADON: CPT

## 2023-08-15 PROCEDURE — 700111 HCHG RX REV CODE 636 W/ 250 OVERRIDE (IP): Mod: JZ,UD | Performed by: EMERGENCY MEDICINE

## 2023-08-15 PROCEDURE — 74176 CT ABD & PELVIS W/O CONTRAST: CPT

## 2023-08-15 PROCEDURE — 81001 URINALYSIS AUTO W/SCOPE: CPT

## 2023-08-15 RX ORDER — ONDANSETRON 2 MG/ML
4 INJECTION INTRAMUSCULAR; INTRAVENOUS ONCE
Status: COMPLETED | OUTPATIENT
Start: 2023-08-15 | End: 2023-08-15

## 2023-08-15 RX ORDER — SODIUM CHLORIDE, SODIUM LACTATE, POTASSIUM CHLORIDE, CALCIUM CHLORIDE 600; 310; 30; 20 MG/100ML; MG/100ML; MG/100ML; MG/100ML
1000 INJECTION, SOLUTION INTRAVENOUS ONCE
Status: COMPLETED | OUTPATIENT
Start: 2023-08-15 | End: 2023-08-15

## 2023-08-15 RX ORDER — KETOROLAC TROMETHAMINE 30 MG/ML
INJECTION, SOLUTION INTRAMUSCULAR; INTRAVENOUS
Status: COMPLETED
Start: 2023-08-15 | End: 2023-08-15

## 2023-08-15 RX ORDER — SULFAMETHOXAZOLE AND TRIMETHOPRIM 800; 160 MG/1; MG/1
1 TABLET ORAL 2 TIMES DAILY
Qty: 14 TABLET | Refills: 0 | Status: ON HOLD | OUTPATIENT
Start: 2023-08-15 | End: 2023-08-18

## 2023-08-15 RX ORDER — HYDROCODONE BITARTRATE AND ACETAMINOPHEN 5; 325 MG/1; MG/1
1-2 TABLET ORAL EVERY 6 HOURS PRN
Qty: 15 TABLET | Refills: 0 | Status: SHIPPED | OUTPATIENT
Start: 2023-08-15 | End: 2023-08-18

## 2023-08-15 RX ORDER — KETOROLAC TROMETHAMINE 30 MG/ML
15 INJECTION, SOLUTION INTRAMUSCULAR; INTRAVENOUS ONCE
Status: COMPLETED | OUTPATIENT
Start: 2023-08-15 | End: 2023-08-15

## 2023-08-15 RX ORDER — HYDROCODONE BITARTRATE AND ACETAMINOPHEN 5; 325 MG/1; MG/1
1-2 TABLET ORAL EVERY 6 HOURS PRN
Qty: 15 TABLET | Refills: 0 | Status: SHIPPED | OUTPATIENT
Start: 2023-08-15 | End: 2023-08-16

## 2023-08-15 RX ORDER — SULFAMETHOXAZOLE AND TRIMETHOPRIM 800; 160 MG/1; MG/1
1 TABLET ORAL 2 TIMES DAILY
Qty: 14 TABLET | Refills: 0 | Status: ACTIVE | OUTPATIENT
Start: 2023-08-15 | End: 2023-08-16

## 2023-08-15 RX ADMIN — KETOROLAC TROMETHAMINE 15 MG: 30 INJECTION, SOLUTION INTRAMUSCULAR; INTRAVENOUS at 16:20

## 2023-08-15 RX ADMIN — FENTANYL CITRATE 50 MCG: 50 INJECTION, SOLUTION INTRAMUSCULAR; INTRAVENOUS at 15:22

## 2023-08-15 RX ADMIN — SODIUM CHLORIDE, POTASSIUM CHLORIDE, SODIUM LACTATE AND CALCIUM CHLORIDE 1000 ML: 600; 310; 30; 20 INJECTION, SOLUTION INTRAVENOUS at 15:22

## 2023-08-15 RX ADMIN — FENTANYL CITRATE 100 MCG: 50 INJECTION, SOLUTION INTRAMUSCULAR; INTRAVENOUS at 16:25

## 2023-08-15 RX ADMIN — ONDANSETRON 4 MG: 2 INJECTION INTRAMUSCULAR; INTRAVENOUS at 15:22

## 2023-08-15 ASSESSMENT — FIBROSIS 4 INDEX: FIB4 SCORE: 1.21

## 2023-08-15 NOTE — ED NOTES
Pt urinated approximately 200 mL. Urine sent to lab. Pt reports pain is a little better but has had flank pain since prior to the stent placement.

## 2023-08-15 NOTE — ED PROVIDER NOTES
"ED Provider Note    CHIEF COMPLAINT  Chief Complaint   Patient presents with    Abdominal Pain     Had renal stent removed today. No urine output post stent removal w/ SOB. Abd pain radiating to L flank.        EXTERNAL RECORDS REVIEWED  Reviewed recent hospitalization records and kidney stone management imaging studies urological procedures and consults    HPI/ROS  LIMITATION TO HISTORY   None  OUTSIDE HISTORIAN(S):  None    Russell Bunn is a 33 y.o. male who presents for evaluation of severe left-sided flank pain, inability to urinate nausea and vomiting without diarrhea.  The patient was just recently hospitalized at this facility for obstructing left kidney stone with possible infection.  Dr. Silver with urology placed a stent approximately 5 days ago.  Patient was discharged on antibiotics and pain medication and improved condition.  He was at the office with urology this morning at 10 AM and had the stent removed.  He initially had some pain but over the last 2 to 3 hours has had severe worsening pain.  He called the urology office and they referred him here for higher level of care.  He reports when he attempted to urinate he may have urinated \"air bubbles.  \"No high fevers or chills night sweats weight loss numbness tingling or weakness.    PAST MEDICAL HISTORY   has a past medical history of Kidney stones and Seizure disorder (HCC).    SURGICAL HISTORY   has a past surgical history that includes lithotripsy; cysto/uretero/pyeloscopy, dx (Right, 3/13/2020); cystoscopy,insert ureteral stent (Right, 3/13/2020); lasertripsy (Right, 3/13/2020); cystoscopy,insert ureteral stent (Left, 4/26/2021); cysto/uretero/pyeloscopy, dx (Left, 4/26/2021); cystoscopy (Left, 02/20/2022); cystoscopy,insert ureteral stent (Left, 2/20/2022); cysto/uretero/pyeloscopy, dx (Left, 2/20/2022); lasertripsy (Left, 2/20/2022); cysto stent placemnt pre surg (Right, 4/24/2023); cystoscopy,insert ureteral stent (Left, " 8/10/2023); and cysto/uretero/pyeloscopy, dx (Left, 8/10/2023).    FAMILY HISTORY  No family history on file.    SOCIAL HISTORY  Social History     Tobacco Use    Smoking status: Former     Packs/day: 0.50     Types: Cigarettes    Smokeless tobacco: Never   Vaping Use    Vaping Use: Never used   Substance and Sexual Activity    Alcohol use: Yes     Comment: Occasional    Drug use: Yes     Types: Inhaled     Comment: Marijuana, every day    Sexual activity: Not on file       CURRENT MEDICATIONS  No current facility-administered medications for this encounter.    Current Outpatient Medications:     HYDROcodone-acetaminophen (NORCO) 5-325 MG Tab per tablet, Take 1-2 Tablets by mouth every 6 hours as needed (pain) for up to 3 days., Disp: 15 Tablet, Rfl: 0    sulfamethoxazole-trimethoprim (BACTRIM DS) 800-160 MG tablet, Take 1 Tablet by mouth 2 times a day for 7 days., Disp: 14 Tablet, Rfl: 0    HYDROcodone-acetaminophen (NORCO) 5-325 MG Tab per tablet, Take 1-2 Tablets by mouth every 6 hours as needed (pain) for up to 3 days., Disp: 15 Tablet, Rfl: 0    sulfamethoxazole-trimethoprim (BACTRIM DS) 800-160 MG tablet, Take 1 Tablet by mouth 2 times a day for 7 days., Disp: 14 Tablet, Rfl: 0    acetaminophen (TYLENOL) 325 MG Tab, Take 2 Tablets by mouth every 6 hours as needed for Fever, Moderate Pain or Mild Pain., Disp: 30 Tablet, Rfl: 0    senna-docusate (PERICOLACE OR SENOKOT S) 8.6-50 MG Tab, Take 2 Tablets by mouth 2 times a day., Disp: 30 Tablet, Rfl: 0    polyethylene glycol/lytes (MIRALAX) 17 g Pack, Take 1 Packet by mouth 1 time a day as needed (if sennosides and docusate ineffective after 24 hours)., Disp: , Rfl: 3    cefdinir (OMNICEF) 300 MG Cap, Take 1 Capsule by mouth 2 times a day for 9 days., Disp: 18 Capsule, Rfl: 0        ALLERGIES  Allergies   Allergen Reactions    Kiwi Extract Anaphylaxis    Shellfish Allergy Anaphylaxis    Cefazolin Rash and Itching     Rash  Tolerated ceftriaxone (April 2021)     Dilaudid [Hydromorphone] Itching    Hydrocodone Itching    Oxycodone Itching    Potassium Iodide Itching     Severe itching    Tape Unspecified     Paper tape okay        PHYSICAL EXAM  VITAL SIGNS: /74   Pulse 66   Temp 36.8 °C (98.2 °F) (Temporal)   Resp 18   Wt 62.6 kg (138 lb)   SpO2 93%   BMI 22.27 kg/m²    Pulse ox interpretation: I interpret this pulse ox as normal.  Constitutional: Alert and oriented x 3, moderate distress  HEENT: Atraumatic normocephalic, pupils are equal round reactive to light extraocular movements are intact. The nares is clear, external ears are normal, mouth shows moist mucous membranes normal dentition for age  Neck: Supple, no JVD no tracheal deviation  Cardiovascular: Regular rate and rhythm no murmur rub or gallop 2+ pulses peripherally x4  Thorax & Lungs: No respiratory distress, no wheezes rales or rhonchi, No chest tenderness.   GI: Soft nontender nondistended positive bowel sounds, no peritoneal signs diffuse severe left flank pain  Skin: Warm dry no acute rash or lesion  Musculoskeletal: Moving all extremities with full range and 5 of 5 strength no acute  deformity  Neurologic: Cranial nerves III through XII are grossly intact no sensory deficit no cerebellar dysfunction   Psychiatric: Moderate distress        DIAGNOSTIC STUDIES / PROCEDURES      LABS  Results for orders placed or performed during the hospital encounter of 08/15/23   T.PALLIDUM AB RICO (Syphilis)   Result Value Ref Range    Syphilis, Treponemal Qual Non-Reactive Non-Reactive   HIV AG/AB Combo Assay Screening   Result Value Ref Range    HIV Ag/Ab Combo Assay Non-Reactive Non Reactive   CBC WITH DIFFERENTIAL   Result Value Ref Range    WBC 12.4 (H) 4.8 - 10.8 K/uL    RBC 4.92 4.70 - 6.10 M/uL    Hemoglobin 15.6 14.0 - 18.0 g/dL    Hematocrit 48.4 42.0 - 52.0 %    MCV 98.4 (H) 81.4 - 97.8 fL    MCH 31.7 27.0 - 33.0 pg    MCHC 32.2 (L) 32.3 - 36.5 g/dL    RDW 48.1 35.9 - 50.0 fL    Platelet Count 249  164 - 446 K/uL    MPV 9.3 9.0 - 12.9 fL    Neutrophils-Polys 68.70 44.00 - 72.00 %    Lymphocytes 20.90 (L) 22.00 - 41.00 %    Monocytes 9.50 0.00 - 13.40 %    Eosinophils 0.90 0.00 - 6.90 %    Basophils 0.00 0.00 - 1.80 %    Nucleated RBC 0.00 0.00 - 0.20 /100 WBC    Neutrophils (Absolute) 8.52 (H) 1.82 - 7.42 K/uL    Lymphs (Absolute) 2.59 1.00 - 4.80 K/uL    Monos (Absolute) 1.18 (H) 0.00 - 0.85 K/uL    Eos (Absolute) 0.11 0.00 - 0.51 K/uL    Baso (Absolute) 0.00 0.00 - 0.12 K/uL    NRBC (Absolute) 0.00 K/uL    Anisocytosis 1+     Macrocytosis 1+    URINALYSIS    Specimen: Urine   Result Value Ref Range    Color Yellow     Character Cloudy (A)     Specific Gravity 1.020 <1.035    Ph 7.0 5.0 - 8.0    Glucose Negative Negative mg/dL    Ketones Negative Negative mg/dL    Protein 300 (A) Negative mg/dL    Bilirubin Negative Negative    Urobilinogen, Urine 0.2 Negative    Nitrite Negative Negative    Leukocyte Esterase Moderate (A) Negative    Occult Blood Large (A) Negative    Micro Urine Req Microscopic    DIFFERENTIAL MANUAL   Result Value Ref Range    Manual Diff Status PERFORMED    PERIPHERAL SMEAR REVIEW   Result Value Ref Range    Peripheral Smear Review see below    PLATELET ESTIMATE   Result Value Ref Range    Plt Estimation Normal    MORPHOLOGY   Result Value Ref Range    RBC Morphology Present     Poikilocytosis 1+     Echinocytes 1+    Comp Metabolic Panel   Result Value Ref Range    Sodium 139 135 - 145 mmol/L    Potassium 4.9 3.6 - 5.5 mmol/L    Chloride 103 96 - 112 mmol/L    Co2 27 20 - 33 mmol/L    Anion Gap 9.0 7.0 - 16.0    Glucose 87 65 - 99 mg/dL    Bun 16 8 - 22 mg/dL    Creatinine 1.28 0.50 - 1.40 mg/dL    Calcium 9.8 8.5 - 10.5 mg/dL    Correct Calcium 9.3 8.5 - 10.5 mg/dL    AST(SGOT) 22 12 - 45 U/L    ALT(SGPT) 14 2 - 50 U/L    Alkaline Phosphatase 89 30 - 99 U/L    Total Bilirubin 0.3 0.1 - 1.5 mg/dL    Albumin 4.6 3.2 - 4.9 g/dL    Total Protein 8.0 6.0 - 8.2 g/dL    Globulin 3.4 1.9 - 3.5  g/dL    A-G Ratio 1.4 g/dL   LIPASE   Result Value Ref Range    Lipase 40 11 - 82 U/L   URINE MICROSCOPIC (W/UA)   Result Value Ref Range    WBC Packed (A) /hpf    RBC >150 (A) /hpf    Bacteria Moderate (A) None /hpf    Epithelial Cells Negative /hpf    Hyaline Cast 0-2 /lpf   ESTIMATED GFR   Result Value Ref Range    GFR (CKD-EPI) 76 >60 mL/min/1.73 m 2         RADIOLOGY  I have independently interpreted the diagnostic imaging associated with this visit and am waiting the final reading from the radiologist.   My preliminary interpretation is as follows: Nonobstructive right-sided kidney stone persistent left hydronephrosis  Radiologist interpretation:   CT-RENAL COLIC EVALUATION(A/P W/O)   Final Result      1.  Right kidney nonobstructive tiny punctate calculi x2 at the lower pole.   2.  Left kidney moderate hydronephrosis and hydroureter. Medullary calcification/nephrocalcinosis. This might represent medullary sponge kidney. 3 mm punctate calcification at or near the expected course of the distal left ureter at the level of the    acetabulum. No change from prior exam. This could represent a persistent distal left ureteral calculus, a few centimeters above the UVJ. Not entirely convinced that this is not just a phlebolith, but suspicious.          COURSE & MEDICAL DECISION MAKING    ED Observation Status? Yes; I am placing the patient in to an observation status due to a diagnostic uncertainty as well as therapeutic intensity. Patient placed in observation status at 3:03 PM, 8/15/2023.     Observation plan is as follows: Establish an IV administer parenteral nausea and pain medication perform extensive blood and urine test perform imaging studies perform serial abdominal exams reviewed plan of care and imaging studies with patient    Upon Reevaluation, the patient's condition has: Improved; and will be discharged.    Patient discharged from ED Observation status at 1835 (Time) 8/15 (Date).     INITIAL ASSESSMENT,  COURSE AND PLAN  Care Narrative:   This is a very pleasant 33-year-old gentleman who presents here with acute and severe flank pain after stent removal in the urology office earlier today.  He was recently hospitalized for an obstructing left-sided kidney stone.  He did have a low-grade temperature and was placed on antibiotics.  He had severe pain after stent removal and came here.Here he had an extensive evaluation.  His white count was slightly elevated but he had no high fever hypotension or signs of sepsis.  Urinalysis had a plethora of white and red cells that could simply be from stent removal or possible infection there is no large perinephric abscess or intra-abdominal infection.  There did not appear to be any significant findings or change on the CT scan.  After treatment patient felt much improved.  I will give a small prescription of pain medicine and coverage urine with Bactrim and recommend he follow-up with urology    HYDRATION: Based on the patient's presentation of Acute Vomiting the patient was given IV fluids. IV Hydration was used because oral hydration was not adequate alone. Upon recheck following hydration, the patient was improved.      ADDITIONAL PROBLEM LIST    DISPOSITION AND DISCUSSIONS  I have discussed management of the patient with the following physicians and RAJAT's: None    Discussion of management with other QHP or appropriate source(s): None    Escalation of care considered, and ultimately not performed:acute inpatient care management, however at this time, the patient is most appropriate for outpatient management    Barriers to care at this time, including but not limited to: Patient does not have established PCP.     Decision tools and prescription drugs considered including, but not limited to: Patient will be prescribed opiate pain medication as well as antibiotics    FINAL DIAGNOSIS  1. Kidney stone on left side    2. Bacteria in urine         In prescribing controlled  substances to this patient, I certify that I have obtained and reviewed the medical history of Russell Bunn. I have also made a good filiberto effort to obtain applicable records from other providers who have treated the patient and records did not demonstrate any increased risk of substance abuse that would prevent me from prescribing controlled substances.     I have conducted a physical exam and documented it. I have reviewed Mr. Bunn’s prescription history as maintained by the [place] Prescription Monitoring Program.     I have assessed the patient’s risk for abuse, dependency, and addiction using the validated Opioid Risk Tool available at https://www.mdcalc.com/ovbfze-vqsn-mpsr-ort-narcotic-abuse.     Given the above, I believe the benefits of controlled substance therapy outweigh the risks. The reasons for prescribing controlled substances include non-narcotic, oral analgesic alternatives have been inadequate for pain control. Accordingly, I have discussed the risk and benefits, treatment plan, and alternative therapies with the patient.     Electronically signed by: Sean Phillip M.D., 8/15/2023 3:08 PM

## 2023-08-15 NOTE — ED TRIAGE NOTES
Chief Complaint   Patient presents with    Abdominal Pain     Had renal stent removed today. No urine output post stent removal w/ SOB. Abd pain radiating to L flank.         /62   Pulse 84   Temp 36 °C (96.8 °F)   Resp 18   SpO2 98%

## 2023-08-16 ENCOUNTER — APPOINTMENT (OUTPATIENT)
Dept: RADIOLOGY | Facility: MEDICAL CENTER | Age: 33
DRG: 694 | End: 2023-08-16
Attending: EMERGENCY MEDICINE
Payer: MEDICAID

## 2023-08-16 ENCOUNTER — HOSPITAL ENCOUNTER (INPATIENT)
Facility: MEDICAL CENTER | Age: 33
LOS: 2 days | DRG: 694 | End: 2023-08-18
Attending: EMERGENCY MEDICINE | Admitting: HOSPITALIST
Payer: MEDICAID

## 2023-08-16 DIAGNOSIS — N39.0 ACUTE UTI: ICD-10-CM

## 2023-08-16 DIAGNOSIS — N13.2 HYDRONEPHROSIS WITH URINARY OBSTRUCTION DUE TO URETERAL CALCULUS: ICD-10-CM

## 2023-08-16 DIAGNOSIS — R10.9 LEFT FLANK PAIN: ICD-10-CM

## 2023-08-16 DIAGNOSIS — R52 INTRACTABLE PAIN: ICD-10-CM

## 2023-08-16 DIAGNOSIS — N20.0 NEPHROLITHIASIS: ICD-10-CM

## 2023-08-16 DIAGNOSIS — N20.0 KIDNEY STONE ON LEFT SIDE: ICD-10-CM

## 2023-08-16 DIAGNOSIS — R10.32 LLQ PAIN: ICD-10-CM

## 2023-08-16 LAB
ANION GAP SERPL CALC-SCNC: 10 MMOL/L (ref 7–16)
APPEARANCE UR: ABNORMAL
BACTERIA #/AREA URNS HPF: ABNORMAL /HPF
BASOPHILS # BLD AUTO: 0.3 % (ref 0–1.8)
BASOPHILS # BLD: 0.03 K/UL (ref 0–0.12)
BILIRUB UR QL STRIP.AUTO: NEGATIVE
BUN SERPL-MCNC: 15 MG/DL (ref 8–22)
CALCIUM SERPL-MCNC: 7.9 MG/DL (ref 8.5–10.5)
CHLORIDE SERPL-SCNC: 107 MMOL/L (ref 96–112)
CO2 SERPL-SCNC: 22 MMOL/L (ref 20–33)
COLOR UR: YELLOW
CREAT SERPL-MCNC: 1.06 MG/DL (ref 0.5–1.4)
EOSINOPHIL # BLD AUTO: 0.45 K/UL (ref 0–0.51)
EOSINOPHIL NFR BLD: 5 % (ref 0–6.9)
EPI CELLS #/AREA URNS HPF: NEGATIVE /HPF
ERYTHROCYTE [DISTWIDTH] IN BLOOD BY AUTOMATED COUNT: 47.8 FL (ref 35.9–50)
GFR SERPLBLD CREATININE-BSD FMLA CKD-EPI: 95 ML/MIN/1.73 M 2
GLUCOSE SERPL-MCNC: 90 MG/DL (ref 65–99)
GLUCOSE UR STRIP.AUTO-MCNC: NEGATIVE MG/DL
HCT VFR BLD AUTO: 36.2 % (ref 42–52)
HGB BLD-MCNC: 11.7 G/DL (ref 14–18)
HYALINE CASTS #/AREA URNS LPF: ABNORMAL /LPF
IMM GRANULOCYTES # BLD AUTO: 0.02 K/UL (ref 0–0.11)
IMM GRANULOCYTES NFR BLD AUTO: 0.2 % (ref 0–0.9)
KETONES UR STRIP.AUTO-MCNC: NEGATIVE MG/DL
LEUKOCYTE ESTERASE UR QL STRIP.AUTO: ABNORMAL
LYMPHOCYTES # BLD AUTO: 2.33 K/UL (ref 1–4.8)
LYMPHOCYTES NFR BLD: 25.6 % (ref 22–41)
MCH RBC QN AUTO: 31.5 PG (ref 27–33)
MCHC RBC AUTO-ENTMCNC: 32.3 G/DL (ref 32.3–36.5)
MCV RBC AUTO: 97.6 FL (ref 81.4–97.8)
MICRO URNS: ABNORMAL
MONOCYTES # BLD AUTO: 0.81 K/UL (ref 0–0.85)
MONOCYTES NFR BLD AUTO: 8.9 % (ref 0–13.4)
NEUTROPHILS # BLD AUTO: 5.45 K/UL (ref 1.82–7.42)
NEUTROPHILS NFR BLD: 60 % (ref 44–72)
NITRITE UR QL STRIP.AUTO: NEGATIVE
NRBC # BLD AUTO: 0 K/UL
NRBC BLD-RTO: 0 /100 WBC (ref 0–0.2)
PH UR STRIP.AUTO: 6.5 [PH] (ref 5–8)
PLATELET # BLD AUTO: 212 K/UL (ref 164–446)
PMV BLD AUTO: 9.3 FL (ref 9–12.9)
POTASSIUM SERPL-SCNC: 3.7 MMOL/L (ref 3.6–5.5)
PROT UR QL STRIP: 100 MG/DL
RBC # BLD AUTO: 3.71 M/UL (ref 4.7–6.1)
RBC # URNS HPF: >150 /HPF
RBC UR QL AUTO: ABNORMAL
SODIUM SERPL-SCNC: 139 MMOL/L (ref 135–145)
SP GR UR STRIP.AUTO: 1.01
UROBILINOGEN UR STRIP.AUTO-MCNC: 0.2 MG/DL
WBC # BLD AUTO: 9.1 K/UL (ref 4.8–10.8)
WBC #/AREA URNS HPF: ABNORMAL /HPF

## 2023-08-16 PROCEDURE — 700111 HCHG RX REV CODE 636 W/ 250 OVERRIDE (IP): Mod: JZ,UD | Performed by: EMERGENCY MEDICINE

## 2023-08-16 PROCEDURE — 94760 N-INVAS EAR/PLS OXIMETRY 1: CPT

## 2023-08-16 PROCEDURE — 87077 CULTURE AEROBIC IDENTIFY: CPT

## 2023-08-16 PROCEDURE — 700105 HCHG RX REV CODE 258: Performed by: EMERGENCY MEDICINE

## 2023-08-16 PROCEDURE — 85025 COMPLETE CBC W/AUTO DIFF WBC: CPT

## 2023-08-16 PROCEDURE — 700105 HCHG RX REV CODE 258: Mod: JZ | Performed by: HOSPITALIST

## 2023-08-16 PROCEDURE — 87186 SC STD MICRODIL/AGAR DIL: CPT

## 2023-08-16 PROCEDURE — 81001 URINALYSIS AUTO W/SCOPE: CPT

## 2023-08-16 PROCEDURE — 80048 BASIC METABOLIC PNL TOTAL CA: CPT

## 2023-08-16 PROCEDURE — 96375 TX/PRO/DX INJ NEW DRUG ADDON: CPT

## 2023-08-16 PROCEDURE — 99285 EMERGENCY DEPT VISIT HI MDM: CPT

## 2023-08-16 PROCEDURE — 74176 CT ABD & PELVIS W/O CONTRAST: CPT

## 2023-08-16 PROCEDURE — 36415 COLL VENOUS BLD VENIPUNCTURE: CPT

## 2023-08-16 PROCEDURE — 87086 URINE CULTURE/COLONY COUNT: CPT

## 2023-08-16 PROCEDURE — 770006 HCHG ROOM/CARE - MED/SURG/GYN SEMI*

## 2023-08-16 PROCEDURE — 99223 1ST HOSP IP/OBS HIGH 75: CPT | Performed by: HOSPITALIST

## 2023-08-16 PROCEDURE — 96365 THER/PROPH/DIAG IV INF INIT: CPT

## 2023-08-16 PROCEDURE — 700101 HCHG RX REV CODE 250: Performed by: EMERGENCY MEDICINE

## 2023-08-16 RX ORDER — OXYCODONE HYDROCHLORIDE 5 MG/1
5 TABLET ORAL
Status: DISCONTINUED | OUTPATIENT
Start: 2023-08-16 | End: 2023-08-18 | Stop reason: HOSPADM

## 2023-08-16 RX ORDER — MORPHINE SULFATE 4 MG/ML
4 INJECTION INTRAVENOUS ONCE
Status: COMPLETED | OUTPATIENT
Start: 2023-08-16 | End: 2023-08-16

## 2023-08-16 RX ORDER — ACETAMINOPHEN 500 MG
1000 TABLET ORAL EVERY 6 HOURS
Status: DISCONTINUED | OUTPATIENT
Start: 2023-08-17 | End: 2023-08-16

## 2023-08-16 RX ORDER — KETOROLAC TROMETHAMINE 30 MG/ML
30 INJECTION, SOLUTION INTRAMUSCULAR; INTRAVENOUS EVERY 6 HOURS
Status: DISCONTINUED | OUTPATIENT
Start: 2023-08-17 | End: 2023-08-18 | Stop reason: HOSPADM

## 2023-08-16 RX ORDER — OXYCODONE HYDROCHLORIDE 10 MG/1
10 TABLET ORAL
Status: DISCONTINUED | OUTPATIENT
Start: 2023-08-16 | End: 2023-08-18 | Stop reason: HOSPADM

## 2023-08-16 RX ORDER — IBUPROFEN 800 MG/1
800 TABLET ORAL 3 TIMES DAILY PRN
Status: DISCONTINUED | OUTPATIENT
Start: 2023-08-20 | End: 2023-08-18 | Stop reason: HOSPADM

## 2023-08-16 RX ORDER — KETOROLAC TROMETHAMINE 30 MG/ML
30 INJECTION, SOLUTION INTRAMUSCULAR; INTRAVENOUS ONCE
Status: COMPLETED | OUTPATIENT
Start: 2023-08-16 | End: 2023-08-16

## 2023-08-16 RX ORDER — ONDANSETRON 2 MG/ML
4 INJECTION INTRAMUSCULAR; INTRAVENOUS ONCE
Status: COMPLETED | OUTPATIENT
Start: 2023-08-16 | End: 2023-08-16

## 2023-08-16 RX ORDER — SODIUM CHLORIDE, SODIUM LACTATE, POTASSIUM CHLORIDE, CALCIUM CHLORIDE 600; 310; 30; 20 MG/100ML; MG/100ML; MG/100ML; MG/100ML
INJECTION, SOLUTION INTRAVENOUS CONTINUOUS
Status: DISCONTINUED | OUTPATIENT
Start: 2023-08-16 | End: 2023-08-18 | Stop reason: HOSPADM

## 2023-08-16 RX ORDER — CEFTRIAXONE 1 G/1
1000 INJECTION, POWDER, FOR SOLUTION INTRAMUSCULAR; INTRAVENOUS ONCE
Status: COMPLETED | OUTPATIENT
Start: 2023-08-16 | End: 2023-08-16

## 2023-08-16 RX ORDER — MORPHINE SULFATE 4 MG/ML
4 INJECTION INTRAVENOUS
Status: DISCONTINUED | OUTPATIENT
Start: 2023-08-16 | End: 2023-08-18 | Stop reason: HOSPADM

## 2023-08-16 RX ORDER — ACETAMINOPHEN 500 MG
1000 TABLET ORAL EVERY 6 HOURS PRN
Status: DISCONTINUED | OUTPATIENT
Start: 2023-08-22 | End: 2023-08-16

## 2023-08-16 RX ADMIN — CEFTRIAXONE SODIUM 1000 MG: 1 INJECTION, POWDER, FOR SOLUTION INTRAMUSCULAR; INTRAVENOUS at 22:32

## 2023-08-16 RX ADMIN — LIDOCAINE HYDROCHLORIDE 92 MG: 20 INJECTION, SOLUTION EPIDURAL; INFILTRATION; INTRACAUDAL; PERINEURAL at 23:12

## 2023-08-16 RX ADMIN — SODIUM CHLORIDE, POTASSIUM CHLORIDE, SODIUM LACTATE AND CALCIUM CHLORIDE: 600; 310; 30; 20 INJECTION, SOLUTION INTRAVENOUS at 22:38

## 2023-08-16 RX ADMIN — FENTANYL CITRATE 50 MCG: 50 INJECTION, SOLUTION INTRAMUSCULAR; INTRAVENOUS at 19:42

## 2023-08-16 RX ADMIN — ONDANSETRON 4 MG: 2 INJECTION INTRAMUSCULAR; INTRAVENOUS at 19:42

## 2023-08-16 RX ADMIN — MORPHINE SULFATE 4 MG: 4 INJECTION, SOLUTION INTRAMUSCULAR; INTRAVENOUS at 21:13

## 2023-08-16 RX ADMIN — KETOROLAC TROMETHAMINE 30 MG: 30 INJECTION, SOLUTION INTRAMUSCULAR; INTRAVENOUS at 19:42

## 2023-08-16 ASSESSMENT — ENCOUNTER SYMPTOMS
DIAPHORESIS: 0
CHILLS: 1
BLURRED VISION: 0
CLAUDICATION: 0
PALPITATIONS: 0
WEAKNESS: 0
EYE PAIN: 0
FLANK PAIN: 1
POLYDIPSIA: 0
BRUISES/BLEEDS EASILY: 0
STRIDOR: 0
SORE THROAT: 0
FEVER: 1
TINGLING: 0
TREMORS: 0
WHEEZING: 0
NECK PAIN: 0
HEMOPTYSIS: 0
NAUSEA: 1
VOMITING: 1
DEPRESSION: 0
SPUTUM PRODUCTION: 0
BACK PAIN: 0
HALLUCINATIONS: 0
DIARRHEA: 0
PND: 0
ABDOMINAL PAIN: 1
ORTHOPNEA: 0
DIZZINESS: 0
COUGH: 0
FALLS: 0
BLOOD IN STOOL: 0
PHOTOPHOBIA: 0
HEARTBURN: 0
SINUS PAIN: 0
HEADACHES: 0
CONSTIPATION: 0
MYALGIAS: 0
DOUBLE VISION: 0
SHORTNESS OF BREATH: 0

## 2023-08-16 ASSESSMENT — FIBROSIS 4 INDEX: FIB4 SCORE: 0.76

## 2023-08-16 ASSESSMENT — PAIN DESCRIPTION - PAIN TYPE
TYPE: ACUTE PAIN

## 2023-08-16 ASSESSMENT — LIFESTYLE VARIABLES: SUBSTANCE_ABUSE: 0

## 2023-08-17 LAB
ALBUMIN SERPL BCP-MCNC: 3.2 G/DL (ref 3.2–4.9)
ALBUMIN/GLOB SERPL: 1.2 G/DL
ALP SERPL-CCNC: 71 U/L (ref 30–99)
ALT SERPL-CCNC: 9 U/L (ref 2–50)
ANION GAP SERPL CALC-SCNC: 13 MMOL/L (ref 7–16)
AST SERPL-CCNC: 23 U/L (ref 12–45)
BASOPHILS # BLD AUTO: 0.4 % (ref 0–1.8)
BASOPHILS # BLD: 0.03 K/UL (ref 0–0.12)
BILIRUB SERPL-MCNC: <0.2 MG/DL (ref 0.1–1.5)
BUN SERPL-MCNC: 16 MG/DL (ref 8–22)
CALCIUM ALBUM COR SERPL-MCNC: 8.2 MG/DL (ref 8.5–10.5)
CALCIUM SERPL-MCNC: 7.6 MG/DL (ref 8.5–10.5)
CHLORIDE SERPL-SCNC: 107 MMOL/L (ref 96–112)
CO2 SERPL-SCNC: 18 MMOL/L (ref 20–33)
CREAT SERPL-MCNC: 1.28 MG/DL (ref 0.5–1.4)
EOSINOPHIL # BLD AUTO: 0.58 K/UL (ref 0–0.51)
EOSINOPHIL NFR BLD: 7.6 % (ref 0–6.9)
ERYTHROCYTE [DISTWIDTH] IN BLOOD BY AUTOMATED COUNT: 48.1 FL (ref 35.9–50)
GFR SERPLBLD CREATININE-BSD FMLA CKD-EPI: 76 ML/MIN/1.73 M 2
GLOBULIN SER CALC-MCNC: 2.6 G/DL (ref 1.9–3.5)
GLUCOSE SERPL-MCNC: 92 MG/DL (ref 65–99)
HCT VFR BLD AUTO: 37.2 % (ref 42–52)
HGB BLD-MCNC: 12.1 G/DL (ref 14–18)
IMM GRANULOCYTES # BLD AUTO: 0.01 K/UL (ref 0–0.11)
IMM GRANULOCYTES NFR BLD AUTO: 0.1 % (ref 0–0.9)
LYMPHOCYTES # BLD AUTO: 2.89 K/UL (ref 1–4.8)
LYMPHOCYTES NFR BLD: 38 % (ref 22–41)
MCH RBC QN AUTO: 31.8 PG (ref 27–33)
MCHC RBC AUTO-ENTMCNC: 32.5 G/DL (ref 32.3–36.5)
MCV RBC AUTO: 97.6 FL (ref 81.4–97.8)
MONOCYTES # BLD AUTO: 0.9 K/UL (ref 0–0.85)
MONOCYTES NFR BLD AUTO: 11.8 % (ref 0–13.4)
NEUTROPHILS # BLD AUTO: 3.2 K/UL (ref 1.82–7.42)
NEUTROPHILS NFR BLD: 42.1 % (ref 44–72)
NRBC # BLD AUTO: 0 K/UL
NRBC BLD-RTO: 0 /100 WBC (ref 0–0.2)
PLATELET # BLD AUTO: 207 K/UL (ref 164–446)
PMV BLD AUTO: 9.3 FL (ref 9–12.9)
POTASSIUM SERPL-SCNC: 3.9 MMOL/L (ref 3.6–5.5)
PROT SERPL-MCNC: 5.8 G/DL (ref 6–8.2)
RBC # BLD AUTO: 3.81 M/UL (ref 4.7–6.1)
SODIUM SERPL-SCNC: 138 MMOL/L (ref 135–145)
WBC # BLD AUTO: 7.6 K/UL (ref 4.8–10.8)

## 2023-08-17 PROCEDURE — 85025 COMPLETE CBC W/AUTO DIFF WBC: CPT

## 2023-08-17 PROCEDURE — 700105 HCHG RX REV CODE 258: Performed by: HOSPITALIST

## 2023-08-17 PROCEDURE — 700111 HCHG RX REV CODE 636 W/ 250 OVERRIDE (IP): Performed by: STUDENT IN AN ORGANIZED HEALTH CARE EDUCATION/TRAINING PROGRAM

## 2023-08-17 PROCEDURE — 700102 HCHG RX REV CODE 250 W/ 637 OVERRIDE(OP): Performed by: STUDENT IN AN ORGANIZED HEALTH CARE EDUCATION/TRAINING PROGRAM

## 2023-08-17 PROCEDURE — 99232 SBSQ HOSP IP/OBS MODERATE 35: CPT | Performed by: STUDENT IN AN ORGANIZED HEALTH CARE EDUCATION/TRAINING PROGRAM

## 2023-08-17 PROCEDURE — A9270 NON-COVERED ITEM OR SERVICE: HCPCS | Performed by: HOSPITALIST

## 2023-08-17 PROCEDURE — 96367 TX/PROPH/DG ADDL SEQ IV INF: CPT

## 2023-08-17 PROCEDURE — 80053 COMPREHEN METABOLIC PANEL: CPT

## 2023-08-17 PROCEDURE — 700105 HCHG RX REV CODE 258: Mod: JZ | Performed by: STUDENT IN AN ORGANIZED HEALTH CARE EDUCATION/TRAINING PROGRAM

## 2023-08-17 PROCEDURE — 700111 HCHG RX REV CODE 636 W/ 250 OVERRIDE (IP): Mod: JZ | Performed by: HOSPITALIST

## 2023-08-17 PROCEDURE — 96366 THER/PROPH/DIAG IV INF ADDON: CPT

## 2023-08-17 PROCEDURE — 96375 TX/PRO/DX INJ NEW DRUG ADDON: CPT

## 2023-08-17 PROCEDURE — 96376 TX/PRO/DX INJ SAME DRUG ADON: CPT

## 2023-08-17 PROCEDURE — 700111 HCHG RX REV CODE 636 W/ 250 OVERRIDE (IP)

## 2023-08-17 PROCEDURE — 770006 HCHG ROOM/CARE - MED/SURG/GYN SEMI*

## 2023-08-17 PROCEDURE — 700102 HCHG RX REV CODE 250 W/ 637 OVERRIDE(OP): Performed by: HOSPITALIST

## 2023-08-17 PROCEDURE — A9270 NON-COVERED ITEM OR SERVICE: HCPCS | Performed by: STUDENT IN AN ORGANIZED HEALTH CARE EDUCATION/TRAINING PROGRAM

## 2023-08-17 RX ORDER — DIPHENHYDRAMINE HCL 25 MG
25 TABLET ORAL EVERY 6 HOURS PRN
Status: DISCONTINUED | OUTPATIENT
Start: 2023-08-17 | End: 2023-08-17

## 2023-08-17 RX ORDER — TAMSULOSIN HYDROCHLORIDE 0.4 MG/1
0.4 CAPSULE ORAL
Status: DISCONTINUED | OUTPATIENT
Start: 2023-08-17 | End: 2023-08-18 | Stop reason: HOSPADM

## 2023-08-17 RX ORDER — DIPHENHYDRAMINE HYDROCHLORIDE 50 MG/ML
50 INJECTION INTRAMUSCULAR; INTRAVENOUS EVERY 6 HOURS PRN
Status: DISCONTINUED | OUTPATIENT
Start: 2023-08-17 | End: 2023-08-18 | Stop reason: HOSPADM

## 2023-08-17 RX ORDER — DIPHENHYDRAMINE HYDROCHLORIDE 50 MG/ML
INJECTION INTRAMUSCULAR; INTRAVENOUS
Status: COMPLETED
Start: 2023-08-17 | End: 2023-08-17

## 2023-08-17 RX ORDER — METHYLPREDNISOLONE SODIUM SUCCINATE 40 MG/ML
40 INJECTION, POWDER, LYOPHILIZED, FOR SOLUTION INTRAMUSCULAR; INTRAVENOUS ONCE
Status: COMPLETED | OUTPATIENT
Start: 2023-08-17 | End: 2023-08-17

## 2023-08-17 RX ORDER — LINEZOLID 600 MG/1
600 TABLET, FILM COATED ORAL EVERY 12 HOURS
Status: DISCONTINUED | OUTPATIENT
Start: 2023-08-19 | End: 2023-08-18 | Stop reason: HOSPADM

## 2023-08-17 RX ORDER — DIPHENHYDRAMINE HYDROCHLORIDE 50 MG/ML
25 INJECTION INTRAMUSCULAR; INTRAVENOUS EVERY 6 HOURS PRN
Status: DISCONTINUED | OUTPATIENT
Start: 2023-08-17 | End: 2023-08-17

## 2023-08-17 RX ADMIN — AMPICILLIN AND SULBACTAM 3 G: 1; 2 INJECTION, POWDER, FOR SOLUTION INTRAMUSCULAR; INTRAVENOUS at 07:37

## 2023-08-17 RX ADMIN — KETOROLAC TROMETHAMINE 30 MG: 30 INJECTION, SOLUTION INTRAMUSCULAR; INTRAVENOUS at 04:00

## 2023-08-17 RX ADMIN — AMPICILLIN AND SULBACTAM 3 G: 1; 2 INJECTION, POWDER, FOR SOLUTION INTRAMUSCULAR; INTRAVENOUS at 00:35

## 2023-08-17 RX ADMIN — DIPHENHYDRAMINE HYDROCHLORIDE 50 MG: 50 INJECTION, SOLUTION INTRAMUSCULAR; INTRAVENOUS at 10:02

## 2023-08-17 RX ADMIN — METHYLPREDNISOLONE SODIUM SUCCINATE 40 MG: 40 INJECTION, POWDER, FOR SOLUTION INTRAMUSCULAR; INTRAVENOUS at 14:35

## 2023-08-17 RX ADMIN — VANCOMYCIN HYDROCHLORIDE 1250 MG: 5 INJECTION, POWDER, LYOPHILIZED, FOR SOLUTION INTRAVENOUS at 08:59

## 2023-08-17 RX ADMIN — KETOROLAC TROMETHAMINE 30 MG: 30 INJECTION, SOLUTION INTRAMUSCULAR; INTRAVENOUS at 17:43

## 2023-08-17 RX ADMIN — OXYCODONE HYDROCHLORIDE 10 MG: 10 TABLET ORAL at 02:26

## 2023-08-17 RX ADMIN — DIPHENHYDRAMINE HYDROCHLORIDE 50 MG: 50 INJECTION, SOLUTION INTRAMUSCULAR; INTRAVENOUS at 07:48

## 2023-08-17 RX ADMIN — DIPHENHYDRAMINE HYDROCHLORIDE 25 MG: 50 INJECTION INTRAMUSCULAR; INTRAVENOUS at 03:46

## 2023-08-17 RX ADMIN — SODIUM CHLORIDE, POTASSIUM CHLORIDE, SODIUM LACTATE AND CALCIUM CHLORIDE: 600; 310; 30; 20 INJECTION, SOLUTION INTRAVENOUS at 19:38

## 2023-08-17 RX ADMIN — DIPHENHYDRAMINE HYDROCHLORIDE 50 MG: 50 INJECTION, SOLUTION INTRAMUSCULAR; INTRAVENOUS at 18:46

## 2023-08-17 RX ADMIN — KETOROLAC TROMETHAMINE 30 MG: 30 INJECTION, SOLUTION INTRAMUSCULAR; INTRAVENOUS at 12:17

## 2023-08-17 RX ADMIN — OXYCODONE HYDROCHLORIDE 10 MG: 10 TABLET ORAL at 07:46

## 2023-08-17 RX ADMIN — TAMSULOSIN HYDROCHLORIDE 0.4 MG: 0.4 CAPSULE ORAL at 08:30

## 2023-08-17 RX ADMIN — DIPHENHYDRAMINE HYDROCHLORIDE 25 MG: 50 INJECTION, SOLUTION INTRAMUSCULAR; INTRAVENOUS at 03:46

## 2023-08-17 ASSESSMENT — PATIENT HEALTH QUESTIONNAIRE - PHQ9
SUM OF ALL RESPONSES TO PHQ QUESTIONS 1-9: 0
7. TROUBLE CONCENTRATING ON THINGS, SUCH AS READING THE NEWSPAPER OR WATCHING TELEVISION: NOT AT ALL
9. THOUGHTS THAT YOU WOULD BE BETTER OFF DEAD, OR OF HURTING YOURSELF: NOT AT ALL
4. FEELING TIRED OR HAVING LITTLE ENERGY: NOT AT ALL
2. FEELING DOWN, DEPRESSED, IRRITABLE, OR HOPELESS: NOT AT ALL
6. FEELING BAD ABOUT YOURSELF - OR THAT YOU ARE A FAILURE OR HAVE LET YOURSELF OR YOUR FAMILY DOWN: NOT AL ALL
SUM OF ALL RESPONSES TO PHQ9 QUESTIONS 1 AND 2: 0
5. POOR APPETITE OR OVEREATING: NOT AT ALL
1. LITTLE INTEREST OR PLEASURE IN DOING THINGS: NOT AT ALL
8. MOVING OR SPEAKING SO SLOWLY THAT OTHER PEOPLE COULD HAVE NOTICED. OR THE OPPOSITE, BEING SO FIGETY OR RESTLESS THAT YOU HAVE BEEN MOVING AROUND A LOT MORE THAN USUAL: NOT AT ALL
3. TROUBLE FALLING OR STAYING ASLEEP OR SLEEPING TOO MUCH: NOT AT ALL

## 2023-08-17 ASSESSMENT — COGNITIVE AND FUNCTIONAL STATUS - GENERAL
SUGGESTED CMS G CODE MODIFIER MOBILITY: CH
DAILY ACTIVITIY SCORE: 24
MOBILITY SCORE: 24
SUGGESTED CMS G CODE MODIFIER DAILY ACTIVITY: CH

## 2023-08-17 ASSESSMENT — ENCOUNTER SYMPTOMS
BACK PAIN: 0
HEADACHES: 0
NAUSEA: 0
FOCAL WEAKNESS: 0
SHORTNESS OF BREATH: 0
BLURRED VISION: 0
SENSORY CHANGE: 0
EYE PAIN: 0
FALLS: 0
PALPITATIONS: 0
FLANK PAIN: 1
FEVER: 0
COUGH: 0
ABDOMINAL PAIN: 0
INSOMNIA: 0
VOMITING: 0
DIZZINESS: 0
CHILLS: 0

## 2023-08-17 ASSESSMENT — PAIN DESCRIPTION - PAIN TYPE
TYPE: ACUTE PAIN

## 2023-08-17 ASSESSMENT — LIFESTYLE VARIABLES: SUBSTANCE_ABUSE: 0

## 2023-08-17 NOTE — ASSESSMENT & PLAN NOTE
History of Enterococcus in the urine  Stop unasyn as possible allergy given itching  Start vancomycin  Urine culture 8/11 growing enterococcus, multi drug sensitive

## 2023-08-17 NOTE — ED NOTES
Pt complaining of worsening flank pain and requesting medications, will medicate with PRN pain meds

## 2023-08-17 NOTE — ED PROVIDER NOTES
ED Provider Note    CHIEF COMPLAINT  Chief Complaint   Patient presents with    Flank Pain     PATIENT STATES HE WAS HERE YESTERDAY FOR THE SAME ISSUE. PATIENT WAS HERE FOR LEFT FLANK PAIN. PT WITH RECENT SURGERY FOR STENT PLACEMENT AND STENT WAS REMOVED ON FRIDAY. PATIENT SINCE WITH TERRIBLE PAIN AND THINKS ANOTHER STONE IS WORKING ITS WAY DOWN.     PT STATES DECREASED URINE OUTPUT WITH BLOOD.        EXTERNAL RECORDS REVIEWED  Inpatient Notes discharge summary 8/11/2023 after presentation for bilateral flank pain right greater than left, no history of kidney stones,.  History of cystine urea and cystine stones, alcohol use former smoker with illicit inhaled drug use.  Presented with left flank pain, fever and chills.  CT showed left ureteral stone and left hydronephrosis.  No leukocytosis but UA positive for infection.  Antibiotics started.  He underwent left ureteroscopy, left ureteral stent placement by Dr. Almaraz on 8/10.  He tolerated procedure was cleared by urology for discharge follow-up stent removal and repeat ultrasound.  ED evaluation 8/15/2023 for left flank pain, inability to urinate, nausea and vomiting.  Seen by urology earlier today and had stent removed.  Has completed antibiotic regimen.  Initially had some pain but prior to presentation and severe worsening left lower quadrant left flank pain.  Denies fevers.  CT with right kidney nonobstructive tiny punctate calculi x2.  Left kidney moderate hydronephrosis and hydroureter.  Medullary calcification/nephrocalcinosis.  Might represent medullary sponge kidney.  3 mm punctate calcification at or near the expected course of the distal ureter.  Urinalysis with a plethora of white and red cells could simply be from stent removal or possible infection but without large perinephric abscess or intra-abdominal infection.  Symptomatology much improved after ED intervention.  Given small prescription of pain medicine and started on Bactrim to follow-up with  urology.    HPI/ROS  LIMITATION TO HISTORY   Select: : None  OUTSIDE HISTORIAN(S):  None    Russell Bunn is a 33 y.o. male who presents to the emergency department by ambulance from home for left lower quadrant, left flank pain.  Patient describes history much like that provided above, stent placement last Friday, seen at urology office yesterday morning for stent removal.  Increased pain with ED presentation last night but symptoms controlled and discharged home.  However pain persist throughout the day, increasingly worse just prior to arrival.  Complaining of 8 out of 10 sharp, left lower quadrant abdominal pain, left flank pain.  No difficulty urinating, no gross hematuria.  Nausea without vomiting.  Denies fever.    PAST MEDICAL HISTORY   has a past medical history of Kidney stones and Seizure disorder (HCC).    SURGICAL HISTORY   has a past surgical history that includes lithotripsy; cysto/uretero/pyeloscopy, dx (Right, 3/13/2020); cystoscopy,insert ureteral stent (Right, 3/13/2020); lasertripsy (Right, 3/13/2020); cystoscopy,insert ureteral stent (Left, 4/26/2021); cysto/uretero/pyeloscopy, dx (Left, 4/26/2021); cystoscopy (Left, 02/20/2022); cystoscopy,insert ureteral stent (Left, 2/20/2022); cysto/uretero/pyeloscopy, dx (Left, 2/20/2022); lasertripsy (Left, 2/20/2022); cysto stent placemnt pre surg (Right, 4/24/2023); cystoscopy,insert ureteral stent (Left, 8/10/2023); and cysto/uretero/pyeloscopy, dx (Left, 8/10/2023).    FAMILY HISTORY  History reviewed. No pertinent family history.    SOCIAL HISTORY  Social History     Tobacco Use    Smoking status: Former     Packs/day: .5     Types: Cigarettes    Smokeless tobacco: Never   Vaping Use    Vaping Use: Never used   Substance and Sexual Activity    Alcohol use: Yes     Comment: Occasional    Drug use: Yes     Types: Inhaled     Comment: Marijuana, every day    Sexual activity: Not on file       CURRENT MEDICATIONS  Home Medications    **Home  "medications have not yet been reviewed for this encounter**         ALLERGIES  Allergies   Allergen Reactions    Kiwi Extract Anaphylaxis    Shellfish Allergy Anaphylaxis    Cefazolin Rash and Itching     Rash  Tolerated ceftriaxone (April 2021)    Dilaudid [Hydromorphone] Itching    Hydrocodone Itching    Oxycodone Itching    Potassium Iodide Itching     Severe itching    Tape Unspecified     Paper tape okay        PHYSICAL EXAM  VITAL SIGNS: /70   Pulse 73   Temp 36.7 °C (98.1 °F) (Temporal)   Resp 13   Ht 1.689 m (5' 6.5\")   Wt 61.2 kg (135 lb)   SpO2 98%   BMI 21.46 kg/m²    Pulse ox interpretation: I interpret this pulse ox as normal.  Constitutional: Alert in no apparent distress.  HENT: Normocephalic, atraumatic. Bilateral external ears normal, Nose normal.   Eyes: Conjunctiva normal.   Neck: Normal range of motion, Supple  Lymphatic: No lymphadenopathy noted.   Cardiovascular: Regular rate and rhythm, no murmurs. Distal pulses intact.   Thorax & Lungs: Normal breath sounds.  No wheezing/rales/ronchi. No increased work of breathing  Abdomen: Soft, non-distended.  Tender to palpation left lower quadrant without guarding or peritonitis.  No CVA tenderness percussion.  No palpable inguinal mass, hernia, lymphadenopathy.  Skin: Warm, Dry, No erythema, No rash.   Musculoskeletal: Good range of motion in all major joints.   Neurologic: Alert and orient x4  Psychiatric: Affect normal, Judgment normal, Mood normal.       DIAGNOSTIC STUDIES / PROCEDURES  LABS  Results for orders placed or performed during the hospital encounter of 08/16/23   CBC WITH DIFFERENTIAL   Result Value Ref Range    WBC 9.1 4.8 - 10.8 K/uL    RBC 3.71 (L) 4.70 - 6.10 M/uL    Hemoglobin 11.7 (L) 14.0 - 18.0 g/dL    Hematocrit 36.2 (L) 42.0 - 52.0 %    MCV 97.6 81.4 - 97.8 fL    MCH 31.5 27.0 - 33.0 pg    MCHC 32.3 32.3 - 36.5 g/dL    RDW 47.8 35.9 - 50.0 fL    Platelet Count 212 164 - 446 K/uL    MPV 9.3 9.0 - 12.9 fL    " Neutrophils-Polys 60.00 44.00 - 72.00 %    Lymphocytes 25.60 22.00 - 41.00 %    Monocytes 8.90 0.00 - 13.40 %    Eosinophils 5.00 0.00 - 6.90 %    Basophils 0.30 0.00 - 1.80 %    Immature Granulocytes 0.20 0.00 - 0.90 %    Nucleated RBC 0.00 0.00 - 0.20 /100 WBC    Neutrophils (Absolute) 5.45 1.82 - 7.42 K/uL    Lymphs (Absolute) 2.33 1.00 - 4.80 K/uL    Monos (Absolute) 0.81 0.00 - 0.85 K/uL    Eos (Absolute) 0.45 0.00 - 0.51 K/uL    Baso (Absolute) 0.03 0.00 - 0.12 K/uL    Immature Granulocytes (abs) 0.02 0.00 - 0.11 K/uL    NRBC (Absolute) 0.00 K/uL   BASIC METABOLIC PANEL   Result Value Ref Range    Sodium 139 135 - 145 mmol/L    Potassium 3.7 3.6 - 5.5 mmol/L    Chloride 107 96 - 112 mmol/L    Co2 22 20 - 33 mmol/L    Glucose 90 65 - 99 mg/dL    Bun 15 8 - 22 mg/dL    Creatinine 1.06 0.50 - 1.40 mg/dL    Calcium 7.9 (L) 8.5 - 10.5 mg/dL    Anion Gap 10.0 7.0 - 16.0   URINALYSIS (UA)    Specimen: Urine   Result Value Ref Range    Color Yellow     Character Cloudy (A)     Specific Gravity 1.015 <1.035    Ph 6.5 5.0 - 8.0    Glucose Negative Negative mg/dL    Ketones Negative Negative mg/dL    Protein 100 (A) Negative mg/dL    Bilirubin Negative Negative    Urobilinogen, Urine 0.2 Negative    Nitrite Negative Negative    Leukocyte Esterase Large (A) Negative    Occult Blood Large (A) Negative    Micro Urine Req Microscopic    ESTIMATED GFR   Result Value Ref Range    GFR (CKD-EPI) 95 >60 mL/min/1.73 m 2   URINE MICROSCOPIC (W/UA)   Result Value Ref Range    WBC Packed (A) /hpf    RBC >150 (A) /hpf    Bacteria Moderate (A) None /hpf    Epithelial Cells Negative /hpf    Hyaline Cast 0-2 /lpf     RADIOLOGY  I have independently interpreted the diagnostic imaging associated with this visit and am waiting the final reading from the radiologist.     Radiologist interpretation:   CT-RENAL COLIC EVALUATION(A/P W/O)   Final Result         1.  1.5 mm distal left ureteral stone resulting in left urinary outflow tract  obstructive changes.   2.  Right nephrolithiasis without visualized obstructive changes   3.  Hepatomegaly            COURSE & MEDICAL DECISION MAKING    ED Observation Status? Yes; I am placing the patient in to an observation status due to a diagnostic uncertainty as well as therapeutic intensity. Patient placed in observation status at 7:25 PM, 8/16/2023.     Observation plan is as follows: Symptom control, labs, imaging before final disposition can be made    Upon Reevaluation, the patient's condition has: not improved; and will be escalated to hospitalization.    Patient discharged from ED Observation status at 9:47 PM  (Time) 8/16/23 (Date).     INITIAL ASSESSMENT, COURSE AND PLAN  Care Narrative:   Seen evaluated bedside.  Left lower quadrant abdominal pain, mild obvious discomfort.  Add Toradol, fentanyl, Zofran, as well as labs, urinalysis and repeat scan for intractable pain with known ureteral calculus and prior obstruction.    Initial pain medication only minimally effective, add morphine.    No leukocytosis, left shift or bandemia.  Mild normocytic anemia, decreased from prior, could be delusional, can trend but no indication for transfusion.  No electrolyte derangement, renal function preserved.  Awaiting urinalysis.    CT with 1.5 mm distal left ureteral stone resulting in urinary outflow tract obstructive changes.  Will discuss with urology.    Urinalysis does have large blood, large leuk esterase as well as packed WBCs and bacteria but negative for epithelials.  Add urine culture, add Rocephin (discussed with pharmacy, tolerated previously despite reported allergy to cefazolin).  No clinical evidence of pyelonephritis or sepsis.    ADDITIONAL PROBLEM LIST  Substance abuse    DISPOSITION AND DISCUSSIONS  I have discussed management of the patient with the following physicians and RAJAT's:    9:36 PM Urology, Hospitalist paged    9:53 PM Dr. Bansal, urology, is aware of the patient agreeable to  consultation.  Agrees with hospital admission, pain control and will reevaluate in the morning for possible intervention if symptoms persist.  Aware of urine, will add culture, cover with first dose Rocephin and continue to observe as patient is clinically nontoxic, no evidence for pyelonephritis or sepsis.    9:55 PM hospitalist is aware the patient and agreeable to consultation.    Discussion of management with other QHP or appropriate source(s): Pharmacy antibiotic selection        FINAL DIAGNOSIS  1. Intractable pain    2. Hydronephrosis with urinary obstruction due to ureteral calculus    3. LLQ pain    4. Left flank pain           Electronically signed by: Yasmeen Santiago D.O., 8/16/2023 7:25 PM

## 2023-08-17 NOTE — H&P
Hospital Medicine History & Physical Note    Date of Service  8/16/2023    Primary Care Physician  Pcp Pt States None    Consultants  urology    Specialist Names:     Code Status  Full Code    Chief Complaint  Chief Complaint   Patient presents with    Flank Pain     PATIENT STATES HE WAS HERE YESTERDAY FOR THE SAME ISSUE. PATIENT WAS HERE FOR LEFT FLANK PAIN. PT WITH RECENT SURGERY FOR STENT PLACEMENT AND STENT WAS REMOVED ON FRIDAY. PATIENT SINCE WITH TERRIBLE PAIN AND THINKS ANOTHER STONE IS WORKING ITS WAY DOWN.     PT STATES DECREASED URINE OUTPUT WITH BLOOD.        History of Presenting Illness  Russell Bunn is a 33 y.o. male who presented 8/16/2023 with past medical history of cystinuria, repeated nephrolithiasis, polycystic kidney disease, seizure disorder who presents to the hospital for left-sided flank pain started yesterday..  Is associated with fevers and chills.  Patient has been in the hospital repeatedly for nephrolithiasis.  He did have a stent that was removed l yesterday.  Patient is having difficulty urinating and states his only bubbles are coming out.  He also complains of nausea and vomiting since the stent removed.  Patient states that he is drinking potassium bicarbonate water that he bought a case of.    CT scan of the abdomen found a 1.5 mm distal left ureteral stone with obstructive changes.    I discussed the plan of care with patient.    Review of Systems  Review of Systems   Constitutional:  Positive for chills, fever and malaise/fatigue. Negative for diaphoresis.   HENT:  Negative for congestion, ear discharge, ear pain, hearing loss, nosebleeds, sinus pain, sore throat and tinnitus.    Eyes:  Negative for blurred vision, double vision, photophobia and pain.   Respiratory:  Negative for cough, hemoptysis, sputum production, shortness of breath, wheezing and stridor.    Cardiovascular:  Negative for chest pain, palpitations, orthopnea, claudication, leg swelling and PND.    Gastrointestinal:  Positive for abdominal pain, nausea and vomiting. Negative for blood in stool, constipation, diarrhea, heartburn and melena.   Genitourinary:  Positive for dysuria and flank pain. Negative for frequency, hematuria and urgency.   Musculoskeletal:  Negative for back pain, falls, joint pain, myalgias and neck pain.   Skin:  Negative for itching and rash.   Neurological:  Negative for dizziness, tingling, tremors, weakness and headaches.   Endo/Heme/Allergies:  Negative for environmental allergies and polydipsia. Does not bruise/bleed easily.   Psychiatric/Behavioral:  Negative for depression, hallucinations, substance abuse and suicidal ideas.        Past Medical History   has a past medical history of Kidney stones and Seizure disorder (HCC).    Surgical History   has a past surgical history that includes lithotripsy; pr cysto/uretero/pyeloscopy, dx (Right, 3/13/2020); pr cystoscopy,insert ureteral stent (Right, 3/13/2020); lasertripsy (Right, 3/13/2020); pr cystoscopy,insert ureteral stent (Left, 4/26/2021); pr cysto/uretero/pyeloscopy, dx (Left, 4/26/2021); cystoscopy (Left, 02/20/2022); pr cystoscopy,insert ureteral stent (Left, 2/20/2022); pr cysto/uretero/pyeloscopy, dx (Left, 2/20/2022); lasertripsy (Left, 2/20/2022); cysto stent placemnt pre surg (Right, 4/24/2023); pr cystoscopy,insert ureteral stent (Left, 8/10/2023); and pr cysto/uretero/pyeloscopy, dx (Left, 8/10/2023).     Family History  family history is not on file.   Family history reviewed with patient. There is no family history that is pertinent to the chief complaint.     Social History   reports that he has quit smoking. His smoking use included cigarettes. He smoked an average of .5 packs per day. He has never used smokeless tobacco. He reports current alcohol use. He reports current drug use. Drug: Inhaled.    Allergies  Allergies   Allergen Reactions    Kiwi Extract Anaphylaxis    Shellfish Allergy Anaphylaxis    Cefazolin  Rash and Itching     Rash  Tolerated ceftriaxone (April 2021)    Dilaudid [Hydromorphone] Itching    Hydrocodone Itching    Oxycodone Itching    Potassium Iodide Itching     Severe itching    Tape Unspecified     Paper tape okay        Medications  Prior to Admission Medications   Prescriptions Last Dose Informant Patient Reported? Taking?   HYDROcodone-acetaminophen (NORCO) 5-325 MG Tab per tablet   No No   Sig: Take 1-2 Tablets by mouth every 6 hours as needed (pain) for up to 3 days.   HYDROcodone-acetaminophen (NORCO) 5-325 MG Tab per tablet   No No   Sig: Take 1-2 Tablets by mouth every 6 hours as needed (pain) for up to 3 days.   acetaminophen (TYLENOL) 325 MG Tab   No No   Sig: Take 2 Tablets by mouth every 6 hours as needed for Fever, Moderate Pain or Mild Pain.   cefdinir (OMNICEF) 300 MG Cap   No No   Sig: Take 1 Capsule by mouth 2 times a day for 9 days.   polyethylene glycol/lytes (MIRALAX) 17 g Pack   No No   Sig: Take 1 Packet by mouth 1 time a day as needed (if sennosides and docusate ineffective after 24 hours).   senna-docusate (PERICOLACE OR SENOKOT S) 8.6-50 MG Tab   No No   Sig: Take 2 Tablets by mouth 2 times a day.   sulfamethoxazole-trimethoprim (BACTRIM DS) 800-160 MG tablet   No No   Sig: Take 1 Tablet by mouth 2 times a day for 7 days.   sulfamethoxazole-trimethoprim (BACTRIM DS) 800-160 MG tablet   No No   Sig: Take 1 Tablet by mouth 2 times a day for 7 days.      Facility-Administered Medications: None       Physical Exam  Temp:  [36.7 °C (98.1 °F)] 36.7 °C (98.1 °F)  Pulse:  [59-79] 59  Resp:  [13-18] 13  BP: (112-129)/(73-77) 113/73  SpO2:  [96 %-100 %] 96 %  Blood Pressure: 113/73   Temperature: 36.7 °C (98.1 °F)   Pulse: (!) 59   Respiration: 13   Pulse Oximetry: 96 %       Physical Exam  Vitals and nursing note reviewed.   Constitutional:       General: He is not in acute distress.     Appearance: Normal appearance. He is not ill-appearing, toxic-appearing or diaphoretic.   HENT:      " Head: Normocephalic and atraumatic.      Nose: No congestion or rhinorrhea.      Mouth/Throat:      Pharynx: No posterior oropharyngeal erythema.   Eyes:      General: No scleral icterus.        Right eye: No discharge.   Cardiovascular:      Rate and Rhythm: Normal rate and regular rhythm.      Pulses: Normal pulses.      Heart sounds: Normal heart sounds. No murmur heard.     No friction rub. No gallop.   Pulmonary:      Effort: Pulmonary effort is normal. No respiratory distress.      Breath sounds: Normal breath sounds. No stridor. No wheezing, rhonchi or rales.   Abdominal:      General: There is no distension.      Tenderness: There is no abdominal tenderness.   Musculoskeletal:         General: No swelling, tenderness, deformity or signs of injury.      Cervical back: Normal range of motion.      Right lower leg: No edema.      Left lower leg: No edema.   Skin:     Capillary Refill: Capillary refill takes more than 3 seconds.      Coloration: Skin is not jaundiced or pale.      Findings: No bruising, erythema, lesion or rash.   Neurological:      General: No focal deficit present.      Mental Status: He is alert and oriented to person, place, and time.         Laboratory:  Recent Labs     08/15/23  1507 08/16/23  1905   WBC 12.4* 9.1   RBC 4.92 3.71*   HEMOGLOBIN 15.6 11.7*   HEMATOCRIT 48.4 36.2*   MCV 98.4* 97.6   MCH 31.7 31.5   MCHC 32.2* 32.3   RDW 48.1 47.8   PLATELETCT 249 212   MPV 9.3 9.3     Recent Labs     08/15/23  1813 08/16/23  1905   SODIUM 139 139   POTASSIUM 4.9 3.7   CHLORIDE 103 107   CO2 27 22   GLUCOSE 87 90   BUN 16 15   CREATININE 1.28 1.06   CALCIUM 9.8 7.9*     Recent Labs     08/15/23  1813 08/16/23  1905   ALTSGPT 14  --    ASTSGOT 22  --    ALKPHOSPHAT 89  --    TBILIRUBIN 0.3  --    LIPASE 40  --    GLUCOSE 87 90         No results for input(s): \"NTPROBNP\" in the last 72 hours.      No results for input(s): \"TROPONINT\" in the last 72 hours.    Imaging:  CT-RENAL COLIC " EVALUATION(A/P W/O)   Final Result         1.  1.5 mm distal left ureteral stone resulting in left urinary outflow tract obstructive changes.   2.  Right nephrolithiasis without visualized obstructive changes   3.  Hepatomegaly          no X-Ray or EKG requiring interpretation    Assessment/Plan:  Justification for Admission Status  I anticipate this patient will require at least two midnights for appropriate medical management, necessitating inpatient admission because nephrolithiasis    Patient will need a Med/Surg bed on MEDICAL service .  The need is secondary to nephrolithiasis.    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

## 2023-08-17 NOTE — ASSESSMENT & PLAN NOTE
1.7 mm stone  Pain control with oral and IV narcotics  IV hydration  Flomax  Urology consulted, plan for OR tomorrow for likely stone expulsion  NPO at midnight

## 2023-08-17 NOTE — PROGRESS NOTES
[facility] Medicine Daily Progress Note    Date of Service  8/17/2023    Chief Complaint  Russell Bunn is a 33 y.o. male admitted 8/16/2023 with flank pain.    Hospital Course  Russell Bunn is a 33 y.o. male who presented 8/16/2023 with past medical history of cystinuria, repeated nephrolithiasis, polycystic kidney disease, seizure disorder who presents to the hospital for left-sided flank pain started yesterday..  Is associated with fevers and chills.  Patient has been in the hospital repeatedly for nephrolithiasis.  He did have a stent that was removed l yesterday.  Patient is having difficulty urinating and states his only bubbles are coming out.  He also complains of nausea and vomiting since the stent removed.  Patient states that he is drinking potassium bicarbonate water that he bought a case of.     CT scan of the abdomen found a 1.5 mm distal left ureteral stone with obstructive changes.    Interval Problem Update  No acute events overnight.  Patient reports ongoing flank pain, feeling dizzy. He reports decreased urine output as well as dysuria since having ureter stent removed two days ago. Denies fever.  UA with signs of infection.  CT renal colic shows distal left ureteral stone with obstructive changes.  Stop unasyn due to possible allergic reaction with itching.  Start vancomycin.  Discussed with urology, plan for OR tomorrow for likely stone expulsion. NPO at midnight.  Continue IV fluids and flomax for expectant management as well.      I have discussed this patient's plan of care and discharge plan at IDT rounds today with Case Management, Nursing, Nursing leadership, and other members of the IDT team.    Consultants/Specialty  urology    Code Status  Full Code    Disposition  The patient is not medically cleared for discharge to home or a post-acute facility.  Anticipate discharge to: home with close outpatient follow-up    I have placed the appropriate orders for  post-discharge needs.    Review of Systems  Review of Systems   Constitutional:  Negative for chills and fever.   Eyes:  Negative for blurred vision and pain.   Respiratory:  Negative for cough and shortness of breath.    Cardiovascular:  Negative for chest pain, palpitations and leg swelling.   Gastrointestinal:  Negative for abdominal pain, nausea and vomiting.   Genitourinary:  Positive for dysuria and flank pain. Negative for urgency.   Musculoskeletal:  Negative for back pain and falls.   Skin:  Negative for itching and rash.   Neurological:  Negative for dizziness, sensory change, focal weakness and headaches.   Psychiatric/Behavioral:  Negative for substance abuse. The patient does not have insomnia.         Physical Exam  Temp:  [36.4 °C (97.6 °F)-36.7 °C (98.1 °F)] 36.4 °C (97.6 °F)  Pulse:  [54-79] 69  Resp:  [13-18] 17  BP: ()/(60-97) 148/97  SpO2:  [94 %-100 %] 94 %    Physical Exam  Constitutional:       General: He is not in acute distress.     Appearance: He is not ill-appearing.   HENT:      Head: Normocephalic and atraumatic.      Right Ear: External ear normal.      Left Ear: External ear normal.      Mouth/Throat:      Pharynx: No oropharyngeal exudate or posterior oropharyngeal erythema.   Eyes:      Extraocular Movements: Extraocular movements intact.      Pupils: Pupils are equal, round, and reactive to light.   Cardiovascular:      Rate and Rhythm: Normal rate and regular rhythm.      Pulses: Normal pulses.      Heart sounds: Normal heart sounds.   Pulmonary:      Effort: Pulmonary effort is normal. No respiratory distress.      Breath sounds: Normal breath sounds. No wheezing or rales.   Abdominal:      General: Bowel sounds are normal. There is no distension.      Palpations: Abdomen is soft.      Tenderness: There is no abdominal tenderness. There is no right CVA tenderness, left CVA tenderness or guarding.   Musculoskeletal:         General: No swelling or tenderness.      Cervical  back: Normal range of motion and neck supple.      Right lower leg: No edema.      Left lower leg: No edema.   Skin:     General: Skin is warm and dry.   Neurological:      General: No focal deficit present.      Mental Status: He is oriented to person, place, and time.      Cranial Nerves: No cranial nerve deficit.      Sensory: No sensory deficit.      Motor: No weakness.   Psychiatric:         Mood and Affect: Mood normal.         Behavior: Behavior normal.         Fluids    Intake/Output Summary (Last 24 hours) at 8/17/2023 1315  Last data filed at 8/16/2023 2354  Gross per 24 hour   Intake 100 ml   Output --   Net 100 ml       Laboratory  Recent Labs     08/15/23  1507 08/16/23  1905 08/17/23  0046   WBC 12.4* 9.1 7.6   RBC 4.92 3.71* 3.81*   HEMOGLOBIN 15.6 11.7* 12.1*   HEMATOCRIT 48.4 36.2* 37.2*   MCV 98.4* 97.6 97.6   MCH 31.7 31.5 31.8   MCHC 32.2* 32.3 32.5   RDW 48.1 47.8 48.1   PLATELETCT 249 212 207   MPV 9.3 9.3 9.3     Recent Labs     08/15/23  1813 08/16/23  1905 08/17/23  0046   SODIUM 139 139 138   POTASSIUM 4.9 3.7 3.9   CHLORIDE 103 107 107   CO2 27 22 18*   GLUCOSE 87 90 92   BUN 16 15 16   CREATININE 1.28 1.06 1.28   CALCIUM 9.8 7.9* 7.6*                   Imaging  CT-RENAL COLIC EVALUATION(A/P W/O)   Final Result         1.  1.5 mm distal left ureteral stone resulting in left urinary outflow tract obstructive changes.   2.  Right nephrolithiasis without visualized obstructive changes   3.  Hepatomegaly           Assessment/Plan  * Nephrolithiasis- (present on admission)  Assessment & Plan  1.7 mm stone  Pain control with oral and IV narcotics  IV hydration  Flomax  Urology consulted, plan for OR tomorrow for likely stone expulsion  NPO at midnight    Acute pyelonephritis- (present on admission)  Assessment & Plan  History of Enterococcus in the urine  Stop unasyn as possible allergy given itching  Start vancomycin  Urine culture 8/11 growing enterococcus, multi drug sensitive    Cystinuria  (HCC)- (present on admission)  Assessment & Plan  Recommend to continue alkalinization of the urine         VTE prophylaxis:   SCDs/TEDs

## 2023-08-17 NOTE — ED NOTES
Med Rec complete per patient  Allergies reviewed  Preferred Pharmacy: [facility]    Patient started Keflex but stopped taking, last dose unknown.

## 2023-08-17 NOTE — ED NOTES
Assumed care of pt, received bedside report from TATE Ferrer. Safety rounds completed, pt resting comfortably in the room.

## 2023-08-17 NOTE — PROGRESS NOTES
Pharmacy Vancomycin Kinetics Note for 8/17/2023     33 y.o. male on Vancomycin day # 1     Vancomycin Indication (AUC Dosing): Complicated UTI    Provider specified end date: 08/22/23    Active Antibiotics (From admission, onward)      Ordered     Ordering Provider       Thu Aug 17, 2023  7:42 AM    08/17/23 0742  MD Alert...Vancomycin per Pharmacy  PHARMACY TO DOSE        Question:  Indication(s) for vancomycin?  Answer:  Urinary tract infection    Polo Cabezas M.D.            Dosing Weight: 61.2 kg (134 lb 14.7 oz)      Admission History: Admitted on 8/16/2023 for Nephrolithiasis [N20.0]  Pertinent history: 33 year old male on vancomycin for urine culture result with enterococcus    Allergies:     Kiwi extract, Shellfish allergy, Ampicillin-sulbactam sodium, Cefazolin, Dilaudid [hydromorphone], Hydrocodone, Oxycodone, Potassium iodide, and Tape     Pertinent cultures to date:     Results       Procedure Component Value Units Date/Time    URINE CULTURE (ADD ON) [[id number]] Collected: 08/16/23 2014    Order Status: Sent Specimen: Urine, Clean Catch Updated: 08/16/23 2243    Narrative:      Indication for culture:->Patient WITHOUT an indwelling [place]  catheter in place with new onset of Dysuria, Frequency,  Urgency, and/or Suprapubic pain    URINALYSIS (UA) [[id number]]  (Abnormal) Collected: 08/16/23 2014    Order Status: Completed Specimen: Urine Updated: 08/16/23 2109     Color Yellow     Character Cloudy     Specific Gravity 1.015     Ph 6.5     Glucose Negative mg/dL      Ketones Negative mg/dL      Protein 100 mg/dL      Bilirubin Negative     Urobilinogen, Urine 0.2     Nitrite Negative     Leukocyte Esterase Large     Occult Blood Large     Micro Urine Req Microscopic            Labs:     Estimated Creatinine Clearance: 71.7 mL/min (by C-G formula based on SCr of 1.28 mg/dL).  Recent Labs     08/15/23  1507 08/16/23  1905 08/17/23  0046   WBC 12.4* 9.1 7.6   NEUTSPOLYS 68.70 60.00 42.10*     Recent Labs      "08/15/23  1813 23  1905 23  0046   BUN 16 15 16   CREATININE 1.28 1.06 1.28   ALBUMIN 4.6  --  3.2       Intake/Output Summary (Last 24 hours) at 2023 0757  Last data filed at 2023 2354  Gross per 24 hour   Intake 100 ml   Output --   Net 100 ml      BP (!) 138/94   Pulse 62   Temp 36.7 °C (98.1 °F) (Temporal)   Resp 16   Ht 1.689 m (5' 6.5\")   Wt 61.2 kg (135 lb)   SpO2 100%  Temp (24hrs), Av.7 °C (98.1 °F), Min:36.7 °C (98.1 °F), Max:36.7 °C (98.1 °F)      List concerns for Vancomycin clearance:          Pharmacokinetics:     AUC kinetics:   Ke (hr ^-1): 0.0633 hr^-1  Half life: 10.95 hr  Clearance: 2.518  Estimated TDD: 1259  Estimated Dose: 682  Estimated interval: 13        A/P:     -  Vancomycin dose: 1250 mg IV every 24 hours, monitor renal function consider empiric dose increase if renal function improves. Scr 1.28 Crcl 71 on 2023    -  Next vancomycin level(s): defer    -  Predicted vancomycin AUC from initial AUC test calculator: 496 mg·hr/L    -  Comments:     Merlene Gaytan, PharmD    "

## 2023-08-17 NOTE — ED TRIAGE NOTES
"Chief Complaint   Patient presents with    Flank Pain     PATIENT STATES HE WAS HERE YESTERDAY FOR THE SAME ISSUE. PATIENT WAS HERE FOR LEFT FLANK PAIN. PT WITH RECENT SURGERY FOR STENT PLACEMENT AND STENT WAS REMOVED ON FRIDAY. PATIENT SINCE WITH TERRIBLE PAIN AND THINKS ANOTHER STONE IS WORKING ITS WAY DOWN.     PT STATES DECREASED URINE OUTPUT WITH BLOOD.            PT WHEELED TO TRIAGE. Pt AA&O X 4, SEE ABOVE.     PT TO LOBBY . PT EDUCATED ON ALERTING STAFF TO CHANGES IN CONDITION. PT VERBALIZED AN UNDERSTANDING.     EMS GAVE:    4 ZOFRAN   100 MCG FENTANYL     /77   Pulse 79   Temp 36.7 °C (98.1 °F) (Temporal)   Resp 18   Ht 1.689 m (5' 6.5\")   Wt 61.2 kg (135 lb)   SpO2 100%   BMI 21.46 kg/m²     "

## 2023-08-17 NOTE — ED NOTES
Bedside report from Janice RN. Pt connected to cardiac monitor, automatic BP, pulse ox. Pt resting, even chest rise and fall noted. Call light within reach.

## 2023-08-17 NOTE — CONSULTS
UROLOGY Consult Note:    Rowan Givens P.A.-C.  Date & Time note created:    8/17/2023   12:41 PM     Referring MD:  Dr. Perdomo     Patient ID:   Name:             Russell Bunn   YOB: 1990  Age:                 33 y.o.  male   MRN:               [id number]                                                             Reason for Consult:      Hydrone    History of Present Illness:    Russell Bunn is a 33 y.o. male known to our office who has past medical history of cystinuria, repeated nephrolithiasis, polycystic kidney disease, seizure disorder returned to hospital for left sided flank pain.     Review of Systems:      Constitutional: Endorses fevers   Eyes: Denies changes in vision   Ears/Nose/Throat/Mouth: Denies nasal congestion   Cardiovascular: no chest pain   Respiratory: no shortness of breath   Gastrointestinal/Hepatic: Endorses abdominal pain   Genitourinary: Denies hematuria, dysuria or frequency  Musculoskeletal/Rheum: Denies joint pain   Skin: Denies rash  Neurological: Denies headache   Psychiatric: denies mood disorder   Endocrine: Luana thyroid problems  Heme/Oncology/Lymph Nodes: Denies enlarged lymph nodes   All other systems were reviewed and are negative (AMA/CMS criteria)                Past Medical History:   Past Medical History:   Diagnosis Date    Kidney stones     multiple times requiring multiple surgeries    Seizure disorder (HCC)      Active Hospital Problems    Diagnosis     Nephrolithiasis [N20.0]     Acute pyelonephritis [N10]     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]        Past Surgical History:  Past Surgical History:   Procedure Laterality Date    OH CYSTOSCOPY,INSERT URETERAL STENT Left 8/10/2023    Procedure: CYSTOSCOPY, WITH URETERAL STENT INSERTION;  Surgeon: Rei Silver M.D.;  Location: [facility];  Service: Urology    OH CYSTO/URETERO/PYELOSCOPY, DX Left 8/10/2023    Procedure: URETEROSCOPY;  Surgeon: Rei Silver  M.D.;  Location: [facility];  Service: Urology    CYSTO STENT PLACEMNT PRE SURG Right 4/24/2023    Procedure: CYSTOSCOPY, WITH RIGHT URETERAL STENT INSERTION;  Surgeon: Dante Stephenson M.D.;  Location: [facility];  Service: Urology    CYSTOSCOPY Left 02/20/2022    Cysto ureteroscopy ,lithotripsy, stone basket, ureteral stent placement, Dr Silver    [place] CYSTOSCOPY,INSERT URETERAL STENT Left 2/20/2022    Procedure: CYSTOSCOPY, WITH URETERAL STENT INSERTION;  Surgeon: Rei Silver M.D.;  Location: [facility];  Service: Urology    NH CYSTO/URETERO/PYELOSCOPY, DX Left 2/20/2022    Procedure: URETEROSCOPY;  Surgeon: Rei Silver M.D.;  Location: [facility];  Service: Urology    LASERTRIPSY Left 2/20/2022    Procedure: LITHOTRIPSY, USING LASER;  Surgeon: Rei Silver M.D.;  Location: [facility];  Service: Urology    NH CYSTOSCOPY,INSERT URETERAL STENT Left 4/26/2021    Procedure: CYSTOSCOPY, WITH OUT URETERAL STENT INSERTION;  Surgeon: Raf Moss M.D.;  Location: [facility];  Service: Urology    NH CYSTO/URETERO/PYELOSCOPY, DX Left 4/26/2021    Procedure: URETEROSCOPY;  Surgeon: Raf Moss M.D.;  Location: [facility];  Service: Urology    NH CYSTO/URETERO/PYELOSCOPY, DX Right 3/13/2020    Procedure: URETEROSCOPY;  Surgeon: Chase Damon M.D.;  Location: [facility];  Service: Urology    NH CYSTOSCOPY,INSERT URETERAL STENT Right 3/13/2020    Procedure: CYSTOSCOPY;  Surgeon: Chase Damon M.D.;  Location: [facility];  Service: Urology    LASERTRIPSY Right 3/13/2020    Procedure: LITHOTRIPSY, USING LASER;  Surgeon: Chase Damon M.D.;  Location: [facility];  Service: Urology    LITHOTRIPSY         Hospital Medications:    Current Facility-Administered Medications:     diphenhydrAMINE (Benadryl) injection 50 mg, 50 mg, Intravenous, Q6HRS PRN, Polo Cabezas M.D., 50 mg at 08/17/23 [facility]    MD  Alert...Vancomycin per Pharmacy, , Other, PHARMACY TO DOSE, Polo Cabezas M.D.    tamsulosin (Flomax) capsule 0.4 mg, 0.4 mg, Oral, AFTER BREAKFAST, Polo Cabezas M.D., 0.4 mg at 08/17/23 0830    vancomycin (Vancocin) 1,250 mg in  mL IVPB, 1,250 mg, Intravenous, Q24HR, Polo Cabezas M.D., Stopped at 08/17/23 1059    methylPREDNISolone (SOLU-MEDROL) 40 MG injection 40 mg, 40 mg, Intravenous, Once, Polo Cabezas M.D.    lactated ringers infusion, , Intravenous, Continuous, Raul Perdomo M.D., Last Rate: 100 mL/hr at 08/16/23 2238, New Bag at 08/16/23 2238    Pharmacy Consult Request ...Pain Management Review 1 Each, 1 Each, Other, PHARMACY TO DOSE, Raul Perdomo M.D.    ketorolac (Toradol) injection 30 mg, 30 mg, Intravenous, Q6HRS, 30 mg at 08/17/23 1217 **FOLLOWED BY** [START ON 8/20/2023] ibuprofen (Motrin) tablet 800 mg, 800 mg, Oral, TID PRN, Raul Perdomo M.D.    oxyCODONE immediate-release (Roxicodone) tablet 5 mg, 5 mg, Oral, Q3HRS PRN **OR** oxyCODONE immediate release (Roxicodone) tablet 10 mg, 10 mg, Oral, Q3HRS PRN, 10 mg at 08/17/23 0746 **OR** morphine 4 MG/ML injection 4 mg, 4 mg, Intravenous, Q3HRS PRN, Raul Perdomo M.D.    Current Outpatient Medications:  Medications Prior to Admission   Medication Sig Dispense Refill Last Dose    HYDROcodone-acetaminophen (NORCO) 5-325 MG Tab per tablet Take 1-2 Tablets by mouth every 6 hours as needed (pain) for up to 3 days. 15 Tablet 0 8/16/2023 at 1000    sulfamethoxazole-trimethoprim (BACTRIM DS) 800-160 MG tablet Take 1 Tablet by mouth 2 times a day for 7 days. 14 Tablet 0 NEW RX at NOT STARTED    cefdinir (OMNICEF) 300 MG Cap Take 1 Capsule by mouth 2 times a day for 9 days. 18 Capsule 0 FEW DAYS AGO at K       Medication Allergy:  Allergies   Allergen Reactions    Kiwi Extract Anaphylaxis    Shellfish Allergy Anaphylaxis    Ampicillin-Sulbactam Sodium Rash and Itching     Itching and rash with IV unasyn 8/17/2023    Cefazolin Rash and Itching      "Rash  Tolerated ceftriaxone (April 2021)    Dilaudid [Hydromorphone] Itching    Hydrocodone Itching    Oxycodone Itching    Potassium Iodide Itching     Severe itching    Tape Unspecified     Paper tape okay        Family History:  History reviewed. No pertinent family history.    Social History:  Social History     Socioeconomic History    Marital status: Single     Spouse name: Not on file    Number of children: Not on file    Years of education: Not on file    Highest education level: Not on file   Occupational History    Not on file   Tobacco Use    Smoking status: Former     Packs/day: .5     Types: Cigarettes    Smokeless tobacco: Never   Vaping Use    Vaping Use: Never used   Substance and Sexual Activity    Alcohol use: Yes     Comment: Occasional    Drug use: Yes     Types: Inhaled     Comment: Marijuana, every day    Sexual activity: Not on file   Other Topics Concern    Not on file   Social History Narrative    Not on file     Social Determinants of Health     Financial Resource Strain: Not on file   Food Insecurity: Not on file   Transportation Needs: Not on file   Physical Activity: Not on file   Stress: Not on file   Social Connections: Not on file   Intimate Partner Violence: Not on file   Housing Stability: Not on file         Physical Exam:  Vitals/ General Appearance:   Weight/BMI: Body mass index is 21.46 kg/m².  BP (!) 148/97   Pulse 69   Temp 36.4 °C (97.6 °F) (Temporal)   Resp 17   Ht 1.689 m (5' 6.5\")   Wt 61.2 kg (135 lb)   SpO2 94%   Vitals:    08/17/23 0700 08/17/23 0752 08/17/23 0800 08/17/23 1119   BP: 115/64 (!) 138/94  (!) 148/97   Pulse: 70 62  69   Resp: 16 16 16 17   Temp:    36.4 °C (97.6 °F)   TempSrc:    Temporal   SpO2:  100%  94%   Weight:       Height:         Oxygen Therapy:  Pulse Oximetry: 94 %, O2 (LPM): 0, O2 Delivery Device: None - Room Air    Constitutional:   Well developed,  No acute distress  HENMT:  Normocephalic, Atraumatic,   Lungs:  regular effort  : " -CVAT  Skin: Warm, Dry, No erythema, No rash, no induration.  Neurologic: Alert & oriented x 3, No focal deficits noted.  Psychiatric: Affect normal, Judgment normal, Mood normal.      MDM (Data Review):     Records reviewed and summarized in current documentation    Lab Data Review:  Recent Results (from the past 24 hour(s))   CBC WITH DIFFERENTIAL    Collection Time: 08/16/23  7:05 PM   Result Value Ref Range    WBC 9.1 4.8 - 10.8 K/uL    RBC 3.71 (L) 4.70 - 6.10 M/uL    Hemoglobin 11.7 (L) 14.0 - 18.0 g/dL    Hematocrit 36.2 (L) 42.0 - 52.0 %    MCV 97.6 81.4 - 97.8 fL    MCH 31.5 27.0 - 33.0 pg    MCHC 32.3 32.3 - 36.5 g/dL    RDW 47.8 35.9 - 50.0 fL    Platelet Count 212 164 - 446 K/uL    MPV 9.3 9.0 - 12.9 fL    Neutrophils-Polys 60.00 44.00 - 72.00 %    Lymphocytes 25.60 22.00 - 41.00 %    Monocytes 8.90 0.00 - 13.40 %    Eosinophils 5.00 0.00 - 6.90 %    Basophils 0.30 0.00 - 1.80 %    Immature Granulocytes 0.20 0.00 - 0.90 %    Nucleated RBC 0.00 0.00 - 0.20 /100 WBC    Neutrophils (Absolute) 5.45 1.82 - 7.42 K/uL    Lymphs (Absolute) 2.33 1.00 - 4.80 K/uL    Monos (Absolute) 0.81 0.00 - 0.85 K/uL    Eos (Absolute) 0.45 0.00 - 0.51 K/uL    Baso (Absolute) 0.03 0.00 - 0.12 K/uL    Immature Granulocytes (abs) 0.02 0.00 - 0.11 K/uL    NRBC (Absolute) 0.00 K/uL   BASIC METABOLIC PANEL    Collection Time: 08/16/23  7:05 PM   Result Value Ref Range    Sodium 139 135 - 145 mmol/L    Potassium 3.7 3.6 - 5.5 mmol/L    Chloride 107 96 - 112 mmol/L    Co2 22 20 - 33 mmol/L    Glucose 90 65 - 99 mg/dL    Bun 15 8 - 22 mg/dL    Creatinine 1.06 0.50 - 1.40 mg/dL    Calcium 7.9 (L) 8.5 - 10.5 mg/dL    Anion Gap 10.0 7.0 - 16.0   ESTIMATED GFR    Collection Time: 08/16/23  7:05 PM   Result Value Ref Range    GFR (CKD-EPI) 95 >60 mL/min/1.73 m 2   URINALYSIS (UA)    Collection Time: 08/16/23  8:14 PM    Specimen: Urine   Result Value Ref Range    Color Yellow     Character Cloudy (A)     Specific Gravity 1.015 <1.035    Ph  6.5 5.0 - 8.0    Glucose Negative Negative mg/dL    Ketones Negative Negative mg/dL    Protein 100 (A) Negative mg/dL    Bilirubin Negative Negative    Urobilinogen, Urine 0.2 Negative    Nitrite Negative Negative    Leukocyte Esterase Large (A) Negative    Occult Blood Large (A) Negative    Micro Urine Req Microscopic    URINE MICROSCOPIC (W/UA)    Collection Time: 08/16/23  8:14 PM   Result Value Ref Range    WBC Packed (A) /hpf    RBC >150 (A) /hpf    Bacteria Moderate (A) None /hpf    Epithelial Cells Negative /hpf    Hyaline Cast 0-2 /lpf   CBC with Differential    Collection Time: 08/17/23 12:46 AM   Result Value Ref Range    WBC 7.6 4.8 - 10.8 K/uL    RBC 3.81 (L) 4.70 - 6.10 M/uL    Hemoglobin 12.1 (L) 14.0 - 18.0 g/dL    Hematocrit 37.2 (L) 42.0 - 52.0 %    MCV 97.6 81.4 - 97.8 fL    MCH 31.8 27.0 - 33.0 pg    MCHC 32.5 32.3 - 36.5 g/dL    RDW 48.1 35.9 - 50.0 fL    Platelet Count 207 164 - 446 K/uL    MPV 9.3 9.0 - 12.9 fL    Neutrophils-Polys 42.10 (L) 44.00 - 72.00 %    Lymphocytes 38.00 22.00 - 41.00 %    Monocytes 11.80 0.00 - 13.40 %    Eosinophils 7.60 (H) 0.00 - 6.90 %    Basophils 0.40 0.00 - 1.80 %    Immature Granulocytes 0.10 0.00 - 0.90 %    Nucleated RBC 0.00 0.00 - 0.20 /100 WBC    Neutrophils (Absolute) 3.20 1.82 - 7.42 K/uL    Lymphs (Absolute) 2.89 1.00 - 4.80 K/uL    Monos (Absolute) 0.90 (H) 0.00 - 0.85 K/uL    Eos (Absolute) 0.58 (H) 0.00 - 0.51 K/uL    Baso (Absolute) 0.03 0.00 - 0.12 K/uL    Immature Granulocytes (abs) 0.01 0.00 - 0.11 K/uL    NRBC (Absolute) 0.00 K/uL   Comp Metabolic Panel (CMP)    Collection Time: 08/17/23 12:46 AM   Result Value Ref Range    Sodium 138 135 - 145 mmol/L    Potassium 3.9 3.6 - 5.5 mmol/L    Chloride 107 96 - 112 mmol/L    Co2 18 (L) 20 - 33 mmol/L    Anion Gap 13.0 7.0 - 16.0    Glucose 92 65 - 99 mg/dL    Bun 16 8 - 22 mg/dL    Creatinine 1.28 0.50 - 1.40 mg/dL    Calcium 7.6 (L) 8.5 - 10.5 mg/dL    Correct Calcium 8.2 (L) 8.5 - 10.5 mg/dL     AST(SGOT) 23 12 - 45 U/L    ALT(SGPT) 9 2 - 50 U/L    Alkaline Phosphatase 71 30 - 99 U/L    Total Bilirubin <0.2 0.1 - 1.5 mg/dL    Albumin 3.2 3.2 - 4.9 g/dL    Total Protein 5.8 (L) 6.0 - 8.2 g/dL    Globulin 2.6 1.9 - 3.5 g/dL    A-G Ratio 1.2 g/dL   ESTIMATED GFR    Collection Time: 08/17/23 12:46 AM   Result Value Ref Range    GFR (CKD-EPI) 76 >60 mL/min/1.73 m 2       Imaging/Procedures Review:    Reviewed    MDM (Assessment and Plan):     32 yo M with cystinuria, was seen by our office last week for 3mm stone with CULTS procedure. Stent removed this week. Pt has returned back to the ER for pain x2. CT with left hydro and 1.5mm distal stone. Pt restarted on MET. Given the option to repeat CULTS, pt refuses stent placement. Pt also eating prior to exam. WBC and kidney labs WNL.     Plan:  NPO at midnight   Continue MET  Plan for CULT procedure tomorrow.     Urology to follow     Dr. Bacon is aware of this consult and has directed this plan of care  Rowan Givens PA-C  [facility]

## 2023-08-17 NOTE — ED NOTES
Pt complaining of being increasingly itchy since lidocaine administration and requesting meds for this, Dr. Perdomo notified.

## 2023-08-17 NOTE — PROGRESS NOTES
Assumed care of patient at 1030 from [place]. Patient is A&O x4, states pain level is 8/10 medicated patient per mar. Bed locked in lowest position with 2 rail up. Call light  in place, belongings at bedside. Hourly rounding is in place.

## 2023-08-17 NOTE — ED NOTES
Pt c/o of itching, pain 9/10, erp made aware, increased dose of benadryl to 50mg, benadryl given to pt, pain meds given to pt, abx stopped.

## 2023-08-18 ENCOUNTER — PHARMACY VISIT (OUTPATIENT)
Dept: PHARMACY | Facility: MEDICAL CENTER | Age: 33
End: 2023-08-18
Payer: COMMERCIAL

## 2023-08-18 VITALS
BODY MASS INDEX: 21.19 KG/M2 | OXYGEN SATURATION: 99 % | HEIGHT: 67 IN | DIASTOLIC BLOOD PRESSURE: 72 MMHG | SYSTOLIC BLOOD PRESSURE: 116 MMHG | WEIGHT: 135 LBS | TEMPERATURE: 97.8 F | HEART RATE: 62 BPM | RESPIRATION RATE: 18 BRPM

## 2023-08-18 LAB
ANION GAP SERPL CALC-SCNC: 9 MMOL/L (ref 7–16)
BUN SERPL-MCNC: 15 MG/DL (ref 8–22)
CALCIUM SERPL-MCNC: 9.2 MG/DL (ref 8.5–10.5)
CHLORIDE SERPL-SCNC: 101 MMOL/L (ref 96–112)
CO2 SERPL-SCNC: 24 MMOL/L (ref 20–33)
CREAT SERPL-MCNC: 1.22 MG/DL (ref 0.5–1.4)
GFR SERPLBLD CREATININE-BSD FMLA CKD-EPI: 81 ML/MIN/1.73 M 2
GLUCOSE SERPL-MCNC: 84 MG/DL (ref 65–99)
POTASSIUM SERPL-SCNC: 4.3 MMOL/L (ref 3.6–5.5)
SODIUM SERPL-SCNC: 134 MMOL/L (ref 135–145)

## 2023-08-18 PROCEDURE — 700102 HCHG RX REV CODE 250 W/ 637 OVERRIDE(OP): Performed by: HOSPITALIST

## 2023-08-18 PROCEDURE — 80048 BASIC METABOLIC PNL TOTAL CA: CPT

## 2023-08-18 PROCEDURE — A9270 NON-COVERED ITEM OR SERVICE: HCPCS | Performed by: HOSPITALIST

## 2023-08-18 PROCEDURE — RXMED WILLOW AMBULATORY MEDICATION CHARGE: Performed by: STUDENT IN AN ORGANIZED HEALTH CARE EDUCATION/TRAINING PROGRAM

## 2023-08-18 PROCEDURE — 700111 HCHG RX REV CODE 636 W/ 250 OVERRIDE (IP): Mod: JZ | Performed by: HOSPITALIST

## 2023-08-18 PROCEDURE — 700111 HCHG RX REV CODE 636 W/ 250 OVERRIDE (IP): Performed by: STUDENT IN AN ORGANIZED HEALTH CARE EDUCATION/TRAINING PROGRAM

## 2023-08-18 PROCEDURE — 99239 HOSP IP/OBS DSCHRG MGMT >30: CPT | Performed by: STUDENT IN AN ORGANIZED HEALTH CARE EDUCATION/TRAINING PROGRAM

## 2023-08-18 RX ORDER — TAMSULOSIN HYDROCHLORIDE 0.4 MG/1
0.4 CAPSULE ORAL
Qty: 30 CAPSULE | Refills: 0 | Status: SHIPPED | OUTPATIENT
Start: 2023-08-18

## 2023-08-18 RX ORDER — OXYCODONE HYDROCHLORIDE 5 MG/1
5 TABLET ORAL EVERY 6 HOURS PRN
Qty: 15 TABLET | Refills: 0 | Status: ON HOLD | OUTPATIENT
Start: 2023-08-18 | End: 2023-08-21

## 2023-08-18 RX ORDER — LINEZOLID 600 MG/1
600 TABLET, FILM COATED ORAL EVERY 12 HOURS
Qty: 12 TABLET | Refills: 0 | Status: ACTIVE | OUTPATIENT
Start: 2023-08-19 | End: 2023-08-25

## 2023-08-18 RX ADMIN — KETOROLAC TROMETHAMINE 30 MG: 30 INJECTION, SOLUTION INTRAMUSCULAR; INTRAVENOUS at 00:13

## 2023-08-18 RX ADMIN — OXYCODONE HYDROCHLORIDE 10 MG: 10 TABLET ORAL at 04:05

## 2023-08-18 RX ADMIN — DIPHENHYDRAMINE HYDROCHLORIDE 50 MG: 50 INJECTION, SOLUTION INTRAMUSCULAR; INTRAVENOUS at 00:20

## 2023-08-18 RX ADMIN — KETOROLAC TROMETHAMINE 30 MG: 30 INJECTION, SOLUTION INTRAMUSCULAR; INTRAVENOUS at 06:23

## 2023-08-18 ASSESSMENT — PAIN DESCRIPTION - PAIN TYPE
TYPE: ACUTE PAIN

## 2023-08-18 NOTE — DISCHARGE SUMMARY
Discharge Summary    CHIEF COMPLAINT ON ADMISSION  Chief Complaint   Patient presents with    Flank Pain     PATIENT STATES HE WAS HERE YESTERDAY FOR THE SAME ISSUE. PATIENT WAS HERE FOR LEFT FLANK PAIN. PT WITH RECENT SURGERY FOR STENT PLACEMENT AND STENT WAS REMOVED ON FRIDAY. PATIENT SINCE WITH TERRIBLE PAIN AND THINKS ANOTHER STONE IS WORKING ITS WAY DOWN.     PT STATES DECREASED URINE OUTPUT WITH BLOOD.        Reason for Admission  EMS     Admission Date  8/16/2023    CODE STATUS  Prior    HPI & HOSPITAL COURSE  Russell Bunn is a 33 y.o. male who presented 8/16/2023 with past medical history of cystinuria, repeated nephrolithiasis, polycystic kidney disease, seizure disorder who presents to the hospital for left-sided flank pain started yesterday after ureter stent removal. Patient with flank pain. He was found to have 1.5 mm distal left ureteral stone with obstructive changes. Urology consulted, recommend outpatient follow up as patient's symptoms have improved. Prior urine culture from 8/11 growing enterococcus. He is discharged on linezolid for UTI. He is to follow up with urology for continued management. He is also prescribed flomax as well as short course pain medications.      Therefore, he is discharged in fair and stable condition to home with close outpatient follow-up.    The patient met 2-midnight criteria for an inpatient stay at the time of discharge.    Discharge Date  8/18/2023    FOLLOW UP ITEMS POST DISCHARGE  Take medications as prescribed.  Follow up with PCP and urology.     DISCHARGE DIAGNOSES  Principal Problem:    Nephrolithiasis (POA: Yes)  Active Problems:    Cystinuria (HCC) (POA: Yes)    Acute pyelonephritis (POA: Yes)  Resolved Problems:    * No resolved hospital problems. *      FOLLOW UP  No future appointments.  No follow-up provider specified.    MEDICATIONS ON DISCHARGE     Medication List        START taking these medications        Instructions   linezolid 600  MG Tabs  Start taking on: August 19, 2023  Commonly known as: Zyvox   Take 1 Tablet by mouth every 12 hours for 6 days.  Dose: 600 mg     oxyCODONE immediate-release 5 MG Tabs  Commonly known as: Roxicodone   Take 1 Tablet by mouth every 6 hours as needed for Severe Pain for up to 5 days.  Dose: 5 mg     tamsulosin 0.4 MG capsule  Commonly known as: Flomax   Take 1 Capsule by mouth 1/2 hour after breakfast.  Dose: 0.4 mg            CONTINUE taking these medications        Instructions   HYDROcodone-acetaminophen 5-325 MG Tabs per tablet  Commonly known as: Norco   Take 1-2 Tablets by mouth every 6 hours as needed (pain) for up to 3 days.  Dose: 1-2 Tablet            STOP taking these medications      cefdinir 300 MG Caps  Commonly known as: Omnicef     sulfamethoxazole-trimethoprim 800-160 MG tablet  Commonly known as: Bactrim DS              Allergies  Allergies   Allergen Reactions    Kiwi Extract Anaphylaxis    Shellfish Allergy Anaphylaxis    Ampicillin-Sulbactam Sodium Rash and Itching     Itching and rash with IV unasyn 8/17/2023    Cefazolin Rash and Itching     Rash  Tolerated ceftriaxone (April 2021)    Dilaudid [Hydromorphone] Itching    Hydrocodone Itching    Oxycodone Itching    Potassium Iodide Itching     Severe itching    Tape Unspecified     Paper tape okay        DIET  No orders of the defined types were placed in this encounter.      ACTIVITY  As tolerated.  Weight bearing as tolerated    CONSULTATIONS  urology    PROCEDURES  none    LABORATORY  Lab Results   Component Value Date    SODIUM 134 (L) 08/18/2023    POTASSIUM 4.3 08/18/2023    CHLORIDE 101 08/18/2023    CO2 24 08/18/2023    GLUCOSE 84 08/18/2023    BUN 15 08/18/2023    CREATININE 1.22 08/18/2023        Lab Results   Component Value Date    WBC 7.6 08/17/2023    HEMOGLOBIN 12.1 (L) 08/17/2023    HEMATOCRIT 37.2 (L) 08/17/2023    PLATELETCT 207 08/17/2023        Total time of the discharge process exceeds 35 minutes.

## 2023-08-18 NOTE — PROGRESS NOTES
Pt verbalized frustration and getting irritated regarding nobody explain to him about the treatment plan and not seeing a doctor. Now he's appetite is back , wants to eat and smoke outside. Tried to explained about the NPO order post midnight and MD will discuss furthermore information in am prior to procedure and non smoking policy of the hospital. Offered to call APN on call to explain the treatment plan but getting more irritated and he feels like where holding him like in a [facility]. Pt stated his right that he can do whatever he wants. Will inform APN on call.

## 2023-08-18 NOTE — PROGRESS NOTES
"[facility] Progress Note    Service: [facility] [place]  Patient's Name: Russell Bunn  MRN: [id number]  Admit Date:8/16/2023  Today's Date: 8/18/2023   Room #: S519/01      Identification:  33 y.o. male who is known to our office for cystinuria.     Overnight-Interval events:   - pain greatly improved.     Subjective/ROS:   Pain is controlled.   PO tolerated, (-)N/V, no eructations.  + flatus, +BM  + Ambulation.   No CP/SOB/F/C    Physical Exam:  Current Vitals:   /72   Pulse 62   Temp 36.6 °C (97.8 °F) (Temporal)   Resp 18   Ht 1.689 m (5' 6.5\")   Wt 61.2 kg (135 lb)   SpO2 99%   BMI 21.46 kg/m²     08/16 1900 - 08/18 0659  In: 220 [P.O.:120]  Out: 950 [Urine:950]    GEN : NAD, A&O X4   RES:  no acute respiratory distress  ABD: Soft ND, non to palpation.    :   No powell   INC:  CDI, no erythema, exudate, induration.   EXT:  No edema,   Labs:   Recent Labs     08/15/23  1813 08/16/23  1905 08/17/23  0046   SODIUM 139 139 138   POTASSIUM 4.9 3.7 3.9   CHLORIDE 103 107 107   CO2 27 22 18*   GLUCOSE 87 90 92   BUN 16 15 16   CREATININE 1.28 1.06 1.28   CALCIUM 9.8 7.9* 7.6*     Recent Labs     08/15/23  1507 08/16/23  1905 08/17/23  0046   WBC 12.4* 9.1 7.6   RBC 4.92 3.71* 3.81*   HEMOGLOBIN 15.6 11.7* 12.1*   HEMATOCRIT 48.4 36.2* 37.2*   MCV 98.4* 97.6 97.6   MCH 31.7 31.5 31.8   MCHC 32.2* 32.3 32.5   RDW 48.1 47.8 48.1   PLATELETCT 249 212 207   MPV 9.3 9.3 9.3     Lab Results   Component Value Date/Time    GLUCOSE 92 08/17/2023 12:46 AM    GLUCOSE 90 08/16/2023 07:05 PM    GLUCOSE 87 08/15/2023 06:13 PM    GLUCOSE 108 (H) 08/11/2023 03:17 AM       Assessment/Plan  Russell Bunn is a 33 y.o. male with chronic stone history. Pt returned to ED for pain control. CT 8/16 with 1.5mm distal left ureteral stone resulting in upper outflow tract obstructive changes. Kidney function remains WNL. Given pts history we would advise that he remain on flomax upon discharge to help pass " stone fragments. Pt was previously on Potassium citrate to prevent stone formation but reports he was unable to tolerate this medication.     Plan:  Continue flomax, pain control.     Pt will follow up in our office with repeat RBUS to ensure resolution of hydro in 4-6 weeks. Our office will arrange.   No further urologic interventions planned at this time.   Urology signing off     Dr. Stephenson is aware of this consult and has directed this plan of care        MINNIE Hernandez-C.   [address]  [place] [address]   [phone]

## 2023-08-18 NOTE — PROGRESS NOTES
4 Eyes Skin Assessment Completed by TATE Steel and TATE Rendon.    Head WDL  Ears WDL  Nose WDL  Mouth WDL  Neck WDL  Breast/Chest WDL  Shoulder Blades WDL  Spine WDL  (R) Arm/Elbow/Hand WDL  (L) Arm/Elbow/Hand WDL  Abdomen WDL  Groin WDL  Scrotum/Coccyx/Buttocks WDL  (R) Leg WDL  (L) Leg WDL  (R) Heel/Foot/Toe WDL  (L) Heel/Foot/Toe WDL          Devices In Places Blood Pressure Cuff      Interventions In Place Pressure Redistribution Mattress    Possible Skin Injury No    Pictures Uploaded Into Epic N/A  Wound Consult Placed N/A  RN Wound Prevention Protocol Ordered No

## 2023-08-18 NOTE — PROGRESS NOTES
Assumed pt care with RN. Pt is A&OX4, and states he is not in any pain and declines interventions at this time. Plan of care discussed, no further questions at this time. Personal belongings and call light within reach.

## 2023-08-18 NOTE — CARE PLAN
The patient is Stable - Low risk of patient condition declining or worsening    Shift Goals  Clinical Goals: iv fluids, pain control  Patient Goals: pain control    Progress made toward(s) clinical / shift goals:  Pt's pain levels were assessed throughout the night, and medication was given which allowed the patient to rest and sleep. The plan of care was discussed with the patient to reinforce the importance of staying NPO past midnight in order to continue his treatment plan in the morning. The patient verbalized understanding.      Problem: Pain - Standard  Goal: Alleviation of pain or a reduction in pain to the patient’s comfort goal  Outcome: Progressing     Problem: Knowledge Deficit - Standard  Goal: Patient and family/care givers will demonstrate understanding of plan of care, disease process/condition, diagnostic tests and medications  Outcome: Progressing       Patient is not progressing towards the following goals:

## 2023-08-20 ENCOUNTER — HOSPITAL ENCOUNTER (INPATIENT)
Facility: MEDICAL CENTER | Age: 33
LOS: 1 days | DRG: 690 | End: 2023-08-21
Attending: EMERGENCY MEDICINE | Admitting: INTERNAL MEDICINE
Payer: MEDICAID

## 2023-08-20 DIAGNOSIS — R52 INTRACTABLE PAIN: ICD-10-CM

## 2023-08-20 DIAGNOSIS — N17.9 AKI (ACUTE KIDNEY INJURY) (HCC): ICD-10-CM

## 2023-08-20 DIAGNOSIS — N32.89 BLADDER SPASMS: ICD-10-CM

## 2023-08-20 DIAGNOSIS — N39.0 URINARY TRACT INFECTION WITHOUT HEMATURIA, SITE UNSPECIFIED: ICD-10-CM

## 2023-08-20 PROBLEM — N20.0 LEFT NEPHROLITHIASIS: Status: ACTIVE | Noted: 2023-08-20

## 2023-08-20 PROBLEM — R10.2 SUPRAPUBIC PAIN: Status: ACTIVE | Noted: 2023-08-20

## 2023-08-20 LAB
ALBUMIN SERPL BCP-MCNC: 4.1 G/DL (ref 3.2–4.9)
ALBUMIN/GLOB SERPL: 1.4 G/DL
ALP SERPL-CCNC: 80 U/L (ref 30–99)
ALT SERPL-CCNC: 11 U/L (ref 2–50)
ANION GAP SERPL CALC-SCNC: 10 MMOL/L (ref 7–16)
APPEARANCE UR: CLEAR
AST SERPL-CCNC: 18 U/L (ref 12–45)
BACTERIA #/AREA URNS HPF: NEGATIVE /HPF
BASOPHILS # BLD AUTO: 0.3 % (ref 0–1.8)
BASOPHILS # BLD: 0.04 K/UL (ref 0–0.12)
BILIRUB SERPL-MCNC: 0.4 MG/DL (ref 0.1–1.5)
BILIRUB UR QL STRIP.AUTO: NEGATIVE
BUN SERPL-MCNC: 19 MG/DL (ref 8–22)
CALCIUM ALBUM COR SERPL-MCNC: 8.9 MG/DL (ref 8.5–10.5)
CALCIUM SERPL-MCNC: 9 MG/DL (ref 8.5–10.5)
CHLORIDE SERPL-SCNC: 103 MMOL/L (ref 96–112)
CO2 SERPL-SCNC: 26 MMOL/L (ref 20–33)
COLOR UR: YELLOW
CREAT SERPL-MCNC: 1.52 MG/DL (ref 0.5–1.4)
EOSINOPHIL # BLD AUTO: 0.38 K/UL (ref 0–0.51)
EOSINOPHIL NFR BLD: 3 % (ref 0–6.9)
EPI CELLS #/AREA URNS HPF: NEGATIVE /HPF
ERYTHROCYTE [DISTWIDTH] IN BLOOD BY AUTOMATED COUNT: 45.4 FL (ref 35.9–50)
GFR SERPLBLD CREATININE-BSD FMLA CKD-EPI: 62 ML/MIN/1.73 M 2
GLOBULIN SER CALC-MCNC: 3 G/DL (ref 1.9–3.5)
GLUCOSE SERPL-MCNC: 87 MG/DL (ref 65–99)
GLUCOSE UR STRIP.AUTO-MCNC: NEGATIVE MG/DL
HCT VFR BLD AUTO: 38.4 % (ref 42–52)
HGB BLD-MCNC: 12.7 G/DL (ref 14–18)
HYALINE CASTS #/AREA URNS LPF: ABNORMAL /LPF
IMM GRANULOCYTES # BLD AUTO: 0.03 K/UL (ref 0–0.11)
IMM GRANULOCYTES NFR BLD AUTO: 0.2 % (ref 0–0.9)
KETONES UR STRIP.AUTO-MCNC: NEGATIVE MG/DL
LEUKOCYTE ESTERASE UR QL STRIP.AUTO: ABNORMAL
LIPASE SERPL-CCNC: 42 U/L (ref 11–82)
LYMPHOCYTES # BLD AUTO: 2.77 K/UL (ref 1–4.8)
LYMPHOCYTES NFR BLD: 21.9 % (ref 22–41)
MCH RBC QN AUTO: 31.3 PG (ref 27–33)
MCHC RBC AUTO-ENTMCNC: 33.1 G/DL (ref 32.3–36.5)
MCV RBC AUTO: 94.6 FL (ref 81.4–97.8)
MICRO URNS: ABNORMAL
MONOCYTES # BLD AUTO: 1.51 K/UL (ref 0–0.85)
MONOCYTES NFR BLD AUTO: 11.9 % (ref 0–13.4)
NEUTROPHILS # BLD AUTO: 7.91 K/UL (ref 1.82–7.42)
NEUTROPHILS NFR BLD: 62.7 % (ref 44–72)
NITRITE UR QL STRIP.AUTO: NEGATIVE
NRBC # BLD AUTO: 0 K/UL
NRBC BLD-RTO: 0 /100 WBC (ref 0–0.2)
PH UR STRIP.AUTO: 7 [PH] (ref 5–8)
PLATELET # BLD AUTO: 315 K/UL (ref 164–446)
PMV BLD AUTO: 9.3 FL (ref 9–12.9)
POTASSIUM SERPL-SCNC: 3.6 MMOL/L (ref 3.6–5.5)
PROT SERPL-MCNC: 7.1 G/DL (ref 6–8.2)
PROT UR QL STRIP: 30 MG/DL
RBC # BLD AUTO: 4.06 M/UL (ref 4.7–6.1)
RBC # URNS HPF: ABNORMAL /HPF
RBC UR QL AUTO: ABNORMAL
SODIUM SERPL-SCNC: 139 MMOL/L (ref 135–145)
SP GR UR STRIP.AUTO: 1.01
UROBILINOGEN UR STRIP.AUTO-MCNC: 0.2 MG/DL
WBC # BLD AUTO: 12.6 K/UL (ref 4.8–10.8)
WBC #/AREA URNS HPF: ABNORMAL /HPF

## 2023-08-20 PROCEDURE — 99285 EMERGENCY DEPT VISIT HI MDM: CPT

## 2023-08-20 PROCEDURE — 85025 COMPLETE CBC W/AUTO DIFF WBC: CPT

## 2023-08-20 PROCEDURE — 700111 HCHG RX REV CODE 636 W/ 250 OVERRIDE (IP): Mod: JZ | Performed by: INTERNAL MEDICINE

## 2023-08-20 PROCEDURE — 99222 1ST HOSP IP/OBS MODERATE 55: CPT | Mod: GC | Performed by: INTERNAL MEDICINE

## 2023-08-20 PROCEDURE — 700111 HCHG RX REV CODE 636 W/ 250 OVERRIDE (IP): Mod: JZ,UD | Performed by: EMERGENCY MEDICINE

## 2023-08-20 PROCEDURE — 87086 URINE CULTURE/COLONY COUNT: CPT

## 2023-08-20 PROCEDURE — A9270 NON-COVERED ITEM OR SERVICE: HCPCS | Performed by: INTERNAL MEDICINE

## 2023-08-20 PROCEDURE — 770001 HCHG ROOM/CARE - MED/SURG/GYN PRIV*

## 2023-08-20 PROCEDURE — 700111 HCHG RX REV CODE 636 W/ 250 OVERRIDE (IP)

## 2023-08-20 PROCEDURE — 96374 THER/PROPH/DIAG INJ IV PUSH: CPT

## 2023-08-20 PROCEDURE — 36415 COLL VENOUS BLD VENIPUNCTURE: CPT

## 2023-08-20 PROCEDURE — 700102 HCHG RX REV CODE 250 W/ 637 OVERRIDE(OP)

## 2023-08-20 PROCEDURE — 700102 HCHG RX REV CODE 250 W/ 637 OVERRIDE(OP): Performed by: INTERNAL MEDICINE

## 2023-08-20 PROCEDURE — 700102 HCHG RX REV CODE 250 W/ 637 OVERRIDE(OP): Mod: UD | Performed by: EMERGENCY MEDICINE

## 2023-08-20 PROCEDURE — 700105 HCHG RX REV CODE 258: Mod: JZ,UD | Performed by: EMERGENCY MEDICINE

## 2023-08-20 PROCEDURE — A9270 NON-COVERED ITEM OR SERVICE: HCPCS

## 2023-08-20 PROCEDURE — 80053 COMPREHEN METABOLIC PANEL: CPT

## 2023-08-20 PROCEDURE — A9270 NON-COVERED ITEM OR SERVICE: HCPCS | Mod: UD | Performed by: EMERGENCY MEDICINE

## 2023-08-20 PROCEDURE — 81001 URINALYSIS AUTO W/SCOPE: CPT

## 2023-08-20 PROCEDURE — 96375 TX/PRO/DX INJ NEW DRUG ADDON: CPT

## 2023-08-20 PROCEDURE — 700111 HCHG RX REV CODE 636 W/ 250 OVERRIDE (IP): Mod: JZ

## 2023-08-20 PROCEDURE — 83690 ASSAY OF LIPASE: CPT

## 2023-08-20 PROCEDURE — 96376 TX/PRO/DX INJ SAME DRUG ADON: CPT

## 2023-08-20 PROCEDURE — 700105 HCHG RX REV CODE 258

## 2023-08-20 RX ORDER — HYDROCODONE BITARTRATE AND ACETAMINOPHEN 7.5; 325 MG/1; MG/1
1 TABLET ORAL EVERY 6 HOURS PRN
Status: ON HOLD | COMMUNITY
End: 2023-08-21

## 2023-08-20 RX ORDER — ACETAMINOPHEN 500 MG
1000 TABLET ORAL 3 TIMES DAILY
Status: DISCONTINUED | OUTPATIENT
Start: 2023-08-20 | End: 2023-08-21 | Stop reason: HOSPADM

## 2023-08-20 RX ORDER — PROCHLORPERAZINE EDISYLATE 5 MG/ML
10 INJECTION INTRAMUSCULAR; INTRAVENOUS EVERY 6 HOURS PRN
Status: DISCONTINUED | OUTPATIENT
Start: 2023-08-20 | End: 2023-08-21 | Stop reason: HOSPADM

## 2023-08-20 RX ORDER — SCOLOPAMINE TRANSDERMAL SYSTEM 1 MG/1
1 PATCH, EXTENDED RELEASE TRANSDERMAL ONCE
Status: CANCELLED | OUTPATIENT
Start: 2023-08-20 | End: 2023-08-21

## 2023-08-20 RX ORDER — SODIUM CHLORIDE, SODIUM LACTATE, POTASSIUM CHLORIDE, CALCIUM CHLORIDE 600; 310; 30; 20 MG/100ML; MG/100ML; MG/100ML; MG/100ML
1000 INJECTION, SOLUTION INTRAVENOUS ONCE
Status: COMPLETED | OUTPATIENT
Start: 2023-08-20 | End: 2023-08-20

## 2023-08-20 RX ORDER — KETOROLAC TROMETHAMINE 30 MG/ML
30 INJECTION, SOLUTION INTRAMUSCULAR; INTRAVENOUS ONCE
Status: COMPLETED | OUTPATIENT
Start: 2023-08-20 | End: 2023-08-20

## 2023-08-20 RX ORDER — MORPHINE SULFATE 4 MG/ML
4 INJECTION INTRAVENOUS EVERY 4 HOURS PRN
Status: DISCONTINUED | OUTPATIENT
Start: 2023-08-20 | End: 2023-08-21 | Stop reason: HOSPADM

## 2023-08-20 RX ORDER — HYDROMORPHONE HYDROCHLORIDE 1 MG/ML
0.5 INJECTION, SOLUTION INTRAMUSCULAR; INTRAVENOUS; SUBCUTANEOUS ONCE
Status: DISCONTINUED | OUTPATIENT
Start: 2023-08-20 | End: 2023-08-20

## 2023-08-20 RX ORDER — SODIUM CHLORIDE 9 MG/ML
INJECTION, SOLUTION INTRAVENOUS CONTINUOUS
Status: DISCONTINUED | OUTPATIENT
Start: 2023-08-20 | End: 2023-08-21 | Stop reason: HOSPADM

## 2023-08-20 RX ORDER — OXYCODONE HYDROCHLORIDE 5 MG/1
5 TABLET ORAL EVERY 4 HOURS PRN
Status: DISCONTINUED | OUTPATIENT
Start: 2023-08-20 | End: 2023-08-21 | Stop reason: HOSPADM

## 2023-08-20 RX ORDER — TAMSULOSIN HYDROCHLORIDE 0.4 MG/1
0.4 CAPSULE ORAL
Status: DISCONTINUED | OUTPATIENT
Start: 2023-08-20 | End: 2023-08-21 | Stop reason: HOSPADM

## 2023-08-20 RX ORDER — PHENAZOPYRIDINE HYDROCHLORIDE 200 MG/1
200 TABLET, FILM COATED ORAL ONCE
Status: COMPLETED | OUTPATIENT
Start: 2023-08-20 | End: 2023-08-20

## 2023-08-20 RX ORDER — ONDANSETRON 2 MG/ML
4 INJECTION INTRAMUSCULAR; INTRAVENOUS EVERY 4 HOURS PRN
Status: DISCONTINUED | OUTPATIENT
Start: 2023-08-20 | End: 2023-08-21 | Stop reason: HOSPADM

## 2023-08-20 RX ORDER — ONDANSETRON 4 MG/1
4 TABLET, ORALLY DISINTEGRATING ORAL EVERY 4 HOURS PRN
Status: DISCONTINUED | OUTPATIENT
Start: 2023-08-20 | End: 2023-08-20

## 2023-08-20 RX ORDER — MORPHINE SULFATE 4 MG/ML
4 INJECTION INTRAVENOUS ONCE
Status: COMPLETED | OUTPATIENT
Start: 2023-08-20 | End: 2023-08-20

## 2023-08-20 RX ORDER — LINEZOLID 600 MG/1
600 TABLET, FILM COATED ORAL EVERY 12 HOURS
Status: DISCONTINUED | OUTPATIENT
Start: 2023-08-20 | End: 2023-08-21 | Stop reason: HOSPADM

## 2023-08-20 RX ORDER — OXYCODONE HYDROCHLORIDE 10 MG/1
10 TABLET ORAL EVERY 4 HOURS PRN
Status: DISCONTINUED | OUTPATIENT
Start: 2023-08-20 | End: 2023-08-21 | Stop reason: HOSPADM

## 2023-08-20 RX ADMIN — KETOROLAC TROMETHAMINE 30 MG: 30 INJECTION, SOLUTION INTRAMUSCULAR; INTRAVENOUS at 10:58

## 2023-08-20 RX ADMIN — SODIUM CHLORIDE: 9 INJECTION, SOLUTION INTRAVENOUS at 17:08

## 2023-08-20 RX ADMIN — ACETAMINOPHEN 1000 MG: 500 TABLET, FILM COATED ORAL at 17:09

## 2023-08-20 RX ADMIN — ONDANSETRON 4 MG: 2 INJECTION INTRAMUSCULAR; INTRAVENOUS at 16:36

## 2023-08-20 RX ADMIN — OXYCODONE HYDROCHLORIDE 10 MG: 10 TABLET ORAL at 21:01

## 2023-08-20 RX ADMIN — SODIUM CHLORIDE, POTASSIUM CHLORIDE, SODIUM LACTATE AND CALCIUM CHLORIDE 1000 ML: 600; 310; 30; 20 INJECTION, SOLUTION INTRAVENOUS at 11:03

## 2023-08-20 RX ADMIN — ONDANSETRON 4 MG: 2 INJECTION INTRAMUSCULAR; INTRAVENOUS at 21:01

## 2023-08-20 RX ADMIN — LINEZOLID 600 MG: 600 TABLET, FILM COATED ORAL at 17:09

## 2023-08-20 RX ADMIN — PROCHLORPERAZINE EDISYLATE 10 MG: 5 INJECTION, SOLUTION INTRAMUSCULAR; INTRAVENOUS at 18:11

## 2023-08-20 RX ADMIN — PHENAZOPYRIDINE 200 MG: 200 TABLET ORAL at 11:05

## 2023-08-20 RX ADMIN — MORPHINE SULFATE 4 MG: 4 INJECTION, SOLUTION INTRAMUSCULAR; INTRAVENOUS at 13:19

## 2023-08-20 RX ADMIN — OXYCODONE HYDROCHLORIDE 10 MG: 10 TABLET ORAL at 14:29

## 2023-08-20 RX ADMIN — ACETAMINOPHEN 1000 MG: 500 TABLET, FILM COATED ORAL at 21:01

## 2023-08-20 RX ADMIN — MORPHINE SULFATE 4 MG: 4 INJECTION, SOLUTION INTRAMUSCULAR; INTRAVENOUS at 14:29

## 2023-08-20 ASSESSMENT — COGNITIVE AND FUNCTIONAL STATUS - GENERAL
SUGGESTED CMS G CODE MODIFIER MOBILITY: CH
SUGGESTED CMS G CODE MODIFIER DAILY ACTIVITY: CH
MOBILITY SCORE: 24
DAILY ACTIVITIY SCORE: 24

## 2023-08-20 ASSESSMENT — ENCOUNTER SYMPTOMS
NAUSEA: 1
BLURRED VISION: 0
DIZZINESS: 0
ABDOMINAL PAIN: 0
SHORTNESS OF BREATH: 0
CHILLS: 1
FEVER: 1
MYALGIAS: 0
HEARTBURN: 0
COUGH: 0
FLANK PAIN: 1
VOMITING: 1
WEIGHT LOSS: 0
NERVOUS/ANXIOUS: 1
HEADACHES: 0

## 2023-08-20 ASSESSMENT — LIFESTYLE VARIABLES
EVER HAD A DRINK FIRST THING IN THE MORNING TO STEADY YOUR NERVES TO GET RID OF A HANGOVER: NO
EVER FELT BAD OR GUILTY ABOUT YOUR DRINKING: NO
HAVE PEOPLE ANNOYED YOU BY CRITICIZING YOUR DRINKING: NO
TOTAL SCORE: 0
AVERAGE NUMBER OF DAYS PER WEEK YOU HAVE A DRINK CONTAINING ALCOHOL: 1
HOW MANY TIMES IN THE PAST YEAR HAVE YOU HAD 5 OR MORE DRINKS IN A DAY: 0
ALCOHOL_USE: NO
CONSUMPTION TOTAL: NEGATIVE
TOTAL SCORE: 0
HAVE YOU EVER FELT YOU SHOULD CUT DOWN ON YOUR DRINKING: NO
ON A TYPICAL DAY WHEN YOU DRINK ALCOHOL HOW MANY DRINKS DO YOU HAVE: 0
TOTAL SCORE: 0

## 2023-08-20 ASSESSMENT — FIBROSIS 4 INDEX
FIB4 SCORE: 0.55
FIB4 SCORE: 1.185185185185185185
FIB4 SCORE: 0.55

## 2023-08-20 ASSESSMENT — PAIN DESCRIPTION - PAIN TYPE: TYPE: ACUTE PAIN

## 2023-08-20 NOTE — ED PROVIDER NOTES
ED Provider Note    CHIEF COMPLAINT  Chief Complaint   Patient presents with    Abdominal Pain     Pt BIB EMS, report that pt began having LL abd pain around 6am. Pt states hx of same (kidney stones). Pt given 2mg Versed/ 100mcg Fentanyl PTA with mild relief.        EXTERNAL RECORDS REVIEWED  Inpatient Notes patient was admitted to the hospital 8/16/2023 and discharged 8/18/2023.  He has a history of kidney stones and presented at that time with left-sided flank pain after ureteral stent removal he was found to have a 1.5 mm left distal ureteral stone with obstructive changes urology was consulted he did have a positive urine culture growing Enterococcus he was discharged home with linezolid and Flomax and was to follow-up with urology for continued management    HPI/ROS  LIMITATION TO HISTORY   Select: : None  OUTSIDE HISTORIAN(S):  none    Russell Bunn is a 33 y.o. male who presents complaining of pain in his suprapubic area that started around 6 AM this morning.  He has a history of kidney stones and is currently taking linezolid for a urinary tract infection.  The patient recently had a stent removed for kidney stones that was placed by Dr. Silver when he had lithotripsy 8/10/2023.  He was admitted to the hospital until August 18th, 2 days ago for pain in his left flank.  Today he comes in by ambulance because he has pain in his bladder area that is sharp and stabbing and he feels like he cannot urinate.  He feels like something is obstructing his urethra.  He has had some nausea and vomiting.  He denies any fevers.  He describes the pain as sharp stabbing and intermittent.  Currently when he has the pain it is 10 out of 10.    PAST MEDICAL HISTORY   has a past medical history of Kidney stones and Seizure disorder (HCC).    SURGICAL HISTORY   has a past surgical history that includes lithotripsy; cysto/uretero/pyeloscopy, dx (Right, 3/13/2020); cystoscopy,insert ureteral stent (Right, 3/13/2020);  "lasertripsy (Right, 3/13/2020); cystoscopy,insert ureteral stent (Left, 4/26/2021); cysto/uretero/pyeloscopy, dx (Left, 4/26/2021); cystoscopy (Left, 02/20/2022); cystoscopy,insert ureteral stent (Left, 2/20/2022); cysto/uretero/pyeloscopy, dx (Left, 2/20/2022); lasertripsy (Left, 2/20/2022); cysto stent placemnt pre surg (Right, 4/24/2023); cystoscopy,insert ureteral stent (Left, 8/10/2023); and cysto/uretero/pyeloscopy, dx (Left, 8/10/2023).    FAMILY HISTORY  No family history on file.    SOCIAL HISTORY  Social History     Tobacco Use    Smoking status: Former     Packs/day: .5     Types: Cigarettes    Smokeless tobacco: Never   Vaping Use    Vaping Use: Never used   Substance and Sexual Activity    Alcohol use: Yes     Comment: Occasional    Drug use: Yes     Types: Inhaled     Comment: Marijuana, every day    Sexual activity: Not on file       CURRENT MEDICATIONS  Home Medications    **Home medications have not yet been reviewed for this encounter**         ALLERGIES  Allergies   Allergen Reactions    Kiwi Extract Anaphylaxis    Shellfish Allergy Anaphylaxis    Ampicillin-Sulbactam Sodium Rash and Itching     Itching and rash with IV unasyn 8/17/2023    Cefazolin Rash and Itching     Rash  Tolerated ceftriaxone (April 2021)    Dilaudid [Hydromorphone] Itching    Hydrocodone Itching    Oxycodone Itching    Potassium Iodide Itching     Severe itching    Tape Unspecified     Paper tape okay        PHYSICAL EXAM  VITAL SIGNS: /78   Pulse 86   Temp 36.4 °C (97.5 °F) (Temporal)   Resp 16   Ht 1.676 m (5' 6\")   Wt 61.2 kg (135 lb)   SpO2 94%   BMI 21.79 kg/m²      Constitutional: Well developed, Well nourished, No acute distress, Non-toxic appearance.   HEENT: Normocephalic, Atraumatic,  external ears normal, pharynx pink,  Mucous  Membranes moist, No rhinorrhea or mucosal edema  Eyes: PERRL, EOMI, Conjunctiva normal, No discharge.   Neck: Normal range of motion, No tenderness, Supple, No stridor. "   Lymphatic: No lymphadenopathy    Cardiovascular: Regular Rate and Rhythm, No murmurs,  rubs, or gallops.   Thorax & Lungs: Lungs clear to auscultation bilaterally, No respiratory distress, No wheezes, rhales or rhonchi, No chest wall tenderness.   Abdomen: Bowel sounds normal, Soft, non tender, non distended,  No pulsatile masses., no rebound guarding or peritoneal signs.   Skin: Warm, Dry, No erythema, No rash,   Back:  No CVA tenderness,  No spinal tenderness, bony crepitance step offs or instability.   Extremities: Equal, intact distal pulses, No cyanosis, clubbing or edema,  No tenderness.   Musculoskeletal: Good range of motion in all major joints. No tenderness to palpation or major deformities noted.   Neurologic: Alert & oriented   No focal deficits noted.   Psychiatric: Affect normal, Judgment normal, Mood normal. No suicidal or homicidal ideation      DIAGNOSTIC STUDIES / PROCEDURES  EKG  I have independently interpreted this EKG  See below    LABS  Results for orders placed or performed during the hospital encounter of 08/20/23   CBC WITH DIFFERENTIAL   Result Value Ref Range    WBC 12.6 (H) 4.8 - 10.8 K/uL    RBC 4.06 (L) 4.70 - 6.10 M/uL    Hemoglobin 12.7 (L) 14.0 - 18.0 g/dL    Hematocrit 38.4 (L) 42.0 - 52.0 %    MCV 94.6 81.4 - 97.8 fL    MCH 31.3 27.0 - 33.0 pg    MCHC 33.1 32.3 - 36.5 g/dL    RDW 45.4 35.9 - 50.0 fL    Platelet Count 315 164 - 446 K/uL    MPV 9.3 9.0 - 12.9 fL    Neutrophils-Polys 62.70 44.00 - 72.00 %    Lymphocytes 21.90 (L) 22.00 - 41.00 %    Monocytes 11.90 0.00 - 13.40 %    Eosinophils 3.00 0.00 - 6.90 %    Basophils 0.30 0.00 - 1.80 %    Immature Granulocytes 0.20 0.00 - 0.90 %    Nucleated RBC 0.00 0.00 - 0.20 /100 WBC    Neutrophils (Absolute) 7.91 (H) 1.82 - 7.42 K/uL    Lymphs (Absolute) 2.77 1.00 - 4.80 K/uL    Monos (Absolute) 1.51 (H) 0.00 - 0.85 K/uL    Eos (Absolute) 0.38 0.00 - 0.51 K/uL    Baso (Absolute) 0.04 0.00 - 0.12 K/uL    Immature Granulocytes (abs) 0.03  0.00 - 0.11 K/uL    NRBC (Absolute) 0.00 K/uL   COMP METABOLIC PANEL   Result Value Ref Range    Sodium 139 135 - 145 mmol/L    Potassium 3.6 3.6 - 5.5 mmol/L    Chloride 103 96 - 112 mmol/L    Co2 26 20 - 33 mmol/L    Anion Gap 10.0 7.0 - 16.0    Glucose 87 65 - 99 mg/dL    Bun 19 8 - 22 mg/dL    Creatinine 1.52 (H) 0.50 - 1.40 mg/dL    Calcium 9.0 8.5 - 10.5 mg/dL    Correct Calcium 8.9 8.5 - 10.5 mg/dL    AST(SGOT) 18 12 - 45 U/L    ALT(SGPT) 11 2 - 50 U/L    Alkaline Phosphatase 80 30 - 99 U/L    Total Bilirubin 0.4 0.1 - 1.5 mg/dL    Albumin 4.1 3.2 - 4.9 g/dL    Total Protein 7.1 6.0 - 8.2 g/dL    Globulin 3.0 1.9 - 3.5 g/dL    A-G Ratio 1.4 g/dL   LIPASE   Result Value Ref Range    Lipase 42 11 - 82 U/L   URINALYSIS,CULTURE IF INDICATED    Specimen: Urine, Clean Catch   Result Value Ref Range    Color Yellow     Character Clear     Specific Gravity 1.013 <1.035    Ph 7.0 5.0 - 8.0    Glucose Negative Negative mg/dL    Ketones Negative Negative mg/dL    Protein 30 (A) Negative mg/dL    Bilirubin Negative Negative    Urobilinogen, Urine 0.2 Negative    Nitrite Negative Negative    Leukocyte Esterase Moderate (A) Negative    Occult Blood Large (A) Negative    Micro Urine Req Microscopic    ESTIMATED GFR   Result Value Ref Range    GFR (CKD-EPI) 62 >60 mL/min/1.73 m 2   URINE MICROSCOPIC (W/UA)   Result Value Ref Range    WBC  (A) /hpf    RBC  (A) /hpf    Bacteria Negative None /hpf    Epithelial Cells Negative /hpf    Hyaline Cast 0-2 /lpf   URINE CULTURE(NEW)    Specimen: Urine   Result Value Ref Range    Significant Indicator NEG     Source UR     Site -     Culture Result -          RADIOLOGY  I have independently interpreted the diagnostic imaging associated with this visit and am waiting the final reading from the radiologist.   My preliminary interpretation is as follows: none  Radiologist interpretation:   No orders to display         COURSE & MEDICAL DECISION MAKING    ED Observation Status?  Yes; I am placing the patient in to an observation status due to a diagnostic uncertainty as well as therapeutic intensity. Patient placed in observation status at 10:41 AM, 8/20/2023.     Observation plan is as follows: Lab work, bladder scan Pyridium Toradol and IV fluid    Upon Reevaluation, the patient's condition has: not improved; and will be escalated to hospitalization.    Patient discharged from ED Observation status at 2:16 PM  (Time) 8/20/23 (Date).     INITIAL ASSESSMENT, COURSE AND PLAN  Care Narrative: Russell Bunn is a 33 y.o. male who presents complaining of pain in his suprapubic area that started around 6 AM this morning.  He has a history of kidney stones and is currently taking linezolid for a urinary tract infection.  The patient recently had a stent removed for kidney stones that was placed by Dr. Silver when he had lithotripsy 8/10/2023.  He was admitted to the hospital until August 18th, 2 days ago for pain in his left flank.  Today he comes in by ambulance because he has pain in his bladder area that is sharp and stabbing and he feels like he cannot urinate.  He feels like something is obstructing his urethra.  He has had some nausea and vomiting.  He denies any fevers.  He describes the pain as sharp stabbing and intermittent.  Currently when he has the pain it is 10 out of 10.  On physical exam his mucous membranes are dry and he intermittently winces with pain in his suprapubic area.  His abdomen is soft without rebound masses or peritoneal signs.  HYDRATION: Based on the patient's presentation of Acute Vomiting and Dehydration the patient was given IV fluids. IV Hydration was used because oral hydration was not adequate alone. Upon recheck following hydration, the patient was improved.   Differential diagnosis: Urinary obstruction due to bladder stone, urinary retention, cystitis, renal insufficiency, dehydration  ADDITIONAL PROBLEM LIST  Seizure disorder  Kidney stones  multiple surgeries in the past  DISPOSITION AND DISCUSSIONS    Patient's white count is elevated at 12.6 hemoglobin 12.7 with 21% lymphocytes.  His urine still has moderate leukocyte esterase and large blood but his urine white cells are down to  with no bacteria.  Comprehensive metabolic panel, his lipase is normal at 42.  His comprehensive metabolic panel has a slight elevated creatinine of 1.52 which is new for him.  The rest of his electrolytes and liver function tests are normal.    The patient did have improvement of his symptoms with the initial dose of Toradol and Pyridium but his pain returned and I ended up giving him IV morphine.    I spoken with urology and he would like him kept in the hospital for pain control and antibiotics and possible repeat CT of his abdomen pelvis to evaluate location of the stone if he has not improved tomorrow.  The patient will be admitted to the medicine service for pain control IV antibiotics and further care.  He understands his need for admission due to intractable pain at this time.    I have discussed management of the patient with the following physicians and RAJAT's: Dr. Damon urology who would like him to have a colic CT tomorrow if his pain is out of control but would like him to be admitted for IV fluids and pain control  [facility] internal medicine Dr. Li will admit the patient for pain control and antibiotics and further assessment  Discussion of management with other [facility] or appropriate source(s): None     Escalation of care considered, and ultimately not performed:diagnostic imaging renal colic CT considered however the patient has had multiple CTs this year already has one being on the 16th of this month    Barriers to care at this time, including but not limited to: Patient does not have established PCP.     Decision tools and prescription drugs considered including, but not limited to: Pain Medications Toradol and morphine .  Patient will be admitted in  guarded condition  FINAL DIAGNOSIS  1. Bladder spasms    2. Urinary tract infection without hematuria, site unspecified    3. Intractable pain    4. ARAM (acute kidney injury) (HCC)           Electronically signed by: Erin Groves M.D., 8/20/2023 10:39 AM

## 2023-08-20 NOTE — H&P
[facility] Internal Medicine History & Physical Note    Date of Service  8/20/2023    [facility] Team: R IM Green Team   Attending: Constanza Li M.d.  Senior Resident: Dr. Villarreal   Intern:  Dr. Petit  Contact Number: [phone]    Primary Care Physician  Pcp Pt States None    Consultants  urology    Specialist Names: NA    Code Status  Full Code    Chief Complaint  Chief Complaint   Patient presents with    Abdominal Pain     Pt BIB EMS, report that pt began having LL abd pain around 6am. Pt states hx of same (kidney stones). Pt given 2mg Versed/ 100mcg Fentanyl PTA with mild relief.        History of Presenting Illness (HPI):   Russell Bunn is a 33 y.o. male with a PMHx of nephrolithiasis, polycystic kidney disease, seizure disorder and cystinuria who presented 8/20/2023 with progressive suprapubic pain radiating to his left flank for the past day. He states the pain feels like cramps and he has try to find a good position for the pain to go away. He said the pain is currently on a 9/10. He has also been having dysuria and on and off hematuria. He additionally endorses fevers and chills this morning. Patient reported he felt very weak this morning and his step dad had to carry him to the ambulance primarily because of the pain. Patient also reports he has felt nauseous and had some dry heaves this morning. Patient said he picked up his Linezolid after discharge two days ago and has been compliant with the doses.     Of note, patient had a left ureteral stent placed on 8/10/23 for ureterolithiasis and suspected pyelonephritis and initiated on antibiotics. His stent was later removed in the outpatient setting on 8/15. The patient returned to the ED on 8/16 for left flank and was discharged two days later with a six-day course of Linezolid.     In the ED, the patient's vitals were: T:97.5, HR: 86, RR: 16, BP: 118/78, SpO2: 94% on room air. His WBC was 12.6 (7.6 on 8/17), a creatinine of 1.52 and UA demonstrating  "moderate amount of leukocyte esterase,  wbcs, and  rbcs.     I discussed the plan of care with Dr. Constanza Li.     Review of Systems  Review of Systems   Constitutional:  Positive for chills, fever and malaise/fatigue. Negative for weight loss.   Eyes:  Negative for blurred vision.   Respiratory:  Negative for cough and shortness of breath.    Cardiovascular:  Negative for chest pain.   Gastrointestinal:  Positive for nausea and vomiting. Negative for abdominal pain and heartburn.   Genitourinary:  Positive for dysuria, flank pain and hematuria.   Musculoskeletal:  Negative for myalgias.   Neurological:  Negative for dizziness and headaches.   Psychiatric/Behavioral:  The patient is nervous/anxious.        Past Medical History  Recurrent nephrolithiasis, polycystic kidney disease, seizure disorder and cystinuria    Surgical History   has a past surgical history that includes lithotripsy; pr cysto/uretero/pyeloscopy, dx (Right, 3/13/2020); pr cystoscopy,insert ureteral stent (Right, 3/13/2020); lasertripsy (Right, 3/13/2020); pr cystoscopy,insert ureteral stent (Left, 4/26/2021); pr cysto/uretero/pyeloscopy, dx (Left, 4/26/2021); cystoscopy (Left, 02/20/2022); pr cystoscopy,insert ureteral stent (Left, 2/20/2022); pr cysto/uretero/pyeloscopy, dx (Left, 2/20/2022); lasertripsy (Left, 2/20/2022); cysto stent placemnt pre surg (Right, 4/24/2023); pr cystoscopy,insert ureteral stent (Left, 8/10/2023); and pr cysto/uretero/pyeloscopy, dx (Left, 8/10/2023).     Family History  Patient states his mom and dad were \"carriers\" of polycystic kidney disease.     Social History  Tobacco: Denies  Alcohol: Socially  Recreational drugs (illegal or prescription): Smokes Marijuana daily   Living Situation: Single-father who lives with his son in an apt in [place]   Recent Travel: Denies  Primary Care Provider: Denies  Other (stressors, spirituality, exposures): States he has been very stressed taking care of his son on top " of all his medical issues     Allergies  Allergies   Allergen Reactions    Kiwi Extract Anaphylaxis    Shellfish Allergy Anaphylaxis    Ampicillin-Sulbactam Sodium Rash and Itching     Itching and rash with IV unasyn 8/17/2023    Cefazolin Rash and Itching     Rash  Tolerated ceftriaxone (April 2021)    Dilaudid [Hydromorphone] Itching    Hydrocodone Itching    Oxycodone Itching    Potassium Iodide Itching     Severe itching    Tape Unspecified     Paper tape okay        Medications  Prior to Admission Medications   Prescriptions Last Dose Informant Patient Reported? Taking?   HYDROcodone-acetaminophen (NORCO) 7.5-325 MG tab 8/18/2023 at PRN  Yes Yes   Sig: Take 1 Tablet by mouth every 6 hours as needed for Severe Pain.   linezolid (ZYVOX) 600 MG Tab 8/19/2023 at PM Patient No No   Sig: Take 1 Tablet by mouth every 12 hours for 6 days.   oxyCODONE immediate-release (ROXICODONE) 5 MG Tab 8/20/2023 at AM Patient No No   Sig: Take 1 Tablet by mouth every 6 hours as needed for Severe Pain for up to 5 days.   tamsulosin (FLOMAX) 0.4 MG capsule 8/18/2023 Patient No No   Sig: Take 1 Capsule by mouth 1/2 hour after breakfast.      Facility-Administered Medications: None       Physical Exam  Temp:  [36.4 °C (97.5 °F)] 36.4 °C (97.5 °F)  Pulse:  [58-86] 73  Resp:  [14-18] 16  BP: (111-138)/(72-99) 138/73  SpO2:  [94 %-99 %] 99 %  Blood Pressure: (!) 128/99   Temperature: 36.4 °C (97.5 °F)   Pulse: 68   Respiration: 15   Pulse Oximetry: 95 %       Physical Exam  Constitutional:       Comments: In visible distress due to pain   HENT:      Head: Normocephalic.      Nose: Nose normal.      Mouth/Throat:      Mouth: Mucous membranes are moist.   Eyes:      General: No scleral icterus.        Right eye: No discharge.         Left eye: No discharge.   Cardiovascular:      Rate and Rhythm: Normal rate and regular rhythm.      Pulses: Normal pulses.      Heart sounds: Normal heart sounds.   Pulmonary:      Effort: Pulmonary effort is  "normal.      Breath sounds: Normal breath sounds.   Abdominal:      General: Abdomen is flat. There is no distension.      Tenderness: There is left CVA tenderness.      Comments: Significant suprapubic tenderness with guarding    Musculoskeletal:         General: Normal range of motion.      Cervical back: Normal range of motion.      Right lower leg: No edema.      Left lower leg: No edema.   Skin:     General: Skin is warm.      Coloration: Skin is not jaundiced.   Neurological:      Mental Status: He is alert and oriented to person, place, and time.   Psychiatric:         Mood and Affect: Mood normal.         Laboratory:  Recent Labs     08/20/23  1035   WBC 12.6*   RBC 4.06*   HEMOGLOBIN 12.7*   HEMATOCRIT 38.4*   MCV 94.6   MCH 31.3   MCHC 33.1   RDW 45.4   PLATELETCT 315   MPV 9.3     Recent Labs     08/18/23  0830 08/20/23  1035   SODIUM 134* 139   POTASSIUM 4.3 3.6   CHLORIDE 101 103   CO2 24 26   GLUCOSE 84 87   BUN 15 19   CREATININE 1.22 1.52*   CALCIUM 9.2 9.0     Recent Labs     08/18/23  0830 08/20/23  1035   ALTSGPT  --  11   ASTSGOT  --  18   ALKPHOSPHAT  --  80   TBILIRUBIN  --  0.4   LIPASE  --  42   GLUCOSE 84 87         No results for input(s): \"NTPROBNP\" in the last 72 hours.      No results for input(s): \"TROPONINT\" in the last 72 hours.    Imaging:  US-RENAL    (Results Pending)   US-BLADDER    (Results Pending)         Assessment/Plan:  Problem Representation: Patient is a 32yo male with a PMHx of recurrent nephrolithiasis, cystinuria, and polycystic kidney disease who presented for significant suprapubic pain after undergoing left ureteral stent placement (8/10) and removal (8/15) due to ureterolithiasis and pyelonephritis. He had leukocytosis with neutrophilia present on CBC along with a creatinine of 1.52 above from his baseline of 1.2-1.3. Urology was consulted by the ER physician with plan for possible stent placement in the am.   I anticipate this patient will require at least two " midnights for appropriate medical management, necessitating inpatient admission because an acute kidney injury secondary to obstructive nephropathy and intractable pain.     Patient will need a Med/Surg bed on MEDICAL service . The need is secondary to treatment of intractable pain and an ARAM in the setting of obstructive uropathy.    * Suprapubic pain likely secondary left ureterolithiasis vs stricture   Assessment & Plan  Patient has significant suprapubic tenderness on exam with some guarding. Current differential is incomplete passage of stone vs possible ureteral stricture. He underwent left ureteral stent placement on 8/10/2023 secondary to a 3.5mm stone and suspected urosepsis which was later removed on 8/15/23 in the outpatient setting.   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 Patient reports he was on potassium citrate and tiopronin started by his urologist back in 2018 in [place]. He reported it was stopped after an adverse event consisting of nausea.   - Can consider starting just potassium citrate during this hospitalization once the patient stabilizes to monitor for adverse effects   - Will need regular urology follow-up    Post-renal ARAM- (present on admission)  Assessment & Plan  Patient's creatinine at presentation at 1.52 up from his baseline of 1.2-1.3 BUN is stable at 19. This is most likely an acute kidney injury in the setting of obstructive uropathy.   - NS at 100 mL/hr  - BMP in am    UTI (urinary tract infection)  Assessment & Plan  Patient found to have UTI due to E.Faecalis on 8/10/23. Was recently discharged on 8/18 with a  six-day course of Linezolid (starting 8/19) to complete the antibiotic course.  - On Linezolid 600mg BID (8/20-8/25)          VTE prophylaxis: SCDs/TEDs

## 2023-08-20 NOTE — ASSESSMENT & PLAN NOTE
Patient found to have UTI due to E.Faecalis on 8/10/23. Was recently discharged on 8/18 with a six-day course of Linezolid (starting 8/19) to complete the antibiotic course.  - On Linezolid 600mg BID (8/20-8/25)

## 2023-08-20 NOTE — CONSULTS
UROLOGY Consult Note:    Rowan Givens P.A.-C.  Date & Time note created:    8/20/2023   3:45 PM     Referring MD:  Dr. Li     Patient ID:   Name:             Russell Bunn   YOB: 1990  Age:                 33 y.o.  male   MRN:               [id number]                                                             Reason for Consult:      ARAM    History of Present Illness:    This is a 32 yo M well known to our office for past medical history of cystinuria, repeated nephrolithiasis, polycystic kidney disease, seizure disorder, has returned to hospital for the 3rd time for left sided flank pain and difficulties with urination. Recent stone removal via Left CULTS 8/10, Stent removed 8/15.     Review of Systems:      Constitutional: Denies fevers   Eyes: Denies changes in vision   Ears/Nose/Throat/Mouth: Denies nasal congestion   Cardiovascular: no chest pain   Respiratory: no shortness of breath   Gastrointestinal/Hepatic: Endorses abdominal pain   Genitourinary: Denies hematuria, dysuria or frequency  Musculoskeletal/Rheum: Denies joint pain   Skin: Denies rash  Neurological: Denies headache   Psychiatric: denies mood disorder   Endocrine: Luana thyroid problems  Heme/Oncology/Lymph Nodes: Denies enlarged lymph nodes   All other systems were reviewed and are negative (AMA/CMS criteria)                Past Medical History:   Past Medical History:   Diagnosis Date    Kidney stones     multiple times requiring multiple surgeries    Seizure disorder (HCC)      Active Hospital Problems    Diagnosis     Left nephrolithiasis [N20.0]        Past Surgical History:  Past Surgical History:   Procedure Laterality Date    [place] CYSTOSCOPY,INSERT URETERAL STENT Left 8/10/2023    Procedure: CYSTOSCOPY, WITH URETERAL STENT INSERTION;  Surgeon: Rei Silver M.D.;  Location: SURGERY [facility];  Service: Urology    WV CYSTO/URETERO/PYELOSCOPY, DX Left 8/10/2023    Procedure: URETEROSCOPY;   Surgeon: Rei Silver M.D.;  Location: [facility];  Service: Urology    CYSTO STENT PLACEMNT PRE SURG Right 4/24/2023    Procedure: CYSTOSCOPY, WITH RIGHT URETERAL STENT INSERTION;  Surgeon: Dante Stephenson M.D.;  Location: [facility];  Service: Urology    CYSTOSCOPY Left 02/20/2022    Cysto ureteroscopy ,lithotripsy, stone basket, ureteral stent placement, Dr Silver    NM CYSTOSCOPY,INSERT URETERAL STENT Left 2/20/2022    Procedure: CYSTOSCOPY, WITH URETERAL STENT INSERTION;  Surgeon: Rei Silver M.D.;  Location: [facility];  Service: Urology    NM CYSTO/URETERO/PYELOSCOPY, DX Left 2/20/2022    Procedure: URETEROSCOPY;  Surgeon: Rei Silver M.D.;  Location: [facility];  Service: Urology    LASERTRIPSY Left 2/20/2022    Procedure: LITHOTRIPSY, USING LASER;  Surgeon: Rei Silver M.D.;  Location: [facility];  Service: Urology    NM CYSTOSCOPY,INSERT URETERAL STENT Left 4/26/2021    Procedure: CYSTOSCOPY, WITH OUT URETERAL STENT INSERTION;  Surgeon: Raf Moss M.D.;  Location: [facility];  Service: Urology    NM CYSTO/URETERO/PYELOSCOPY, DX Left 4/26/2021    Procedure: URETEROSCOPY;  Surgeon: Raf Moss M.D.;  Location: [facility];  Service: Urology    NM CYSTO/URETERO/PYELOSCOPY, DX Right 3/13/2020    Procedure: URETEROSCOPY;  Surgeon: Chase Damon M.D.;  Location: [facility];  Service: Urology    NM CYSTOSCOPY,INSERT URETERAL STENT Right 3/13/2020    Procedure: CYSTOSCOPY;  Surgeon: Chase Damon M.D.;  Location: [facility];  Service: Urology    LASERTRIPSY Right 3/13/2020    Procedure: LITHOTRIPSY, USING LASER;  Surgeon: Chase Damon M.D.;  Location: [facility];  Service: Urology    LITHOTRIPSY         Hospital Medications:    Current Facility-Administered Medications:     oxyCODONE immediate-release (Roxicodone) tablet 5 mg, 5 mg, Oral, Q4HRS PRN **OR** oxyCODONE immediate  release (Roxicodone) tablet 10 mg, 10 mg, Oral, Q4HRS PRN, Constanza Li M.D., 10 mg at 08/20/23 1429    linezolid (Zyvox) tablet 600 mg, 600 mg, Oral, Q12HRS, Roshan Petit M.D.    tamsulosin (Flomax) capsule 0.4 mg, 0.4 mg, Oral, AFTER BREAKFAST, Roshan Petit M.D.    acetaminophen (Tylenol) tablet 1,000 mg, 1,000 mg, Oral, TID, Roshan Petit M.D.    morphine 4 MG/ML injection 4 mg, 4 mg, Intravenous, Q4HRS PRN, Roshan Petit M.D.    NS infusion, , Intravenous, Continuous, Roshan Petit M.D.    Current Outpatient Medications:  Medications Prior to Admission   Medication Sig Dispense Refill Last Dose    HYDROcodone-acetaminophen (NORCO) 7.5-325 MG tab Take 1 Tablet by mouth every 6 hours as needed for Severe Pain.   8/18/2023 at PRN    linezolid (ZYVOX) 600 MG Tab Take 1 Tablet by mouth every 12 hours for 6 days. 12 Tablet 0 8/19/2023 at PM    tamsulosin (FLOMAX) 0.4 MG capsule Take 1 Capsule by mouth 1/2 hour after breakfast. 30 Capsule 0 8/18/2023    oxyCODONE immediate-release (ROXICODONE) 5 MG Tab Take 1 Tablet by mouth every 6 hours as needed for Severe Pain for up to 5 days. 15 Tablet 0 8/20/2023 at AM       Medication Allergy:  Allergies   Allergen Reactions    Kiwi Extract Anaphylaxis    Shellfish Allergy Anaphylaxis    Ampicillin-Sulbactam Sodium Rash and Itching     Itching and rash with IV unasyn 8/17/2023    Cefazolin Rash and Itching     Rash  Tolerated ceftriaxone (April 2021)    Dilaudid [Hydromorphone] Itching    Hydrocodone Itching    Oxycodone Itching    Potassium Iodide Itching     Severe itching    Tape Unspecified     Paper tape okay        Family History:  No family history on file.    Social History:  Social History     Socioeconomic History    Marital status: Single     Spouse name: Not on file    Number of children: Not on file    Years of education: Not on file    Highest education level: Not on file   Occupational History    Not on file   Tobacco Use    Smoking status: Former     Packs/day: .5  "    Types: Cigarettes    Smokeless tobacco: Never   Vaping Use    Vaping Use: Never used   Substance and Sexual Activity    Alcohol use: Yes     Comment: Occasional    Drug use: Yes     Types: Inhaled     Comment: Marijuana, every day    Sexual activity: Not on file   Other Topics Concern    Not on file   Social History Narrative    Not on file     Social Determinants of Health     Financial Resource Strain: Not on file   Food Insecurity: Not on file   Transportation Needs: Not on file   Physical Activity: Not on file   Stress: Not on file   Social Connections: Not on file   Intimate Partner Violence: Not on file   Housing Stability: Not on file         Physical Exam:  Vitals/ General Appearance:   Weight/BMI: Body mass index is 21.79 kg/m².  BP (!) 128/99   Pulse 68   Temp 36.4 °C (97.5 °F) (Temporal)   Resp 15   Ht 1.676 m (5' 6\")   Wt 61.2 kg (135 lb)   SpO2 95%   Vitals:    08/20/23 1102 08/20/23 1202 08/20/23 1302 08/20/23 1417   BP: 115/72 111/76 119/73 (!) 128/99   Pulse: 80 (!) 58 76 68   Resp: 15 14 18 15   Temp:       TempSrc:       SpO2: 96% 97% 95% 95%   Weight:       Height:         Oxygen Therapy:  Pulse Oximetry: 95 %, O2 Delivery Device: None - Room Air    Constitutional:   Well developed, Well nourished, No acute distress  HENMT:  Normocephalic, Atraumatic, Oropharynx moist mucous membranes, No oral exudates, Nose normal.  No thyromegaly.  Eyes:  EOMI, Conjunctiva normal, No discharge.  Neck:  Normal range of motion, No cervical tenderness.  Cardiovascular:  Normal heart rate, Normal rhythm, No murmurs, No rubs, No gallops.   Extremitites with intact distal pulses, no cyanosis, or edema.  Lungs:  Normal breath sounds, breath sounds clear to auscultation bilaterally,  no crackles, no wheezing.   Abdomen: Bowel sounds normal, Soft, No tenderness, No guarding, No rebound, No masses, No hepatosplenomegaly.  : -CVAT  Skin: Warm, Dry, No erythema, No rash, no induration.  Neurologic: Alert & " oriented x 3, No focal deficits noted.  Psychiatric: Affect normal, Judgment normal, Mood normal.      MDM (Data Review):     Records reviewed and summarized in current documentation    Lab Data Review:  Recent Results (from the past 24 hour(s))   CBC WITH DIFFERENTIAL    Collection Time: 08/20/23 10:35 AM   Result Value Ref Range    WBC 12.6 (H) 4.8 - 10.8 K/uL    RBC 4.06 (L) 4.70 - 6.10 M/uL    Hemoglobin 12.7 (L) 14.0 - 18.0 g/dL    Hematocrit 38.4 (L) 42.0 - 52.0 %    MCV 94.6 81.4 - 97.8 fL    MCH 31.3 27.0 - 33.0 pg    MCHC 33.1 32.3 - 36.5 g/dL    RDW 45.4 35.9 - 50.0 fL    Platelet Count 315 164 - 446 K/uL    MPV 9.3 9.0 - 12.9 fL    Neutrophils-Polys 62.70 44.00 - 72.00 %    Lymphocytes 21.90 (L) 22.00 - 41.00 %    Monocytes 11.90 0.00 - 13.40 %    Eosinophils 3.00 0.00 - 6.90 %    Basophils 0.30 0.00 - 1.80 %    Immature Granulocytes 0.20 0.00 - 0.90 %    Nucleated RBC 0.00 0.00 - 0.20 /100 WBC    Neutrophils (Absolute) 7.91 (H) 1.82 - 7.42 K/uL    Lymphs (Absolute) 2.77 1.00 - 4.80 K/uL    Monos (Absolute) 1.51 (H) 0.00 - 0.85 K/uL    Eos (Absolute) 0.38 0.00 - 0.51 K/uL    Baso (Absolute) 0.04 0.00 - 0.12 K/uL    Immature Granulocytes (abs) 0.03 0.00 - 0.11 K/uL    NRBC (Absolute) 0.00 K/uL   COMP METABOLIC PANEL    Collection Time: 08/20/23 10:35 AM   Result Value Ref Range    Sodium 139 135 - 145 mmol/L    Potassium 3.6 3.6 - 5.5 mmol/L    Chloride 103 96 - 112 mmol/L    Co2 26 20 - 33 mmol/L    Anion Gap 10.0 7.0 - 16.0    Glucose 87 65 - 99 mg/dL    Bun 19 8 - 22 mg/dL    Creatinine 1.52 (H) 0.50 - 1.40 mg/dL    Calcium 9.0 8.5 - 10.5 mg/dL    Correct Calcium 8.9 8.5 - 10.5 mg/dL    AST(SGOT) 18 12 - 45 U/L    ALT(SGPT) 11 2 - 50 U/L    Alkaline Phosphatase 80 30 - 99 U/L    Total Bilirubin 0.4 0.1 - 1.5 mg/dL    Albumin 4.1 3.2 - 4.9 g/dL    Total Protein 7.1 6.0 - 8.2 g/dL    Globulin 3.0 1.9 - 3.5 g/dL    A-G Ratio 1.4 g/dL   LIPASE    Collection Time: 08/20/23 10:35 AM   Result Value Ref Range     Lipase 42 11 - 82 U/L   ESTIMATED GFR    Collection Time: 08/20/23 10:35 AM   Result Value Ref Range    GFR (CKD-EPI) 62 >60 mL/min/1.73 m 2   URINALYSIS,CULTURE IF INDICATED    Collection Time: 08/20/23 12:35 PM    Specimen: Urine, Clean Catch   Result Value Ref Range    Color Yellow     Character Clear     Specific Gravity 1.013 <1.035    Ph 7.0 5.0 - 8.0    Glucose Negative Negative mg/dL    Ketones Negative Negative mg/dL    Protein 30 (A) Negative mg/dL    Bilirubin Negative Negative    Urobilinogen, Urine 0.2 Negative    Nitrite Negative Negative    Leukocyte Esterase Moderate (A) Negative    Occult Blood Large (A) Negative    Micro Urine Req Microscopic    URINE MICROSCOPIC (W/UA)    Collection Time: 08/20/23 12:35 PM   Result Value Ref Range    WBC  (A) /hpf    RBC  (A) /hpf    Bacteria Negative None /hpf    Epithelial Cells Negative /hpf    Hyaline Cast 0-2 /lpf   URINE CULTURE(NEW)    Collection Time: 08/20/23 12:58 PM    Specimen: Urine   Result Value Ref Range    Significant Indicator NEG     Source UR     Site -     Culture Result -        Imaging/Procedures Review:    Reviewed    MDM (Assessment and Plan):       34yo M who has returned for the 3rd time for ongoing abd pain secondary to kidney stones and hydronephrosis. WBC increased from discharge 8/17, 12(7.6). Cr increased 1.52(1.22), likely that hydronephrosis is still present. Pt refused stent placement 8/17 and understood the risks to his kidney function. Pt is still very adverse to stent placement. Pt reports he was not able to tolerate Potasium citrate in the past due to the combination of this and Thiola.     Plan:  -Continue abx and flomax   -Restart potasium citrate, will need to reassess urine PH and CMP in 6 weeks from starting.   -await results of RBUS   -Control pain   -NPO at midnight for possible stent placement, would recommend repeat CT w/o prior if RBUS is non conclusive     Urology to follow  Dr. Damon is aware of this  consult and has directed this plan of care.

## 2023-08-20 NOTE — SENIOR ADMIT NOTE
[facility]  Senior Admission Note      HPI  Russell Bunn is a 33 y.o. male with a past medical history that includes cystinuria c/w recurrent nephrolithiases, polycystic kidney disease, seizure disorder who presented on 8/20/2023.  Patient had recent left ureteral stent placed on 8/10/2023 with urology for 3 mm left distal ureteral stone with hydroureteronephrosis, followed by stent removal on 8/15/2023 in outpatient clinic.  Following stent removal, patient has had significant intermittent worsening of abdominal pain, prompting him to present to the ED here at [facility] on 8/15, as well as admitted to [facility] on 8/16- 8/18 for persistent recurring pain.  Discharged on linezolid on 8/18 due to recent urine cultures on 8/11 growing E faecalis.  Presenting yet again today with significant abdominal pain primarily in the suprapubic region radiating to left abdomen and left lower back.  Patient endorses associated N/V, and fever/chills.  Denies chest pain, palpitations.  Notes that he became short of breath and began to hyperventilate when pain was excruciating earlier today.  Otherwise currently without SOB.  Also of note, patient was previously on potassium citrate and tiopronin by nephrologist for prophylaxis against stones in the setting of his cystinuria, however  was unable to tolerate due to N/V and weight loss.  Unsure of which agent was the inciting factor to his symptomology then.  ED course, vital signs are stable with patient saturating adequately on RA, afebrile as well.  Remarkable labs include leukocytosis of 12.6 with neutrophilia, as well as an increase in her creatinine to 1.52 (baseline 1.2-1.3).  UA demonstrating large occult blood, 30 protein, moderate leukocyte esterase,  WBCs and RBCs.  NGTD urine cultures.  CT imaging deferred for now given patient has had 3 CT renal colic studies since 8/10/2023.  Of note, most recent CT on 8/16/2023 demonstrating remaining 1.5 mm distal left ureteral  stone with urinary outflow tract obstructive changes, as well as right nephrolithiasis.  Urology was consulted by ERP, with possible plan for repeat stent placement of left ureter in the a.m. Patient will be admitted for intractable pain and ARAM secondary to obstructive nephropathy in the setting of left nephrolithiasis.    Physical Exam  Physical Exam  Constitutional:       General: He is not in acute distress.     Appearance: He is normal weight.   HENT:      Head: Normocephalic and atraumatic.      Mouth/Throat:      Mouth: Mucous membranes are moist.   Eyes:      Extraocular Movements: Extraocular movements intact.      Conjunctiva/sclera: Conjunctivae normal.   Cardiovascular:      Rate and Rhythm: Normal rate and regular rhythm.      Pulses: Normal pulses.      Heart sounds: Normal heart sounds. No murmur heard.     No friction rub. No gallop.   Pulmonary:      Effort: Pulmonary effort is normal.      Breath sounds: Normal breath sounds. No wheezing, rhonchi or rales.   Abdominal:      Tenderness: There is abdominal tenderness (Suprapubic). There is guarding. There is no rebound.   Musculoskeletal:      Right lower leg: No edema.      Left lower leg: No edema.   Skin:     General: Skin is warm.   Neurological:      General: No focal deficit present.      Mental Status: He is alert and oriented to person, place, and time. Mental status is at baseline.   Psychiatric:         Mood and Affect: Mood normal.         Behavior: Behavior normal.         Assessment  33 y.o. male with a past medical history that includes cystinuria c/w recurrent nephrolithiases, polycystic kidney disease stenting with intractable abdominal pain and ARAM  secondary to obstructive nephropathy in the setting of left nephrolithiasis.    Plan  #ARAM  #Obstructive nephropathy  #Left nephrolithiasis  #Cystinuria c/w recurrent nephrolithiases  #Polycystic kidney disease  #E faecalis UTI  #Intractable pain  Recent left ureteral stent removal in  8/15/2023 with intermittent significant abdominal pain, likely secondary to incomplete passing of stone versus possible stricture of left ureter.  Urology currently following, appreciate support.  Also of note, and recent UTI with E faecalis, currently on outpatient linezolid, however has not completed course  -Admit to medicine  -Ordered renal/bladder ultrasound for further evaluation  -Continue home linezolid to complete ABX course  -Continue home tamsulosin  -Pain management with scheduled Tylenol, and oxycodone/morphine as needed for breakthrough pain  -Avoid nephrotoxic agents  -N.p.o. at midnight with possible cystoscopy with urology in a.m., appreciate support    Discussed with my attending physician, Constanza Li M.D

## 2023-08-20 NOTE — ASSESSMENT & PLAN NOTE
Most likely the cause of his recurrent nephrolithiases. Patient reports he was on potassium citrate and tiopronin started by his urologist back in 2018 in [place]. He reported it was stopped after an adverse event consisting of nausea.   - Can consider starting just potassium citrate during this hospitalization once the patient stabilizes to monitor for adverse effects   - Will need regular urology follow-up

## 2023-08-20 NOTE — ASSESSMENT & PLAN NOTE
Patient's creatinine at presentation at 1.52 up from his baseline of 1.2-1.3 BUN is stable at 19. This is most likely an acute kidney injury in the setting of obstructive uropathy.   - NS at 100 mL/hr  - BMP in am

## 2023-08-20 NOTE — ASSESSMENT & PLAN NOTE
Patient has significant suprapubic tenderness on exam with some guarding. Current differential is incomplete passage of stone vs possible ureteral stricture. He underwent left ureteral stent placement on 8/10/2023 secondary to a 3.5mm stone and suspected urosepsis which was later removed on 8/15/23 in the outpatient setting.   - NPO at midnight for possible urological intervention in am  - On Tylenol 1000mg TIDm oxy PRN and Morphine 4mg PRN  - Last CT abdomen from 8/16 demonstrated 1.5mm left distal ureteral stone   - Urology consulted by ERP, appreciate their recs  - Renal and bladder US   - NS at 100 mL/hr

## 2023-08-20 NOTE — ED TRIAGE NOTES
Chief Complaint   Patient presents with    Abdominal Pain     Pt BIB EMS, report that pt began having LL abd pain around 6am. Pt states hx of same (kidney stones). Pt given 2mg Versed/ 100mcg Fentanyl PTA with mild relief.

## 2023-08-21 ENCOUNTER — PHARMACY VISIT (OUTPATIENT)
Dept: PHARMACY | Facility: MEDICAL CENTER | Age: 33
End: 2023-08-21
Payer: COMMERCIAL

## 2023-08-21 ENCOUNTER — APPOINTMENT (OUTPATIENT)
Dept: RADIOLOGY | Facility: MEDICAL CENTER | Age: 33
DRG: 690 | End: 2023-08-21
Payer: MEDICAID

## 2023-08-21 VITALS
HEART RATE: 67 BPM | WEIGHT: 121.91 LBS | SYSTOLIC BLOOD PRESSURE: 139 MMHG | DIASTOLIC BLOOD PRESSURE: 93 MMHG | OXYGEN SATURATION: 100 % | BODY MASS INDEX: 19.59 KG/M2 | RESPIRATION RATE: 17 BRPM | TEMPERATURE: 97.7 F | HEIGHT: 66 IN

## 2023-08-21 LAB
ANION GAP SERPL CALC-SCNC: 7 MMOL/L (ref 7–16)
BUN SERPL-MCNC: 14 MG/DL (ref 8–22)
CALCIUM SERPL-MCNC: 8.8 MG/DL (ref 8.5–10.5)
CHLORIDE SERPL-SCNC: 105 MMOL/L (ref 96–112)
CO2 SERPL-SCNC: 27 MMOL/L (ref 20–33)
CREAT SERPL-MCNC: 1.38 MG/DL (ref 0.5–1.4)
ERYTHROCYTE [DISTWIDTH] IN BLOOD BY AUTOMATED COUNT: 46.4 FL (ref 35.9–50)
GFR SERPLBLD CREATININE-BSD FMLA CKD-EPI: 69 ML/MIN/1.73 M 2
GLUCOSE SERPL-MCNC: 94 MG/DL (ref 65–99)
HCT VFR BLD AUTO: 38.5 % (ref 42–52)
HGB BLD-MCNC: 12.7 G/DL (ref 14–18)
INR PPP: 1.14 (ref 0.87–1.13)
MCH RBC QN AUTO: 31.4 PG (ref 27–33)
MCHC RBC AUTO-ENTMCNC: 33 G/DL (ref 32.3–36.5)
MCV RBC AUTO: 95.3 FL (ref 81.4–97.8)
PLATELET # BLD AUTO: 292 K/UL (ref 164–446)
PMV BLD AUTO: 9.2 FL (ref 9–12.9)
POTASSIUM SERPL-SCNC: 3.8 MMOL/L (ref 3.6–5.5)
PROTHROMBIN TIME: 14.5 SEC (ref 12–14.6)
RBC # BLD AUTO: 4.04 M/UL (ref 4.7–6.1)
SODIUM SERPL-SCNC: 139 MMOL/L (ref 135–145)
WBC # BLD AUTO: 8.2 K/UL (ref 4.8–10.8)

## 2023-08-21 PROCEDURE — 76775 US EXAM ABDO BACK WALL LIM: CPT

## 2023-08-21 PROCEDURE — RXMED WILLOW AMBULATORY MEDICATION CHARGE

## 2023-08-21 PROCEDURE — 85027 COMPLETE CBC AUTOMATED: CPT

## 2023-08-21 PROCEDURE — 700111 HCHG RX REV CODE 636 W/ 250 OVERRIDE (IP): Mod: JZ

## 2023-08-21 PROCEDURE — A9270 NON-COVERED ITEM OR SERVICE: HCPCS

## 2023-08-21 PROCEDURE — A9270 NON-COVERED ITEM OR SERVICE: HCPCS | Performed by: INTERNAL MEDICINE

## 2023-08-21 PROCEDURE — 85610 PROTHROMBIN TIME: CPT

## 2023-08-21 PROCEDURE — 700102 HCHG RX REV CODE 250 W/ 637 OVERRIDE(OP): Performed by: INTERNAL MEDICINE

## 2023-08-21 PROCEDURE — 700102 HCHG RX REV CODE 250 W/ 637 OVERRIDE(OP)

## 2023-08-21 PROCEDURE — 80048 BASIC METABOLIC PNL TOTAL CA: CPT

## 2023-08-21 PROCEDURE — 99239 HOSP IP/OBS DSCHRG MGMT >30: CPT | Mod: GC | Performed by: INTERNAL MEDICINE

## 2023-08-21 PROCEDURE — 700105 HCHG RX REV CODE 258

## 2023-08-21 RX ORDER — METHYLPREDNISOLONE 4 MG/1
4 TABLET ORAL DAILY
Qty: 5 TABLET | Refills: 0 | Status: SHIPPED | OUTPATIENT
Start: 2023-08-21 | End: 2023-08-26

## 2023-08-21 RX ORDER — POTASSIUM CITRATE 10 MEQ/1
10 TABLET, EXTENDED RELEASE ORAL 3 TIMES DAILY
Status: DISCONTINUED | OUTPATIENT
Start: 2023-08-21 | End: 2023-08-21 | Stop reason: HOSPADM

## 2023-08-21 RX ORDER — POTASSIUM CITRATE 10 MEQ/1
10 TABLET, EXTENDED RELEASE ORAL 3 TIMES DAILY
Qty: 90 TABLET | Refills: 0 | Status: SHIPPED | OUTPATIENT
Start: 2023-08-21 | End: 2023-09-20

## 2023-08-21 RX ADMIN — POTASSIUM CITRATE 10 MEQ: 10 TABLET, EXTENDED RELEASE ORAL at 09:54

## 2023-08-21 RX ADMIN — TAMSULOSIN HYDROCHLORIDE 0.4 MG: 0.4 CAPSULE ORAL at 08:01

## 2023-08-21 RX ADMIN — MORPHINE SULFATE 4 MG: 4 INJECTION, SOLUTION INTRAMUSCULAR; INTRAVENOUS at 00:21

## 2023-08-21 RX ADMIN — POTASSIUM CITRATE 10 MEQ: 10 TABLET, EXTENDED RELEASE ORAL at 11:58

## 2023-08-21 RX ADMIN — SODIUM CHLORIDE: 9 INJECTION, SOLUTION INTRAVENOUS at 00:15

## 2023-08-21 RX ADMIN — MORPHINE SULFATE 4 MG: 4 INJECTION, SOLUTION INTRAMUSCULAR; INTRAVENOUS at 06:14

## 2023-08-21 RX ADMIN — LINEZOLID 600 MG: 600 TABLET, FILM COATED ORAL at 05:13

## 2023-08-21 RX ADMIN — ONDANSETRON 4 MG: 2 INJECTION INTRAMUSCULAR; INTRAVENOUS at 06:14

## 2023-08-21 RX ADMIN — OXYCODONE HYDROCHLORIDE 10 MG: 10 TABLET ORAL at 05:14

## 2023-08-21 RX ADMIN — ACETAMINOPHEN 1000 MG: 500 TABLET, FILM COATED ORAL at 05:13

## 2023-08-21 NOTE — DISCHARGE INSTRUCTIONS
A Low Sodium Diet - 2 Grams Diet restricts the amount of sodium to no more than 2 g (2000 mg) daily. Limiting the amount of sodium is often used to help lower blood pressure or if you have heart, liver, or kidney problems. Many foods contain sodium for flavor and or as a preservative. Look for products with no more than 500 to 600 mg of sodium per meal and no more than 150 mg per serving on the label. Remember that 2 g = 2000 mg.  Do not add salt to food.    Comments: Please continue taking your antibiotic (Zyvox) until 8/25/23.   We have prescribed you steroids to take take for five days. Please continue taking for five days (last dose on 8/25).   We are also prescribing you potassium citrate to take three times a day. Please follow-up with your urologist and bring up any adverse effects you may have noticed while taking this medication.    May return to work without restrictions    /P:            Active Hospital Problems     Diagnosis      Left nephrolithiasis [N20.0]      Suprapubic pain likely secondary left ureterolithiasis vs stricture  [R10.2]      UTI (urinary tract infection) [N39.0]      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]      Post-renal ARAM [N17.9]              - Ambulate, IS.  - Nothing further from urology at this time.  Would recommend steroid dose pack for ureteral edema s/p Left CULTS.  Patient can be discharged home when medically cleared.  We will contact patient for outpatient follow up in 4-6w with RBUS prior.   - Regular diet  - Urology signing off     Left message for regarding scheduling an ultrasound appointment.

## 2023-08-21 NOTE — PROGRESS NOTES
Patient arrived to unit , A&Ox4, reporting 6/10 L flank pain. Denies need for pain medication at this time. Reporting nausea, vomited x5, Wilfred notified, zofran and compazine ordered. Zofran admin with no relief. Compazine admin with mild relief, shortly after admin compazine patient began writhing in bed, very anxious, vitals WNL, Dr Mukherjee notified, no new orders at this time. OOB indap. Straining urine. Denies needs at this time, call bell within reach.

## 2023-08-21 NOTE — PROGRESS NOTES
"Urology Progress Note      S: No fevers, chills, nausea or vomiting. Pain improved from last night.  ROBERT shows mild LEFT hydronephrosis with small distal Left UVJ stone.  ROBERT reviewed by myself and Dr Mahmood. C/o dysuria and frequency.     O:   BP (!) 139/93   Pulse 67   Temp 36.5 °C (97.7 °F) (Temporal)   Resp 17   Ht 1.676 m (5' 6\")   Wt 55.3 kg (121 lb 14.6 oz)   SpO2 100%   Recent Labs     08/20/23  1035 08/21/23  0305   SODIUM 139 139   POTASSIUM 3.6 3.8   CHLORIDE 103 105   CO2 26 27   GLUCOSE 87 94   BUN 19 14   CREATININE 1.52* 1.38   CALCIUM 9.0 8.8     Recent Labs     08/20/23  1035 08/21/23  0305   WBC 12.6* 8.2   RBC 4.06* 4.04*   HEMOGLOBIN 12.7* 12.7*   HEMATOCRIT 38.4* 38.5*   MCV 94.6 95.3   MCH 31.3 31.4   MCHC 33.1 33.0   RDW 45.4 46.4   PLATELETCT 315 292   MPV 9.3 9.2         Intake/Output Summary (Last 24 hours) at 8/21/2023 1107  Last data filed at 8/21/2023 0513  Gross per 24 hour   Intake 1000 ml   Output 1600 ml   Net -600 ml       Exam:  Abdomen soft, benign.     Urine: clear yellow      A/P:    Active Hospital Problems    Diagnosis     Left nephrolithiasis [N20.0]     Suprapubic pain likely secondary left ureterolithiasis vs stricture  [R10.2]     UTI (urinary tract infection) [N39.0]     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]     Post-renal ARAM [N17.9]          - Ambulate, IS.  - Nothing further from urology at this time.  Would recommend steroid dose pack for ureteral edema s/p Left CULTS.  Patient can be discharged home when medically cleared.  We will contact patient for outpatient follow up in 4-6w with RBUS prior.   - Regular diet  - Urology signing off    "

## 2023-08-21 NOTE — DISCHARGE SUMMARY
[facility] Internal Medicine Discharge Summary    Attending: Constanza Li M.D.  Senior Resident: Dr. Villarreal  Intern:  Dr. Petit  Contact Number: [phone]    CHIEF COMPLAINT ON ADMISSION  Chief Complaint   Patient presents with    Abdominal Pain     Pt BIB EMS, report that pt began having LL abd pain around 6am. Pt states hx of same (kidney stones). Pt given 2mg Versed/ 100mcg Fentanyl PTA with mild relief.        Reason for Admission  Recurrent nephrolithiasis and intractable suprapubic pain     Admission Date  8/20/2023    CODE STATUS  Full Code    HPI & HOSPITAL COURSE  Russell Bunn is a 33 y.o. male with a PMHx of cystinuria and recurrent nephrolithiasis admitted for intractable suprapubic pain on 8/20. Patient was recently hospitalized for ureterolithiasis and UTI due to E.Faecalis (being treated with Linezolid) requiring left ureteral stent placement (8/10) which was later removed (8/15). He was found to have an ARAM on presentation likely due to post-obstructive uropathy. Patient was initiated on pain control with IV morphine and PO oxycodone PRN, home Linezolid for the treatment of E.Faecalis UTI, and IV fluids. He underwent a renal US which demonstrated a possible left ureterovesical junction stone. Urology was consulted and believed his symptoms were largely due to ureteral edema following stent removal and recommended treatment with a steroid dose pack. On 8/21, patient's pain was doing much better and his ARAM had resolved. He was started on potassium citrate for the prophylaxis against nephrolithiases in the setting of his cystinuria, as well as a five day steroid course per urology recommendations. He was advised to continue taking the Linezolid at home to complete the antibiotic course for the treatment of his UTI.  Also advised to begin a low-sodium diet in order to further prevent recurrent kidney stone formation.  He will follow-up with urology outpatient. Patient is stable for discharge on 8/21 and had a  fairly unremarkable hospitalization.    Therefore, he is discharged in good and stable condition to home with close outpatient follow-up.    The patient recovered much more quickly than anticipated on admission.    Discharge Date  8/21/2023    Physical Exam on Day of Discharge  Physical Exam  HENT:      Head: Normocephalic.      Nose: Nose normal.      Mouth/Throat:      Mouth: Mucous membranes are moist.   Eyes:      General: No scleral icterus.        Right eye: No discharge.         Left eye: No discharge.   Cardiovascular:      Rate and Rhythm: Normal rate and regular rhythm.      Pulses: Normal pulses.      Heart sounds: Normal heart sounds.   Pulmonary:      Effort: Pulmonary effort is normal.      Breath sounds: Normal breath sounds.   Abdominal:      General: Abdomen is flat.      Comments: Mild suprapubic tenderness much improved from presentation   Musculoskeletal:         General: No swelling.      Cervical back: Normal range of motion.      Right lower leg: No edema.      Left lower leg: No edema.   Skin:     General: Skin is warm and dry.   Neurological:      Mental Status: He is alert and oriented to person, place, and time.   Psychiatric:         Mood and Affect: Mood normal.         FOLLOW UP ITEMS POST DISCHARGE  -Per urology, they will call the pt to set up outpatient f/u in the next 4-6 weeks.   -Continue taking Linezolid for the treatment of E.Faecalis UTI until 8/25, alongside short course of steroids  -Low-sodium diet alongside potassium citrate supplementation for nephrolithiasis prophylaxis    DISCHARGE DIAGNOSES  Principal Problem:    Suprapubic pain likely secondary left ureterolithiasis vs stricture  (POA: Unknown)  Active Problems:    Post-renal ARAM (POA: Yes)    Cystinuria (HCC) (POA: Yes)    UTI (urinary tract infection) (POA: Unknown)    Left nephrolithiasis (POA: Yes)  Resolved Problems:    * No resolved hospital problems. *      FOLLOW UP  Will have urology outpatient f/u set up.    No follow-up provider specified.    MEDICATIONS ON DISCHARGE     Medication List        START taking these medications        Instructions   methylPREDNISolone 4 MG Tabs  Commonly known as: Medrol  Next Dose Due: today   Take 1 Tablet by mouth every day for 5 days.  Dose: 4 mg     potassium citrate SR 10 MEQ (1080 MG) Tbcr  Commonly known as: Urocit-K SR  Next Dose Due: today   Take 1 Tablet by mouth 3 times a day for 30 days.  Dose: 10 mEq            CONTINUE taking these medications        Instructions   linezolid 600 MG Tabs  Commonly known as: Zyvox  Next Dose Due: today   Take 1 Tablet by mouth every 12 hours for 6 days.  Dose: 600 mg     tamsulosin 0.4 MG capsule  Commonly known as: Flomax  Next Dose Due: tomorrow   Take 1 Capsule by mouth 1/2 hour after breakfast.  Dose: 0.4 mg            STOP taking these medications      HYDROcodone-acetaminophen 7.5-325 MG tab  Commonly known as: Norco     oxyCODONE immediate-release 5 MG Tabs  Commonly known as: Roxicodone              Allergies  Allergies   Allergen Reactions    Kiwi Extract Anaphylaxis    Shellfish Allergy Anaphylaxis    Ampicillin-Sulbactam Sodium Rash and Itching     Itching and rash with IV unasyn 8/17/2023    Cefazolin Rash and Itching     Rash  Tolerated ceftriaxone (April 2021)    Dilaudid [Hydromorphone] Itching    Hydrocodone Itching    Oxycodone Itching    Potassium Iodide Itching     Severe itching    Tape Unspecified     Paper tape okay        DIET  No orders of the defined types were placed in this encounter.      ACTIVITY  As tolerated.  Weight bearing as tolerated    CONSULTATIONS  Urology    PROCEDURES  None    LABORATORY  Lab Results   Component Value Date    SODIUM 139 08/21/2023    POTASSIUM 3.8 08/21/2023    CHLORIDE 105 08/21/2023    CO2 27 08/21/2023    GLUCOSE 94 08/21/2023    BUN 14 08/21/2023    CREATININE 1.38 08/21/2023        Lab Results   Component Value Date    WBC 8.2 08/21/2023    HEMOGLOBIN 12.7 (L) 08/21/2023     HEMATOCRIT 38.5 (L) 08/21/2023    PLATELETCT 292 08/21/2023        Total time of the discharge process exceeds 45 minutes.

## 2023-08-21 NOTE — PROGRESS NOTES
4 Eyes Skin Assessment Completed by TATE East and TATE Ashraf.    Head WDL  Ears WDL  Nose WDL  Mouth WDL  Neck WDL  Breast/Chest WDL  Shoulder Blades WDL  Spine WDL  (R) Arm/Elbow/Hand WDL  (L) Arm/Elbow/Hand WDL  Abdomen WDL  Groin WDL  Scrotum/Coccyx/Buttocks WDL  (R) Leg WDL  (L) Leg Scar  (R) Heel/Foot/Toe WDL  (L) Heel/Foot/Toe WDL          Devices In Places Blood Pressure Cuff      Interventions In Place N/A    Possible Skin Injury No    Pictures Uploaded Into Epic N/A  Wound Consult Placed N/A  RN Wound Prevention Protocol Ordered No

## 2023-08-21 NOTE — CARE PLAN
The patient is Stable - Low risk of patient condition declining or worsening    Shift Goals  Clinical Goals: pain management, nausea management, IV fluids  Patient Goals: pain control, rest  Family Goals: LACY    Progress made toward(s) clinical / shift goals:    Problem: Pain - Standard  Goal: Alleviation of pain or a reduction in pain to the patient’s comfort goal  Outcome: Progressing  Note: Patient met pain goals with PRN medications ordered. Educated patient on non-pharmacological interventions.        Problem: Knowledge Deficit - Standard  Goal: Patient and family/care givers will demonstrate understanding of plan of care, disease process/condition, diagnostic tests and medications  Outcome: Progressing  Note: Patient verbalizes understanding of plan of care.         Patient is not progressing towards the following goals:

## 2023-08-22 LAB
BACTERIA UR CULT: NORMAL
SIGNIFICANT IND 70042: NORMAL
SITE SITE: NORMAL
SOURCE SOURCE: NORMAL

## 2024-06-25 ENCOUNTER — HOSPITAL ENCOUNTER (EMERGENCY)
Facility: MEDICAL CENTER | Age: 34
End: 2024-06-25
Attending: STUDENT IN AN ORGANIZED HEALTH CARE EDUCATION/TRAINING PROGRAM
Payer: MEDICAID

## 2024-06-25 ENCOUNTER — APPOINTMENT (OUTPATIENT)
Dept: RADIOLOGY | Facility: MEDICAL CENTER | Age: 34
End: 2024-06-25
Attending: STUDENT IN AN ORGANIZED HEALTH CARE EDUCATION/TRAINING PROGRAM
Payer: MEDICAID

## 2024-06-25 ENCOUNTER — PHARMACY VISIT (OUTPATIENT)
Dept: PHARMACY | Facility: MEDICAL CENTER | Age: 34
End: 2024-06-25
Payer: COMMERCIAL

## 2024-06-25 VITALS
RESPIRATION RATE: 18 BRPM | OXYGEN SATURATION: 92 % | HEIGHT: 67 IN | WEIGHT: 140 LBS | BODY MASS INDEX: 21.97 KG/M2 | SYSTOLIC BLOOD PRESSURE: 122 MMHG | HEART RATE: 90 BPM | TEMPERATURE: 98.2 F | DIASTOLIC BLOOD PRESSURE: 78 MMHG

## 2024-06-25 DIAGNOSIS — R10.2 ABDOMINAL PAIN, SUPRAPUBIC: ICD-10-CM

## 2024-06-25 DIAGNOSIS — R10.9 FLANK PAIN: ICD-10-CM

## 2024-06-25 LAB
ALBUMIN SERPL BCP-MCNC: 4.3 G/DL (ref 3.2–4.9)
ALBUMIN/GLOB SERPL: 1.1 G/DL
ALP SERPL-CCNC: 136 U/L (ref 30–99)
ALT SERPL-CCNC: 17 U/L (ref 2–50)
ANION GAP SERPL CALC-SCNC: 14 MMOL/L (ref 7–16)
APPEARANCE UR: CLEAR
AST SERPL-CCNC: 21 U/L (ref 12–45)
BACTERIA #/AREA URNS HPF: NEGATIVE /HPF
BASOPHILS # BLD AUTO: 0.4 % (ref 0–1.8)
BASOPHILS # BLD: 0.06 K/UL (ref 0–0.12)
BILIRUB SERPL-MCNC: 0.7 MG/DL (ref 0.1–1.5)
BILIRUB UR QL STRIP.AUTO: NEGATIVE
BUN SERPL-MCNC: 12 MG/DL (ref 8–22)
CALCIUM ALBUM COR SERPL-MCNC: 9.7 MG/DL (ref 8.5–10.5)
CALCIUM SERPL-MCNC: 9.9 MG/DL (ref 8.5–10.5)
CHLORIDE SERPL-SCNC: 101 MMOL/L (ref 96–112)
CO2 SERPL-SCNC: 22 MMOL/L (ref 20–33)
COLOR UR: YELLOW
CREAT SERPL-MCNC: 1.54 MG/DL (ref 0.5–1.4)
EOSINOPHIL # BLD AUTO: 0.23 K/UL (ref 0–0.51)
EOSINOPHIL NFR BLD: 1.6 % (ref 0–6.9)
EPI CELLS #/AREA URNS HPF: NEGATIVE /HPF
ERYTHROCYTE [DISTWIDTH] IN BLOOD BY AUTOMATED COUNT: 44.9 FL (ref 35.9–50)
GFR SERPLBLD CREATININE-BSD FMLA CKD-EPI: 61 ML/MIN/1.73 M 2
GLOBULIN SER CALC-MCNC: 3.9 G/DL (ref 1.9–3.5)
GLUCOSE SERPL-MCNC: 90 MG/DL (ref 65–99)
GLUCOSE UR STRIP.AUTO-MCNC: NEGATIVE MG/DL
HCT VFR BLD AUTO: 47.5 % (ref 42–52)
HGB BLD-MCNC: 15.9 G/DL (ref 14–18)
HYALINE CASTS #/AREA URNS LPF: ABNORMAL /LPF
IMM GRANULOCYTES # BLD AUTO: 0.05 K/UL (ref 0–0.11)
IMM GRANULOCYTES NFR BLD AUTO: 0.3 % (ref 0–0.9)
KETONES UR STRIP.AUTO-MCNC: NEGATIVE MG/DL
LEUKOCYTE ESTERASE UR QL STRIP.AUTO: NEGATIVE
LYMPHOCYTES # BLD AUTO: 1.73 K/UL (ref 1–4.8)
LYMPHOCYTES NFR BLD: 11.8 % (ref 22–41)
MCH RBC QN AUTO: 30.8 PG (ref 27–33)
MCHC RBC AUTO-ENTMCNC: 33.5 G/DL (ref 32.3–36.5)
MCV RBC AUTO: 92.1 FL (ref 81.4–97.8)
MICRO URNS: ABNORMAL
MONOCYTES # BLD AUTO: 1.19 K/UL (ref 0–0.85)
MONOCYTES NFR BLD AUTO: 8.1 % (ref 0–13.4)
NEUTROPHILS # BLD AUTO: 11.44 K/UL (ref 1.82–7.42)
NEUTROPHILS NFR BLD: 77.8 % (ref 44–72)
NITRITE UR QL STRIP.AUTO: NEGATIVE
NRBC # BLD AUTO: 0 K/UL
NRBC BLD-RTO: 0 /100 WBC (ref 0–0.2)
PH UR STRIP.AUTO: 8 [PH] (ref 5–8)
PLATELET # BLD AUTO: 311 K/UL (ref 164–446)
PMV BLD AUTO: 9.7 FL (ref 9–12.9)
POTASSIUM SERPL-SCNC: 4.3 MMOL/L (ref 3.6–5.5)
PROT SERPL-MCNC: 8.2 G/DL (ref 6–8.2)
PROT UR QL STRIP: 30 MG/DL
RBC # BLD AUTO: 5.16 M/UL (ref 4.7–6.1)
RBC # URNS HPF: ABNORMAL /HPF
RBC UR QL AUTO: ABNORMAL
SODIUM SERPL-SCNC: 137 MMOL/L (ref 135–145)
SP GR UR STRIP.AUTO: 1.02
UROBILINOGEN UR STRIP.AUTO-MCNC: 0.2 MG/DL
WBC # BLD AUTO: 14.7 K/UL (ref 4.8–10.8)
WBC #/AREA URNS HPF: ABNORMAL /HPF

## 2024-06-25 PROCEDURE — 700105 HCHG RX REV CODE 258: Mod: UD | Performed by: STUDENT IN AN ORGANIZED HEALTH CARE EDUCATION/TRAINING PROGRAM

## 2024-06-25 PROCEDURE — 96374 THER/PROPH/DIAG INJ IV PUSH: CPT

## 2024-06-25 PROCEDURE — 700102 HCHG RX REV CODE 250 W/ 637 OVERRIDE(OP): Mod: UD | Performed by: STUDENT IN AN ORGANIZED HEALTH CARE EDUCATION/TRAINING PROGRAM

## 2024-06-25 PROCEDURE — RXMED WILLOW AMBULATORY MEDICATION CHARGE: Performed by: STUDENT IN AN ORGANIZED HEALTH CARE EDUCATION/TRAINING PROGRAM

## 2024-06-25 PROCEDURE — 99284 EMERGENCY DEPT VISIT MOD MDM: CPT

## 2024-06-25 PROCEDURE — 85025 COMPLETE CBC W/AUTO DIFF WBC: CPT

## 2024-06-25 PROCEDURE — 96375 TX/PRO/DX INJ NEW DRUG ADDON: CPT

## 2024-06-25 PROCEDURE — 80053 COMPREHEN METABOLIC PANEL: CPT

## 2024-06-25 PROCEDURE — 74176 CT ABD & PELVIS W/O CONTRAST: CPT

## 2024-06-25 PROCEDURE — 81001 URINALYSIS AUTO W/SCOPE: CPT

## 2024-06-25 PROCEDURE — A9270 NON-COVERED ITEM OR SERVICE: HCPCS | Mod: UD | Performed by: STUDENT IN AN ORGANIZED HEALTH CARE EDUCATION/TRAINING PROGRAM

## 2024-06-25 PROCEDURE — 36415 COLL VENOUS BLD VENIPUNCTURE: CPT

## 2024-06-25 PROCEDURE — 700111 HCHG RX REV CODE 636 W/ 250 OVERRIDE (IP): Mod: JZ,UD | Performed by: STUDENT IN AN ORGANIZED HEALTH CARE EDUCATION/TRAINING PROGRAM

## 2024-06-25 RX ORDER — KETOROLAC TROMETHAMINE 15 MG/ML
15 INJECTION, SOLUTION INTRAMUSCULAR; INTRAVENOUS ONCE
Status: COMPLETED | OUTPATIENT
Start: 2024-06-25 | End: 2024-06-25

## 2024-06-25 RX ORDER — PHENAZOPYRIDINE HYDROCHLORIDE 200 MG/1
200 TABLET, FILM COATED ORAL 3 TIMES DAILY PRN
Qty: 6 TABLET | Refills: 0 | Status: SHIPPED | OUTPATIENT
Start: 2024-06-25

## 2024-06-25 RX ORDER — MORPHINE SULFATE 4 MG/ML
4 INJECTION INTRAVENOUS ONCE
Status: COMPLETED | OUTPATIENT
Start: 2024-06-25 | End: 2024-06-25

## 2024-06-25 RX ORDER — PHENAZOPYRIDINE HYDROCHLORIDE 200 MG/1
100 TABLET, FILM COATED ORAL ONCE
Status: COMPLETED | OUTPATIENT
Start: 2024-06-25 | End: 2024-06-25

## 2024-06-25 RX ORDER — SODIUM CHLORIDE, SODIUM LACTATE, POTASSIUM CHLORIDE, CALCIUM CHLORIDE 600; 310; 30; 20 MG/100ML; MG/100ML; MG/100ML; MG/100ML
1000 INJECTION, SOLUTION INTRAVENOUS ONCE
Status: COMPLETED | OUTPATIENT
Start: 2024-06-25 | End: 2024-06-25

## 2024-06-25 RX ADMIN — SODIUM CHLORIDE, POTASSIUM CHLORIDE, SODIUM LACTATE AND CALCIUM CHLORIDE 1000 ML: 600; 310; 30; 20 INJECTION, SOLUTION INTRAVENOUS at 17:49

## 2024-06-25 RX ADMIN — KETOROLAC TROMETHAMINE 15 MG: 15 INJECTION, SOLUTION INTRAMUSCULAR; INTRAVENOUS at 17:43

## 2024-06-25 RX ADMIN — MORPHINE SULFATE 4 MG: 4 INJECTION INTRAVENOUS at 17:43

## 2024-06-25 RX ADMIN — PHENAZOPYRIDINE 100 MG: 200 TABLET ORAL at 17:43

## 2024-06-25 ASSESSMENT — FIBROSIS 4 INDEX: FIB4 SCORE: 0.61

## 2024-06-25 NOTE — ED TRIAGE NOTES
Russell Kapoor Oni  34 y.o. male  Chief Complaint   Patient presents with    Flank Pain     Bilateral flank pain today. Multiple admission related urinary stones. Deneis fever     Pt on wheelchair for above complaint.     Pt is GCS 15, speaking in full sentences, follows commands and responds appropriately to questions. Resp are even and unlabored.       Pt placed in ED lobby. Pt educated on triage process. Pt encouraged to alert staff for any changes.       Vitals:    06/25/24 1630   BP: 129/82   Pulse: (!) 111   Resp: (!) 22   Temp: 37.3 °C (99.2 °F)   SpO2: 97%

## 2024-06-26 NOTE — ED PROVIDER NOTES
ED Provider Note    CHIEF COMPLAINT  Chief Complaint   Patient presents with    Flank Pain     Bilateral flank pain today. Multiple admission related urinary stones. Deneis fever       EXTERNAL RECORDS REVIEWED  Inpatient Notes patient was discharged from the hospital 8/21/2023 after presenting with intractable abdominal pain in the setting of recurrent nephrolithiasis and suprapubic pain.  Patient also has recently been admitted for ureterolithiasis and UTI due to Enterococcus faecalis on linezolid.  He had left ureteral stent placement 8/10/2023 with removal 8/15/2023.  Was later found to have an ARAM due to postobstructive uropathy.  During this admission urology was consulted and believed symptoms were related to ureteral edema from recently removed stent and he was treated with a steroid Dosepak, ARAM resolved and he was discharged on continued steroids and potassium citrate    HPI/ROS  LIMITATION TO HISTORY   Select: : None      Russell Bunn is a 34 y.o. male who presents to the emergency department for evaluation of bilateral flank pain and suprapubic abdominal pain that started this morning.  He states this feels very similar to prior episodes of kidney stones and urinary infection.  He reports it does burn when he pees and that just started recently.  He denies nausea, vomiting, diarrhea, fevers or chills.  He states he has passed very small kidney stones since he was last admitted to the hospital but has not had severe pain similar to this.    PAST MEDICAL HISTORY   has a past medical history of Kidney stones and Seizure disorder (HCC).    SURGICAL HISTORY   has a past surgical history that includes lithotripsy; cysto/uretero/pyeloscopy, dx (Right, 3/13/2020); cystoscopy,insert ureteral stent (Right, 3/13/2020); lasertripsy (Right, 3/13/2020); cystoscopy,insert ureteral stent (Left, 4/26/2021); cysto/uretero/pyeloscopy, dx (Left, 4/26/2021); cystoscopy (Left, 02/20/2022); cystoscopy,insert  "ureteral stent (Left, 2/20/2022); cysto/uretero/pyeloscopy, dx (Left, 2/20/2022); lasertripsy (Left, 2/20/2022); cysto stent placemnt pre surg (Right, 4/24/2023); cystoscopy,insert ureteral stent (Left, 8/10/2023); and cysto/uretero/pyeloscopy, dx (Left, 8/10/2023).    FAMILY HISTORY  No family history on file.    SOCIAL HISTORY  Social History     Tobacco Use    Smoking status: Former     Current packs/day: 0.50     Types: Cigarettes    Smokeless tobacco: Never   Vaping Use    Vaping status: Never Used   Substance and Sexual Activity    Alcohol use: Yes     Comment: Occasional    Drug use: Yes     Types: Inhaled     Comment: Marijuana, every day    Sexual activity: Not on file       CURRENT MEDICATIONS  Home Medications       Reviewed by Jean Chun R.N. (Registered Nurse) on 06/25/24 at 1646  Med List Status: Partial     Medication Last Dose Status   tamsulosin (FLOMAX) 0.4 MG capsule  Active                  Audit from Redirected Encounters    **Home medications have not yet been reviewed for this encounter**         ALLERGIES  Allergies   Allergen Reactions    Kiwi Extract Anaphylaxis    Shellfish Allergy Anaphylaxis    Ampicillin-Sulbactam Sodium Rash and Itching     Itching and rash with IV unasyn 8/17/2023    Cefazolin Rash and Itching     Rash  Tolerated ceftriaxone (April 2021)    Dilaudid [Hydromorphone] Itching    Hydrocodone Itching    Oxycodone Itching    Potassium Iodide Itching     Severe itching    Tape Unspecified     Paper tape okay        PHYSICAL EXAM  VITAL SIGNS: /86   Pulse (!) 101   Temp 37.3 °C (99.2 °F) (Temporal)   Resp 16   Ht 1.702 m (5' 7\")   Wt 63.5 kg (140 lb)   SpO2 95%   BMI 21.93 kg/m²    Constitutional: Anxious, tearful, clutching flank, very uncomfortable appearing  HEENT: Atraumatic, normocephalic, pupils are equal round reactive to light, nose normal, mouth shows moist mucous membranes  Neck: Supple, no JVD, no tracheal deviation  Cardiovascular: Tachycardic, " regular rhythm, no murmur, rub or gallop, 2+ pulses peripherally x4  Thorax & Lungs: No respiratory distress, no wheezes, rales or rhonchi, no chest wall tenderness.  GI: Soft, non-distended, suprapubic abdominal tenderness without guarding, no rebound, bilateral CVA tenderness  Skin: Warm, dry, no acute rash or lesion  Musculoskeletal: Moving all extremities, no acute deformity, no edema, no tenderness  Neurologic: A&Ox3, at baseline mentation, no focal deficits  Psychiatric: Very anxious          EKG/LABS  Labs Reviewed   CBC WITH DIFFERENTIAL - Abnormal; Notable for the following components:       Result Value    WBC 14.7 (*)     Neutrophils-Polys 77.80 (*)     Lymphocytes 11.80 (*)     Neutrophils (Absolute) 11.44 (*)     Monos (Absolute) 1.19 (*)     All other components within normal limits   COMP METABOLIC PANEL - Abnormal; Notable for the following components:    Creatinine 1.54 (*)     Alkaline Phosphatase 136 (*)     Globulin 3.9 (*)     All other components within normal limits   URINALYSIS,CULTURE IF INDICATED - Abnormal; Notable for the following components:    Protein 30 (*)     Occult Blood Trace (*)     All other components within normal limits   URINE MICROSCOPIC (W/UA) - Abnormal; Notable for the following components:    WBC 0-2 (*)     RBC 10-20 (*)     All other components within normal limits   ESTIMATED GFR         RADIOLOGY/PROCEDURES   I have independently interpreted the diagnostic imaging associated with this visit and am waiting the final reading from the radiologist.   My preliminary interpretation is as follows: CT scan reviewed.  No obvious significant hydronephrosis or obstructing kidney stone.    Radiologist interpretation:  CT-RENAL COLIC EVALUATION(A/P W/O)   Final Result      1.  No ureteral stone or hydronephrosis.   2.  Cortical calcifications are seen in both kidneys.   3.  Scarring at the lower pole LEFT kidney.   4.  Stable cystic lesion at the lower pole RIGHT kidney with  punctate mural calcification.   5.  Normal appendix.          COURSE & MEDICAL DECISION MAKING    ASSESSMENT, COURSE AND PLAN    Care Narrative:     Patient with a history of recurrent nephrolithiasis/ureterolithiasis, severe intractable pain associated with such, infected obstructing kidney stones is presenting to the ER today for evaluation of severe bilateral flank pain, suprapubic pain.  Patient is very uncomfortable appearing, tearful, anxious on initial evaluation.  Given history I suspect recurrent passing stone versus recurrent cystitis, pyelonephritis, infected obstructing kidney stone.  Less likely appendicitis versus cholecystitis given no significant right lower quadrant or right upper quadrant tenderness.  Will medicate the patient with ketorolac, Pyridium, morphine and initiate lactated Ringer's infusion.  Will plan for repeat CT scan, labs and urinalysis.    Patient's workup is remarkable for a leukocytosis, mild ARAM, hematuria.  No evidence of UTI.  CT scan without evidence of obstructing ureterolithiasis or hydronephrosis and a normal appendix.  On recheck patient reports significant improvement of his pain after medications and using the restroom.  Suspect he may have passed a small kidney stone in the interim.  I feel he is currently stable for discharge home given his significant symptomatic improvement.  I do feel he that he necessitates primary care and urology follow-up given his significant stone burden, ARAM.  I discussed this with him and provided follow-up information.  Will discharge with a prescription for Pyridium and otherwise encouraged Tylenol and ibuprofen use.  Return precautions discussed all questions answered he was discharged stable condition.    Hydration: Based on the patient's presentation of Acute Kidney Injury the patient was given IV fluids. IV Hydration was used because oral hydration was not adequate alone. Upon recheck following hydration, the patient was improved.           ADDITIONAL PROBLEMS MANAGED  ARAM    DISPOSITION AND DISCUSSIONS  I have discussed management of the patient with the following physicians and RAJAT's: None    Discussion of management with other QHP or appropriate source(s): None     Escalation of care considered, and ultimately not performed:acute inpatient care management, however at this time, the patient is most appropriate for outpatient management    Barriers to care at this time, including but not limited to: Patient does not have established PCP.     Decision tools and prescription drugs considered including, but not limited to: Pain Medications Pyridium, morphine, ketorolac .    FINAL DIAGNOSIS  1. Flank pain    2. Abdominal pain, suprapubic           Electronically signed by: Yossi Raygoza M.D., 6/25/2024 5:14 PM

## 2024-06-26 NOTE — ED NOTES
Pt WC from the lobby to [facility]. Pt placed on the monitor for simple vitals. Agree with triage note. Chart up for ERP.

## 2024-06-26 NOTE — DISCHARGE INSTRUCTIONS
Please call the attached number to schedule follow-up appointment with your urologist.  You can take Pyridium 3 times daily as needed for ongoing pain.    Otherwise you can take Tylenol and ibuprofen every 6 hours.    Patient also establish care with a primary care doctor by calling the attached number.  He should have your lab work rechecked in 2 weeks to ensure your kidney function is returned to normal.

## 2024-07-15 ENCOUNTER — HOSPITAL ENCOUNTER (EMERGENCY)
Facility: MEDICAL CENTER | Age: 34
End: 2024-07-16
Attending: EMERGENCY MEDICINE
Payer: MEDICAID

## 2024-07-15 ENCOUNTER — APPOINTMENT (OUTPATIENT)
Dept: RADIOLOGY | Facility: MEDICAL CENTER | Age: 34
End: 2024-07-15
Attending: EMERGENCY MEDICINE
Payer: MEDICAID

## 2024-07-15 DIAGNOSIS — R50.9 FEVER, UNSPECIFIED FEVER CAUSE: ICD-10-CM

## 2024-07-15 DIAGNOSIS — N39.0 ACUTE UTI: ICD-10-CM

## 2024-07-15 DIAGNOSIS — R51.9 ACUTE NONINTRACTABLE HEADACHE, UNSPECIFIED HEADACHE TYPE: ICD-10-CM

## 2024-07-15 DIAGNOSIS — R10.9 FLANK PAIN: ICD-10-CM

## 2024-07-15 LAB
ALBUMIN SERPL BCP-MCNC: 4.2 G/DL (ref 3.2–4.9)
ALBUMIN/GLOB SERPL: 1.1 G/DL
ALP SERPL-CCNC: 133 U/L (ref 30–99)
ALT SERPL-CCNC: 14 U/L (ref 2–50)
ANION GAP SERPL CALC-SCNC: 13 MMOL/L (ref 7–16)
AST SERPL-CCNC: 21 U/L (ref 12–45)
BASOPHILS # BLD AUTO: 0.2 % (ref 0–1.8)
BASOPHILS # BLD: 0.02 K/UL (ref 0–0.12)
BILIRUB SERPL-MCNC: 0.9 MG/DL (ref 0.1–1.5)
BUN SERPL-MCNC: 13 MG/DL (ref 8–22)
CALCIUM ALBUM COR SERPL-MCNC: 8.8 MG/DL (ref 8.5–10.5)
CALCIUM SERPL-MCNC: 9 MG/DL (ref 8.5–10.5)
CHLORIDE SERPL-SCNC: 101 MMOL/L (ref 96–112)
CO2 SERPL-SCNC: 22 MMOL/L (ref 20–33)
CREAT SERPL-MCNC: 1.29 MG/DL (ref 0.5–1.4)
EOSINOPHIL # BLD AUTO: 0.25 K/UL (ref 0–0.51)
EOSINOPHIL NFR BLD: 2.8 % (ref 0–6.9)
ERYTHROCYTE [DISTWIDTH] IN BLOOD BY AUTOMATED COUNT: 45.6 FL (ref 35.9–50)
GFR SERPLBLD CREATININE-BSD FMLA CKD-EPI: 75 ML/MIN/1.73 M 2
GLOBULIN SER CALC-MCNC: 3.7 G/DL (ref 1.9–3.5)
GLUCOSE SERPL-MCNC: 103 MG/DL (ref 65–99)
HCT VFR BLD AUTO: 48.5 % (ref 42–52)
HGB BLD-MCNC: 15.8 G/DL (ref 14–18)
IMM GRANULOCYTES # BLD AUTO: 0.02 K/UL (ref 0–0.11)
IMM GRANULOCYTES NFR BLD AUTO: 0.2 % (ref 0–0.9)
LIPASE SERPL-CCNC: 55 U/L (ref 11–82)
LYMPHOCYTES # BLD AUTO: 1.05 K/UL (ref 1–4.8)
LYMPHOCYTES NFR BLD: 11.7 % (ref 22–41)
MCH RBC QN AUTO: 30.5 PG (ref 27–33)
MCHC RBC AUTO-ENTMCNC: 32.6 G/DL (ref 32.3–36.5)
MCV RBC AUTO: 93.6 FL (ref 81.4–97.8)
MONOCYTES # BLD AUTO: 0.78 K/UL (ref 0–0.85)
MONOCYTES NFR BLD AUTO: 8.7 % (ref 0–13.4)
NEUTROPHILS # BLD AUTO: 6.86 K/UL (ref 1.82–7.42)
NEUTROPHILS NFR BLD: 76.4 % (ref 44–72)
NRBC # BLD AUTO: 0 K/UL
NRBC BLD-RTO: 0 /100 WBC (ref 0–0.2)
PLATELET # BLD AUTO: 300 K/UL (ref 164–446)
PMV BLD AUTO: 9.5 FL (ref 9–12.9)
POTASSIUM SERPL-SCNC: 3.8 MMOL/L (ref 3.6–5.5)
PROT SERPL-MCNC: 7.9 G/DL (ref 6–8.2)
RBC # BLD AUTO: 5.18 M/UL (ref 4.7–6.1)
SODIUM SERPL-SCNC: 136 MMOL/L (ref 135–145)
WBC # BLD AUTO: 9 K/UL (ref 4.8–10.8)

## 2024-07-15 PROCEDURE — 76775 US EXAM ABDO BACK WALL LIM: CPT

## 2024-07-15 PROCEDURE — 700105 HCHG RX REV CODE 258: Mod: UD | Performed by: EMERGENCY MEDICINE

## 2024-07-15 PROCEDURE — 700111 HCHG RX REV CODE 636 W/ 250 OVERRIDE (IP): Mod: JZ,UD | Performed by: EMERGENCY MEDICINE

## 2024-07-15 PROCEDURE — 36415 COLL VENOUS BLD VENIPUNCTURE: CPT

## 2024-07-15 PROCEDURE — 80053 COMPREHEN METABOLIC PANEL: CPT

## 2024-07-15 PROCEDURE — 85025 COMPLETE CBC W/AUTO DIFF WBC: CPT

## 2024-07-15 PROCEDURE — 96374 THER/PROPH/DIAG INJ IV PUSH: CPT

## 2024-07-15 PROCEDURE — 96375 TX/PRO/DX INJ NEW DRUG ADDON: CPT

## 2024-07-15 PROCEDURE — 81001 URINALYSIS AUTO W/SCOPE: CPT

## 2024-07-15 PROCEDURE — 99284 EMERGENCY DEPT VISIT MOD MDM: CPT

## 2024-07-15 PROCEDURE — 83690 ASSAY OF LIPASE: CPT

## 2024-07-15 RX ORDER — PROCHLORPERAZINE EDISYLATE 5 MG/ML
10 INJECTION INTRAMUSCULAR; INTRAVENOUS ONCE
Status: COMPLETED | OUTPATIENT
Start: 2024-07-15 | End: 2024-07-15

## 2024-07-15 RX ORDER — SODIUM CHLORIDE, SODIUM LACTATE, POTASSIUM CHLORIDE, CALCIUM CHLORIDE 600; 310; 30; 20 MG/100ML; MG/100ML; MG/100ML; MG/100ML
1000 INJECTION, SOLUTION INTRAVENOUS ONCE
Status: COMPLETED | OUTPATIENT
Start: 2024-07-15 | End: 2024-07-16

## 2024-07-15 RX ORDER — KETOROLAC TROMETHAMINE 15 MG/ML
15 INJECTION, SOLUTION INTRAMUSCULAR; INTRAVENOUS ONCE
Status: COMPLETED | OUTPATIENT
Start: 2024-07-15 | End: 2024-07-15

## 2024-07-15 RX ORDER — ONDANSETRON 2 MG/ML
4 INJECTION INTRAMUSCULAR; INTRAVENOUS ONCE
Status: COMPLETED | OUTPATIENT
Start: 2024-07-15 | End: 2024-07-15

## 2024-07-15 RX ADMIN — PROCHLORPERAZINE EDISYLATE 10 MG: 5 INJECTION INTRAMUSCULAR; INTRAVENOUS at 22:56

## 2024-07-15 RX ADMIN — ONDANSETRON 4 MG: 2 INJECTION INTRAMUSCULAR; INTRAVENOUS at 22:34

## 2024-07-15 RX ADMIN — KETOROLAC TROMETHAMINE 15 MG: 15 INJECTION, SOLUTION INTRAMUSCULAR; INTRAVENOUS at 22:35

## 2024-07-15 RX ADMIN — SODIUM CHLORIDE, POTASSIUM CHLORIDE, SODIUM LACTATE AND CALCIUM CHLORIDE 1000 ML: 600; 310; 30; 20 INJECTION, SOLUTION INTRAVENOUS at 22:57

## 2024-07-15 ASSESSMENT — FIBROSIS 4 INDEX: FIB4 SCORE: 0.54

## 2024-07-16 VITALS
OXYGEN SATURATION: 95 % | SYSTOLIC BLOOD PRESSURE: 136 MMHG | DIASTOLIC BLOOD PRESSURE: 82 MMHG | HEART RATE: 71 BPM | BODY MASS INDEX: 25.16 KG/M2 | WEIGHT: 156.53 LBS | HEIGHT: 66 IN | TEMPERATURE: 100.6 F | RESPIRATION RATE: 27 BRPM

## 2024-07-16 LAB
APPEARANCE UR: CLEAR
BACTERIA #/AREA URNS HPF: NEGATIVE /HPF
BILIRUB UR QL STRIP.AUTO: NEGATIVE
COLOR UR: YELLOW
EPI CELLS #/AREA URNS HPF: NEGATIVE /HPF
GLUCOSE UR STRIP.AUTO-MCNC: NEGATIVE MG/DL
HYALINE CASTS #/AREA URNS LPF: ABNORMAL /LPF
KETONES UR STRIP.AUTO-MCNC: NEGATIVE MG/DL
LEUKOCYTE ESTERASE UR QL STRIP.AUTO: ABNORMAL
MICRO URNS: ABNORMAL
NITRITE UR QL STRIP.AUTO: NEGATIVE
PH UR STRIP.AUTO: 7.5 [PH] (ref 5–8)
PROT UR QL STRIP: 30 MG/DL
RBC # URNS HPF: ABNORMAL /HPF
RBC UR QL AUTO: NEGATIVE
SCCMEC + MECA PNL NOSE NAA+PROBE: NEGATIVE
SP GR UR STRIP.AUTO: 1.02
UROBILINOGEN UR STRIP.AUTO-MCNC: 0.2 MG/DL
WBC #/AREA URNS HPF: ABNORMAL /HPF

## 2024-07-16 PROCEDURE — 87641 MR-STAPH DNA AMP PROBE: CPT

## 2024-07-16 RX ORDER — LINEZOLID 600 MG/1
600 TABLET, FILM COATED ORAL 2 TIMES DAILY
Qty: 14 TABLET | Refills: 0 | Status: ACTIVE | OUTPATIENT
Start: 2024-07-16

## 2024-07-16 RX ORDER — LINEZOLID 600 MG/1
600 TABLET, FILM COATED ORAL ONCE
Status: DISCONTINUED | OUTPATIENT
Start: 2024-07-16 | End: 2024-07-16 | Stop reason: HOSPADM

## 2024-07-16 RX ORDER — SULFAMETHOXAZOLE AND TRIMETHOPRIM 800; 160 MG/1; MG/1
1 TABLET ORAL EVERY 12 HOURS
Qty: 14 TABLET | Refills: 0 | Status: ACTIVE | OUTPATIENT
Start: 2024-07-16 | End: 2024-07-16

## 2024-07-16 RX ORDER — SULFAMETHOXAZOLE AND TRIMETHOPRIM 800; 160 MG/1; MG/1
1 TABLET ORAL ONCE
Status: DISCONTINUED | OUTPATIENT
Start: 2024-07-16 | End: 2024-07-16

## 2024-09-04 ENCOUNTER — TELEPHONE (OUTPATIENT)
Dept: HEALTH INFORMATION MANAGEMENT | Facility: OTHER | Age: 34
End: 2024-09-04

## 2024-09-07 ENCOUNTER — HOSPITAL ENCOUNTER (INPATIENT)
Facility: MEDICAL CENTER | Age: 34
LOS: 1 days | DRG: 694 | End: 2024-09-07
Attending: EMERGENCY MEDICINE | Admitting: HOSPITALIST
Payer: MEDICAID

## 2024-09-07 ENCOUNTER — APPOINTMENT (OUTPATIENT)
Dept: RADIOLOGY | Facility: MEDICAL CENTER | Age: 34
DRG: 694 | End: 2024-09-07
Attending: EMERGENCY MEDICINE
Payer: MEDICAID

## 2024-09-07 VITALS
HEART RATE: 82 BPM | DIASTOLIC BLOOD PRESSURE: 100 MMHG | WEIGHT: 156 LBS | BODY MASS INDEX: 25.18 KG/M2 | SYSTOLIC BLOOD PRESSURE: 151 MMHG | TEMPERATURE: 98 F | RESPIRATION RATE: 18 BRPM | OXYGEN SATURATION: 99 %

## 2024-09-07 DIAGNOSIS — N20.0 KIDNEY STONE: ICD-10-CM

## 2024-09-07 DIAGNOSIS — R52 INTRACTABLE PAIN: ICD-10-CM

## 2024-09-07 LAB
ALBUMIN SERPL BCP-MCNC: 3.8 G/DL (ref 3.2–4.9)
ALBUMIN/GLOB SERPL: 1.2 G/DL
ALP SERPL-CCNC: 107 U/L (ref 30–99)
ALT SERPL-CCNC: 11 U/L (ref 2–50)
ANION GAP SERPL CALC-SCNC: 10 MMOL/L (ref 7–16)
APPEARANCE UR: CLEAR
AST SERPL-CCNC: 19 U/L (ref 12–45)
BASOPHILS # BLD AUTO: 0.6 % (ref 0–1.8)
BASOPHILS # BLD: 0.05 K/UL (ref 0–0.12)
BILIRUB SERPL-MCNC: 0.3 MG/DL (ref 0.1–1.5)
BILIRUB UR QL STRIP.AUTO: NEGATIVE
BUN SERPL-MCNC: 13 MG/DL (ref 8–22)
CALCIUM ALBUM COR SERPL-MCNC: 8.7 MG/DL (ref 8.5–10.5)
CALCIUM SERPL-MCNC: 8.5 MG/DL (ref 8.5–10.5)
CHLORIDE SERPL-SCNC: 106 MMOL/L (ref 96–112)
CO2 SERPL-SCNC: 21 MMOL/L (ref 20–33)
COLOR UR: YELLOW
CREAT SERPL-MCNC: 1.13 MG/DL (ref 0.5–1.4)
EOSINOPHIL # BLD AUTO: 0.33 K/UL (ref 0–0.51)
EOSINOPHIL NFR BLD: 3.8 % (ref 0–6.9)
ERYTHROCYTE [DISTWIDTH] IN BLOOD BY AUTOMATED COUNT: 47.2 FL (ref 35.9–50)
GFR SERPLBLD CREATININE-BSD FMLA CKD-EPI: 88 ML/MIN/1.73 M 2
GLOBULIN SER CALC-MCNC: 3.1 G/DL (ref 1.9–3.5)
GLUCOSE SERPL-MCNC: 95 MG/DL (ref 65–99)
GLUCOSE UR STRIP.AUTO-MCNC: NEGATIVE MG/DL
HCT VFR BLD AUTO: 45.7 % (ref 42–52)
HGB BLD-MCNC: 14.7 G/DL (ref 14–18)
IMM GRANULOCYTES # BLD AUTO: 0.02 K/UL (ref 0–0.11)
IMM GRANULOCYTES NFR BLD AUTO: 0.2 % (ref 0–0.9)
KETONES UR STRIP.AUTO-MCNC: NEGATIVE MG/DL
LEUKOCYTE ESTERASE UR QL STRIP.AUTO: NEGATIVE
LIPASE SERPL-CCNC: 53 U/L (ref 11–82)
LYMPHOCYTES # BLD AUTO: 2.85 K/UL (ref 1–4.8)
LYMPHOCYTES NFR BLD: 32.8 % (ref 22–41)
MCH RBC QN AUTO: 31 PG (ref 27–33)
MCHC RBC AUTO-ENTMCNC: 32.2 G/DL (ref 32.3–36.5)
MCV RBC AUTO: 96.4 FL (ref 81.4–97.8)
MICRO URNS: NORMAL
MONOCYTES # BLD AUTO: 0.81 K/UL (ref 0–0.85)
MONOCYTES NFR BLD AUTO: 9.3 % (ref 0–13.4)
NEUTROPHILS # BLD AUTO: 4.62 K/UL (ref 1.82–7.42)
NEUTROPHILS NFR BLD: 53.3 % (ref 44–72)
NITRITE UR QL STRIP.AUTO: NEGATIVE
NRBC # BLD AUTO: 0 K/UL
NRBC BLD-RTO: 0 /100 WBC (ref 0–0.2)
PH UR STRIP.AUTO: 7.5 [PH] (ref 5–8)
PLATELET # BLD AUTO: 256 K/UL (ref 164–446)
PMV BLD AUTO: 8.9 FL (ref 9–12.9)
POTASSIUM SERPL-SCNC: 4.1 MMOL/L (ref 3.6–5.5)
PROT SERPL-MCNC: 6.9 G/DL (ref 6–8.2)
PROT UR QL STRIP: NEGATIVE MG/DL
RBC # BLD AUTO: 4.74 M/UL (ref 4.7–6.1)
RBC UR QL AUTO: NEGATIVE
SODIUM SERPL-SCNC: 137 MMOL/L (ref 135–145)
SP GR UR STRIP.AUTO: 1.01
UROBILINOGEN UR STRIP.AUTO-MCNC: 0.2 MG/DL
WBC # BLD AUTO: 8.7 K/UL (ref 4.8–10.8)

## 2024-09-07 PROCEDURE — 36415 COLL VENOUS BLD VENIPUNCTURE: CPT

## 2024-09-07 PROCEDURE — 85025 COMPLETE CBC W/AUTO DIFF WBC: CPT

## 2024-09-07 PROCEDURE — A9270 NON-COVERED ITEM OR SERVICE: HCPCS | Mod: UD | Performed by: EMERGENCY MEDICINE

## 2024-09-07 PROCEDURE — 96375 TX/PRO/DX INJ NEW DRUG ADDON: CPT

## 2024-09-07 PROCEDURE — 80053 COMPREHEN METABOLIC PANEL: CPT

## 2024-09-07 PROCEDURE — 700105 HCHG RX REV CODE 258: Performed by: HOSPITALIST

## 2024-09-07 PROCEDURE — 99285 EMERGENCY DEPT VISIT HI MDM: CPT

## 2024-09-07 PROCEDURE — 700102 HCHG RX REV CODE 250 W/ 637 OVERRIDE(OP): Mod: UD | Performed by: EMERGENCY MEDICINE

## 2024-09-07 PROCEDURE — 700111 HCHG RX REV CODE 636 W/ 250 OVERRIDE (IP): Mod: JZ,UD | Performed by: EMERGENCY MEDICINE

## 2024-09-07 PROCEDURE — 770006 HCHG ROOM/CARE - MED/SURG/GYN SEMI*

## 2024-09-07 PROCEDURE — 96376 TX/PRO/DX INJ SAME DRUG ADON: CPT

## 2024-09-07 PROCEDURE — 700105 HCHG RX REV CODE 258: Mod: UD | Performed by: EMERGENCY MEDICINE

## 2024-09-07 PROCEDURE — 99222 1ST HOSP IP/OBS MODERATE 55: CPT | Performed by: HOSPITALIST

## 2024-09-07 PROCEDURE — 83690 ASSAY OF LIPASE: CPT

## 2024-09-07 PROCEDURE — 76775 US EXAM ABDO BACK WALL LIM: CPT

## 2024-09-07 PROCEDURE — 81003 URINALYSIS AUTO W/O SCOPE: CPT

## 2024-09-07 PROCEDURE — 96374 THER/PROPH/DIAG INJ IV PUSH: CPT

## 2024-09-07 RX ORDER — SODIUM CHLORIDE, SODIUM LACTATE, POTASSIUM CHLORIDE, CALCIUM CHLORIDE 600; 310; 30; 20 MG/100ML; MG/100ML; MG/100ML; MG/100ML
INJECTION, SOLUTION INTRAVENOUS CONTINUOUS
Status: DISCONTINUED | OUTPATIENT
Start: 2024-09-07 | End: 2024-09-07 | Stop reason: HOSPADM

## 2024-09-07 RX ORDER — PROCHLORPERAZINE EDISYLATE 5 MG/ML
5-10 INJECTION INTRAMUSCULAR; INTRAVENOUS EVERY 4 HOURS PRN
Status: DISCONTINUED | OUTPATIENT
Start: 2024-09-07 | End: 2024-09-07 | Stop reason: HOSPADM

## 2024-09-07 RX ORDER — ONDANSETRON 2 MG/ML
4 INJECTION INTRAMUSCULAR; INTRAVENOUS ONCE
Status: COMPLETED | OUTPATIENT
Start: 2024-09-07 | End: 2024-09-07

## 2024-09-07 RX ORDER — AMOXICILLIN 250 MG
2 CAPSULE ORAL EVERY EVENING
Status: DISCONTINUED | OUTPATIENT
Start: 2024-09-07 | End: 2024-09-07 | Stop reason: HOSPADM

## 2024-09-07 RX ORDER — KETOROLAC TROMETHAMINE 15 MG/ML
15 INJECTION, SOLUTION INTRAMUSCULAR; INTRAVENOUS EVERY 6 HOURS PRN
Status: DISCONTINUED | OUTPATIENT
Start: 2024-09-07 | End: 2024-09-07 | Stop reason: HOSPADM

## 2024-09-07 RX ORDER — MORPHINE SULFATE 4 MG/ML
4 INJECTION INTRAVENOUS ONCE
Status: COMPLETED | OUTPATIENT
Start: 2024-09-07 | End: 2024-09-07

## 2024-09-07 RX ORDER — PROMETHAZINE HYDROCHLORIDE 25 MG/1
12.5-25 TABLET ORAL EVERY 4 HOURS PRN
Status: DISCONTINUED | OUTPATIENT
Start: 2024-09-07 | End: 2024-09-07 | Stop reason: HOSPADM

## 2024-09-07 RX ORDER — SODIUM CHLORIDE 9 MG/ML
1000 INJECTION, SOLUTION INTRAVENOUS ONCE
Status: COMPLETED | OUTPATIENT
Start: 2024-09-07 | End: 2024-09-07

## 2024-09-07 RX ORDER — TAMSULOSIN HYDROCHLORIDE 0.4 MG/1
0.4 CAPSULE ORAL
Status: DISCONTINUED | OUTPATIENT
Start: 2024-09-07 | End: 2024-09-07 | Stop reason: HOSPADM

## 2024-09-07 RX ORDER — ACETAMINOPHEN 325 MG/1
650 TABLET ORAL EVERY 6 HOURS PRN
Status: DISCONTINUED | OUTPATIENT
Start: 2024-09-07 | End: 2024-09-07 | Stop reason: HOSPADM

## 2024-09-07 RX ORDER — KETOROLAC TROMETHAMINE 15 MG/ML
15 INJECTION, SOLUTION INTRAMUSCULAR; INTRAVENOUS ONCE
Status: COMPLETED | OUTPATIENT
Start: 2024-09-07 | End: 2024-09-07

## 2024-09-07 RX ORDER — PROMETHAZINE HYDROCHLORIDE 25 MG/1
12.5-25 SUPPOSITORY RECTAL EVERY 4 HOURS PRN
Status: DISCONTINUED | OUTPATIENT
Start: 2024-09-07 | End: 2024-09-07 | Stop reason: HOSPADM

## 2024-09-07 RX ORDER — TAMSULOSIN HYDROCHLORIDE 0.4 MG/1
0.4 CAPSULE ORAL ONCE
Status: COMPLETED | OUTPATIENT
Start: 2024-09-07 | End: 2024-09-07

## 2024-09-07 RX ORDER — POLYETHYLENE GLYCOL 3350 17 G/17G
1 POWDER, FOR SOLUTION ORAL
Status: DISCONTINUED | OUTPATIENT
Start: 2024-09-07 | End: 2024-09-07 | Stop reason: HOSPADM

## 2024-09-07 RX ORDER — ONDANSETRON 2 MG/ML
4 INJECTION INTRAMUSCULAR; INTRAVENOUS EVERY 4 HOURS PRN
Status: DISCONTINUED | OUTPATIENT
Start: 2024-09-07 | End: 2024-09-07 | Stop reason: HOSPADM

## 2024-09-07 RX ORDER — MORPHINE SULFATE 4 MG/ML
5 INJECTION INTRAVENOUS
Status: DISCONTINUED | OUTPATIENT
Start: 2024-09-07 | End: 2024-09-07 | Stop reason: HOSPADM

## 2024-09-07 RX ORDER — LABETALOL HYDROCHLORIDE 5 MG/ML
10 INJECTION, SOLUTION INTRAVENOUS EVERY 4 HOURS PRN
Status: DISCONTINUED | OUTPATIENT
Start: 2024-09-07 | End: 2024-09-07 | Stop reason: HOSPADM

## 2024-09-07 RX ORDER — ONDANSETRON 4 MG/1
4 TABLET, ORALLY DISINTEGRATING ORAL EVERY 4 HOURS PRN
Status: DISCONTINUED | OUTPATIENT
Start: 2024-09-07 | End: 2024-09-07 | Stop reason: HOSPADM

## 2024-09-07 RX ADMIN — SODIUM CHLORIDE, POTASSIUM CHLORIDE, SODIUM LACTATE AND CALCIUM CHLORIDE: 600; 310; 30; 20 INJECTION, SOLUTION INTRAVENOUS at 16:46

## 2024-09-07 RX ADMIN — MORPHINE SULFATE 4 MG: 4 INJECTION, SOLUTION INTRAMUSCULAR; INTRAVENOUS at 11:35

## 2024-09-07 RX ADMIN — TAMSULOSIN HYDROCHLORIDE 0.4 MG: 0.4 CAPSULE ORAL at 14:59

## 2024-09-07 RX ADMIN — MORPHINE SULFATE 4 MG: 4 INJECTION, SOLUTION INTRAMUSCULAR; INTRAVENOUS at 13:54

## 2024-09-07 RX ADMIN — SODIUM CHLORIDE 1000 ML: 9 INJECTION, SOLUTION INTRAVENOUS at 11:37

## 2024-09-07 RX ADMIN — KETOROLAC TROMETHAMINE 15 MG: 15 INJECTION, SOLUTION INTRAMUSCULAR; INTRAVENOUS at 11:34

## 2024-09-07 RX ADMIN — ONDANSETRON 4 MG: 2 INJECTION INTRAMUSCULAR; INTRAVENOUS at 11:35

## 2024-09-07 SDOH — ECONOMIC STABILITY: TRANSPORTATION INSECURITY
IN THE PAST 12 MONTHS, HAS THE LACK OF TRANSPORTATION KEPT YOU FROM MEDICAL APPOINTMENTS OR FROM GETTING MEDICATIONS?: NO

## 2024-09-07 SDOH — ECONOMIC STABILITY: TRANSPORTATION INSECURITY
IN THE PAST 12 MONTHS, HAS LACK OF RELIABLE TRANSPORTATION KEPT YOU FROM MEDICAL APPOINTMENTS, MEETINGS, WORK OR FROM GETTING THINGS NEEDED FOR DAILY LIVING?: NO

## 2024-09-07 ASSESSMENT — SOCIAL DETERMINANTS OF HEALTH (SDOH)
WITHIN THE LAST YEAR, HAVE YOU BEEN HUMILIATED OR EMOTIONALLY ABUSED IN OTHER WAYS BY YOUR PARTNER OR EX-PARTNER?: NO
WITHIN THE PAST 12 MONTHS, YOU WORRIED THAT YOUR FOOD WOULD RUN OUT BEFORE YOU GOT THE MONEY TO BUY MORE: NEVER TRUE
IN THE PAST 12 MONTHS, HAS THE ELECTRIC, GAS, OIL, OR WATER COMPANY THREATENED TO SHUT OFF SERVICE IN YOUR HOME?: NO
WITHIN THE LAST YEAR, HAVE YOU BEEN KICKED, HIT, SLAPPED, OR OTHERWISE PHYSICALLY HURT BY YOUR PARTNER OR EX-PARTNER?: NO
WITHIN THE LAST YEAR, HAVE TO BEEN RAPED OR FORCED TO HAVE ANY KIND OF SEXUAL ACTIVITY BY YOUR PARTNER OR EX-PARTNER?: NO
WITHIN THE LAST YEAR, HAVE YOU BEEN AFRAID OF YOUR PARTNER OR EX-PARTNER?: NO
WITHIN THE PAST 12 MONTHS, THE FOOD YOU BOUGHT JUST DIDN'T LAST AND YOU DIDN'T HAVE MONEY TO GET MORE: NEVER TRUE

## 2024-09-07 ASSESSMENT — LIFESTYLE VARIABLES
ALCOHOL_USE: NO
HAVE PEOPLE ANNOYED YOU BY CRITICIZING YOUR DRINKING: NO
HAVE YOU EVER FELT YOU SHOULD CUT DOWN ON YOUR DRINKING: NO
DOES PATIENT WANT TO STOP DRINKING: NO
CONSUMPTION TOTAL: NEGATIVE
TOTAL SCORE: 0
EVER FELT BAD OR GUILTY ABOUT YOUR DRINKING: NO
TOTAL SCORE: 0
TOTAL SCORE: 0
HOW MANY TIMES IN THE PAST YEAR HAVE YOU HAD 5 OR MORE DRINKS IN A DAY: 0
ON A TYPICAL DAY WHEN YOU DRINK ALCOHOL HOW MANY DRINKS DO YOU HAVE: 0
AVERAGE NUMBER OF DAYS PER WEEK YOU HAVE A DRINK CONTAINING ALCOHOL: 0
EVER HAD A DRINK FIRST THING IN THE MORNING TO STEADY YOUR NERVES TO GET RID OF A HANGOVER: NO

## 2024-09-07 ASSESSMENT — FIBROSIS 4 INDEX
FIB4 SCORE: 0.62
FIB4 SCORE: 0.74

## 2024-09-07 ASSESSMENT — ENCOUNTER SYMPTOMS
NAUSEA: 0
SHORTNESS OF BREATH: 0
NERVOUS/ANXIOUS: 0
SPEECH CHANGE: 0
SORE THROAT: 0
BACK PAIN: 1
FLANK PAIN: 1
FEVER: 0
DIZZINESS: 0
ABDOMINAL PAIN: 0

## 2024-09-07 ASSESSMENT — PAIN DESCRIPTION - PAIN TYPE
TYPE: ACUTE PAIN
TYPE: ACUTE PAIN

## 2024-09-07 NOTE — H&P
Hospital Medicine History & Physical Note    Date of Service  9/7/2024    Primary Care Physician  Pcp Pt States None    Consultants  urology    Specialist Names: Dr. Carnes    Code Status  Full Code    Chief Complaint  Chief Complaint   Patient presents with    Flank Pain     BIB [facility] from home for left flank pain that started this morning. Per Patient he has had multiple kidney infections in the past. Patient received 100 fentanyl en route with no relief.        History of Presenting Illness  Russell Bunn is a 34 y.o. male with a history of recurrent kidney stones since age 16 requiring multiple surgeries in the past.  He presented 9/7/2024 with left flank plain and concern of recurrent kidney stone.  In the emergency room ultrasound no hydronephrosis mild dilation of the artery ureter.  There is nonobstructive calculi noted in each kidney.  Calcification identified in the prostatic urethra versus adjacent prostate gland.  The patient has been receiving pain medication with Toradol and IV morphine.  On my evaluation he had already received IV fluids as well as Flomax.  Currently on my evaluation of the patient  had showed me he had passed 2 stones in a urinal.  The urine was yellow without any blood.  Patient still complaining of pain.  The emergency room physician has contacted the urologist and if no improvement overnight they are planning for possible scope in the a.m.    I discussed the plan of care with patient.    Review of Systems  Review of Systems   Constitutional:  Negative for fever and malaise/fatigue.   HENT:  Negative for sore throat.    Respiratory:  Negative for shortness of breath.    Cardiovascular:  Negative for leg swelling.   Gastrointestinal:  Negative for abdominal pain and nausea.   Genitourinary:  Positive for flank pain. Negative for dysuria and hematuria.   Musculoskeletal:  Positive for back pain.   Neurological:  Negative for dizziness and speech change.    Psychiatric/Behavioral:  The patient is not nervous/anxious.        Past Medical History   has a past medical history of Kidney stones and Seizure disorder (HCC).    Surgical History   has a past surgical history that includes lithotripsy; pr cysto/uretero/pyeloscopy, dx (Right, 3/13/2020); pr cystoscopy,insert ureteral stent (Right, 3/13/2020); lasertripsy (Right, 3/13/2020); pr cystoscopy,insert ureteral stent (Left, 4/26/2021); pr cysto/uretero/pyeloscopy, dx (Left, 4/26/2021); cystoscopy (Left, 02/20/2022); pr cystoscopy,insert ureteral stent (Left, 2/20/2022); pr cysto/uretero/pyeloscopy, dx (Left, 2/20/2022); lasertripsy (Left, 2/20/2022); cysto stent placemnt pre surg (Right, 4/24/2023); pr cystoscopy,insert ureteral stent (Left, 8/10/2023); and pr cysto/uretero/pyeloscopy, dx (Left, 8/10/2023).     Family History  family history is not on file.   Family history reviewed with patient. There is no family history that is pertinent to the chief complaint.     Social History   reports that he has quit smoking. His smoking use included cigarettes. He has never used smokeless tobacco. He reports current alcohol use. He reports current drug use. Drug: Inhaled.    Allergies  Allergies   Allergen Reactions    Kiwi Extract Anaphylaxis    Shellfish Allergy Anaphylaxis    Ampicillin-Sulbactam Sodium Rash and Itching     Itching and rash with IV unasyn 8/17/2023    Cefazolin Rash and Itching     Rash  Tolerated ceftriaxone (April 2021)    Dilaudid [Hydromorphone] Itching    Hydrocodone Itching    Oxycodone Itching    Potassium Iodide Itching     Severe itching    Tape Unspecified     Paper tape okay        Medications  Prior to Admission Medications   Prescriptions Last Dose Informant Patient Reported? Taking?   linezolid (ZYVOX) 600 MG Tab Never started Patient No No   Sig: Take 1 Tablet by mouth 2 times a day.      Facility-Administered Medications: None       Physical Exam  Temp:  [36.8 °C (98.2 °F)] 36.8 °C (98.2  "°F)  Pulse:  [60-78] 78  Resp:  [16-18] 16  BP: (128-146)/() 146/91  SpO2:  [93 %-97 %] 94 %  Blood Pressure: (!) 141/88   Temperature: 36.8 °C (98.2 °F)   Pulse: 68   Respiration: 16   Pulse Oximetry: 93 %       Physical Exam  Vitals reviewed.   Constitutional:       Appearance: Normal appearance.   HENT:      Head: Normocephalic and atraumatic.      Nose: Nose normal.      Mouth/Throat:      Mouth: Mucous membranes are moist.   Eyes:      Extraocular Movements: Extraocular movements intact.   Cardiovascular:      Rate and Rhythm: Normal rate and regular rhythm.      Pulses: Normal pulses.      Heart sounds: No murmur heard.  Pulmonary:      Effort: Pulmonary effort is normal. No respiratory distress.      Breath sounds: Normal breath sounds.   Abdominal:      Tenderness: There is abdominal tenderness.   Musculoskeletal:      Right lower leg: No edema.      Left lower leg: No edema.   Skin:     General: Skin is dry.   Neurological:      General: No focal deficit present.      Mental Status: He is alert and oriented to person, place, and time.   Psychiatric:         Mood and Affect: Mood normal.         Laboratory:  Recent Labs     09/07/24  1107   WBC 8.7   RBC 4.74   HEMOGLOBIN 14.7   HEMATOCRIT 45.7   MCV 96.4   MCH 31.0   MCHC 32.2*   RDW 47.2   PLATELETCT 256   MPV 8.9*     Recent Labs     09/07/24  1107   SODIUM 137   POTASSIUM 4.1   CHLORIDE 106   CO2 21   GLUCOSE 95   BUN 13   CREATININE 1.13   CALCIUM 8.5     Recent Labs     09/07/24  1107   ALTSGPT 11   ASTSGOT 19   ALKPHOSPHAT 107*   TBILIRUBIN 0.3   LIPASE 53   GLUCOSE 95         No results for input(s): \"NTPROBNP\" in the last 72 hours.      No results for input(s): \"TROPONINT\" in the last 72 hours.    Imaging:  US-RENAL   Final Result      1.  No hydronephrosis.      2.  Mild dilatation of left ureter.      3.  Nonobstructing calculi noted in each kidney.      4.  Calcification identified within the prosthetic urethra versus adjacent prostate " gland.            Assessment/Plan:  Justification for Admission Status  I anticipate this patient will require at least two midnights for appropriate medical management, necessitating inpatient admission because possible urologic surgery tomorrow if not passing kidney stones.      Patient will need a Med/Surg bed on MEDICAL service .  The need is secondary to IV fluids, .    * Nephrolithiasis- (present on admission)  Assessment & Plan  Urology Dr Carnes aware and to see if no improvement  IV fluids  Pain control with IV toradol and morphine  Flomax initiated        VTE prophylaxis: SCDs/TEDs

## 2024-09-07 NOTE — ED PROVIDER NOTES
ED Provider Note    CHIEF COMPLAINT  Chief Complaint   Patient presents with    Flank Pain     BIB [facility] from home for left flank pain that started this morning. Per Patient he has had multiple kidney infections in the past. Patient received 100 fentanyl en route with no relief.        EXTERNAL RECORDS REVIEWED  Inpatient Notes ED note 7/15/24 when the patient was evaluated for headache and flank pain.  He was diagnosed with a UTI and a headache.  He was discharged home on Zyvox.    HPI/ROS  LIMITATION TO HISTORY   Select: : None  OUTSIDE HISTORIAN(S):  None    Russell Bunn is a 34 y.o. male who presents to the emergency department for evaluation of flank pain.  The patient states that he started having left flank and left lower abdominal pain earlier this morning.  He has a significant past medical history of recurrent kidney stones and has had multiple procedures.  His first kidney stone was when he was 16 years old.  He states that this feels similarly to when he had kidney stones in the past.  He does admit to some dysuria but denies hematuria.  He admits to nausea but has not vomited.  He denies any fevers.  He denies any chest pain or shortness of breath.    PAST MEDICAL HISTORY   has a past medical history of Kidney stones and Seizure disorder (HCC).    SURGICAL HISTORY   has a past surgical history that includes lithotripsy; cysto/uretero/pyeloscopy, dx (Right, 3/13/2020); cystoscopy,insert ureteral stent (Right, 3/13/2020); lasertripsy (Right, 3/13/2020); cystoscopy,insert ureteral stent (Left, 4/26/2021); cysto/uretero/pyeloscopy, dx (Left, 4/26/2021); cystoscopy (Left, 02/20/2022); cystoscopy,insert ureteral stent (Left, 2/20/2022); cysto/uretero/pyeloscopy, dx (Left, 2/20/2022); lasertripsy (Left, 2/20/2022); cysto stent placemnt pre surg (Right, 4/24/2023); cystoscopy,insert ureteral stent (Left, 8/10/2023); and cysto/uretero/pyeloscopy, dx (Left, 8/10/2023).    FAMILY HISTORY  History  reviewed. No pertinent family history.    SOCIAL HISTORY  Social History     Tobacco Use    Smoking status: Former     Current packs/day: 0.50     Types: Cigarettes    Smokeless tobacco: Never   Vaping Use    Vaping status: Never Used   Substance and Sexual Activity    Alcohol use: Yes     Comment: Occasional    Drug use: Yes     Types: Inhaled     Comment: Marijuana, every day    Sexual activity: Not on file       CURRENT MEDICATIONS  Home Medications       Reviewed by Fazal Aponte (Pharmacy Tech) on 09/07/24 at 1502  Med List Status: Complete     Medication Last Dose Status   linezolid (ZYVOX) 600 MG Tab Never started Active                  Audit from Redirected Encounters    **Home medications have not yet been reviewed for this encounter**         ALLERGIES  Allergies   Allergen Reactions    Kiwi Extract Anaphylaxis    Shellfish Allergy Anaphylaxis    Ampicillin-Sulbactam Sodium Rash and Itching     Itching and rash with IV unasyn 8/17/2023    Cefazolin Rash and Itching     Rash  Tolerated ceftriaxone (April 2021)    Dilaudid [Hydromorphone] Itching    Hydrocodone Itching    Oxycodone Itching    Potassium Iodide Itching     Severe itching    Tape Unspecified     Paper tape okay        PHYSICAL EXAM  VITAL SIGNS: /88   Pulse 74   Temp 36.1 °C (97 °F) (Temporal)   Resp 17   Wt 70.8 kg (156 lb)   SpO2 98%   BMI 25.18 kg/m²   Constitutional: Alert but appears uncomfortable.  HENT: Normocephalic atraumatic. Bilateral external ears normal. Nose normal. Mucous membranes are moist.  Eyes: Pupils are equal and reactive. Conjunctiva normal. Non-icteric sclera.   Neck: Normal range of motion without tenderness. Supple. No meningeal signs.  Cardiovascular: Regular rate and rhythm. No murmurs, gallops or rubs.  Thorax & Lungs: No increased work of breathing. Breath sounds are clear to auscultation bilaterally. No wheezing, rhonchi or rales.  Abdomen: Soft, nontender and nondistended.  Skin: Warm and  dry. No rashes are noted.  Back: No bony tenderness, No CVA tenderness.   Extremities: 2+ peripheral pulses. Cap refill is less than 2 seconds. No edema, cyanosis, or clubbing.  Musculoskeletal: Good range of motion in all major joints. No tenderness to palpation or major deformities noted.   Neurologic: Alert and oriented x 3. The patient moves all 4 extremities without obvious deficits.    LABS  Results for orders placed or performed during the hospital encounter of 09/07/24   CBC WITH DIFFERENTIAL   Result Value Ref Range    WBC 8.7 4.8 - 10.8 K/uL    RBC 4.74 4.70 - 6.10 M/uL    Hemoglobin 14.7 14.0 - 18.0 g/dL    Hematocrit 45.7 42.0 - 52.0 %    MCV 96.4 81.4 - 97.8 fL    MCH 31.0 27.0 - 33.0 pg    MCHC 32.2 (L) 32.3 - 36.5 g/dL    RDW 47.2 35.9 - 50.0 fL    Platelet Count 256 164 - 446 K/uL    MPV 8.9 (L) 9.0 - 12.9 fL    Neutrophils-Polys 53.30 44.00 - 72.00 %    Lymphocytes 32.80 22.00 - 41.00 %    Monocytes 9.30 0.00 - 13.40 %    Eosinophils 3.80 0.00 - 6.90 %    Basophils 0.60 0.00 - 1.80 %    Immature Granulocytes 0.20 0.00 - 0.90 %    Nucleated RBC 0.00 0.00 - 0.20 /100 WBC    Neutrophils (Absolute) 4.62 1.82 - 7.42 K/uL    Lymphs (Absolute) 2.85 1.00 - 4.80 K/uL    Monos (Absolute) 0.81 0.00 - 0.85 K/uL    Eos (Absolute) 0.33 0.00 - 0.51 K/uL    Baso (Absolute) 0.05 0.00 - 0.12 K/uL    Immature Granulocytes (abs) 0.02 0.00 - 0.11 K/uL    NRBC (Absolute) 0.00 K/uL   COMP METABOLIC PANEL   Result Value Ref Range    Sodium 137 135 - 145 mmol/L    Potassium 4.1 3.6 - 5.5 mmol/L    Chloride 106 96 - 112 mmol/L    Co2 21 20 - 33 mmol/L    Anion Gap 10.0 7.0 - 16.0    Glucose 95 65 - 99 mg/dL    Bun 13 8 - 22 mg/dL    Creatinine 1.13 0.50 - 1.40 mg/dL    Calcium 8.5 8.5 - 10.5 mg/dL    Correct Calcium 8.7 8.5 - 10.5 mg/dL    AST(SGOT) 19 12 - 45 U/L    ALT(SGPT) 11 2 - 50 U/L    Alkaline Phosphatase 107 (H) 30 - 99 U/L    Total Bilirubin 0.3 0.1 - 1.5 mg/dL    Albumin 3.8 3.2 - 4.9 g/dL    Total Protein 6.9  6.0 - 8.2 g/dL    Globulin 3.1 1.9 - 3.5 g/dL    A-G Ratio 1.2 g/dL   LIPASE   Result Value Ref Range    Lipase 53 11 - 82 U/L   URINALYSIS    Specimen: Urine   Result Value Ref Range    Color Yellow     Character Clear     Specific Gravity 1.014 <1.035    Ph 7.5 5.0 - 8.0    Glucose Negative Negative mg/dL    Ketones Negative Negative mg/dL    Protein Negative Negative mg/dL    Bilirubin Negative Negative    Urobilinogen, Urine 0.2 Negative    Nitrite Negative Negative    Leukocyte Esterase Negative Negative    Occult Blood Negative Negative    Micro Urine Req see below    ESTIMATED GFR   Result Value Ref Range    GFR (CKD-EPI) 88 >60 mL/min/1.73 m 2     RADIOLOGY/PROCEDURES   I have independently interpreted the diagnostic imaging associated with this visit and am waiting the final reading from the radiologist.   My preliminary interpretation is as follows: No obvious hydro noted    Radiologist interpretation:  US-RENAL   Final Result      1.  No hydronephrosis.      2.  Mild dilatation of left ureter.      3.  Nonobstructing calculi noted in each kidney.      4.  Calcification identified within the prosthetic urethra versus adjacent prostate gland.        COURSE & MEDICAL DECISION MAKING    ASSESSMENT, COURSE AND PLAN  Care Narrative: This is a 34-year-old male presenting to the emergency department for evaluation of flank pain.  On initial evaluation, the patient was noted to be hypertensive.  The remainder of his vital signs are reassuring.  The patient did appear uncomfortable.  He had no abdominal tenderness and no CVA tenderness.    I reviewed his medical records and noted that he has had multiple CTs.  He states that he was first diagnosed with kidney stones in [place] when he was 16.  I suspect he had more there as well.  The plan was made to obtain an ultrasound and labs to avoid additional radiation at this point.  He was treated with IV fluids, morphine, and Toradol.    Urinalysis was clean with no  evidence of bladder infection.  White count was normal as well.  The patient has not had a fever.  I am less concerned for infectious component at this point.  Renal function was normal.    Ultrasound was notable for mild dilatation of the left ureter and a calcification within the prostatic urethra versus adjacent prostate gland.    Upon reevaluation, the patient states that he still feels that he has not passed the stone.  He is asking for more pain medication.  The plan was made to admit for pain control.    2:47 PM - I discussed the case with Dr Cramer, urology.  He agreed with the plan for admission to the hospitalist and will consult.    3:01 PM - I discussed the case with Dr Kearney, hospitalist.  He agreed with the plan and accepted the patient.    Hydration: Based on the patient's presentation of Dehydration the patient was given IV fluids. IV Hydration was used because oral hydration was not adequate alone. Upon recheck following hydration, the patient was improved.    ADDITIONAL PROBLEMS MANAGED  Kidney stone, intractable pain    DISPOSITION AND DISCUSSIONS  I have discussed management of the patient with the following physicians and RAJAT's: Dr. Cramer, urology, Dr. Kearney, hospitalist    Discussion of management with other [facility] or appropriate source(s): None     FINAL IMPRESSION  1. Kidney stone    2. Intractable pain      -ADMIT-     Electronically signed by: Jaqueline Montero D.O., 9/7/2024 11:09 AM

## 2024-09-07 NOTE — ED TRIAGE NOTES
Chief Complaint   Patient presents with    Flank Pain     BIB [facility] from home for left flank pain that started this morning. Per Patient he has had multiple kidney infections in the past. Patient received 100 fentanyl en route with no relief.      Vitals:    09/07/24 1057   BP: (!) 132/101   Pulse: 65   Resp: 18   Temp: 36.8 °C (98.2 °F)   SpO2: 97%

## 2024-09-07 NOTE — ED NOTES
Med rec is complete per patient at bedside  Outpatient antibiotics within the last 30 days: NONE. Had prescription for Linezolid 600mg BID from 7/16/24 but states he never picked it up.  Anticoagulants: NONE  Patient's home pharmacy - [facility] ([phone])    Elvia Her, PhT

## 2024-09-07 NOTE — ASSESSMENT & PLAN NOTE
Urology Dr Carnes aware and to see if no improvement  IV fluids  Pain control with IV toradol and morphine  Flomax initiated

## 2024-09-08 NOTE — PROGRESS NOTES
4 Eyes Skin Assessment Completed by Radha RN and TATE Wylie.    Head WDL  Ears WDL  Nose WDL  Mouth WDL  Neck WDL  Breast/Chest WDL  Shoulder Blades WDL  Spine WDL  (R) Arm/Elbow/Hand WDL  (L) Arm/Elbow/Hand WDL  Abdomen Scar scars on flanks bilaterally  Groin WDL  Scrotum/Coccyx/Buttocks WDL  (R) Leg WDL  (L) Leg WDL  (R) Heel/Foot/Toe WDL  (L) Heel/Foot/Toe WDL          Devices In Places N/A      Interventions In Place Pillows    Possible Skin Injury No    Pictures Uploaded Into Epic N/A  Wound Consult Placed N/A  RN Wound Prevention Protocol Ordered No

## 2024-09-08 NOTE — PROGRESS NOTES
Received report and assumed care at shift change. A&O x 4, no complain of pain or distress. During hand off patient stated he will be leaving as he no longer have pain and is voiding well. This writer with day shift RN advised patient that there is no discharge orders and will have to wait til morning to discuss with MD. Patient stated he is very adamant that he will need to leave tonight as he is a single father and there is no one to care for his 2 year old son.  Patient left unit at 2045, PIV discontinued. Notified admitting MD, Dr. Kearney.

## 2024-09-08 NOTE — CONSULTS
UROLOGY Consult Note:    MINNIE Alan  Date & Time note created:    9/7/2024   5:42 PM       Patient ID:   Name:             Russell Bunn   YOB: 1990  Age:                 34 y.o.  male   MRN:               [id number]                                                             Reason for Consult:      History of kidney stones, flank pain.    History of Present Illness:    This is a 33 yo Male known to [facility] as he has a long history of nephrolithiasis, and previously had management with ureteroscopy with laser lithotripsy; he is s/p left CULTS 08/10/2023.  He presented to the ED with severe left flank pain.  During workup, imaging was obtained which showed mild dilatation of the left ureter, bilateral non obstructing renal stones, and calcification in prostate. His urinalysis is not suggestive of a UTI. Creatinine 1.13. At the time of the consult, he reports some persistent left flank pain, but improved since admission.  He denies abdominal pain, dysuria, frequency, urgency. Denies fevers, chills, nausea, vomiting.     Review of Systems:      Constitutional: See HPI  Eyes: Denies changes in vision, no eye pain  Ears/Nose/Throat/Mouth: Denies nasal congestion or sore throat   Cardiovascular: no chest pain, no palpitations   Respiratory: no shortness of breath , Denies cough  Gastrointestinal/Hepatic: See HPI  Genitourinary: See HPI  Musculoskeletal/Rheum: Denies  joint pain and swelling, no edema  Skin: Denies rash  Neurological: Denies headache, confusion, memory loss or focal weakness/parasthesias  Psychiatric: denies mood disorder   Endocrine: Luana thyroid problems  Heme/Oncology/Lymph Nodes: Denies enlarged lymph nodes, denies brusing or known bleeding disorder  All other systems were reviewed and are negative (AMA/CMS criteria)                Past Medical History:   Past Medical History:   Diagnosis Date    Kidney stones     multiple times requiring multiple  surgeries    Seizure disorder (HCC)      Active Hospital Problems    Diagnosis     Nephrolithiasis [N20.0]        Past Surgical History:  Past Surgical History:   Procedure Laterality Date    MS CYSTOSCOPY,INSERT URETERAL STENT Left 8/10/2023    Procedure: CYSTOSCOPY, WITH URETERAL STENT INSERTION;  Surgeon: Rei Silver M.D.;  Location: [facility];  Service: Urology    MS CYSTO/URETERO/PYELOSCOPY, DX Left 8/10/2023    Procedure: URETEROSCOPY;  Surgeon: Rei Silver M.D.;  Location: [facility];  Service: Urology    CYSTO STENT PLACEMNT PRE SURG Right 4/24/2023    Procedure: CYSTOSCOPY, WITH RIGHT URETERAL STENT INSERTION;  Surgeon: Dante Stephenson M.D.;  Location: [facility];  Service: Urology    CYSTOSCOPY Left 02/20/2022    Cysto ureteroscopy ,lithotripsy, stone basket, ureteral stent placement, Dr Silver    MS CYSTOSCOPY,INSERT URETERAL STENT Left 2/20/2022    Procedure: CYSTOSCOPY, WITH URETERAL STENT INSERTION;  Surgeon: Rei Silver M.D.;  Location: [facility];  Service: Urology    MS CYSTO/URETERO/PYELOSCOPY, DX Left 2/20/2022    Procedure: URETEROSCOPY;  Surgeon: Rei Silver M.D.;  Location: [facility];  Service: Urology    LASERTRIPSY Left 2/20/2022    Procedure: LITHOTRIPSY, USING LASER;  Surgeon: Rei Silver M.D.;  Location: [facility];  Service: Urology    MS CYSTOSCOPY,INSERT URETERAL STENT Left 4/26/2021    Procedure: CYSTOSCOPY, WITH OUT URETERAL STENT INSERTION;  Surgeon: Raf Moss M.D.;  Location: [facility];  Service: Urology    MS CYSTO/URETERO/PYELOSCOPY, DX Left 4/26/2021    Procedure: URETEROSCOPY;  Surgeon: Raf Moss M.D.;  Location: [facility];  Service: Urology    MS CYSTO/URETERO/PYELOSCOPY, DX Right 3/13/2020    Procedure: URETEROSCOPY;  Surgeon: Chase Damon M.D.;  Location: [facility];  Service: Urology    MS CYSTOSCOPY,INSERT URETERAL STENT Right 3/13/2020     Procedure: CYSTOSCOPY;  Surgeon: Chase Damon M.D.;  Location: [facility];  Service: Urology    LASERTRIPSY Right 3/13/2020    Procedure: LITHOTRIPSY, USING LASER;  Surgeon: Chase Damon M.D.;  Location: [facility];  Service: Urology    LITHOTRIPSY         [facility] Medications:    Current Facility-Administered Medications:     lactated ringers infusion, , Intravenous, Continuous, Chase Kearney D.O., Last Rate: 150 mL/hr at 09/07/24 1646, New Bag at 09/07/24 1646    acetaminophen (Tylenol) tablet 650 mg, 650 mg, Oral, Q6HRS PRN, Chase Kearney D.O.    senna-docusate (Pericolace Or Senokot S) 8.6-50 MG per tablet 2 Tablet, 2 Tablet, Oral, Q EVENING **AND** polyethylene glycol/lytes (Miralax) Packet 1 Packet, 1 Packet, Oral, QDAY PRN, Chase Kearney D.O.    labetalol (Normodyne/Trandate) injection 10 mg, 10 mg, Intravenous, Q4HRS PRN, Chase Kearney D.O.    ondansetron (Zofran) syringe/vial injection 4 mg, 4 mg, Intravenous, Q4HRS PRN, Chase Kearney D.O.    ondansetron (Zofran ODT) dispertab 4 mg, 4 mg, Oral, Q4HRS PRN, Chase Kearney D.O.    promethazine (Phenergan) tablet 12.5-25 mg, 12.5-25 mg, Oral, Q4HRS PRN, Chase Kearney D.O.    promethazine (Phenergan) suppository 12.5-25 mg, 12.5-25 mg, Rectal, Q4HRS PRN, Chase Kearney D.O.    prochlorperazine (Compazine) injection 5-10 mg, 5-10 mg, Intravenous, Q4HRS PRN, Chase Kearney D.O.    tamsulosin (Flomax) capsule 0.4 mg, 0.4 mg, Oral, AFTER BREAKFAST, Chase Kearney D.O.    ketorolac (Toradol) 15 MG/ML injection 15 mg, 15 mg, Intravenous, Q6HRS PRN, Chase Kearney D.O.    morphine 4 MG/ML injection 5 mg, 5 mg, Intravenous, Q3HRS PRN, Chase Kearney D.O.    Current Outpatient Medications:  Medications Prior to Admission   Medication Sig Dispense Refill Last Dose    linezolid (ZYVOX) 600 MG Tab Take 1 Tablet by mouth 2 times a day. 14 Tablet 0 Never started       Medication Allergy:  Allergies   Allergen Reactions    Kiwi  Extract Anaphylaxis    Shellfish Allergy Anaphylaxis    Ampicillin-Sulbactam Sodium Rash and Itching     Itching and rash with IV unasyn 8/17/2023    Cefazolin Rash and Itching     Rash  Tolerated ceftriaxone (April 2021)    Dilaudid [Hydromorphone] Itching    Hydrocodone Itching    Oxycodone Itching    Potassium Iodide Itching     Severe itching    Tape Unspecified     Paper tape okay        Family History:  History reviewed. No pertinent family history.    Social History:  Social History     Socioeconomic History    Marital status: Single     Spouse name: Not on file    Number of children: Not on file    Years of education: Not on file    Highest education level: Not on file   Occupational History    Not on file   Tobacco Use    Smoking status: Former     Current packs/day: 0.50     Types: Cigarettes    Smokeless tobacco: Never   Vaping Use    Vaping status: Never Used   Substance and Sexual Activity    Alcohol use: Yes     Comment: Occasional    Drug use: Yes     Types: Inhaled     Comment: Marijuana, every day    Sexual activity: Not on file   Other Topics Concern    Not on file   Social History Narrative    Not on file     Social Determinants of Health     Financial Resource Strain: Not on file   Food Insecurity: Not on file   Transportation Needs: Not on file   Physical Activity: Not on file   Stress: Not on file   Social Connections: Not on file   Intimate Partner Violence: Not on file   Housing Stability: Not on file         Physical Exam:  Vitals/ General Appearance:   Weight/BMI: Body mass index is 25.18 kg/m².  /88   Pulse 74   Temp 36.1 °C (97 °F) (Temporal)   Resp 17   Wt 70.8 kg (156 lb)   SpO2 98%   Vitals:    09/07/24 1354 09/07/24 1400 09/07/24 1500 09/07/24 1600   BP: 128/88 (!) 141/88 (!) 146/91 132/88   Pulse: 78 68 78 74   Resp:  16  17   Temp:    36.1 °C (97 °F)   TempSrc:    Temporal   SpO2: 96% 93% 94% 98%   Weight:         Oxygen Therapy:  Pulse Oximetry: 98 %, O2 (LPM): 0, O2  Delivery Device: None - Room Air    Constitutional:   No acute distress  HENMT:  Normocephalic, Atraumatic  Eyes:  EOMI  Lungs:  Normal respiratory effort  Abdomen: Soft, No tenderness, No guarding, No rebound tenderness  : Left CVAT  Skin: Warm, Dr  Neurologic: Alert & oriented x 3  Psychiatric: Affect normal, Judgment normal, Mood normal.      MDM (Data Review):     Records reviewed and summarized in current documentation    Lab Data Review:  Recent Results (from the past 24 hour(s))   CBC WITH DIFFERENTIAL    Collection Time: 09/07/24 11:07 AM   Result Value Ref Range    WBC 8.7 4.8 - 10.8 K/uL    RBC 4.74 4.70 - 6.10 M/uL    Hemoglobin 14.7 14.0 - 18.0 g/dL    Hematocrit 45.7 42.0 - 52.0 %    MCV 96.4 81.4 - 97.8 fL    MCH 31.0 27.0 - 33.0 pg    MCHC 32.2 (L) 32.3 - 36.5 g/dL    RDW 47.2 35.9 - 50.0 fL    Platelet Count 256 164 - 446 K/uL    MPV 8.9 (L) 9.0 - 12.9 fL    Neutrophils-Polys 53.30 44.00 - 72.00 %    Lymphocytes 32.80 22.00 - 41.00 %    Monocytes 9.30 0.00 - 13.40 %    Eosinophils 3.80 0.00 - 6.90 %    Basophils 0.60 0.00 - 1.80 %    Immature Granulocytes 0.20 0.00 - 0.90 %    Nucleated RBC 0.00 0.00 - 0.20 /100 WBC    Neutrophils (Absolute) 4.62 1.82 - 7.42 K/uL    Lymphs (Absolute) 2.85 1.00 - 4.80 K/uL    Monos (Absolute) 0.81 0.00 - 0.85 K/uL    Eos (Absolute) 0.33 0.00 - 0.51 K/uL    Baso (Absolute) 0.05 0.00 - 0.12 K/uL    Immature Granulocytes (abs) 0.02 0.00 - 0.11 K/uL    NRBC (Absolute) 0.00 K/uL   COMP METABOLIC PANEL    Collection Time: 09/07/24 11:07 AM   Result Value Ref Range    Sodium 137 135 - 145 mmol/L    Potassium 4.1 3.6 - 5.5 mmol/L    Chloride 106 96 - 112 mmol/L    Co2 21 20 - 33 mmol/L    Anion Gap 10.0 7.0 - 16.0    Glucose 95 65 - 99 mg/dL    Bun 13 8 - 22 mg/dL    Creatinine 1.13 0.50 - 1.40 mg/dL    Calcium 8.5 8.5 - 10.5 mg/dL    Correct Calcium 8.7 8.5 - 10.5 mg/dL    AST(SGOT) 19 12 - 45 U/L    ALT(SGPT) 11 2 - 50 U/L    Alkaline Phosphatase 107 (H) 30 - 99 U/L     Total Bilirubin 0.3 0.1 - 1.5 mg/dL    Albumin 3.8 3.2 - 4.9 g/dL    Total Protein 6.9 6.0 - 8.2 g/dL    Globulin 3.1 1.9 - 3.5 g/dL    A-G Ratio 1.2 g/dL   LIPASE    Collection Time: 09/07/24 11:07 AM   Result Value Ref Range    Lipase 53 11 - 82 U/L   ESTIMATED GFR    Collection Time: 09/07/24 11:07 AM   Result Value Ref Range    GFR (CKD-EPI) 88 >60 mL/min/1.73 m 2   URINALYSIS    Collection Time: 09/07/24 12:53 PM    Specimen: Urine   Result Value Ref Range    Color Yellow     Character Clear     Specific Gravity 1.014 <1.035    Ph 7.5 5.0 - 8.0    Glucose Negative Negative mg/dL    Ketones Negative Negative mg/dL    Protein Negative Negative mg/dL    Bilirubin Negative Negative    Urobilinogen, Urine 0.2 Negative    Nitrite Negative Negative    Leukocyte Esterase Negative Negative    Occult Blood Negative Negative    Micro Urine Req see below        Imaging/Procedures Review:    Reviewed    MDM (Assessment and Plan):     Active Hospital Problems    Diagnosis     Nephrolithiasis [N20.0]      33 yo Male known to [facility] [place] as he has a long history of nephrolithiasis, and previously has had management with ureteroscopy with laser lithotripsy. Now admitted with left flank pain and finding on imaging of mild dilatation of the left ureter, bilateral non obstructing renal stones, and calcification in prostate. Case discussed with Dr. Cramer. Recommend observation and conservative management for today. We will make NPO at midnight for evaluation in the morning for potential cystoscopy left ureteroscopy with possible stent placement.  This was discussed in detail with patient. He has already noticed improvement and is amenable to the plan. Urology will continue to follow up.     MARCIO Alan.   [address] [place] [address]   [phone]

## 2024-09-08 NOTE — DISCHARGE SUMMARY
Discharge Summary    CHIEF COMPLAINT ON ADMISSION  Chief Complaint   Patient presents with    Flank Pain     BIB [facility] from home for left flank pain that started this morning. Per Patient he has had multiple kidney infections in the past. Patient received 100 fentanyl en route with no relief.        Reason for Admission  Recurrent kidney stones    Admission Date  9/7/2024    CODE STATUS  FULL    HPI & HOSPITAL COURSE  Russell is a 34-year-old male with a history of recurrent kidney stones.  He was admitted for concerns of flank pain dysuria.  Urology had been consulted in the ER with potential need for uroscopy if patient had not passed stones.  Patient is a single father of a 2-year-old he had a friend watching him and per nursing staff the patient had to leave to take care of his son, I was only notified after the patient had left.  The patient states passing 2 stones but still feeling like he had more earlier after admission.  Patient left AGAINST MEDICAL ADVICE stable condition.        AMA/discharge Date  9/7/2024    FOLLOW UP ITEMS POST DISCHARGE  None    DISCHARGE DIAGNOSES  Principal Problem:    Nephrolithiasis (POA: Yes)  Resolved Problems:    * No resolved hospital problems. *      FOLLOW UP  No future appointments.  Pcp Pt States None            MEDICATIONS ON DISCHARGE     Medication List        ASK your doctor about these medications        Instructions   linezolid 600 MG Tabs  Commonly known as: Zyvox   Take 1 Tablet by mouth 2 times a day.  Dose: 600 mg              Allergies  Allergies   Allergen Reactions    Kiwi Extract Anaphylaxis    Shellfish Allergy Anaphylaxis    Ampicillin-Sulbactam Sodium Rash and Itching     Itching and rash with IV unasyn 8/17/2023    Cefazolin Rash and Itching     Rash  Tolerated ceftriaxone (April 2021)    Dilaudid [Hydromorphone] Itching    Hydrocodone Itching    Oxycodone Itching    Potassium Iodide Itching     Severe itching    Tape Unspecified     Paper tape okay         DIET  No orders of the defined types were placed in this encounter.          LABORATORY  Lab Results   Component Value Date    SODIUM 137 09/07/2024    POTASSIUM 4.1 09/07/2024    CHLORIDE 106 09/07/2024    CO2 21 09/07/2024    GLUCOSE 95 09/07/2024    BUN 13 09/07/2024    CREATININE 1.13 09/07/2024        Lab Results   Component Value Date    WBC 8.7 09/07/2024    HEMOGLOBIN 14.7 09/07/2024    HEMATOCRIT 45.7 09/07/2024    PLATELETCT 256 09/07/2024      US-RENAL   Final Result      1.  No hydronephrosis.      2.  Mild dilatation of left ureter.      3.  Nonobstructing calculi noted in each kidney.      4.  Calcification identified within the prosthetic urethra versus adjacent prostate gland.

## 2024-11-12 ENCOUNTER — HOSPITAL ENCOUNTER (EMERGENCY)
Facility: MEDICAL CENTER | Age: 34
End: 2024-11-12
Attending: EMERGENCY MEDICINE
Payer: COMMERCIAL

## 2024-11-12 ENCOUNTER — APPOINTMENT (OUTPATIENT)
Dept: RADIOLOGY | Facility: MEDICAL CENTER | Age: 34
End: 2024-11-12
Attending: EMERGENCY MEDICINE
Payer: COMMERCIAL

## 2024-11-12 VITALS
BODY MASS INDEX: 21.69 KG/M2 | WEIGHT: 135 LBS | SYSTOLIC BLOOD PRESSURE: 138 MMHG | HEIGHT: 66 IN | RESPIRATION RATE: 16 BRPM | HEART RATE: 80 BPM | OXYGEN SATURATION: 98 % | TEMPERATURE: 98.4 F | DIASTOLIC BLOOD PRESSURE: 88 MMHG

## 2024-11-12 DIAGNOSIS — R10.9 FLANK PAIN: ICD-10-CM

## 2024-11-12 LAB
ALBUMIN SERPL BCP-MCNC: 4.1 G/DL (ref 3.2–4.9)
ALBUMIN/GLOB SERPL: 1.1 G/DL
ALP SERPL-CCNC: 139 U/L (ref 30–99)
ALT SERPL-CCNC: 18 U/L (ref 2–50)
ANION GAP SERPL CALC-SCNC: 11 MMOL/L (ref 7–16)
APPEARANCE UR: CLEAR
AST SERPL-CCNC: 23 U/L (ref 12–45)
BACTERIA #/AREA URNS HPF: ABNORMAL /HPF
BASOPHILS # BLD AUTO: 0.4 % (ref 0–1.8)
BASOPHILS # BLD: 0.06 K/UL (ref 0–0.12)
BILIRUB SERPL-MCNC: 0.6 MG/DL (ref 0.1–1.5)
BILIRUB UR QL STRIP.AUTO: NEGATIVE
BUN SERPL-MCNC: 12 MG/DL (ref 8–22)
CALCIUM ALBUM COR SERPL-MCNC: 9.2 MG/DL (ref 8.5–10.5)
CALCIUM SERPL-MCNC: 9.3 MG/DL (ref 8.5–10.5)
CASTS URNS QL MICRO: ABNORMAL /LPF (ref 0–2)
CHLORIDE SERPL-SCNC: 102 MMOL/L (ref 96–112)
CO2 SERPL-SCNC: 24 MMOL/L (ref 20–33)
COLOR UR: YELLOW
CREAT SERPL-MCNC: 1.36 MG/DL (ref 0.5–1.4)
EOSINOPHIL # BLD AUTO: 0.36 K/UL (ref 0–0.51)
EOSINOPHIL NFR BLD: 2.6 % (ref 0–6.9)
EPITHELIAL CELLS 1715: ABNORMAL /HPF (ref 0–5)
ERYTHROCYTE [DISTWIDTH] IN BLOOD BY AUTOMATED COUNT: 44 FL (ref 35.9–50)
GFR SERPLBLD CREATININE-BSD FMLA CKD-EPI: 70 ML/MIN/1.73 M 2
GLOBULIN SER CALC-MCNC: 3.9 G/DL (ref 1.9–3.5)
GLUCOSE SERPL-MCNC: 86 MG/DL (ref 65–99)
GLUCOSE UR STRIP.AUTO-MCNC: NEGATIVE MG/DL
HCT VFR BLD AUTO: 48.8 % (ref 42–52)
HGB BLD-MCNC: 16.1 G/DL (ref 14–18)
HIV 1+2 AB+HIV1 P24 AG SERPL QL IA: NORMAL
IMM GRANULOCYTES # BLD AUTO: 0.03 K/UL (ref 0–0.11)
IMM GRANULOCYTES NFR BLD AUTO: 0.2 % (ref 0–0.9)
KETONES UR STRIP.AUTO-MCNC: NEGATIVE MG/DL
LEUKOCYTE ESTERASE UR QL STRIP.AUTO: NEGATIVE
LIPASE SERPL-CCNC: 47 U/L (ref 11–82)
LYMPHOCYTES # BLD AUTO: 2.6 K/UL (ref 1–4.8)
LYMPHOCYTES NFR BLD: 18.5 % (ref 22–41)
Lab: PRESENT /HPF
MCH RBC QN AUTO: 31.1 PG (ref 27–33)
MCHC RBC AUTO-ENTMCNC: 33 G/DL (ref 32.3–36.5)
MCV RBC AUTO: 94.4 FL (ref 81.4–97.8)
MICRO URNS: ABNORMAL
MONOCYTES # BLD AUTO: 1.61 K/UL (ref 0–0.85)
MONOCYTES NFR BLD AUTO: 11.5 % (ref 0–13.4)
NEUTROPHILS # BLD AUTO: 9.4 K/UL (ref 1.82–7.42)
NEUTROPHILS NFR BLD: 66.8 % (ref 44–72)
NITRITE UR QL STRIP.AUTO: NEGATIVE
NRBC # BLD AUTO: 0 K/UL
NRBC BLD-RTO: 0 /100 WBC (ref 0–0.2)
PH UR STRIP.AUTO: 6 [PH] (ref 5–8)
PLATELET # BLD AUTO: 294 K/UL (ref 164–446)
PMV BLD AUTO: 9.2 FL (ref 9–12.9)
POTASSIUM SERPL-SCNC: 4.1 MMOL/L (ref 3.6–5.5)
PROT SERPL-MCNC: 8 G/DL (ref 6–8.2)
PROT UR QL STRIP: 100 MG/DL
RBC # BLD AUTO: 5.17 M/UL (ref 4.7–6.1)
RBC # URNS HPF: ABNORMAL /HPF (ref 0–2)
RBC UR QL AUTO: NEGATIVE
SODIUM SERPL-SCNC: 137 MMOL/L (ref 135–145)
SP GR UR STRIP.AUTO: 1.02
T PALLIDUM AB SER QL IA: NONREACTIVE
UROBILINOGEN UR STRIP.AUTO-MCNC: 1 EU/DL
WBC # BLD AUTO: 14.1 K/UL (ref 4.8–10.8)
WBC #/AREA URNS HPF: ABNORMAL /HPF

## 2024-11-12 PROCEDURE — 87491 CHLMYD TRACH DNA AMP PROBE: CPT

## 2024-11-12 PROCEDURE — 85025 COMPLETE CBC W/AUTO DIFF WBC: CPT

## 2024-11-12 PROCEDURE — 80053 COMPREHEN METABOLIC PANEL: CPT

## 2024-11-12 PROCEDURE — 87389 HIV-1 AG W/HIV-1&-2 AB AG IA: CPT

## 2024-11-12 PROCEDURE — 83690 ASSAY OF LIPASE: CPT

## 2024-11-12 PROCEDURE — 700111 HCHG RX REV CODE 636 W/ 250 OVERRIDE (IP): Mod: JZ | Performed by: EMERGENCY MEDICINE

## 2024-11-12 PROCEDURE — 87591 N.GONORRHOEAE DNA AMP PROB: CPT

## 2024-11-12 PROCEDURE — 99284 EMERGENCY DEPT VISIT MOD MDM: CPT

## 2024-11-12 PROCEDURE — 86780 TREPONEMA PALLIDUM: CPT

## 2024-11-12 PROCEDURE — 36415 COLL VENOUS BLD VENIPUNCTURE: CPT

## 2024-11-12 PROCEDURE — 76775 US EXAM ABDO BACK WALL LIM: CPT

## 2024-11-12 PROCEDURE — 96374 THER/PROPH/DIAG INJ IV PUSH: CPT

## 2024-11-12 PROCEDURE — 81001 URINALYSIS AUTO W/SCOPE: CPT

## 2024-11-12 RX ORDER — KETOROLAC TROMETHAMINE 15 MG/ML
15 INJECTION, SOLUTION INTRAMUSCULAR; INTRAVENOUS ONCE
Status: COMPLETED | OUTPATIENT
Start: 2024-11-12 | End: 2024-11-12

## 2024-11-12 RX ADMIN — KETOROLAC TROMETHAMINE 15 MG: 15 INJECTION, SOLUTION INTRAMUSCULAR; INTRAVENOUS at 13:29

## 2024-11-12 ASSESSMENT — FIBROSIS 4 INDEX: FIB4 SCORE: 0.74

## 2024-11-12 ASSESSMENT — PAIN DESCRIPTION - PAIN TYPE
TYPE: ACUTE PAIN
TYPE: ACUTE PAIN

## 2024-11-12 NOTE — Clinical Note
Russell Bunn was seen and treated in our emergency department on 11/12/2024.  He may return to work on 11/13/2024.       If you have any questions or concerns, please don't hesitate to call.      Aida Webb M.D.

## 2024-11-12 NOTE — ED PROVIDER NOTES
ED Provider Note    CHIEF COMPLAINT  Chief Complaint   Patient presents with    Flank Pain       EXTERNAL RECORDS REVIEWED  Inpatient Notes discharge summary from September 7 reviewed.  Patient has a history of multiple kidney infections and recurrent kidney stones.    HPI/ROS  LIMITATION TO HISTORY   Select: : None  OUTSIDE HISTORIAN(S):  none    Russell Bunn is a 34 y.o. male who presents for evaluation of right-sided flank pain over the last few weeks.  He says it got significantly worse today which is why he called EMS to pick him up at work.Patient denies any fevers he says he is having a little burning with urination.  He was supposed to follow-up with urology however he of the past 2 balance and therefore he was unable to follow-up with them.    PAST MEDICAL HISTORY   has a past medical history of Kidney stones and Seizure disorder (HCC).    SURGICAL HISTORY   has a past surgical history that includes lithotripsy; cysto/uretero/pyeloscopy, dx (Right, 3/13/2020); cystoscopy,insert ureteral stent (Right, 3/13/2020); lasertripsy (Right, 3/13/2020); cystoscopy,insert ureteral stent (Left, 4/26/2021); cysto/uretero/pyeloscopy, dx (Left, 4/26/2021); cystoscopy (Left, 02/20/2022); cystoscopy,insert ureteral stent (Left, 2/20/2022); cysto/uretero/pyeloscopy, dx (Left, 2/20/2022); lasertripsy (Left, 2/20/2022); cystoscopy with ureteral stent insertion or removal (Right, 4/24/2023); cystoscopy,insert ureteral stent (Left, 8/10/2023); and cysto/uretero/pyeloscopy, dx (Left, 8/10/2023).    FAMILY HISTORY  History reviewed. No pertinent family history.    SOCIAL HISTORY  Social History     Tobacco Use    Smoking status: Former     Current packs/day: 0.50     Types: Cigarettes    Smokeless tobacco: Never   Vaping Use    Vaping status: Never Used   Substance and Sexual Activity    Alcohol use: Yes     Comment: Occasional    Drug use: Yes     Types: Inhaled     Comment: Marijuana, every day    Sexual activity:  "Not on file       CURRENT MEDICATIONS  Home Medications    **Home medications have not yet been reviewed for this encounter**         ALLERGIES  Allergies   Allergen Reactions    Kiwi Extract Anaphylaxis    Shellfish Allergy Anaphylaxis    Ampicillin-Sulbactam Sodium Rash and Itching     Itching and rash with IV unasyn 8/17/2023    Cefazolin Rash and Itching     Rash  Tolerated ceftriaxone (April 2021)    Dilaudid [Hydromorphone] Itching    Hydrocodone Itching    Oxycodone Itching    Potassium Iodide Itching     Severe itching    Tape Unspecified     Paper tape okay        PHYSICAL EXAM  VITAL SIGNS: BP (!) 160/106   Pulse 85   Temp 36.8 °C (98.2 °F) (Temporal)   Resp 16   Ht 1.676 m (5' 6\")   Wt 61.2 kg (135 lb)   SpO2 97%   BMI 21.79 kg/m²    Constitutional: Alert in no apparent distress.  HENT: No signs of trauma, Bilateral external ears normal, Nose normal.   Eyes: Pupils are equal and reactive, Conjunctiva normal, Non-icteric.   Neck:  No stridor.   Cardiovascular: Regular rate and rhythm, no murmurs.   Thorax & Lungs: Normal breath sounds, No respiratory distress, No wheezing, No chest tenderness.   Abdomen: Bowel sounds normal, Soft, No tenderness, No masses, No peritoneal signs.  Skin: Warm, Dry, No erythema, No rash.   Back: No bony tenderness, + right sided cva tenderness  Musculoskeletal:  No major deformities noted.   Neurologic: Alert, moving all extremities without difficulty, no focal deficits.      EKG/LABS  Results for orders placed or performed during the hospital encounter of 11/12/24   T.PALLIDUM AB RICO (Syphilis)    Collection Time: 11/12/24 12:26 PM   Result Value Ref Range    Syphilis, Treponemal Qual Nonreactive Non-Reactive   HIV AG/AB Combo Assay Screening    Collection Time: 11/12/24 12:26 PM   Result Value Ref Range    HIV Ag/Ab Combo Assay Non-Reactive Non Reactive   CBC WITH DIFFERENTIAL    Collection Time: 11/12/24 12:26 PM   Result Value Ref Range    WBC 14.1 (H) 4.8 - 10.8 " K/uL    RBC 5.17 4.70 - 6.10 M/uL    Hemoglobin 16.1 14.0 - 18.0 g/dL    Hematocrit 48.8 42.0 - 52.0 %    MCV 94.4 81.4 - 97.8 fL    MCH 31.1 27.0 - 33.0 pg    MCHC 33.0 32.3 - 36.5 g/dL    RDW 44.0 35.9 - 50.0 fL    Platelet Count 294 164 - 446 K/uL    MPV 9.2 9.0 - 12.9 fL    Neutrophils-Polys 66.80 44.00 - 72.00 %    Lymphocytes 18.50 (L) 22.00 - 41.00 %    Monocytes 11.50 0.00 - 13.40 %    Eosinophils 2.60 0.00 - 6.90 %    Basophils 0.40 0.00 - 1.80 %    Immature Granulocytes 0.20 0.00 - 0.90 %    Nucleated RBC 0.00 0.00 - 0.20 /100 WBC    Neutrophils (Absolute) 9.40 (H) 1.82 - 7.42 K/uL    Lymphs (Absolute) 2.60 1.00 - 4.80 K/uL    Monos (Absolute) 1.61 (H) 0.00 - 0.85 K/uL    Eos (Absolute) 0.36 0.00 - 0.51 K/uL    Baso (Absolute) 0.06 0.00 - 0.12 K/uL    Immature Granulocytes (abs) 0.03 0.00 - 0.11 K/uL    NRBC (Absolute) 0.00 K/uL   COMP METABOLIC PANEL    Collection Time: 11/12/24 12:26 PM   Result Value Ref Range    Sodium 137 135 - 145 mmol/L    Potassium 4.1 3.6 - 5.5 mmol/L    Chloride 102 96 - 112 mmol/L    Co2 24 20 - 33 mmol/L    Anion Gap 11.0 7.0 - 16.0    Glucose 86 65 - 99 mg/dL    Bun 12 8 - 22 mg/dL    Creatinine 1.36 0.50 - 1.40 mg/dL    Calcium 9.3 8.5 - 10.5 mg/dL    Correct Calcium 9.2 8.5 - 10.5 mg/dL    AST(SGOT) 23 12 - 45 U/L    ALT(SGPT) 18 2 - 50 U/L    Alkaline Phosphatase 139 (H) 30 - 99 U/L    Total Bilirubin 0.6 0.1 - 1.5 mg/dL    Albumin 4.1 3.2 - 4.9 g/dL    Total Protein 8.0 6.0 - 8.2 g/dL    Globulin 3.9 (H) 1.9 - 3.5 g/dL    A-G Ratio 1.1 g/dL   LIPASE    Collection Time: 11/12/24 12:26 PM   Result Value Ref Range    Lipase 47 11 - 82 U/L   ESTIMATED GFR    Collection Time: 11/12/24 12:26 PM   Result Value Ref Range    GFR (CKD-EPI) 70 >60 mL/min/1.73 m 2   Chlamydia/GC, PCR (Urine)    Collection Time: 11/12/24  1:45 PM    Specimen: Urine   Result Value Ref Range    Source Urine    URINALYSIS    Collection Time: 11/12/24  1:45 PM    Specimen: Urine   Result Value Ref Range     Color Yellow     Character Clear     Specific Gravity 1.023 <1.035    Ph 6.0 5.0 - 8.0    Glucose Negative Negative mg/dL    Ketones Negative Negative mg/dL    Protein 100 (A) Negative mg/dL    Bilirubin Negative Negative    Urobilinogen, Urine 1.0 <=1.0 EU/dL    Nitrite Negative Negative    Leukocyte Esterase Negative Negative    Occult Blood Negative Negative    Micro Urine Req Microscopic    URINE MICROSCOPIC (W/UA)    Collection Time: 11/12/24  1:45 PM   Result Value Ref Range    WBC 0-2 /hpf    RBC 0-2 0 - 2 /hpf    Bacteria None None /hpf    Epithelial Cells 0-2 0 - 5 /hpf    Cystine Crystal Present (A) Absent /hpf    Urine Casts 0-2 0 - 2 /lpf       I have independently interpreted this EKG    RADIOLOGY/PROCEDURES   I have independently interpreted the diagnostic imaging associated with this visit and am waiting the final reading from the radiologist.   My preliminary interpretation is as follows: No obvious hydronephrosis    Radiologist interpretation:  US-RENAL   Final Result      1.  Multiple bilateral nonobstructing kidney stones.   2.  No hydronephrosis.   3.  Cyst at the lower pole RIGHT kidney measuring 1.4 cm, for which no further evaluation is necessary.          COURSE & MEDICAL DECISION MAKING    ASSESSMENT, COURSE AND PLAN  Care Narrative: This is a 34-year-old gentleman with history of kidney stones and pyelonephritis presenting with flank pain.  Patient was pretty well-appearing on exam he is afebrile not tachycardic.  Labs are obtained he does have a slightly elevated white count at 14.  No significant left shift however.  CMP is essentially normal.  Urinalysis resulted and does not show any evidence of blood or leukocytes.  Therefore no evidence of infection.  He does have some crystals.  Ultrasound was performed given he has had multiple CT scans.  He has bilateral nonobstructing stones without hydronephrosis.  Therefore at this point I do not see any evidence of an obstructed stone he does  not have an infection I do think he can be discharged with outpatient follow-up as needed.  Patient is agreeable to this plan.      DISPOSITION AND DISCUSSIONS  I have discussed management of the patient with the following physicians and RAJAT's:  none    Discussion of management with other Q or appropriate source(s): None     Escalation of care considered, and ultimately not performed:diagnostic imaging    Barriers to care at this time, including but not limited to: Patient does not have established PCP.     Decision tools and prescription drugs considered including, but not limited to: Pain Medications patient is allergic to narcotic pain medication therefore no additional medications were given .     The patient will return for new or worsening symptoms and is stable at the time of discharge. Patient was given return precautions. Patient and/or family member verbalizes understanding and will comply.    DISPOSITION:  Patient will be discharged home in stable condition.    FOLLOW UP:  [facility], Emergency Dept  [address] [facility] [id number] [phone]    Return to the emergency department for worsening pain fevers vomiting or other concerns.    Miguel Bansal M.D.  [address]  [address] [facility] [address]  [phone]            OUTPATIENT MEDICATIONS:  Discharge Medication List as of 11/12/2024  3:57 PM            FINAL DIAGNOSIS  1. Flank pain         Electronically signed by: Aida Webb M.D., 11/12/2024 1:11 PM

## 2024-11-12 NOTE — ED TRIAGE NOTES
Pt to ED via EMS with complaints of right flank pain that started today. Hx of kidney stones. Received 100 mcg fentanyl PTA by EMS. IV remains in place.  He does report burning with urination. No fever or chills.     Pt educated on ED process and asked to wait in lobby. Patient educated on importance of alerting staff to new or worsening symptoms or concerns.

## 2024-11-13 LAB
C TRACH DNA SPEC QL NAA+PROBE: NEGATIVE
N GONORRHOEA DNA SPEC QL NAA+PROBE: NEGATIVE
SPECIMEN SOURCE: NORMAL

## 2024-11-15 ENCOUNTER — PHARMACY VISIT (OUTPATIENT)
Dept: PHARMACY | Facility: MEDICAL CENTER | Age: 34
End: 2024-11-15
Payer: COMMERCIAL

## 2024-11-15 ENCOUNTER — HOSPITAL ENCOUNTER (EMERGENCY)
Facility: MEDICAL CENTER | Age: 34
End: 2024-11-15
Attending: EMERGENCY MEDICINE
Payer: COMMERCIAL

## 2024-11-15 ENCOUNTER — APPOINTMENT (OUTPATIENT)
Dept: RADIOLOGY | Facility: MEDICAL CENTER | Age: 34
End: 2024-11-15
Attending: EMERGENCY MEDICINE
Payer: COMMERCIAL

## 2024-11-15 VITALS
BODY MASS INDEX: 25.86 KG/M2 | OXYGEN SATURATION: 99 % | SYSTOLIC BLOOD PRESSURE: 126 MMHG | TEMPERATURE: 97.8 F | HEART RATE: 82 BPM | WEIGHT: 160.94 LBS | HEIGHT: 66 IN | RESPIRATION RATE: 16 BRPM | DIASTOLIC BLOOD PRESSURE: 80 MMHG

## 2024-11-15 DIAGNOSIS — R31.9 HEMATURIA, UNSPECIFIED TYPE: ICD-10-CM

## 2024-11-15 DIAGNOSIS — R10.9 FLANK PAIN: ICD-10-CM

## 2024-11-15 LAB
APPEARANCE UR: CLEAR
BACTERIA #/AREA URNS HPF: ABNORMAL /HPF
BILIRUB UR QL STRIP.AUTO: NEGATIVE
CASTS URNS QL MICRO: ABNORMAL /LPF (ref 0–2)
COLOR UR: YELLOW
EPITHELIAL CELLS 1715: ABNORMAL /HPF (ref 0–5)
GLUCOSE UR STRIP.AUTO-MCNC: NEGATIVE MG/DL
KETONES UR STRIP.AUTO-MCNC: NEGATIVE MG/DL
LEUKOCYTE ESTERASE UR QL STRIP.AUTO: NEGATIVE
MICRO URNS: ABNORMAL
NITRITE UR QL STRIP.AUTO: NEGATIVE
PH UR STRIP.AUTO: 6 [PH] (ref 5–8)
PROT UR QL STRIP: 100 MG/DL
RBC # URNS HPF: ABNORMAL /HPF (ref 0–2)
RBC UR QL AUTO: ABNORMAL
SP GR UR STRIP.AUTO: 1.02
UROBILINOGEN UR STRIP.AUTO-MCNC: 0.2 EU/DL
WBC #/AREA URNS HPF: ABNORMAL /HPF

## 2024-11-15 PROCEDURE — 96375 TX/PRO/DX INJ NEW DRUG ADDON: CPT

## 2024-11-15 PROCEDURE — 99284 EMERGENCY DEPT VISIT MOD MDM: CPT

## 2024-11-15 PROCEDURE — 74176 CT ABD & PELVIS W/O CONTRAST: CPT

## 2024-11-15 PROCEDURE — 81001 URINALYSIS AUTO W/SCOPE: CPT

## 2024-11-15 PROCEDURE — 700111 HCHG RX REV CODE 636 W/ 250 OVERRIDE (IP): Mod: JZ | Performed by: EMERGENCY MEDICINE

## 2024-11-15 PROCEDURE — 96374 THER/PROPH/DIAG INJ IV PUSH: CPT

## 2024-11-15 PROCEDURE — RXMED WILLOW AMBULATORY MEDICATION CHARGE: Performed by: EMERGENCY MEDICINE

## 2024-11-15 RX ORDER — OXYCODONE AND ACETAMINOPHEN 5; 325 MG/1; MG/1
1 TABLET ORAL EVERY 4 HOURS PRN
Qty: 20 TABLET | Refills: 0 | Status: SHIPPED | OUTPATIENT
Start: 2024-11-15 | End: 2024-11-20

## 2024-11-15 RX ORDER — KETOROLAC TROMETHAMINE 15 MG/ML
15 INJECTION, SOLUTION INTRAMUSCULAR; INTRAVENOUS ONCE
Status: COMPLETED | OUTPATIENT
Start: 2024-11-15 | End: 2024-11-15

## 2024-11-15 RX ORDER — MORPHINE SULFATE 4 MG/ML
4 INJECTION INTRAVENOUS ONCE
Status: COMPLETED | OUTPATIENT
Start: 2024-11-15 | End: 2024-11-15

## 2024-11-15 RX ADMIN — MORPHINE SULFATE 4 MG: 4 INJECTION, SOLUTION INTRAMUSCULAR; INTRAVENOUS at 10:16

## 2024-11-15 RX ADMIN — KETOROLAC TROMETHAMINE 15 MG: 15 INJECTION, SOLUTION INTRAMUSCULAR; INTRAVENOUS at 09:30

## 2024-11-15 ASSESSMENT — PAIN DESCRIPTION - PAIN TYPE
TYPE: ACUTE PAIN
TYPE: ACUTE PAIN

## 2024-11-15 ASSESSMENT — FIBROSIS 4 INDEX: FIB4 SCORE: 0.61

## 2024-11-15 NOTE — ED NOTES
Discharge instructions provided, all questions answered, pt ambulated with steady gait to pharmacy

## 2024-11-15 NOTE — ED TRIAGE NOTES
Chief Complaint   Patient presents with    Abdominal Pain    RLQ Pain     Pt BIB self to triage. Pt reports to have been dealing with symptoms for about 4 days. Pt found to have multiple kidney stones, had follow up with Urology but cannot been seen due to no insurance. Pt reports pain to come and go, vomiting last night. No relief from pain.     N/V     Explained to pt triage process, made pt aware to tell this RN/staff of any changes/concerns, pt verbalized understanding of process and instructions given. Pt to ER lobby.

## 2024-11-15 NOTE — Clinical Note
Russell Bunn was seen and treated in our emergency department on 11/15/2024.  He may return to work on 11/18/2024.  Patient was seen in the ER on Nov 13 and 15 for kidney stones     If you have any questions or concerns, please don't hesitate to call.      Mihir Mathis M.D.

## 2024-11-15 NOTE — ED PROVIDER NOTES
ED Provider Note    CHIEF COMPLAINT  Chief Complaint   Patient presents with    Abdominal Pain    RLQ Pain     Pt BIB self to triage. Pt reports to have been dealing with symptoms for about 4 days. Pt found to have multiple kidney stones, had follow up with Urology but cannot been seen due to no insurance. Pt reports pain to come and go, vomiting last night. No relief from pain.     N/V       EXTERNAL RECORDS REVIEWED  Outpatient Notes patient has had inpatient visits and outpatient visits for UTIs and kidney stones    HPI/ROS  LIMITATION TO HISTORY   Select: : None  OUTSIDE HISTORIAN(S):  None    Russell Bunn is a 34 y.o. male who presents with past medical history seen for kidney stones and seizure disorder, he reports that he has severe right flank pain.  With nausea.  He denies any fever.    PAST MEDICAL HISTORY   has a past medical history of Kidney stones and Seizure disorder (HCC).    SURGICAL HISTORY   has a past surgical history that includes lithotripsy; cysto/uretero/pyeloscopy, dx (Right, 3/13/2020); cystoscopy,insert ureteral stent (Right, 3/13/2020); lasertripsy (Right, 3/13/2020); cystoscopy,insert ureteral stent (Left, 4/26/2021); cysto/uretero/pyeloscopy, dx (Left, 4/26/2021); cystoscopy (Left, 02/20/2022); cystoscopy,insert ureteral stent (Left, 2/20/2022); cysto/uretero/pyeloscopy, dx (Left, 2/20/2022); lasertripsy (Left, 2/20/2022); cystoscopy with ureteral stent insertion or removal (Right, 4/24/2023); cystoscopy,insert ureteral stent (Left, 8/10/2023); and cysto/uretero/pyeloscopy, dx (Left, 8/10/2023).    FAMILY HISTORY  No family history on file.    SOCIAL HISTORY  Social History     Tobacco Use    Smoking status: Former     Current packs/day: 0.50     Types: Cigarettes    Smokeless tobacco: Never   Vaping Use    Vaping status: Never Used   Substance and Sexual Activity    Alcohol use: Yes     Comment: Occasional    Drug use: Yes     Types: Inhaled     Comment: Marijuana, every  "day    Sexual activity: Not on file       CURRENT MEDICATIONS  The patient denies medications    ALLERGIES  Allergies   Allergen Reactions    Kiwi Extract Anaphylaxis    Shellfish Allergy Anaphylaxis    Ampicillin-Sulbactam Sodium Rash and Itching     Itching and rash with IV unasyn 8/17/2023    Cefazolin Rash and Itching     Rash  Tolerated ceftriaxone (April 2021)    Dilaudid [Hydromorphone] Itching    Hydrocodone Itching    Oxycodone Itching    Potassium Iodide Itching     Severe itching    Tape Unspecified     Paper tape okay        PHYSICAL EXAM  VITAL SIGNS: /80   Pulse 82   Temp 36.6 °C (97.8 °F)   Resp 16   Ht 1.676 m (5' 6\")   Wt 73 kg (160 lb 15 oz)   SpO2 99%   BMI 25.98 kg/m²      Constitutional: Alert.  HENT: No signs of trauma, Bilateral external ears normal, Nose normal.   Eyes: Pupils are equal and reactive, Conjunctiva normal, Non-icteric.   Neck: Normal range of motion, No tenderness, Supple, No stridor.   Lymphatic: No lymphadenopathy noted.   Cardiovascular: Regular rate and rhythm, no murmurs.   Thorax & Lungs: Normal breath sounds, No respiratory distress, No wheezing, No chest tenderness.   Abdomen: Bowel sounds normal, Soft, No tenderness, No peritoneal signs, No masses, No pulsatile masses.   Skin: Warm, Dry, No erythema, No rash.   Back: No bony tenderness, No CVA tenderness.   Extremities: Intact distal pulses, No edema, No tenderness, No cyanosis.  Musculoskeletal: Good range of motion in all major joints. No tenderness to palpation or major deformities noted.   Neurologic: Alert , Normal motor function, Normal sensory function, No focal deficits noted.   Psychiatric: Affect normal, Judgment normal, Mood normal.       LABS      Labs Reviewed   URINALYSIS - Abnormal; Notable for the following components:       Result Value    Protein 100 (*)     Occult Blood Moderate (*)     All other components within normal limits   URINE MICROSCOPIC (W/UA) - Abnormal; Notable for the " following components:    RBC 21-50 (*)     All other components within normal limits         RADIOLOGY/PROCEDURES   I have independently interpreted the diagnostic imaging associated with this visit and am waiting the final reading from the radiologist.   My preliminary interpretation is as follows: No hydronephrosis    Radiologist interpretation:  CT-RENAL COLIC EVALUATION(A/P W/O)   Final Result      1.  14 mm probably cystic lesion at the lower pole of the right kidney with mural calcification. No significant change from prior exam. Nonobstructive 4 mm cortical calcification in the right kidney midpole.   2.  Left kidney cortical scarring at the lower pole with punctate calcifications. Tiny subtle nonobstructive cortical calcifications at the left kidney upper pole.   3.  No hydronephrosis or evidence of acute renal or ureteral obstruction.   4.  Small fat-containing umbilical hernia.          COURSE & MEDICAL DECISION MAKING    ASSESSMENT, COURSE AND PLAN  Care Narrative:     Patient presents with right flank pain and multiple kidney stones previously.  CT was ordered, labs ordered.  Toradol 15 mg IV was ordered.    1:30 PM the patient's CT scan is negative for obstructing calculus.  He has calcifications in the kidneys.  He has hematuria with no evidence of urinary tract infection.  He may have passed a recent kidney stone.  The patient will follow-up with the urologist.  I prescribed him Percocet for pain.            ADDITIONAL PROBLEMS MANAGED  Hematuria, flank pain, intrarenal calculi    DISPOSITION AND DISCUSSIONS  I have discussed management of the patient with the following physicians and RAJAT's: None    Discussion of management with other QHP or appropriate source(s): None     Escalation of care considered, and ultimately not performed:acute inpatient care management, however at this time, the patient is most appropriate for outpatient management    Barriers to care at this time, including but not limited  to:  None .     Decision tools and prescription drugs considered including, but not limited to:  Prescribed the patient Percocet .    The patient will return for new or worsening symptoms and is stable at the time of discharge.    The patient is referred to a primary physician for blood pressure management, diabetic screening, and for all other preventative health concerns.        DISPOSITION:  Patient will be discharged home in stable condition.    FOLLOW UP:  [facility], Emergency Dept  [address] [place] [id number] [phone]    If symptoms worsen      OUTPATIENT MEDICATIONS:  New Prescriptions    OXYCODONE-ACETAMINOPHEN (PERCOCET) 5-325 MG TAB    Take 1 Tablet by mouth every four hours as needed (pain) for up to 5 days. No driving, no drinking alcohol         FINAL DIAGNOSIS  1. Flank pain    2. Hematuria, unspecified type         Electronically signed by: Mihir Mathis M.D., 11/15/2024 9:13 AM

## 2025-01-21 ENCOUNTER — HOSPITAL ENCOUNTER (OUTPATIENT)
Facility: MEDICAL CENTER | Age: 35
End: 2025-01-23
Attending: EMERGENCY MEDICINE | Admitting: STUDENT IN AN ORGANIZED HEALTH CARE EDUCATION/TRAINING PROGRAM
Payer: COMMERCIAL

## 2025-01-21 ENCOUNTER — APPOINTMENT (OUTPATIENT)
Dept: RADIOLOGY | Facility: MEDICAL CENTER | Age: 35
End: 2025-01-21
Attending: EMERGENCY MEDICINE
Payer: COMMERCIAL

## 2025-01-21 DIAGNOSIS — N20.1 URETERAL STONE: Primary | ICD-10-CM

## 2025-01-21 DIAGNOSIS — E87.20 LACTIC ACIDOSIS: ICD-10-CM

## 2025-01-21 DIAGNOSIS — R07.89 OTHER CHEST PAIN: ICD-10-CM

## 2025-01-21 DIAGNOSIS — E87.20 LACTIC ACID ACIDOSIS: ICD-10-CM

## 2025-01-21 DIAGNOSIS — N20.1 URETEROLITHIASIS: ICD-10-CM

## 2025-01-21 DIAGNOSIS — N13.9 ACUTE UNILATERAL OBSTRUCTIVE UROPATHY: ICD-10-CM

## 2025-01-21 DIAGNOSIS — N12 PYELONEPHRITIS: ICD-10-CM

## 2025-01-21 PROBLEM — N13.8 OBSTRUCTIVE NEPHROPATHY: Status: ACTIVE | Noted: 2025-01-21

## 2025-01-21 LAB
ALBUMIN SERPL BCP-MCNC: 4.5 G/DL (ref 3.2–4.9)
ALBUMIN/GLOB SERPL: 1.3 G/DL
ALP SERPL-CCNC: 108 U/L (ref 30–99)
ALT SERPL-CCNC: 15 U/L (ref 2–50)
ANION GAP SERPL CALC-SCNC: 14 MMOL/L (ref 7–16)
APPEARANCE UR: CLEAR
AST SERPL-CCNC: 19 U/L (ref 12–45)
BACTERIA #/AREA URNS HPF: ABNORMAL /HPF
BASOPHILS # BLD AUTO: 0.6 % (ref 0–1.8)
BASOPHILS # BLD: 0.08 K/UL (ref 0–0.12)
BILIRUB SERPL-MCNC: 0.5 MG/DL (ref 0.1–1.5)
BILIRUB UR QL STRIP.AUTO: NEGATIVE
BUN SERPL-MCNC: 16 MG/DL (ref 8–22)
CALCIUM ALBUM COR SERPL-MCNC: 9.1 MG/DL (ref 8.5–10.5)
CALCIUM SERPL-MCNC: 9.5 MG/DL (ref 8.5–10.5)
CASTS URNS QL MICRO: ABNORMAL /LPF (ref 0–2)
CHLORIDE SERPL-SCNC: 102 MMOL/L (ref 96–112)
CO2 SERPL-SCNC: 21 MMOL/L (ref 20–33)
COLOR UR: YELLOW
CREAT SERPL-MCNC: 1.42 MG/DL (ref 0.5–1.4)
EOSINOPHIL # BLD AUTO: 0.59 K/UL (ref 0–0.51)
EOSINOPHIL NFR BLD: 4.6 % (ref 0–6.9)
EPITHELIAL CELLS 1715: ABNORMAL /HPF (ref 0–5)
ERYTHROCYTE [DISTWIDTH] IN BLOOD BY AUTOMATED COUNT: 44.6 FL (ref 35.9–50)
GFR SERPLBLD CREATININE-BSD FMLA CKD-EPI: 67 ML/MIN/1.73 M 2
GLOBULIN SER CALC-MCNC: 3.6 G/DL (ref 1.9–3.5)
GLUCOSE SERPL-MCNC: 88 MG/DL (ref 65–99)
GLUCOSE UR STRIP.AUTO-MCNC: NEGATIVE MG/DL
HCT VFR BLD AUTO: 49.7 % (ref 42–52)
HGB BLD-MCNC: 16.5 G/DL (ref 14–18)
IMM GRANULOCYTES # BLD AUTO: 0.03 K/UL (ref 0–0.11)
IMM GRANULOCYTES NFR BLD AUTO: 0.2 % (ref 0–0.9)
KETONES UR STRIP.AUTO-MCNC: NEGATIVE MG/DL
LEUKOCYTE ESTERASE UR QL STRIP.AUTO: ABNORMAL
LIPASE SERPL-CCNC: 110 U/L (ref 11–82)
LYMPHOCYTES # BLD AUTO: 3.63 K/UL (ref 1–4.8)
LYMPHOCYTES NFR BLD: 28.5 % (ref 22–41)
MCH RBC QN AUTO: 30.6 PG (ref 27–33)
MCHC RBC AUTO-ENTMCNC: 33.2 G/DL (ref 32.3–36.5)
MCV RBC AUTO: 92.2 FL (ref 81.4–97.8)
MICRO URNS: ABNORMAL
MONOCYTES # BLD AUTO: 1.06 K/UL (ref 0–0.85)
MONOCYTES NFR BLD AUTO: 8.3 % (ref 0–13.4)
NEUTROPHILS # BLD AUTO: 7.36 K/UL (ref 1.82–7.42)
NEUTROPHILS NFR BLD: 57.8 % (ref 44–72)
NITRITE UR QL STRIP.AUTO: NEGATIVE
NRBC # BLD AUTO: 0 K/UL
NRBC BLD-RTO: 0 /100 WBC (ref 0–0.2)
PH UR STRIP.AUTO: 6.5 [PH] (ref 5–8)
PLATELET # BLD AUTO: 357 K/UL (ref 164–446)
PMV BLD AUTO: 9.6 FL (ref 9–12.9)
POTASSIUM SERPL-SCNC: 4.3 MMOL/L (ref 3.6–5.5)
PROT SERPL-MCNC: 8.1 G/DL (ref 6–8.2)
PROT UR QL STRIP: 100 MG/DL
RBC # BLD AUTO: 5.39 M/UL (ref 4.7–6.1)
RBC # URNS HPF: ABNORMAL /HPF (ref 0–2)
RBC UR QL AUTO: ABNORMAL
SODIUM SERPL-SCNC: 137 MMOL/L (ref 135–145)
SP GR UR STRIP.AUTO: 1.02
UROBILINOGEN UR STRIP.AUTO-MCNC: 1 EU/DL
WBC # BLD AUTO: 12.8 K/UL (ref 4.8–10.8)
WBC #/AREA URNS HPF: ABNORMAL /HPF

## 2025-01-21 PROCEDURE — 83690 ASSAY OF LIPASE: CPT

## 2025-01-21 PROCEDURE — 96375 TX/PRO/DX INJ NEW DRUG ADDON: CPT

## 2025-01-21 PROCEDURE — 84145 PROCALCITONIN (PCT): CPT

## 2025-01-21 PROCEDURE — 96374 THER/PROPH/DIAG INJ IV PUSH: CPT

## 2025-01-21 PROCEDURE — 36415 COLL VENOUS BLD VENIPUNCTURE: CPT

## 2025-01-21 PROCEDURE — 80053 COMPREHEN METABOLIC PANEL: CPT

## 2025-01-21 PROCEDURE — 99222 1ST HOSP IP/OBS MODERATE 55: CPT | Performed by: STUDENT IN AN ORGANIZED HEALTH CARE EDUCATION/TRAINING PROGRAM

## 2025-01-21 PROCEDURE — 74176 CT ABD & PELVIS W/O CONTRAST: CPT

## 2025-01-21 PROCEDURE — 700105 HCHG RX REV CODE 258: Performed by: EMERGENCY MEDICINE

## 2025-01-21 PROCEDURE — 85025 COMPLETE CBC W/AUTO DIFF WBC: CPT

## 2025-01-21 PROCEDURE — G0378 HOSPITAL OBSERVATION PER HR: HCPCS

## 2025-01-21 PROCEDURE — 700111 HCHG RX REV CODE 636 W/ 250 OVERRIDE (IP): Mod: JZ | Performed by: EMERGENCY MEDICINE

## 2025-01-21 PROCEDURE — 81001 URINALYSIS AUTO W/SCOPE: CPT

## 2025-01-21 PROCEDURE — 99285 EMERGENCY DEPT VISIT HI MDM: CPT

## 2025-01-21 PROCEDURE — 96376 TX/PRO/DX INJ SAME DRUG ADON: CPT

## 2025-01-21 RX ORDER — ACETAMINOPHEN 325 MG/1
650 TABLET ORAL EVERY 6 HOURS PRN
Status: DISCONTINUED | OUTPATIENT
Start: 2025-01-21 | End: 2025-01-22

## 2025-01-21 RX ORDER — ONDANSETRON 4 MG/1
4 TABLET, ORALLY DISINTEGRATING ORAL EVERY 4 HOURS PRN
Status: DISCONTINUED | OUTPATIENT
Start: 2025-01-21 | End: 2025-01-23 | Stop reason: HOSPADM

## 2025-01-21 RX ORDER — LABETALOL HYDROCHLORIDE 5 MG/ML
10 INJECTION, SOLUTION INTRAVENOUS EVERY 4 HOURS PRN
Status: DISCONTINUED | OUTPATIENT
Start: 2025-01-21 | End: 2025-01-23 | Stop reason: HOSPADM

## 2025-01-21 RX ORDER — CIPROFLOXACIN 2 MG/ML
400 INJECTION, SOLUTION INTRAVENOUS ONCE
Status: COMPLETED | OUTPATIENT
Start: 2025-01-22 | End: 2025-01-22

## 2025-01-21 RX ORDER — PROMETHAZINE HYDROCHLORIDE 25 MG/1
12.5-25 SUPPOSITORY RECTAL EVERY 4 HOURS PRN
Status: DISCONTINUED | OUTPATIENT
Start: 2025-01-21 | End: 2025-01-23 | Stop reason: HOSPADM

## 2025-01-21 RX ORDER — PROMETHAZINE HYDROCHLORIDE 25 MG/1
12.5-25 TABLET ORAL EVERY 4 HOURS PRN
Status: DISCONTINUED | OUTPATIENT
Start: 2025-01-21 | End: 2025-01-23 | Stop reason: HOSPADM

## 2025-01-21 RX ORDER — MORPHINE SULFATE 4 MG/ML
4 INJECTION INTRAVENOUS EVERY 4 HOURS PRN
Status: DISCONTINUED | OUTPATIENT
Start: 2025-01-21 | End: 2025-01-22

## 2025-01-21 RX ORDER — MORPHINE SULFATE 4 MG/ML
4 INJECTION INTRAVENOUS ONCE
Status: COMPLETED | OUTPATIENT
Start: 2025-01-21 | End: 2025-01-21

## 2025-01-21 RX ORDER — SODIUM CHLORIDE 9 MG/ML
INJECTION, SOLUTION INTRAVENOUS CONTINUOUS
Status: DISCONTINUED | OUTPATIENT
Start: 2025-01-22 | End: 2025-01-23

## 2025-01-21 RX ORDER — ONDANSETRON 2 MG/ML
4 INJECTION INTRAMUSCULAR; INTRAVENOUS EVERY 4 HOURS PRN
Status: DISCONTINUED | OUTPATIENT
Start: 2025-01-21 | End: 2025-01-23 | Stop reason: HOSPADM

## 2025-01-21 RX ORDER — PROCHLORPERAZINE EDISYLATE 5 MG/ML
5-10 INJECTION INTRAMUSCULAR; INTRAVENOUS EVERY 4 HOURS PRN
Status: DISCONTINUED | OUTPATIENT
Start: 2025-01-21 | End: 2025-01-23 | Stop reason: HOSPADM

## 2025-01-21 RX ORDER — SODIUM CHLORIDE 9 MG/ML
1000 INJECTION, SOLUTION INTRAVENOUS ONCE
Status: COMPLETED | OUTPATIENT
Start: 2025-01-21 | End: 2025-01-21

## 2025-01-21 RX ORDER — KETOROLAC TROMETHAMINE 15 MG/ML
15 INJECTION, SOLUTION INTRAMUSCULAR; INTRAVENOUS ONCE
Status: COMPLETED | OUTPATIENT
Start: 2025-01-21 | End: 2025-01-21

## 2025-01-21 RX ADMIN — MORPHINE SULFATE 4 MG: 4 INJECTION, SOLUTION INTRAMUSCULAR; INTRAVENOUS at 20:25

## 2025-01-21 RX ADMIN — FENTANYL CITRATE 50 MCG: 50 INJECTION, SOLUTION INTRAMUSCULAR; INTRAVENOUS at 19:00

## 2025-01-21 RX ADMIN — KETOROLAC TROMETHAMINE 15 MG: 15 INJECTION, SOLUTION INTRAMUSCULAR; INTRAVENOUS at 18:59

## 2025-01-21 RX ADMIN — SODIUM CHLORIDE 1000 ML: 9 INJECTION, SOLUTION INTRAVENOUS at 18:58

## 2025-01-21 RX ADMIN — MORPHINE SULFATE 4 MG: 4 INJECTION, SOLUTION INTRAMUSCULAR; INTRAVENOUS at 22:13

## 2025-01-21 ASSESSMENT — FIBROSIS 4 INDEX: FIB4 SCORE: 0.61

## 2025-01-21 ASSESSMENT — PAIN DESCRIPTION - PAIN TYPE
TYPE: ACUTE PAIN

## 2025-01-22 ENCOUNTER — ANESTHESIA (OUTPATIENT)
Dept: SURGERY | Facility: MEDICAL CENTER | Age: 35
End: 2025-01-22
Payer: COMMERCIAL

## 2025-01-22 ENCOUNTER — ANESTHESIA EVENT (OUTPATIENT)
Dept: SURGERY | Facility: MEDICAL CENTER | Age: 35
End: 2025-01-22
Payer: COMMERCIAL

## 2025-01-22 ENCOUNTER — APPOINTMENT (OUTPATIENT)
Dept: RADIOLOGY | Facility: MEDICAL CENTER | Age: 35
End: 2025-01-22
Attending: UROLOGY
Payer: COMMERCIAL

## 2025-01-22 PROBLEM — N13.9 ACUTE UNILATERAL OBSTRUCTIVE UROPATHY: Status: ACTIVE | Noted: 2025-01-21

## 2025-01-22 LAB
ANION GAP SERPL CALC-SCNC: 9 MMOL/L (ref 7–16)
BUN SERPL-MCNC: 16 MG/DL (ref 8–22)
CALCIUM SERPL-MCNC: 9.1 MG/DL (ref 8.5–10.5)
CHLORIDE SERPL-SCNC: 105 MMOL/L (ref 96–112)
CO2 SERPL-SCNC: 22 MMOL/L (ref 20–33)
CREAT SERPL-MCNC: 1.39 MG/DL (ref 0.5–1.4)
ERYTHROCYTE [DISTWIDTH] IN BLOOD BY AUTOMATED COUNT: 44.9 FL (ref 35.9–50)
GFR SERPLBLD CREATININE-BSD FMLA CKD-EPI: 68 ML/MIN/1.73 M 2
GLUCOSE SERPL-MCNC: 92 MG/DL (ref 65–99)
HCT VFR BLD AUTO: 46.5 % (ref 42–52)
HGB BLD-MCNC: 15.1 G/DL (ref 14–18)
MCH RBC QN AUTO: 30.4 PG (ref 27–33)
MCHC RBC AUTO-ENTMCNC: 32.5 G/DL (ref 32.3–36.5)
MCV RBC AUTO: 93.8 FL (ref 81.4–97.8)
PLATELET # BLD AUTO: 318 K/UL (ref 164–446)
PMV BLD AUTO: 9.4 FL (ref 9–12.9)
POTASSIUM SERPL-SCNC: 3.9 MMOL/L (ref 3.6–5.5)
PROCALCITONIN SERPL-MCNC: 0.06 NG/ML
RBC # BLD AUTO: 4.96 M/UL (ref 4.7–6.1)
SODIUM SERPL-SCNC: 136 MMOL/L (ref 135–145)
WBC # BLD AUTO: 11.9 K/UL (ref 4.8–10.8)

## 2025-01-22 PROCEDURE — 160039 HCHG SURGERY MINUTES - EA ADDL 1 MIN LEVEL 3: Performed by: UROLOGY

## 2025-01-22 PROCEDURE — A9270 NON-COVERED ITEM OR SERVICE: HCPCS | Performed by: STUDENT IN AN ORGANIZED HEALTH CARE EDUCATION/TRAINING PROGRAM

## 2025-01-22 PROCEDURE — 700105 HCHG RX REV CODE 258: Performed by: STUDENT IN AN ORGANIZED HEALTH CARE EDUCATION/TRAINING PROGRAM

## 2025-01-22 PROCEDURE — C1894 INTRO/SHEATH, NON-LASER: HCPCS | Performed by: UROLOGY

## 2025-01-22 PROCEDURE — 36415 COLL VENOUS BLD VENIPUNCTURE: CPT

## 2025-01-22 PROCEDURE — 700111 HCHG RX REV CODE 636 W/ 250 OVERRIDE (IP): Mod: JZ | Performed by: STUDENT IN AN ORGANIZED HEALTH CARE EDUCATION/TRAINING PROGRAM

## 2025-01-22 PROCEDURE — 700102 HCHG RX REV CODE 250 W/ 637 OVERRIDE(OP): Performed by: STUDENT IN AN ORGANIZED HEALTH CARE EDUCATION/TRAINING PROGRAM

## 2025-01-22 PROCEDURE — 160048 HCHG OR STATISTICAL LEVEL 1-5: Performed by: UROLOGY

## 2025-01-22 PROCEDURE — 700101 HCHG RX REV CODE 250: Performed by: ANESTHESIOLOGY

## 2025-01-22 PROCEDURE — G0378 HOSPITAL OBSERVATION PER HR: HCPCS

## 2025-01-22 PROCEDURE — 160036 HCHG PACU - EA ADDL 30 MINS PHASE I: Performed by: UROLOGY

## 2025-01-22 PROCEDURE — C1747 HCHG SHELL REV 278 C1747: HCPCS | Performed by: UROLOGY

## 2025-01-22 PROCEDURE — 88300 SURGICAL PATH GROSS: CPT | Performed by: STUDENT IN AN ORGANIZED HEALTH CARE EDUCATION/TRAINING PROGRAM

## 2025-01-22 PROCEDURE — 82365 CALCULUS SPECTROSCOPY: CPT

## 2025-01-22 PROCEDURE — 160002 HCHG RECOVERY MINUTES (STAT): Performed by: UROLOGY

## 2025-01-22 PROCEDURE — RXMED WILLOW AMBULATORY MEDICATION CHARGE: Performed by: UROLOGY

## 2025-01-22 PROCEDURE — 700105 HCHG RX REV CODE 258: Performed by: ANESTHESIOLOGY

## 2025-01-22 PROCEDURE — 700111 HCHG RX REV CODE 636 W/ 250 OVERRIDE (IP): Performed by: EMERGENCY MEDICINE

## 2025-01-22 PROCEDURE — 700111 HCHG RX REV CODE 636 W/ 250 OVERRIDE (IP): Performed by: ANESTHESIOLOGY

## 2025-01-22 PROCEDURE — A9270 NON-COVERED ITEM OR SERVICE: HCPCS

## 2025-01-22 PROCEDURE — 96376 TX/PRO/DX INJ SAME DRUG ADON: CPT

## 2025-01-22 PROCEDURE — C2617 STENT, NON-COR, TEM W/O DEL: HCPCS | Performed by: UROLOGY

## 2025-01-22 PROCEDURE — 110371 HCHG SHELL REV 272: Performed by: UROLOGY

## 2025-01-22 PROCEDURE — 700111 HCHG RX REV CODE 636 W/ 250 OVERRIDE (IP): Mod: JZ

## 2025-01-22 PROCEDURE — 80048 BASIC METABOLIC PNL TOTAL CA: CPT

## 2025-01-22 PROCEDURE — 160009 HCHG ANES TIME/MIN: Performed by: UROLOGY

## 2025-01-22 PROCEDURE — 99233 SBSQ HOSP IP/OBS HIGH 50: CPT | Performed by: STUDENT IN AN ORGANIZED HEALTH CARE EDUCATION/TRAINING PROGRAM

## 2025-01-22 PROCEDURE — 160035 HCHG PACU - 1ST 60 MINS PHASE I: Performed by: UROLOGY

## 2025-01-22 PROCEDURE — 96375 TX/PRO/DX INJ NEW DRUG ADDON: CPT

## 2025-01-22 PROCEDURE — 96365 THER/PROPH/DIAG IV INF INIT: CPT

## 2025-01-22 PROCEDURE — 700102 HCHG RX REV CODE 250 W/ 637 OVERRIDE(OP): Performed by: ANESTHESIOLOGY

## 2025-01-22 PROCEDURE — 160028 HCHG SURGERY MINUTES - 1ST 30 MINS LEVEL 3: Performed by: UROLOGY

## 2025-01-22 PROCEDURE — 700102 HCHG RX REV CODE 250 W/ 637 OVERRIDE(OP)

## 2025-01-22 PROCEDURE — 85027 COMPLETE CBC AUTOMATED: CPT

## 2025-01-22 PROCEDURE — 700111 HCHG RX REV CODE 636 W/ 250 OVERRIDE (IP): Mod: JZ | Performed by: ANESTHESIOLOGY

## 2025-01-22 PROCEDURE — A9270 NON-COVERED ITEM OR SERVICE: HCPCS | Performed by: ANESTHESIOLOGY

## 2025-01-22 PROCEDURE — C1769 GUIDE WIRE: HCPCS | Performed by: UROLOGY

## 2025-01-22 DEVICE — STENT UROLOGICAL POLARIS 6X24 ULTRA: Type: IMPLANTABLE DEVICE | Site: URETER | Status: FUNCTIONAL

## 2025-01-22 RX ORDER — HYDROMORPHONE HYDROCHLORIDE 1 MG/ML
0.25 INJECTION, SOLUTION INTRAMUSCULAR; INTRAVENOUS; SUBCUTANEOUS
Status: DISCONTINUED | OUTPATIENT
Start: 2025-01-22 | End: 2025-01-22

## 2025-01-22 RX ORDER — OXYCODONE HCL 5 MG/5 ML
10 SOLUTION, ORAL ORAL
Status: COMPLETED | OUTPATIENT
Start: 2025-01-22 | End: 2025-01-22

## 2025-01-22 RX ORDER — HYDRALAZINE HYDROCHLORIDE 20 MG/ML
5 INJECTION INTRAMUSCULAR; INTRAVENOUS
Status: DISCONTINUED | OUTPATIENT
Start: 2025-01-22 | End: 2025-01-22 | Stop reason: HOSPADM

## 2025-01-22 RX ORDER — METOPROLOL TARTRATE 1 MG/ML
1 INJECTION, SOLUTION INTRAVENOUS
Status: DISCONTINUED | OUTPATIENT
Start: 2025-01-22 | End: 2025-01-22 | Stop reason: HOSPADM

## 2025-01-22 RX ORDER — DEXAMETHASONE SODIUM PHOSPHATE 4 MG/ML
INJECTION, SOLUTION INTRA-ARTICULAR; INTRALESIONAL; INTRAMUSCULAR; INTRAVENOUS; SOFT TISSUE PRN
Status: DISCONTINUED | OUTPATIENT
Start: 2025-01-22 | End: 2025-01-22 | Stop reason: SURG

## 2025-01-22 RX ORDER — MORPHINE SULFATE 4 MG/ML
4 INJECTION INTRAVENOUS EVERY 4 HOURS PRN
Status: DISCONTINUED | OUTPATIENT
Start: 2025-01-22 | End: 2025-01-22

## 2025-01-22 RX ORDER — OXYBUTYNIN CHLORIDE 10 MG/1
10 TABLET, EXTENDED RELEASE ORAL
Qty: 14 TABLET | Refills: 0 | Status: SHIPPED | OUTPATIENT
Start: 2025-01-22 | End: 2025-01-29

## 2025-01-22 RX ORDER — DIPHENHYDRAMINE HYDROCHLORIDE 50 MG/ML
12.5 INJECTION INTRAMUSCULAR; INTRAVENOUS
Status: DISCONTINUED | OUTPATIENT
Start: 2025-01-22 | End: 2025-01-22 | Stop reason: HOSPADM

## 2025-01-22 RX ORDER — OXYCODONE HCL 5 MG/5 ML
5 SOLUTION, ORAL ORAL
Status: COMPLETED | OUTPATIENT
Start: 2025-01-22 | End: 2025-01-22

## 2025-01-22 RX ORDER — KETOROLAC TROMETHAMINE 15 MG/ML
15 INJECTION, SOLUTION INTRAMUSCULAR; INTRAVENOUS ONCE
Status: COMPLETED | OUTPATIENT
Start: 2025-01-22 | End: 2025-01-22

## 2025-01-22 RX ORDER — TRAMADOL HYDROCHLORIDE 50 MG/1
50 TABLET ORAL EVERY 4 HOURS PRN
Status: DISCONTINUED | OUTPATIENT
Start: 2025-01-22 | End: 2025-01-22

## 2025-01-22 RX ORDER — OXYCODONE HYDROCHLORIDE 5 MG/1
5 TABLET ORAL
Status: DISCONTINUED | OUTPATIENT
Start: 2025-01-22 | End: 2025-01-22

## 2025-01-22 RX ORDER — OXYCODONE AND ACETAMINOPHEN 5; 325 MG/1; MG/1
1 TABLET ORAL
Status: ON HOLD | COMMUNITY
End: 2025-01-23

## 2025-01-22 RX ORDER — SODIUM CHLORIDE, SODIUM LACTATE, POTASSIUM CHLORIDE, CALCIUM CHLORIDE 600; 310; 30; 20 MG/100ML; MG/100ML; MG/100ML; MG/100ML
INJECTION, SOLUTION INTRAVENOUS
Status: DISCONTINUED | OUTPATIENT
Start: 2025-01-22 | End: 2025-01-22 | Stop reason: SURG

## 2025-01-22 RX ORDER — TAMSULOSIN HYDROCHLORIDE 0.4 MG/1
0.4 CAPSULE ORAL DAILY
Qty: 30 CAPSULE | Refills: 1 | Status: SHIPPED | OUTPATIENT
Start: 2025-01-22 | End: 2025-01-29

## 2025-01-22 RX ORDER — ACETAMINOPHEN 500 MG
1000 TABLET ORAL EVERY 6 HOURS
Status: DISCONTINUED | OUTPATIENT
Start: 2025-01-22 | End: 2025-01-23 | Stop reason: HOSPADM

## 2025-01-22 RX ORDER — OXYCODONE HYDROCHLORIDE 10 MG/1
10 TABLET ORAL
Status: DISCONTINUED | OUTPATIENT
Start: 2025-01-22 | End: 2025-01-23 | Stop reason: HOSPADM

## 2025-01-22 RX ORDER — LABETALOL HYDROCHLORIDE 5 MG/ML
5 INJECTION, SOLUTION INTRAVENOUS
Status: DISCONTINUED | OUTPATIENT
Start: 2025-01-22 | End: 2025-01-22 | Stop reason: HOSPADM

## 2025-01-22 RX ORDER — OXYCODONE HYDROCHLORIDE 5 MG/1
5 TABLET ORAL
Status: DISCONTINUED | OUTPATIENT
Start: 2025-01-22 | End: 2025-01-23 | Stop reason: HOSPADM

## 2025-01-22 RX ORDER — PHENAZOPYRIDINE HYDROCHLORIDE 100 MG/1
100 TABLET, FILM COATED ORAL 3 TIMES DAILY PRN
COMMUNITY
Start: 2025-01-22 | End: 2025-01-23

## 2025-01-22 RX ORDER — IPRATROPIUM BROMIDE AND ALBUTEROL SULFATE 2.5; .5 MG/3ML; MG/3ML
3 SOLUTION RESPIRATORY (INHALATION)
Status: DISCONTINUED | OUTPATIENT
Start: 2025-01-22 | End: 2025-01-22 | Stop reason: HOSPADM

## 2025-01-22 RX ORDER — OXYCODONE HYDROCHLORIDE 5 MG/1
5-10 TABLET ORAL EVERY 6 HOURS PRN
Qty: 25 TABLET | Refills: 0 | Status: CANCELLED | OUTPATIENT
Start: 2025-01-22 | End: 2025-01-27

## 2025-01-22 RX ORDER — OXYCODONE HYDROCHLORIDE 5 MG/1
2.5 TABLET ORAL
Status: DISCONTINUED | OUTPATIENT
Start: 2025-01-22 | End: 2025-01-22

## 2025-01-22 RX ORDER — LIDOCAINE HYDROCHLORIDE 20 MG/ML
INJECTION, SOLUTION EPIDURAL; INFILTRATION; INTRACAUDAL; PERINEURAL PRN
Status: DISCONTINUED | OUTPATIENT
Start: 2025-01-22 | End: 2025-01-22 | Stop reason: SURG

## 2025-01-22 RX ORDER — POLYETHYLENE GLYCOL 3350 17 G/17G
17 POWDER, FOR SOLUTION ORAL DAILY
COMMUNITY
Start: 2025-01-22 | End: 2025-01-29

## 2025-01-22 RX ORDER — ACETAMINOPHEN 500 MG
1000 TABLET ORAL EVERY 6 HOURS PRN
Status: DISCONTINUED | OUTPATIENT
Start: 2025-01-27 | End: 2025-01-23 | Stop reason: HOSPADM

## 2025-01-22 RX ORDER — MEPERIDINE HYDROCHLORIDE 25 MG/ML
12.5 INJECTION INTRAMUSCULAR; INTRAVENOUS; SUBCUTANEOUS
Status: DISCONTINUED | OUTPATIENT
Start: 2025-01-22 | End: 2025-01-22 | Stop reason: HOSPADM

## 2025-01-22 RX ORDER — ONDANSETRON 2 MG/ML
4 INJECTION INTRAMUSCULAR; INTRAVENOUS
Status: DISCONTINUED | OUTPATIENT
Start: 2025-01-22 | End: 2025-01-22 | Stop reason: HOSPADM

## 2025-01-22 RX ORDER — MIDAZOLAM HYDROCHLORIDE 1 MG/ML
INJECTION INTRAMUSCULAR; INTRAVENOUS PRN
Status: DISCONTINUED | OUTPATIENT
Start: 2025-01-22 | End: 2025-01-22 | Stop reason: SURG

## 2025-01-22 RX ORDER — HALOPERIDOL 5 MG/ML
1 INJECTION INTRAMUSCULAR
Status: DISCONTINUED | OUTPATIENT
Start: 2025-01-22 | End: 2025-01-22 | Stop reason: HOSPADM

## 2025-01-22 RX ORDER — SODIUM CHLORIDE, SODIUM LACTATE, POTASSIUM CHLORIDE, CALCIUM CHLORIDE 600; 310; 30; 20 MG/100ML; MG/100ML; MG/100ML; MG/100ML
INJECTION, SOLUTION INTRAVENOUS CONTINUOUS
Status: DISCONTINUED | OUTPATIENT
Start: 2025-01-22 | End: 2025-01-23 | Stop reason: HOSPADM

## 2025-01-22 RX ORDER — EPHEDRINE SULFATE 50 MG/ML
5 INJECTION, SOLUTION INTRAVENOUS
Status: DISCONTINUED | OUTPATIENT
Start: 2025-01-22 | End: 2025-01-22 | Stop reason: HOSPADM

## 2025-01-22 RX ORDER — KETOROLAC TROMETHAMINE 10 MG/1
10 TABLET, FILM COATED ORAL EVERY 6 HOURS PRN
Qty: 30 TABLET | Refills: 1 | Status: SHIPPED | OUTPATIENT
Start: 2025-01-22 | End: 2025-01-29

## 2025-01-22 RX ORDER — MORPHINE SULFATE 4 MG/ML
4 INJECTION INTRAVENOUS
Status: DISCONTINUED | OUTPATIENT
Start: 2025-01-22 | End: 2025-01-23

## 2025-01-22 RX ADMIN — KETOROLAC TROMETHAMINE 15 MG: 15 INJECTION, SOLUTION INTRAMUSCULAR; INTRAVENOUS at 04:15

## 2025-01-22 RX ADMIN — HYDROMORPHONE HYDROCHLORIDE 0.25 MG: 1 INJECTION, SOLUTION INTRAMUSCULAR; INTRAVENOUS; SUBCUTANEOUS at 20:12

## 2025-01-22 RX ADMIN — ACETAMINOPHEN 1000 MG: 500 TABLET ORAL at 12:48

## 2025-01-22 RX ADMIN — MORPHINE SULFATE 4 MG: 4 INJECTION, SOLUTION INTRAMUSCULAR; INTRAVENOUS at 23:28

## 2025-01-22 RX ADMIN — DEXAMETHASONE SODIUM PHOSPHATE 8 MG: 4 INJECTION INTRA-ARTICULAR; INTRALESIONAL; INTRAMUSCULAR; INTRAVENOUS; SOFT TISSUE at 15:44

## 2025-01-22 RX ADMIN — FENTANYL CITRATE 50 MCG: 50 INJECTION, SOLUTION INTRAMUSCULAR; INTRAVENOUS at 17:20

## 2025-01-22 RX ADMIN — OXYCODONE HYDROCHLORIDE 10 MG: 10 TABLET ORAL at 22:10

## 2025-01-22 RX ADMIN — MIDAZOLAM HYDROCHLORIDE 2 MG: 1 INJECTION, SOLUTION INTRAMUSCULAR; INTRAVENOUS at 15:26

## 2025-01-22 RX ADMIN — KETOROLAC TROMETHAMINE 15 MG: 15 INJECTION, SOLUTION INTRAMUSCULAR; INTRAVENOUS at 20:20

## 2025-01-22 RX ADMIN — MORPHINE SULFATE 2 MG: 10 INJECTION INTRAVENOUS at 17:40

## 2025-01-22 RX ADMIN — FENTANYL CITRATE 100 MCG: 50 INJECTION, SOLUTION INTRAMUSCULAR; INTRAVENOUS at 15:28

## 2025-01-22 RX ADMIN — CIPROFLOXACIN 400 MG: 400 INJECTION, SOLUTION INTRAVENOUS at 00:53

## 2025-01-22 RX ADMIN — PROPOFOL 200 MG: 10 INJECTION, EMULSION INTRAVENOUS at 15:39

## 2025-01-22 RX ADMIN — MORPHINE SULFATE 4 MG: 4 INJECTION, SOLUTION INTRAMUSCULAR; INTRAVENOUS at 00:45

## 2025-01-22 RX ADMIN — CEFTRIAXONE SODIUM 2000 MG: 10 INJECTION, POWDER, FOR SOLUTION INTRAVENOUS at 14:35

## 2025-01-22 RX ADMIN — ACETAMINOPHEN 1000 MG: 500 TABLET ORAL at 18:34

## 2025-01-22 RX ADMIN — SODIUM CHLORIDE, POTASSIUM CHLORIDE, SODIUM LACTATE AND CALCIUM CHLORIDE: 600; 310; 30; 20 INJECTION, SOLUTION INTRAVENOUS at 20:28

## 2025-01-22 RX ADMIN — MORPHINE SULFATE 4 MG: 4 INJECTION, SOLUTION INTRAMUSCULAR; INTRAVENOUS at 06:27

## 2025-01-22 RX ADMIN — OXYCODONE 5 MG: 5 TABLET ORAL at 18:35

## 2025-01-22 RX ADMIN — OXYCODONE 5 MG: 5 TABLET ORAL at 12:48

## 2025-01-22 RX ADMIN — MORPHINE SULFATE 4 MG: 4 INJECTION, SOLUTION INTRAMUSCULAR; INTRAVENOUS at 20:25

## 2025-01-22 RX ADMIN — SODIUM CHLORIDE, POTASSIUM CHLORIDE, SODIUM LACTATE AND CALCIUM CHLORIDE: 600; 310; 30; 20 INJECTION, SOLUTION INTRAVENOUS at 15:32

## 2025-01-22 RX ADMIN — FENTANYL CITRATE 50 MCG: 50 INJECTION, SOLUTION INTRAMUSCULAR; INTRAVENOUS at 17:25

## 2025-01-22 RX ADMIN — OXYCODONE 5 MG: 5 TABLET ORAL at 08:33

## 2025-01-22 RX ADMIN — LIDOCAINE HYDROCHLORIDE 100 MG: 20 INJECTION, SOLUTION EPIDURAL; INFILTRATION; INTRACAUDAL; PERINEURAL at 15:39

## 2025-01-22 RX ADMIN — HYDROMORPHONE HYDROCHLORIDE 0.25 MG: 1 INJECTION, SOLUTION INTRAMUSCULAR; INTRAVENOUS; SUBCUTANEOUS at 19:43

## 2025-01-22 RX ADMIN — ACETAMINOPHEN 1000 MG: 500 TABLET ORAL at 08:32

## 2025-01-22 RX ADMIN — SODIUM CHLORIDE: 9 INJECTION, SOLUTION INTRAVENOUS at 00:49

## 2025-01-22 RX ADMIN — OXYCODONE HYDROCHLORIDE 10 MG: 5 SOLUTION ORAL at 17:17

## 2025-01-22 RX ADMIN — ACETAMINOPHEN 1000 MG: 500 TABLET ORAL at 23:27

## 2025-01-22 RX ADMIN — ACETAMINOPHEN 650 MG: 325 TABLET ORAL at 03:44

## 2025-01-22 RX ADMIN — ONDANSETRON 4 MG: 2 INJECTION INTRAMUSCULAR; INTRAVENOUS at 01:21

## 2025-01-22 RX ADMIN — HYDROMORPHONE HYDROCHLORIDE 0.25 MG: 1 INJECTION, SOLUTION INTRAMUSCULAR; INTRAVENOUS; SUBCUTANEOUS at 14:42

## 2025-01-22 RX ADMIN — MORPHINE SULFATE 5 MG: 10 INJECTION INTRAVENOUS at 17:32

## 2025-01-22 SDOH — ECONOMIC STABILITY: TRANSPORTATION INSECURITY
IN THE PAST 12 MONTHS, HAS LACK OF RELIABLE TRANSPORTATION KEPT YOU FROM MEDICAL APPOINTMENTS, MEETINGS, WORK OR FROM GETTING THINGS NEEDED FOR DAILY LIVING?: YES

## 2025-01-22 ASSESSMENT — LIFESTYLE VARIABLES
EVER FELT BAD OR GUILTY ABOUT YOUR DRINKING: NO
CONSUMPTION TOTAL: INCOMPLETE
HAVE YOU EVER FELT YOU SHOULD CUT DOWN ON YOUR DRINKING: NO
TOTAL SCORE: 0
HAVE PEOPLE ANNOYED YOU BY CRITICIZING YOUR DRINKING: NO
DOES PATIENT WANT TO STOP DRINKING: NO
TOTAL SCORE: 0
EVER HAD A DRINK FIRST THING IN THE MORNING TO STEADY YOUR NERVES TO GET RID OF A HANGOVER: NO
TOTAL SCORE: 0
ALCOHOL_USE: NO

## 2025-01-22 ASSESSMENT — PAIN DESCRIPTION - PAIN TYPE
TYPE: ACUTE PAIN
TYPE: SURGICAL PAIN
TYPE: ACUTE PAIN
TYPE: SURGICAL PAIN
TYPE: ACUTE PAIN
TYPE: SURGICAL PAIN
TYPE: ACUTE PAIN

## 2025-01-22 ASSESSMENT — COGNITIVE AND FUNCTIONAL STATUS - GENERAL
DAILY ACTIVITIY SCORE: 17
MOVING FROM LYING ON BACK TO SITTING ON SIDE OF FLAT BED: A LITTLE
MOBILITY SCORE: 15
HELP NEEDED FOR BATHING: A LITTLE
DRESSING REGULAR UPPER BODY CLOTHING: A LITTLE
STANDING UP FROM CHAIR USING ARMS: A LITTLE
CLIMB 3 TO 5 STEPS WITH RAILING: A LOT
EATING MEALS: A LITTLE
WALKING IN HOSPITAL ROOM: A LOT
DRESSING REGULAR LOWER BODY CLOTHING: A LITTLE
TOILETING: A LOT
SUGGESTED CMS G CODE MODIFIER DAILY ACTIVITY: CK
PERSONAL GROOMING: A LITTLE
MOVING TO AND FROM BED TO CHAIR: A LOT
SUGGESTED CMS G CODE MODIFIER MOBILITY: CK
TURNING FROM BACK TO SIDE WHILE IN FLAT BAD: A LITTLE

## 2025-01-22 ASSESSMENT — ENCOUNTER SYMPTOMS
SHORTNESS OF BREATH: 0
GASTROINTESTINAL NEGATIVE: 1
PSYCHIATRIC NEGATIVE: 1
DEPRESSION: 0
EYES NEGATIVE: 1
BRUISES/BLEEDS EASILY: 0
BLURRED VISION: 0
FLANK PAIN: 1
RESPIRATORY NEGATIVE: 1
VOMITING: 1
CHILLS: 0
FEVER: 0
ABDOMINAL PAIN: 0
CARDIOVASCULAR NEGATIVE: 1
NAUSEA: 1
MUSCULOSKELETAL NEGATIVE: 1
BACK PAIN: 0
NEUROLOGICAL NEGATIVE: 1

## 2025-01-22 ASSESSMENT — PAIN SCALES - GENERAL: PAIN_LEVEL: 0

## 2025-01-22 ASSESSMENT — SOCIAL DETERMINANTS OF HEALTH (SDOH)
IN THE PAST 12 MONTHS, HAS THE ELECTRIC, GAS, OIL, OR WATER COMPANY THREATENED TO SHUT OFF SERVICE IN YOUR HOME?: YES
WITHIN THE PAST 12 MONTHS, THE FOOD YOU BOUGHT JUST DIDN'T LAST AND YOU DIDN'T HAVE MONEY TO GET MORE: SOMETIMES TRUE
WITHIN THE PAST 12 MONTHS, YOU WORRIED THAT YOUR FOOD WOULD RUN OUT BEFORE YOU GOT THE MONEY TO BUY MORE: OFTEN TRUE

## 2025-01-22 NOTE — CONSULTS
[facility] [place] Consult/H&P Note    Patient's Name/MRN: Russell Bunn, [id number]   Room #: S522/00    Admit Date:1/21/2025  Today's Date: 1/22/2025   Length of stay:  LOS: 0 days      Reason for consult/chief complaint: Left ureteral stone  ID/HPI: Russell Bunn is a 34 y.o. male patient who p/w severe 8/10 flank pain on left side. He has had +N, +V, -F, -C.  TIn ER, WBC was 11.9, UA was NOT c/w infection, and Cr was 1.42 up from baseline at 1.36.  CT was doen showing 7mm stones in the Left prxoimal and distal ureter.     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 Finn for many years.  He reports allergies to Thiola which she is tried in the past.  He has been on oral potassium citrate tabs although this caused extreme stomach upset and nausea vomiting so he has been on nothing although he has been trying to stay hydrated at home.     Past Medical History:   Past Medical History:   Diagnosis Date    Kidney stones     multiple times requiring multiple surgeries    Seizure disorder (HCC)         Past Surgical History:   Past Surgical History:   Procedure Laterality Date    VA CYSTOSCOPY,INSERT URETERAL STENT Left 8/10/2023    Procedure: CYSTOSCOPY, WITH URETERAL STENT INSERTION;  Surgeon: Rei Silver M.D.;  Location: [facility];  Service: Urology    VA CYSTO/URETERO/PYELOSCOPY, DX Left 8/10/2023    Procedure: URETEROSCOPY;  Surgeon: Rei Silver M.D.;  Location: [facility];  Service: Urology    CYSTOSCOPY WITH URETERAL STENT INSERTION OR REMOVAL Right 4/24/2023    Procedure: CYSTOSCOPY, WITH RIGHT URETERAL STENT INSERTION;  Surgeon: Dante Stephenson M.D.;  Location: [facility];  Service: Urology    CYSTOSCOPY Left 02/20/2022    Cysto ureteroscopy ,lithotripsy, stone basket, ureteral stent placement, Dr Silver    VA CYSTOSCOPY,INSERT URETERAL STENT Left 2/20/2022    Procedure:  CYSTOSCOPY, WITH URETERAL STENT INSERTION;  Surgeon: Rei Silver M.D.;  Location: SUR[facility]ERY [facility];  Service: Urology    MO CYSTO/URETERO/PYELOSCOPY, DX Left 2/20/2022    Procedure: URETEROSCOPY;  Surgeon: Rei Silver M.D.;  Location: SURGERY [facility];  Service: Urology    LASERTRIPSY Left 2/20/2022    Procedure: LITHOTRIPSY, USING LASER;  Surgeon: Rei Silver M.D.;  Location: SURGERY [facility];  Service: Urology    MO CYSTOSCOPY,INSERT URETERAL STENT Left 4/26/2021    Procedure: CYSTOSCOPY, WITH OUT URETERAL STENT INSERTION;  Surgeon: Raf Moss M.D.;  Location: SURGERY [facility];  Service: Urology    MO CYSTO/URETERO/PYELOSCOPY, DX Left 4/26/2021    Procedure: URETEROSCOPY;  Surgeon: Raf Moss M.D.;  Location: [facility];  Service: Urology    MO CYSTO/URETERO/PYELOSCOPY, DX Right 3/13/2020    Procedure: URETEROSCOPY;  Surgeon: Chase Damon M.D.;  Location: [facility];  Service: Urology    MO CYSTOSCOPY,INSERT URETERAL STENT Right 3/13/2020    Procedure: CYSTOSCOPY;  Surgeon: Chase Damon M.D.;  Location: [facility];  Service: Urology    LASERTRIPSY Right 3/13/2020    Procedure: LITHOTRIPSY, USING LASER;  Surgeon: Chase Damon M.D.;  Location: SURGERY [facility];  Service: Urology    LITHOTRIPSY          Family History:   History reviewed. No pertinent family history.      Social History:   Social History     Tobacco Use    Smoking status: Some Days    Smokeless tobacco: Never   Vaping Use    Vaping status: Some Days    Substances: THC (Smokes multiple times a day everyday)    Devices: Disposable    Passive vaping exposure: Yes   Substance Use Topics    Alcohol use: Yes    Drug use: Yes     Types: Inhaled     Comment: Marijuana, every day      Social History     Social History Narrative    Not on file        Allergies: he Kiwi extract, Shellfish allergy, Ampicillin-sulbactam sodium, Cefazolin, Dilaudid [hydromorphone], Hydrocodone,  "Oxycodone, Potassium iodide, and Tape    Medications:   Medications Prior to Admission   Medication Sig Dispense Refill Last Dose/Taking    oxyCODONE-acetaminophen (PERCOCET) 5-325 MG Tab Take 1 Tablet by mouth one time as needed.   1/21/2025 Morning         Review of Systems  Review of Systems   Constitutional:  Negative for chills and fever.   Eyes:  Negative for blurred vision.   Respiratory:  Negative for shortness of breath.    Cardiovascular:  Negative for chest pain.   Gastrointestinal:  Positive for nausea and vomiting. Negative for abdominal pain.   Genitourinary:  Positive for flank pain, frequency and urgency. Negative for dysuria and hematuria.   Musculoskeletal:  Negative for back pain.   Skin:  Negative for rash.   Endo/Heme/Allergies:  Does not bruise/bleed easily.   Psychiatric/Behavioral:  Negative for depression.         Physical Exam  VITAL SIGNS: BP (!) 152/97   Pulse 85   Temp 36.9 °C (98.5 °F) (Temporal)   Resp 18   Ht 1.676 m (5' 6\")   Wt 74.1 kg (163 lb 5.8 oz)   SpO2 98%   BMI 26.37 kg/m²   GENERAL:  alert, in no acute distress  EYES: EOMI and normal accomodation  Neck: Supple  BACK: No CVA tenderness.   CHEST AND LUNGS: good air entry, no audible wheezes  CARDIOVASCULAR: Rate is regular, no peripheral edema.   ABDOMEN: Abdomen soft, nontender. No masses, organomegaly.  : +L CVAT  EXTREMITIES: Warm and well perfused without c/c/e  NEURO: No focal deficits  SKIN: Skin color, texture, turgor normal. No rashes, lesions, nor jaundice.      Labs:  Recent Labs     01/21/25  1711 01/22/25  0209   WBC 12.8* 11.9*   RBC 5.39 4.96   HEMOGLOBIN 16.5 15.1   HEMATOCRIT 49.7 46.5   MCV 92.2 93.8   MCH 30.6 30.4   MCHC 33.2 32.5   RDW 44.6 44.9   PLATELETCT 357 318   MPV 9.6 9.4     Recent Labs     01/21/25  1711 01/22/25  0209   SODIUM 137 136   POTASSIUM 4.3 3.9   CHLORIDE 102 105   CO2 21 22   GLUCOSE 88 92   BUN 16 16   CREATININE 1.42* 1.39   CALCIUM 9.5 9.1         Glucose:  Lab Results "   Component Value Date/Time    GLUCOSE 92 01/22/2025 02:09 AM    GLUCOSE 88 01/21/2025 05:11 PM    GLUCOSE 86 11/12/2024 12:26 PM    GLUCOSE 95 09/07/2024 11:07 AM     Coags:  Lab Results   Component Value Date/Time    INR 1.14 (H) 08/21/2023 03:05 AM    INR 1.16 (H) 08/10/2023 11:31 AM    INR 1.17 (H) 04/24/2023 06:20 PM         Urinalysis:   Lab Results   Component Value Date/Time    COLORURINE Yellow 01/21/2025 05:43 PM    CLARITY Clear 01/21/2025 05:43 PM    SPECGRAVITY 1.023 01/21/2025 05:43 PM    PHURINE 6.5 01/21/2025 05:43 PM    GLUCOSEUR Negative 01/21/2025 05:43 PM    KETONES Negative 01/21/2025 05:43 PM    NITRITE Negative 01/21/2025 05:43 PM    OCCULTBLOOD Large (A) 01/21/2025 05:43 PM    RBCURINE  (A) 01/21/2025 05:43 PM    BACTERIA None Seen 01/21/2025 05:43 PM    EPITHELCELL 0-2 01/21/2025 05:43 PM       Imaging:  CT 1/22/2025 I reviewed CT shwoing 7mm proimal and distal left ureteral stones with minimal renal nephroilthiasis. 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 Disla in the past with left proximal and distal 7mm ureteral stones.  He understands the risk of inability to access ureter, the need for second procedures, the possibility of negative ureteroscopy, that he may have stent discomfort until this is removed, bleeding, infection, ureteral injury or stricture, bladder injury, post op urinary retention requiring powell catheter, and the general pulmonary and cardiovascular risks associated with anesthesia.  After a full discussion of the alternatives risks and benefits of the procedure, the patient consented to proceeding with the planned procedure.     Consent obatined  Add on for Cystoscopy, left ureteroscopy, Laser lithotripsy and JJ stents (CULTS)  Anticipate discharge post op       Kulwinder Cramer M.D.   [address] [place] [address]   [phone]

## 2025-01-22 NOTE — ED NOTES
Medication history reviewed with patient.  Med rec is complete  Allergies reviewed.     Patient denies any outpatient antibiotics in the   last 30 days.     Anticoagulants taken in the last 14 days? No    Patient took a tablet of Percocet left over from a prior RX yesterday morning (1/21/25)    Mandie Talbert PhT

## 2025-01-22 NOTE — OR NURSING
Repeat bgl done . 53mg/dl , NP raudel notified by daine Dwyer and she stated that they will send someone to do blood test. DR. Bagley is aware at bedside talking to np.

## 2025-01-22 NOTE — ASSESSMENT & PLAN NOTE
Patient with known history of nephrolithiasis presents for left-sided flank pain  CT renal showing 7.1 mm left ureteropelvic junction stone and 7.7 mm distal left ureteral stone resulting in outflow tract obstructive   S/p Cystoscopy, Left Ureteroscopy w/ laser lithotripsy, JJ stent  on 1/22/2025 1/23/2025  Continue on IV Rocephin  Continue to trend troponin, no concern for ACS, has severe anxiety on exam  Reviewed BMP in a.m. to monitor electrolytes and renal function  Repeat CBC in a.m. to monitor white count and hemoglobin  Requiring close monitoring for toxicity while on IV controlled substance  Requiring IV antibiotics, need close monitoring for toxicity  Case discussed with urology Dr. Carnes.  Plan is to repeat CT abdomen if continued to have bladder spasm and pain

## 2025-01-22 NOTE — PROGRESS NOTES
4 Eyes Skin Assessment Completed by Deon Mckeon RN and Reyes, Tina RN.    Head WDL  Ears WDL  Nose WDL  Mouth WDL  Neck WDL  Breast/Chest WDL  Shoulder Blades WDL  Spine Bruising  (R) Arm/Elbow/Hand Bruising  (L) Arm/Elbow/Hand WDL  Abdomen WDL  Groin WDL  Scrotum/Coccyx/Buttocks WDL  (R) Leg WDL  (L) Leg WDL  (R) Heel/Foot/Toe WDL  (L) Heel/Foot/Toe WDL          Devices In Places (None needed) extra pillows and blankets given      Interventions In Place Pillows    Possible Skin Injury No    Pictures Uploaded Into Epic N/A  Wound Consult Placed N/A  RN Wound Prevention Protocol Ordered No

## 2025-01-22 NOTE — CARE PLAN
Problem: Knowledge Deficit - Standard  Goal: Patient and family/care givers will demonstrate understanding of plan of care, disease process/condition, diagnostic tests and medications  Outcome: Progressing  Note: Education provided to the pt all questions were answered, teach back method was used     Problem: Pain - Standard  Goal: Alleviation of pain or a reduction in pain to the patient’s comfort goal  Outcome: Met  Flowsheets (Taken 1/22/2025 0255)  OB Pain Intervention: Medication - See MAR  Note: Pain has been managed at comfort level, Extra pillows and blankets given for comfort   The patient is Stable - Low risk of patient condition declining or worsening         Progress made toward(s) clinical / shift goals:  Pt's pain has been met throughout my shift

## 2025-01-22 NOTE — ED PROVIDER NOTES
ED Provider Note    CHIEF COMPLAINT  Chief Complaint   Patient presents with    Abdominal Pain     Pt complaining of pelvic pain that has been going on for 3 days. He states he has a kidney stone and he has been trying to pass it. Stating the pain is becoming too unbearable. He is still able to urinate but denies any blood.       EXTERNAL RECORDS REVIEWED  Other reviewed a CT scan from November 15, 2024 that showed a 14 mm cystic lesion in lower pole of the right kidney, left kidney cortical scarring at the lower pole, multiple nonobstructive calcifications in the lower pole of the left kidney, no hydronephrosis or ureteral obstruction was noted.    HPI/ROS    Russell Bunn is a 34 y.o. male who presents with left flank pain.  The patient states this started about 3 days ago.  He states the pain is intermittent in nature.  He describes it as sharp and severe.  He is unaware of any exacerbating or relieving factors.  He states he has a history of kidney stones in the past with similar discomfort.  He has not had any associated vomiting.  He has not had any fevers.  He denies dysuria and he has not noted any hematuria.  Currently the pain is moderate to severe in intensity.    PAST MEDICAL HISTORY   has a past medical history of Kidney stones and Seizure disorder (HCC).    SURGICAL HISTORY   has a past surgical history that includes lithotripsy; cysto/uretero/pyeloscopy, dx (Right, 3/13/2020); cystoscopy,insert ureteral stent (Right, 3/13/2020); lasertripsy (Right, 3/13/2020); cystoscopy,insert ureteral stent (Left, 4/26/2021); cysto/uretero/pyeloscopy, dx (Left, 4/26/2021); cystoscopy (Left, 02/20/2022); cystoscopy,insert ureteral stent (Left, 2/20/2022); cysto/uretero/pyeloscopy, dx (Left, 2/20/2022); lasertripsy (Left, 2/20/2022); cystoscopy with ureteral stent insertion or removal (Right, 4/24/2023); cystoscopy,insert ureteral stent (Left, 8/10/2023); and cysto/uretero/pyeloscopy, dx (Left,  "8/10/2023).    FAMILY HISTORY  History reviewed. No pertinent family history.    SOCIAL HISTORY  Social History     Tobacco Use    Smoking status: Former     Current packs/day: 0.50     Types: Cigarettes    Smokeless tobacco: Never   Vaping Use    Vaping status: Never Used   Substance and Sexual Activity    Alcohol use: Not Currently    Drug use: Yes     Types: Inhaled     Comment: Marijuana, every day    Sexual activity: Not on file       CURRENT MEDICATIONS  Home Medications       Reviewed by Jose Marrero R.N. (Registered Nurse) on 01/21/25 at 1702  Med List Status: Not Addressed     Medication Last Dose Status   linezolid (ZYVOX) 600 MG Tab  Active                    ALLERGIES  Allergies   Allergen Reactions    Kiwi Extract Anaphylaxis    Shellfish Allergy Anaphylaxis    Ampicillin-Sulbactam Sodium Rash and Itching     Itching and rash with IV unasyn 8/17/2023    Cefazolin Rash and Itching     Rash  Tolerated ceftriaxone (April 2021)    Dilaudid [Hydromorphone] Itching    Hydrocodone Itching    Oxycodone Itching    Potassium Iodide Itching     Severe itching    Tape Unspecified     Paper tape okay        PHYSICAL EXAM  VITAL SIGNS: /78   Pulse 90   Temp 36.1 °C (96.9 °F) (Temporal)   Resp 16   Ht 1.676 m (5' 6\")   Wt 74.1 kg (163 lb 5.8 oz)   SpO2 93%   BMI 26.37 kg/m²    In general the patient appears uncomfortable    HEENT unremarkable    Pulmonary the patient's lungs are clear to auscultation bilaterally    Cardiovascular S1-S2 with a regular rate and rhythm    GI the patient does have some diffuse discomfort with some voluntary guarding.  He has good bowel sounds with no distention    Skin no pallor nor jaundice    Extremities no distal edema    Neurologic examination is grossly intact    EKG/LABS  Results for orders placed or performed during the hospital encounter of 01/21/25   CBC WITH DIFFERENTIAL    Collection Time: 01/21/25  5:11 PM   Result Value Ref Range    WBC 12.8 (H) 4.8 - 10.8 " K/uL    RBC 5.39 4.70 - 6.10 M/uL    Hemoglobin 16.5 14.0 - 18.0 g/dL    Hematocrit 49.7 42.0 - 52.0 %    MCV 92.2 81.4 - 97.8 fL    MCH 30.6 27.0 - 33.0 pg    MCHC 33.2 32.3 - 36.5 g/dL    RDW 44.6 35.9 - 50.0 fL    Platelet Count 357 164 - 446 K/uL    MPV 9.6 9.0 - 12.9 fL    Neutrophils-Polys 57.80 44.00 - 72.00 %    Lymphocytes 28.50 22.00 - 41.00 %    Monocytes 8.30 0.00 - 13.40 %    Eosinophils 4.60 0.00 - 6.90 %    Basophils 0.60 0.00 - 1.80 %    Immature Granulocytes 0.20 0.00 - 0.90 %    Nucleated RBC 0.00 0.00 - 0.20 /100 WBC    Neutrophils (Absolute) 7.36 1.82 - 7.42 K/uL    Lymphs (Absolute) 3.63 1.00 - 4.80 K/uL    Monos (Absolute) 1.06 (H) 0.00 - 0.85 K/uL    Eos (Absolute) 0.59 (H) 0.00 - 0.51 K/uL    Baso (Absolute) 0.08 0.00 - 0.12 K/uL    Immature Granulocytes (abs) 0.03 0.00 - 0.11 K/uL    NRBC (Absolute) 0.00 K/uL   COMP METABOLIC PANEL    Collection Time: 01/21/25  5:11 PM   Result Value Ref Range    Sodium 137 135 - 145 mmol/L    Potassium 4.3 3.6 - 5.5 mmol/L    Chloride 102 96 - 112 mmol/L    Co2 21 20 - 33 mmol/L    Anion Gap 14.0 7.0 - 16.0    Glucose 88 65 - 99 mg/dL    Bun 16 8 - 22 mg/dL    Creatinine 1.42 (H) 0.50 - 1.40 mg/dL    Calcium 9.5 8.5 - 10.5 mg/dL    Correct Calcium 9.1 8.5 - 10.5 mg/dL    AST(SGOT) 19 12 - 45 U/L    ALT(SGPT) 15 2 - 50 U/L    Alkaline Phosphatase 108 (H) 30 - 99 U/L    Total Bilirubin 0.5 0.1 - 1.5 mg/dL    Albumin 4.5 3.2 - 4.9 g/dL    Total Protein 8.1 6.0 - 8.2 g/dL    Globulin 3.6 (H) 1.9 - 3.5 g/dL    A-G Ratio 1.3 g/dL   LIPASE    Collection Time: 01/21/25  5:11 PM   Result Value Ref Range    Lipase 110 (H) 11 - 82 U/L   ESTIMATED GFR    Collection Time: 01/21/25  5:11 PM   Result Value Ref Range    GFR (CKD-EPI) 67 >60 mL/min/1.73 m 2   URINALYSIS    Collection Time: 01/21/25  5:43 PM    Specimen: Urine   Result Value Ref Range    Color Yellow     Character Clear     Specific Gravity 1.023 <1.035    Ph 6.5 5.0 - 8.0    Glucose Negative Negative mg/dL     Ketones Negative Negative mg/dL    Protein 100 (A) Negative mg/dL    Bilirubin Negative Negative    Urobilinogen, Urine 1.0 <=1.0 EU/dL    Nitrite Negative Negative    Leukocyte Esterase Small (A) Negative    Occult Blood Large (A) Negative    Micro Urine Req Microscopic    URINE MICROSCOPIC (W/UA)    Collection Time: 01/21/25  5:43 PM   Result Value Ref Range    WBC 3-5 (A) /hpf    RBC  (A) 0 - 2 /hpf    Bacteria None Seen None /hpf    Epithelial Cells 0-2 0 - 5 /hpf    Urine Casts 0-2 0 - 2 /lpf       I have independently interpreted this EKG    RADIOLOGY/PROCEDURES     Radiologist interpretation:  CT-RENAL COLIC EVALUATION(A/P W/O)   Final Result         1.  7.1 mm left ureteropelvic junction stone and 7.7 mm distal left ureteral stone resulting in outflow tract obstructive changes   2.  Right nephrolithiasis without visualized obstructive changes   3.  Fat-containing umbilical hernia   4.  Hepatomegaly          COURSE & MEDICAL DECISION MAKING    This a 34-year-old male who presents with left flank pain.  The patient initially received Toradol as well as fentanyl and this was not effective and he switched over to morphine.  He has received several doses of morphine with continued unrelenting discomfort.  CT scan shows a 7.1 mm left ureteropelvic junction stone as well as a distal 7.7 mm stone causing obstructive changes.  His urinalysis is consistent with urolithiasis and there is also 3-5 white cells and a small amount of leukocyte esterase and I suspect this is more from inflammation of the stone than from infection.  I will give the patient ciprofloxacin as he is allergic to penicillin as well as cephalosporins.  The patient will be admitted for further pain control throughout the morning and urology will be consulted.    FINAL DIAGNOSIS  Ureterolithiasis    Disposition  The patient will be admitted in stable condition     Electronically signed by: Hai Recinos M.D., 1/21/2025 6:23 PM

## 2025-01-22 NOTE — ANESTHESIA PROCEDURE NOTES
Airway    Date/Time: 1/22/2025 3:42 PM    Performed by: Gael Daniel M.D.  Authorized by: Gael Daniel M.D.    Location:  OR  Urgency:  Elective  Difficult Airway: No    Indications for Airway Management:  Anesthesia      Spontaneous Ventilation: absent    Sedation Level:  Deep  Preoxygenated: Yes    Patient Position:  Sniffing  Mask Difficulty Assessment:  1 - vent by mask  Final Airway Type:  Supraglottic airway  Final Supraglottic Airway:  Standard LMA    SGA Size:  5  Number of Attempts at Approach:  1

## 2025-01-22 NOTE — OR NURSING
Report taken from diane Lockwood . Patient is scheduled  for CYSTOSCOPY, WITH URETERAL STENT INSERTION - Left    URETEROSCOPY  LITHOTRIPSY, USING LASER at 1445 today H

## 2025-01-22 NOTE — ED NOTES
Pt being taken upstairs at this time by transport tech via gurney. Pt is awake and alert, talking to staff, in no apparent distress at time of transfer. Pt's paperwork and belongings sent upstairs with pt.

## 2025-01-22 NOTE — ED TRIAGE NOTES
Chief Complaint   Patient presents with    Abdominal Pain     Pt complaining of pelvic pain that has been going on for 3 days. He states he has a kidney stone and he has been trying to pass it. Stating the pain is becoming too unbearable. He is still able to urinate but denies any blood.     Vitals:    01/21/25 1652   BP: 116/80   Pulse: 100   Resp: 16   Temp: 36.1 °C (96.9 °F)   SpO2: 97%

## 2025-01-22 NOTE — H&P
Hospital Medicine History & Physical Note    Date of Service  1/21/2025    Primary Care Physician  Pcp Pt States None    Consultants  None    Code Status  Full Code    Chief Complaint  Chief Complaint   Patient presents with    Abdominal Pain     Pt complaining of pelvic pain that has been going on for 3 days. He states he has a kidney stone and he has been trying to pass it. Stating the pain is becoming too unbearable. He is still able to urinate but denies any blood.       History of Presenting Illness  Russell Bunn is a 34 y.o. male who presented 1/21/2025 with left lower quadrant flank pain.  Patient has had multiple nephrolithiasis in the past.  About 2 days ago, he began to have progressive worsening left-sided flank pain associated with nausea.  He tried to wait it off but the pain intensified, prompting him to come into the ER today for further evaluation.    In ER, patient found to have normal vital signs.  Found to have white blood cell count of 12,000, creatinine 1.4.  UA positive for hematuria but negative for UTI.  CT renal showing 7.1 mm left ureteropelvic junction stone and 7.7 mm distal left ureteral stone resulting in outflow tract obstructive changes.  Right nephrolithiasis without visualized obstructive changes.  Admitted for nephrolithiasis    I discussed the plan of care with patient.    Review of Systems  Review of Systems   Constitutional:  Positive for malaise/fatigue.   HENT: Negative.     Eyes: Negative.    Respiratory: Negative.     Cardiovascular: Negative.    Gastrointestinal: Negative.    Genitourinary: Negative.    Musculoskeletal: Negative.    Skin: Negative.    Neurological: Negative.    Endo/Heme/Allergies: Negative.    Psychiatric/Behavioral: Negative.         Past Medical History   has a past medical history of Kidney stones and Seizure disorder (HCC).    Surgical History   has a past surgical history that includes lithotripsy; pr cysto/uretero/pyeloscopy, dx  (Right, 3/13/2020); pr cystoscopy,insert ureteral stent (Right, 3/13/2020); lasertripsy (Right, 3/13/2020); pr cystoscopy,insert ureteral stent (Left, 4/26/2021); pr cysto/uretero/pyeloscopy, dx (Left, 4/26/2021); cystoscopy (Left, 02/20/2022); pr cystoscopy,insert ureteral stent (Left, 2/20/2022); pr cysto/uretero/pyeloscopy, dx (Left, 2/20/2022); lasertripsy (Left, 2/20/2022); cystoscopy with ureteral stent insertion or removal (Right, 4/24/2023); pr cystoscopy,insert ureteral stent (Left, 8/10/2023); and pr cysto/uretero/pyeloscopy, dx (Left, 8/10/2023).     Family History  family history is not on file.   Family history reviewed with patient. There is no family history that is pertinent to the chief complaint.     Social History   reports that he has quit smoking. His smoking use included cigarettes. He has never used smokeless tobacco. He reports that he does not currently use alcohol. He reports current drug use. Drug: Inhaled.    Allergies  Allergies   Allergen Reactions    Kiwi Extract Anaphylaxis    Shellfish Allergy Anaphylaxis    Ampicillin-Sulbactam Sodium Rash and Itching     Itching and rash with IV unasyn 8/17/2023    Cefazolin Rash and Itching     Rash  Tolerated ceftriaxone (April 2021)    Dilaudid [Hydromorphone] Itching    Hydrocodone Itching    Oxycodone Itching    Potassium Iodide Itching     Severe itching    Tape Unspecified     Paper tape okay        Medications  Prior to Admission Medications   Prescriptions Last Dose Informant Patient Reported? Taking?   linezolid (ZYVOX) 600 MG Tab  Patient No No   Sig: Take 1 Tablet by mouth 2 times a day.      Facility-Administered Medications: None       Physical Exam  Temp:  [36.1 °C (96.9 °F)] 36.1 °C (96.9 °F)  Pulse:  [] 73  Resp:  [16-20] 16  BP: (116-156)/() 156/109  SpO2:  [93 %-98 %] 95 %  Blood Pressure: (!) 156/109   Temperature: 36.1 °C (96.9 °F)   Pulse: 73   Respiration: 16   Pulse Oximetry: 95 %       Physical  "Exam  Constitutional:       Appearance: Normal appearance. He is normal weight.   HENT:      Head: Normocephalic.      Nose: Nose normal.      Mouth/Throat:      Mouth: Mucous membranes are moist.   Eyes:      Pupils: Pupils are equal, round, and reactive to light.   Cardiovascular:      Rate and Rhythm: Normal rate and regular rhythm.      Pulses: Normal pulses.   Pulmonary:      Effort: Pulmonary effort is normal.      Breath sounds: Normal breath sounds.   Abdominal:      General: Abdomen is flat. Bowel sounds are normal.      Palpations: Abdomen is soft.      Tenderness: There is left CVA tenderness.   Musculoskeletal:         General: Normal range of motion.      Cervical back: Neck supple.   Skin:     General: Skin is warm.   Neurological:      General: No focal deficit present.      Mental Status: He is alert and oriented to person, place, and time. Mental status is at baseline.   Psychiatric:         Mood and Affect: Mood normal.         Behavior: Behavior normal.         Thought Content: Thought content normal.         Judgment: Judgment normal.         Laboratory:  Recent Labs     01/21/25  1711   WBC 12.8*   RBC 5.39   HEMOGLOBIN 16.5   HEMATOCRIT 49.7   MCV 92.2   MCH 30.6   MCHC 33.2   RDW 44.6   PLATELETCT 357   MPV 9.6     Recent Labs     01/21/25  1711   SODIUM 137   POTASSIUM 4.3   CHLORIDE 102   CO2 21   GLUCOSE 88   BUN 16   CREATININE 1.42*   CALCIUM 9.5     Recent Labs     01/21/25  1711   ALTSGPT 15   ASTSGOT 19   ALKPHOSPHAT 108*   TBILIRUBIN 0.5   LIPASE 110*   GLUCOSE 88         No results for input(s): \"NTPROBNP\" in the last 72 hours.      No results for input(s): \"TROPONINT\" in the last 72 hours.    Imaging:  CT-RENAL COLIC EVALUATION(A/P W/O)   Final Result         1.  7.1 mm left ureteropelvic junction stone and 7.7 mm distal left ureteral stone resulting in outflow tract obstructive changes   2.  Right nephrolithiasis without visualized obstructive changes   3.  Fat-containing umbilical " hernia   4.  Hepatomegaly          no X-Ray or EKG requiring interpretation    Assessment/Plan:  Justification for Admission Status  I anticipate this patient is appropriate for observation status at this time because pt has nephrolithiasis    Patient will need a Med/Surg bed on MEDICAL service .  The need is secondary to nephrolithasis.    * Obstructive nephropathy- (present on admission)  Assessment & Plan  Patient with known history of nephrolithiasis presents for left-sided flank pain  CT renal showing 7.1 mm left ureteropelvic junction stone and 7.7 mm distal left ureteral stone resulting in outflow tract obstructive changes.   No evidence of UTI  Fluids  Morphine as needed  Urology consult in a.m.        VTE prophylaxis: pharmacologic prophylaxis contraindicated due to possible or in am

## 2025-01-22 NOTE — ANESTHESIA PREPROCEDURE EVALUATION
Case: [id number] Date/Time: 01/22/25 1430    Procedures:       CYSTOSCOPY, WITH URETERAL STENT INSERTION (Left: Ureter)      URETEROSCOPY (Left: Ureter)      LITHOTRIPSY, USING LASER    Anesthesia type: General    Location: [place] [address] / SURGERY [facility]    Surgeons: Kulwinder Cramer M.D.            Relevant Problems      (positive) Acute pyelonephritis   (positive) Cystinuria (HCC)   (positive) Hydronephrosis   (positive) Hydroureteronephrosis, pyelonephritis   (positive) Left nephrolithiasis   (positive) Nephrolithiasis   (positive) Post-renal ARAM   (positive) Pyelonephritis       Physical Exam    Airway   Mallampati: II  TM distance: >3 FB  Neck ROM: full       Cardiovascular - normal exam  Rhythm: regular  Rate: normal  (-) murmur     Dental - normal exam           Pulmonary - normal exam  Breath sounds clear to auscultation     Abdominal    Neurological - normal exam                   Anesthesia Plan    ASA 2- EMERGENT   ASA physical status emergent criteria: compromised vital organ, limb or tissue    Plan - general       Airway plan will be LMA          Induction: intravenous    Postoperative Plan: Postoperative administration of opioids is intended.    Pertinent diagnostic labs and testing reviewed    Informed Consent:    Anesthetic plan and risks discussed with patient.    Use of blood products discussed with: patient whom consented to blood products.

## 2025-01-22 NOTE — PROGRESS NOTES
[facility] Medicine Daily Progress Note    Date of Service  1/22/2025    Chief Complaint  Russell Bunn is a 34 y.o. male admitted 1/21/2025 with left flank pain     Hospital Course    Russell Bunn is a 34 y.o. male who presented 1/21/2025 with left lower quadrant flank pain.  Initial lab in the ED noted with leukocytosis, UA positive for leukocyte esterase, white count, subsequent CT renal noted with 7.1 mm left uteropelvic junction stone and 7.7 cm distal left ureteral stone resulting in outflow tract obstructive change.  Urology Dr. Carnes planning for cystoscopy and ureteral stent placement.    Interval Problem Update  1/22/2025  Seen and examined bedside  Continue complain of left flank pain  Labs reviewed noted leukocytosis, elevated bun/cr 15/1.42  Imaging reviewed, CT renal noted with 7.1 mm left uteropelvic junction stone and 7.7 cm distal left ureteral stone resulting in outflow tract obstructive change  Chart reviewed, H&P reviewed, meds reviewed    PLAN  Start on IV Rocephin, only reports of itching, monitor for allergy  Repeat BMP in a.m. to monitor electrolytes and renal function  Repeat CBC in a.m. to monitor white count and hemoglobin  Need close monitoring of blood counts, electrolytes and renal function due to potential of organ dysfunction.    Requiring close monitoring for toxicity while on IV controlled substance  Requiring IV antibiotics, need close monitoring for toxicity  Scheduled for cystoscopy and ureteral stent placement.  Case discussed with urology Dr. Carnes      Patient has a high medical complexity, complex decision making and is at high risk of complication, morbidity and mortality        I have discussed this patient's plan of care and discharge plan at IDT rounds today with Case Management, Nursing, Nursing leadership, and other members of the IDT team.    Consultants/Specialty  urology    Code Status  Full Code    Disposition  The patient is not  medically cleared for discharge to home or a post-acute facility.  Anticipate discharge to: home with close outpatient follow-up    I have placed the appropriate orders for post-discharge needs.    Review of Systems  Review of Systems   Genitourinary:  Positive for dysuria and flank pain.        Physical Exam  Temp:  [36.1 °C (96.9 °F)-36.9 °C (98.5 °F)] 36.9 °C (98.5 °F)  Pulse:  [] 85  Resp:  [16-20] 18  BP: (116-156)/() 152/97  SpO2:  [93 %-98 %] 98 %    Physical Exam  Constitutional:       Appearance: Normal appearance.   Cardiovascular:      Rate and Rhythm: Normal rate and regular rhythm.      Pulses: Normal pulses.   Pulmonary:      Effort: Pulmonary effort is normal. No respiratory distress.      Breath sounds: Normal breath sounds.   Abdominal:      Tenderness: There is left CVA tenderness.   Musculoskeletal:      Right lower leg: No edema.      Left lower leg: No edema.   Skin:     General: Skin is warm.   Neurological:      General: No focal deficit present.      Mental Status: He is alert and oriented to person, place, and time.   Psychiatric:         Mood and Affect: Mood normal.         Fluids    Intake/Output Summary (Last 24 hours) at 1/22/2025 1340  Last data filed at 1/22/2025 0756  Gross per 24 hour   Intake --   Output 200 ml   Net -200 ml        Laboratory  Recent Labs     01/21/25  1711 01/22/25  0209   WBC 12.8* 11.9*   RBC 5.39 4.96   HEMOGLOBIN 16.5 15.1   HEMATOCRIT 49.7 46.5   MCV 92.2 93.8   MCH 30.6 30.4   MCHC 33.2 32.5   RDW 44.6 44.9   PLATELETCT 357 318   MPV 9.6 9.4     Recent Labs     01/21/25  1711 01/22/25  0209   SODIUM 137 136   POTASSIUM 4.3 3.9   CHLORIDE 102 105   CO2 21 22   GLUCOSE 88 92   BUN 16 16   CREATININE 1.42* 1.39   CALCIUM 9.5 9.1                   Imaging  CT-RENAL COLIC EVALUATION(A/P W/O)   Final Result         1.  7.1 mm left ureteropelvic junction stone and 7.7 mm distal left ureteral stone resulting in outflow tract obstructive changes   2.   Right nephrolithiasis without visualized obstructive changes   3.  Fat-containing umbilical hernia   4.  Hepatomegaly      DX-PORTABLE FLUOROSCOPY < 1 HOUR Reason For Exam: Main OR    (Results Pending)        Assessment/Plan  * Acute unilateral obstructive uropathy- (present on admission)  Assessment & Plan  Patient with known history of nephrolithiasis presents for left-sided flank pain  CT renal showing 7.1 mm left ureteropelvic junction stone and 7.7 mm distal left ureteral stone resulting in outflow tract obstructive     1/22/2025  Start on IV Rocephin, only reports of itching, monitor for allergy  Repeat BMP in a.m. to monitor electrolytes and renal function  Repeat CBC in a.m. to monitor white count and hemoglobin  Need close monitoring of blood counts, electrolytes and renal function due to potential of organ dysfunction.    Requiring close monitoring for toxicity while on IV controlled substance  Requiring IV antibiotics, need close monitoring for toxicity  Scheduled for cystoscopy and ureteral stent placement.  Case discussed with urology Dr. Carnes    Acute pyelonephritis- (present on admission)  Assessment & Plan  On IV antibiotics  Scheduled for cystoscopy and ureteral stent placement  Repeat labs in a.m.      Post-renal ARAM- (present on admission)  Assessment & Plan  Plan for lithotropsy   Iv fluid repeat labs in AM         VTE prophylaxis: SC , resume Lovenox after surgery    I have performed a physical exam and reviewed and updated ROS and Plan today (1/22/2025). In review of yesterday's note (1/21/2025), there are no changes except as documented above.

## 2025-01-22 NOTE — PROGRESS NOTES
Assumed care of pt 01/22/2025  Admit profile complete  Education provided to the pt at bedside and all questions answered.Teach back method was used  Call light left within reach, hourly rounding in place

## 2025-01-23 ENCOUNTER — PATIENT OUTREACH (OUTPATIENT)
Dept: SCHEDULING | Facility: IMAGING CENTER | Age: 35
End: 2025-01-23
Payer: COMMERCIAL

## 2025-01-23 ENCOUNTER — PHARMACY VISIT (OUTPATIENT)
Dept: PHARMACY | Facility: MEDICAL CENTER | Age: 35
End: 2025-01-23
Payer: COMMERCIAL

## 2025-01-23 VITALS
OXYGEN SATURATION: 93 % | DIASTOLIC BLOOD PRESSURE: 86 MMHG | RESPIRATION RATE: 18 BRPM | SYSTOLIC BLOOD PRESSURE: 148 MMHG | WEIGHT: 163.36 LBS | BODY MASS INDEX: 26.25 KG/M2 | TEMPERATURE: 97.8 F | HEIGHT: 66 IN | HEART RATE: 82 BPM

## 2025-01-23 PROBLEM — R07.89 OTHER CHEST PAIN: Status: ACTIVE | Noted: 2025-01-23

## 2025-01-23 LAB
ANION GAP SERPL CALC-SCNC: 14 MMOL/L (ref 7–16)
BASOPHILS # BLD AUTO: 0.2 % (ref 0–1.8)
BASOPHILS # BLD: 0.02 K/UL (ref 0–0.12)
BUN SERPL-MCNC: 18 MG/DL (ref 8–22)
CALCIUM SERPL-MCNC: 9.4 MG/DL (ref 8.5–10.5)
CHLORIDE SERPL-SCNC: 98 MMOL/L (ref 96–112)
CO2 SERPL-SCNC: 23 MMOL/L (ref 20–33)
CREAT SERPL-MCNC: 1.45 MG/DL (ref 0.5–1.4)
EKG IMPRESSION: NORMAL
EOSINOPHIL # BLD AUTO: 0 K/UL (ref 0–0.51)
EOSINOPHIL NFR BLD: 0 % (ref 0–6.9)
ERYTHROCYTE [DISTWIDTH] IN BLOOD BY AUTOMATED COUNT: 42.1 FL (ref 35.9–50)
GFR SERPLBLD CREATININE-BSD FMLA CKD-EPI: 65 ML/MIN/1.73 M 2
GLUCOSE SERPL-MCNC: 96 MG/DL (ref 65–99)
HCT VFR BLD AUTO: 47.3 % (ref 42–52)
HGB BLD-MCNC: 15.9 G/DL (ref 14–18)
IMM GRANULOCYTES # BLD AUTO: 0.03 K/UL (ref 0–0.11)
IMM GRANULOCYTES NFR BLD AUTO: 0.2 % (ref 0–0.9)
LYMPHOCYTES # BLD AUTO: 2.03 K/UL (ref 1–4.8)
LYMPHOCYTES NFR BLD: 16.6 % (ref 22–41)
MAGNESIUM SERPL-MCNC: 1.7 MG/DL (ref 1.5–2.5)
MCH RBC QN AUTO: 30.6 PG (ref 27–33)
MCHC RBC AUTO-ENTMCNC: 33.6 G/DL (ref 32.3–36.5)
MCV RBC AUTO: 91.1 FL (ref 81.4–97.8)
MONOCYTES # BLD AUTO: 0.62 K/UL (ref 0–0.85)
MONOCYTES NFR BLD AUTO: 5.1 % (ref 0–13.4)
NEUTROPHILS # BLD AUTO: 9.53 K/UL (ref 1.82–7.42)
NEUTROPHILS NFR BLD: 77.9 % (ref 44–72)
NRBC # BLD AUTO: 0 K/UL
NRBC BLD-RTO: 0 /100 WBC (ref 0–0.2)
PATHOLOGY CONSULT NOTE: NORMAL
PHOSPHATE SERPL-MCNC: 3.2 MG/DL (ref 2.5–4.5)
PLATELET # BLD AUTO: 330 K/UL (ref 164–446)
PMV BLD AUTO: 9.3 FL (ref 9–12.9)
POTASSIUM SERPL-SCNC: 4.5 MMOL/L (ref 3.6–5.5)
RBC # BLD AUTO: 5.19 M/UL (ref 4.7–6.1)
SODIUM SERPL-SCNC: 135 MMOL/L (ref 135–145)
TROPONIN T SERPL-MCNC: 7 NG/L (ref 6–19)
WBC # BLD AUTO: 12.2 K/UL (ref 4.8–10.8)

## 2025-01-23 PROCEDURE — G0378 HOSPITAL OBSERVATION PER HR: HCPCS

## 2025-01-23 PROCEDURE — 83735 ASSAY OF MAGNESIUM: CPT

## 2025-01-23 PROCEDURE — 36415 COLL VENOUS BLD VENIPUNCTURE: CPT

## 2025-01-23 PROCEDURE — 700111 HCHG RX REV CODE 636 W/ 250 OVERRIDE (IP): Performed by: STUDENT IN AN ORGANIZED HEALTH CARE EDUCATION/TRAINING PROGRAM

## 2025-01-23 PROCEDURE — 99239 HOSP IP/OBS DSCHRG MGMT >30: CPT | Performed by: STUDENT IN AN ORGANIZED HEALTH CARE EDUCATION/TRAINING PROGRAM

## 2025-01-23 PROCEDURE — 84100 ASSAY OF PHOSPHORUS: CPT

## 2025-01-23 PROCEDURE — 96376 TX/PRO/DX INJ SAME DRUG ADON: CPT

## 2025-01-23 PROCEDURE — 80048 BASIC METABOLIC PNL TOTAL CA: CPT

## 2025-01-23 PROCEDURE — RXMED WILLOW AMBULATORY MEDICATION CHARGE: Performed by: UROLOGY

## 2025-01-23 PROCEDURE — 700102 HCHG RX REV CODE 250 W/ 637 OVERRIDE(OP): Performed by: STUDENT IN AN ORGANIZED HEALTH CARE EDUCATION/TRAINING PROGRAM

## 2025-01-23 PROCEDURE — RXMED WILLOW AMBULATORY MEDICATION CHARGE: Performed by: STUDENT IN AN ORGANIZED HEALTH CARE EDUCATION/TRAINING PROGRAM

## 2025-01-23 PROCEDURE — 93005 ELECTROCARDIOGRAM TRACING: CPT | Mod: TC | Performed by: STUDENT IN AN ORGANIZED HEALTH CARE EDUCATION/TRAINING PROGRAM

## 2025-01-23 PROCEDURE — 700102 HCHG RX REV CODE 250 W/ 637 OVERRIDE(OP)

## 2025-01-23 PROCEDURE — 85025 COMPLETE CBC W/AUTO DIFF WBC: CPT

## 2025-01-23 PROCEDURE — A9270 NON-COVERED ITEM OR SERVICE: HCPCS

## 2025-01-23 PROCEDURE — 700105 HCHG RX REV CODE 258: Performed by: STUDENT IN AN ORGANIZED HEALTH CARE EDUCATION/TRAINING PROGRAM

## 2025-01-23 PROCEDURE — 700111 HCHG RX REV CODE 636 W/ 250 OVERRIDE (IP): Mod: JZ

## 2025-01-23 PROCEDURE — 700111 HCHG RX REV CODE 636 W/ 250 OVERRIDE (IP): Mod: JZ | Performed by: STUDENT IN AN ORGANIZED HEALTH CARE EDUCATION/TRAINING PROGRAM

## 2025-01-23 PROCEDURE — 84484 ASSAY OF TROPONIN QUANT: CPT

## 2025-01-23 PROCEDURE — 700105 HCHG RX REV CODE 258: Performed by: ANESTHESIOLOGY

## 2025-01-23 PROCEDURE — 93010 ELECTROCARDIOGRAM REPORT: CPT | Performed by: INTERNAL MEDICINE

## 2025-01-23 PROCEDURE — A9270 NON-COVERED ITEM OR SERVICE: HCPCS | Performed by: STUDENT IN AN ORGANIZED HEALTH CARE EDUCATION/TRAINING PROGRAM

## 2025-01-23 RX ORDER — CEFDINIR 300 MG/1
300 CAPSULE ORAL 2 TIMES DAILY
Qty: 20 CAPSULE | Refills: 0 | Status: ON HOLD | OUTPATIENT
Start: 2025-01-23 | End: 2025-02-02

## 2025-01-23 RX ORDER — KETOROLAC TROMETHAMINE 15 MG/ML
15 INJECTION, SOLUTION INTRAMUSCULAR; INTRAVENOUS ONCE
Status: COMPLETED | OUTPATIENT
Start: 2025-01-23 | End: 2025-01-23

## 2025-01-23 RX ORDER — MORPHINE SULFATE 4 MG/ML
2 INJECTION INTRAVENOUS EVERY 6 HOURS PRN
Status: DISCONTINUED | OUTPATIENT
Start: 2025-01-23 | End: 2025-01-23 | Stop reason: HOSPADM

## 2025-01-23 RX ORDER — OXYCODONE HYDROCHLORIDE 5 MG/1
5 TABLET ORAL EVERY 8 HOURS PRN
Qty: 7 TABLET | Refills: 0 | Status: SHIPPED | OUTPATIENT
Start: 2025-01-23 | End: 2025-01-28

## 2025-01-23 RX ORDER — PHENAZOPYRIDINE HYDROCHLORIDE 100 MG/1
100 TABLET, FILM COATED ORAL 3 TIMES DAILY PRN
Qty: 6 TABLET | Refills: 0 | Status: SHIPPED | OUTPATIENT
Start: 2025-01-23 | End: 2025-01-27

## 2025-01-23 RX ORDER — ALUMINA, MAGNESIA, AND SIMETHICONE 2400; 2400; 240 MG/30ML; MG/30ML; MG/30ML
10 SUSPENSION ORAL 4 TIMES DAILY PRN
Status: DISCONTINUED | OUTPATIENT
Start: 2025-01-23 | End: 2025-01-23 | Stop reason: HOSPADM

## 2025-01-23 RX ORDER — OXYBUTYNIN CHLORIDE 10 MG/1
10 TABLET, EXTENDED RELEASE ORAL DAILY
Status: COMPLETED | OUTPATIENT
Start: 2025-01-23 | End: 2025-01-23

## 2025-01-23 RX ORDER — MAGNESIUM SULFATE HEPTAHYDRATE 40 MG/ML
2 INJECTION, SOLUTION INTRAVENOUS ONCE
Status: DISCONTINUED | OUTPATIENT
Start: 2025-01-23 | End: 2025-01-23 | Stop reason: HOSPADM

## 2025-01-23 RX ORDER — PHENAZOPYRIDINE HYDROCHLORIDE 100 MG/1
100 TABLET, FILM COATED ORAL 3 TIMES DAILY
Status: DISCONTINUED | OUTPATIENT
Start: 2025-01-23 | End: 2025-01-23 | Stop reason: HOSPADM

## 2025-01-23 RX ORDER — PHENAZOPYRIDINE HYDROCHLORIDE 100 MG/1
100 TABLET, FILM COATED ORAL
Status: DISCONTINUED | OUTPATIENT
Start: 2025-01-23 | End: 2025-01-23

## 2025-01-23 RX ORDER — CYCLOBENZAPRINE HCL 5 MG
5 TABLET ORAL 3 TIMES DAILY PRN
Qty: 30 TABLET | Refills: 0 | Status: SHIPPED | OUTPATIENT
Start: 2025-01-23 | End: 2025-01-29

## 2025-01-23 RX ORDER — LORAZEPAM 2 MG/ML
0.5 INJECTION INTRAMUSCULAR EVERY 6 HOURS PRN
Status: DISCONTINUED | OUTPATIENT
Start: 2025-01-23 | End: 2025-01-23 | Stop reason: HOSPADM

## 2025-01-23 RX ADMIN — MORPHINE SULFATE 4 MG: 4 INJECTION, SOLUTION INTRAMUSCULAR; INTRAVENOUS at 07:23

## 2025-01-23 RX ADMIN — MORPHINE SULFATE 4 MG: 4 INJECTION, SOLUTION INTRAMUSCULAR; INTRAVENOUS at 11:35

## 2025-01-23 RX ADMIN — CEFTRIAXONE SODIUM 2000 MG: 10 INJECTION, POWDER, FOR SOLUTION INTRAVENOUS at 05:32

## 2025-01-23 RX ADMIN — OXYBUTYNIN CHLORIDE 10 MG: 10 TABLET, EXTENDED RELEASE ORAL at 12:09

## 2025-01-23 RX ADMIN — PHENAZOPYRIDINE 100 MG: 100 TABLET ORAL at 14:07

## 2025-01-23 RX ADMIN — ACETAMINOPHEN 1000 MG: 500 TABLET ORAL at 12:09

## 2025-01-23 RX ADMIN — OXYCODONE HYDROCHLORIDE 10 MG: 10 TABLET ORAL at 01:10

## 2025-01-23 RX ADMIN — OXYCODONE HYDROCHLORIDE 10 MG: 10 TABLET ORAL at 05:28

## 2025-01-23 RX ADMIN — SODIUM CHLORIDE, POTASSIUM CHLORIDE, SODIUM LACTATE AND CALCIUM CHLORIDE: 600; 310; 30; 20 INJECTION, SOLUTION INTRAVENOUS at 07:11

## 2025-01-23 RX ADMIN — OXYCODONE HYDROCHLORIDE 10 MG: 10 TABLET ORAL at 08:34

## 2025-01-23 RX ADMIN — MORPHINE SULFATE 4 MG: 4 INJECTION, SOLUTION INTRAMUSCULAR; INTRAVENOUS at 02:41

## 2025-01-23 RX ADMIN — KETOROLAC TROMETHAMINE 15 MG: 15 INJECTION, SOLUTION INTRAMUSCULAR; INTRAVENOUS at 12:08

## 2025-01-23 RX ADMIN — ONDANSETRON 4 MG: 2 INJECTION INTRAMUSCULAR; INTRAVENOUS at 04:17

## 2025-01-23 ASSESSMENT — PAIN DESCRIPTION - PAIN TYPE
TYPE: ACUTE PAIN

## 2025-01-23 ASSESSMENT — LIFESTYLE VARIABLES
TOTAL SCORE: 0
AVERAGE NUMBER OF DAYS PER WEEK YOU HAVE A DRINK CONTAINING ALCOHOL: 0
TOTAL SCORE: 0
ALCOHOL_USE: NO
EVER FELT BAD OR GUILTY ABOUT YOUR DRINKING: NO
EVER HAD A DRINK FIRST THING IN THE MORNING TO STEADY YOUR NERVES TO GET RID OF A HANGOVER: NO
HOW MANY TIMES IN THE PAST YEAR HAVE YOU HAD 5 OR MORE DRINKS IN A DAY: 0
HAVE YOU EVER FELT YOU SHOULD CUT DOWN ON YOUR DRINKING: NO
HAVE PEOPLE ANNOYED YOU BY CRITICIZING YOUR DRINKING: NO
ON A TYPICAL DAY WHEN YOU DRINK ALCOHOL HOW MANY DRINKS DO YOU HAVE: 0
TOTAL SCORE: 0
CONSUMPTION TOTAL: NEGATIVE

## 2025-01-23 ASSESSMENT — ENCOUNTER SYMPTOMS: FLANK PAIN: 1

## 2025-01-23 ASSESSMENT — SOCIAL DETERMINANTS OF HEALTH (SDOH)

## 2025-01-23 ASSESSMENT — FIBROSIS 4 INDEX: FIB4 SCORE: 0.49

## 2025-01-23 NOTE — PROGRESS NOTES
Contacted [facility] to send message to MERCEDES Auguste. Pt stating while urinating three times in the early a.m. there were stones present. Pt states now he feels a stone might be lodged on the string in the ureter in the shaft. Pt states 10/10 pain and he has been manually pressing on his penis to try to dislodge the stone.

## 2025-01-23 NOTE — DISCHARGE PLANNING
Community Health Worker Intake    Identified barriers to insurance, food, transportation, PCP.  Resources provided to [employer] phone number, [employer], [employer], food pantries in [place], food stamps application phone number.  Contact information provided to Russell Bunn   Inpatient assessment completed.    CHW met with pt at bedside to introduce Community Care Management per CM RN referral. Pt states that he lives with his son in an apartment. Address is [address] [place] [address] [place], [address]. Pt states that food is a big insecurity, and that he wants to apply for food stamps. Pt states that he does not use and DME, and that he doesn't have trouble taking his prescriptions, just getting them because of financial reasons. Pt has no income and no job. Pt states that he either rides the bus or walks to get around. Pt does not have a PCP in [place]. Pt states that his phone is currently turned off, however, he will try and turn it back on and the number is [phone].     Plan:  Pt given resources for [employer] and [employer], and educated on their sliding fee scales and how to use them.     Pt given resources on food pantries in [place] area and the phone number to call and apply for food stamps.     Pt given phone number to call [employer] so they can help him apply for medicaid.     Pt given [employer] resources and educated on how to get a new phone.

## 2025-01-23 NOTE — PROGRESS NOTES
Assumed pt care with RN. Pt is A&OX4 and states he is in 10/10 pain and medication ws administered by Arlen (see MAR). Plan of care discussed, no further questions. Personal belongings and call light within reach.

## 2025-01-23 NOTE — PROGRESS NOTES
Contacted by RN for severe 10/10 urethral pain described as feeling like a stone is stuck in the urethra. Likely bladder spasm and stent discomfort. Discussed with Dr Cramer. Recommended single dose of oxybutynin and ketorolac. Reevaluate in 1 hour. If still having severe pain, consider stat CT w/o and stent removal.

## 2025-01-23 NOTE — DISCHARGE SUMMARY
Discharge Summary    CHIEF COMPLAINT ON ADMISSION  Chief Complaint   Patient presents with    Abdominal Pain     Pt complaining of pelvic pain that has been going on for 3 days. He states he has a kidney stone and he has been trying to pass it. Stating the pain is becoming too unbearable. He is still able to urinate but denies any blood.       Reason for Admission  Abdominal Pain, Flank Pain     Admission Date  1/21/2025    CODE STATUS  Full Code    HPI & HOSPITAL COURSE    Russell Bunn is a 34 y.o. male who presented 1/21/2025 with left lower quadrant flank pain.  Initial lab in the ED noted with leukocytosis, UA positive for leukocyte esterase, white count, subsequent CT renal noted with 7.1 mm left uteropelvic junction stone and 7.7 cm distal left ureteral stone resulting in outflow tract obstructive change .  UA concerning for UTI, started on IV antibiotics.s/p Cystoscopy, Left Ureteroscopy w/ laser lithotripsy, JJ stent.  Urology cleared patient for discharge with outpatient follow-up in clinic in 5 to 7 days for stent removal.    Patient seen and examined at bedside on day of discharge.  His vitals remained stable, remain febrile, persistent leukocytosis, renal function is stable.  Encourage oral hydration.  Recommended outpatient follow-up with repeat labs to monitor renal function.  Outpatient.  Cbc and BMP has been ordered.  I have also ordered additional can discuss of antibiotic.    At the time of discharge patient reports of some chest pain lasted for few seconds, on exam appeared to have anxiety attack, subsequent EKG and troponin negative.  There is no concern for ACS.  He was monitored for 5 to 6-hour and he would like to go home and follow-up with outpatient echocardiogram.      At the time of discharge patient remain hemodynamically stable ,asymptomatic ,labs remain unremarkable .  Patient will be discharged with close follow up with PCP .Discharge plan was discussed with patient in  details .  Patient agreed with discharge plan  and  all questions answered.      Patient has a high medical complexity, complex decision making and is at high risk of complication, morbidity and mortality      Therefore, he is discharged in good and stable condition to home with close outpatient follow-up.    The patient met 2-midnight criteria for an inpatient stay at the time of discharge.    Discharge Date  1/23/2025        DISCHARGE DIAGNOSES  Principal Problem:    Acute unilateral obstructive uropathy (POA: Yes)  Active Problems:    Post-renal ARAM (POA: Yes)    Acute pyelonephritis (POA: Yes)  Resolved Problems:    * No resolved hospital problems. *      FOLLOW UP  No future appointments.  No follow-up provider specified.    MEDICATIONS ON DISCHARGE     Medication List        START taking these medications        Instructions   ketorolac 10 MG Tabs  Commonly known as: Toradol   Take 1 Tablet by mouth every 6 hours as needed for Mild Pain, Moderate Pain or Severe Pain. Hold for low urine output, take with food.  Dose: 10 mg     oxybutynin SR 10 MG CR tablet  Commonly known as: Ditropan-XL   Take 1 Tablet by mouth 1 time a day as needed (bladder spasms/stent pain.). For stent pain/bladder spasms. Stop if having difficulty peeing.  Dose: 10 mg     phenazopyridine 100 MG Tabs  Commonly known as: Pyridium   Take 1 Tablet by mouth 3 times a day as needed (bladder pain, dysuria).  Dose: 100 mg     polyethylene glycol/lytes Pack  Commonly known as: Miralax   Take 1 Packet by mouth every day. Please take to prevent constipation following your procedure.  Hold if loose stools  Dose: 17 g     tamsulosin 0.4 MG capsule  Commonly known as: Flomax   Take 1 Capsule by mouth every day. For relief from stent or ureteral pain.  Dose: 0.4 mg            ASK your doctor about these medications        Instructions   oxyCODONE-acetaminophen 5-325 MG Tabs  Commonly known as: Percocet   Take 1 Tablet by mouth one time as needed.  Dose:  1 Tablet              Allergies  Allergies   Allergen Reactions    Kiwi Extract Anaphylaxis    Shellfish Allergy Anaphylaxis    Ampicillin-Sulbactam Sodium Rash and Itching     Itching and rash with IV unasyn 8/17/2023    Cefazolin Rash and Itching     Rash  Tolerated ceftriaxone (April 2021)    Dilaudid [Hydromorphone] Itching    Hydrocodone Itching    Oxycodone Itching    Potassium Iodide Itching     Severe itching    Tape Unspecified     Paper tape okay        DIET  Orders Placed This Encounter   Procedures    Diet Order Diet: Regular     Standing Status:   Standing     Number of Occurrences:   1     Order Specific Question:   Diet:     Answer:   Regular [1]       ACTIVITY  As tolerated.  Weight bearing as tolerated    CONSULTATIONS  Urology     PROCEDURES  DX-PORTABLE FLUOROSCOPY < 1 HOUR Reason For Exam: Main OR   Preliminary Result      Portable fluoroscopy utilized for 36 seconds.         INTERPRETING LOCATION: [address] [place] [address], [facility], [facility]      CT-RENAL COLIC EVALUATION(A/P W/O)   Final Result         1.  7.1 mm left ureteropelvic junction stone and 7.7 mm distal left ureteral stone resulting in outflow tract obstructive changes   2.  Right nephrolithiasis without visualized obstructive changes   3.  Fat-containing umbilical hernia   4.  Hepatomegaly            LABORATORY  Lab Results   Component Value Date    SODIUM 135 01/23/2025    POTASSIUM 4.5 01/23/2025    CHLORIDE 98 01/23/2025    CO2 23 01/23/2025    GLUCOSE 96 01/23/2025    BUN 18 01/23/2025    CREATININE 1.45 (H) 01/23/2025        Lab Results   Component Value Date    WBC 12.2 (H) 01/23/2025    HEMOGLOBIN 15.9 01/23/2025    HEMATOCRIT 47.3 01/23/2025    PLATELETCT 330 01/23/2025        Total time of the discharge process exceeds 32 minutes.

## 2025-01-23 NOTE — ANESTHESIA POSTPROCEDURE EVALUATION
Patient: Russell Bunn    Procedure Summary       Date: 01/22/25 Room / Location: [place] [address] / SURGERY [facility]    Anesthesia Start: 1532 Anesthesia Stop: 1639    Procedures:       CYSTOSCOPY, WITH URETERAL STENT INSERTION (Left: Ureter)      URETEROSCOPY (Left: Ureter)      LITHOTRIPSY, USING LASER (Left: Ureter) Diagnosis: (CYSTINURIA)    Surgeons: Kulwinder Cramer M.D. Responsible Provider: Gael Daniel M.D.    Anesthesia Type: general ASA Status: 2 - Emergent            Final Anesthesia Type: general  Last vitals  BP   Blood Pressure: 100/57    Temp   35.9 °C (96.7 °F)    Pulse   68   Resp   12    SpO2   100 %      Anesthesia Post Evaluation    Patient location during evaluation: PACU  Patient participation: complete - patient participated  Level of consciousness: awake and alert  Pain score: 0    Airway patency: patent  Anesthetic complications: no  Cardiovascular status: hemodynamically stable  Respiratory status: acceptable  Hydration status: euvolemic    PONV: none          No notable events documented.     Nurse Pain Score: 10 (NPRS)

## 2025-01-23 NOTE — CARE PLAN
The patient is Stable - Low risk of patient condition declining or worsening    Shift Goals  Clinical Goals: Pt to experience less pain with medication during the night  Patient Goals: Rest and comfort  Family Goals: Rest and comfort    Progress made toward(s) clinical / shift goals:  Pt continues to slowly progress toward pain control during the night    Patient is not progressing towards the following goals:

## 2025-01-23 NOTE — PROGRESS NOTES
Patient recovered well post op. A&Ox4. VSS, 2L nc. Surgical sites CDI. Surgical pain managed. Patient able to drink fluids without Nausea and vomiting. PT belongings claimed by the patient. New IV placed in the PACU. Spouse updated and discussed plan of care. Report called to Mandie YBARRA.

## 2025-01-23 NOTE — DISCHARGE PLANNING
Care Transition Team Assessment    NOHALLE is pt's mother, Cristian, [phone]    RN NEELAM met with patient at bedside for assessment. He lives in an apartment at address on facesheet with his infant son. He reports he is a single father and has some support from a couple friends at times. His mother lives in [place]. He is independent with ADL's using no DME. He had a job but it ended so he now has no income. His facesheet says he has [employer] but pt reports that he has not had that in years as he has been in [place] for 5 years. He is applying for Medicaid he reports. He was incarcerated and currently has an ankle monitor. CHW met with pt and gave community resources for food, [employer] and [employer]. He has a friend at bedside to give pt and his son a ride home at discharge.    Information Source  Orientation Level: Oriented X4  Information Given By: Patient  Who is responsible for making decisions for patient? : Patient    Readmission Evaluation  Is this a readmission?: No    Elopement Risk  Legal Hold: No  Ambulatory or Self Mobile in Wheelchair: Yes  Disoriented: No  Psychiatric Symptoms: None  History of Wandering: No  Elopement this Admit: No  Vocalizing Wanting to Leave: No  Displays Behaviors, Body Language Wanting to Leave: No-Not at Risk for Elopement  Elopement Risk: Not at Risk for Elopement    Interdisciplinary Discharge Planning  Lives with - Patient's Self Care Capacity: Alone and Able to Care For Self  Patient or legal guardian wants to designate a caregiver: Yes  Caregiver name: Yu Portillo  Caregiver contact info: [phone]  ([facility]) Authorization for Release of Health Information has been completed: Yes  Support Systems: Parent, Friends / Neighbors  Housing / Facility: 1 Story Apartment / Condo  Able to Return to Previous ADL's: Yes  Mobility Issues: No  Prior Services: None  Patient Prefers to be Discharged to:: home    Discharge Preparedness  What is your plan after discharge?: Home with help  Prior  Functional Level: Ambulatory, Independent with Activities of Daily Living    Functional Assesment  Prior Functional Level: Ambulatory, Independent with Activities of Daily Living    Finances  Prescription Coverage: No    Advance Directive  Advance Directive?: None    Domestic Abuse  Have you ever been the victim of abuse or violence?: No    Discharge Risks or Barriers  Discharge risks or barriers?: No PCP, Uninsured / underinsured    Anticipated Discharge Information  Discharge Disposition: Discharged to home/self care (01)

## 2025-01-23 NOTE — DISCHARGE PLANNING
Case Management Discharge Planning    Admission Date: 1/21/2025  GMLOS:    ALOS: 0      Anticipated Discharge Dispo:  home, self care    DME Needed: No    Action(s) Taken: Patient cleared for discharge. Given PFA contact information to discuss applying for Medicaid. Pt has no income and needs assistance with paying for medications. Approved services done for :cefdinir $14.39, cyclobenzaprine $10.44, Ketorolac $18, oxybutynin $10.55, Phenazopyridine $10.55, tamsulosin $10.86, total of $74.79.    Escalations Completed: None    Medically Clear: Yes    Next Steps: pt to discharge     Barriers to Discharge: None    Is the patient up for discharge tomorrow: No    1300-Patient no longer discharging today due to urology wanting to monitor, pt complaining of severe pain. Possible CT and stent removal if pain does not get better.

## 2025-01-23 NOTE — PROGRESS NOTES
"0942: CHW at bedside to talk to patient on insurance questions.     1130: Pt's friend called this RN, reporting that pt is experiencing chest pain. This RN walked in the room, pt sitting at the edge of the bed, clutching his chest and could not talk very well. SpO2 98% in room air, HR 92. Unable to obtain BP as pt's arms are very tense. Morphine administered as per MAR.  As per pt, pain felt a little bit better after morphine administration.  Dr. Cabrera aware and notified. Ativan, EKG and troponin ordered. Will closely monitor.     1256: Re-checked patient's BP. BP stable at 145/92. Pt appears more comfortable and reports that his chest pain is \"gone.\" Pt still reports urethral and left flank pain at 6/10 which is also described by the patient as \"tolerable.\"  "

## 2025-01-23 NOTE — PROGRESS NOTES
Assumed care of patient after receiving report from Radha. Pt is A&Ox 4. Pain level 9/10. Assessment complete. Call light within reach and bed is locked and in lowest position. Plan of care discussed and patient requesting pain meds at this time.

## 2025-01-23 NOTE — DISCHARGE INSTRUCTIONS
DR. SWEENEY' DISCHARGE INSTRUCTIONS FOLLOWING   URETEROSCOPY AND LASER LITHOTRIPSY      DIET:  You can resume your regular diet. We encourage you to eat well-balanced and nutritious meals.      ACTIVITY:  Please restrain from strenuous activity or heavy lifting (more than 20 pounds) for the next week.  Please walk daily as much as tolerated, making exercise a part of your daily life. Do not drive while using narcotics for pain control. You may resumes showering and bathing without any restrictions.     WOUND CARE:  1. You have no dressing or open wounds to manage.   2. You do have a internal ureteral stent on strings.  Please do not pull these strings as they will be used at your follow up visit to remove the stent.     MEDICATIONS:  - Please use an over the counter antiinflammatory (ie Ibuprofen, naproxen, Ketoralac) and/or Tylenol for pain control as needed.  - You may take 3 days of pyridium as prescribed for numbing the bladder and burning with urination.  - You have been prescribed oxybutynin prn to help with bladder spasms or burning with urination. Please hold this if having difficulty urinating however.   - If you have severe pain refractory to Ibuprofen you may use Oxycodone for pain control as well as prescribed. If taking a narcotic, please use a stool softener like miralax or colace to prevent constipation.  - Please use flomax daily as prescribed while you have a stent in place to lessen stone pain.   - If you are using aspirin, Plavix, or coumadin, please don't restart these medications until 1 day after your discharge if you are not having large amounts of blood in the urine.    FOLLOW-UP:  We will call you to schedule your follow up appointment in 5-7 days with Dr. Sweeney' physician assistant for stent removal on strings and discussion of stone diet. You will have f/u with  in approximately 4-6 weeks with renal ultrasound and possibly metabolic work up if you've had recurrent stones. If you  have not heard from us in 1-2 business days, please call [phone] to schedule your follow-up appointment. You may also contact this number if you have questions or concerns that can be answered by Dr. Cramer' staff.      WARNING SIGNS:  Fever greater than 101 degrees Fahrenheit, chills, nausea or vomiting, Large amount of clots in urine that make it difficult to urinate or for urine to drain from powell, increasing pain, or abdominal swelling. If you are experiencing these symptoms, call the Urology Clinic or go to your local PCP or emergency room.    It is normal to see blood in your urine for up to 2 weeks even from surgery. The urine may clear up entirely, and then turn bloody again a few days later depending on your activity level; do not be alarmed. However, if you experience severe pain or tenderness, have a lot of increased bleeding, or find that you are unable to urinate because of large clots, please notify your doctor immediately     MEDICAL HELP DURING NORMAL BUSINESS HOURS:  Between the hours of 8 AM and 5 PM, please call [phone] to speak with Dr. Kulwinder Cramer’ staff.     MEDICAL HELP AFTER HOURS:  If you have a serious emergency such as chest pain, shortness of breath, relentless pain you should call 434. For other urgent problems after hours you may contact the urology physician on call by phoning the [phone]. You may also visit the Emergency room at local hospital for help.     For non-emergent problems such as prescription refills or routine questions, please do your best to contact us during normal business hours. This after-hours number should be used for urgent or emergent questions only.         Kulwinder Cramer M.D.   [address] [place] [address]   [phone]         Follow-up with repeat labs in 1 week to monitor kidney function.

## 2025-01-23 NOTE — ANESTHESIA TIME REPORT
Anesthesia Start and Stop Event Times       Date Time Event    1/22/2025 1530 Ready for Procedure     1532 Anesthesia Start     1639 Anesthesia Stop          Responsible Staff  01/22/25      Name Role Begin End    Gael Daniel M.D. Anesth 1532 1639          Overtime Reason:  overtime    Comments:

## 2025-01-23 NOTE — OP REPORT
Pre-op Diagnosis: Left nephrolithiasis  Left ureterolithiasis  Left hydronephrosis  Cystinuria   Post-op Diagnosis: Same as above   Procedure: - Cystoscopy, Left Ureteroscopy w/ laser lithotripsy, JJ stent:, 62465  -22 complex modifier given the patient had 2 separate stones basically having to totally separate procedures requiring more time then typically required be treating both the stone in the distal ureter with the rigid ureteroscope as well as the stone in the proximal ureter with the flexible scope necessitating an access sheath.   Surgeon: Surgeons and Role:     * Kulwinder Cramer M.D. - Primary   Assistant: Circulator: Yariel Cole R.N.  Laser Staff: Alberto Haji  Scrub Person: Christophe Velzi  Radiology Technologist: Dannielle Mathew   Anesthesia: General, LMA  Anesthesiologist: Gael Daniel M.D.   Estimated Blood Loss: * No blood loss amount entered *   IV fluids <1L Crystalloid    Specimens: 1. Left Stone for chemical analysis    Complications: None   Condition: Stable, procedure well tolerated    Drains: 1. Left [id number] JJ stent (on strings)   2. No powell   Disposition:  1. PACU, discharge after voiding  2. f/u 5-7 days removal stent on strings  3. F/u 6 weks RBUS in our metabolic stone clinic.   Findings 1.  0.7 cm stone at Left proximal and distal ureter  2.  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 Finn at [facility] who has been intolerant to medications versus near including Thiola and potassium citrate tabs who presents with 2 left ureteral stones today.  After a full discussion of alternatives, risks, and benefits the patient consented to proceeding with Ureteroscopy, laser lithotripsy and possible JJ stent placement on the respective side.  He understands the risk of inability to access ureter, the need  for second procedures, the possibility of negative ureteroscopy, that he may have stent discomfort until this is removed, bleeding, infection, ureteral injury or stricture, bladder injury, post op urinary retention requiring powell catheter, and the general pulmonary and cardiovascular risks associated with anesthesia.     Procedure Details:   The patient was taken to operating room and placed on table in supine position.  Ancef 22 was administered prior to the start of the procedure based on previous urine cultures. Sequential compression devices were placed for deep venous thrombosis prophylaxis. After induction of general anesthesia, both legs were placed in Sam stirrups in the standard lithotomy position.  A timeout was held confirming the correct patient, procedure and laterality.   The perineal area was prepped and draped in a sterile fashion. A 22 [place] rigid cystoscope was inserted into the urethra and the bladder was emptied and then distended with sterile saline. Urethral sounds were not no evidence needed to dilate the urethral meatus. Cystoscopy revealed normal bladder mucosa, orthotopic ureteral orifices, and no evidence of cystitis.    We passed a wire through the ureteral orifice of the respective side into the renal pelvis which was visualized under fluoroscopic vision.     The wire was moved to the side and a semirigid ureteroscope was placed into the urethra and passed up the left ureter. We did NOT need a ureteral dilator to pass the scope into the ureter.  The stone was encountered in the distal ureter was broken with a 200 µm holmium laser and all fragments were basketed and removed    A [id number] ureteral access sheath was then placed over one of the  wires to desired position in left ureter, and wire was removed.  We inserted the flexible ureteroscope and encountered the stone in the proximal ureter which was pushed into the upper middle pole calyx. The holmium laser was then used to  fracture the stone until it was small passable less than 1 mm fragments.  We removed the ureteroscope slowly with the ureteral access sheath flushing out any remaining ureteral stone debris.     Returnign to rigid cystoscope, we then placed a Left-sided JJ stent, [id number] JJ stent (on strings)  under fluoroscopy. We then saw that the JJ stent was in appropriate position, with a good curl visualized in the renal pelvis fluoroscopically and a good curl in the bladder endoscopically. The cystoscope was removed after emptying the bladder.  The patient was awoken from anesthesia and brought to recovery in satisfactory condition.        Kulwinder Cramer M.D.   [address] [address]   [phone]

## 2025-01-24 ENCOUNTER — PATIENT OUTREACH (OUTPATIENT)
Dept: HEALTH INFORMATION MANAGEMENT | Facility: OTHER | Age: 35
End: 2025-01-24
Payer: COMMERCIAL

## 2025-01-24 NOTE — PROGRESS NOTES
Hospital Medicine Daily Progress Note    Date of Service  1/23/2025    Chief Complaint  Russell Bunn is a 34 y.o. male admitted 1/21/2025 with left flank pain     Hospital Course    Russell Bunn is a 34 y.o. male who presented 1/21/2025 with left lower quadrant flank pain.  Initial lab in the ED noted with leukocytosis, UA positive for leukocyte esterase, white count, subsequent CT renal noted with 7.1 mm left uteropelvic junction stone and 7.7 cm distal left ureteral stone resulting in outflow tract obstructive change.  S/p Cystoscopy, Left Ureteroscopy w/ laser lithotripsy, JJ stent  on 1/22/2025 .  Patient continues to have urethral pain.  He does report of chest pain with subsequent EKG and troponin unremarkable.      Interval Problem Update    1/23/2025  Seen and examined at bedside  Remain anxious  Continue complain of 10 out of 10 flank pain  Had short episode of chest pain which resolved  Labs with slightly worsened leukocytosis white count 12.2, hemoglobin 15.9, chemistry sodium 135 potassium 4.5, worsening renal function, troponin 7, EKG reviewed which is unremarkable  Chart reviewed, consult reviewed, procedure note reviewed    PLAN  Continue on IV Rocephin  Continue to trend troponin, no concern for ACS, has severe anxiety on exam  Reviewed BMP in a.m. to monitor electrolytes and renal function  Repeat CBC in a.m. to monitor white count and hemoglobin  Requiring close monitoring for toxicity while on IV controlled substance  Requiring IV antibiotics, need close monitoring for toxicity  Case discussed with urology Dr. Carnes.  Plan is to repeat CT abdomen if continued to have bladder spasm and pain      Patient has a high medical complexity, complex decision making and is at high risk of complication, morbidity and mortality        I have discussed this patient's plan of care and discharge plan at IDT rounds today with Case Management, Nursing, Nursing leadership, and other  members of the IDT team.    Consultants/Specialty  urology    Code Status  Full Code    Disposition  The patient is not medically cleared for discharge to home or a post-acute facility.  Anticipate discharge to: home with close outpatient follow-up    I have placed the appropriate orders for post-discharge needs.    Review of Systems  Review of Systems   Genitourinary:  Positive for dysuria and flank pain.        Physical Exam  Temp:  [36 °C (96.8 °F)-36.8 °C (98.2 °F)] 36.6 °C (97.8 °F)  Pulse:  [60-92] 82  Resp:  [12-20] 18  BP: (125-156)/(64-95) 148/86  SpO2:  [91 %-100 %] 93 %    Physical Exam  Constitutional:       Appearance: Normal appearance.   Cardiovascular:      Rate and Rhythm: Normal rate and regular rhythm.      Pulses: Normal pulses.   Pulmonary:      Effort: Pulmonary effort is normal. No respiratory distress.      Breath sounds: Normal breath sounds.   Abdominal:      Tenderness: There is left CVA tenderness.   Musculoskeletal:      Right lower leg: No edema.      Left lower leg: No edema.   Skin:     General: Skin is warm.   Neurological:      General: No focal deficit present.      Mental Status: He is alert and oriented to person, place, and time.   Psychiatric:         Mood and Affect: Mood normal.         Fluids    Intake/Output Summary (Last 24 hours) at 1/23/2025 1641  Last data filed at 1/23/2025 1208  Gross per 24 hour   Intake --   Output 680 ml   Net -680 ml        Laboratory  Recent Labs     01/21/25 1711 01/22/25  0209 01/23/25  0625   WBC 12.8* 11.9* 12.2*   RBC 5.39 4.96 5.19   HEMOGLOBIN 16.5 15.1 15.9   HEMATOCRIT 49.7 46.5 47.3   MCV 92.2 93.8 91.1   MCH 30.6 30.4 30.6   MCHC 33.2 32.5 33.6   RDW 44.6 44.9 42.1   PLATELETCT 357 318 330   MPV 9.6 9.4 9.3     Recent Labs     01/21/25 1711 01/22/25  0209 01/23/25  0625   SODIUM 137 136 135   POTASSIUM 4.3 3.9 4.5   CHLORIDE 102 105 98   CO2 21 22 23   GLUCOSE 88 92 96   BUN 16 16 18   CREATININE 1.42* 1.39 1.45*   CALCIUM 9.5 9.1  9.4                   Imaging  DX-PORTABLE FLUOROSCOPY < 1 HOUR Reason For Exam: Main OR   Preliminary Result      Portable fluoroscopy utilized for 36 seconds.         INTERPRETING LOCATION: [address] [place] [address], [place], [address]      CT-RENAL COLIC EVALUATION(A/P W/O)   Final Result         1.  7.1 mm left ureteropelvic junction stone and 7.7 mm distal left ureteral stone resulting in outflow tract obstructive changes   2.  Right nephrolithiasis without visualized obstructive changes   3.  Fat-containing umbilical hernia   4.  Hepatomegaly           Assessment/Plan  * Acute unilateral obstructive uropathy- (present on admission)  Assessment & Plan  Patient with known history of nephrolithiasis presents for left-sided flank pain  CT renal showing 7.1 mm left ureteropelvic junction stone and 7.7 mm distal left ureteral stone resulting in outflow tract obstructive   S/p Cystoscopy, Left Ureteroscopy w/ laser lithotripsy, JJ stent  on 1/22/2025 1/23/2025  Continue on IV Rocephin  Continue to trend troponin, no concern for ACS, has severe anxiety on exam  Reviewed BMP in a.m. to monitor electrolytes and renal function  Repeat CBC in a.m. to monitor white count and hemoglobin  Requiring close monitoring for toxicity while on IV controlled substance  Requiring IV antibiotics, need close monitoring for toxicity  Case discussed with urology Dr. Carnes.  Plan is to repeat CT abdomen if continued to have bladder spasm and pain    Other chest pain  Assessment & Plan  Anxiety related pain   no concern for ACS  EKG unremarkable, troponin negative trending troponin  Continue with pain management  Ativan as needed for anxiety    Acute pyelonephritis- (present on admission)  Assessment & Plan  On IV antibiotics  Scheduled for cystoscopy and ureteral stent placement  Repeat labs in a.m.      Post-renal ARAM- (present on admission)  Assessment & Plan  Plan for lithotropsy   Iv fluid repeat labs in AM         VTE prophylaxis: SC , resume  Lovenox after surgery    I have performed a physical exam and reviewed and updated ROS and Plan today (1/23/2025). In review of yesterday's note (1/22/2025), there are no changes except as documented above.

## 2025-01-24 NOTE — PROGRESS NOTES
PIV D/C'd.  Discharge instructions provided to pt.  Pt states understanding.  Pt states all questions have been answered.  Copy of discharge provided to pt.  Signed copy in chart.  Prescriptions provided to pt, copy in chart. Pt to  meds from pharmacy. Pt states that all personal belongings are in possession. Pt walked off unit with little son and friend without incident.

## 2025-01-24 NOTE — CARE PLAN
The patient is Watcher - Medium risk of patient condition declining or worsening    Shift Goals  Clinical Goals: Control pain  Patient Goals: Rest & comfort  Family Goals: Rest & comfort    Progress made toward(s) clinical / shift goals:        Problem: Knowledge Deficit - Standard  Goal: Patient and family/care givers will demonstrate understanding of plan of care, disease process/condition, diagnostic tests and medications  1/23/2025 1601 by Rosa Zazueta RMargieN.  Outcome: Progressing  1/23/2025 1600 by Rosa Zazueta R.N.  Outcome: Progressing  1/23/2025 1600 by KARTHIKEYAN MossN.  Outcome: Progressing     Problem: Pain - Standard  Goal: Alleviation of pain or a reduction in pain to the patient’s comfort goal  1/23/2025 1606 by Rosa Zazueta R.N.  Outcome: Progressing  Flowsheets  Taken 1/23/2025 1409 by Rosa Zazueta R.N.  Pain Rating Scale (NPRS): 8  Taken 1/22/2025 1835 by Mandie Ramos RSHANTA  Non Verbal Scale: Calm  Note: Pt pain is controlled at 6/10 with PRN pain medications, significant other brought food and distraction. Pt appears calm and able to perform ADLs.  1/23/2025 1602 by Rosa Zazueta R.N.  Outcome: Progressing            Patient is not progressing towards the following goals:      Problem: Psychosocial Needs:  Goal: Level of anxiety will decrease  1/23/2025 1606 by KARTHIKEYAN MossN.  Outcome: Not Progressing  Flowsheets (Taken 1/23/2025 1606)  Patient Behaviors: Anxious  Note: Pt anxious d/t circumstances leading to discharge, due to no insurance, having son at bedside that he needs to care for, and no access to money for prescriptions.   1/23/2025 1602 by KARTHIKEYAN MossN.  Outcome: Not Progressing  1/23/2025 1601 by KARTHIKEYAN MossN.  Outcome: Not Progressing

## 2025-01-24 NOTE — ASSESSMENT & PLAN NOTE
Anxiety related pain   no concern for ACS  EKG unremarkable, troponin negative trending troponin  Continue with pain management  Ativan as needed for anxiety

## 2025-01-26 LAB
APPEARANCE STONE: NORMAL
COMPN STONE: NORMAL
SPECIMEN WT: 57 MG

## 2025-01-29 ENCOUNTER — APPOINTMENT (OUTPATIENT)
Dept: RADIOLOGY | Facility: MEDICAL CENTER | Age: 35
End: 2025-01-29
Attending: EMERGENCY MEDICINE

## 2025-01-29 ENCOUNTER — HOSPITAL ENCOUNTER (EMERGENCY)
Facility: MEDICAL CENTER | Age: 35
End: 2025-01-29
Attending: EMERGENCY MEDICINE
Payer: COMMERCIAL

## 2025-01-29 ENCOUNTER — PHARMACY VISIT (OUTPATIENT)
Dept: PHARMACY | Facility: MEDICAL CENTER | Age: 35
End: 2025-01-29
Payer: COMMERCIAL

## 2025-01-29 VITALS
TEMPERATURE: 98 F | WEIGHT: 160 LBS | HEART RATE: 75 BPM | OXYGEN SATURATION: 95 % | BODY MASS INDEX: 25.71 KG/M2 | RESPIRATION RATE: 16 BRPM | HEIGHT: 66 IN | SYSTOLIC BLOOD PRESSURE: 135 MMHG | DIASTOLIC BLOOD PRESSURE: 89 MMHG

## 2025-01-29 DIAGNOSIS — R10.9 FLANK PAIN: ICD-10-CM

## 2025-01-29 DIAGNOSIS — N20.1 URETERAL STONE: ICD-10-CM

## 2025-01-29 LAB
ALBUMIN SERPL BCP-MCNC: 4.2 G/DL (ref 3.2–4.9)
ALBUMIN/GLOB SERPL: 1.2 G/DL
ALP SERPL-CCNC: 96 U/L (ref 30–99)
ALT SERPL-CCNC: 27 U/L (ref 2–50)
ANION GAP SERPL CALC-SCNC: 11 MMOL/L (ref 7–16)
APPEARANCE UR: ABNORMAL
AST SERPL-CCNC: 31 U/L (ref 12–45)
BACTERIA #/AREA URNS HPF: ABNORMAL /HPF
BASOPHILS # BLD AUTO: 0.5 % (ref 0–1.8)
BASOPHILS # BLD: 0.06 K/UL (ref 0–0.12)
BILIRUB SERPL-MCNC: 0.6 MG/DL (ref 0.1–1.5)
BILIRUB UR QL STRIP.AUTO: NEGATIVE
BUN SERPL-MCNC: 13 MG/DL (ref 8–22)
CALCIUM ALBUM COR SERPL-MCNC: 9.3 MG/DL (ref 8.5–10.5)
CALCIUM SERPL-MCNC: 9.5 MG/DL (ref 8.5–10.5)
CASTS URNS QL MICRO: ABNORMAL /LPF (ref 0–2)
CHLORIDE SERPL-SCNC: 102 MMOL/L (ref 96–112)
CO2 SERPL-SCNC: 22 MMOL/L (ref 20–33)
COLOR UR: YELLOW
CREAT SERPL-MCNC: 1.39 MG/DL (ref 0.5–1.4)
EOSINOPHIL # BLD AUTO: 0.38 K/UL (ref 0–0.51)
EOSINOPHIL NFR BLD: 3.5 % (ref 0–6.9)
EPITHELIAL CELLS 1715: ABNORMAL /HPF (ref 0–5)
ERYTHROCYTE [DISTWIDTH] IN BLOOD BY AUTOMATED COUNT: 43.8 FL (ref 35.9–50)
GFR SERPLBLD CREATININE-BSD FMLA CKD-EPI: 68 ML/MIN/1.73 M 2
GLOBULIN SER CALC-MCNC: 3.6 G/DL (ref 1.9–3.5)
GLUCOSE SERPL-MCNC: 101 MG/DL (ref 65–99)
GLUCOSE UR STRIP.AUTO-MCNC: NEGATIVE MG/DL
HCT VFR BLD AUTO: 48.1 % (ref 42–52)
HGB BLD-MCNC: 16 G/DL (ref 14–18)
IMM GRANULOCYTES # BLD AUTO: 0.04 K/UL (ref 0–0.11)
IMM GRANULOCYTES NFR BLD AUTO: 0.4 % (ref 0–0.9)
KETONES UR STRIP.AUTO-MCNC: NEGATIVE MG/DL
LEUKOCYTE ESTERASE UR QL STRIP.AUTO: ABNORMAL
LIPASE SERPL-CCNC: 38 U/L (ref 11–82)
LYMPHOCYTES # BLD AUTO: 3.36 K/UL (ref 1–4.8)
LYMPHOCYTES NFR BLD: 30.7 % (ref 22–41)
Lab: PRESENT /HPF
MCH RBC QN AUTO: 30.7 PG (ref 27–33)
MCHC RBC AUTO-ENTMCNC: 33.3 G/DL (ref 32.3–36.5)
MCV RBC AUTO: 92.3 FL (ref 81.4–97.8)
MICRO URNS: ABNORMAL
MONOCYTES # BLD AUTO: 0.86 K/UL (ref 0–0.85)
MONOCYTES NFR BLD AUTO: 7.8 % (ref 0–13.4)
NEUTROPHILS # BLD AUTO: 6.26 K/UL (ref 1.82–7.42)
NEUTROPHILS NFR BLD: 57.1 % (ref 44–72)
NITRITE UR QL STRIP.AUTO: NEGATIVE
NRBC # BLD AUTO: 0 K/UL
NRBC BLD-RTO: 0 /100 WBC (ref 0–0.2)
PH UR STRIP.AUTO: 7.5 [PH] (ref 5–8)
PLATELET # BLD AUTO: 288 K/UL (ref 164–446)
PMV BLD AUTO: 9 FL (ref 9–12.9)
POTASSIUM SERPL-SCNC: 4.5 MMOL/L (ref 3.6–5.5)
PROT SERPL-MCNC: 7.8 G/DL (ref 6–8.2)
PROT UR QL STRIP: 300 MG/DL
RBC # BLD AUTO: 5.21 M/UL (ref 4.7–6.1)
RBC # URNS HPF: >100 /HPF (ref 0–2)
RBC UR QL AUTO: ABNORMAL
SODIUM SERPL-SCNC: 135 MMOL/L (ref 135–145)
SP GR UR STRIP.AUTO: 1.02
UROBILINOGEN UR STRIP.AUTO-MCNC: 1 EU/DL
WBC # BLD AUTO: 11 K/UL (ref 4.8–10.8)
WBC #/AREA URNS HPF: ABNORMAL /HPF

## 2025-01-29 PROCEDURE — 80053 COMPREHEN METABOLIC PANEL: CPT

## 2025-01-29 PROCEDURE — 700111 HCHG RX REV CODE 636 W/ 250 OVERRIDE (IP): Performed by: EMERGENCY MEDICINE

## 2025-01-29 PROCEDURE — 85025 COMPLETE CBC W/AUTO DIFF WBC: CPT

## 2025-01-29 PROCEDURE — 96372 THER/PROPH/DIAG INJ SC/IM: CPT

## 2025-01-29 PROCEDURE — 96374 THER/PROPH/DIAG INJ IV PUSH: CPT

## 2025-01-29 PROCEDURE — 76775 US EXAM ABDO BACK WALL LIM: CPT

## 2025-01-29 PROCEDURE — 83690 ASSAY OF LIPASE: CPT

## 2025-01-29 PROCEDURE — RXMED WILLOW AMBULATORY MEDICATION CHARGE: Performed by: EMERGENCY MEDICINE

## 2025-01-29 PROCEDURE — 96375 TX/PRO/DX INJ NEW DRUG ADDON: CPT

## 2025-01-29 PROCEDURE — 81001 URINALYSIS AUTO W/SCOPE: CPT

## 2025-01-29 PROCEDURE — 99285 EMERGENCY DEPT VISIT HI MDM: CPT

## 2025-01-29 PROCEDURE — 74018 RADEX ABDOMEN 1 VIEW: CPT

## 2025-01-29 RX ORDER — DIAZEPAM 5 MG/1
5 TABLET ORAL EVERY 6 HOURS PRN
Qty: 8 TABLET | Refills: 0 | Status: ON HOLD | OUTPATIENT
Start: 2025-01-29 | End: 2025-02-01

## 2025-01-29 RX ORDER — KETOROLAC TROMETHAMINE 10 MG/1
10 TABLET, FILM COATED ORAL EVERY 4 HOURS PRN
Qty: 30 TABLET | Refills: 0 | Status: SHIPPED | OUTPATIENT
Start: 2025-01-29 | End: 2025-01-29

## 2025-01-29 RX ORDER — DIAZEPAM 5 MG/1
5 TABLET ORAL EVERY 6 HOURS PRN
Qty: 8 TABLET | Refills: 0 | Status: SHIPPED | OUTPATIENT
Start: 2025-01-29 | End: 2025-01-29

## 2025-01-29 RX ORDER — DIAZEPAM 10 MG/2ML
5 INJECTION, SOLUTION INTRAMUSCULAR; INTRAVENOUS ONCE
Status: COMPLETED | OUTPATIENT
Start: 2025-01-29 | End: 2025-01-29

## 2025-01-29 RX ORDER — KETOROLAC TROMETHAMINE 10 MG/1
10 TABLET, FILM COATED ORAL EVERY 4 HOURS PRN
Qty: 30 TABLET | Refills: 0 | Status: SHIPPED | OUTPATIENT
Start: 2025-01-29 | End: 2025-02-03

## 2025-01-29 RX ORDER — KETOROLAC TROMETHAMINE 15 MG/ML
15 INJECTION, SOLUTION INTRAMUSCULAR; INTRAVENOUS ONCE
Status: COMPLETED | OUTPATIENT
Start: 2025-01-29 | End: 2025-01-29

## 2025-01-29 RX ORDER — OXYCODONE HYDROCHLORIDE 5 MG/1
5 TABLET ORAL EVERY 8 HOURS PRN
Status: SHIPPED | COMMUNITY
Start: 2025-01-23 | End: 2025-01-31

## 2025-01-29 RX ADMIN — MORPHINE SULFATE 6 MG: 10 INJECTION INTRAVENOUS at 19:02

## 2025-01-29 RX ADMIN — KETOROLAC TROMETHAMINE 15 MG: 15 INJECTION, SOLUTION INTRAMUSCULAR; INTRAVENOUS at 16:44

## 2025-01-29 RX ADMIN — DIAZEPAM 5 MG: 10 INJECTION, SOLUTION INTRAMUSCULAR; INTRAVENOUS at 16:45

## 2025-01-29 ASSESSMENT — FIBROSIS 4 INDEX: FIB4 SCORE: 0.49

## 2025-01-29 NOTE — ED TRIAGE NOTES
Chief Complaint   Patient presents with    Abdominal Pain     Pt reports L sided abdominal pain that radiates into groin, similar to kidney stones in the past. Pt had kidney stent placed last week.     Pt to triage via wheelchair for above complaint.      Pt is alert & oriented and follows commands. Pt speaking in full sentences and responds appropriately to questions. No acute distress noted in triage. Respirations are even and unlabored. Skin is pink/warm/dry.    Pt placed back in lobby and educated on triage process. Pt encouraged to alert staff to any changes in condition.

## 2025-01-30 NOTE — DISCHARGE INSTRUCTIONS
Your tests here were reassuring.  There are no signs of stones or obstruction on the left side.  Your pain is most likely related to your recent procedure, to passing pieces of the 2 large stones that used to be on the left side, and perhaps some ongoing infection as you continue your antibiotics.  Use the prescribed medications, and go to your appointment on Friday.  Return here for severe or worsening symptoms in the meantime.

## 2025-01-30 NOTE — ED PROVIDER NOTES
"ER Provider Note    Scribed for Vito Snow M.d. by Shilpa Fitzgerald. 1/29/2025  4:18 PM    Primary Care Provider: Pcp Pt States None    CHIEF COMPLAINT  Chief Complaint   Patient presents with    Abdominal Pain     Pt reports L sided abdominal pain that radiates into groin, similar to kidney stones in the past. Pt had kidney stent placed last week.     EXTERNAL RECORDS REVIEWED  Inpatient Notes The patient was admitted 1/21-1/23 for kidney stones. CT showed 7 mm left UPJ stone and 8 mm distal left ureteral stone. He underwent left ureteral cystoscopy with laser lithotripsy and stent placement by Dr. Cramer      HPI/ROS  LIMITATION TO HISTORY   Select: : None    OUTSIDE HISTORIAN(S):  None    Russell Bunn is a 34 y.o. male who presents to the ED complaining of flank pain onset 2 days ago. The patient reports that about 2 days ago he started experiencing excruciating left flank pain. He describes the pain as intermittent \"waves\" that feel like \"a tight ball.\" He explains that his pain became unbearable today prompting him to present to the ED. The patient reports that he has had several stents placed in the past and has never had pain like this. He notes that he had a fever, chills, and vomiting yesterday that have since resolved. He adds that he has been feeling \"flutters\" on his right flank also and think he may have another stone on the right. He has been taking oxycodone for his kidney pain with little alleviation.     PAST MEDICAL HISTORY  Past Medical History:   Diagnosis Date    Kidney stones     multiple times requiring multiple surgeries    Seizure disorder (HCC)        SURGICAL HISTORY  Past Surgical History:   Procedure Laterality Date    TX CYSTOSCOPY,INSERT URETERAL STENT Left 1/22/2025    Procedure: CYSTOSCOPY, WITH URETERAL STENT INSERTION;  Surgeon: Kulwinder Cramer M.D.;  Location: SURGERY [facility];  Service: Urology    TX CYSTO/URETERO/PYELOSCOPY, DX Left 1/22/2025    " Procedure: URETEROSCOPY;  Surgeon: Kulwinder Cramer M.D.;  Location: [facility];  Service: Urology    LASERTRIPSY Left 1/22/2025    Procedure: LITHOTRIPSY, USING LASER;  Surgeon: Kulwinder Cramer M.D.;  Location: [facility];  Service: Urology    WA CYSTOSCOPY,INSERT URETERAL STENT Left 8/10/2023    Procedure: CYSTOSCOPY, WITH URETERAL STENT INSERTION;  Surgeon: Rei Silver M.D.;  Location: [facility];  Service: Urology    WA CYSTO/URETERO/PYELOSCOPY, DX Left 8/10/2023    Procedure: URETEROSCOPY;  Surgeon: Rei Silver M.D.;  Location: [facility];  Service: Urology    CYSTOSCOPY WITH URETERAL STENT INSERTION OR REMOVAL Right 4/24/2023    Procedure: CYSTOSCOPY, WITH RIGHT URETERAL STENT INSERTION;  Surgeon: Dante Stephenson M.D.;  Location: [facility];  Service: Urology    CYSTOSCOPY Left 02/20/2022    Cysto ureteroscopy ,lithotripsy, stone basket, ureteral stent placement, Dr Silver    WA CYSTOSCOPY,INSERT URETERAL STENT Left 2/20/2022    Procedure: CYSTOSCOPY, WITH URETERAL STENT INSERTION;  Surgeon: Rei Silver M.D.;  Location: [facility];  Service: Urology    WA CYSTO/URETERO/PYELOSCOPY, DX Left 2/20/2022    Procedure: URETEROSCOPY;  Surgeon: Rei Silver M.D.;  Location: [facility];  Service: Urology    LASERTRIPSY Left 2/20/2022    Procedure: LITHOTRIPSY, USING LASER;  Surgeon: Rei Silver M.D.;  Location: [facility];  Service: Urology    WA CYSTOSCOPY,INSERT URETERAL STENT Left 4/26/2021    Procedure: CYSTOSCOPY, WITH OUT URETERAL STENT INSERTION;  Surgeon: Raf Moss M.D.;  Location: [facility];  Service: Urology    WA CYSTO/URETERO/PYELOSCOPY, DX Left 4/26/2021    Procedure: URETEROSCOPY;  Surgeon: Raf Moss M.D.;  Location: [facility];  Service: Urology    WA CYSTO/URETERO/PYELOSCOPY, DX Right 3/13/2020    Procedure: URETEROSCOPY;  Surgeon: Chase Damon M.D.;  Location:  "SURGERY [facility];  Service: Urology    OR CYSTOSCOPY,INSERT URETERAL STENT Right 3/13/2020    Procedure: CYSTOSCOPY;  Surgeon: Chase Damon M.D.;  Location: SURGERY [facility];  Service: Urology    LASERTRIPSY Right 3/13/2020    Procedure: LITHOTRIPSY, USING LASER;  Surgeon: Chase Damon M.D.;  Location: SURGERY [facility];  Service: Urology    LITHOTRIPSY         FAMILY HISTORY  No family history on file.    SOCIAL HISTORY   reports that he has been smoking. He has never used smokeless tobacco. He reports current alcohol use. He reports current drug use. Drug: Inhaled.    CURRENT MEDICATIONS  Discharge Medication List as of 1/29/2025  7:07 PM        CONTINUE these medications which have NOT CHANGED    Details   oxyCODONE immediate-release (ROXICODONE) 5 MG Tab Take 5 mg by mouth every 8 hours as needed for Severe Pain. For 5 days, Historical Med      cefdinir (OMNICEF) 300 MG Cap Take 1 Capsule by mouth 2 times a day for 10 days., Disp-20 Capsule, R-0, Normal             ALLERGIES  Kiwi extract, Shellfish allergy, Ampicillin-sulbactam sodium, Cefazolin, Dilaudid [hydromorphone], Potassium iodide, and Tape    PHYSICAL EXAM  VITAL SIGNS: /87   Pulse 85   Temp 37.1 °C (98.8 °F) (Temporal)   Resp 18   Ht 1.676 m (5' 6\")   Wt 72.6 kg (160 lb)   SpO2 100%   BMI 25.82 kg/m²   Pulse ox interpretation: I interpret this pulse ox as normal.  Constitutional: Alert in mild distress.  HENT: No signs of trauma, Bilateral external ears normal, Nose normal.   Eyes: Conjunctiva normal, Non-icteric.   Neck: Normal range of motion, Supple, No stridor.   Lymphatic: No lymphadenopathy noted.   Cardiovascular: Regular rate and rhythm, no murmurs.   Thorax & Lungs: Normal breath sounds, No respiratory distress, No wheezing  Abdomen: Left flank tenderness that continues anteriorly to the LUQ. Bowel sounds normal, Soft, No masses, No pulsatile masses. No peritoneal signs.  Skin: Warm, Dry, No erythema, No " rash.   Back:  No midline bony tenderness.   Extremities: Intact distal pulses, No edema, No cyanosis.  Musculoskeletal: Good range of motion in all major joints. No or major deformities noted.   Neurologic: Alert , Normal motor function, Normal sensory function, No focal deficits noted.   Psychiatric: Affect normal, Judgment normal, Mood normal.     DIAGNOSTIC STUDIES    Labs:   Labs Reviewed   CBC WITH DIFFERENTIAL - Abnormal; Notable for the following components:       Result Value    WBC 11.0 (*)     Monos (Absolute) 0.86 (*)     All other components within normal limits   COMP METABOLIC PANEL - Abnormal; Notable for the following components:    Glucose 101 (*)     Globulin 3.6 (*)     All other components within normal limits   URINALYSIS - Abnormal; Notable for the following components:    Character Cloudy (*)     Protein 300 (*)     Leukocyte Esterase Moderate (*)     Occult Blood Large (*)     All other components within normal limits   URINE MICROSCOPIC (W/UA) - Abnormal; Notable for the following components:    WBC 3-5 (*)     RBC >100 (*)     Cystine Crystal Present (*)     All other components within normal limits   LIPASE   ESTIMATED GFR       EKG:   I have independently interpreted this EKG  Results for orders placed or performed during the hospital encounter of 25   EKG   Result Value Ref Range    Report       [facility]    Test Date:  2025  Pt Name:    ANTONIA HIGHTOWER           Department: 3  MRN:        [id number]                      Room:       S522  Gender:     Male                         Technician: ARTURO  :        1990                   Requested By:LB HOGAN  Order #:    [id number]                    Reading MD: Wu Boss MD    Measurements  Intervals                                Axis  Rate:       75                           P:          88  ME:         160                          QRS:        95  QRSD:       94                           T:           26  QT:         386  QTc:        432    Interpretive Statements  Sinus rhythm  Probable left atrial enlargement  Borderline right axis deviation  Low voltage, extremity leads  ST elev, probable normal early repol pattern  Electronically Signed On 01- 21:49:37 PST by Wu Boss MD           Radiology:   The attending emergency physician has independently interpreted the diagnostic imaging associated with this visit and am waiting the final reading from the radiologist.     Preliminary interpretation is a follows: No signs of retained stone or obstruction.    Radiologist interpretation:   US-RENAL   Final Result      1.  Increased echogenicity of both kidneys.   2.  Echogenic foci in the renal pyramids bilaterally, without hydronephrosis.   3.  Appropriate position of the left internal nephroureteral stent.      [id number] VIEW   Final Result      Status post left ureteral stent placement.          COURSE & MEDICAL DECISION MAKING     INITIAL ASSESSMENT, COURSE AND PLAN  Care Narrative:     4:18 PM Patient presents to the ED with left flank pain. Per triage protocol, urine micro was ordered. Patient evaluated at bedside and discussed plan of care, including labs and imaging. Patient will be treated with 15 mg Toradol and 5 mg Valium. Ordered for US-Renal, DX-Abdomen, CBC with diff, CMP, Lipase, and UA to evaluate his symptoms. Differential diagnoses include but not limited to: retained ureteral stone, post operative pain, urinary outflow obstruction, UTI.     6:47 PM - Patient reevaluated at bedside. He feels improved following medication administration. I discussed the patient's diagnostic study results. Discussed plan for discharge, including plan for follow-up, and informed them to return to the [facility] ED with any new or worsening symptoms.  He has a follow-up appointment for stent removal the day after tomorrow.  The cause of his pain is not clear, but there is no evidence of a dangerous cause  at this time.  We discussed the possibility that stone fragments from his procedures may have passed, causing pain.  He is still on antibiotics out of concern for recent urinary tract infection.  There is still some evidence of possible mild infection, but treatment is ongoing.  No evidence of sepsis.  He can be safely discharged to this prompt follow-up appointment, with close emergency department return precautions in the meantime.  Patient was given the opportunity for questions, and I addressed all questions or concerns. He is stable for discharge at this time. Patient verbalizes understanding and support with my plan for discharge.      DISPOSITION AND DISCUSSIONS  I have discussed management of the patient with the following physicians and RAJAT's:  None    Discussion of management with other QHP or appropriate source(s): None     Escalation of care considered, and ultimately not performed: acute inpatient care management, however at this time, the patient is most appropriate for outpatient management.    Barriers to care at this time, including but not limited to: Patient does not have established PCP.     Decision tools and prescription drugs considered including, but not limited to: Pain Medications was prescribed, as the patient has had significant discomfort, was prescribed a course of pain medication post procedure, but has run out despite appropriate use, and did require a second dose of pain medication .    The patient will return for new or worsening symptoms and is stable at the time of discharge.    The patient is referred to a primary physician for blood pressure management, diabetic screening, and for all other preventative health concerns.    DISPOSITION:  Patient will be discharged home in stable condition.    FOLLOW UP:  [facility] [place]      At your appointment on Friday.      OUTPATIENT MEDICATIONS:  Discharge Medication List as of 1/29/2025  7:07 PM           FINAL DIAGNOSIS  1. Flank pain    2.  Ureteral stone         IShilpa (Scribe), am scribing for, and in the presence of, Vito Snow M.D..    Electronically signed by: Shilpa Fitzgerald (Scriblee), 1/29/2025    IVito M.D. personally performed the services described in this documentation, as scribed by Shilpa Fitzgerald in my presence, and it is both accurate and complete.

## 2025-01-30 NOTE — ED NOTES
Patient provided discharge instructions and medication information. Patient verbalizes understanding and denies any further questions. Patent instructed to return if condition worsens. No distress noted.

## 2025-01-30 NOTE — ED NOTES
Medication history reviewed with patient at bedside.   Med rec is complete  Allergies reviewed.     Patient was prescribed a 10 day course of Cefdinir 300mg BID on 1/23/25- Last dose 1/28/25 PM per patient.     Anticoagulants: No    Fazal Fernandez

## 2025-01-31 ENCOUNTER — APPOINTMENT (OUTPATIENT)
Dept: RADIOLOGY | Facility: MEDICAL CENTER | Age: 35
DRG: 690 | End: 2025-01-31
Attending: EMERGENCY MEDICINE
Payer: COMMERCIAL

## 2025-01-31 ENCOUNTER — HOSPITAL ENCOUNTER (INPATIENT)
Facility: MEDICAL CENTER | Age: 35
End: 2025-01-31
Attending: EMERGENCY MEDICINE | Admitting: INTERNAL MEDICINE
Payer: COMMERCIAL

## 2025-01-31 VITALS
RESPIRATION RATE: 16 BRPM | HEIGHT: 67 IN | TEMPERATURE: 97 F | BODY MASS INDEX: 25.9 KG/M2 | SYSTOLIC BLOOD PRESSURE: 115 MMHG | OXYGEN SATURATION: 93 % | WEIGHT: 165 LBS | HEART RATE: 82 BPM | DIASTOLIC BLOOD PRESSURE: 76 MMHG

## 2025-01-31 DIAGNOSIS — N20.0 NEPHROLITHIASIS: ICD-10-CM

## 2025-01-31 DIAGNOSIS — N11.1 OBSTRUCTIVE PYELONEPHRITIS: ICD-10-CM

## 2025-01-31 DIAGNOSIS — N39.0 ACUTE UTI: ICD-10-CM

## 2025-01-31 PROBLEM — R73.9 HYPERGLYCEMIA: Status: ACTIVE | Noted: 2025-01-31

## 2025-01-31 LAB
ALBUMIN SERPL BCP-MCNC: 4.5 G/DL (ref 3.2–4.9)
ALBUMIN/GLOB SERPL: 1.2 G/DL
ALP SERPL-CCNC: 105 U/L (ref 30–99)
ALT SERPL-CCNC: 20 U/L (ref 2–50)
ANION GAP SERPL CALC-SCNC: 19 MMOL/L (ref 7–16)
APPEARANCE UR: CLEAR
AST SERPL-CCNC: 20 U/L (ref 12–45)
BACTERIA #/AREA URNS HPF: ABNORMAL /HPF
BASOPHILS # BLD AUTO: 0.4 % (ref 0–1.8)
BASOPHILS # BLD: 0.09 K/UL (ref 0–0.12)
BILIRUB SERPL-MCNC: 0.6 MG/DL (ref 0.1–1.5)
BILIRUB UR QL STRIP.AUTO: NEGATIVE
BUN SERPL-MCNC: 18 MG/DL (ref 8–22)
CALCIUM ALBUM COR SERPL-MCNC: 9.4 MG/DL (ref 8.5–10.5)
CALCIUM SERPL-MCNC: 9.8 MG/DL (ref 8.5–10.5)
CASTS URNS QL MICRO: ABNORMAL /LPF (ref 0–2)
CHLORIDE SERPL-SCNC: 97 MMOL/L (ref 96–112)
CO2 SERPL-SCNC: 21 MMOL/L (ref 20–33)
COLOR UR: ABNORMAL
CREAT SERPL-MCNC: 1.54 MG/DL (ref 0.5–1.4)
EOSINOPHIL # BLD AUTO: 0.28 K/UL (ref 0–0.51)
EOSINOPHIL NFR BLD: 1.1 % (ref 0–6.9)
EPITHELIAL CELLS 1715: ABNORMAL /HPF (ref 0–5)
ERYTHROCYTE [DISTWIDTH] IN BLOOD BY AUTOMATED COUNT: 44.6 FL (ref 35.9–50)
GFR SERPLBLD CREATININE-BSD FMLA CKD-EPI: 60 ML/MIN/1.73 M 2
GLOBULIN SER CALC-MCNC: 3.9 G/DL (ref 1.9–3.5)
GLUCOSE BLD STRIP.AUTO-MCNC: 134 MG/DL (ref 65–99)
GLUCOSE SERPL-MCNC: 203 MG/DL (ref 65–99)
GLUCOSE UR STRIP.AUTO-MCNC: 500 MG/DL
HCT VFR BLD AUTO: 46.7 % (ref 42–52)
HGB BLD-MCNC: 15.8 G/DL (ref 14–18)
IMM GRANULOCYTES # BLD AUTO: 0.13 K/UL (ref 0–0.11)
IMM GRANULOCYTES NFR BLD AUTO: 0.5 % (ref 0–0.9)
KETONES UR STRIP.AUTO-MCNC: ABNORMAL MG/DL
LEUKOCYTE ESTERASE UR QL STRIP.AUTO: ABNORMAL
LYMPHOCYTES # BLD AUTO: 4.35 K/UL (ref 1–4.8)
LYMPHOCYTES NFR BLD: 17.1 % (ref 22–41)
MCH RBC QN AUTO: 31.2 PG (ref 27–33)
MCHC RBC AUTO-ENTMCNC: 33.8 G/DL (ref 32.3–36.5)
MCV RBC AUTO: 92.3 FL (ref 81.4–97.8)
MICRO URNS: ABNORMAL
MONOCYTES # BLD AUTO: 1.73 K/UL (ref 0–0.85)
MONOCYTES NFR BLD AUTO: 6.8 % (ref 0–13.4)
NEUTROPHILS # BLD AUTO: 18.82 K/UL (ref 1.82–7.42)
NEUTROPHILS NFR BLD: 74.1 % (ref 44–72)
NITRITE UR QL STRIP.AUTO: NEGATIVE
NRBC # BLD AUTO: 0 K/UL
NRBC BLD-RTO: 0 /100 WBC (ref 0–0.2)
PH UR STRIP.AUTO: 8 [PH] (ref 5–8)
PLATELET # BLD AUTO: 309 K/UL (ref 164–446)
PMV BLD AUTO: 9.7 FL (ref 9–12.9)
POTASSIUM SERPL-SCNC: 3.6 MMOL/L (ref 3.6–5.5)
PROT SERPL-MCNC: 8.4 G/DL (ref 6–8.2)
PROT UR QL STRIP: 100 MG/DL
RBC # BLD AUTO: 5.06 M/UL (ref 4.7–6.1)
RBC # URNS HPF: ABNORMAL /HPF (ref 0–2)
RBC UR QL AUTO: ABNORMAL
SODIUM SERPL-SCNC: 137 MMOL/L (ref 135–145)
SP GR UR STRIP.AUTO: 1.01
UROBILINOGEN UR STRIP.AUTO-MCNC: 0.2 EU/DL
WBC # BLD AUTO: 25.4 K/UL (ref 4.8–10.8)
WBC #/AREA URNS HPF: ABNORMAL /HPF

## 2025-01-31 PROCEDURE — 82962 GLUCOSE BLOOD TEST: CPT

## 2025-01-31 PROCEDURE — 96374 THER/PROPH/DIAG INJ IV PUSH: CPT

## 2025-01-31 PROCEDURE — 85025 COMPLETE CBC W/AUTO DIFF WBC: CPT

## 2025-01-31 PROCEDURE — 81001 URINALYSIS AUTO W/SCOPE: CPT

## 2025-01-31 PROCEDURE — 80053 COMPREHEN METABOLIC PANEL: CPT

## 2025-01-31 PROCEDURE — 36415 COLL VENOUS BLD VENIPUNCTURE: CPT

## 2025-01-31 PROCEDURE — 74176 CT ABD & PELVIS W/O CONTRAST: CPT

## 2025-01-31 PROCEDURE — 96376 TX/PRO/DX INJ SAME DRUG ADON: CPT

## 2025-01-31 PROCEDURE — 99285 EMERGENCY DEPT VISIT HI MDM: CPT

## 2025-01-31 PROCEDURE — 770006 HCHG ROOM/CARE - MED/SURG/GYN SEMI*

## 2025-01-31 PROCEDURE — A9270 NON-COVERED ITEM OR SERVICE: HCPCS | Performed by: INTERNAL MEDICINE

## 2025-01-31 PROCEDURE — 99223 1ST HOSP IP/OBS HIGH 75: CPT | Performed by: INTERNAL MEDICINE

## 2025-01-31 PROCEDURE — 700102 HCHG RX REV CODE 250 W/ 637 OVERRIDE(OP): Performed by: INTERNAL MEDICINE

## 2025-01-31 PROCEDURE — 87086 URINE CULTURE/COLONY COUNT: CPT

## 2025-01-31 PROCEDURE — 96375 TX/PRO/DX INJ NEW DRUG ADDON: CPT

## 2025-01-31 PROCEDURE — 700111 HCHG RX REV CODE 636 W/ 250 OVERRIDE (IP): Mod: JZ | Performed by: EMERGENCY MEDICINE

## 2025-01-31 RX ORDER — DIAZEPAM 5 MG/1
5 TABLET ORAL EVERY 6 HOURS PRN
Status: DISCONTINUED | OUTPATIENT
Start: 2025-01-31 | End: 2025-02-01 | Stop reason: HOSPADM

## 2025-01-31 RX ORDER — PROCHLORPERAZINE EDISYLATE 5 MG/ML
5-10 INJECTION INTRAMUSCULAR; INTRAVENOUS EVERY 4 HOURS PRN
Status: DISCONTINUED | OUTPATIENT
Start: 2025-01-31 | End: 2025-02-01 | Stop reason: HOSPADM

## 2025-01-31 RX ORDER — ONDANSETRON 2 MG/ML
4 INJECTION INTRAMUSCULAR; INTRAVENOUS ONCE
Status: COMPLETED | OUTPATIENT
Start: 2025-01-31 | End: 2025-01-31

## 2025-01-31 RX ORDER — SODIUM CHLORIDE 9 MG/ML
INJECTION, SOLUTION INTRAVENOUS CONTINUOUS
Status: DISCONTINUED | OUTPATIENT
Start: 2025-01-31 | End: 2025-02-01 | Stop reason: HOSPADM

## 2025-01-31 RX ORDER — CYCLOBENZAPRINE HCL 5 MG
5 TABLET ORAL 3 TIMES DAILY PRN
COMMUNITY

## 2025-01-31 RX ORDER — PROMETHAZINE HYDROCHLORIDE 25 MG/1
12.5-25 SUPPOSITORY RECTAL EVERY 4 HOURS PRN
Status: DISCONTINUED | OUTPATIENT
Start: 2025-01-31 | End: 2025-02-01 | Stop reason: HOSPADM

## 2025-01-31 RX ORDER — CEFTRIAXONE 2 G/1
2000 INJECTION, POWDER, FOR SOLUTION INTRAMUSCULAR; INTRAVENOUS ONCE
Status: COMPLETED | OUTPATIENT
Start: 2025-01-31 | End: 2025-01-31

## 2025-01-31 RX ORDER — DEXTROSE MONOHYDRATE 25 G/50ML
25 INJECTION, SOLUTION INTRAVENOUS
Status: DISCONTINUED | OUTPATIENT
Start: 2025-01-31 | End: 2025-02-01 | Stop reason: HOSPADM

## 2025-01-31 RX ORDER — LABETALOL HYDROCHLORIDE 5 MG/ML
10 INJECTION, SOLUTION INTRAVENOUS EVERY 4 HOURS PRN
Status: DISCONTINUED | OUTPATIENT
Start: 2025-01-31 | End: 2025-02-01 | Stop reason: HOSPADM

## 2025-01-31 RX ORDER — OXYCODONE HYDROCHLORIDE 5 MG/1
5 TABLET ORAL
Status: DISCONTINUED | OUTPATIENT
Start: 2025-01-31 | End: 2025-02-01 | Stop reason: HOSPADM

## 2025-01-31 RX ORDER — AMOXICILLIN 250 MG
2 CAPSULE ORAL EVERY EVENING
Status: DISCONTINUED | OUTPATIENT
Start: 2025-01-31 | End: 2025-02-01 | Stop reason: HOSPADM

## 2025-01-31 RX ORDER — MORPHINE SULFATE 4 MG/ML
4 INJECTION INTRAVENOUS ONCE
Status: COMPLETED | OUTPATIENT
Start: 2025-01-31 | End: 2025-01-31

## 2025-01-31 RX ORDER — ONDANSETRON 2 MG/ML
4 INJECTION INTRAMUSCULAR; INTRAVENOUS EVERY 4 HOURS PRN
Status: DISCONTINUED | OUTPATIENT
Start: 2025-01-31 | End: 2025-02-01 | Stop reason: HOSPADM

## 2025-01-31 RX ORDER — PROMETHAZINE HYDROCHLORIDE 25 MG/1
12.5-25 TABLET ORAL EVERY 4 HOURS PRN
Status: DISCONTINUED | OUTPATIENT
Start: 2025-01-31 | End: 2025-02-01 | Stop reason: HOSPADM

## 2025-01-31 RX ORDER — MORPHINE SULFATE 4 MG/ML
4 INJECTION INTRAVENOUS
Status: DISCONTINUED | OUTPATIENT
Start: 2025-01-31 | End: 2025-02-01 | Stop reason: HOSPADM

## 2025-01-31 RX ORDER — CYCLOBENZAPRINE HCL 10 MG
5 TABLET ORAL 3 TIMES DAILY PRN
Status: DISCONTINUED | OUTPATIENT
Start: 2025-01-31 | End: 2025-02-01 | Stop reason: HOSPADM

## 2025-01-31 RX ORDER — INSULIN LISPRO 100 [IU]/ML
1-6 INJECTION, SOLUTION INTRAVENOUS; SUBCUTANEOUS
Status: DISCONTINUED | OUTPATIENT
Start: 2025-01-31 | End: 2025-02-01 | Stop reason: HOSPADM

## 2025-01-31 RX ORDER — ENOXAPARIN SODIUM 100 MG/ML
40 INJECTION SUBCUTANEOUS DAILY
Status: DISCONTINUED | OUTPATIENT
Start: 2025-01-31 | End: 2025-02-01 | Stop reason: HOSPADM

## 2025-01-31 RX ORDER — ONDANSETRON 4 MG/1
4 TABLET, ORALLY DISINTEGRATING ORAL EVERY 4 HOURS PRN
Status: DISCONTINUED | OUTPATIENT
Start: 2025-01-31 | End: 2025-02-01 | Stop reason: HOSPADM

## 2025-01-31 RX ORDER — POLYETHYLENE GLYCOL 3350 17 G/17G
1 POWDER, FOR SOLUTION ORAL
Status: DISCONTINUED | OUTPATIENT
Start: 2025-01-31 | End: 2025-02-01 | Stop reason: HOSPADM

## 2025-01-31 RX ORDER — OXYCODONE HYDROCHLORIDE 10 MG/1
10 TABLET ORAL
Status: DISCONTINUED | OUTPATIENT
Start: 2025-01-31 | End: 2025-02-01 | Stop reason: HOSPADM

## 2025-01-31 RX ORDER — ACETAMINOPHEN 325 MG/1
650 TABLET ORAL EVERY 6 HOURS PRN
Status: DISCONTINUED | OUTPATIENT
Start: 2025-01-31 | End: 2025-02-01 | Stop reason: HOSPADM

## 2025-01-31 RX ADMIN — CEFTRIAXONE SODIUM 2000 MG: 2 INJECTION, POWDER, FOR SOLUTION INTRAMUSCULAR; INTRAVENOUS at 17:21

## 2025-01-31 RX ADMIN — MORPHINE SULFATE 4 MG: 4 INJECTION, SOLUTION INTRAMUSCULAR; INTRAVENOUS at 15:45

## 2025-01-31 RX ADMIN — ONDANSETRON 4 MG: 2 INJECTION INTRAMUSCULAR; INTRAVENOUS at 13:50

## 2025-01-31 RX ADMIN — FENTANYL CITRATE 100 MCG: 50 INJECTION, SOLUTION INTRAMUSCULAR; INTRAVENOUS at 14:15

## 2025-01-31 RX ADMIN — ONDANSETRON 4 MG: 2 INJECTION INTRAMUSCULAR; INTRAVENOUS at 15:45

## 2025-01-31 RX ADMIN — OXYCODONE HYDROCHLORIDE 10 MG: 10 TABLET ORAL at 23:09

## 2025-01-31 RX ADMIN — MORPHINE SULFATE 4 MG: 4 INJECTION, SOLUTION INTRAMUSCULAR; INTRAVENOUS at 13:50

## 2025-01-31 RX ADMIN — OXYCODONE HYDROCHLORIDE 10 MG: 10 TABLET ORAL at 19:50

## 2025-01-31 SDOH — ECONOMIC STABILITY: TRANSPORTATION INSECURITY
IN THE PAST 12 MONTHS, HAS THE LACK OF TRANSPORTATION KEPT YOU FROM MEDICAL APPOINTMENTS OR FROM GETTING MEDICATIONS?: YES

## 2025-01-31 ASSESSMENT — ENCOUNTER SYMPTOMS
CHILLS: 0
COUGH: 0
TREMORS: 0
VOMITING: 0
DIZZINESS: 1
WEIGHT LOSS: 0
SPUTUM PRODUCTION: 0
HEADACHES: 0
NERVOUS/ANXIOUS: 0
VOMITING: 1
FEVER: 0
DOUBLE VISION: 0
BRUISES/BLEEDS EASILY: 0
FOCAL WEAKNESS: 0
NAUSEA: 1
POLYDIPSIA: 0
PALPITATIONS: 0
BLURRED VISION: 0
ABDOMINAL PAIN: 0
SPEECH CHANGE: 0
HEMOPTYSIS: 0
BACK PAIN: 0
HALLUCINATIONS: 0
SHORTNESS OF BREATH: 0
ORTHOPNEA: 0
NECK PAIN: 0
FLANK PAIN: 1
PHOTOPHOBIA: 0
HEARTBURN: 0

## 2025-01-31 ASSESSMENT — SOCIAL DETERMINANTS OF HEALTH (SDOH)
WITHIN THE LAST YEAR, HAVE YOU BEEN AFRAID OF YOUR PARTNER OR EX-PARTNER?: NO
WITHIN THE LAST YEAR, HAVE YOU BEEN KICKED, HIT, SLAPPED, OR OTHERWISE PHYSICALLY HURT BY YOUR PARTNER OR EX-PARTNER?: NO
WITHIN THE PAST 12 MONTHS, YOU WORRIED THAT YOUR FOOD WOULD RUN OUT BEFORE YOU GOT THE MONEY TO BUY MORE: SOMETIMES TRUE
WITHIN THE PAST 12 MONTHS, THE FOOD YOU BOUGHT JUST DIDN'T LAST AND YOU DIDN'T HAVE MONEY TO GET MORE: SOMETIMES TRUE
WITHIN THE LAST YEAR, HAVE YOU BEEN HUMILIATED OR EMOTIONALLY ABUSED IN OTHER WAYS BY YOUR PARTNER OR EX-PARTNER?: NO
WITHIN THE LAST YEAR, HAVE TO BEEN RAPED OR FORCED TO HAVE ANY KIND OF SEXUAL ACTIVITY BY YOUR PARTNER OR EX-PARTNER?: NO
IN THE PAST 12 MONTHS, HAS THE ELECTRIC, GAS, OIL, OR WATER COMPANY THREATENED TO SHUT OFF SERVICE IN YOUR HOME?: YES

## 2025-01-31 ASSESSMENT — LIFESTYLE VARIABLES
HAVE YOU EVER FELT YOU SHOULD CUT DOWN ON YOUR DRINKING: NO
ALCOHOL_USE: YES
EVER FELT BAD OR GUILTY ABOUT YOUR DRINKING: NO
SUBSTANCE_ABUSE: 0
TOTAL SCORE: 0
EVER HAD A DRINK FIRST THING IN THE MORNING TO STEADY YOUR NERVES TO GET RID OF A HANGOVER: NO
DOES PATIENT WANT TO STOP DRINKING: YES
DOES PATIENT WANT TO TALK TO SOMEONE ABOUT QUITTING: NO
TOTAL SCORE: 0
TOTAL SCORE: 0
CONSUMPTION TOTAL: INCOMPLETE
HAVE PEOPLE ANNOYED YOU BY CRITICIZING YOUR DRINKING: NO

## 2025-01-31 ASSESSMENT — PAIN DESCRIPTION - PAIN TYPE
TYPE: ACUTE PAIN

## 2025-01-31 ASSESSMENT — FIBROSIS 4 INDEX
FIB4 SCORE: 0.48
FIB4 SCORE: 0.68

## 2025-01-31 NOTE — ED NOTES
Patient educated on need for urine collection, verbalized understanding, reports unable to provide at this time.

## 2025-01-31 NOTE — ED NOTES
Patient returned from imaging. Resting on gurney, respirations even and unlabored. Call light within reach.

## 2025-01-31 NOTE — ED NOTES
"CT at bedside to take pt to imaging. Pt refusing until \"I get more pain medications.\" ERP messaged for orders.   "

## 2025-01-31 NOTE — ED TRIAGE NOTES
Chief Complaint   Patient presents with    Flank Pain     L flank.  Patient reportedly had urinary stent removed at [facility] this morning.  Last void at 1100.    Penis Pain     Patient BIB EMS from home with above complaints.  PTA PIV placed, fentanyl adminsitered 25 mcg.    Patient diaphoretic, c/o severe pain, per EMS, patient declined returning to [facility] when urinary stent was removed this morning.      Chart up for ERP.

## 2025-01-31 NOTE — ED PROVIDER NOTES
ED Provider Note    CHIEF COMPLAINT  Chief Complaint   Patient presents with    Flank Pain     L flank.  Patient reportedly had urinary stent removed at [facility] this morning.  Last void at 1100.    Penis Pain       EXTERNAL RECORDS REVIEWED  Outpatient Notes   surgery center    HPI/ROS  LIMITATION TO HISTORY   None  OUTSIDE HISTORIAN(S):  None    Russell Bunn is a 34 y.o. male who presents here for evaluation of flank pain.  Patient states that he was at the urology office today when he had his stents pulled out.  Patient states he had some mild pain after the stent was removed, then went home.  He states he had some increased pain at that time, and has had some vomiting.  He has no fever or chills, no chest pain or shortness of breath.    PAST MEDICAL HISTORY   has a past medical history of Kidney stones and Seizure disorder (HCC).    SURGICAL HISTORY   has a past surgical history that includes lithotripsy; cysto/uretero/pyeloscopy, dx (Right, 3/13/2020); cystoscopy,insert ureteral stent (Right, 3/13/2020); lasertripsy (Right, 3/13/2020); cystoscopy,insert ureteral stent (Left, 4/26/2021); cysto/uretero/pyeloscopy, dx (Left, 4/26/2021); cystoscopy (Left, 02/20/2022); cystoscopy,insert ureteral stent (Left, 2/20/2022); cysto/uretero/pyeloscopy, dx (Left, 2/20/2022); lasertripsy (Left, 2/20/2022); cystoscopy with ureteral stent insertion or removal (Right, 4/24/2023); cystoscopy,insert ureteral stent (Left, 8/10/2023); cysto/uretero/pyeloscopy, dx (Left, 8/10/2023); cystoscopy,insert ureteral stent (Left, 1/22/2025); cysto/uretero/pyeloscopy, dx (Left, 1/22/2025); and lasertripsy (Left, 1/22/2025).    FAMILY HISTORY  History reviewed. No pertinent family history.    SOCIAL HISTORY  Social History     Tobacco Use    Smoking status: Some Days    Smokeless tobacco: Never   Vaping Use    Vaping status: Some Days    Substances: THC (Smokes multiple times a day everyday)    Devices:  "Disposable    Passive vaping exposure: Yes   Substance and Sexual Activity    Alcohol use: Yes    Drug use: Yes     Types: Inhaled     Comment: Marijuana, every day    Sexual activity: Not on file       CURRENT MEDICATIONS  Home Medications       Reviewed by Connie Mantilla R.N. (Registered Nurse) on 01/31/25 at 1236  Med List Status: Partial     Medication Last Dose Status   cefdinir (OMNICEF) 300 MG Cap  Active   diazePAM (VALIUM) 5 MG Tab  Active   ketorolac (TORADOL) 10 MG Tab  Active   oxyCODONE immediate-release (ROXICODONE) 5 MG Tab  Active                  Audit from Redirected Encounters    **Home medications have not yet been reviewed for this encounter**         ALLERGIES  Allergies   Allergen Reactions    Kiwi Extract Anaphylaxis    Shellfish Allergy Anaphylaxis    Ampicillin-Sulbactam Sodium Rash and Itching     Itching and rash with IV unasyn 8/17/2023    Cefazolin Rash and Itching     Rash  Tolerated ceftriaxone (April 2021)    Dilaudid [Hydromorphone] Itching    Potassium Iodide Itching     Severe itching    Tape Unspecified     Paper tape okay        PHYSICAL EXAM  VITAL SIGNS: BP (!) 122/91   Pulse 79   Temp 36.7 °C (98 °F) (Temporal)   Resp 20   Ht 1.676 m (5' 6\")   Wt 72.6 kg (160 lb)   SpO2 98%   BMI 25.82 kg/m²    Constitutional: Well developed, well nourished. moderate acute distress.  HEENT: Normocephalic, atraumatic. Posterior pharynx clear and moist.  Eyes:  EOMI. Normal sclera.  Neck: Supple, Full range of motion, nontender.  Chest/Pulmonary: clear to ausculation. Symmetrical expansion.   Cardio: Regular rate and rhythm with no murmur.   Abdomen: Soft, mild diffuse tenderness, No peritoneal signs. No guarding. No palpable masses.  Back: No CVA tenderness, nontender midline, no step offs.  Musculoskeletal: No deformity, no edema, neurovascular intact.   Neuro: Clear speech, appropriate, cooperative, cranial nerves II-XII grossly intact.  Psych: Normal mood and " affect      EKG/LABS  Results for orders placed or performed during the hospital encounter of 01/31/25   CBC WITH DIFFERENTIAL    Collection Time: 01/31/25  1:43 PM   Result Value Ref Range    WBC 25.4 (H) 4.8 - 10.8 K/uL    RBC 5.06 4.70 - 6.10 M/uL    Hemoglobin 15.8 14.0 - 18.0 g/dL    Hematocrit 46.7 42.0 - 52.0 %    MCV 92.3 81.4 - 97.8 fL    MCH 31.2 27.0 - 33.0 pg    MCHC 33.8 32.3 - 36.5 g/dL    RDW 44.6 35.9 - 50.0 fL    Platelet Count 309 164 - 446 K/uL    MPV 9.7 9.0 - 12.9 fL    Neutrophils-Polys 74.10 (H) 44.00 - 72.00 %    Lymphocytes 17.10 (L) 22.00 - 41.00 %    Monocytes 6.80 0.00 - 13.40 %    Eosinophils 1.10 0.00 - 6.90 %    Basophils 0.40 0.00 - 1.80 %    Immature Granulocytes 0.50 0.00 - 0.90 %    Nucleated RBC 0.00 0.00 - 0.20 /100 WBC    Neutrophils (Absolute) 18.82 (H) 1.82 - 7.42 K/uL    Lymphs (Absolute) 4.35 1.00 - 4.80 K/uL    Monos (Absolute) 1.73 (H) 0.00 - 0.85 K/uL    Eos (Absolute) 0.28 0.00 - 0.51 K/uL    Baso (Absolute) 0.09 0.00 - 0.12 K/uL    Immature Granulocytes (abs) 0.13 (H) 0.00 - 0.11 K/uL    NRBC (Absolute) 0.00 K/uL   COMP METABOLIC PANEL    Collection Time: 01/31/25  1:43 PM   Result Value Ref Range    Sodium 137 135 - 145 mmol/L    Potassium 3.6 3.6 - 5.5 mmol/L    Chloride 97 96 - 112 mmol/L    Co2 21 20 - 33 mmol/L    Anion Gap 19.0 (H) 7.0 - 16.0    Glucose 203 (H) 65 - 99 mg/dL    Bun 18 8 - 22 mg/dL    Creatinine 1.54 (H) 0.50 - 1.40 mg/dL    Calcium 9.8 8.5 - 10.5 mg/dL    Correct Calcium 9.4 8.5 - 10.5 mg/dL    AST(SGOT) 20 12 - 45 U/L    ALT(SGPT) 20 2 - 50 U/L    Alkaline Phosphatase 105 (H) 30 - 99 U/L    Total Bilirubin 0.6 0.1 - 1.5 mg/dL    Albumin 4.5 3.2 - 4.9 g/dL    Total Protein 8.4 (H) 6.0 - 8.2 g/dL    Globulin 3.9 (H) 1.9 - 3.5 g/dL    A-G Ratio 1.2 g/dL   ESTIMATED GFR    Collection Time: 01/31/25  1:43 PM   Result Value Ref Range    GFR (CKD-EPI) 60 >60 mL/min/1.73 m 2   URINALYSIS CULTURE, IF INDICATED    Collection Time: 01/31/25  3:19 PM     Specimen: Urine, Clean Catch   Result Value Ref Range    Color Red (A)     Character Clear     Specific Gravity 1.014 <1.035    Ph 8.0 5.0 - 8.0    Glucose 500 (A) Negative mg/dL    Ketones Trace (A) Negative mg/dL    Protein 100 (A) Negative mg/dL    Bilirubin Negative Negative    Urobilinogen, Urine 0.2 <=1.0 EU/dL    Nitrite Negative Negative    Leukocyte Esterase Moderate (A) Negative    Occult Blood Large (A) Negative    Micro Urine Req Microscopic    URINE MICROSCOPIC (W/UA)    Collection Time: 01/31/25  3:19 PM   Result Value Ref Range    WBC 3-5 (A) /hpf    RBC 21-50 (A) 0 - 2 /hpf    Bacteria None None /hpf    Epithelial Cells 0-2 0 - 5 /hpf    Urine Casts 0-2 0 - 2 /lpf         RADIOLOGY/PROCEDURES   I have independently interpreted the diagnostic imaging associated with this visit and am waiting the final reading from the radiologist.   My preliminary interpretation is as follows: below     Radiologist interpretation:  CT-RENAL COLIC EVALUATION(A/P W/O)   Final Result      1. Interval development of moderate left hydroureteronephrosis after removal of the left internal nephroureteral stent. There is wall thickening involving the left ureter with no obstructing distal left ureteral stone. There is left renal edema and left    perinephric fat stranding.   2. There are stone fragments in the left renal pelvis and in the bladder lumen.   3. Multiple nonobstructing stones in the right kidney.   4. The remainder of the CT of the abdomen and pelvis without IV contrast is within normal limits.          COURSE & MEDICAL DECISION MAKING    ASSESSMENT, COURSE AND PLAN  Care Narrative: This is a 34-year-old male here for evaluation of post ureteral stent removal pain.  CT scan shows development of some left hydro ureter nephrosis after stent removal.  He has no acute finding noted.  He has no fever or chills.  Patient does have some nausea and vomiting from the pain.    4:44 PM  Spoke with [facility].  He states  ok for rocephin as he has tolerated it before.   Pt will be admitted for n/v and uti.    I spoke to urology, who will see as consult. It was Dr. Moyer.  He states to keep npo after midnight, hospitalist admit, and he will see in the am.         DISPOSITION AND DISCUSSIONS  I have discussed management of the patient with the following physicians and RAJAT's: Urology    FINAL DIAGNOSIS  UTI  Vomiting       Electronically signed by: Ezekiel Weiss D.O., 1/31/2025 1:58 PM

## 2025-02-01 ENCOUNTER — PHARMACY VISIT (OUTPATIENT)
Dept: PHARMACY | Facility: MEDICAL CENTER | Age: 35
End: 2025-02-01
Payer: COMMERCIAL

## 2025-02-01 VITALS
OXYGEN SATURATION: 96 % | HEART RATE: 91 BPM | BODY MASS INDEX: 25.9 KG/M2 | WEIGHT: 165 LBS | HEIGHT: 67 IN | TEMPERATURE: 97.3 F | DIASTOLIC BLOOD PRESSURE: 79 MMHG | SYSTOLIC BLOOD PRESSURE: 117 MMHG | RESPIRATION RATE: 18 BRPM

## 2025-02-01 LAB
ALBUMIN SERPL BCP-MCNC: 4.1 G/DL (ref 3.2–4.9)
ALBUMIN/GLOB SERPL: 1.2 G/DL
ALP SERPL-CCNC: 92 U/L (ref 30–99)
ALT SERPL-CCNC: 19 U/L (ref 2–50)
ANION GAP SERPL CALC-SCNC: 10 MMOL/L (ref 7–16)
AST SERPL-CCNC: 23 U/L (ref 12–45)
BASOPHILS # BLD AUTO: 0.4 % (ref 0–1.8)
BASOPHILS # BLD: 0.05 K/UL (ref 0–0.12)
BILIRUB SERPL-MCNC: 0.4 MG/DL (ref 0.1–1.5)
BUN SERPL-MCNC: 16 MG/DL (ref 8–22)
CALCIUM ALBUM COR SERPL-MCNC: 9.3 MG/DL (ref 8.5–10.5)
CALCIUM SERPL-MCNC: 9.4 MG/DL (ref 8.5–10.5)
CHLORIDE SERPL-SCNC: 101 MMOL/L (ref 96–112)
CO2 SERPL-SCNC: 27 MMOL/L (ref 20–33)
CREAT SERPL-MCNC: 1.52 MG/DL (ref 0.5–1.4)
EOSINOPHIL # BLD AUTO: 0.24 K/UL (ref 0–0.51)
EOSINOPHIL NFR BLD: 2 % (ref 0–6.9)
ERYTHROCYTE [DISTWIDTH] IN BLOOD BY AUTOMATED COUNT: 45.6 FL (ref 35.9–50)
GFR SERPLBLD CREATININE-BSD FMLA CKD-EPI: 61 ML/MIN/1.73 M 2
GLOBULIN SER CALC-MCNC: 3.5 G/DL (ref 1.9–3.5)
GLUCOSE BLD STRIP.AUTO-MCNC: 120 MG/DL (ref 65–99)
GLUCOSE BLD STRIP.AUTO-MCNC: 93 MG/DL (ref 65–99)
GLUCOSE SERPL-MCNC: 91 MG/DL (ref 65–99)
HCT VFR BLD AUTO: 45.2 % (ref 42–52)
HGB BLD-MCNC: 14.9 G/DL (ref 14–18)
IMM GRANULOCYTES # BLD AUTO: 0.04 K/UL (ref 0–0.11)
IMM GRANULOCYTES NFR BLD AUTO: 0.3 % (ref 0–0.9)
LYMPHOCYTES # BLD AUTO: 3.7 K/UL (ref 1–4.8)
LYMPHOCYTES NFR BLD: 30.7 % (ref 22–41)
MCH RBC QN AUTO: 31.1 PG (ref 27–33)
MCHC RBC AUTO-ENTMCNC: 33 G/DL (ref 32.3–36.5)
MCV RBC AUTO: 94.4 FL (ref 81.4–97.8)
MONOCYTES # BLD AUTO: 1.18 K/UL (ref 0–0.85)
MONOCYTES NFR BLD AUTO: 9.8 % (ref 0–13.4)
NEUTROPHILS # BLD AUTO: 6.86 K/UL (ref 1.82–7.42)
NEUTROPHILS NFR BLD: 56.8 % (ref 44–72)
NRBC # BLD AUTO: 0 K/UL
NRBC BLD-RTO: 0 /100 WBC (ref 0–0.2)
PLATELET # BLD AUTO: 291 K/UL (ref 164–446)
PMV BLD AUTO: 9.3 FL (ref 9–12.9)
POTASSIUM SERPL-SCNC: 4.1 MMOL/L (ref 3.6–5.5)
PROT SERPL-MCNC: 7.6 G/DL (ref 6–8.2)
RBC # BLD AUTO: 4.79 M/UL (ref 4.7–6.1)
SODIUM SERPL-SCNC: 138 MMOL/L (ref 135–145)
WBC # BLD AUTO: 12.1 K/UL (ref 4.8–10.8)

## 2025-02-01 PROCEDURE — A9270 NON-COVERED ITEM OR SERVICE: HCPCS | Performed by: INTERNAL MEDICINE

## 2025-02-01 PROCEDURE — 700102 HCHG RX REV CODE 250 W/ 637 OVERRIDE(OP): Performed by: INTERNAL MEDICINE

## 2025-02-01 PROCEDURE — 85025 COMPLETE CBC W/AUTO DIFF WBC: CPT

## 2025-02-01 PROCEDURE — 80053 COMPREHEN METABOLIC PANEL: CPT

## 2025-02-01 PROCEDURE — 82962 GLUCOSE BLOOD TEST: CPT | Mod: 91

## 2025-02-01 PROCEDURE — 700105 HCHG RX REV CODE 258: Performed by: INTERNAL MEDICINE

## 2025-02-01 PROCEDURE — RXMED WILLOW AMBULATORY MEDICATION CHARGE: Performed by: INTERNAL MEDICINE

## 2025-02-01 PROCEDURE — 99239 HOSP IP/OBS DSCHRG MGMT >30: CPT | Performed by: INTERNAL MEDICINE

## 2025-02-01 PROCEDURE — 700111 HCHG RX REV CODE 636 W/ 250 OVERRIDE (IP): Performed by: INTERNAL MEDICINE

## 2025-02-01 RX ORDER — OXYCODONE HYDROCHLORIDE 5 MG/1
5 TABLET ORAL EVERY 6 HOURS PRN
Qty: 12 TABLET | Refills: 0 | Status: SHIPPED | OUTPATIENT
Start: 2025-02-01 | End: 2025-02-04

## 2025-02-01 RX ORDER — ACETAMINOPHEN 325 MG/1
650 TABLET ORAL EVERY 6 HOURS PRN
COMMUNITY
Start: 2025-02-01

## 2025-02-01 RX ORDER — ONDANSETRON 4 MG/1
4 TABLET, ORALLY DISINTEGRATING ORAL EVERY 4 HOURS PRN
Qty: 30 TABLET | Refills: 0 | Status: SHIPPED | OUTPATIENT
Start: 2025-02-01

## 2025-02-01 RX ORDER — CALCIUM CARBONATE 500 MG/1
500 TABLET, CHEWABLE ORAL 4 TIMES DAILY PRN
Status: DISCONTINUED | OUTPATIENT
Start: 2025-02-01 | End: 2025-02-01 | Stop reason: HOSPADM

## 2025-02-01 RX ADMIN — AMOXICILLIN AND CLAVULANATE POTASSIUM 1 TABLET: 875; 125 TABLET, FILM COATED ORAL at 11:33

## 2025-02-01 RX ADMIN — ONDANSETRON 4 MG: 4 TABLET, ORALLY DISINTEGRATING ORAL at 13:36

## 2025-02-01 RX ADMIN — ANTACID TABLETS 500 MG: 500 TABLET, CHEWABLE ORAL at 08:10

## 2025-02-01 RX ADMIN — OXYCODONE HYDROCHLORIDE 10 MG: 10 TABLET ORAL at 08:09

## 2025-02-01 RX ADMIN — SODIUM CHLORIDE: 9 INJECTION, SOLUTION INTRAVENOUS at 03:10

## 2025-02-01 RX ADMIN — DIAZEPAM 5 MG: 5 TABLET ORAL at 01:34

## 2025-02-01 ASSESSMENT — PAIN DESCRIPTION - PAIN TYPE
TYPE: ACUTE PAIN

## 2025-02-01 ASSESSMENT — ENCOUNTER SYMPTOMS
VOMITING: 0
ABDOMINAL PAIN: 0
FLANK PAIN: 0
FEVER: 0
NAUSEA: 0
CHILLS: 0

## 2025-02-01 NOTE — PROGRESS NOTES
Patient report received and assumed care. Assessed patient at 0730. Patient Aox4 and reports 7/10 pain in lower mid and left abdomen. Patient on room air. Patient is up self, no fall risk. Patient NPO at this time.Patient bed is in low, locked position with bed alarm off. Standard fall precautions are in place. Personal belonging and call light are within reach. Patient reinforced to use call light when needed.

## 2025-02-01 NOTE — ED NOTES
Medication history reviewed with patient at bedside.   Med rec is complete  Allergies reviewed.     Patient was prescribed a 10 day course of Cefdinir 300mg BID on 1/23/25- Last dose 1/30/25 AM per patient.   Anticoagulants: No    Fazal Fernandez

## 2025-02-01 NOTE — ASSESSMENT & PLAN NOTE
Bump in creatinine to 1.54 noted.  Probably secondary to obstruction, versus NSAID for intravascular depletion  Plan: Continue IV fluids  Neurology are being consulted for moderate hydronephrosis  Hold NSAID and other nephrotoxins  Monitor kidney function

## 2025-02-01 NOTE — CARE PLAN
The patient is Stable - Low risk of patient condition declining or worsening    Shift Goals  Clinical Goals: Pain control, Npo midnight , monitor blood glucose  Patient Goals: rest  Family Goals: N/A      Problem: Pain - Standard  Goal: Alleviation of pain or a reduction in pain to the patient’s comfort goal  Description: Target End Date:  Prior to discharge or change in level of care    Document on Vitals flowsheet    1.  Document pain using the appropriate pain scale per order or unit policy  2.  Educate and implement non-pharmacologic comfort measures (i.e. relaxation, distraction, massage, cold/heat therapy, etc.)  3.  Pain management medications as ordered  4.  Reassess pain after pain med administration per policy  5.  If opiods administered assess patient's response to pain medication is appropriate per POSS sedation scale  6.  Follow pain management plan developed in collaboration with patient and interdisciplinary team (including palliative care or pain specialists if applicable)  Outcome: Progressing     Problem: Knowledge Deficit - Standard  Goal: Patient and family/care givers will demonstrate understanding of plan of care, disease process/condition, diagnostic tests and medications  Description: Target End Date:  1-3 days or as soon as patient condition allows    Document in Patient Education    1.  Patient and family/caregiver oriented to unit, equipment, visitation policy and means for communicating concern  2.  Complete/review Learning Assessment  3.  Assess knowledge level of disease process/condition, treatment plan, diagnostic tests and medications  4.  Explain disease process/condition, treatment plan, diagnostic tests and medications  Outcome: Progressing

## 2025-02-01 NOTE — ED NOTES
Floor called and notified of transport, report to RN.  Russell Bunn transported to S5 via gurney with transport. All personal belongings in possession.  NAD noted.

## 2025-02-01 NOTE — PROGRESS NOTES
Bedside report received, Assume care.     A&O x 4, Able to make needs known.  Pain Assessment: 9/10 to *** . Medication provided per MAR.  Continuous *** and [place] monitoring in place.     Bed in low position and locked, pt resting comfortably now, call light with in reach, all needs met at this time. Interventions will be executed per plan of care.

## 2025-02-01 NOTE — H&P
Hospital Medicine History & Physical Note    Date of Service  1/31/2025    Primary Care Physician  Pcp Pt States None    Consultants  Urology    Code Status  Full Code    Chief Complaint  Chief Complaint   Patient presents with    Flank Pain     L flank.  Patient reportedly had urinary stent removed at [facility] this morning.  Last void at 1100.    Penis Pain       History of Presenting Illness  Russell Bunn is a 34 y.o. male with past medical history of kidney stones, multiple cystoscopies, lithotripsy, and bilateral stents placement , seizure disorder, cocaine use, who presented 1/31/2025 with left-sided flank pain for 2 days, nausea, vomiting.    He was admitted 1/21 to 1/23 with left-sided obstructive pyelonephritis and, secondary to 7.1 mm left UPJ stone, status post laser lithotripsy and J stent placement on 1/22.  He was seen by urology today and stent was removed.  Left flank pain gotten worse and he developed nausea and vomiting.  CT renal colic was done in ER, showed interval development of moderate left hydroureteronephrosis removal of the stent, with no obstructing stone seen.  There is perinephric fat stranding.  There are stone fragments in the left renal pelvis and the bladder.  Multiple nonobstructive stone in the right kidney.  UA noted to be red in color, with moderate leukocyte esterase, large blood, WBC 3-5, RBC 21-50, no bacteria.  CBC: WBC 25.4.  Chemistry: Creatinine 1.54, glucose 203.  ERP Dr Weiss spoke to urology team.  Recommended n.p.o. at midnight for possible need to repeat instrumentation tomorrow.      I discussed the plan of care with patient, bedside RN, and ERP, urology .    Review of Systems  Review of Systems   Constitutional:  Negative for chills, fever and weight loss.   HENT:  Negative for ear pain, hearing loss and tinnitus.    Eyes:  Negative for blurred vision, double vision and photophobia.   Respiratory:  Negative for cough,  hemoptysis and sputum production.    Cardiovascular:  Negative for chest pain, palpitations and orthopnea.   Gastrointestinal:  Positive for nausea and vomiting. Negative for heartburn.   Genitourinary:  Positive for flank pain. Negative for dysuria, frequency and hematuria.   Musculoskeletal:  Negative for back pain, joint pain and neck pain.   Skin:  Negative for itching and rash.   Neurological:  Negative for tremors, speech change, focal weakness and headaches.   Endo/Heme/Allergies:  Negative for environmental allergies and polydipsia. Does not bruise/bleed easily.   Psychiatric/Behavioral:  Negative for hallucinations and substance abuse. The patient is not nervous/anxious.        Past Medical History   has a past medical history of Kidney stones and Seizure disorder (HCC).    Surgical History   has a past surgical history that includes lithotripsy; pr cysto/uretero/pyeloscopy, dx (Right, 3/13/2020); pr cystoscopy,insert ureteral stent (Right, 3/13/2020); lasertripsy (Right, 3/13/2020); pr cystoscopy,insert ureteral stent (Left, 4/26/2021); pr cysto/uretero/pyeloscopy, dx (Left, 4/26/2021); cystoscopy (Left, 02/20/2022); pr cystoscopy,insert ureteral stent (Left, 2/20/2022); pr cysto/uretero/pyeloscopy, dx (Left, 2/20/2022); lasertripsy (Left, 2/20/2022); cystoscopy with ureteral stent insertion or removal (Right, 4/24/2023); pr cystoscopy,insert ureteral stent (Left, 8/10/2023); pr cysto/uretero/pyeloscopy, dx (Left, 8/10/2023); pr cystoscopy,insert ureteral stent (Left, 1/22/2025); pr cysto/uretero/pyeloscopy, dx (Left, 1/22/2025); and lasertripsy (Left, 1/22/2025).     Family History  family history is not on file.   Family history reviewed with patient. There is no family history that is pertinent to the chief complaint.     Social History   reports that he has been smoking. He has never used smokeless tobacco. He reports current alcohol use. He reports current drug use. Drug:  Inhaled.    Allergies  Allergies   Allergen Reactions    Kiwi Extract Anaphylaxis    Shellfish Allergy Anaphylaxis    Ampicillin-Sulbactam Sodium Rash and Itching     Itching and rash with IV unasyn 8/17/2023    Cefazolin Rash and Itching     Rash  Tolerated ceftriaxone (April 2021)    Dilaudid [Hydromorphone] Itching    Potassium Iodide Itching     Severe itching    Tape Unspecified     Paper tape okay        Medications  Prior to Admission Medications   Prescriptions Last Dose Informant Patient Reported? Taking?   cefdinir (OMNICEF) 300 MG Cap 1/30/2025 Morning Patient No Yes   Sig: Take 1 Capsule by mouth 2 times a day for 10 days.   cyclobenzaprine (FLEXERIL) 5 mg tablet 1/31/2025 Morning Patient Yes Yes   Sig: Take 5 mg by mouth 3 times a day as needed for Muscle Spasms.   diazePAM (VALIUM) 5 MG Tab 1/30/2025 Patient No Yes   Sig: Take 1 Tablet by mouth every 6 hours as needed (flank pain/spasm) for up to 2 days.   ketorolac (TORADOL) 10 MG Tab 1/31/2025 Morning Patient No Yes   Sig: Take 1 Tablet by mouth every four hours as needed for Mild Pain, Moderate Pain or Severe Pain for up to 5 days.      Facility-Administered Medications: None       Physical Exam  Temp:  [36.7 °C (98 °F)] 36.7 °C (98 °F)  Pulse:  [72-84] 79  Resp:  [20] 20  BP: (122-126)/(68-91) 122/91  SpO2:  [98 %-99 %] 98 %  Blood Pressure: (!) 122/91   Temperature: 36.7 °C (98 °F)   Pulse: 79   Respiration: 20   Pulse Oximetry: 98 %       Physical Exam  Vitals and nursing note reviewed.   Constitutional:       General: He is not in acute distress.  HENT:      Head: Normocephalic and atraumatic.      Nose: Nose normal.      Mouth/Throat:      Mouth: Mucous membranes are moist.   Eyes:      Extraocular Movements: Extraocular movements intact.      Pupils: Pupils are equal, round, and reactive to light.   Cardiovascular:      Rate and Rhythm: Normal rate and regular rhythm.   Pulmonary:      Effort: Pulmonary effort is normal.      Breath sounds:  "Normal breath sounds.   Abdominal:      General: Abdomen is flat. There is no distension.      Tenderness: There is no abdominal tenderness. There is left CVA tenderness. There is no guarding or rebound.   Musculoskeletal:         General: No swelling or deformity. Normal range of motion.      Cervical back: Normal range of motion and neck supple.   Skin:     General: Skin is warm and dry.   Neurological:      General: No focal deficit present.      Mental Status: He is alert and oriented to person, place, and time.   Psychiatric:         Mood and Affect: Mood normal.         Behavior: Behavior normal.         Laboratory:  Recent Labs     01/29/25  1515 01/31/25  1343   WBC 11.0* 25.4*   RBC 5.21 5.06   HEMOGLOBIN 16.0 15.8   HEMATOCRIT 48.1 46.7   MCV 92.3 92.3   MCH 30.7 31.2   MCHC 33.3 33.8   RDW 43.8 44.6   PLATELETCT 288 309   MPV 9.0 9.7     Recent Labs     01/29/25  1515 01/31/25  1343   SODIUM 135 137   POTASSIUM 4.5 3.6   CHLORIDE 102 97   CO2 22 21   GLUCOSE 101* 203*   BUN 13 18   CREATININE 1.39 1.54*   CALCIUM 9.5 9.8     Recent Labs     01/29/25  1515 01/31/25  1343   ALTSGPT 27 20   ASTSGOT 31 20   ALKPHOSPHAT 96 105*   TBILIRUBIN 0.6 0.6   LIPASE 38  --    GLUCOSE 101* 203*         No results for input(s): \"NTPROBNP\" in the last 72 hours.      No results for input(s): \"TROPONINT\" in the last 72 hours.    Imaging:  CT-RENAL COLIC EVALUATION(A/P W/O)   Final Result      1. Interval development of moderate left hydroureteronephrosis after removal of the left internal nephroureteral stent. There is wall thickening involving the left ureter with no obstructing distal left ureteral stone. There is left renal edema and left    perinephric fat stranding.   2. There are stone fragments in the left renal pelvis and in the bladder lumen.   3. Multiple nonobstructing stones in the right kidney.   4. The remainder of the CT of the abdomen and pelvis without IV contrast is within normal limits.    "       Assessment/Plan:  Justification for Admission Status  I anticipate this patient will require at least two midnights for appropriate medical management, necessitating inpatient admission because obstructive pyelonephritis, moderate hydronephrosis    Patient will need a Med/Surg bed on MEDICAL service .  The need is secondary to   pyelonephritis, moderate hydronephrosis.    * Obstructive pyelonephritis- (present on admission)  Assessment & Plan  84-year-old male with history of multiple obstructive stones bilaterally and stents placement, most recently on 1/22 into the left ureteral, that was removed today, presented with worsening left flank pain nausea and vomiting, new moderate hydronephrosis on CT without visualized distractive stone, and UA positive for leukocyte esterase  Plan: Continue ceftriaxone IV  IV fluid support  Pain control  Zofran as needed  N.p.o. at midnight: Monitor for respiratory depression secondary to IV morphine with pulse oximetry    Hyperglycemia  Assessment & Plan  Glucose 203  Ordered insulin sliding scale    Post-renal ARMA- (present on admission)  Assessment & Plan  Bump in creatinine to 1.54 noted.  Probably secondary to obstruction, versus NSAID for intravascular depletion  Plan: Continue IV fluids  Neurology are being consulted for moderate hydronephrosis  Hold NSAID and other nephrotoxins  Monitor kidney function        VTE prophylaxis: enoxaparin ppx

## 2025-02-01 NOTE — PROGRESS NOTES
"Note to reader: this note follows the APSO format rather than the historical SOAP format. Assessment and plan located at the top of the note for ease of use.    Chief Complaint  34 y.o. year old male here with Flank Pain (L flank.  Patient reportedly had urinary stent removed at [facility] [place] this morning.  Last void at 1100.) and Penis Pain      Assessment/Plan  Interval History   Active Hospital Problems    Diagnosis     Obstructive pyelonephritis [N11.1]     Hyperglycemia [R73.9]     Post-renal ARAM [N17.9]       pt seen and examined     2/1- wbc improving, pain improving. Afebrile. No acute changes or problems overnight. Sleeping comfortably, woke up to converse.     Disposition  Stable.      PLAN:  Abx, ivf. No anticipate for OR visit for stent as patients wbc improving and he has remained afebrile.   Will arrange outpt f/u in 6-8w from stone surgery with RBUS same day with our office. I have communicated this plan with him.   Likely no further interventions from our team, s/o.     Review of Systems  Physical Exam   Review of Systems   Constitutional:  Negative for chills and fever.   Gastrointestinal:  Negative for abdominal pain, nausea and vomiting.   Genitourinary:  Negative for dysuria, flank pain, frequency, hematuria and urgency.   All other systems reviewed and are negative.    Vitals:    01/31/25 2102 01/31/25 2309 02/01/25 0422 02/01/25 0721   BP: 115/76  (!) 121/91 117/79   Pulse: 82  92 91   Resp: 16 16 17 17   Temp: 36.1 °C (97 °F)  36.5 °C (97.7 °F) 36.3 °C (97.3 °F)   TempSrc: Temporal  Temporal Temporal   SpO2: 93%  99% 96%   Weight: 74.8 kg (165 lb)      Height: 1.707 m (5' 7.2\")        Physical Exam  Vitals and nursing note reviewed.   Constitutional:       Appearance: Normal appearance.   HENT:      Head: Normocephalic and atraumatic.      Mouth/Throat:      Mouth: Mucous membranes are dry.   Eyes:      Pupils: Pupils are equal, round, and reactive to light.   Pulmonary:     "  Effort: Pulmonary effort is normal.   Neurological:      Mental Status: He is alert and oriented to person, place, and time.   Psychiatric:         Mood and Affect: Mood normal.          Hematology Chemistry   Lab Results   Component Value Date/Time    WBC 12.1 (H) 02/01/2025 12:40 AM    HEMOGLOBIN 14.9 02/01/2025 12:40 AM    HEMATOCRIT 45.2 02/01/2025 12:40 AM    PLATELETCT 291 02/01/2025 12:40 AM     Lab Results   Component Value Date/Time    SODIUM 138 02/01/2025 12:40 AM    POTASSIUM 4.1 02/01/2025 12:40 AM    CHLORIDE 101 02/01/2025 12:40 AM    CO2 27 02/01/2025 12:40 AM    GLUCOSE 91 02/01/2025 12:40 AM    BUN 16 02/01/2025 12:40 AM    CREATININE 1.52 (H) 02/01/2025 12:40 AM         Labs not explicitly included in this progress note were reviewed by the author.   Radiology/imaging not explicitly included in this progress note was reviewed by the author.     Core Measures

## 2025-02-01 NOTE — PROGRESS NOTES
4 Eyes Skin Assessment Completed by TATE Ayala and TATE Bland.    Head WDL  Ears WDL  Nose WDL  Mouth WDL  Neck WDL  Breast/Chest WDL  Shoulder Blades WDL  Spine WDL  (R) Arm/Elbow/Hand WDL  (L) Arm/Elbow/Hand WDL  Abdomen WDL  Groin WDL  Scrotum/Coccyx/Buttocks WDL  (R) Leg WDL  (L) Leg Scar  (R) Heel/Foot/Toe WDL  (L) Heel/Foot/Toe WDL          Devices In Places N/A      Interventions In Place N/A    Possible Skin Injury No    Pictures Uploaded Into Epic N/A  Wound Consult Placed N/A  RN Wound Prevention Protocol Ordered No

## 2025-02-01 NOTE — CONSULTS
Consult/H&P Note    Primary Service: hosptialist  Attending: Dustin Porras M.D.  Patient's Name/MRN: Russell Bunn, [id number]    Admit Date:1/31/2025  Today's Date: 1/31/2025   Length of stay:  LOS: 0 days   Room #:  31/31 GRN      Reason for consult/chief complaint: flank pain and nausea  ID/HPI: Russell Bunn is a 34 y.o. male patient s/p CULTs with Dr. Cramer 1/22. Had stent removed today by our office and after going home became very dizzy, lightheaded, nauseous and had horrible pain unlike any of his other stents. He called the ambulance. In ER, WBC elevated at 25 but UA not overly infected looking. Started on rocephin. CT showing hydro, but no obstruction. We were consulted for further recommendations. Cr 1.54(1.3). Denies fevers, chills.        Past Medical History:   Past Medical History:   Diagnosis Date    Kidney stones     multiple times requiring multiple surgeries    Seizure disorder (HCC)         Past Surgical History:   Past Surgical History:   Procedure Laterality Date    WY CYSTOSCOPY,INSERT URETERAL STENT Left 1/22/2025    Procedure: CYSTOSCOPY, WITH URETERAL STENT INSERTION;  Surgeon: Kulwinder Cramer M.D.;  Location: [facility];  Service: Urology    WY CYSTO/URETERO/PYELOSCOPY, DX Left 1/22/2025    Procedure: URETEROSCOPY;  Surgeon: Kulwinder Cramer M.D.;  Location: [facility];  Service: Urology    LASERTRIPSY Left 1/22/2025    Procedure: LITHOTRIPSY, USING LASER;  Surgeon: Kulwinder Cramer M.D.;  Location: [facility];  Service: Urology    WY CYSTOSCOPY,INSERT URETERAL STENT Left 8/10/2023    Procedure: CYSTOSCOPY, WITH URETERAL STENT INSERTION;  Surgeon: Rei Silver M.D.;  Location: [facility];  Service: Urology    WY CYSTO/URETERO/PYELOSCOPY, DX Left 8/10/2023    Procedure: URETEROSCOPY;  Surgeon: Rei Silver M.D.;  Location: [facility];  Service: Urology    CYSTOSCOPY WITH URETERAL STENT INSERTION  OR REMOVAL Right 4/24/2023    Procedure: CYSTOSCOPY, WITH RIGHT URETERAL STENT INSERTION;  Surgeon: Dante Stephenson M.D.;  Location: [facility];  Service: Urology    CYSTOSCOPY Left 02/20/2022    Cysto ureteroscopy ,lithotripsy, stone basket, ureteral stent placement, Dr Silver    CT CYSTOSCOPY,INSERT URETERAL STENT Left 2/20/2022    Procedure: CYSTOSCOPY, WITH URETERAL STENT INSERTION;  Surgeon: Rei Silver M.D.;  Location: [facility];  Service: Urology    CT CYSTO/URETERO/PYELOSCOPY, DX Left 2/20/2022    Procedure: URETEROSCOPY;  Surgeon: Rei Silver M.D.;  Location: [facility];  Service: Urology    LASERTRIPSY Left 2/20/2022    Procedure: LITHOTRIPSY, USING LASER;  Surgeon: Rei Silver M.D.;  Location: [facility];  Service: Urology    CT CYSTOSCOPY,INSERT URETERAL STENT Left 4/26/2021    Procedure: CYSTOSCOPY, WITH OUT URETERAL STENT INSERTION;  Surgeon: Raf Moss M.D.;  Location: [facility];  Service: Urology    CT CYSTO/URETERO/PYELOSCOPY, DX Left 4/26/2021    Procedure: URETEROSCOPY;  Surgeon: Raf Moss M.D.;  Location: [facility];  Service: Urology    CT CYSTO/URETERO/PYELOSCOPY, DX Right 3/13/2020    Procedure: URETEROSCOPY;  Surgeon: Chase Damon M.D.;  Location: [facility];  Service: Urology    CT CYSTOSCOPY,INSERT URETERAL STENT Right 3/13/2020    Procedure: CYSTOSCOPY;  Surgeon: Chase Damon M.D.;  Location: [facility];  Service: Urology    LASERTRIPSY Right 3/13/2020    Procedure: LITHOTRIPSY, USING LASER;  Surgeon: Chase Damon M.D.;  Location: [facility];  Service: Urology    LITHOTRIPSY          Family History:   History reviewed. No pertinent family history.      Social History:   Social History     Tobacco Use    Smoking status: Some Days    Smokeless tobacco: Never   Vaping Use    Vaping status: Some Days    Substances: THC (Smokes multiple times a day everyday)     "Devices: Disposable    Passive vaping exposure: Yes   Substance Use Topics    Alcohol use: Yes    Drug use: Yes     Types: Inhaled     Comment: Marijuana, every day      Social History     Social History Narrative    Not on file        Allergies: he Kiwi extract, Shellfish allergy, Ampicillin-sulbactam sodium, Cefazolin, Dilaudid [hydromorphone], Potassium iodide, and Tape    Medications:   (Not in a hospital admission)        Review of Systems  Review of Systems   Constitutional:  Negative for chills and fever.   Respiratory:  Negative for shortness of breath.    Cardiovascular:  Negative for chest pain.   Gastrointestinal:  Positive for nausea. Negative for abdominal pain and vomiting.   Genitourinary:  Positive for flank pain.   Neurological:  Positive for dizziness.        Physical Exam  VITAL SIGNS: BP (!) 124/91   Pulse 85   Temp 36.7 °C (98 °F) (Temporal)   Resp 16   Ht 1.676 m (5' 6\")   Wt 72.6 kg (160 lb)   SpO2 96%   BMI 25.82 kg/m²   Physical Exam  Vitals and nursing note reviewed.   Constitutional:       Appearance: Normal appearance.   HENT:      Head: Normocephalic and atraumatic.      Mouth/Throat:      Mouth: Mucous membranes are dry.   Neurological:      Mental Status: He is alert and oriented to person, place, and time.           Labs:  Recent Labs     01/29/25  1515 01/31/25  1343   WBC 11.0* 25.4*   RBC 5.21 5.06   HEMOGLOBIN 16.0 15.8   HEMATOCRIT 48.1 46.7   MCV 92.3 92.3   MCH 30.7 31.2   MCHC 33.3 33.8   RDW 43.8 44.6   PLATELETCT 288 309   MPV 9.0 9.7     Recent Labs     01/29/25  1515 01/31/25  1343   SODIUM 135 137   POTASSIUM 4.5 3.6   CHLORIDE 102 97   CO2 22 21   GLUCOSE 101* 203*   BUN 13 18   CREATININE 1.39 1.54*   CALCIUM 9.5 9.8         Glucose:  Recent Labs     01/29/25  1515 01/31/25  1343   GLUCOSE 101* 203*     Coags:  No results for input(s): \"INR\" in the last 72 hours.      Urinalysis:   Recent Labs     01/31/25  1519   COLORURINE Red*   CLARITY Clear   SPECGRAVITY " 1.014   PHURINE 8.0   GLUCOSEUR 500*   KETONES Trace*   NITRITE Negative   OCCULTBLOOD Large*   RBCURINE 21-50*   BACTERIA None   EPITHELCELL 0-2       Imaging:  CT-RENAL COLIC EVALUATION(A/P W/O)   Final Result      1. Interval development of moderate left hydroureteronephrosis after removal of the left internal nephroureteral stent. There is wall thickening involving the left ureter with no obstructing distal left ureteral stone. There is left renal edema and left    perinephric fat stranding.   2. There are stone fragments in the left renal pelvis and in the bladder lumen.   3. Multiple nonobstructing stones in the right kidney.   4. The remainder of the CT of the abdomen and pelvis without IV contrast is within normal limits.          @[facility]@     Assessment/Recommendation   34 y.o. male s/p stent removal after definitive kidney stone surgery with Dr. Cramer 1/22 admitted for elevated WBC (25) and flank pain.     PLAN:  CT showed hydronephrosis with no evidence of obstruction. Hopeful for symptoms to improve with abx, ivf and time. Do not anticipate need for repeat OR visit at this time unless clinical picture deteriorates. Will make NPO in case he becomes febrile tonight.   Dr. Moyer is aware of this consult and dictated the above plan of care.        Mary Kate Moss, PMargieA.-C.   [address] [place] [address]   [phone]

## 2025-02-01 NOTE — PROGRESS NOTES
Patient left unit for discharge lounge. Patient taken by unit clerk by walking. Peripheral IV removed.

## 2025-02-01 NOTE — PROGRESS NOTES
All discharge education given. Any questions answered. PIV removed. All belongings gathered. Patient in stable condition for discharge.

## 2025-02-01 NOTE — ASSESSMENT & PLAN NOTE
84-year-old male with history of multiple obstructive stones bilaterally and stents placement, most recently on 1/22 into the left ureteral, that was removed today, presented with worsening left flank pain nausea and vomiting, new moderate hydronephrosis on CT without visualized distractive stone, and UA positive for leukocyte esterase  Plan: Continue ceftriaxone IV  IV fluid support  Pain control  Zofran as needed  N.p.o. at midnight: Monitor for respiratory depression secondary to IV morphine with pulse oximetry

## 2025-02-01 NOTE — DISCHARGE SUMMARY
Discharge Summary    CHIEF COMPLAINT ON ADMISSION  Chief Complaint   Patient presents with    Flank Pain     L flank.  Patient reportedly had urinary stent removed at [facility] this morning.  Last void at 1100.    Penis Pain       Reason for Admission  ems     Admission Date  1/31/2025    CODE STATUS  Full Code    HPI & HOSPITAL COURSE  This is a 34 y.o. male here with flank pain and nausea.    34 y.o. male with past medical history of kidney stones, multiple cystoscopies, lithotripsy, and bilateral stents placement , seizure disorder, cocaine use, who presented 1/31/2025 with left-sided flank pain for 2 days, nausea, vomiting.     He was admitted 1/21 to 1/23 with left-sided obstructive pyelonephritis and, secondary to 7.1 mm left UPJ stone, status post laser lithotripsy and J stent placement on 1/22.  He was seen by urology today and stent was removed.  Left flank pain gotten worse and he developed nausea and vomiting.  CT renal colic was done in ER, showed interval development of moderate left hydroureteronephrosis removal of the stent, with no obstructing stone seen.  There is perinephric fat stranding.  There are stone fragments in the left renal pelvis and the bladder.  Multiple nonobstructive stone in the right kidney.  UA noted to be red in color, with moderate leukocyte esterase, large blood, WBC 3-5, RBC 21-50, no bacteria.  CBC: WBC 25.4.  Chemistry: Creatinine 1.54, glucose 203.    Patient started on IV fluids, IV antibiotics. Urology consulted did not recommended any further intervention since afebrile and pain is improving. Patient's vital signs are stable and he is requesting to be discharged home. Urine cultures still pending however patient is a single father and needs to get home. Will dc on oral augmentin to finish off his course of antibiotics for pyelonephritis.     Therefore, he is discharged in good and stable condition to home with close outpatient follow-up.    The  patient recovered much more quickly than anticipated on admission.    Discharge Date  2/1/2025    FOLLOW UP ITEMS POST DISCHARGE  Follow up with primary care and urology     DISCHARGE DIAGNOSES  Principal Problem:    Obstructive pyelonephritis (POA: Yes)  Active Problems:    Post-renal ARAM (POA: Yes)    Hyperglycemia (POA: Unknown)  Resolved Problems:    * No resolved hospital problems. *      FOLLOW UP  No future appointments.  [facility] [place]  [address] [place] [address]  [phone]    Follow up  Follow-up with urology in 6 to 8 weeks    [facility] ([facility]  [address] [place] [address]  [place] [address]  [phone]  Follow up  Please follow-up with your primary care physician in 1 to 2 weeks, if you do not have one please call the number attached establish care      MEDICATIONS ON DISCHARGE     Medication List        START taking these medications        Instructions   acetaminophen 325 MG Tabs  Commonly known as: Tylenol   Take 2 Tablets by mouth every 6 hours as needed for Mild Pain, Moderate Pain or Fever.  Dose: 650 mg     amoxicillin-clavulanate 875-125 MG Tabs  Commonly known as: Augmentin   Take 1 Tablet by mouth every 12 hours for 7 days.  Dose: 1 Tablet     ondansetron 4 MG Tbdp  Commonly known as: Zofran ODT   Take 1 Tablet by mouth every four hours as needed for Nausea/Vomiting.  Dose: 4 mg     oxyCODONE immediate-release 5 MG Tabs  Commonly known as: Roxicodone   Take 1 Tablet by mouth every 6 hours as needed for Severe Pain for up to 3 days.  Dose: 5 mg            CONTINUE taking these medications        Instructions   cyclobenzaprine 5 mg tablet  Commonly known as: Flexeril   Take 5 mg by mouth 3 times a day as needed for Muscle Spasms.  Dose: 5 mg     ketorolac 10 MG Tabs  Commonly known as: Toradol   Take 1 Tablet by mouth every four hours as needed for Mild Pain, Moderate Pain or Severe Pain for up to 5 days.  Dose: 10 mg            STOP taking  these medications      cefdinir 300 MG Caps  Commonly known as: Omnicef     diazePAM 5 MG Tabs  Commonly known as: Valium              Allergies  Allergies   Allergen Reactions    Kiwi Extract Anaphylaxis    Shellfish Allergy Anaphylaxis    Ampicillin-Sulbactam Sodium Rash and Itching     Itching and rash with IV unasyn 8/17/2023    Cefazolin Rash and Itching     Rash  Tolerated ceftriaxone (April 2021)    Dilaudid [Hydromorphone] Itching    Potassium Iodide Itching     Severe itching    Tape Unspecified     Paper tape okay        DIET  Orders Placed This Encounter   Procedures    Diet Order Diet: Consistent CHO (Diabetic)     Standing Status:   Standing     Number of Occurrences:   1     Order Specific Question:   Diet:     Answer:   Consistent CHO (Diabetic) [4]       ACTIVITY  As tolerated.  Weight bearing as tolerated    CONSULTATIONS  Urology     PROCEDURES  None     LABORATORY  Lab Results   Component Value Date    SODIUM 138 02/01/2025    POTASSIUM 4.1 02/01/2025    CHLORIDE 101 02/01/2025    CO2 27 02/01/2025    GLUCOSE 91 02/01/2025    BUN 16 02/01/2025    CREATININE 1.52 (H) 02/01/2025        Lab Results   Component Value Date    WBC 12.1 (H) 02/01/2025    HEMOGLOBIN 14.9 02/01/2025    HEMATOCRIT 45.2 02/01/2025    PLATELETCT 291 02/01/2025        Total time of the discharge process exceeds 34 minutes.

## 2025-02-03 LAB
BACTERIA UR CULT: NORMAL
SIGNIFICANT IND 70042: NORMAL
SITE SITE: NORMAL
SOURCE SOURCE: NORMAL

## 2025-05-31 ENCOUNTER — HOSPITAL ENCOUNTER (EMERGENCY)
Facility: MEDICAL CENTER | Age: 35
End: 2025-05-31
Attending: EMERGENCY MEDICINE

## 2025-05-31 ENCOUNTER — PHARMACY VISIT (OUTPATIENT)
Dept: PHARMACY | Facility: MEDICAL CENTER | Age: 35
End: 2025-05-31
Payer: COMMERCIAL

## 2025-05-31 ENCOUNTER — APPOINTMENT (OUTPATIENT)
Dept: RADIOLOGY | Facility: MEDICAL CENTER | Age: 35
End: 2025-05-31
Attending: EMERGENCY MEDICINE

## 2025-05-31 VITALS
HEART RATE: 100 BPM | BODY MASS INDEX: 22.5 KG/M2 | TEMPERATURE: 97.9 F | SYSTOLIC BLOOD PRESSURE: 121 MMHG | RESPIRATION RATE: 17 BRPM | DIASTOLIC BLOOD PRESSURE: 89 MMHG | HEIGHT: 66 IN | WEIGHT: 140 LBS | OXYGEN SATURATION: 98 %

## 2025-05-31 DIAGNOSIS — N20.1 URETERAL STONE: Primary | ICD-10-CM

## 2025-05-31 LAB
ALBUMIN SERPL BCP-MCNC: 4.4 G/DL (ref 3.2–4.9)
ALBUMIN/GLOB SERPL: 1.2 G/DL
ALP SERPL-CCNC: 108 U/L (ref 30–99)
ALT SERPL-CCNC: 14 U/L (ref 2–50)
ANION GAP SERPL CALC-SCNC: 10 MMOL/L (ref 7–16)
APPEARANCE UR: CLEAR
AST SERPL-CCNC: 23 U/L (ref 12–45)
BACTERIA #/AREA URNS HPF: ABNORMAL /HPF
BASOPHILS # BLD AUTO: 0.5 % (ref 0–1.8)
BASOPHILS # BLD: 0.08 K/UL (ref 0–0.12)
BILIRUB SERPL-MCNC: 0.3 MG/DL (ref 0.1–1.5)
BILIRUB UR QL STRIP.AUTO: NEGATIVE
BUN SERPL-MCNC: 16 MG/DL (ref 8–22)
CALCIUM ALBUM COR SERPL-MCNC: 9.1 MG/DL (ref 8.5–10.5)
CALCIUM SERPL-MCNC: 9.4 MG/DL (ref 8.5–10.5)
CASTS URNS QL MICRO: ABNORMAL /LPF (ref 0–2)
CHLORIDE SERPL-SCNC: 104 MMOL/L (ref 96–112)
CO2 SERPL-SCNC: 25 MMOL/L (ref 20–33)
COLOR UR: YELLOW
CREAT SERPL-MCNC: 1.46 MG/DL (ref 0.5–1.4)
EOSINOPHIL # BLD AUTO: 0.3 K/UL (ref 0–0.51)
EOSINOPHIL NFR BLD: 1.8 % (ref 0–6.9)
EPITHELIAL CELLS 1715: ABNORMAL /HPF (ref 0–5)
ERYTHROCYTE [DISTWIDTH] IN BLOOD BY AUTOMATED COUNT: 47 FL (ref 35.9–50)
GFR SERPLBLD CREATININE-BSD FMLA CKD-EPI: 64 ML/MIN/1.73 M 2
GLOBULIN SER CALC-MCNC: 3.6 G/DL (ref 1.9–3.5)
GLUCOSE SERPL-MCNC: 94 MG/DL (ref 65–99)
GLUCOSE UR STRIP.AUTO-MCNC: NEGATIVE MG/DL
HCT VFR BLD AUTO: 45.2 % (ref 42–52)
HGB BLD-MCNC: 15 G/DL (ref 14–18)
IMM GRANULOCYTES # BLD AUTO: 0.06 K/UL (ref 0–0.11)
IMM GRANULOCYTES NFR BLD AUTO: 0.4 % (ref 0–0.9)
KETONES UR STRIP.AUTO-MCNC: NEGATIVE MG/DL
LEUKOCYTE ESTERASE UR QL STRIP.AUTO: ABNORMAL
LIPASE SERPL-CCNC: 125 U/L (ref 11–82)
LYMPHOCYTES # BLD AUTO: 1.54 K/UL (ref 1–4.8)
LYMPHOCYTES NFR BLD: 9.3 % (ref 22–41)
MCH RBC QN AUTO: 31.7 PG (ref 27–33)
MCHC RBC AUTO-ENTMCNC: 33.2 G/DL (ref 32.3–36.5)
MCV RBC AUTO: 95.6 FL (ref 81.4–97.8)
MICRO URNS: ABNORMAL
MONOCYTES # BLD AUTO: 1.15 K/UL (ref 0–0.85)
MONOCYTES NFR BLD AUTO: 6.9 % (ref 0–13.4)
NEUTROPHILS # BLD AUTO: 13.47 K/UL (ref 1.82–7.42)
NEUTROPHILS NFR BLD: 81.1 % (ref 44–72)
NITRITE UR QL STRIP.AUTO: NEGATIVE
NRBC # BLD AUTO: 0 K/UL
NRBC BLD-RTO: 0 /100 WBC (ref 0–0.2)
PH UR STRIP.AUTO: 8.5 [PH] (ref 5–8)
PLATELET # BLD AUTO: 287 K/UL (ref 164–446)
PMV BLD AUTO: 9.1 FL (ref 9–12.9)
POTASSIUM SERPL-SCNC: 4.1 MMOL/L (ref 3.6–5.5)
PROT SERPL-MCNC: 8 G/DL (ref 6–8.2)
PROT UR QL STRIP: 30 MG/DL
RBC # BLD AUTO: 4.73 M/UL (ref 4.7–6.1)
RBC # URNS HPF: ABNORMAL /HPF (ref 0–2)
RBC UR QL AUTO: NEGATIVE
SODIUM SERPL-SCNC: 139 MMOL/L (ref 135–145)
SP GR UR STRIP.AUTO: 1.02
UROBILINOGEN UR STRIP.AUTO-MCNC: 0.2 EU/DL
WBC # BLD AUTO: 16.6 K/UL (ref 4.8–10.8)
WBC #/AREA URNS HPF: ABNORMAL /HPF

## 2025-05-31 PROCEDURE — 83690 ASSAY OF LIPASE: CPT

## 2025-05-31 PROCEDURE — 700102 HCHG RX REV CODE 250 W/ 637 OVERRIDE(OP): Performed by: EMERGENCY MEDICINE

## 2025-05-31 PROCEDURE — A9270 NON-COVERED ITEM OR SERVICE: HCPCS | Performed by: EMERGENCY MEDICINE

## 2025-05-31 PROCEDURE — 85025 COMPLETE CBC W/AUTO DIFF WBC: CPT

## 2025-05-31 PROCEDURE — 74176 CT ABD & PELVIS W/O CONTRAST: CPT

## 2025-05-31 PROCEDURE — 99285 EMERGENCY DEPT VISIT HI MDM: CPT

## 2025-05-31 PROCEDURE — 81001 URINALYSIS AUTO W/SCOPE: CPT

## 2025-05-31 PROCEDURE — 700105 HCHG RX REV CODE 258: Performed by: EMERGENCY MEDICINE

## 2025-05-31 PROCEDURE — 96375 TX/PRO/DX INJ NEW DRUG ADDON: CPT

## 2025-05-31 PROCEDURE — 700111 HCHG RX REV CODE 636 W/ 250 OVERRIDE (IP): Mod: JZ | Performed by: EMERGENCY MEDICINE

## 2025-05-31 PROCEDURE — RXMED WILLOW AMBULATORY MEDICATION CHARGE: Performed by: EMERGENCY MEDICINE

## 2025-05-31 PROCEDURE — 87086 URINE CULTURE/COLONY COUNT: CPT

## 2025-05-31 PROCEDURE — 80053 COMPREHEN METABOLIC PANEL: CPT

## 2025-05-31 PROCEDURE — 36415 COLL VENOUS BLD VENIPUNCTURE: CPT

## 2025-05-31 PROCEDURE — 96374 THER/PROPH/DIAG INJ IV PUSH: CPT

## 2025-05-31 RX ORDER — TAMSULOSIN HYDROCHLORIDE 0.4 MG/1
0.4 CAPSULE ORAL ONCE
Status: COMPLETED | OUTPATIENT
Start: 2025-05-31 | End: 2025-05-31

## 2025-05-31 RX ORDER — ONDANSETRON 2 MG/ML
4 INJECTION INTRAMUSCULAR; INTRAVENOUS ONCE
Status: COMPLETED | OUTPATIENT
Start: 2025-05-31 | End: 2025-05-31

## 2025-05-31 RX ORDER — SULFAMETHOXAZOLE AND TRIMETHOPRIM 800; 160 MG/1; MG/1
1 TABLET ORAL 2 TIMES DAILY
Qty: 10 TABLET | Refills: 0 | Status: ACTIVE | OUTPATIENT
Start: 2025-05-31 | End: 2025-06-05

## 2025-05-31 RX ORDER — ONDANSETRON 4 MG/1
4 TABLET, ORALLY DISINTEGRATING ORAL EVERY 8 HOURS PRN
Qty: 6 TABLET | Refills: 0 | Status: SHIPPED | OUTPATIENT
Start: 2025-05-31 | End: 2025-06-02

## 2025-05-31 RX ORDER — TAMSULOSIN HYDROCHLORIDE 0.4 MG/1
0.4 CAPSULE ORAL DAILY
Qty: 3 CAPSULE | Refills: 0 | Status: SHIPPED | OUTPATIENT
Start: 2025-06-01 | End: 2025-06-04

## 2025-05-31 RX ORDER — OXYCODONE AND ACETAMINOPHEN 5; 325 MG/1; MG/1
1 TABLET ORAL EVERY 6 HOURS PRN
Qty: 12 TABLET | Refills: 0 | Status: SHIPPED | OUTPATIENT
Start: 2025-05-31 | End: 2025-06-03

## 2025-05-31 RX ORDER — KETOROLAC TROMETHAMINE 15 MG/ML
15 INJECTION, SOLUTION INTRAMUSCULAR; INTRAVENOUS ONCE
Status: COMPLETED | OUTPATIENT
Start: 2025-05-31 | End: 2025-05-31

## 2025-05-31 RX ORDER — SODIUM CHLORIDE, SODIUM LACTATE, POTASSIUM CHLORIDE, CALCIUM CHLORIDE 600; 310; 30; 20 MG/100ML; MG/100ML; MG/100ML; MG/100ML
1000 INJECTION, SOLUTION INTRAVENOUS ONCE
Status: COMPLETED | OUTPATIENT
Start: 2025-05-31 | End: 2025-05-31

## 2025-05-31 RX ADMIN — SODIUM CHLORIDE, POTASSIUM CHLORIDE, SODIUM LACTATE AND CALCIUM CHLORIDE 1000 ML: 600; 310; 30; 20 INJECTION, SOLUTION INTRAVENOUS at 16:11

## 2025-05-31 RX ADMIN — TAMSULOSIN HYDROCHLORIDE 0.4 MG: 0.4 CAPSULE ORAL at 18:45

## 2025-05-31 RX ADMIN — ONDANSETRON 4 MG: 2 INJECTION INTRAMUSCULAR; INTRAVENOUS at 16:11

## 2025-05-31 RX ADMIN — KETOROLAC TROMETHAMINE 15 MG: 15 INJECTION, SOLUTION INTRAMUSCULAR; INTRAVENOUS at 16:10

## 2025-05-31 ASSESSMENT — FIBROSIS 4 INDEX
FIB4 SCORE: 0.62
FIB4 SCORE: 0.62

## 2025-05-31 NOTE — ED TRIAGE NOTES
Chief Complaint   Patient presents with    Abdominal Pain     LUQ pain, began today, pt states he's passing a kidney stone.     Loss of Consciousness     LOC due to pain from kidney stone, witnessed, lasted ~10 minutes. Pt able to ambulate to [facility] on scene.      . AAOx4, on room air. Pt given Fentanyl 100mcg IM, Zofran 4mg ODT.

## 2025-05-31 NOTE — ED PROVIDER NOTES
"ED Provider Note    CHIEF COMPLAINT  Chief Complaint   Patient presents with    Abdominal Pain     LUQ pain, began today, pt states he's passing a kidney stone.     Loss of Consciousness     LOC due to pain from kidney stone, witnessed, lasted ~10 minutes. Pt able to ambulate to [facility] on scene.        EXTERNAL RECORDS REVIEWED  Inpatient Notes patient was admitted in January 2025 for flank pain and nausea.  He has history of multiple kidney stones in the past, multiple cystoscopies, lithotripsy and bilateral stent placements.  He also has history of seizure disorder, and cocaine use. Patient was admitted from January 21 to January 23rd of 2025 for left-sided obstructive pyelonephritis due to a 7.1 mm left UPJ stone.  He underwent laser lithotripsy and J stent placement on January 22.       Patient was readmitted to the hospital January 31, 2025 through February 1, 2025 after developing worsening flank pain and chills after stent removal in the urology office on January 31.  CT scan showed hydronephrosis without any evidence of obstruction.  Patient was started on IV fluids and IV antibiotics but urology did not recommend any further intervention since patient was afebrile and pain was improving.    HPI/ROS  LIMITATION TO HISTORY   Select: : None  OUTSIDE HISTORIAN(S):  Significant other  significant other at bedside but does not provide any additional history.    Russell Bunn is a 35 y.o. male who presents with a sudden onset of left lower quadrant abdominal pain and left flank pain which began about an hour prior to arrival while he was getting his care done.  He arrives via [facility].  Patient has a history of multiple kidney stones in the past.  He says he is had \"48 surgeries for kidney stones.\"  He follows with [facility] [place].  Unfortunately he has not been able to see them in the outpatient setting because he does not have any insurance and he owes them money.  He is only seen them in the hospital " "and has had problems.  He says he had a stent earlier this year.  Patient reports that the pain feels identical to previous kidney stone pain in the past.  He has not urinated since onset of pain so he is not sure if he has any blood in urine.  No fevers or chills although he did \"feel hot in the ambulance.\"  He has had nausea and vomiting.    PAST MEDICAL HISTORY   has a past medical history of Kidney stones and Seizure disorder (HCC).    SURGICAL HISTORY   has a past surgical history that includes lithotripsy; cysto/uretero/pyeloscopy, dx (Right, 3/13/2020); cystoscopy,insert ureteral stent (Right, 3/13/2020); lasertripsy (Right, 3/13/2020); cystoscopy,insert ureteral stent (Left, 4/26/2021); cysto/uretero/pyeloscopy, dx (Left, 4/26/2021); cystoscopy (Left, 02/20/2022); cystoscopy,insert ureteral stent (Left, 2/20/2022); cysto/uretero/pyeloscopy, dx (Left, 2/20/2022); lasertripsy (Left, 2/20/2022); cystoscopy with ureteral stent insertion or removal (Right, 4/24/2023); cystoscopy,insert ureteral stent (Left, 8/10/2023); cysto/uretero/pyeloscopy, dx (Left, 8/10/2023); cystoscopy,insert ureteral stent (Left, 1/22/2025); cysto/uretero/pyeloscopy, dx (Left, 1/22/2025); and lasertripsy (Left, 1/22/2025).    FAMILY HISTORY  No family history on file.    SOCIAL HISTORY  Social History     Tobacco Use    Smoking status: Some Days    Smokeless tobacco: Never   Vaping Use    Vaping status: Some Days    Substances: THC (Smokes multiple times a day everyday)    Devices: Disposable    Passive vaping exposure: Yes   Substance and Sexual Activity    Alcohol use: Yes    Drug use: Yes     Types: Inhaled     Comment: Marijuana, every day    Sexual activity: Not on file       CURRENT MEDICATIONS  Home Medications       Reviewed by Chase Johnson R.N. (Registered Nurse) on 05/31/25 at 1518  Med List Status: Not Addressed     Medication Last Dose Status   acetaminophen (TYLENOL) 325 MG Tab  Active   cyclobenzaprine (FLEXERIL) 5 mg tablet  " "Active   ondansetron (ZOFRAN ODT) 4 MG TABLET DISPERSIBLE  Active                    ALLERGIES  Allergies[1]    PHYSICAL EXAM  VITAL SIGNS: /89   Pulse 100   Temp 36.6 °C (97.9 °F) (Temporal)   Resp 17   Ht 1.676 m (5' 6\")   Wt 63.5 kg (140 lb)   SpO2 98%   BMI 22.60 kg/m²    Constitutional:  Well developed, well nourished; moderate distress due to pain  HENT: Normocephalic, Atraumatic, Bilateral external ears normal, slightly dry mucous membranes  Eyes: PERRL, EOMI, Conjunctiva normal, No discharge.   Neck: Normal range of motion, supple, nontender  Lymphatic: No lymphadenopathy noted.   Cardiovascular: Normal heart rate, Normal rhythm, No murmurs, rubs or gallops   Thorax & Lungs: CTA=bilaterally;  No respiratory distress, no wheezing rales, or rhonchi; No chest tenderness. No crepitus or subQ air  Abdomen: soft, good bowel sounds, no guarding no rebound, no masses, no pulsatile mass, left lower quadrant tenderness, no distention  Skin: Warm, Dry, No erythema, No rash.   Back: No tenderness, bilateral CVA tenderness, left greater than right.   Extremities: 2+ dp and pt pulses bilateral LEs;  Nontender; no pretibial edema  Neurologic: Alert & oriented x 4, clear speech,   Psychiatric: appropriate, normal affect     EKG/LABS  Results for orders placed or performed during the hospital encounter of 05/31/25   CBC WITH DIFFERENTIAL    Collection Time: 05/31/25  3:40 PM   Result Value Ref Range    WBC 16.6 (H) 4.8 - 10.8 K/uL    RBC 4.73 4.70 - 6.10 M/uL    Hemoglobin 15.0 14.0 - 18.0 g/dL    Hematocrit 45.2 42.0 - 52.0 %    MCV 95.6 81.4 - 97.8 fL    MCH 31.7 27.0 - 33.0 pg    MCHC 33.2 32.3 - 36.5 g/dL    RDW 47.0 35.9 - 50.0 fL    Platelet Count 287 164 - 446 K/uL    MPV 9.1 9.0 - 12.9 fL    Neutrophils-Polys 81.10 (H) 44.00 - 72.00 %    Lymphocytes 9.30 (L) 22.00 - 41.00 %    Monocytes 6.90 0.00 - 13.40 %    Eosinophils 1.80 0.00 - 6.90 %    Basophils 0.50 0.00 - 1.80 %    Immature Granulocytes 0.40 " 0.00 - 0.90 %    Nucleated RBC 0.00 0.00 - 0.20 /100 WBC    Neutrophils (Absolute) 13.47 (H) 1.82 - 7.42 K/uL    Lymphs (Absolute) 1.54 1.00 - 4.80 K/uL    Monos (Absolute) 1.15 (H) 0.00 - 0.85 K/uL    Eos (Absolute) 0.30 0.00 - 0.51 K/uL    Baso (Absolute) 0.08 0.00 - 0.12 K/uL    Immature Granulocytes (abs) 0.06 0.00 - 0.11 K/uL    NRBC (Absolute) 0.00 K/uL   COMP METABOLIC PANEL    Collection Time: 05/31/25  3:40 PM   Result Value Ref Range    Sodium 139 135 - 145 mmol/L    Potassium 4.1 3.6 - 5.5 mmol/L    Chloride 104 96 - 112 mmol/L    Co2 25 20 - 33 mmol/L    Anion Gap 10.0 7.0 - 16.0    Glucose 94 65 - 99 mg/dL    Bun 16 8 - 22 mg/dL    Creatinine 1.46 (H) 0.50 - 1.40 mg/dL    Calcium 9.4 8.5 - 10.5 mg/dL    Correct Calcium 9.1 8.5 - 10.5 mg/dL    AST(SGOT) 23 12 - 45 U/L    ALT(SGPT) 14 2 - 50 U/L    Alkaline Phosphatase 108 (H) 30 - 99 U/L    Total Bilirubin 0.3 0.1 - 1.5 mg/dL    Albumin 4.4 3.2 - 4.9 g/dL    Total Protein 8.0 6.0 - 8.2 g/dL    Globulin 3.6 (H) 1.9 - 3.5 g/dL    A-G Ratio 1.2 g/dL   LIPASE    Collection Time: 05/31/25  3:40 PM   Result Value Ref Range    Lipase 125 (H) 11 - 82 U/L   ESTIMATED GFR    Collection Time: 05/31/25  3:40 PM   Result Value Ref Range    GFR (CKD-EPI) 64 >60 mL/min/1.73 m 2   URINALYSIS    Collection Time: 05/31/25  4:20 PM    Specimen: Urine   Result Value Ref Range    Color Yellow     Character Clear     Specific Gravity 1.016 <1.035    Ph 8.5 (A) 5.0 - 8.0    Glucose Negative Negative mg/dL    Ketones Negative Negative mg/dL    Protein 30 (A) Negative mg/dL    Bilirubin Negative Negative    Urobilinogen, Urine 0.2 <=1.0 EU/dL    Nitrite Negative Negative    Leukocyte Esterase Moderate (A) Negative    Occult Blood Negative Negative    Micro Urine Req Microscopic    URINE MICROSCOPIC (W/UA)    Collection Time: 05/31/25  4:20 PM   Result Value Ref Range    WBC  (A) /hpf    RBC 0-2 0 - 2 /hpf    Bacteria None Seen None /hpf    Epithelial Cells 0-2 0 - 5 /hpf     Urine Casts 0-2 0 - 2 /lpf          RADIOLOGY/PROCEDURES   I have independently interpreted the diagnostic imaging associated with this visit and am waiting the final reading from the radiologist.     My preliminary interpretation is as follows: ER MD is reviewed the patient's CT scan.  He appears to have some left hydronephrosis.    Radiologist interpretation:  CT-RENAL COLIC EVALUATION(A/P W/O)   Final Result      1.  Obstructing stone at the left ureterovesical junction measuring 2.8 mm in diameter. Moderate left hydroureteronephrosis.   2.  Additional bilateral nonobstructing renal stones.          COURSE & MEDICAL DECISION MAKING    ASSESSMENT, COURSE AND PLAN  Care Narrative: Patient presents to the ER complaining of left flank pain and left lower abdominal pain which began 1 hour prior to arrival.  The patient has history of multiple kidney stones in the past.  He is had multiple urologic interventions for his kidney stones.  He most recently had hospitalizations back in January 2025 for kidney stone issues.  He follows with [facility] [place].  The patient feels that the pain is identical and reminiscent to previous kidney stone pain.  He is had nausea and vomiting.  He was getting his haircut when the pain started.  He appears quite uncomfortable upon arrival.  He was given Toradol with significant improvement in his pain.  He is no longer vomiting with the Zofran.  CT scan shows a 2.8 mm left UVJ stone with moderate left hydroureteronephrosis.  White count is a little high at 16,000.  I suspect this is due to some stress to margination from the vomiting and the pain.  He is afebrile.  Again, pain to started an hour ago.  Renal function is at baseline with a creatinine of 1.46.  Lipase is little high at 125, but I do not think this is a acute pancreatitis.  Urinalysis shows moderate leukocyte, which urologist that is common in stone passage.  He does have  WBCs, which is typically more WBCs than he  "normally has in his urine.  He is afebrile though.  He is well-appearing.  Pain is controlled.  Stone is small and at the distal ureter and should pass pretty easily.  Urologist does not feel patient has an infected ureteral stone at this time and does not feel patient needs to be admitted.  Patient is very well-appearing.  He feels comfortable going home.  He will go home with prescription for Flomax, Zofran, Percocet, and Bactrim.  He has been given strict return precautions and discharge instructions and he understands treatment plan and follow-up.    Hydration: Based on the patient's presentation of Acute Vomiting the patient was given IV fluids. IV Hydration was used because oral hydration was not as rapid as required. Upon recheck following hydration, the patient was  improved.    1820: Paged urology.      1825: Patient said his pain is gone.  He still feels a little bit of pressure down in his left suprapubic area.  He is no longer nauseated.  No vomiting.  He says \"I always have signs of urinary tract infection when I passed the stone.\"  He says Flomax works for him.  Will go ahead and order Flomax.  Awaiting urology callback.    1850: Discussed with Dr. Silver, urologist on-call.  He knows this patient well.  He said the patient usually always has some leukocyte and his urine, which is actually quite common when you are passing a stone.  Since he has had history of infected ureteral stones in the past requiring emergent stenting, he recommends sending the patient home with a short course of antibiotics such as Keflex, to keep the patient from getting infected since he is sometimes not very compliant with follow-up.  However, he feels that patient should pass this 2.8 mm stone without significant difficulty with some Flomax.  He would like to see the patient in the office in the next 1 week to do a repeat ultrasound.  He says the patient owes them money, but he is willing to work with the patient.  He just " needs to call to let them know that he is interested in getting on some sort of payment plan.    In prescribing controlled substances to this patient, I certify that I have obtained and reviewed the medical history of Russell Bunn. I have also made a good filiberto effort to obtain applicable records from other providers who have treated the patient and records did not demonstrate any increased risk of substance abuse that would prevent me from prescribing controlled substances.     I have conducted a physical exam and documented it. I have reviewed Mr. Bunn’s prescription history as maintained by the [place] Prescription Monitoring Program.     I have assessed the patient’s risk for abuse, dependency, and addiction using the validated Opioid Risk Tool available at https://www.mdcalc.com/wgglzb-lxef-xmkw-ort-narcotic-abuse.  Risk score is 1    Given the above, I believe the benefits of controlled substance therapy outweigh the risks. The reasons for prescribing controlled substances include in my professional opinion, controlled substances are the only reasonable choice for this patient because ureteral stones are very painful. Accordingly, I have discussed the risk and benefits, treatment plan, and alternative therapies with the patient.          DISPOSITION AND DISCUSSIONS  I have discussed management of the patient with the following physicians and RAJAT's: Urologist    Discussion of management with other QHP or appropriate source(s): None     Escalation of care considered, and ultimately not performed:acute inpatient care management, however at this time, the patient is most appropriate for outpatient management.  After discussion with urologist, patient is well-appearing.  He can be seen in the office in 1 week for repeat ultrasound.  They will see him despite him having an outstanding balance.  [facility] just [facility].  Low  suspicion for infected ureteral stone at this time per urologist.  No need for emergent trip to the operating room or admission to the hospital.    Barriers to care at this time, including but not limited to: Patient does not have insurance.     Decision tools and prescription drugs considered including, but not limited to: Antibiotics will send the patient home with 5 days of Bactrim per urologist recommendation as patient does have history of infected ureteral stones.  No evidence of infection at this time as patient is well-appearing, afebrile, and is only had pain for 1 hour prior to arrival.  Urinalysis is not suggestive of UTI at this time per urologist and okay to discharge home, but will cover with Bactrim prophylactically..    FINAL DIAGNOSIS  1. Ureteral stone Acute        This dictation has been created using voice recognition software. The accuracy of the dictation is limited by the abilities of the software. I expect there may be some errors of grammar and possibly content. I made every attempt to manually correct the errors within my dictation. However, errors related to voice recognition software may still exist and should be interpreted within the appropriate context.     Electronically signed by: Cely Chapin M.D., 5/31/2025 3:29 PM           [1]   Allergies  Allergen Reactions    Kiwi Extract Anaphylaxis    Shellfish Allergy Anaphylaxis    Ampicillin-Sulbactam Sodium Rash and Itching     Itching and rash with IV unasyn 8/17/2023    Cefazolin Rash and Itching     Rash  Tolerated ceftriaxone (April 2021)    Dilaudid [Hydromorphone] Itching    Potassium Iodide Itching     Severe itching    Tape Unspecified     Paper tape okay

## 2025-06-01 NOTE — ED NOTES
Pt understands DC instructions, strainer given, pt ambulated with steady gait to [facility] for DC, IV removed

## 2025-06-01 NOTE — DISCHARGE INSTRUCTIONS
Strain all urine until you have passed the stone.    Drink plenty of fluids to stay well-hydrated.    Return to the ER for any worsening pain, changing pain, recurrent nausea/vomiting, fevers over 100.4, shaking chills, cloudy or foul-smelling urine, pain with urination, muscle aches/body aches, or for any concerns/worsening.    Follow-up with Dr. Silver, urologist, within the next 1 week for repeat ultrasound.  Please call his office first thing Monday morning to schedule an appointment.  Please give them your new phone number.

## 2025-06-03 LAB
BACTERIA UR CULT: NORMAL
SIGNIFICANT IND 70042: NORMAL
SITE SITE: NORMAL
SOURCE SOURCE: NORMAL

## 2025-07-07 ENCOUNTER — PHARMACY VISIT (OUTPATIENT)
Dept: PHARMACY | Facility: MEDICAL CENTER | Age: 35
End: 2025-07-07
Payer: COMMERCIAL

## 2025-07-07 ENCOUNTER — HOSPITAL ENCOUNTER (EMERGENCY)
Facility: MEDICAL CENTER | Age: 35
End: 2025-07-07
Attending: EMERGENCY MEDICINE
Payer: MEDICAID

## 2025-07-07 ENCOUNTER — APPOINTMENT (OUTPATIENT)
Dept: RADIOLOGY | Facility: MEDICAL CENTER | Age: 35
End: 2025-07-07
Attending: EMERGENCY MEDICINE
Payer: MEDICAID

## 2025-07-07 VITALS
BODY MASS INDEX: 22.18 KG/M2 | RESPIRATION RATE: 18 BRPM | SYSTOLIC BLOOD PRESSURE: 135 MMHG | DIASTOLIC BLOOD PRESSURE: 70 MMHG | HEART RATE: 90 BPM | HEIGHT: 66 IN | WEIGHT: 138 LBS | OXYGEN SATURATION: 99 % | TEMPERATURE: 96.5 F

## 2025-07-07 DIAGNOSIS — R10.9 FLANK PAIN: Primary | ICD-10-CM

## 2025-07-07 LAB
ALBUMIN SERPL BCP-MCNC: 4.1 G/DL (ref 3.2–4.9)
ALBUMIN/GLOB SERPL: 1.3 G/DL
ALP SERPL-CCNC: 91 U/L (ref 30–99)
ALT SERPL-CCNC: 11 U/L (ref 2–50)
ANION GAP SERPL CALC-SCNC: 10 MMOL/L (ref 7–16)
APPEARANCE UR: CLEAR
AST SERPL-CCNC: 22 U/L (ref 12–45)
BACTERIA #/AREA URNS HPF: ABNORMAL /HPF
BASOPHILS # BLD AUTO: 0.7 % (ref 0–1.8)
BASOPHILS # BLD: 0.07 K/UL (ref 0–0.12)
BILIRUB SERPL-MCNC: 0.3 MG/DL (ref 0.1–1.5)
BILIRUB UR QL STRIP.AUTO: NEGATIVE
BUN SERPL-MCNC: 13 MG/DL (ref 8–22)
CALCIUM ALBUM COR SERPL-MCNC: 9.1 MG/DL (ref 8.5–10.5)
CALCIUM SERPL-MCNC: 9.2 MG/DL (ref 8.5–10.5)
CASTS URNS QL MICRO: ABNORMAL /LPF (ref 0–2)
CHLORIDE SERPL-SCNC: 105 MMOL/L (ref 96–112)
CO2 SERPL-SCNC: 24 MMOL/L (ref 20–33)
COLOR UR: YELLOW
CREAT SERPL-MCNC: 1.43 MG/DL (ref 0.5–1.4)
EOSINOPHIL # BLD AUTO: 0.85 K/UL (ref 0–0.51)
EOSINOPHIL NFR BLD: 8.3 % (ref 0–6.9)
EPITHELIAL CELLS 1715: ABNORMAL /HPF (ref 0–5)
ERYTHROCYTE [DISTWIDTH] IN BLOOD BY AUTOMATED COUNT: 47.3 FL (ref 35.9–50)
GFR SERPLBLD CREATININE-BSD FMLA CKD-EPI: 66 ML/MIN/1.73 M 2
GLOBULIN SER CALC-MCNC: 3.2 G/DL (ref 1.9–3.5)
GLUCOSE SERPL-MCNC: 84 MG/DL (ref 65–99)
GLUCOSE UR STRIP.AUTO-MCNC: NEGATIVE MG/DL
HCT VFR BLD AUTO: 44.1 % (ref 42–52)
HGB BLD-MCNC: 14.4 G/DL (ref 14–18)
IMM GRANULOCYTES # BLD AUTO: 0.02 K/UL (ref 0–0.11)
IMM GRANULOCYTES NFR BLD AUTO: 0.2 % (ref 0–0.9)
KETONES UR STRIP.AUTO-MCNC: NEGATIVE MG/DL
LEUKOCYTE ESTERASE UR QL STRIP.AUTO: ABNORMAL
LIPASE SERPL-CCNC: 70 U/L (ref 11–82)
LYMPHOCYTES # BLD AUTO: 3.11 K/UL (ref 1–4.8)
LYMPHOCYTES NFR BLD: 30.6 % (ref 22–41)
MCH RBC QN AUTO: 31.4 PG (ref 27–33)
MCHC RBC AUTO-ENTMCNC: 32.7 G/DL (ref 32.3–36.5)
MCV RBC AUTO: 96.3 FL (ref 81.4–97.8)
MICRO URNS: ABNORMAL
MONOCYTES # BLD AUTO: 0.82 K/UL (ref 0–0.85)
MONOCYTES NFR BLD AUTO: 8.1 % (ref 0–13.4)
NEUTROPHILS # BLD AUTO: 5.31 K/UL (ref 1.82–7.42)
NEUTROPHILS NFR BLD: 52.1 % (ref 44–72)
NITRITE UR QL STRIP.AUTO: NEGATIVE
NRBC # BLD AUTO: 0 K/UL
NRBC BLD-RTO: 0 /100 WBC (ref 0–0.2)
PH UR STRIP.AUTO: 7 [PH] (ref 5–8)
PLATELET # BLD AUTO: 283 K/UL (ref 164–446)
PMV BLD AUTO: 9.1 FL (ref 9–12.9)
POTASSIUM SERPL-SCNC: 4.6 MMOL/L (ref 3.6–5.5)
PROT SERPL-MCNC: 7.3 G/DL (ref 6–8.2)
PROT UR QL STRIP: NEGATIVE MG/DL
RBC # BLD AUTO: 4.58 M/UL (ref 4.7–6.1)
RBC # URNS HPF: ABNORMAL /HPF (ref 0–2)
RBC UR QL AUTO: ABNORMAL
SODIUM SERPL-SCNC: 139 MMOL/L (ref 135–145)
SP GR UR STRIP.AUTO: 1.02
UROBILINOGEN UR STRIP.AUTO-MCNC: 0.2 EU/DL
WBC # BLD AUTO: 10.2 K/UL (ref 4.8–10.8)
WBC #/AREA URNS HPF: ABNORMAL /HPF

## 2025-07-07 PROCEDURE — RXMED WILLOW AMBULATORY MEDICATION CHARGE: Performed by: EMERGENCY MEDICINE

## 2025-07-07 PROCEDURE — 700102 HCHG RX REV CODE 250 W/ 637 OVERRIDE(OP): Performed by: EMERGENCY MEDICINE

## 2025-07-07 PROCEDURE — 85025 COMPLETE CBC W/AUTO DIFF WBC: CPT

## 2025-07-07 PROCEDURE — 83690 ASSAY OF LIPASE: CPT

## 2025-07-07 PROCEDURE — 96361 HYDRATE IV INFUSION ADD-ON: CPT

## 2025-07-07 PROCEDURE — 36415 COLL VENOUS BLD VENIPUNCTURE: CPT

## 2025-07-07 PROCEDURE — A9270 NON-COVERED ITEM OR SERVICE: HCPCS | Performed by: EMERGENCY MEDICINE

## 2025-07-07 PROCEDURE — 80053 COMPREHEN METABOLIC PANEL: CPT

## 2025-07-07 PROCEDURE — 96375 TX/PRO/DX INJ NEW DRUG ADDON: CPT

## 2025-07-07 PROCEDURE — 81001 URINALYSIS AUTO W/SCOPE: CPT

## 2025-07-07 PROCEDURE — 96374 THER/PROPH/DIAG INJ IV PUSH: CPT

## 2025-07-07 PROCEDURE — 700111 HCHG RX REV CODE 636 W/ 250 OVERRIDE (IP): Mod: JZ | Performed by: EMERGENCY MEDICINE

## 2025-07-07 PROCEDURE — 87086 URINE CULTURE/COLONY COUNT: CPT

## 2025-07-07 PROCEDURE — 700105 HCHG RX REV CODE 258: Performed by: EMERGENCY MEDICINE

## 2025-07-07 PROCEDURE — 74176 CT ABD & PELVIS W/O CONTRAST: CPT

## 2025-07-07 PROCEDURE — 96376 TX/PRO/DX INJ SAME DRUG ADON: CPT

## 2025-07-07 PROCEDURE — 99284 EMERGENCY DEPT VISIT MOD MDM: CPT

## 2025-07-07 RX ORDER — MORPHINE SULFATE 4 MG/ML
4 INJECTION INTRAVENOUS ONCE
Status: COMPLETED | OUTPATIENT
Start: 2025-07-07 | End: 2025-07-07

## 2025-07-07 RX ORDER — ONDANSETRON 2 MG/ML
4 INJECTION INTRAMUSCULAR; INTRAVENOUS ONCE
Status: COMPLETED | OUTPATIENT
Start: 2025-07-07 | End: 2025-07-07

## 2025-07-07 RX ORDER — SULFAMETHOXAZOLE AND TRIMETHOPRIM 800; 160 MG/1; MG/1
1 TABLET ORAL ONCE
Status: COMPLETED | OUTPATIENT
Start: 2025-07-07 | End: 2025-07-07

## 2025-07-07 RX ORDER — TAMSULOSIN HYDROCHLORIDE 0.4 MG/1
0.4 CAPSULE ORAL DAILY
Qty: 7 CAPSULE | Refills: 0 | Status: SHIPPED | OUTPATIENT
Start: 2025-07-07 | End: 2025-07-14

## 2025-07-07 RX ORDER — ONDANSETRON 4 MG/1
4 TABLET, ORALLY DISINTEGRATING ORAL EVERY 8 HOURS PRN
Qty: 6 TABLET | Refills: 0 | Status: SHIPPED | OUTPATIENT
Start: 2025-07-07 | End: 2025-07-09

## 2025-07-07 RX ORDER — SODIUM CHLORIDE, SODIUM LACTATE, POTASSIUM CHLORIDE, CALCIUM CHLORIDE 600; 310; 30; 20 MG/100ML; MG/100ML; MG/100ML; MG/100ML
1000 INJECTION, SOLUTION INTRAVENOUS ONCE
Status: COMPLETED | OUTPATIENT
Start: 2025-07-07 | End: 2025-07-07

## 2025-07-07 RX ORDER — SULFAMETHOXAZOLE AND TRIMETHOPRIM 800; 160 MG/1; MG/1
1 TABLET ORAL 2 TIMES DAILY
Qty: 14 TABLET | Refills: 0 | Status: ACTIVE | OUTPATIENT
Start: 2025-07-07 | End: 2025-07-14

## 2025-07-07 RX ORDER — OXYCODONE AND ACETAMINOPHEN 5; 325 MG/1; MG/1
1 TABLET ORAL ONCE
Refills: 0 | Status: COMPLETED | OUTPATIENT
Start: 2025-07-07 | End: 2025-07-07

## 2025-07-07 RX ORDER — OXYCODONE AND ACETAMINOPHEN 5; 325 MG/1; MG/1
1 TABLET ORAL EVERY 6 HOURS PRN
Qty: 12 TABLET | Refills: 0 | Status: SHIPPED | OUTPATIENT
Start: 2025-07-07 | End: 2025-07-10

## 2025-07-07 RX ADMIN — SODIUM CHLORIDE, POTASSIUM CHLORIDE, SODIUM LACTATE AND CALCIUM CHLORIDE 1000 ML: 600; 310; 30; 20 INJECTION, SOLUTION INTRAVENOUS at 13:18

## 2025-07-07 RX ADMIN — ONDANSETRON 4 MG: 2 INJECTION INTRAMUSCULAR; INTRAVENOUS at 13:15

## 2025-07-07 RX ADMIN — MORPHINE SULFATE 4 MG: 4 INJECTION, SOLUTION INTRAMUSCULAR; INTRAVENOUS at 18:02

## 2025-07-07 RX ADMIN — SULFAMETHOXAZOLE AND TRIMETHOPRIM 1 TABLET: 800; 160 TABLET ORAL at 16:12

## 2025-07-07 RX ADMIN — MORPHINE SULFATE 4 MG: 4 INJECTION, SOLUTION INTRAMUSCULAR; INTRAVENOUS at 15:02

## 2025-07-07 RX ADMIN — MORPHINE SULFATE 4 MG: 4 INJECTION, SOLUTION INTRAMUSCULAR; INTRAVENOUS at 13:15

## 2025-07-07 RX ADMIN — OXYCODONE AND ACETAMINOPHEN 1 TABLET: 5; 325 TABLET ORAL at 18:02

## 2025-07-07 ASSESSMENT — FIBROSIS 4 INDEX: FIB4 SCORE: 0.73

## 2025-07-07 NOTE — ED NOTES
Pt to desk with SO reporting severe pain. Pt moaning on wheelchair. Pt grabbing his side. Vital signs checked. SO reporting this is what happens when he is in a lot of pain.

## 2025-07-07 NOTE — ED TRIAGE NOTES
P brought into triage via WC with c/o bilateral flank pain. Pt sts he has a hx of kidney stones and this current episode has been ongoing x's 1 week. Pt is in obvious discomfort in triage. Protocol orders placed. Pt is A&O and in a WC. Placed in lobby pending ER room.

## 2025-07-07 NOTE — ED PROVIDER NOTES
ED Provider Note    CHIEF COMPLAINT  Chief Complaint   Patient presents with    Flank Pain     bilateral       EXTERNAL RECORDS REVIEWED  Inpatient Notes: Patient was admitted in January 2025 for flank pain and nausea.  He has history of multiple kidney stones in the past, multiple cystoscopies, lithotripsy and bilateral stent placements.  He also has history of seizure disorder, and cocaine use. Patient was admitted from January 21 to January 23rd of 2025 for left-sided obstructive pyelonephritis due to a 7.1 mm left UPJ stone.  He underwent laser lithotripsy and J stent placement on January 22.        Patient was readmitted to the hospital January 31, 2025 through February 1, 2025 after developing worsening flank pain and chills after stent removal in the urology office on January 31.  CT scan showed hydronephrosis without any evidence of obstruction.  Patient was started on IV fluids and IV antibiotics but urology did not recommend any further intervention since patient was afebrile and pain was improving.    HPI/ROS  LIMITATION TO HISTORY   Select: : None  OUTSIDE HISTORIAN(S):  Significant other says that [facility].    Russell Bunn is a 35 y.o. male who presents to the ER with complaint of bilateral flank pain which has been going on for the last week, worse today.  Patient has a long history of kidney stones.  He reports 48 surgeries for kidney stones in the past.  He follows with [facility].  He had a stent earlier this year.  Patient reports the pain today is identical to his kidney stone pain from the past.  He has had nausea but no vomiting.  He reports some hematuria.  He also reports some dysuria.  Denies fever.  The patient is an significant pain and is not very communicative at this time.    PAST MEDICAL HISTORY   has a past medical history of Kidney stones and Seizure disorder (HCC).    SURGICAL HISTORY   has a  "past surgical history that includes lithotripsy; cysto/uretero/pyeloscopy, dx (Right, 3/13/2020); cystoscopy,insert ureteral stent (Right, 3/13/2020); lasertripsy (Right, 3/13/2020); cystoscopy,insert ureteral stent (Left, 4/26/2021); cysto/uretero/pyeloscopy, dx (Left, 4/26/2021); cystoscopy (Left, 02/20/2022); cystoscopy,insert ureteral stent (Left, 2/20/2022); cysto/uretero/pyeloscopy, dx (Left, 2/20/2022); lasertripsy (Left, 2/20/2022); cystoscopy with ureteral stent insertion or removal (Right, 4/24/2023); cystoscopy,insert ureteral stent (Left, 8/10/2023); cysto/uretero/pyeloscopy, dx (Left, 8/10/2023); cystoscopy,insert ureteral stent (Left, 1/22/2025); cysto/uretero/pyeloscopy, dx (Left, 1/22/2025); and lasertripsy (Left, 1/22/2025).    FAMILY HISTORY  History reviewed. No pertinent family history.    SOCIAL HISTORY  Social History     Tobacco Use    Smoking status: Some Days    Smokeless tobacco: Never   Vaping Use    Vaping status: Some Days    Substances: THC (Smokes multiple times a day everyday)    Devices: Disposable    Passive vaping exposure: Yes   Substance and Sexual Activity    Alcohol use: Yes    Drug use: Yes     Types: Inhaled     Comment: Marijuana, every day    Sexual activity: Not on file       CURRENT MEDICATIONS  Home Medications    **Home medications have not yet been reviewed for this encounter**         ALLERGIES  Allergies[1]    PHYSICAL EXAM  VITAL SIGNS: /70   Pulse 90   Temp 35.8 °C (96.5 °F) (Temporal)   Resp 18   Ht 1.676 m (5' 6\")   Wt 62.6 kg (138 lb)   SpO2 99%   BMI 22.27 kg/m²    Constitutional:  Well developed, well nourished; moderate to severe distress due to pain.  HENT: Normocephalic, Atraumatic, Bilateral external ears normal, Oropharynx moist, No erythema or exudates in posterior oropharynx.   Eyes: PERRL, EOMI, Conjunctiva normal, No discharge.   Neck: Normal range of motion, supple, nontender  Lymphatic: No lymphadenopathy noted.   Cardiovascular: Normal " heart rate, Normal rhythm, No murmurs, rubs or gallops   Thorax & Lungs: CTA=bilaterally;  No respiratory distress,  No wheezing rales, or rhonchi; No chest tenderness. No crepitus or subQ air  Abdomen: soft, good bowel sounds, no guarding no rebound, no masses, no pulsatile mass, diffuse tenderness with minimal palpation, no distention  Skin: Warm, Dry, No erythema, No rash.   Back: No tenderness, bilateral CVA tenderness with minimal percussion.   Extremities: 2+ dp and pt pulses bilateral LEs;  Nontender; no pretibial edema  Neurologic: Alert & oriented x 4, clear speech,   Psychiatric: appropriate, normal affect     EKG/LABS  Results for orders placed or performed during the hospital encounter of 07/07/25   CBC WITH DIFFERENTIAL    Collection Time: 07/07/25 11:54 AM   Result Value Ref Range    WBC 10.2 4.8 - 10.8 K/uL    RBC 4.58 (L) 4.70 - 6.10 M/uL    Hemoglobin 14.4 14.0 - 18.0 g/dL    Hematocrit 44.1 42.0 - 52.0 %    MCV 96.3 81.4 - 97.8 fL    MCH 31.4 27.0 - 33.0 pg    MCHC 32.7 32.3 - 36.5 g/dL    RDW 47.3 35.9 - 50.0 fL    Platelet Count 283 164 - 446 K/uL    MPV 9.1 9.0 - 12.9 fL    Neutrophils-Polys 52.10 44.00 - 72.00 %    Lymphocytes 30.60 22.00 - 41.00 %    Monocytes 8.10 0.00 - 13.40 %    Eosinophils 8.30 (H) 0.00 - 6.90 %    Basophils 0.70 0.00 - 1.80 %    Immature Granulocytes 0.20 0.00 - 0.90 %    Nucleated RBC 0.00 0.00 - 0.20 /100 WBC    Neutrophils (Absolute) 5.31 1.82 - 7.42 K/uL    Lymphs (Absolute) 3.11 1.00 - 4.80 K/uL    Monos (Absolute) 0.82 0.00 - 0.85 K/uL    Eos (Absolute) 0.85 (H) 0.00 - 0.51 K/uL    Baso (Absolute) 0.07 0.00 - 0.12 K/uL    Immature Granulocytes (abs) 0.02 0.00 - 0.11 K/uL    NRBC (Absolute) 0.00 K/uL   COMP METABOLIC PANEL    Collection Time: 07/07/25 11:54 AM   Result Value Ref Range    Sodium 139 135 - 145 mmol/L    Potassium 4.6 3.6 - 5.5 mmol/L    Chloride 105 96 - 112 mmol/L    Co2 24 20 - 33 mmol/L    Anion Gap 10.0 7.0 - 16.0    Glucose 84 65 - 99 mg/dL     Bun 13 8 - 22 mg/dL    Creatinine 1.43 (H) 0.50 - 1.40 mg/dL    Calcium 9.2 8.5 - 10.5 mg/dL    Correct Calcium 9.1 8.5 - 10.5 mg/dL    AST(SGOT) 22 12 - 45 U/L    ALT(SGPT) 11 2 - 50 U/L    Alkaline Phosphatase 91 30 - 99 U/L    Total Bilirubin 0.3 0.1 - 1.5 mg/dL    Albumin 4.1 3.2 - 4.9 g/dL    Total Protein 7.3 6.0 - 8.2 g/dL    Globulin 3.2 1.9 - 3.5 g/dL    A-G Ratio 1.3 g/dL   LIPASE    Collection Time: 07/07/25 11:54 AM   Result Value Ref Range    Lipase 70 11 - 82 U/L   ESTIMATED GFR    Collection Time: 07/07/25 11:54 AM   Result Value Ref Range    GFR (CKD-EPI) 66 >60 mL/min/1.73 m 2   URINALYSIS    Collection Time: 07/07/25  3:05 PM    Specimen: Urine   Result Value Ref Range    Color Yellow     Character Clear     Specific Gravity 1.018 <1.035    Ph 7.0 5.0 - 8.0    Glucose Negative Negative mg/dL    Ketones Negative Negative mg/dL    Protein Negative Negative mg/dL    Bilirubin Negative Negative    Urobilinogen, Urine 0.2 <=1.0 EU/dL    Nitrite Negative Negative    Leukocyte Esterase Small (A) Negative    Occult Blood Small (A) Negative    Micro Urine Req Microscopic    URINE MICROSCOPIC (W/UA)    Collection Time: 07/07/25  3:05 PM   Result Value Ref Range    WBC 11-20 (A) /hpf    RBC 11-20 (A) 0 - 2 /hpf    Bacteria None Seen None /hpf    Epithelial Cells 0-2 0 - 5 /hpf    Urine Casts 0-2 0 - 2 /lpf          RADIOLOGY/PROCEDURES   I have independently interpreted the diagnostic imaging associated with this visit and am waiting the final reading from the radiologist.     My preliminary interpretation is as follows: ER MD is reviewed the patient's CT scan.  No obvious hydronephrosis.    Radiologist interpretation:  CT-RENAL COLIC EVALUATION(A/P W/O)   Final Result      1.  No evidence of hydronephrosis or hydroureter.   2.  Small bilateral nonobstructive renal stones.          COURSE & MEDICAL DECISION MAKING    ASSESSMENT, COURSE AND PLAN  Care Narrative: Patient presents to the ER complaining of  bilateral flank pain for the last 1 week.  Pain got worse today.  Patient has a long history of kidney stones.  He says he has had over to 48 kidney stones.  He has had stenting as well as lithotripsy.  He is followed by urology [place].  His most recent stent was in January of this year.  His most recent identified ureteral stone was at the end of May of this year.  Patient says he has frequent episodes of kidney pain resulting from his kidney stone issues.  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  Patient presents in severe pain.  He was given morphine with improvement in his pain.  CT scan without contrast was performed and does not reveal any ureteral stone.  No hydronephrosis.  Laboratory evaluation reveals baseline kidney function.  White count is normal.  Patient is adamant that the pain he is experiencing today is exactly the same pain as he always has when he has his kidney stone pain.  The patient is adamant that his flank pain is related to the small nonobstructing stones which are sitting up in his kidneys.  Urinalysis reveals small leukocyte, small blood, some WBC and RBC.  There is no bacteria and no nitrite.  However, I gave the patient Bactrim in the event that he is UTI.  Culture is pending.  I spoke with Dr. Damon, urologist on-call.  He said patient could be passing small crystals and recommends I placed the patient on tamsulosin.  They will see the patient in the office in the next several days.  Patient's pain was improved with several rounds of morphine and oxycodone.  I will send the patient home with prescription for Percocet.  His vitals are stable.  He is feeling better.  He is safe for outpatient management discharge home.  He has been given strict return precautions and discharge instructions and he understands treatment plan and follow-up.    Hydration: Based on the patient's presentation of Dehydration  "the patient was given IV fluids. IV Hydration was used because oral hydration was not as rapid as required. Upon recheck following hydration, the patient was improved.      1440: Patient said his pain is a little bit better with morphine.  I explained to the patient that there is no evidence of ureteral stone on his CT scan today.  He says that he this pain is exactly like all of his \"kidney stone pain.\"  He says that when he has had this same pain in the past and his bilateral flank when the stones that are sitting up in his kidney start to hurt him.  He does not think there is anything else going on and that his typical kidney stone pain.  He denies any trauma or injury.  Denies cough, no runny nose or sore throat.  Denies coughing up phlegm.  Does not think he has the flu.    Patient is feeling a little bit better after the pain medicine.  The patient is expressing great frustration that he is always having so much kidney pain.  He believes his pain is related to the small nonobstructing kidney stones in the kidney.  He is adamant that I contact the urologist to find out if they will do lithotripsy on those stones that are up in his kidneys.  I explained to the patient that the urologist likely will not do anything about the nonobstructing stones in the kidneys, especially in a patient that is not septic or toxic, does not have any obstructive changes, and has baseline/normal renal function.  The patient is frustrated.  He says he has cystine stones and those are very uncommon and he believes that the urologist should be able to do something to help him.  At this point I told him I would contact urology on-call to see if they would be able to see him in the office expeditiously to address his concerns.  We discussed the importance of following up with a primary care physician as well as a pain management doctor.    Discussed with Dr. Pabon, urologist on-call.  We discussed the patient's concerns.  He recommends " placing the patient on tamsulosin as he may be passing small crystals causing his pain.  He will put the patient on the list for his office staff to call tomorrow to get an appointment within the next 1 to 2 days.  He said there is no need to do anything about the nonobstructing stone sitting up in the kidneys.        DISPOSITION AND DISCUSSIONS  I have discussed management of the patient with the following physicians and RAJAT's: Urology    Discussion of management with other QHP or appropriate source(s): None     Escalation of care considered, and ultimately not performed:  at this time there are no findings on CT scan or laboratory evaluation that would warrant hospitalization.    Barriers to care at this time, including but not limited to: Patient does not have a PMD.    Decision tools and prescription drugs considered including, but not limited to: Antibiotics patient has some WBCs and RBCs in his urine.  I will go ahead and give the patient Bactrim in the event that he has a urinary tract infection.    In prescribing controlled substances to this patient, I certify that I have obtained and reviewed the medical history of Russell Salvatore Bunn. I have also made a good filiberto effort to obtain applicable records from other providers who have treated the patient and records did not demonstrate any increased risk of substance abuse that would prevent me from prescribing controlled substances.     I have conducted a physical exam and documented it. I have reviewed Mr. Bunn’s prescription history as maintained by the [place] Prescription Monitoring Program.  No patterns for concern    I have assessed the patient’s risk for abuse, dependency, and addiction using the validated Opioid Risk Tool available at https://www.mdcalc.com/zqkchp-drwb-tmdl-ort-narcotic-abuse.  Risk score is 1    Given the above, I believe the benefits of controlled substance therapy outweigh the risks. The reasons for prescribing controlled  substances include non-narcotic, oral analgesic alternatives have been inadequate for pain control. Accordingly, I have discussed the risk and benefits, treatment plan, and alternative therapies with the patient.      FINAL DIAGNOSIS  1. Flank pain Acute        This dictation has been created using voice recognition software. The accuracy of the dictation is limited by the abilities of the software. I expect there may be some errors of grammar and possibly content. I made every attempt to manually correct the errors within my dictation. However, errors related to voice recognition software may still exist and should be interpreted within the appropriate context.     Electronically signed by: Cely Chapin M.D., 7/7/2025 12:49 PM           [1]   Allergies  Allergen Reactions    Kiwi Extract Anaphylaxis    Shellfish Allergy Anaphylaxis    Ampicillin-Sulbactam Sodium Rash and Itching     Itching and rash with IV unasyn 8/17/2023    Cefazolin Rash and Itching     Rash  Tolerated ceftriaxone (April 2021)    Dilaudid [Hydromorphone] Itching    Potassium Iodide Itching     Severe itching    Tape Unspecified     Paper tape okay

## 2025-07-08 NOTE — ED NOTES
Discharge instructions provided to and reviewed with patient. Patient to follow with Urology as discussed and PCP as needed. Patient agreeable to DC plan. Discussed to return to ER if symptoms worsen. Patient verbalized understanding. Pt taken to pharmacy via WC by significant other.

## 2025-07-08 NOTE — DISCHARGE INSTRUCTIONS
Return to the ER for any worsening pain, changing pain, fevers over 100.4, shaking chills, nausea/vomiting, pain with urination, cloudy or foul-smelling urine, blood in urine, or for any concerns/worsening.    Drink plenty of fluids to stay well-hydrated.    Follow-up with Dr. Silver within the next 1 to 2 days.  Please call for appointment.

## 2025-07-09 LAB
BACTERIA UR CULT: NORMAL
SIGNIFICANT IND 70042: NORMAL
SITE SITE: NORMAL
SOURCE SOURCE: NORMAL

## 2025-07-14 ENCOUNTER — HOSPITAL ENCOUNTER (OUTPATIENT)
Facility: MEDICAL CENTER | Age: 35
End: 2025-07-14
Attending: PHYSICIAN ASSISTANT
Payer: MEDICAID

## 2025-07-14 PROCEDURE — 87086 URINE CULTURE/COLONY COUNT: CPT

## 2025-07-17 LAB
BACTERIA UR CULT: NORMAL
SIGNIFICANT IND 70042: NORMAL
SITE SITE: NORMAL
SOURCE SOURCE: NORMAL

## 2025-08-28 ENCOUNTER — HOSPITAL ENCOUNTER (OUTPATIENT)
Dept: RADIOLOGY | Facility: MEDICAL CENTER | Age: 35
End: 2025-08-28
Attending: PHYSICIAN ASSISTANT
Payer: MEDICAID

## 2025-08-28 DIAGNOSIS — N20.0 CALCULUS OF KIDNEY: ICD-10-CM

## 2025-08-28 PROCEDURE — 76775 US EXAM ABDO BACK WALL LIM: CPT

## (undated) DEVICE — SUCTION INSTRUMENT YANKAUER BULBOUS TIP W/O VENT (50EA/CA)

## (undated) DEVICE — GOWN SURGICAL X-LARGE ULTRA - FILM-REINFORCED (20/CA)

## (undated) DEVICE — TUBING CLEARLINK DUO-VENT - C-FLO (48EA/CA)

## (undated) DEVICE — HEAD HOLDER JUNIOR/ADULT

## (undated) DEVICE — COVER LIGHT HANDLE ALC PLUS DISP (18EA/BX)

## (undated) DEVICE — CANISTER SUCTION 3000ML MECHANICAL FILTER AUTO SHUTOFF MEDI-VAC NONSTERILE LF DISP  (40EA/CA)

## (undated) DEVICE — JELLY SURGILUBE STERILE TUBE 4.25 OZ (1/EA)

## (undated) DEVICE — TUBE CONNECT SUCTION CLEAR 120 X 1/4" (50EA/CA)"

## (undated) DEVICE — SENSOR SPO2 NEO LNCS ADHESIVE (20/BX) SEE USER NOTES

## (undated) DEVICE — SLEEVE VASO CALF MED - (10PR/CA)

## (undated) DEVICE — BAG URODRAIN WITH TUBING - (20/CA)

## (undated) DEVICE — GLOVE BIOGEL SZ 7 SURGICAL PF LTX - (50PR/BX 4BX/CA)

## (undated) DEVICE — PACK CYSTOSCOPY III - (8/CA)

## (undated) DEVICE — ZIPWIRE .038 STRAIGHT BENTSON TIP (5EA/BX)

## (undated) DEVICE — GOWN WARMING STANDARD FLEX - (30/CA)

## (undated) DEVICE — SPONGE GAUZESTER 4 X 4 4PLY - (128PK/CA)

## (undated) DEVICE — SLEEVE, VASO, THIGH, MED

## (undated) DEVICE — TOWEL STOP TIMEOUT SAFETY FLAG (40EA/CA)

## (undated) DEVICE — SYRINGE DISP. 12 CC LL - (100/BX)

## (undated) DEVICE — COVER FOOT UNIVERSAL DISP. - (25EA/CA)

## (undated) DEVICE — WIRE GUIDE SENSOR DUAL FLEX - 5/BX

## (undated) DEVICE — WATER IRRIG. STER 3000 ML - (4/CA)

## (undated) DEVICE — FIBER LASER MOSES 365 UM (1/EA)

## (undated) DEVICE — SODIUM CHL. IRRIGATION 0.9% 3000ML (4EA/CA 65CA/PF)

## (undated) DEVICE — BASKET ZERO 1.9FR X 120CM

## (undated) DEVICE — LACTATED RINGERS INJ 1000 ML - (14EA/CA 60CA/PF)

## (undated) DEVICE — CONTAINER SPECIMEN BAG OR - STERILE 4 OZ W/LID (100EA/CA)

## (undated) DEVICE — SET IRRIGATION CYSTOSCOPY Y-TYPE L81 IN (20EA/CA)

## (undated) DEVICE — ADAPTOR UROLOK - 10/BX

## (undated) DEVICE — GOWN SURGEONS LARGE - (32/CA)

## (undated) DEVICE — MEDICINE CUP STERILE 2 OZ - (100/CA)

## (undated) DEVICE — CONNECTOR HOSE NEPTUNE FOR CYSTO ROOM

## (undated) DEVICE — KIT ROOM DECONTAMINATION

## (undated) DEVICE — GLOVE BIOGEL PI INDICATOR SZ 7.5 SURGICAL PF LF -(50/BX 4BX/CA)

## (undated) DEVICE — CLOSURE SKIN STRIP 1/2 X 4 IN - (STERI STRIP) (50/BX 4BX/CA)

## (undated) DEVICE — DILATOR NOTTINGHAM 6F-10FRX70CM

## (undated) DEVICE — MASK ANESTHESIA ADULT  - (100/CA)

## (undated) DEVICE — PACK CYSTO III (2EA/CA)

## (undated) DEVICE — SENSOR OXIMETER ADULT SPO2 RD SET (20EA/BX)

## (undated) DEVICE — BAG DRAINAGE LINGEMAN CYSTO FOR GE/OEC 2600/2800 TABLES (20EA/CA)

## (undated) DEVICE — WATER IRRIGATION STERILE 1000ML (12EA/CA)

## (undated) DEVICE — KIT ANESTHESIA W/CIRCUIT & 3/LT BAG W/FILTER (20EA/CA)

## (undated) DEVICE — SET EXTENSION WITH 2 PORTS (48EA/CA) ***PART #2C8610 IS A SUBSTITUTE*****

## (undated) DEVICE — FIBER LASER MOSES 200 UM (1/EA)

## (undated) DEVICE — GOWN SURGICAL XX-LARGE - (28EA/CA) SIRUS NON REINFORCED

## (undated) DEVICE — PROTECTOR ULNA NERVE - (36PR/CA)

## (undated) DEVICE — ELECTRODE 850 FOAM ADHESIVE - HYDROGEL RADIOTRNSPRNT (50/PK)

## (undated) DEVICE — CANISTER SUCTION 3000ML MECHANICAL FILTER AUTO SHUTOFF MEDI-VAC NONSTERILE LF DISP (40EA/CA)

## (undated) DEVICE — GLOVE SZ 6 BIOGEL PI MICRO - PF LF (50PR/BX 4BX/CA)

## (undated) DEVICE — SET LEADWIRE 5 LEAD BEDSIDE DISPOSABLE ECG (1SET OF 5/EA)

## (undated) DEVICE — CATHETER URET DUAL LUMEN

## (undated) DEVICE — CATHETER URETHRAL OPEN END AXXCESS (10EA/BX)

## (undated) DEVICE — SLEEVE VASO DVT COMPRESSION CALF MED - (10PR/CA)

## (undated) DEVICE — ADHESIVE MASTISOL - (48/BX)

## (undated) DEVICE — GLOVE BIOGEL PI INDICATOR SZ 6.5 SURGICAL PF LF - (50/BX 4BX/CA)

## (undated) DEVICE — GLOVE BIOGEL PI INDICATOR SZ 7.0 SURGICAL PF LF - (50/BX 4BX/CA)

## (undated) DEVICE — GLOVE BIOGEL SZ 7.5 SURGICAL PF LTX - (50PR/BX 4BX/CA)

## (undated) DEVICE — SCOPE DIGITAL URETEROSCOPE DISPOSABLE

## (undated) DEVICE — NEPTUNE 4 PORT MANIFOLD - (20/PK)

## (undated) DEVICE — STERI STRIP COMPOUND BENZOIN - TINCTURE 0.6ML WITH APPLICATOR (40EA/BX)

## (undated) DEVICE — SCOPE DIGITAL URETEROSCOPE DISPOSABLE (10EA/PK)

## (undated) DEVICE — GOWN SURGEONS X-LARGE - DISP. (30/CA)

## (undated) DEVICE — SYRINGE 20 ML LL (50EA/BX 4BX/CA)

## (undated) DEVICE — SHEATH ACCESS URETERAL NAVIGATOR HD STAINLESS STEEL HYDROPHILIC L36 CM OD11-13 FR (1EA)

## (undated) DEVICE — GLOVE BIOGEL INDICATOR SZ 6.5 SURGICAL PF LTX - (50PR/BX 4BX/CA)

## (undated) DEVICE — TUBE E-T HI-LO CUFF 7.5MM (10EA/PK)

## (undated) DEVICE — GLOVE BIOGEL PI INDICATOR SZ 8.5 SURGICAL PF LF - (50PR/BX 4BX/CA)